# Patient Record
Sex: FEMALE | Race: WHITE | NOT HISPANIC OR LATINO | Employment: OTHER | URBAN - METROPOLITAN AREA
[De-identification: names, ages, dates, MRNs, and addresses within clinical notes are randomized per-mention and may not be internally consistent; named-entity substitution may affect disease eponyms.]

---

## 2017-03-23 RX ORDER — ATENOLOL 25 MG/1
TABLET ORAL
Qty: 90 TABLET | Refills: 0 | Status: SHIPPED | OUTPATIENT
Start: 2017-03-23 | End: 2017-06-05 | Stop reason: SDUPTHER

## 2017-03-23 RX ORDER — ATENOLOL 50 MG/1
TABLET ORAL
Qty: 90 TABLET | Refills: 0 | Status: SHIPPED | OUTPATIENT
Start: 2017-03-23 | End: 2017-06-05 | Stop reason: SDUPTHER

## 2017-03-23 RX ORDER — ZOLPIDEM TARTRATE 5 MG/1
TABLET ORAL
Qty: 15 TABLET | Refills: 0 | Status: SHIPPED | OUTPATIENT
Start: 2017-03-23 | End: 2017-06-05 | Stop reason: SDUPTHER

## 2017-04-13 ENCOUNTER — TELEPHONE (OUTPATIENT)
Dept: INTERNAL MEDICINE | Facility: CLINIC | Age: 82
End: 2017-04-13

## 2017-04-13 NOTE — TELEPHONE ENCOUNTER
----- Message from Mayela Gordon sent at 4/13/2017  3:27 PM CDT -----  Contact: Patient  Type: Sooner appointment than  is able to schedule    When is the first available appointment?  7/10/17    What is the nature of the appointment? Follow up     What appointment type:EP     Comments: The patient will be gone July, August, part of September, and October. Please call her at [phone].    Thanks!

## 2017-06-05 ENCOUNTER — OFFICE VISIT (OUTPATIENT)
Dept: INTERNAL MEDICINE | Facility: CLINIC | Age: 82
End: 2017-06-05
Payer: MEDICARE

## 2017-06-05 ENCOUNTER — HOSPITAL ENCOUNTER (OUTPATIENT)
Dept: RADIOLOGY | Facility: HOSPITAL | Age: 82
Discharge: HOME | End: 2017-06-05
Attending: INTERNAL MEDICINE
Payer: MEDICARE

## 2017-06-05 VITALS
HEART RATE: 56 BPM | SYSTOLIC BLOOD PRESSURE: 145 MMHG | HEIGHT: 66 IN | BODY MASS INDEX: 31.22 KG/M2 | WEIGHT: 194.25 LBS | DIASTOLIC BLOOD PRESSURE: 67 MMHG

## 2017-06-05 DIAGNOSIS — I10 ESSENTIAL HYPERTENSION: Primary | ICD-10-CM

## 2017-06-05 DIAGNOSIS — R52 PAIN: ICD-10-CM

## 2017-06-05 DIAGNOSIS — E78.5 HYPERLIPIDEMIA, UNSPECIFIED HYPERLIPIDEMIA TYPE: ICD-10-CM

## 2017-06-05 DIAGNOSIS — L98.9 FOOT LESION: ICD-10-CM

## 2017-06-05 DIAGNOSIS — E03.4 HYPOTHYROIDISM DUE TO ACQUIRED ATROPHY OF THYROID: ICD-10-CM

## 2017-06-05 PROCEDURE — 99999 PR PBB SHADOW E&M-EST. PATIENT-LVL V: CPT | Mod: PBBFAC,,, | Performed by: INTERNAL MEDICINE

## 2017-06-05 PROCEDURE — 73521 X-RAY EXAM HIPS BI 2 VIEWS: CPT | Mod: 26,,, | Performed by: RADIOLOGY

## 2017-06-05 PROCEDURE — 1159F MED LIST DOCD IN RCRD: CPT | Mod: S$GLB,,, | Performed by: INTERNAL MEDICINE

## 2017-06-05 PROCEDURE — 73030 X-RAY EXAM OF SHOULDER: CPT | Mod: TC,50

## 2017-06-05 PROCEDURE — 1157F ADVNC CARE PLAN IN RCRD: CPT | Mod: S$GLB,,, | Performed by: INTERNAL MEDICINE

## 2017-06-05 PROCEDURE — 73521 X-RAY EXAM HIPS BI 2 VIEWS: CPT | Mod: TC

## 2017-06-05 PROCEDURE — 73030 X-RAY EXAM OF SHOULDER: CPT | Mod: 26,50,, | Performed by: RADIOLOGY

## 2017-06-05 PROCEDURE — 99499 UNLISTED E&M SERVICE: CPT | Mod: S$GLB,,, | Performed by: INTERNAL MEDICINE

## 2017-06-05 PROCEDURE — 99214 OFFICE O/P EST MOD 30 MIN: CPT | Mod: S$GLB,,, | Performed by: INTERNAL MEDICINE

## 2017-06-05 PROCEDURE — 1125F AMNT PAIN NOTED PAIN PRSNT: CPT | Mod: S$GLB,,, | Performed by: INTERNAL MEDICINE

## 2017-06-05 RX ORDER — PRAVASTATIN SODIUM 40 MG/1
40 TABLET ORAL NIGHTLY
Qty: 90 TABLET | Refills: 3 | Status: SHIPPED | OUTPATIENT
Start: 2017-06-05 | End: 2017-06-05 | Stop reason: SDUPTHER

## 2017-06-05 RX ORDER — LOSARTAN POTASSIUM 100 MG/1
100 TABLET ORAL DAILY
Qty: 90 TABLET | Refills: 0 | Status: SHIPPED | OUTPATIENT
Start: 2017-06-05 | End: 2017-09-18 | Stop reason: SDUPTHER

## 2017-06-05 RX ORDER — LEVOTHYROXINE SODIUM 100 UG/1
100 TABLET ORAL DAILY
Qty: 90 TABLET | Refills: 3 | Status: SHIPPED | OUTPATIENT
Start: 2017-06-05 | End: 2017-06-05 | Stop reason: SDUPTHER

## 2017-06-05 RX ORDER — LEVOTHYROXINE SODIUM 100 UG/1
100 TABLET ORAL DAILY
Qty: 90 TABLET | Refills: 3 | Status: SHIPPED | OUTPATIENT
Start: 2017-06-05 | End: 2017-09-18 | Stop reason: SDUPTHER

## 2017-06-05 RX ORDER — ATENOLOL 25 MG/1
25 TABLET ORAL EVERY MORNING
Qty: 90 TABLET | Refills: 0 | Status: SHIPPED | OUTPATIENT
Start: 2017-06-05 | End: 2017-06-05 | Stop reason: SDUPTHER

## 2017-06-05 RX ORDER — LOSARTAN POTASSIUM 100 MG/1
100 TABLET ORAL DAILY
Qty: 90 TABLET | Refills: 0 | Status: SHIPPED | OUTPATIENT
Start: 2017-06-05 | End: 2017-06-05 | Stop reason: SDUPTHER

## 2017-06-05 RX ORDER — INDAPAMIDE 2.5 MG/1
2.5 TABLET ORAL DAILY
Qty: 30 TABLET | Refills: 11 | Status: SHIPPED | OUTPATIENT
Start: 2017-06-05 | End: 2017-06-09 | Stop reason: SDUPTHER

## 2017-06-05 RX ORDER — ATENOLOL 50 MG/1
50 TABLET ORAL NIGHTLY
Qty: 90 TABLET | Refills: 0 | Status: SHIPPED | OUTPATIENT
Start: 2017-06-05 | End: 2017-06-05 | Stop reason: SDUPTHER

## 2017-06-05 RX ORDER — PRAVASTATIN SODIUM 40 MG/1
40 TABLET ORAL NIGHTLY
Qty: 90 TABLET | Refills: 3 | Status: SHIPPED | OUTPATIENT
Start: 2017-06-05 | End: 2017-09-18 | Stop reason: SDUPTHER

## 2017-06-05 RX ORDER — INDAPAMIDE 2.5 MG/1
2.5 TABLET ORAL DAILY
Qty: 30 TABLET | Refills: 11 | Status: SHIPPED | OUTPATIENT
Start: 2017-06-05 | End: 2017-06-05 | Stop reason: SDUPTHER

## 2017-06-05 RX ORDER — MELATONIN 10 MG
1 CAPSULE ORAL NIGHTLY
COMMUNITY
End: 2018-09-11

## 2017-06-05 RX ORDER — ATENOLOL 50 MG/1
50 TABLET ORAL NIGHTLY
Qty: 90 TABLET | Refills: 0 | Status: SHIPPED | OUTPATIENT
Start: 2017-06-05 | End: 2017-09-11

## 2017-06-05 RX ORDER — ATENOLOL 25 MG/1
25 TABLET ORAL EVERY MORNING
Qty: 90 TABLET | Refills: 0 | Status: SHIPPED | OUTPATIENT
Start: 2017-06-05 | End: 2017-09-18 | Stop reason: SDUPTHER

## 2017-06-05 NOTE — PROGRESS NOTES
Subjective:       Patient ID: Tiffany Masterson is a 81 y.o. female.    Chief Complaint: Follow-up    HPI the patient is a 81-year-old female comes in for general checkup.  She has several concerns at this time.  She is noticing a lesion in between her fourth and fifth toes on the left foot.  She is put a small pad there and suspects it might be a corn.  The patient reports problems with constipation she has been using Dulcolax periodically about every 2-3 days.  She is not noticing any blood in the stool.  The patient reports pain in her left hip which is status post a total hip replacement.  She also notices pain in the right hip.  She has had no trauma or falls.  The patient also expresses pain in both shoulders.  Review of Systems   Constitutional: Negative for fatigue, fever and unexpected weight change.   HENT: Negative for congestion, hearing loss and sore throat.    Eyes: Negative for visual disturbance.   Respiratory: Negative for cough, shortness of breath and wheezing.    Cardiovascular: Negative for chest pain and palpitations.   Gastrointestinal: Negative for abdominal distention, abdominal pain, blood in stool, diarrhea, nausea and vomiting.   Genitourinary: Negative for difficulty urinating, dysuria, frequency and hematuria.   Musculoskeletal: Positive for arthralgias. Negative for joint swelling.   Skin: Negative for color change and rash.   Neurological: Negative for dizziness, syncope, weakness and headaches.   Hematological: Negative for adenopathy.   Psychiatric/Behavioral: Negative for confusion, decreased concentration and suicidal ideas.       Objective:      Physical Exam   Constitutional: She is oriented to person, place, and time. She appears well-developed and well-nourished.   HENT:   Head: Normocephalic.   Mouth/Throat: No oropharyngeal exudate.   Eyes: Conjunctivae and EOM are normal. Pupils are equal, round, and reactive to light. Right eye exhibits no discharge. Left eye exhibits no  discharge. No scleral icterus.   Neck: No JVD present. No tracheal deviation present. No thyromegaly present.   Cardiovascular: Normal rate, regular rhythm and normal heart sounds.  Exam reveals no gallop and no friction rub.    No murmur heard.  Pulmonary/Chest: Effort normal and breath sounds normal. No respiratory distress. She has no wheezes. She has no rales. She exhibits no tenderness.   Abdominal: Soft. Bowel sounds are normal. She exhibits no distension and no mass. There is no tenderness. There is no rebound and no guarding.   Musculoskeletal: Normal range of motion. She exhibits no edema or tenderness.   Crepitance noticed in the shoulders bilaterally.   Lymphadenopathy:     She has no cervical adenopathy.   Neurological: She is alert and oriented to person, place, and time. She displays normal reflexes. No cranial nerve deficit. She exhibits normal muscle tone. Coordination normal.   Skin: Skin is warm and dry. No rash noted. No erythema. No pallor.   A small verrucous-like lesion is present between the left fourth and fifth toe   Psychiatric: She has a normal mood and affect. Her behavior is normal. Judgment and thought content normal.       Assessment:       1. Essential hypertension    2. Hyperlipidemia, unspecified hyperlipidemia type    3. Hypothyroidism due to acquired atrophy of thyroid    4. Pain    5. Foot lesion        Plan:       1.  Refill medications  2.  Intensify antihypertensive therapy by adding indapamide 2.5 mg by mouth daily  3.  Bilateral shoulder x-rays  4.  Bilateral hip x-rays  5.  Refer to podiatry  6.  CBC, CMP  7.  Return to clinic in 2 months for follow-up of blood pressure

## 2017-06-06 ENCOUNTER — PATIENT MESSAGE (OUTPATIENT)
Dept: INTERNAL MEDICINE | Facility: CLINIC | Age: 82
End: 2017-06-06

## 2017-06-06 RX ORDER — ZOLPIDEM TARTRATE 5 MG/1
TABLET ORAL
Qty: 15 TABLET | Refills: 0 | Status: SHIPPED | OUTPATIENT
Start: 2017-06-06 | End: 2017-09-07 | Stop reason: SDUPTHER

## 2017-06-10 RX ORDER — INDAPAMIDE 2.5 MG/1
2.5 TABLET ORAL DAILY
Qty: 90 TABLET | Refills: 0 | Status: SHIPPED | OUTPATIENT
Start: 2017-06-10 | End: 2017-09-18 | Stop reason: SDUPTHER

## 2017-06-13 ENCOUNTER — OFFICE VISIT (OUTPATIENT)
Dept: PODIATRY | Facility: CLINIC | Age: 82
End: 2017-06-13
Payer: MEDICARE

## 2017-06-13 VITALS
SYSTOLIC BLOOD PRESSURE: 145 MMHG | DIASTOLIC BLOOD PRESSURE: 67 MMHG | HEART RATE: 60 BPM | WEIGHT: 194 LBS | BODY MASS INDEX: 31.18 KG/M2 | HEIGHT: 66 IN

## 2017-06-13 DIAGNOSIS — M79.675 PAIN OF TOE OF LEFT FOOT: ICD-10-CM

## 2017-06-13 DIAGNOSIS — L84 CORNS AND CALLUS: Primary | ICD-10-CM

## 2017-06-13 PROCEDURE — 99999 PR PBB SHADOW E&M-EST. PATIENT-LVL III: CPT | Mod: PBBFAC,,, | Performed by: PODIATRIST

## 2017-06-13 PROCEDURE — 1157F ADVNC CARE PLAN IN RCRD: CPT | Mod: S$GLB,,, | Performed by: PODIATRIST

## 2017-06-13 PROCEDURE — 1159F MED LIST DOCD IN RCRD: CPT | Mod: S$GLB,,, | Performed by: PODIATRIST

## 2017-06-13 PROCEDURE — 1126F AMNT PAIN NOTED NONE PRSNT: CPT | Mod: S$GLB,,, | Performed by: PODIATRIST

## 2017-06-13 PROCEDURE — 99214 OFFICE O/P EST MOD 30 MIN: CPT | Mod: S$GLB,,, | Performed by: PODIATRIST

## 2017-06-13 NOTE — LETTER
June 13, 2017      Lauro Vera Jr., MD  [address]  [facility] [address]           [place] [facility]  [address] [place] [address]  Phone: [phone]          Patient: Tiffany Masterson   MR Number: [id number]   YOB: 1935   Date of Visit: 6/13/2017       Dear Dr. Lauro Vera Jr.:    Thank you for referring Tiffany Masterson to me for evaluation. Attached you will find relevant portions of my assessment and plan of care.    If you have questions, please do not hesitate to call me. I look forward to following Tiffany Masterson along with you.    Sincerely,    Francine Sunshine DPM    Enclosure  CC:  No Recipients    If you would like to receive this communication electronically, please contact [email or web address] or [phone] to request more information on [facility] access.    For providers and/or their staff who would like to refer a patient to [facility], please contact us through our one-stop-shop provider referral line, [facility], at [phone].    If you feel you have received this communication in error or would no longer like to receive these types of communications, please e-mail [email or web address]

## 2017-06-13 NOTE — PROGRESS NOTES
CC:    Callus/corn      HPI:   Tiffany Masterson is a 81 y.o. female who presents to clinic with complaints of a painful corn on the left foot.   Patient reports pain has been present for a few months.   Treatment has included putting a cotton ball in between the toes.  Denies trauma to the feet.    She gets a pedicure about every month.       PMH:  Patient Active Problem List   Diagnosis    Hyperlipidemia    S/P colonoscopic polypectomy    TIA (transient ischemic attack)    Hypothyroidism    Essential hypertension    Vitamin D deficiency disease    CKD (chronic kidney disease) stage 3, GFR 30-59 ml/min    Atherosclerosis of right carotid artery    Osteopenia         Current Outpatient Prescriptions on File Prior to Visit   Medication Sig Dispense Refill    ascorbic acid (VITAMIN C) 250 MG tablet Take 250 mg by mouth once daily.      aspirin (ECOTRIN) 81 MG EC tablet Take 81 mg by mouth once daily.      atenolol (TENORMIN) 25 MG tablet Take 1 tablet (25 mg total) by mouth every morning. 90 tablet 0    atenolol (TENORMIN) 50 MG tablet Take 1 tablet (50 mg total) by mouth every evening. 90 tablet 0    CALCIUM CARBONATE/VITAMIN D3 (CALCIUM 600 + D,3, ORAL) Take 1 tablet by mouth once daily.       DEXTRAN 70/HYPROMELLOSE (ARTIFICIAL TEARS, PF, OPHT) Apply to eye.      DOCUSATE SODIUM (DULCOLAX STOOL SOFTENER, DSS, ORAL) Take by mouth.      erythromycin (ROMYCIN) ophthalmic ointment every evening.      indapamide (LOZOL) 2.5 MG Tab Take 1 tablet (2.5 mg total) by mouth once daily. 90 tablet 0    levothyroxine (SYNTHROID) 100 MCG tablet Take 1 tablet (100 mcg total) by mouth once daily. 90 tablet 3    losartan (COZAAR) 100 MG tablet Take 1 tablet (100 mg total) by mouth once daily. 90 tablet 0    melatonin 5 mg Tab Take 1 tablet by mouth every evening.      MULTIVITAMIN W-MINERALS/LUTEIN (CENTRUM SILVER ORAL) Take 1 tablet by mouth once daily.      pravastatin (PRAVACHOL) 40 MG tablet Take 1 tablet  "(40 mg total) by mouth nightly. 90 tablet 3    zolpidem (AMBIEN) 5 MG Tab TAKE ONE TABLET BY MOUTH AT BEDTIME AS NEEDED 15 tablet 0    estradiol (ESTRACE) 0.01 % (0.1 mg/gram) vaginal cream Place 1 g vaginally once daily. 42.5 g 1    [DISCONTINUED] ciclopirox (PENLAC) 8 % Soln nightly as needed.        No current facility-administered medications on file prior to visit.          ALLG:  Review of patient's allergies indicates:   Allergen Reactions    Levaquin [levofloxacin]      Joint pain    Hydrochlorothiazide Other (See Comments)     Pain in joints and depression per pt               ROS:  General ROS: negative for - chills, fatigue or fever  Cardiovascular ROS: no chest pain or dyspnea on exertion  Musculoskeletal ROS: negative for - joint pain or joint stiffness   Skin: Negative for rash, negative for nail or hair changes. Positive for  Corn/callus on the foot.       EXAM:  Vitals:    06/13/17 1401   BP: (!) 145/67   Pulse: 60   Weight: 88 kg (194 lb)   Height: 5' 6" (1.676 m)        General: alert and orient and in no apparent distress.      Bilateral foot:  Vasc: Palpable pedal pulses, feet appropriately warm to touch. Capillary refill time within normal limits.   Neuro: Epicritic sensation intact.  No focal deficits. No Tinels.   MSK:   normal toes without deformities and foot and ankle exam otherwise normal  Derm:    Corn on the medial left 5th toe and lateral left 4th toe.  No underlying wounds.    No other open lesions, macerations, or rashes noted.           ASSESSMENT / PLAN:    I counseled the patient on her conditions, their implications and medical management.        Corns and callus    Pain of toe of left foot     I filed the corns as a courtesy.  Moisturize the area daily.   Consider a toe sleeve to separate the toes.  A toe sleeve was provided for the patient to try.     Return if symptoms worsen or fail to improve, for Procedure Brief.        "

## 2017-09-08 ENCOUNTER — TELEPHONE (OUTPATIENT)
Dept: OBSTETRICS AND GYNECOLOGY | Facility: CLINIC | Age: 82
End: 2017-09-08

## 2017-09-08 RX ORDER — ATENOLOL 25 MG/1
TABLET ORAL
Qty: 90 TABLET | Refills: 0 | Status: SHIPPED | OUTPATIENT
Start: 2017-09-08 | End: 2017-09-18 | Stop reason: SDUPTHER

## 2017-09-08 RX ORDER — ATENOLOL 50 MG/1
TABLET ORAL
Qty: 90 TABLET | Refills: 0 | Status: SHIPPED | OUTPATIENT
Start: 2017-09-08 | End: 2017-09-11

## 2017-09-08 RX ORDER — LOSARTAN POTASSIUM 100 MG/1
TABLET ORAL
Qty: 90 TABLET | Refills: 0 | Status: SHIPPED | OUTPATIENT
Start: 2017-09-08 | End: 2017-09-18

## 2017-09-08 RX ORDER — ZOLPIDEM TARTRATE 5 MG/1
TABLET ORAL
Qty: 15 TABLET | Refills: 0 | Status: SHIPPED | OUTPATIENT
Start: 2017-09-08 | End: 2017-12-09 | Stop reason: SDUPTHER

## 2017-09-08 NOTE — TELEPHONE ENCOUNTER
----- Message from Carlin Olvera MA sent at 9/8/2017  4:33 PM CDT -----  Contact: Self  Pt called to schedule wwe visit.  Pt was informed that he is retiring pt was also informed that her last visit with him was in 2014 so she would be a np and he is not taking any np's.  Pt is requesting for Dr Zambrano to be informed that she wants to see him before he leaves because she knows him personally.  The pt can be reached at  [phone].  Thanks [place]

## 2017-09-11 RX ORDER — ATENOLOL 50 MG/1
TABLET ORAL
Qty: 90 TABLET | Refills: 0 | Status: SHIPPED | OUTPATIENT
Start: 2017-09-11 | End: 2017-09-18 | Stop reason: SDUPTHER

## 2017-09-12 ENCOUNTER — OFFICE VISIT (OUTPATIENT)
Dept: OBSTETRICS AND GYNECOLOGY | Facility: CLINIC | Age: 82
End: 2017-09-12
Attending: OBSTETRICS & GYNECOLOGY
Payer: MEDICARE

## 2017-09-12 VITALS
SYSTOLIC BLOOD PRESSURE: 130 MMHG | WEIGHT: 192.44 LBS | BODY MASS INDEX: 30.93 KG/M2 | DIASTOLIC BLOOD PRESSURE: 70 MMHG | HEIGHT: 66 IN

## 2017-09-12 DIAGNOSIS — Z12.31 ENCOUNTER FOR SCREENING MAMMOGRAM FOR MALIGNANT NEOPLASM OF BREAST: ICD-10-CM

## 2017-09-12 DIAGNOSIS — Z01.419 WELL WOMAN EXAM WITH ROUTINE GYNECOLOGICAL EXAM: ICD-10-CM

## 2017-09-12 DIAGNOSIS — N95.1 VAGINAL DRYNESS, MENOPAUSAL: ICD-10-CM

## 2017-09-12 DIAGNOSIS — Z92.89 HISTORY OF SCREENING MAMMOGRAPHY: ICD-10-CM

## 2017-09-12 DIAGNOSIS — N76.1 CHRONIC VAGINITIS: Primary | ICD-10-CM

## 2017-09-12 PROCEDURE — G0101 CA SCREEN;PELVIC/BREAST EXAM: HCPCS | Mod: S$GLB,,, | Performed by: OBSTETRICS & GYNECOLOGY

## 2017-09-12 PROCEDURE — 99999 PR PBB SHADOW E&M-EST. PATIENT-LVL III: CPT | Mod: PBBFAC,,, | Performed by: OBSTETRICS & GYNECOLOGY

## 2017-09-12 RX ORDER — ESTRADIOL 0.1 MG/G
1 CREAM VAGINAL DAILY
Qty: 42.5 G | Refills: 1 | Status: SHIPPED | OUTPATIENT
Start: 2017-09-12 | End: 2018-06-14 | Stop reason: SDUPTHER

## 2017-09-12 RX ORDER — NYSTATIN 100000 U/G
CREAM TOPICAL 2 TIMES DAILY
Qty: 30 G | Refills: 4 | Status: SHIPPED | OUTPATIENT
Start: 2017-09-12 | End: 2018-09-11

## 2017-09-12 NOTE — PROGRESS NOTES
SUBJECTIVE:   81 y.o. female   for annual routine Pap and checkup. No LMP recorded. Patient is postmenopausal..      Past Medical History:   Diagnosis Date    Arthritis     Family history of malignant neoplasm of gastrointestinal tract mother    History of colonoscopy     unremarkable  by Dr. Javier.  Barium enema revealed diverticular disease.    Hyperlipidemia     Hypertension     Hypothyroidism     Personal history of colonic polyps     TIA (transient ischemic attack)     with a normal angiogram in the past    Unspecified essential hypertension 2015     Past Surgical History:   Procedure Laterality Date    CATARACT EXTRACTION      right/ Dr. Lexis Nicolas     COLONOSCOPY       polyps    COLONOSCOPY N/A 2016    Procedure: COLONOSCOPY;  Surgeon: Bebeto Gonzalez MD;  Location: [facility] ([address] [place] [address]);  Service: Endoscopy;  Laterality: N/A;    COLONOSCOPY W/ POLYPECTOMY  2013    polyp of colon    EYE SURGERY  11-10-14    right retina/[facility]. Dalton [facility])    JOINT REPLACEMENT  3/23/2011    left total hip replacement    TONSILLECTOMY      pt was 11 years old     Social History     Social History    Marital status:      Spouse name: N/A    Number of children: N/A    Years of education: N/A     Occupational History    Not on file.     Social History Main Topics    Smoking status: Never Smoker    Smokeless tobacco: Never Used      Comment: The patient is not getting any regular exercise at this time.  She does enjoy traveling to [place].    Alcohol use 1.2 oz/week     2 Shots of liquor per week      Comment: daily    Drug use: No    Sexual activity: Yes     Other Topics Concern    Not on file     Social History Narrative    No narrative on file     Family History   Problem Relation Age of Onset    Cancer Mother 75     colon    Cancer Father      lung    Diabetes Other     Cancer Maternal Aunt 80     colon cancer     OB History    Para Term   AB Living   3 3 3         SAB TAB Ectopic Multiple Live Births                  # Outcome Date GA Lbr Ed/2nd Weight Sex Delivery Anes PTL Lv   3 Term      Vag-Spont      2 Term      Vag-Spont      1 Term      Vag-Spont             Current Outpatient Prescriptions   Medication Sig Dispense Refill    ascorbic acid (VITAMIN C) 250 MG tablet Take 250 mg by mouth once daily.      aspirin (ECOTRIN) 81 MG EC tablet Take 81 mg by mouth once daily.      atenolol (TENORMIN) 25 MG tablet Take 1 tablet (25 mg total) by mouth every morning. 90 tablet 0    atenolol (TENORMIN) 25 MG tablet TAKE ONE TABLET BY MOUTH IN THE MORNING 90 tablet 0    atenolol (TENORMIN) 50 MG tablet TAKE ONE TABLET BY MOUTH IN THE EVENING 90 tablet 0    CALCIUM CARBONATE/VITAMIN D3 (CALCIUM 600 + D,3, ORAL) Take 1 tablet by mouth once daily.       DEXTRAN 70/HYPROMELLOSE (ARTIFICIAL TEARS, PF, OPHT) Apply to eye.      DOCUSATE SODIUM (DULCOLAX STOOL SOFTENER, DSS, ORAL) Take by mouth.      erythromycin (ROMYCIN) ophthalmic ointment every evening.      indapamide (LOZOL) 2.5 MG Tab Take 1 tablet (2.5 mg total) by mouth once daily. 90 tablet 0    levothyroxine (SYNTHROID) 100 MCG tablet Take 1 tablet (100 mcg total) by mouth once daily. 90 tablet 3    losartan (COZAAR) 100 MG tablet Take 1 tablet (100 mg total) by mouth once daily. 90 tablet 0    losartan (COZAAR) 100 MG tablet TAKE ONE TABLET BY MOUTH ONCE DAILY 90 tablet 0    melatonin 5 mg Tab Take 1 tablet by mouth every evening.      MULTIVITAMIN W-MINERALS/LUTEIN (CENTRUM SILVER ORAL) Take 1 tablet by mouth once daily.      pravastatin (PRAVACHOL) 40 MG tablet Take 1 tablet (40 mg total) by mouth nightly. 90 tablet 3    zolpidem (AMBIEN) 5 MG Tab TAKE ONE TABLET BY MOUTH ONCE DAILY AT BEDTIME AS NEEDED 15 tablet 0    estradiol (ESTRACE) 0.01 % (0.1 mg/gram) vaginal cream Place 1 g vaginally once daily. 42.5 g 1     No current facility-administered medications for this  "visit.      Allergies: Levaquin [levofloxacin] and Hydrochlorothiazide     CHIEF COMPLAINT: She has no unusual complaints.   Annual visit Yes      ROS:  Constitutional: no weight loss, weight gain, fever, fatigue  Eyes:  No vision changes, glasses/contacts  ENT/Mouth: No ulcers, sinus problems, ears ringing, headache  Cardiovascular: No inability to lie flat, chest pain, exercise intolerance, swelling, heart palpitations  Respiratory: No wheezing, coughing blood, shortness of breath, or cough  Gastrointestinal: No diarrhea, bloody stool, nausea/vomiting, constipation, gas, hemorrhoids  Genitourinary: No blood in urine, painful urination, urgency of urination, frequency of urination, incomplete emptying, incontinence, abnormal bleeding, painful periods, heavy periods, vaginal discharge, vaginal odor, painful intercourse, sexual problems, bleeding after intercourse.  Musculoskeletal: No muscle weakness  Skin/Breast: No painful breasts, nipple discharge, masses, rash, ulcers  Neurological: No passing out, seizures, numbness, headache  Endocrine: No diabetes, hypothyroid, hyperthyroid, hot flashes, hair loss, abnormal hair growth, ance  Psychiatric: No depression, crying  Hematologic: No bruises, bleeding, swollen lymph nodes, anemia.      OBJECTIVE:   The patient appears well, alert, oriented x 3, in no distress.  /70   Ht 5' 6" (1.676 m)   Wt 87.3 kg (192 lb 7.4 oz)   BMI 31.06 kg/m²   NECK: no thyromegaly, trachea midline  SKIN: no acne, striae, hirsutism  BREAST EXAM: breasts appear normal, no suspicious masses, no skin or nipple changes or axillary nodes  ABDOMEN: no hernias, masses, or hepatosplenomegaly  GENITALIA: normal external genitalia, no erythema, no discharge  URETHRA: normal urethra, normal urethral meatus  VAGINA: Normal  CERVIX: no lesions or cervical motion tenderness  UTERUS: normal size, contour, position, consistency, mobility, non-tender  ADNEXA: no mass, fullness, " tenderness      ASSESSMENT:   Well woman  Vaginal dryness    PLAN:   mammogram  return annually or prn  estrace cream

## 2017-09-15 ENCOUNTER — HOSPITAL ENCOUNTER (OUTPATIENT)
Dept: RADIOLOGY | Facility: HOSPITAL | Age: 82
Discharge: HOME | End: 2017-09-15
Attending: OBSTETRICS & GYNECOLOGY
Payer: MEDICARE

## 2017-09-15 VITALS — BODY MASS INDEX: 30.86 KG/M2 | HEIGHT: 66 IN | WEIGHT: 192 LBS

## 2017-09-15 DIAGNOSIS — Z92.89 HISTORY OF SCREENING MAMMOGRAPHY: ICD-10-CM

## 2017-09-15 DIAGNOSIS — Z12.31 ENCOUNTER FOR SCREENING MAMMOGRAM FOR MALIGNANT NEOPLASM OF BREAST: ICD-10-CM

## 2017-09-15 PROCEDURE — 77067 SCR MAMMO BI INCL CAD: CPT | Mod: 26,,, | Performed by: RADIOLOGY

## 2017-09-15 PROCEDURE — 77067 SCR MAMMO BI INCL CAD: CPT | Mod: TC

## 2017-09-15 PROCEDURE — 77063 BREAST TOMOSYNTHESIS BI: CPT | Mod: 26,,, | Performed by: RADIOLOGY

## 2017-09-18 ENCOUNTER — OFFICE VISIT (OUTPATIENT)
Dept: INTERNAL MEDICINE | Facility: CLINIC | Age: 82
End: 2017-09-18
Payer: MEDICARE

## 2017-09-18 VITALS
SYSTOLIC BLOOD PRESSURE: 118 MMHG | HEIGHT: 66 IN | DIASTOLIC BLOOD PRESSURE: 62 MMHG | OXYGEN SATURATION: 97 % | HEART RATE: 61 BPM | WEIGHT: 188.25 LBS | BODY MASS INDEX: 30.25 KG/M2

## 2017-09-18 DIAGNOSIS — I10 ESSENTIAL HYPERTENSION: Primary | ICD-10-CM

## 2017-09-18 DIAGNOSIS — E78.5 HYPERLIPIDEMIA, UNSPECIFIED HYPERLIPIDEMIA TYPE: ICD-10-CM

## 2017-09-18 PROCEDURE — 3078F DIAST BP <80 MM HG: CPT | Mod: S$GLB,,, | Performed by: INTERNAL MEDICINE

## 2017-09-18 PROCEDURE — 1157F ADVNC CARE PLAN IN RCRD: CPT | Mod: S$GLB,,, | Performed by: INTERNAL MEDICINE

## 2017-09-18 PROCEDURE — 1159F MED LIST DOCD IN RCRD: CPT | Mod: S$GLB,,, | Performed by: INTERNAL MEDICINE

## 2017-09-18 PROCEDURE — 99999 PR PBB SHADOW E&M-EST. PATIENT-LVL III: CPT | Mod: PBBFAC,,, | Performed by: INTERNAL MEDICINE

## 2017-09-18 PROCEDURE — 3008F BODY MASS INDEX DOCD: CPT | Mod: S$GLB,,, | Performed by: INTERNAL MEDICINE

## 2017-09-18 PROCEDURE — 3074F SYST BP LT 130 MM HG: CPT | Mod: S$GLB,,, | Performed by: INTERNAL MEDICINE

## 2017-09-18 PROCEDURE — 99499 UNLISTED E&M SERVICE: CPT | Mod: S$GLB,,, | Performed by: INTERNAL MEDICINE

## 2017-09-18 PROCEDURE — 99213 OFFICE O/P EST LOW 20 MIN: CPT | Mod: S$GLB,,, | Performed by: INTERNAL MEDICINE

## 2017-09-18 RX ORDER — LEVOTHYROXINE SODIUM 100 UG/1
100 TABLET ORAL DAILY
Qty: 90 TABLET | Refills: 3 | Status: SHIPPED | OUTPATIENT
Start: 2017-09-18 | End: 2017-09-18 | Stop reason: SDUPTHER

## 2017-09-18 RX ORDER — ATENOLOL 25 MG/1
25 TABLET ORAL EVERY MORNING
Qty: 90 TABLET | Refills: 3 | Status: SHIPPED | OUTPATIENT
Start: 2017-09-18 | End: 2017-09-18

## 2017-09-18 RX ORDER — LOSARTAN POTASSIUM 100 MG/1
100 TABLET ORAL DAILY
Qty: 90 TABLET | Refills: 3 | Status: SHIPPED | OUTPATIENT
Start: 2017-09-18 | End: 2017-09-18 | Stop reason: SDUPTHER

## 2017-09-18 RX ORDER — PRAVASTATIN SODIUM 40 MG/1
40 TABLET ORAL NIGHTLY
Qty: 90 TABLET | Refills: 3 | Status: SHIPPED | OUTPATIENT
Start: 2017-09-18 | End: 2018-12-04 | Stop reason: SDUPTHER

## 2017-09-18 RX ORDER — INDAPAMIDE 2.5 MG/1
2.5 TABLET ORAL DAILY
Qty: 90 TABLET | Refills: 3 | Status: SHIPPED | OUTPATIENT
Start: 2017-09-18 | End: 2018-12-04 | Stop reason: SDUPTHER

## 2017-09-18 RX ORDER — ATENOLOL 50 MG/1
50 TABLET ORAL NIGHTLY
Qty: 90 TABLET | Refills: 3 | Status: SHIPPED | OUTPATIENT
Start: 2017-09-18 | End: 2017-09-18 | Stop reason: SDUPTHER

## 2017-09-18 RX ORDER — ATENOLOL 50 MG/1
50 TABLET ORAL NIGHTLY
Qty: 90 TABLET | Refills: 3 | Status: SHIPPED | OUTPATIENT
Start: 2017-09-18 | End: 2018-03-06

## 2017-09-18 RX ORDER — PRAVASTATIN SODIUM 40 MG/1
40 TABLET ORAL NIGHTLY
Qty: 90 TABLET | Refills: 3 | Status: SHIPPED | OUTPATIENT
Start: 2017-09-18 | End: 2017-09-18 | Stop reason: SDUPTHER

## 2017-09-18 RX ORDER — LOSARTAN POTASSIUM 100 MG/1
100 TABLET ORAL DAILY
Qty: 90 TABLET | Refills: 3 | Status: SHIPPED | OUTPATIENT
Start: 2017-09-18 | End: 2018-12-05 | Stop reason: SDUPTHER

## 2017-09-18 RX ORDER — ATENOLOL 25 MG/1
25 TABLET ORAL EVERY MORNING
Qty: 90 TABLET | Refills: 0 | Status: SHIPPED | OUTPATIENT
Start: 2017-09-18 | End: 2018-03-06

## 2017-09-18 RX ORDER — LEVOTHYROXINE SODIUM 100 UG/1
100 TABLET ORAL DAILY
Qty: 90 TABLET | Refills: 3 | Status: SHIPPED | OUTPATIENT
Start: 2017-09-18 | End: 2018-12-06 | Stop reason: SDUPTHER

## 2017-09-18 RX ORDER — INDAPAMIDE 2.5 MG/1
2.5 TABLET ORAL DAILY
Qty: 90 TABLET | Refills: 3 | Status: SHIPPED | OUTPATIENT
Start: 2017-09-18 | End: 2017-09-18 | Stop reason: SDUPTHER

## 2017-09-19 NOTE — PROGRESS NOTES
Subjective:       Patient ID: Tiffany Masterson is a 81 y.o. female.    Chief Complaint: Follow-up    HPI the patient is a 81-year-old female who comes in for follow-up of her hypertension.  She is tolerating her new medication well.  Blood pressures at home have been 133/67 maximum.  She is not noticing any side effects on the medication  The patient does report some difficulty with occasional back pain.  She is also noticing occasional episodes where her vision gets transiently blurry.  We discussed following up with her ophthalmologist.  She will be seeing Dr. Nicolas for further evaluation of this visual disturbance.  Review of Systems   Constitutional: Negative.  Negative for activity change, appetite change and fatigue.   Eyes: Positive for visual disturbance.   Respiratory: Negative for cough, chest tightness and shortness of breath.    Cardiovascular: Negative for chest pain and palpitations.   Musculoskeletal: Positive for back pain.       Objective:      Physical Exam   Constitutional: She appears well-developed and well-nourished. No distress.   Cardiovascular: Normal rate, regular rhythm and normal heart sounds.  Exam reveals no gallop and no friction rub.    No murmur heard.  Pulmonary/Chest: Effort normal and breath sounds normal. No respiratory distress. She has no wheezes. She has no rales.   Musculoskeletal:   No vertebral body tenderness.  Negative straight leg raising bilaterally.  Reflexes in lower extremity bilaterally symmetric and 1+.   Skin: She is not diaphoretic.       Assessment:       1. Essential hypertension    2. Hyperlipidemia, unspecified hyperlipidemia type      3.  Low back pain, was likely related to osteoarthritic changes.  Plan:       1.  Back exercises  2.  The patient will follow up with her ophthalmologist regarding her occasional episodes of blurriness of vision  3.  Refill medications  4.  Return to clinic in 6 months

## 2017-12-11 RX ORDER — ZOLPIDEM TARTRATE 5 MG/1
TABLET ORAL
Qty: 15 TABLET | Refills: 0 | Status: SHIPPED | OUTPATIENT
Start: 2017-12-11 | End: 2018-03-05 | Stop reason: SDUPTHER

## 2018-01-23 ENCOUNTER — PES CALL (OUTPATIENT)
Dept: ADMINISTRATIVE | Facility: CLINIC | Age: 83
End: 2018-01-23

## 2018-03-01 ENCOUNTER — TELEPHONE (OUTPATIENT)
Dept: INTERNAL MEDICINE | Facility: CLINIC | Age: 83
End: 2018-03-01

## 2018-03-01 NOTE — TELEPHONE ENCOUNTER
----- Message from Connor Paz sent at 2/28/2018  3:45 PM CST -----  Contact: self [phone]  Patient requesting a call from the office to discuss getting an appointment with MD Before he retired . Please call and advise, Thanks

## 2018-03-06 RX ORDER — ZOLPIDEM TARTRATE 5 MG/1
TABLET ORAL
Qty: 15 TABLET | Refills: 0 | Status: SHIPPED | OUTPATIENT
Start: 2018-03-06 | End: 2018-06-21 | Stop reason: SDUPTHER

## 2018-03-06 RX ORDER — ATENOLOL 50 MG/1
TABLET ORAL
Qty: 90 TABLET | Refills: 0 | Status: SHIPPED | OUTPATIENT
Start: 2018-03-06 | End: 2018-06-14 | Stop reason: SDUPTHER

## 2018-03-09 RX ORDER — ATENOLOL 25 MG/1
TABLET ORAL
Qty: 90 TABLET | Refills: 0 | Status: SHIPPED | OUTPATIENT
Start: 2018-03-09 | End: 2018-06-14 | Stop reason: SDUPTHER

## 2018-03-22 ENCOUNTER — PES CALL (OUTPATIENT)
Dept: ADMINISTRATIVE | Facility: CLINIC | Age: 83
End: 2018-03-22

## 2018-04-10 ENCOUNTER — OFFICE VISIT (OUTPATIENT)
Dept: INTERNAL MEDICINE | Facility: CLINIC | Age: 83
End: 2018-04-10
Payer: MEDICARE

## 2018-04-10 VITALS
DIASTOLIC BLOOD PRESSURE: 52 MMHG | SYSTOLIC BLOOD PRESSURE: 116 MMHG | OXYGEN SATURATION: 97 % | WEIGHT: 190.06 LBS | BODY MASS INDEX: 30.67 KG/M2 | HEART RATE: 73 BPM

## 2018-04-10 DIAGNOSIS — Z00.00 ROUTINE CHECK-UP: ICD-10-CM

## 2018-04-10 DIAGNOSIS — E03.4 HYPOTHYROIDISM DUE TO ACQUIRED ATROPHY OF THYROID: Primary | Chronic | ICD-10-CM

## 2018-04-10 DIAGNOSIS — I10 ESSENTIAL HYPERTENSION: ICD-10-CM

## 2018-04-10 DIAGNOSIS — E78.5 HYPERLIPIDEMIA, UNSPECIFIED HYPERLIPIDEMIA TYPE: ICD-10-CM

## 2018-04-10 LAB
BACTERIA #/AREA URNS AUTO: ABNORMAL /HPF
BILIRUB UR QL STRIP: NEGATIVE
CLARITY UR REFRACT.AUTO: ABNORMAL
COLOR UR AUTO: YELLOW
GLUCOSE UR QL STRIP: NEGATIVE
HGB UR QL STRIP: NEGATIVE
HYALINE CASTS UR QL AUTO: 50 /LPF
KETONES UR QL STRIP: NEGATIVE
LEUKOCYTE ESTERASE UR QL STRIP: ABNORMAL
MICROSCOPIC COMMENT: ABNORMAL
NITRITE UR QL STRIP: NEGATIVE
PH UR STRIP: 5 [PH] (ref 5–8)
PROT UR QL STRIP: NEGATIVE
SP GR UR STRIP: 1.01 (ref 1–1.03)
URN SPEC COLLECT METH UR: ABNORMAL
UROBILINOGEN UR STRIP-ACNC: NEGATIVE EU/DL
WBC #/AREA URNS AUTO: 96 /HPF (ref 0–5)

## 2018-04-10 PROCEDURE — 99499 UNLISTED E&M SERVICE: CPT | Mod: S$PBB,,, | Performed by: INTERNAL MEDICINE

## 2018-04-10 PROCEDURE — 81001 URINALYSIS AUTO W/SCOPE: CPT

## 2018-04-10 PROCEDURE — 99214 OFFICE O/P EST MOD 30 MIN: CPT | Mod: S$GLB,,, | Performed by: INTERNAL MEDICINE

## 2018-04-10 PROCEDURE — 3078F DIAST BP <80 MM HG: CPT | Mod: CPTII,S$GLB,, | Performed by: INTERNAL MEDICINE

## 2018-04-10 PROCEDURE — 3074F SYST BP LT 130 MM HG: CPT | Mod: CPTII,S$GLB,, | Performed by: INTERNAL MEDICINE

## 2018-04-10 PROCEDURE — 99999 PR PBB SHADOW E&M-EST. PATIENT-LVL V: CPT | Mod: PBBFAC,,, | Performed by: INTERNAL MEDICINE

## 2018-04-10 RX ORDER — TRIAMCINOLONE ACETONIDE 1 MG/G
CREAM TOPICAL 2 TIMES DAILY PRN
COMMUNITY
End: 2018-08-30

## 2018-04-11 ENCOUNTER — PATIENT MESSAGE (OUTPATIENT)
Dept: INTERNAL MEDICINE | Facility: CLINIC | Age: 83
End: 2018-04-11

## 2018-04-11 ENCOUNTER — LAB VISIT (OUTPATIENT)
Dept: LAB | Facility: HOSPITAL | Age: 83
End: 2018-04-11
Attending: INTERNAL MEDICINE
Payer: MEDICARE

## 2018-04-11 DIAGNOSIS — E78.5 HYPERLIPIDEMIA, UNSPECIFIED HYPERLIPIDEMIA TYPE: ICD-10-CM

## 2018-04-11 DIAGNOSIS — I10 ESSENTIAL HYPERTENSION: ICD-10-CM

## 2018-04-11 DIAGNOSIS — E03.4 HYPOTHYROIDISM DUE TO ACQUIRED ATROPHY OF THYROID: Chronic | ICD-10-CM

## 2018-04-11 LAB
ALBUMIN SERPL BCP-MCNC: 3.8 G/DL
ALP SERPL-CCNC: 76 U/L
ALT SERPL W/O P-5'-P-CCNC: 27 U/L
ANION GAP SERPL CALC-SCNC: 8 MMOL/L
AST SERPL-CCNC: 22 U/L
BASOPHILS # BLD AUTO: 0.05 K/UL
BASOPHILS NFR BLD: 0.6 %
BILIRUB SERPL-MCNC: 0.6 MG/DL
BUN SERPL-MCNC: 15 MG/DL
CALCIUM SERPL-MCNC: 9 MG/DL
CHLORIDE SERPL-SCNC: 101 MMOL/L
CHOLEST SERPL-MCNC: 163 MG/DL
CHOLEST/HDLC SERPL: 4 {RATIO}
CO2 SERPL-SCNC: 26 MMOL/L
CREAT SERPL-MCNC: 1.2 MG/DL
DIFFERENTIAL METHOD: ABNORMAL
EOSINOPHIL # BLD AUTO: 0.5 K/UL
EOSINOPHIL NFR BLD: 6.5 %
ERYTHROCYTE [DISTWIDTH] IN BLOOD BY AUTOMATED COUNT: 12.6 %
EST. GFR  (AFRICAN AMERICAN): 49 ML/MIN/1.73 M^2
EST. GFR  (NON AFRICAN AMERICAN): 43 ML/MIN/1.73 M^2
GLUCOSE SERPL-MCNC: 103 MG/DL
HCT VFR BLD AUTO: 35.5 %
HDLC SERPL-MCNC: 41 MG/DL
HDLC SERPL: 25.2 %
HGB BLD-MCNC: 11.6 G/DL
LDLC SERPL CALC-MCNC: 88.6 MG/DL
LYMPHOCYTES # BLD AUTO: 1.9 K/UL
LYMPHOCYTES NFR BLD: 22.6 %
MCH RBC QN AUTO: 29.4 PG
MCHC RBC AUTO-ENTMCNC: 32.7 G/DL
MCV RBC AUTO: 90 FL
MONOCYTES # BLD AUTO: 1 K/UL
MONOCYTES NFR BLD: 11.6 %
NEUTROPHILS # BLD AUTO: 4.8 K/UL
NEUTROPHILS NFR BLD: 58.3 %
NONHDLC SERPL-MCNC: 122 MG/DL
PLATELET # BLD AUTO: 261 K/UL
PMV BLD AUTO: 10.5 FL
POTASSIUM SERPL-SCNC: 4.4 MMOL/L
PROT SERPL-MCNC: 7.1 G/DL
RBC # BLD AUTO: 3.94 M/UL
SODIUM SERPL-SCNC: 135 MMOL/L
TRIGL SERPL-MCNC: 167 MG/DL
TSH SERPL DL<=0.005 MIU/L-ACNC: 1.83 UIU/ML
WBC # BLD AUTO: 8.26 K/UL

## 2018-04-11 PROCEDURE — 80053 COMPREHEN METABOLIC PANEL: CPT

## 2018-04-11 PROCEDURE — 36415 COLL VENOUS BLD VENIPUNCTURE: CPT

## 2018-04-11 PROCEDURE — 85025 COMPLETE CBC W/AUTO DIFF WBC: CPT

## 2018-04-11 PROCEDURE — 80061 LIPID PANEL: CPT

## 2018-04-11 PROCEDURE — 84443 ASSAY THYROID STIM HORMONE: CPT

## 2018-04-11 RX ORDER — SULFAMETHOXAZOLE AND TRIMETHOPRIM 800; 160 MG/1; MG/1
1 TABLET ORAL 2 TIMES DAILY
Qty: 6 TABLET | Refills: 0 | Status: SHIPPED | OUTPATIENT
Start: 2018-04-11 | End: 2018-06-14

## 2018-04-11 NOTE — PROGRESS NOTES
Subjective:       Patient ID: Tiffany Masterson is a 82 y.o. female.    Chief Complaint: Cough and Follow-up    HPI the patient is an 82-year-old female comes in for general checkup.  She has generally been feeling well.  She has noticed a very slight cough which she attributes to recent upper strength infection.  It is not associated with any shortness of breath or wheezing.  The patient has noticed a mild pelvic discomfort which has been poorly characterized.  She does wish to undergo a GYN evaluation and be referred to gynecology.  She is asked to see Dr. Karissa Vanessa.  Review of Systems   Constitutional: Negative for fatigue, fever and unexpected weight change.   HENT: Negative for congestion, hearing loss and sore throat.    Eyes: Negative for visual disturbance.   Respiratory: Positive for cough. Negative for shortness of breath and wheezing.    Cardiovascular: Negative for chest pain and palpitations.   Gastrointestinal: Negative for abdominal distention, abdominal pain, blood in stool, diarrhea, nausea and vomiting.   Genitourinary: Negative for difficulty urinating, dysuria, frequency and hematuria.   Musculoskeletal: Negative for arthralgias and joint swelling.   Skin: Negative for color change and rash.   Neurological: Negative for dizziness, syncope, weakness and headaches.   Hematological: Negative for adenopathy.   Psychiatric/Behavioral: Negative for confusion, decreased concentration and suicidal ideas.       Objective:      Physical Exam   Constitutional: She is oriented to person, place, and time. She appears well-developed and well-nourished.   HENT:   Head: Normocephalic.   Mouth/Throat: No oropharyngeal exudate.   Eyes: Conjunctivae and EOM are normal. Pupils are equal, round, and reactive to light. Right eye exhibits no discharge. Left eye exhibits no discharge. No scleral icterus.   Neck: No JVD present. No tracheal deviation present. No thyromegaly present.   Cardiovascular: Normal rate,  regular rhythm and normal heart sounds.  Exam reveals no gallop and no friction rub.    No murmur heard.  Pulmonary/Chest: Effort normal and breath sounds normal. No respiratory distress. She has no wheezes. She has no rales. She exhibits no tenderness.   Abdominal: Soft. Bowel sounds are normal. She exhibits no distension and no mass. There is no tenderness. There is no rebound and no guarding.   Musculoskeletal: Normal range of motion. She exhibits no edema or tenderness.   Lymphadenopathy:     She has no cervical adenopathy.   Neurological: She is alert and oriented to person, place, and time. She displays normal reflexes. No cranial nerve deficit. She exhibits normal muscle tone. Coordination normal.   Skin: Skin is warm and dry. No rash noted. No erythema. No pallor.   Psychiatric: She has a normal mood and affect. Her behavior is normal. Judgment and thought content normal.       Assessment:       1. Hypothyroidism due to acquired atrophy of thyroid    2. Hyperlipidemia, unspecified hyperlipidemia type    3. Essential hypertension    4. Routine check-up        Plan:       1.  Fasting lipids, TSH, urinalysis, CMP, CBC  2.  Refer to GYN  3.  Return to clinic in 1 year

## 2018-04-30 ENCOUNTER — TELEPHONE (OUTPATIENT)
Dept: OBSTETRICS AND GYNECOLOGY | Facility: CLINIC | Age: 83
End: 2018-04-30

## 2018-04-30 NOTE — TELEPHONE ENCOUNTER
Returning patients call. Informed patient that, Dr Vanessa assistant Tiffany will contact her on Wednesday to schedule appointment. Patient also stated that she has spoken to Dr Vanessa's Wife in reference to appointment.           ----- Message from Karissa Bello sent at 4/28/2018  9:42 AM CDT -----  Contact: pt [phone]  Pt called, she would like a call back from staff to schedule an apt as a new pt for a well woman exam.    Pt can be reached at [phone]    Thanks  christi

## 2018-05-01 ENCOUNTER — PATIENT MESSAGE (OUTPATIENT)
Dept: ADMINISTRATIVE | Facility: OTHER | Age: 83
End: 2018-05-01

## 2018-05-17 ENCOUNTER — TELEPHONE (OUTPATIENT)
Dept: OBSTETRICS AND GYNECOLOGY | Facility: CLINIC | Age: 83
End: 2018-05-17

## 2018-05-17 NOTE — TELEPHONE ENCOUNTER
----- Message from Carlin Olvera MA sent at 5/17/2018 11:03 AM CDT -----  Contact: Self  I called pt to r/s her appt for 6/7.  I offered to schedule her with [facility] pt states that she is wanting to know if dr Vanessa can see her at another time in June because she will be out of town in July and August.  The pt can be reached at [phone].  Thanks [place]

## 2018-06-14 ENCOUNTER — OFFICE VISIT (OUTPATIENT)
Dept: OBSTETRICS AND GYNECOLOGY | Facility: CLINIC | Age: 83
End: 2018-06-14
Payer: MEDICARE

## 2018-06-14 VITALS
DIASTOLIC BLOOD PRESSURE: 76 MMHG | WEIGHT: 188.5 LBS | BODY MASS INDEX: 30.29 KG/M2 | SYSTOLIC BLOOD PRESSURE: 134 MMHG | HEIGHT: 66 IN

## 2018-06-14 DIAGNOSIS — I10 HYPERTENSION, UNSPECIFIED TYPE: ICD-10-CM

## 2018-06-14 DIAGNOSIS — R10.2 PELVIC PAIN IN FEMALE: ICD-10-CM

## 2018-06-14 DIAGNOSIS — Z01.419 ENCOUNTER FOR GYNECOLOGICAL EXAMINATION WITHOUT ABNORMAL FINDING: Primary | ICD-10-CM

## 2018-06-14 DIAGNOSIS — N95.1 VAGINAL DRYNESS, MENOPAUSAL: ICD-10-CM

## 2018-06-14 DIAGNOSIS — R39.9 UTI SYMPTOMS: ICD-10-CM

## 2018-06-14 DIAGNOSIS — Z12.31 VISIT FOR SCREENING MAMMOGRAM: ICD-10-CM

## 2018-06-14 PROCEDURE — 81001 URINALYSIS AUTO W/SCOPE: CPT

## 2018-06-14 PROCEDURE — 99499 UNLISTED E&M SERVICE: CPT | Mod: S$PBB,,, | Performed by: OBSTETRICS & GYNECOLOGY

## 2018-06-14 PROCEDURE — 87086 URINE CULTURE/COLONY COUNT: CPT

## 2018-06-14 PROCEDURE — G0101 CA SCREEN;PELVIC/BREAST EXAM: HCPCS | Mod: S$GLB,,, | Performed by: OBSTETRICS & GYNECOLOGY

## 2018-06-14 PROCEDURE — 99999 PR PBB SHADOW E&M-EST. PATIENT-LVL III: CPT | Mod: PBBFAC,,, | Performed by: OBSTETRICS & GYNECOLOGY

## 2018-06-14 RX ORDER — ATENOLOL 50 MG/1
50 TABLET ORAL NIGHTLY
Qty: 90 TABLET | Refills: 0 | Status: SHIPPED | OUTPATIENT
Start: 2018-06-14 | End: 2018-12-06 | Stop reason: SDUPTHER

## 2018-06-14 RX ORDER — ESTRADIOL 0.1 MG/G
1 CREAM VAGINAL DAILY
Qty: 42.5 G | Refills: 1 | Status: SHIPPED | OUTPATIENT
Start: 2018-06-14 | End: 2018-09-11

## 2018-06-14 RX ORDER — ATENOLOL 25 MG/1
25 TABLET ORAL EVERY MORNING
Qty: 90 TABLET | Refills: 0 | Status: SHIPPED | OUTPATIENT
Start: 2018-06-14 | End: 2018-09-19 | Stop reason: SDUPTHER

## 2018-06-14 NOTE — PROGRESS NOTES
[facility] Woman/Medicare breast and pelvic exam    Tiffany Masterson is a 82 y.o. year old  female who presents for routine GYN exam.  She is postmenopausal.  Patient reports UTI symptoms (recent) with no fever or blood in urine.  Patient also reports two month history of pelvic pain.      Past Medical History:   Diagnosis Date    Arthritis     Family history of malignant neoplasm of gastrointestinal tract mother    History of colonoscopy     unremarkable  by Dr. Javier.  Barium enema revealed diverticular disease.    Hyperlipidemia     Hypertension     Hypothyroidism     Migraine, ophthalmoplegic     Personal history of colonic polyps     TIA (transient ischemic attack)     with a normal angiogram in the past, Ocular migraines reported by patient, not TIA     Unspecified essential hypertension 2015       Past Surgical History:   Procedure Laterality Date    CATARACT EXTRACTION      right/ Dr. Lexis Nicolas     COLONOSCOPY       polyps    COLONOSCOPY N/A 2016    Procedure: COLONOSCOPY;  Surgeon: Bebeto Gonzalez MD;  Location: [facility] ([address] [place] [address]);  Service: Endoscopy;  Laterality: N/A;    COLONOSCOPY W/ POLYPECTOMY  2013    polyp of colon    EYE SURGERY  11-10-14    right retina/Dr. Dalton Sierra ([facility])    JOINT REPLACEMENT  3/23/2011    left total hip replacement    TONSILLECTOMY      pt was 11 years old       OB History      Para Term  AB Living    3 3 3          SAB TAB Ectopic Multiple Live Births                         ROS:  GENERAL: Feeling well overall.   SKIN: Denies rash or lesions.   HEAD: Denies head injury or headache.   NODES: Denies enlarged lymph nodes.   CHEST: Denies chest pain or shortness of breath.   CARDIOVASCULAR: Denies palpitations or left sided chest pain.   ABDOMEN: No abdominal pain, nausea, vomiting or rectal bleeding.   URINARY: No dysuria, hematuria, or burning on urination.  REPRODUCTIVE: See HPI.   BREASTS: Denies pain,  lumps, or nipple discharge.   HEMATOLOGIC: No easy bruisability or excessive bleeding.   MUSCULOSKELETAL: Denies joint pain or swelling.   NEUROLOGIC: Denies syncope or weakness.   PSYCHIATRIC: Denies depression.    PE:   APPEARANCE: Well nourished, well developed, in no acute distress.  NECK: Neck symmetric without masses or thyromegaly.  BREASTS: Symmetrical, no skin changes or visible lesions. No palpable masses, nipple discharge or adenopathy bilaterally.  ABDOMEN: Soft. No tenderness or masses. No hernias. No CVA tenderness.  VULVA: No lesions. Normal female genitalia.  URETHRAL MEATUS: Normal size and location, no lesions, no prolapse.  URETHRA: No masses, tenderness, prolapse or scarring.  VAGINA: Atrophic.  No lesions, no discharge, no significant cystocele or rectocele.  CERVIX: No lesions and discharge. PAP done.  UTERUS: Normal size, regular shape, mobile, non-tender, bladder base nontender.  ADNEXA: No masses, tenderness or CDS nodularity.  ANUS PERINEUM: Normal.    Diagnosis:  1. Encounter for gynecological examination without abnormal finding    2. UTI symptoms    3. Vaginal dryness, menopausal    4. Visit for screening mammogram    5. Hypertension, unspecified type    6. Pelvic pain in female      PLAN:    Orders Placed This Encounter    Urine culture    Mammo Digital Screening Bilat with Tomosynthesis CAD    US Pelvis Comp with Transvag NON-OB (xpd    Urinalysis    estradiol (ESTRACE) 0.01 % (0.1 mg/gram) vaginal cream    atenolol (TENORMIN) 50 MG tablet    atenolol (TENORMIN) 25 MG tablet     Patient was counseled today on postmenopausal issues.    Orders as above    Pelvic sonogram for pelvic pain since April 2018.    Estrace cream recommendation reviewed.  Patient is not on systemic HRT.    Follow-up in 1 year.    Elliot Vanessa IV, MD  Answers for HPI/ROS submitted by the patient on 6/13/2018   Gynecologic exam  genital itching: No  genital lesions: No  genital odor: No  genital rash:  No  missed menses: No  pelvic pain: Yes  vaginal bleeding: No  vaginal discharge: No  Chronicity: chronic  Onset: more than 1 month ago  Frequency: intermittently  Progression since onset: waxing and waning  Pain severity: mild  Affected side: both  Pregnant now?: No  abdominal pain: Yes  anorexia: No  back pain: No  chills: No  constipation: Yes  diarrhea: No  discolored urine: No  dysuria: No  fever: No  flank pain: No  frequency: Yes  headaches: No  hematuria: No  painful intercourse: Yes  rash: No  vomiting: No  Vaginal bleeding: no bleeding  Passing clots?: No  Passing tissue?: No  Aggravated by: intercourse  treatments tried: antibiotics  Improvement on treatment: significant  Sexual activity: sexually active  Partner with STD symptoms: no  Birth control: nothing  Menstrual history: postmenopausal  STD: No  abdominal surgery: No   section: No  Ectopic pregnancy: No  Endometriosis: No  herpes simplex: No  gynecological surgery: No  menorrhagia: No  metrorrhagia: No  miscarriage: No  ovarian cysts: No  perineal abscess: No  PID: No  terminated pregnancy: No  vaginosis: No

## 2018-06-15 LAB
BACTERIA #/AREA URNS AUTO: ABNORMAL /HPF
BILIRUB UR QL STRIP: NEGATIVE
CLARITY UR REFRACT.AUTO: CLEAR
COLOR UR AUTO: YELLOW
GLUCOSE UR QL STRIP: NEGATIVE
HGB UR QL STRIP: NEGATIVE
KETONES UR QL STRIP: NEGATIVE
LEUKOCYTE ESTERASE UR QL STRIP: ABNORMAL
MICROSCOPIC COMMENT: ABNORMAL
NITRITE UR QL STRIP: NEGATIVE
PH UR STRIP: 6 [PH] (ref 5–8)
PROT UR QL STRIP: NEGATIVE
RBC #/AREA URNS AUTO: 0 /HPF (ref 0–4)
SP GR UR STRIP: 1.01 (ref 1–1.03)
URN SPEC COLLECT METH UR: ABNORMAL
UROBILINOGEN UR STRIP-ACNC: NEGATIVE EU/DL
WBC #/AREA URNS AUTO: 30 /HPF (ref 0–5)

## 2018-06-17 LAB
BACTERIA UR CULT: NORMAL
BACTERIA UR CULT: NORMAL

## 2018-06-22 ENCOUNTER — HOSPITAL ENCOUNTER (OUTPATIENT)
Dept: RADIOLOGY | Facility: HOSPITAL | Age: 83
Discharge: HOME | End: 2018-06-22
Attending: OBSTETRICS & GYNECOLOGY
Payer: MEDICARE

## 2018-06-22 ENCOUNTER — TELEPHONE (OUTPATIENT)
Dept: OBSTETRICS AND GYNECOLOGY | Facility: CLINIC | Age: 83
End: 2018-06-22

## 2018-06-22 DIAGNOSIS — R10.2 PELVIC PAIN IN FEMALE: ICD-10-CM

## 2018-06-22 PROCEDURE — 76830 TRANSVAGINAL US NON-OB: CPT | Mod: TC

## 2018-06-22 PROCEDURE — 76830 TRANSVAGINAL US NON-OB: CPT | Mod: 26,,, | Performed by: RADIOLOGY

## 2018-06-22 PROCEDURE — 76856 US EXAM PELVIC COMPLETE: CPT | Mod: 26,,, | Performed by: RADIOLOGY

## 2018-06-22 RX ORDER — ZOLPIDEM TARTRATE 5 MG/1
5 TABLET ORAL NIGHTLY PRN
Qty: 30 TABLET | Refills: 0 | Status: SHIPPED | OUTPATIENT
Start: 2018-06-22 | End: 2018-09-11

## 2018-06-22 NOTE — TELEPHONE ENCOUNTER
----- Message from Malaika Gutiérrez sent at 6/22/2018 11:01 AM CDT -----  Contact: self  Pt needing a call back, she have questions and can be reached at [phone].

## 2018-06-22 NOTE — TELEPHONE ENCOUNTER
----- Message from Malaika Gutiérrez sent at 6/22/2018  3:39 PM CDT -----  Contact: self  Pt needing a call back this afternoon, she needs to speak to a Dr this time, she can be reached at [phone].

## 2018-06-22 NOTE — TELEPHONE ENCOUNTER
pt called states she will be leave town for 3 months but would like to get the ultrasound scheduled before she leaves. Scheduled appointment today.

## 2018-06-22 NOTE — TELEPHONE ENCOUNTER
Advised pt spoke with Dr Corral and she recommends pt having this evaluated before going out of town for 3 months. Pt verbalized understanding

## 2018-06-22 NOTE — TELEPHONE ENCOUNTER
Pt want to talk to Dr Vanessa about her ultrasound results. Advised pt Dr Vanessa is out of town. Pt states leaving Sunday for three months and would like to know if she needs to cancel her reservations. Pt states the radiologist told her to get cleared by Dr Vanessa before leaving. Advised pt will see if one of Dr Vanessa partners can read it and give a recommendation.

## 2018-06-25 ENCOUNTER — PATIENT MESSAGE (OUTPATIENT)
Dept: OBSTETRICS AND GYNECOLOGY | Facility: CLINIC | Age: 83
End: 2018-06-25

## 2018-06-26 ENCOUNTER — TELEPHONE (OUTPATIENT)
Dept: OBSTETRICS AND GYNECOLOGY | Facility: CLINIC | Age: 83
End: 2018-06-26

## 2018-06-26 DIAGNOSIS — R93.89 THICKENED ENDOMETRIUM: Primary | ICD-10-CM

## 2018-06-27 ENCOUNTER — OFFICE VISIT (OUTPATIENT)
Dept: OBSTETRICS AND GYNECOLOGY | Facility: CLINIC | Age: 83
End: 2018-06-27
Payer: MEDICARE

## 2018-06-27 VITALS
SYSTOLIC BLOOD PRESSURE: 132 MMHG | WEIGHT: 189.38 LBS | DIASTOLIC BLOOD PRESSURE: 50 MMHG | BODY MASS INDEX: 30.44 KG/M2 | HEIGHT: 66 IN

## 2018-06-27 DIAGNOSIS — R10.2 PELVIC PAIN IN FEMALE: ICD-10-CM

## 2018-06-27 DIAGNOSIS — N94.89 ENDOMETRIAL MASS: Primary | ICD-10-CM

## 2018-06-27 PROCEDURE — 3075F SYST BP GE 130 - 139MM HG: CPT | Mod: CPTII,S$GLB,, | Performed by: OBSTETRICS & GYNECOLOGY

## 2018-06-27 PROCEDURE — 99999 PR PBB SHADOW E&M-EST. PATIENT-LVL III: CPT | Mod: PBBFAC,,, | Performed by: OBSTETRICS & GYNECOLOGY

## 2018-06-27 PROCEDURE — 99213 OFFICE O/P EST LOW 20 MIN: CPT | Mod: S$GLB,,, | Performed by: OBSTETRICS & GYNECOLOGY

## 2018-06-27 PROCEDURE — 3078F DIAST BP <80 MM HG: CPT | Mod: CPTII,S$GLB,, | Performed by: OBSTETRICS & GYNECOLOGY

## 2018-06-29 ENCOUNTER — HOSPITAL ENCOUNTER (OUTPATIENT)
Dept: PREADMISSION TESTING | Facility: OTHER | Age: 83
Discharge: HOME | End: 2018-06-29
Attending: OBSTETRICS & GYNECOLOGY
Payer: MEDICARE

## 2018-06-29 ENCOUNTER — ANESTHESIA EVENT (OUTPATIENT)
Dept: SURGERY | Facility: OTHER | Age: 83
End: 2018-06-29
Payer: MEDICARE

## 2018-06-29 VITALS
HEIGHT: 66 IN | OXYGEN SATURATION: 98 % | DIASTOLIC BLOOD PRESSURE: 60 MMHG | SYSTOLIC BLOOD PRESSURE: 150 MMHG | WEIGHT: 185 LBS | RESPIRATION RATE: 16 BRPM | BODY MASS INDEX: 29.73 KG/M2 | HEART RATE: 77 BPM | TEMPERATURE: 99 F

## 2018-06-29 DIAGNOSIS — Z01.818 PREOP TESTING: ICD-10-CM

## 2018-06-29 PROBLEM — N94.89 ENDOMETRIAL MASS: Status: ACTIVE | Noted: 2018-06-29

## 2018-06-29 PROCEDURE — 93005 ELECTROCARDIOGRAM TRACING: CPT

## 2018-06-29 RX ORDER — FAMOTIDINE 20 MG/1
20 TABLET, FILM COATED ORAL
Status: CANCELLED | OUTPATIENT
Start: 2018-06-29 | End: 2018-06-29

## 2018-06-29 RX ORDER — SODIUM CHLORIDE, SODIUM LACTATE, POTASSIUM CHLORIDE, CALCIUM CHLORIDE 600; 310; 30; 20 MG/100ML; MG/100ML; MG/100ML; MG/100ML
INJECTION, SOLUTION INTRAVENOUS CONTINUOUS
Status: CANCELLED | OUTPATIENT
Start: 2018-06-29

## 2018-06-29 RX ORDER — ACETAMINOPHEN 10 MG/ML
1000 INJECTION, SOLUTION INTRAVENOUS
Status: CANCELLED | OUTPATIENT
Start: 2018-07-02 | End: 2018-07-02

## 2018-06-29 NOTE — PROGRESS NOTES
CC:  Endometrial mass    HPI : Tiffany Masterson is a 82 y.o. female  for evaluation and discussion of treatment options for a thickened endometrium/endometrial mass seen on pelvic sonogram.  Patient denies persistent discomfort and denies any vaginal bleeding.  No other Gyn complaints reported.    Chart reviewed    Sonogram report/images reviewed    Past Medical History:   Diagnosis Date    Arthritis     Family history of malignant neoplasm of gastrointestinal tract mother    History of colonoscopy     unremarkable  by Dr. Javier.  Barium enema revealed diverticular disease.    Hyperlipidemia     Hypertension     Hypothyroidism     Migraine, ophthalmoplegic     Personal history of colonic polyps     TIA (transient ischemic attack)     with a normal angiogram in the past, Ocular migraines reported by patient, not TIA     Unspecified essential hypertension 2015     Past Surgical History:   Procedure Laterality Date    CATARACT EXTRACTION      right/ Dr. Lexis Nicolas     COLONOSCOPY       polyps    COLONOSCOPY N/A 2016    Procedure: COLONOSCOPY;  Surgeon: Bebeto Gonzalez MD;  Location: [address]);  Service: Endoscopy;  Laterality: N/A;    COLONOSCOPY W/ POLYPECTOMY  2013    polyp of colon    EYE SURGERY  11-10-14    right retina/Dr. Dalton Sierra ([facility])    JOINT REPLACEMENT  3/23/2011    left total hip replacement    TONSILLECTOMY      pt was 11 years old     Family History   Problem Relation Age of Onset    Cancer Mother 75        colon    Colon cancer Mother     Cancer Father         lung    Diabetes Other     Cancer Maternal Aunt 80        colon cancer    Diabetes Paternal Aunt     Breast cancer Neg Hx     Ovarian cancer Neg Hx      Social History   Substance Use Topics    Smoking status: Never Smoker    Smokeless tobacco: Never Used      Comment: The patient is not getting any regular exercise at this time.  She does enjoy traveling to  "[place].    Alcohol use 1.2 oz/week     2 Shots of liquor per week      Comment: daily     OB History    Para Term  AB Living   3 3 3         SAB TAB Ectopic Multiple Live Births                  # Outcome Date GA Lbr Ed/2nd Weight Sex Delivery Anes PTL Lv   3 Term      Vag-Spont      2 Term      Vag-Spont      1 Term      Vag-Spont             BP (!) 132/50 (BP Location: Right arm)   Ht 5' 6" (1.676 m)   Wt 85.9 kg (189 lb 6 oz)   BMI 30.57 kg/m²     ROS:  GENERAL: Feeling well overall.   SKIN: Denies rash or lesions.   HEAD: Denies head injury or headache.   NODES: Denies enlarged lymph nodes.   CHEST: Denies chest pain or shortness of breath.   CARDIOVASCULAR: Denies palpitations or left sided chest pain.   ABDOMEN: No abdominal pain, constipation, diarrhea, nausea, vomiting or rectal bleeding.   URINARY: No frequency, dysuria, hematuria, or burning on urination.  REPRODUCTIVE: See HPI.   BREASTS: Denies pain, lumps, or nipple discharge.   HEMATOLOGIC: No easy bruisability.  MUSCULOSKELETAL: Denies joint pain or swelling.   NEUROLOGIC: Denies syncope or weakness.   PSYCHIATRIC: Denies depression, anxiety or mood swings.      PHYSICAL EXAM:  APPEARANCE: Well nourished, well developed, in no acute distress.  AFFECT: WNL, alert and oriented x 3  SKIN: No acne or hirsutism  NECK: Neck symmetric without masses or thyromegaly  NODES: No inguinal, cervical, axillary, or femoral lymph node enlargement  CHEST: Good respiratory effect  ABDOMEN: Soft.  No tenderness or masses.  No hepatosplenomegaly.  No hernias.  PELVIC: Normal external genitalia without lesions.  Normal hair distribution.  Adequate perineal body, normal urethral meatus.  Vagina atrophic without lesions or discharge.  Cervix pink, without lesions, discharge or tenderness.  No significant cystocele or rectocele.  Bimanual exam shows uterus to be normal size, regular, mobile and nontender.  Adnexa without masses or tenderness.  "   EXTREMITIES: No edema.    ASSESSMENT & PLAN:  1. Endometrial mass    2. Pelvic pain in female    I have discussed the risks, benefits, indications, and alternatives of the procedure in detail.  The patient verbalizes her understanding.  All questions answered.  Consents signed.  The patient agrees to proceed to surgery scheduling.    Plan:  Hysteroscopic resection of endometrial mass    Elliot Vanessa IV, MD

## 2018-06-29 NOTE — DISCHARGE INSTRUCTIONS
PRE-ADMIT TESTING -  [phone]    [address]  [place] [facility]          Your surgery has been scheduled at [facility]. We are pleased to have the opportunity to serve you. For Further Information please call [phone].    On the day of surgery please report to the Information Desk on the 1st floor.    · CONTACT YOUR PHYSICIAN'S OFFICE THE DAY PRIOR TO YOUR SURGERY TO OBTAIN YOUR ARRIVAL TIME.     · The evening before surgery do not eat anything after 9 p.m. ( this includes hard candy, chewing gum and mints).  You may only have GATORADE, POWERADE AND WATER  from 9 p.m. until you leave your home.   DO NOT DRINK ANY LIQUIDS ON THE WAY TO THE HOSPITAL.      SPECIAL MEDICATION INSTRUCTIONS: TAKE medications checked off by the Anesthesiologist on your Medication List.    Angiogram Patients: Take medications as instructed by your physician, including aspirin.     Surgery Patients:    If you take ASPIRIN - Your PHYSICIAN/SURGEON will need to inform you IF/OR when you need to stop taking aspirin prior to your surgery.     Do Not take any medications containing IBUPROFEN.  Do Not Wear any make-up or dark nail polish   (especially eye make-up) to surgery. If you come to surgery with makeup on you will be required to remove the makeup or nail polish.    Do not shave your surgical area at least 5 days prior to your surgery. The surgical prep will be performed at the hospital according to Infection Control regulations.    Leave all valuables at home.   Do Not wear any jewelry or watches, including any metal in body piercings.  Contact Lens must be removed before surgery. Either do not wear the contact lens or bring a case and solution for storage.  Please bring a container for eyeglasses or dentures as required.  Bring any paperwork your physician has provided, such as consent forms,  history and physicals, doctor's orders, etc.   Bring comfortable clothes that are loose fitting to wear upon  discharge. Take into consideration the type of surgery being performed.  Maintain your diet as advised per your physician the day prior to surgery.      Adequate rest the night before surgery is advised.   Park in the Parking lot behind the hospital or in the [facility] across the street from the parking lot. Parking is complimentary.  If you will be discharged the same day as your procedure, please arrange for a responsible adult to drive you home or to accompany you if traveling by taxi.   YOU WILL NOT BE PERMITTED TO DRIVE OR TO LEAVE THE HOSPITAL ALONE AFTER SURGERY.   It is strongly recommended that you arrange for someone to remain with you for the first 24 hrs following your surgery.       Thank you for your cooperation.  The Staff of [facility].        Bathing Instructions                                                                 Please shower the evening before and morning of your procedure with    ANTIBACTERIAL SOAP. ( DIAL, etc )  Concentrate on the surgical area   for at least 3 minutes and rinse completely. Dry off as usual.   Do not use any deodorant, powder, body lotions, perfume, after shave or    cologne.

## 2018-06-29 NOTE — ANESTHESIA PREPROCEDURE EVALUATION
06/29/2018  Tiffany Masterson is a 82 y.o., female.    Anesthesia Evaluation    I have reviewed the Patient Summary Reports.    I have reviewed the Nursing Notes.   I have reviewed the Medications.     Review of Systems  Anesthesia Hx:  No problems with previous Anesthesia  Denies Family Hx of Anesthesia complications.   Denies Personal Hx of Anesthesia complications.   Social:  Non-Smoker    Hematology/Oncology:  Hematology Normal   Oncology Normal     EENT/Dental:   chronic allergic rhinitis   Cardiovascular:   Hypertension    Pulmonary:  Pulmonary Normal    Renal/:  Renal/ Normal     Hepatic/GI:  Hepatic/GI Normal    Neurological:   TIA, Headaches Has Ocular migraines that have been confused with TIAs   Endocrine:   Hypothyroidism        Physical Exam  General:  Well nourished    Airway/Jaw/Neck:  Airway Findings: Mouth Opening: Normal Tongue: Normal  General Airway Assessment: Adult  Mallampati: II  TM Distance: Normal, at least 6 cm         Dental:  Dental Findings: In tact             Anesthesia Plan  Type of Anesthesia, risks & benefits discussed:  Anesthesia Type:  general  Patient's Preference:   Intra-op Monitoring Plan: standard ASA monitors  Intra-op Monitoring Plan Comments:   Post Op Pain Control Plan: per primary service following discharge from PACU  Post Op Pain Control Plan Comments:   Induction:   IV  Beta Blocker:         Informed Consent: Patient understands risks and agrees with Anesthesia plan.  Questions answered. Anesthesia consent signed with patient.  ASA Score: 2     Day of Surgery Review of History & Physical:    H&P update referred to the surgeon.         Ready For Surgery From Anesthesia Perspective.

## 2018-07-02 ENCOUNTER — PATIENT MESSAGE (OUTPATIENT)
Dept: SURGERY | Facility: OTHER | Age: 83
End: 2018-07-02

## 2018-07-02 ENCOUNTER — ANESTHESIA (OUTPATIENT)
Dept: SURGERY | Facility: OTHER | Age: 83
End: 2018-07-02
Payer: MEDICARE

## 2018-07-02 ENCOUNTER — HOSPITAL ENCOUNTER (OUTPATIENT)
Facility: OTHER | Age: 83
Discharge: HOME | End: 2018-07-02
Attending: OBSTETRICS & GYNECOLOGY | Admitting: OBSTETRICS & GYNECOLOGY
Payer: MEDICARE

## 2018-07-02 VITALS
TEMPERATURE: 98 F | BODY MASS INDEX: 29.73 KG/M2 | SYSTOLIC BLOOD PRESSURE: 138 MMHG | HEIGHT: 66 IN | HEART RATE: 57 BPM | WEIGHT: 185 LBS | OXYGEN SATURATION: 99 % | DIASTOLIC BLOOD PRESSURE: 64 MMHG | RESPIRATION RATE: 16 BRPM

## 2018-07-02 DIAGNOSIS — Z98.890 STATUS POST HYSTEROSCOPIC POLYPECTOMY: Primary | ICD-10-CM

## 2018-07-02 DIAGNOSIS — N94.89 ENDOMETRIAL MASS: ICD-10-CM

## 2018-07-02 DIAGNOSIS — R10.2 PELVIC PAIN IN FEMALE: ICD-10-CM

## 2018-07-02 LAB — POCT GLUCOSE: 95 MG/DL (ref 70–110)

## 2018-07-02 PROCEDURE — 58558 HYSTEROSCOPY BIOPSY: CPT | Mod: ,,, | Performed by: OBSTETRICS & GYNECOLOGY

## 2018-07-02 PROCEDURE — 71000016 HC POSTOP RECOV ADDL HR: Performed by: OBSTETRICS & GYNECOLOGY

## 2018-07-02 PROCEDURE — 27201423 OPTIME MED/SURG SUP & DEVICES STERILE SUPPLY: Performed by: OBSTETRICS & GYNECOLOGY

## 2018-07-02 PROCEDURE — 25000003 PHARM REV CODE 250: Performed by: ANESTHESIOLOGY

## 2018-07-02 PROCEDURE — C1782 MORCELLATOR: HCPCS | Performed by: OBSTETRICS & GYNECOLOGY

## 2018-07-02 PROCEDURE — 71000015 HC POSTOP RECOV 1ST HR: Performed by: OBSTETRICS & GYNECOLOGY

## 2018-07-02 PROCEDURE — 63600175 PHARM REV CODE 636 W HCPCS: Performed by: NURSE ANESTHETIST, CERTIFIED REGISTERED

## 2018-07-02 PROCEDURE — 36000706: Performed by: OBSTETRICS & GYNECOLOGY

## 2018-07-02 PROCEDURE — 71000033 HC RECOVERY, INTIAL HOUR: Performed by: OBSTETRICS & GYNECOLOGY

## 2018-07-02 PROCEDURE — 37000008 HC ANESTHESIA 1ST 15 MINUTES: Performed by: OBSTETRICS & GYNECOLOGY

## 2018-07-02 PROCEDURE — 25000003 PHARM REV CODE 250: Performed by: NURSE ANESTHETIST, CERTIFIED REGISTERED

## 2018-07-02 PROCEDURE — 88305 TISSUE EXAM BY PATHOLOGIST: CPT | Mod: 26,,, | Performed by: PATHOLOGY

## 2018-07-02 PROCEDURE — 63600175 PHARM REV CODE 636 W HCPCS: Performed by: OBSTETRICS & GYNECOLOGY

## 2018-07-02 PROCEDURE — 37000009 HC ANESTHESIA EA ADD 15 MINS: Performed by: OBSTETRICS & GYNECOLOGY

## 2018-07-02 PROCEDURE — 88305 TISSUE EXAM BY PATHOLOGIST: CPT | Performed by: PATHOLOGY

## 2018-07-02 PROCEDURE — 36000707: Performed by: OBSTETRICS & GYNECOLOGY

## 2018-07-02 RX ORDER — LIDOCAINE HCL/PF 100 MG/5ML
SYRINGE (ML) INTRAVENOUS
Status: DISCONTINUED | OUTPATIENT
Start: 2018-07-02 | End: 2018-07-02

## 2018-07-02 RX ORDER — IBUPROFEN 600 MG/1
600 TABLET ORAL EVERY 6 HOURS PRN
Status: DISCONTINUED | OUTPATIENT
Start: 2018-07-02 | End: 2018-07-02 | Stop reason: HOSPADM

## 2018-07-02 RX ORDER — HYDROCODONE BITARTRATE AND ACETAMINOPHEN 5; 325 MG/1; MG/1
1 TABLET ORAL EVERY 4 HOURS PRN
Qty: 5 TABLET | Refills: 0 | Status: SHIPPED | OUTPATIENT
Start: 2018-07-02 | End: 2018-07-02

## 2018-07-02 RX ORDER — ONDANSETRON 8 MG/1
8 TABLET, ORALLY DISINTEGRATING ORAL EVERY 8 HOURS PRN
Status: DISCONTINUED | OUTPATIENT
Start: 2018-07-02 | End: 2018-07-02 | Stop reason: HOSPADM

## 2018-07-02 RX ORDER — HYDROCODONE BITARTRATE AND ACETAMINOPHEN 5; 325 MG/1; MG/1
1 TABLET ORAL EVERY 4 HOURS PRN
Qty: 5 TABLET | Refills: 0 | Status: SHIPPED | OUTPATIENT
Start: 2018-07-02 | End: 2018-08-30

## 2018-07-02 RX ORDER — DEXAMETHASONE SODIUM PHOSPHATE 4 MG/ML
INJECTION, SOLUTION INTRA-ARTICULAR; INTRALESIONAL; INTRAMUSCULAR; INTRAVENOUS; SOFT TISSUE
Status: DISCONTINUED | OUTPATIENT
Start: 2018-07-02 | End: 2018-07-02

## 2018-07-02 RX ORDER — SODIUM CHLORIDE 0.9 % (FLUSH) 0.9 %
3 SYRINGE (ML) INJECTION
Status: DISCONTINUED | OUTPATIENT
Start: 2018-07-02 | End: 2018-07-02 | Stop reason: HOSPADM

## 2018-07-02 RX ORDER — OXYCODONE HYDROCHLORIDE 5 MG/1
5 TABLET ORAL
Status: DISCONTINUED | OUTPATIENT
Start: 2018-07-02 | End: 2018-07-02 | Stop reason: HOSPADM

## 2018-07-02 RX ORDER — FENTANYL CITRATE 50 UG/ML
25 INJECTION, SOLUTION INTRAMUSCULAR; INTRAVENOUS EVERY 5 MIN PRN
Status: DISCONTINUED | OUTPATIENT
Start: 2018-07-02 | End: 2018-07-02 | Stop reason: HOSPADM

## 2018-07-02 RX ORDER — ONDANSETRON 2 MG/ML
4 INJECTION INTRAMUSCULAR; INTRAVENOUS DAILY PRN
Status: DISCONTINUED | OUTPATIENT
Start: 2018-07-02 | End: 2018-07-02 | Stop reason: HOSPADM

## 2018-07-02 RX ORDER — ACETAMINOPHEN 10 MG/ML
1000 INJECTION, SOLUTION INTRAVENOUS
Status: COMPLETED | OUTPATIENT
Start: 2018-07-02 | End: 2018-07-02

## 2018-07-02 RX ORDER — EPHEDRINE SULFATE 50 MG/ML
INJECTION, SOLUTION INTRAVENOUS
Status: DISCONTINUED | OUTPATIENT
Start: 2018-07-02 | End: 2018-07-02

## 2018-07-02 RX ORDER — IBUPROFEN 600 MG/1
600 TABLET ORAL EVERY 6 HOURS PRN
Qty: 30 TABLET | Refills: 0 | Status: SHIPPED | OUTPATIENT
Start: 2018-07-02 | End: 2018-09-11

## 2018-07-02 RX ORDER — ONDANSETRON HYDROCHLORIDE 2 MG/ML
INJECTION, SOLUTION INTRAMUSCULAR; INTRAVENOUS
Status: DISCONTINUED | OUTPATIENT
Start: 2018-07-02 | End: 2018-07-02

## 2018-07-02 RX ORDER — DIPHENHYDRAMINE HYDROCHLORIDE 50 MG/ML
25 INJECTION INTRAMUSCULAR; INTRAVENOUS EVERY 4 HOURS PRN
Status: DISCONTINUED | OUTPATIENT
Start: 2018-07-02 | End: 2018-07-02 | Stop reason: HOSPADM

## 2018-07-02 RX ORDER — PROPOFOL 10 MG/ML
VIAL (ML) INTRAVENOUS
Status: DISCONTINUED | OUTPATIENT
Start: 2018-07-02 | End: 2018-07-02

## 2018-07-02 RX ORDER — FENTANYL CITRATE 50 UG/ML
INJECTION, SOLUTION INTRAMUSCULAR; INTRAVENOUS
Status: DISCONTINUED | OUTPATIENT
Start: 2018-07-02 | End: 2018-07-02

## 2018-07-02 RX ORDER — GLYCOPYRROLATE 0.2 MG/ML
INJECTION INTRAMUSCULAR; INTRAVENOUS
Status: DISCONTINUED | OUTPATIENT
Start: 2018-07-02 | End: 2018-07-02

## 2018-07-02 RX ORDER — MEPERIDINE HYDROCHLORIDE 50 MG/ML
12.5 INJECTION INTRAMUSCULAR; INTRAVENOUS; SUBCUTANEOUS ONCE AS NEEDED
Status: DISCONTINUED | OUTPATIENT
Start: 2018-07-02 | End: 2018-07-02 | Stop reason: HOSPADM

## 2018-07-02 RX ORDER — SODIUM CHLORIDE, SODIUM LACTATE, POTASSIUM CHLORIDE, CALCIUM CHLORIDE 600; 310; 30; 20 MG/100ML; MG/100ML; MG/100ML; MG/100ML
INJECTION, SOLUTION INTRAVENOUS CONTINUOUS
Status: DISCONTINUED | OUTPATIENT
Start: 2018-07-02 | End: 2018-07-02 | Stop reason: HOSPADM

## 2018-07-02 RX ORDER — HYDROMORPHONE HYDROCHLORIDE 2 MG/ML
1 INJECTION, SOLUTION INTRAMUSCULAR; INTRAVENOUS; SUBCUTANEOUS EVERY 4 HOURS PRN
Status: DISCONTINUED | OUTPATIENT
Start: 2018-07-02 | End: 2018-07-02 | Stop reason: HOSPADM

## 2018-07-02 RX ORDER — HYDROMORPHONE HYDROCHLORIDE 2 MG/ML
0.4 INJECTION, SOLUTION INTRAMUSCULAR; INTRAVENOUS; SUBCUTANEOUS EVERY 5 MIN PRN
Status: DISCONTINUED | OUTPATIENT
Start: 2018-07-02 | End: 2018-07-02 | Stop reason: HOSPADM

## 2018-07-02 RX ORDER — HYDROCODONE BITARTRATE AND ACETAMINOPHEN 5; 325 MG/1; MG/1
1 TABLET ORAL EVERY 4 HOURS PRN
Status: DISCONTINUED | OUTPATIENT
Start: 2018-07-02 | End: 2018-07-02 | Stop reason: HOSPADM

## 2018-07-02 RX ORDER — DIPHENHYDRAMINE HCL 25 MG
25 CAPSULE ORAL EVERY 4 HOURS PRN
Status: DISCONTINUED | OUTPATIENT
Start: 2018-07-02 | End: 2018-07-02 | Stop reason: HOSPADM

## 2018-07-02 RX ORDER — FAMOTIDINE 20 MG/1
20 TABLET, FILM COATED ORAL
Status: COMPLETED | OUTPATIENT
Start: 2018-07-02 | End: 2018-07-02

## 2018-07-02 RX ORDER — IBUPROFEN 600 MG/1
600 TABLET ORAL EVERY 6 HOURS PRN
Qty: 30 TABLET | Refills: 0 | Status: SHIPPED | OUTPATIENT
Start: 2018-07-02 | End: 2018-07-02

## 2018-07-02 RX ORDER — HYDROCODONE BITARTRATE AND ACETAMINOPHEN 10; 325 MG/1; MG/1
1 TABLET ORAL EVERY 4 HOURS PRN
Status: DISCONTINUED | OUTPATIENT
Start: 2018-07-02 | End: 2018-07-02 | Stop reason: HOSPADM

## 2018-07-02 RX ADMIN — PROPOFOL 120 MG: 10 INJECTION, EMULSION INTRAVENOUS at 12:07

## 2018-07-02 RX ADMIN — CARBOXYMETHYLCELLULOSE SODIUM 2 DROP: 2.5 SOLUTION/ DROPS OPHTHALMIC at 12:07

## 2018-07-02 RX ADMIN — LIDOCAINE HYDROCHLORIDE 100 MG: 20 INJECTION, SOLUTION INTRAVENOUS at 12:07

## 2018-07-02 RX ADMIN — EPHEDRINE SULFATE 10 MG: 50 INJECTION INTRAMUSCULAR; INTRAVENOUS; SUBCUTANEOUS at 01:07

## 2018-07-02 RX ADMIN — FAMOTIDINE 20 MG: 20 TABLET ORAL at 10:07

## 2018-07-02 RX ADMIN — GLYCOPYRROLATE 0.2 MG: 0.2 INJECTION, SOLUTION INTRAMUSCULAR; INTRAVENOUS at 12:07

## 2018-07-02 RX ADMIN — PROPOFOL 30 MG: 10 INJECTION, EMULSION INTRAVENOUS at 12:07

## 2018-07-02 RX ADMIN — DEXAMETHASONE SODIUM PHOSPHATE 8 MG: 4 INJECTION, SOLUTION INTRAMUSCULAR; INTRAVENOUS at 12:07

## 2018-07-02 RX ADMIN — SODIUM CHLORIDE, SODIUM LACTATE, POTASSIUM CHLORIDE, AND CALCIUM CHLORIDE: 600; 310; 30; 20 INJECTION, SOLUTION INTRAVENOUS at 12:07

## 2018-07-02 RX ADMIN — PROPOFOL 50 MG: 10 INJECTION, EMULSION INTRAVENOUS at 01:07

## 2018-07-02 RX ADMIN — ACETAMINOPHEN 1000 MG: 10 INJECTION, SOLUTION INTRAVENOUS at 12:07

## 2018-07-02 RX ADMIN — ONDANSETRON 4 MG: 2 INJECTION, SOLUTION INTRAMUSCULAR; INTRAVENOUS at 12:07

## 2018-07-02 RX ADMIN — FENTANYL CITRATE 100 MCG: 50 INJECTION, SOLUTION INTRAMUSCULAR; INTRAVENOUS at 12:07

## 2018-07-02 NOTE — PLAN OF CARE
Problem: Pain, Acute (Adult)  Goal: Acceptable Pain Control/Comfort Level  Patient will demonstrate the desired outcomes by discharge/transition of care.  Outcome: Outcome(s) achieved Date Met: 07/02/18  Tiffany Masterson has met all discharge criteria from Phase II. Vital Signs are stable, ambulating  without difficulty.Pain is now under control and tolerable for the pt. Pain score 0 at this time.  Discharge instructions given, patient verbalized understanding. Discharged from facility via wheelchair in stable condition.

## 2018-07-02 NOTE — DISCHARGE SUMMARY
[facility]  Brief Operative Note     SUMMARY     Surgery Date: 7/2/2018     Surgeon(s) and Role:     * Elliot Vanessa IV, MD - Primary    Assisting Surgeon: Linda Thomas MD- Resident    Pre-op Diagnosis:  Thickened endometrium [R93.8]    Post-op Diagnosis:  Post-Op Diagnosis Codes:     * Thickened endometrium [R93.8]    Procedure(s) (LRB):  POLYPECTOMY, UTERUS, HYSTEROSCOPIC (N/A)    Anesthesia: General    Description of the findings of the procedure:   1. Atrophic genitalia  2. Cervix atrophic, dilated in accommodate 5 mm hysteroscope  3. Uterus sounded to 6 cm  4. Hysteroscopic findings notable for polyp on left uterine side wall, removed with truclear incisor device  4. Gentle curettage performed  5. Total volume used 810 cc, deficit 300 cc  6. Specimens sent to pathology for evaluation       Estimated Blood Loss: 15 cc         Specimens:   Specimen (12h ago through future)    Start     Ordered    07/02/18 1313  Specimen to Pathology - Surgery  Once     Comments:  1. Endometrial scrappings2. Endometrial polyp      07/02/18 1324          Discharge Note    SUMMARY     Admit Date: 7/2/2018    Discharge Date and Time:  07/02/2018 1:41 PM    Hospital Course (synopsis of major diagnoses, care, treatment, and services provided during the course of the hospital stay):   Patient presented for scheduled procedure. Patient was passed back to OR for hysteroscope D&C. Please see OP note for further details. Tolerated procedure well and patient was taken to recovery in a stable condition. Prior to discharge patient was able to void, ambulate, tolerate PO and pain was well controlled with PO meds. Patient was given routine post-op instructions for which patient voiced understanding. Patient was subsequently discharged home.       Final Diagnosis: Post-Op Diagnosis Codes:     * Thickened endometrium [R93.8]    Disposition: Home or Self Care    Follow Up/Patient Instructions:     Medications:  Reconciled  Home Medications:      Medication List      START taking these medications    HYDROcodone-acetaminophen 5-325 mg per tablet  Commonly known as:  NORCO  Take 1 tablet by mouth every 4 (four) hours as needed for Pain.     ibuprofen 600 MG tablet  Commonly known as:  ADVIL,MOTRIN  Take 1 tablet (600 mg total) by mouth every 6 (six) hours as needed for Pain.        CONTINUE taking these medications    ARTIFICIAL TEARS (PF) OPHT  Apply to eye.     aspirin 81 MG EC tablet  Commonly known as:  ECOTRIN  Take 81 mg by mouth once daily.     * atenolol 50 MG tablet  Commonly known as:  TENORMIN  Take 1 tablet (50 mg total) by mouth nightly.     * atenolol 25 MG tablet  Commonly known as:  TENORMIN  Take 1 tablet (25 mg total) by mouth every morning.     CALCIUM 600 + D(3) ORAL  Take 1 tablet by mouth once daily.     CENTRUM SILVER ORAL  Take 1 tablet by mouth once daily.     DULCOLAX STOOL SOFTENER (DSS) ORAL  Take by mouth.     estradiol 0.01 % (0.1 mg/gram) vaginal cream  Commonly known as:  ESTRACE  Place 1 g vaginally once daily.     indapamide 2.5 MG Tab  Commonly known as:  LOZOL  Take 1 tablet (2.5 mg total) by mouth once daily.     levothyroxine 100 MCG tablet  Commonly known as:  SYNTHROID  Take 1 tablet (100 mcg total) by mouth once daily.     losartan 100 MG tablet  Commonly known as:  COZAAR  Take 1 tablet (100 mg total) by mouth once daily.     melatonin 10 mg Cap  Take 1 tablet by mouth every evening.     nystatin cream  Commonly known as:  MYCOSTATIN  Apply topically 2 (two) times daily.     pravastatin 40 MG tablet  Commonly known as:  PRAVACHOL  Take 1 tablet (40 mg total) by mouth nightly.     triamcinolone acetonide 0.1% 0.1 % cream  Commonly known as:  KENALOG  Apply topically 2 (two) times daily as needed.     VITAMIN C 250 MG tablet  Generic drug:  ascorbic acid (vitamin C)  Take 250 mg by mouth once daily.     zolpidem 5 MG Tab  Commonly known as:  AMBIEN  Take 1 tablet (5 mg total) by mouth nightly as  needed.        * This list has 2 medication(s) that are the same as other medications prescribed for you. Read the directions carefully, and ask your doctor or other care provider to review them with you.                Discharge Procedure Orders  Diet general     Other restrictions (specify):   Order Comments: Pelvic rest for 2 weeks (no tampons, no douching, no intercourse, etc.).     Call MD for:  extreme fatigue     Call MD for:  persistent dizziness or light-headedness     Call MD for:  hives     Call MD for:  redness, tenderness, or signs of infection (pain, swelling, redness, odor or green/yellow discharge around incision site)     Call MD for:  difficulty breathing, headache or visual disturbances     Call MD for:  severe uncontrolled pain     Call MD for:  persistent nausea and vomiting     Call MD for:  temperature >100.4     No dressing needed     Activity as tolerated       Follow-up Information     Schedule an appointment as soon as possible for a visit with Elliot Vanessa Iv, MD.    Specialties:  Obstetrics, Obstetrics and Gynecology  Why:  Post Operative Appointment  Contact information:  [address] [id number] [phone]

## 2018-07-02 NOTE — TRANSFER OF CARE
"Anesthesia Transfer of Care Note    Patient: Tiffany Masterson    Procedure(s) Performed: Procedure(s) (LRB):  POLYPECTOMY, UTERUS, HYSTEROSCOPIC (N/A)    Patient location: PACU    Anesthesia Type: general    Transport from OR: Transported from OR on 2-3 L/min O2 by NC with adequate spontaneous ventilation    Post pain: adequate analgesia    Post assessment: no apparent anesthetic complications    Post vital signs: stable    Level of consciousness: awake and alert    Nausea/Vomiting: no nausea/vomiting    Complications: none    Transfer of care protocol was followed      Last vitals:   Visit Vitals  /65 (BP Location: Right arm, Patient Position: Lying)   Pulse (!) 51   Temp 36.8 °C (98.2 °F) (Oral)   Resp 16   Ht 5' 6" (1.676 m)   Wt 83.9 kg (185 lb)   SpO2 98%   Breastfeeding? No   BMI 29.86 kg/m²     "

## 2018-07-02 NOTE — DISCHARGE INSTRUCTIONS
Pelvic rest for 2 weeks (no tampons, no douching, no intercourse, etc.).          Hysteroscopy  Hysteroscopy is a procedure that is done to see inside your uterus. It can help find the cause of problems in the uterus. This helps your health care provider decide on the best treatment. In some cases, it can be used to perform treatment. Hysteroscopy may be done in your health care provider's office or in the hospital.    What are the risks and complications of hysteroscopy?  Problems with the procedure are rare. But all procedures have risks. Risks of hysteroscopy include:  · Infection  · Bleeding  · Tearing of the uterine wall  · Damage to internal organs  · Scarring of the uterus  · Fluid overload  · Problems with anesthesia (the medication that prevents pain during the procedure)    What happens during a hysteroscopy?  · Youll lie on an exam table with your feet in stirrups.  · You may be given general anesthesia or medicines to help you relax or sleep. In some cases, an IV line will be put into a vein in your arm or hand. This line is then used to give fluids and medicines.  · A tool called a speculum is inserted into the vagina to hold it open. A tool called a dilator may be used to widen the cervix.  · Numbing medicine may be applied to the cervix.  · The hysteroscope (a long, thin lighted tube) is inserted through the vagina and into the uterus. It is used to see inside the uterus. Images of the uterus are viewed on a monitor.  · A gas or fluid may be injected into the uterus to expand it.  · Other tools may be put through the hysteroscope. These are used to take tissue samples, remove growths, or place implants for the purpose of sterilization.    What happens after hysteroscopy?  · You may have cramps and bleeding for 24 hours after the procedure. This is normal. Use pads instead of tampons.  · Do not douche or use tampons until your health care provider says its OK.  · Do not use any vaginal medicines  until you are told its OK.  · Ask your health care provider when its OK to have sex again.    When should I call my health care provider?  Call your health care provider if you have:  · Heavy bleeding (more than 1 pad an hour for 2 or more hours)  · A fever over 100.4°F (38.0°C)  · Increasing abdominal pain or tenderness  · Foul-smelling discharge     Follow-up care  Schedule a follow-up visit with your health care provider. Based on the results of your test, you may need more treatment. Be sure to follow instructions and keep your appointments.        Discharge Instructions: After Your Surgery  Youve just had surgery. During surgery, you were given medicine called anesthesia to keep you relaxed and free of pain. After surgery, you may have some pain or nausea. This is common. Here are some tips for feeling better and getting well after surgery.     Stay on schedule with your medicine.     Going home  Your healthcare provider will show you how to take care of yourself when you go home. He or she will also answer your questions. Have an adult family member or friend drive you home. For the first 24 hours after your surgery:    · Do not drive or use heavy equipment.  · Do not make important decisions or sign legal papers.  · Do not drink alcohol.  · Have someone stay with you, if needed. He or she can watch for problems and help keep you safe.    Be sure to go to all follow-up visits with your healthcare provider. And rest after your surgery for as long as your healthcare provider tells you to.    Coping with pain  If you have pain after surgery, pain medicine will help you feel better. Take it as told, before pain becomes severe. Also, ask your healthcare provider or pharmacist about other ways to control pain. This might be with heat, ice, or relaxation. And follow any other instructions your surgeon or nurse gives you.    Tips for taking pain medicine  To get the best relief possible, remember these  points:    · Pain medicines can upset your stomach. Taking them with a little food may help.  · Most pain relievers taken by mouth need at least 20 to 30 minutes to start to work.  · Taking medicine on a schedule can help you remember to take it. Try to time your medicine so that you can take it before starting an activity. This might be before you get dressed, go for a walk, or sit down for dinner.  · Constipation is a common side effect of pain medicines. Call your healthcare provider before taking any medicines such as laxatives or stool softeners to help ease constipation. Also ask if you should skip any foods. Drinking lots of fluids and eating foods such as fruits and vegetables that are high in fiber can also help. Remember, do not take laxatives unless your surgeon has prescribed them.  · Drinking alcohol and taking pain medicine can cause dizziness and slow your breathing. It can even be deadly. Do not drink alcohol while taking pain medicine.  · Pain medicine can make you react more slowly to things. Do not drive or run machinery while taking pain medicine.    Your healthcare provider may tell you to take acetaminophen to help ease your pain. Ask him or her how much you are supposed to take each day. Acetaminophen or other pain relievers may interact with your prescription medicines or other over-the-counter (OTC) medicines. Some prescription medicines have acetaminophen and other ingredients. Using both prescription and OTC acetaminophen for pain can cause you to overdose. Read the labels on your OTC medicines with care. This will help you to clearly know the list of ingredients, how much to take, and any warnings. It may also help you not take too much acetaminophen. If you have questions or do not understand the information, ask your pharmacist or healthcare provider to explain it to you before you take the OTC medicine.    Managing nausea  Some people have an upset stomach after surgery. This is often  because of anesthesia, pain, or pain medicine, or the stress of surgery. These tips will help you handle nausea and eat healthy foods as you get better. If you were on a special food plan before surgery, ask your healthcare provider if you should follow it while you get better. These tips may help:    · Do not push yourself to eat. Your body will tell you when to eat and how much.  · Start off with clear liquids and soup. They are easier to digest.  · Next try semi-solid foods, such as mashed potatoes, applesauce, and gelatin, as you feel ready.  · Slowly move to solid foods. Dont eat fatty, rich, or spicy foods at first.  · Do not force yourself to have 3 large meals a day. Instead eat smaller amounts more often.  · Take pain medicines with a small amount of solid food, such as crackers or toast, to avoid nausea.     Call your surgeon if  · You still have pain an hour after taking medicine. The medicine may not be strong enough.  · You feel too sleepy, dizzy, or groggy. The medicine may be too strong.  · You have side effects like nausea, vomiting, or skin changes, such as rash, itching, or hives.       If you have obstructive sleep apnea  You were given anesthesia medicine during surgery to keep you comfortable and free of pain. After surgery, you may have more apnea spells because of this medicine and other medicines you were given. The spells may last longer than usual.   At home:    · Keep using the continuous positive airway pressure (CPAP) device when you sleep. Unless your healthcare provider tells you not to, use it when you sleep, day or night. CPAP is a common device used to treat obstructive sleep apnea.  · Talk with your provider before taking any pain medicine, muscle relaxants, or sedatives. Your provider will tell you about the possible dangers of taking these medicines.    © 2448-3327 [employer]. [address] [place] [address], [place] [address]. All rights reserved. This information is not  intended as a substitute for professional medical care. Always follow your healthcare professional's instructions.    PLEASE FOLLOW ANY OTHER INSTRUCTIONS PROVIDED TO YOU BY DR. CHARLES!

## 2018-07-02 NOTE — INTERVAL H&P NOTE
The patient has been examined and the H&P has been reviewed:    I concur with the findings and no changes have occurred since H&P was written.        Active Hospital Problems    Diagnosis  POA    Endometrial mass [N94.89]  Yes      Resolved Hospital Problems    Diagnosis Date Resolved POA   No resolved problems to display.     Tanesha Cox MD  OBGYN - PGY 3

## 2018-07-02 NOTE — ANESTHESIA POSTPROCEDURE EVALUATION
"Anesthesia Post Evaluation    Patient: Tiffany Masterson    Procedure(s) Performed: Procedure(s) (LRB):  POLYPECTOMY, UTERUS, HYSTEROSCOPIC (N/A)    Final Anesthesia Type: general  Patient location during evaluation: PACU  Patient participation: Yes- Able to Participate  Level of consciousness: awake and alert  Post-procedure vital signs: reviewed and stable  Pain management: adequate  Airway patency: patent  PONV status at discharge: No PONV  Anesthetic complications: no      Cardiovascular status: blood pressure returned to baseline  Respiratory status: unassisted and spontaneous ventilation  Hydration status: euvolemic  Follow-up not needed.        Visit Vitals  BP (!) 140/65 (BP Location: Right arm, Patient Position: Lying)   Pulse 76   Temp 36.8 °C (98.3 °F) (Oral)   Resp 16   Ht 5' 6" (1.676 m)   Wt 83.9 kg (185 lb)   SpO2 96%   Breastfeeding? No   BMI 29.86 kg/m²       Pain/Apple Score: Presence of Pain: denstephon (7/2/2018 10:20 AM)      "

## 2018-07-02 NOTE — OP NOTE
OPERATIVE REPORT    PREOPERATIVE DIAGNOSIS  1. Endometrial Mass    POSTOPERATIVE DIAGNOSIS  1. Endometrial Polyp    PROCEDURE:  1. Hysteroscopy  2. Dilation and curettage     SURGEON: Karissa Vanessa IV, MD    ASSISTANT: Linda Thomas MD (RES)    ANESTHESIA: General    COMPLICATIONS: None    EBL: 15 cc    IV FLUIDS: 600  cc    URINE OUTPUT: 50  Cc    Hysteroscopy fluid deficit: 300cc    FINDINGS:   1. Atrophic genitalia  2. Cervix atrophic, dilated in accommodate 5 mm hysteroscope  3. Uterus sounded to 6 cm  4. Hysteroscopic findings notable for polyp on left uterine side wall, removed with truclear incisor device  4. Gentle curettage performed  5. Total volume used 810 cc, deficit 300 cc  6. Specimens sent to pathology for evaluation        PROCEDURE: The patient was taken to the operating room where general anesthesia was administered and found to be adequate.  She was prepped and draped in the dorsal lithotomy position.  A weighted sterile speculum was placed in the posterior aspect of vagina, and a right angle retractor was placed in the anterior aspect of the vagina.  The anterior lip of the cervix was grasped with a single tooth tenaculum.  The uterus sounded to approximately 6 cm.  The cervix was gently and gradually dilated in order to accommodate a 5mm hysteroscope.  The hysteroscope was then advanced through the cervical os and the uterus was distended with normal saline.  The endometrium was inspected and found to have a polyp on the left uterine side wall.  The tubal ostia were identified without difficulty, and appeared normal.  The truclear incisor was then used to remove the entire endometrial polyp without difficulty.  The hysteroscope was withdrawn without difficulty.     The uterus was curetted in a clockwise fashion until a gritty feeling was noted in all aspects of the uterus. The endometrial scrapings were sent to pathology.  The endometrial cavity was re inspected with the hysteroscope after  curettage and appeared atrophic and otherwise normal.    The tenaculum was removed and hemostasis was noted at the puncture sites in the cervix.     Sponge, lap, needle counts were correct x 2. The patient was taken to the recovery room in stable condition.      Linda Thomas MD  Ob/Gyn PGY-3

## 2018-07-02 NOTE — OR NURSING
Tiffany Masterson has met all discharge criteria from Phase II. Vital Signs are stable, ambulating  without difficulty. Discharge instructions given, patient verbalized understanding. Discharged from facility via wheelchair in stable condition.

## 2018-07-03 ENCOUNTER — TELEPHONE (OUTPATIENT)
Dept: OBSTETRICS AND GYNECOLOGY | Facility: CLINIC | Age: 83
End: 2018-07-03

## 2018-07-03 NOTE — TELEPHONE ENCOUNTER
Per Dr. Vanessa, instructed patient to avoid having lifting or strenuous activity for one week. Also during her travel to stop 2-3 hours to walk around, informed increased risk for DVT after pelvic surgery. Patient's  and  verbalized understanding

## 2018-07-03 NOTE — TELEPHONE ENCOUNTER
----- Message from Dari Santos sent at 7/3/2018 12:24 PM CDT -----  Contact: pt    Name of Who is Calling: KARIN RICHARDS [[id number]]      What is the request in detail: Patient states she's returning a call. Please contact to further discuss and advise       Can the clinic reply by [facility]: no      [facility] [phone]    ROSA

## 2018-07-11 ENCOUNTER — PATIENT MESSAGE (OUTPATIENT)
Dept: OBSTETRICS AND GYNECOLOGY | Facility: CLINIC | Age: 83
End: 2018-07-11

## 2018-07-12 ENCOUNTER — TELEPHONE (OUTPATIENT)
Dept: OBSTETRICS AND GYNECOLOGY | Facility: CLINIC | Age: 83
End: 2018-07-12

## 2018-07-12 NOTE — TELEPHONE ENCOUNTER
Patient reports some spotting after activity, informed can occur until lining becomes normal. Patient having some leg discomfort when walking, thinks from position she was in during surgery. Patient instructed to report to ED, for leg swelling, or if one leg larger than other. Patient verbalized understanding.

## 2018-07-12 NOTE — TELEPHONE ENCOUNTER
----- Message from Elliot Vanessa IV, MD sent at 7/10/2018  1:12 PM CDT -----  Benign hysteroscopy/D&C specimens.  Contact patient

## 2018-08-24 ENCOUNTER — PES CALL (OUTPATIENT)
Dept: ADMINISTRATIVE | Facility: CLINIC | Age: 83
End: 2018-08-24

## 2018-08-30 ENCOUNTER — OFFICE VISIT (OUTPATIENT)
Dept: INTERNAL MEDICINE | Facility: CLINIC | Age: 83
End: 2018-08-30
Payer: MEDICARE

## 2018-08-30 VITALS
BODY MASS INDEX: 28.93 KG/M2 | HEIGHT: 66 IN | SYSTOLIC BLOOD PRESSURE: 140 MMHG | WEIGHT: 180 LBS | HEART RATE: 62 BPM | DIASTOLIC BLOOD PRESSURE: 56 MMHG

## 2018-08-30 DIAGNOSIS — E78.5 HYPERLIPIDEMIA, UNSPECIFIED HYPERLIPIDEMIA TYPE: ICD-10-CM

## 2018-08-30 DIAGNOSIS — K59.00 CONSTIPATION, UNSPECIFIED CONSTIPATION TYPE: ICD-10-CM

## 2018-08-30 DIAGNOSIS — N39.0 RECURRENT UTI: ICD-10-CM

## 2018-08-30 DIAGNOSIS — G43.109 OCULAR MIGRAINE: ICD-10-CM

## 2018-08-30 DIAGNOSIS — Z12.11 COLON CANCER SCREENING: ICD-10-CM

## 2018-08-30 DIAGNOSIS — E03.4 HYPOTHYROIDISM DUE TO ACQUIRED ATROPHY OF THYROID: Primary | Chronic | ICD-10-CM

## 2018-08-30 LAB
BILIRUB UR QL STRIP: NEGATIVE
CLARITY UR REFRACT.AUTO: CLEAR
COLOR UR AUTO: YELLOW
GLUCOSE UR QL STRIP: NEGATIVE
HGB UR QL STRIP: NEGATIVE
KETONES UR QL STRIP: NEGATIVE
LEUKOCYTE ESTERASE UR QL STRIP: ABNORMAL
MICROSCOPIC COMMENT: ABNORMAL
NITRITE UR QL STRIP: NEGATIVE
PH UR STRIP: 6 [PH] (ref 5–8)
PROT UR QL STRIP: NEGATIVE
RBC #/AREA URNS AUTO: 0 /HPF (ref 0–4)
SP GR UR STRIP: 1.01 (ref 1–1.03)
SQUAMOUS #/AREA URNS AUTO: 1 /HPF
URN SPEC COLLECT METH UR: ABNORMAL
UROBILINOGEN UR STRIP-ACNC: NEGATIVE EU/DL
WBC #/AREA URNS AUTO: 7 /HPF (ref 0–5)

## 2018-08-30 PROCEDURE — 81001 URINALYSIS AUTO W/SCOPE: CPT

## 2018-08-30 PROCEDURE — 87086 URINE CULTURE/COLONY COUNT: CPT

## 2018-08-30 PROCEDURE — 99215 OFFICE O/P EST HI 40 MIN: CPT | Mod: S$GLB,,, | Performed by: INTERNAL MEDICINE

## 2018-08-30 PROCEDURE — 3077F SYST BP >= 140 MM HG: CPT | Mod: CPTII,S$GLB,, | Performed by: INTERNAL MEDICINE

## 2018-08-30 PROCEDURE — 99999 PR PBB SHADOW E&M-EST. PATIENT-LVL IV: CPT | Mod: PBBFAC,,, | Performed by: INTERNAL MEDICINE

## 2018-08-30 PROCEDURE — 3078F DIAST BP <80 MM HG: CPT | Mod: CPTII,S$GLB,, | Performed by: INTERNAL MEDICINE

## 2018-08-30 RX ORDER — NAPROXEN SODIUM 220 MG/1
TABLET, FILM COATED ORAL
COMMUNITY
End: 2018-08-30 | Stop reason: SDUPTHER

## 2018-08-30 RX ORDER — ACETAMINOPHEN 500 MG
1 TABLET ORAL DAILY
COMMUNITY

## 2018-08-30 RX ORDER — NITROFURANTOIN 25; 75 MG/1; MG/1
CAPSULE ORAL
Refills: 0 | COMMUNITY
Start: 2018-08-22 | End: 2018-08-30

## 2018-08-30 RX ORDER — CEPHALEXIN 500 MG/1
CAPSULE ORAL
Refills: 0 | COMMUNITY
Start: 2018-08-06 | End: 2018-08-30

## 2018-08-30 NOTE — PATIENT INSTRUCTIONS
Please call the Endoscopy team at [phone] to schedule your colonoscopy.    Once the urine results return, go to the local pharmacy to get TDaP (tetanus and pertussis booster).

## 2018-08-30 NOTE — PROGRESS NOTES
Subjective:       Patient ID: Tiffany Masterson is a 82 y.o. female.    Chief Complaint: Establish Care    HPI    Former pt of Dr Vera. Seen in last year by Gynecology.     Surgery this summer then back in hospital. Now has been treated for UTIs, given fluids and antibiotics in hospital.    In hospital in Aug in [facility] from Sat to Mon for UTI problems.     Notices frequent swelling in the right ankle after walking a long distance.  She has broken bones in the feet and ankle in the past, also with ankle sprains.  Has longstanding irregular sleep patterns for which she will often use Ambien.  Diagnosed with possible TIA in the past causing blurry vision, but now believed secondary to ocular    Review of Systems   Constitutional: Negative for activity change and unexpected weight change.   HENT: Negative for hearing loss, rhinorrhea and trouble swallowing.    Eyes: Positive for visual disturbance. Negative for discharge.   Respiratory: Negative for chest tightness and wheezing.    Cardiovascular: Negative for chest pain and palpitations.   Gastrointestinal: Positive for constipation. Negative for blood in stool, diarrhea and vomiting.   Endocrine: Negative for polydipsia and polyuria.   Genitourinary: Negative for difficulty urinating, dysuria, hematuria and menstrual problem.   Musculoskeletal: Negative for arthralgias, joint swelling and neck pain.   Neurological: Negative for weakness and headaches.   Psychiatric/Behavioral: Negative for confusion and dysphoric mood.       Objective:      Physical Exam   Constitutional: She is oriented to person, place, and time. No distress.   HENT:   Head: Atraumatic.   Right Ear: Tympanic membrane normal. No tenderness.   Left Ear: Tympanic membrane normal. No tenderness.   Mouth/Throat: Oropharynx is clear and moist. No oropharyngeal exudate.   Eyes: Pupils are equal, round, and reactive to light. Right eye exhibits no discharge. Left eye exhibits no discharge.  "  Neck: Normal range of motion. No thyromegaly present.   Cardiovascular: Normal rate, regular rhythm, normal heart sounds and intact distal pulses.   Pulmonary/Chest: Effort normal and breath sounds normal. No stridor. She has no wheezes. She has no rales.   Abdominal: Soft. She exhibits no distension and no mass. There is no tenderness. There is no guarding.   Musculoskeletal: She exhibits no edema (in bilateral ankles) or tenderness (in bilateral calf muscles).   Lymphadenopathy:     She has no cervical adenopathy.   Neurological: She is alert and oriented to person, place, and time.   Skin: Skin is warm and dry. No rash noted.   Psychiatric: She has a normal mood and affect. Her behavior is normal.   Nursing note and vitals reviewed.      Vitals:    08/30/18 1127   BP: (!) 140/56   BP Location: Right arm   Patient Position: Sitting   BP Method: Large (Manual)   Pulse: 62   Weight: 81.6 kg (180 lb)   Height: 5' 6" (1.676 m)     Body mass index is 29.05 kg/m².    RESULTS: Reviewed labs from last 6 months    Assessment:       1. Hypothyroidism due to acquired atrophy of thyroid    2. Hyperlipidemia, unspecified hyperlipidemia type    3. Recurrent UTI    4. Constipation, unspecified constipation type    5. Colon cancer screening    6. Ocular migraine        Plan:   Tiffany was seen today for establish care.    Diagnoses and all orders for this visit:    Hypothyroidism due to acquired atrophy of thyroid:  Prior diagnosis, well controlled on current management. No changes at this time, will continue to monitor.   -     TSH; Future    Hyperlipidemia, unspecified hyperlipidemia type:  Prior diagnosis, well controlled on current management. No changes at this time, will continue to monitor.   -     Comprehensive metabolic panel; Future  -     Lipid panel; Future    Recurrent UTI:  Prior dx, recently completed 2nd course of antibiotics, recheck urine today. follow up with Gynecology.  -     Urinalysis  -     Urine " culture    Constipation, unspecified constipation type:  Prior diagnosis, sufficiently controlled with Dulcolax PRN. Encourage increased fiber intake and fluid. No changes at this time, will continue to monitor.     Colon cancer screening  -     Case request GI: COLONOSCOPY    Ocular migraine:  Prior dx, stable, seen by outside Ophthalmology.    Follow-up in about 9 months (around 5/30/2019) for EPP annual exam, fasting labs 1 week prior.  Matthew Lewis MD  Internal Medicine    Portions of this note were completed using medical dictation software. Please excuse typographical or syntax errors that were missed on review.

## 2018-08-31 DIAGNOSIS — N18.9 CHRONIC KIDNEY DISEASE, UNSPECIFIED CKD STAGE: ICD-10-CM

## 2018-09-01 LAB — BACTERIA UR CULT: NORMAL

## 2018-09-03 ENCOUNTER — PATIENT MESSAGE (OUTPATIENT)
Dept: OBSTETRICS AND GYNECOLOGY | Facility: CLINIC | Age: 83
End: 2018-09-03

## 2018-09-04 ENCOUNTER — TELEPHONE (OUTPATIENT)
Dept: OBSTETRICS AND GYNECOLOGY | Facility: CLINIC | Age: 83
End: 2018-09-04

## 2018-09-04 ENCOUNTER — TELEPHONE (OUTPATIENT)
Dept: UROGYNECOLOGY | Facility: CLINIC | Age: 83
End: 2018-09-04

## 2018-09-04 ENCOUNTER — LAB VISIT (OUTPATIENT)
Dept: LAB | Facility: HOSPITAL | Age: 83
End: 2018-09-04
Attending: OBSTETRICS & GYNECOLOGY
Payer: MEDICARE

## 2018-09-04 DIAGNOSIS — R30.9 PAINFUL URINATION: Primary | ICD-10-CM

## 2018-09-04 DIAGNOSIS — R30.9 PAINFUL URINATION: ICD-10-CM

## 2018-09-04 LAB
BILIRUB UR QL STRIP: NEGATIVE
CLARITY UR REFRACT.AUTO: CLEAR
COLOR UR AUTO: YELLOW
GLUCOSE UR QL STRIP: NEGATIVE
HGB UR QL STRIP: NEGATIVE
HYALINE CASTS UR QL AUTO: 12 /LPF
KETONES UR QL STRIP: NEGATIVE
LEUKOCYTE ESTERASE UR QL STRIP: ABNORMAL
MICROSCOPIC COMMENT: ABNORMAL
NITRITE UR QL STRIP: NEGATIVE
PH UR STRIP: 5 [PH] (ref 5–8)
PROT UR QL STRIP: NEGATIVE
RBC #/AREA URNS AUTO: 0 /HPF (ref 0–4)
SP GR UR STRIP: 1.01 (ref 1–1.03)
SQUAMOUS #/AREA URNS AUTO: 0 /HPF
URN SPEC COLLECT METH UR: ABNORMAL
UROBILINOGEN UR STRIP-ACNC: NEGATIVE EU/DL
WBC #/AREA URNS AUTO: 5 /HPF (ref 0–5)

## 2018-09-04 PROCEDURE — 87086 URINE CULTURE/COLONY COUNT: CPT

## 2018-09-04 PROCEDURE — 81001 URINALYSIS AUTO W/SCOPE: CPT

## 2018-09-04 NOTE — TELEPHONE ENCOUNTER
Spoke to pt, consult appointment scheduled 9/13 at the [facility] location, pt aware and verbalizes understanding

## 2018-09-04 NOTE — TELEPHONE ENCOUNTER
Patient was seen by PCP 8/30, informed urine was negative but + WBC. Patient having back pain and urinary discomforts. Patient will leave clean catch urine today at lab, per Dr. Vanessa. Patient will be scheduled with Dr. Tadeo or Dr. Mallory

## 2018-09-04 NOTE — TELEPHONE ENCOUNTER
Left voice message for pt to give the office a call back at [phone]. Called to schedule a Consult appointment with Dr. Mallory.

## 2018-09-04 NOTE — TELEPHONE ENCOUNTER
----- Message from Wilma Wood sent at 9/4/2018 11:46 AM CDT -----  Pt is needing to schedule a new pt consult appt. She is being referred by Dr.Morris ALMENDAREZ for chronic UTIs with negative signs of infections. The pt can be reached at [phone].

## 2018-09-04 NOTE — TELEPHONE ENCOUNTER
----- Message from Malaika Gutiérrez sent at 9/4/2018  3:09 PM CDT -----  Contact: self  Pt returning a missed call, she can be reached at [phone].

## 2018-09-04 NOTE — TELEPHONE ENCOUNTER
----- Message from Wilma Wood sent at 9/4/2018 10:33 AM CDT -----  Contact: self   Pt is calling to schedule an appt for September if possible. The pt stated she started  experiencing some pain across her back. The pt can be reached at [phone].

## 2018-09-06 LAB — BACTERIA UR CULT: NORMAL

## 2018-09-11 ENCOUNTER — OFFICE VISIT (OUTPATIENT)
Dept: OBSTETRICS AND GYNECOLOGY | Facility: CLINIC | Age: 83
End: 2018-09-11
Payer: MEDICARE

## 2018-09-11 VITALS
DIASTOLIC BLOOD PRESSURE: 64 MMHG | BODY MASS INDEX: 29.62 KG/M2 | WEIGHT: 184.31 LBS | HEIGHT: 66 IN | SYSTOLIC BLOOD PRESSURE: 130 MMHG

## 2018-09-11 DIAGNOSIS — N39.0 RECURRENT UTI: Primary | ICD-10-CM

## 2018-09-11 DIAGNOSIS — N95.2 VAGINAL ATROPHY: ICD-10-CM

## 2018-09-11 DIAGNOSIS — Z98.890 STATUS POST HYSTEROSCOPIC POLYPECTOMY: ICD-10-CM

## 2018-09-11 PROCEDURE — 99213 OFFICE O/P EST LOW 20 MIN: CPT | Mod: S$PBB,,, | Performed by: OBSTETRICS & GYNECOLOGY

## 2018-09-11 PROCEDURE — 3078F DIAST BP <80 MM HG: CPT | Mod: CPTII,,, | Performed by: OBSTETRICS & GYNECOLOGY

## 2018-09-11 PROCEDURE — 99999 PR PBB SHADOW E&M-EST. PATIENT-LVL III: CPT | Mod: PBBFAC,,, | Performed by: OBSTETRICS & GYNECOLOGY

## 2018-09-11 PROCEDURE — 3075F SYST BP GE 130 - 139MM HG: CPT | Mod: CPTII,,, | Performed by: OBSTETRICS & GYNECOLOGY

## 2018-09-11 PROCEDURE — 99213 OFFICE O/P EST LOW 20 MIN: CPT | Mod: PBBFAC | Performed by: OBSTETRICS & GYNECOLOGY

## 2018-09-11 PROCEDURE — 1101F PT FALLS ASSESS-DOCD LE1/YR: CPT | Mod: CPTII,,, | Performed by: OBSTETRICS & GYNECOLOGY

## 2018-09-12 NOTE — PROGRESS NOTES
CC:   Recurrent UTI    HPI:  Tiffany Masterson is a 82 y.o. female patient  who presents today after multiple episodes of UTI.  Patient had a hysteroscopy in 2018.  Patient was in [place] and experienced a severe UTI requiring IV antibiotics and a trip to the ER.  Patient has since had back pain and sequelae consistent with recurrent UTI.  Patient is asymptomatic today.    Chart reviewed.    Recent Urine culture is negative    No LMP recorded. Patient is postmenopausal.    Past Medical History:   Diagnosis Date    Arthritis     Family history of malignant neoplasm of gastrointestinal tract mother    History of colonoscopy     unremarkable  by Dr. Javier.  Barium enema revealed diverticular disease.    Hyperlipidemia     Hypertension     Hypothyroidism     Migraine, ophthalmoplegic     Personal history of colonic polyps     TIA (transient ischemic attack)     with a normal angiogram in the past, Ocular migraines reported by patient, not TIA     Unspecified essential hypertension 2015       Past Surgical History:   Procedure Laterality Date    CATARACT EXTRACTION      right/ Dr. Lexis Nicolas     COLONOSCOPY       polyps    COLONOSCOPY N/A 2016    Procedure: COLONOSCOPY;  Surgeon: Bebeto Gonzalez MD;  Location: [facility] (4TH FLR);  Service: Endoscopy;  Laterality: N/A;    COLONOSCOPY N/A 2016    Performed by Bebeto Gonzalez MD at [facility] (4TH FLR)    COLONOSCOPY N/A 2014    Performed by Raciel Arce MD at [facility] (4TH FLR)    COLONOSCOPY N/A 2013    Performed by King Owens MD at [facility] (4TH FLR)    COLONOSCOPY N/A 4/15/2013    Performed by Raciel Arce MD at [facility] (4TH FLR)    COLONOSCOPY W/ POLYPECTOMY  2013    polyp of colon    DISSECTION, LESION, LOWER GI TRACT, SUBMUCOSAL, ENDOSCOPIC N/A 2013    Performed by King Owens MD at [facility] (2ND FLR)    EYE SURGERY  11-10-14    right  retina/Dr. Dalton Sierra ([facility])    HYSTEROSCOPIC POLYPECTOMY OF UTERUS N/A 7/2/2018    Procedure: POLYPECTOMY, UTERUS, HYSTEROSCOPIC;  Surgeon: Elliot Vanessa IV, MD;  Location: [facility];  Service: OB/GYN;  Laterality: N/A;    JOINT REPLACEMENT  3/23/2011    left total hip replacement    POLYPECTOMY, UTERUS, HYSTEROSCOPIC N/A 7/2/2018    [facility]    TONSILLECTOMY      pt was 11 years old         ROS:  GENERAL: Feeling well overall.   SKIN: Denies rash or lesions.   HEAD: Denies head injury or headache.   NODES: Denies enlarged lymph nodes.   CHEST: Denies chest pain or shortness of breath.   CARDIOVASCULAR: Denies palpitations or left sided chest pain.   ABDOMEN: No abdominal pain, nausea, vomiting or rectal bleeding.   URINARY: No dysuria, hematuria, or burning on urination.  REPRODUCTIVE: See HPI.   BREASTS: Denies pain, lumps, or nipple discharge.   HEMATOLOGIC: No easy bruisability or excessive bleeding.   MUSCULOSKELETAL: Denies joint pain or swelling.   NEUROLOGIC: Denies syncope or weakness.   PSYCHIATRIC: Denies depression.    PE:   APPEARANCE: Well nourished, well developed, in no acute distress.  ABDOMEN: Soft. No tenderness or masses. No hernias. No CVA tenderness.  VULVA: No lesions. Normal female genitalia.  URETHRAL MEATUS: Normal size and location, no lesions, no prolapse.  URETHRA: No masses, tenderness, prolapse or scarring.  VAGINA: Atrophic, no discharge.  Mild cystocele and rectocele.  Bladder non-tender to palpation.  CERVIX: No lesions and discharge.   UTERUS: Normal size, regular shape, mobile, non-tender, bladder base nontender.  ADNEXA: No masses, tenderness or CDS nodularity.  ANUS PERINEUM: Normal.    Diagnosis:  1. Recurrent UTI    2. Vaginal atrophy    3. Status post hysteroscopic polypectomy          PLAN:  Patient was counseled today on UTI prevention and vaginal atrophy.  Possible treatment options reviewed    Patient instructed on  emptying her bladder pre and post sexual relations.    Keep Urology follow-up appointment    Follow-up:  KAILA Vanessa IV, MD

## 2018-09-13 ENCOUNTER — INITIAL CONSULT (OUTPATIENT)
Dept: UROGYNECOLOGY | Facility: CLINIC | Age: 83
End: 2018-09-13
Payer: MEDICARE

## 2018-09-13 VITALS
SYSTOLIC BLOOD PRESSURE: 138 MMHG | BODY MASS INDEX: 29.72 KG/M2 | HEIGHT: 66 IN | WEIGHT: 184.94 LBS | DIASTOLIC BLOOD PRESSURE: 62 MMHG

## 2018-09-13 DIAGNOSIS — N81.4 UTEROVAGINAL PROLAPSE: ICD-10-CM

## 2018-09-13 DIAGNOSIS — R30.0 DYSURIA: ICD-10-CM

## 2018-09-13 DIAGNOSIS — N81.6 RECTOCELE, FEMALE: ICD-10-CM

## 2018-09-13 DIAGNOSIS — M89.9 DISORDER OF BONE AND CARTILAGE: ICD-10-CM

## 2018-09-13 DIAGNOSIS — M79.89 SWELLING OF RIGHT LOWER EXTREMITY: ICD-10-CM

## 2018-09-13 DIAGNOSIS — N39.46 MIXED STRESS AND URGE URINARY INCONTINENCE: ICD-10-CM

## 2018-09-13 DIAGNOSIS — K59.09 CHRONIC CONSTIPATION: ICD-10-CM

## 2018-09-13 DIAGNOSIS — N81.11 CYSTOCELE, MIDLINE: ICD-10-CM

## 2018-09-13 DIAGNOSIS — M94.9 DISORDER OF BONE AND CARTILAGE: ICD-10-CM

## 2018-09-13 DIAGNOSIS — N39.0 FREQUENT UTI: ICD-10-CM

## 2018-09-13 DIAGNOSIS — N95.2 VAGINAL ATROPHY: Primary | ICD-10-CM

## 2018-09-13 DIAGNOSIS — M54.50 ACUTE LOW BACK PAIN WITHOUT SCIATICA, UNSPECIFIED BACK PAIN LATERALITY: ICD-10-CM

## 2018-09-13 PROCEDURE — 3078F DIAST BP <80 MM HG: CPT | Mod: CPTII,,, | Performed by: OBSTETRICS & GYNECOLOGY

## 2018-09-13 PROCEDURE — 51701 INSERT BLADDER CATHETER: CPT | Mod: PBBFAC | Performed by: OBSTETRICS & GYNECOLOGY

## 2018-09-13 PROCEDURE — 51701 INSERT BLADDER CATHETER: CPT | Mod: S$PBB,,, | Performed by: OBSTETRICS & GYNECOLOGY

## 2018-09-13 PROCEDURE — 99205 OFFICE O/P NEW HI 60 MIN: CPT | Mod: S$PBB,25,, | Performed by: OBSTETRICS & GYNECOLOGY

## 2018-09-13 PROCEDURE — 1101F PT FALLS ASSESS-DOCD LE1/YR: CPT | Mod: CPTII,,, | Performed by: OBSTETRICS & GYNECOLOGY

## 2018-09-13 PROCEDURE — 99999 PR PBB SHADOW E&M-EST. PATIENT-LVL III: CPT | Mod: PBBFAC,,, | Performed by: OBSTETRICS & GYNECOLOGY

## 2018-09-13 PROCEDURE — 3075F SYST BP GE 130 - 139MM HG: CPT | Mod: CPTII,,, | Performed by: OBSTETRICS & GYNECOLOGY

## 2018-09-13 PROCEDURE — 99213 OFFICE O/P EST LOW 20 MIN: CPT | Mod: PBBFAC,25 | Performed by: OBSTETRICS & GYNECOLOGY

## 2018-09-13 NOTE — LETTER
September 30, 2018      Elliot Vanessa III, MD  [address] [facility] [address]  [facility] [address]           [facility]  [address] [address]  Phone: [phone]          Patient: Tiffany Masterson   MR Number: [id number]   YOB: 1935   Date of Visit: 9/13/2018       Dear Dr. Elliot Vanessa III:    Thank you for referring Tiffany Masterson to me for evaluation. Attached you will find relevant portions of my assessment and plan of care.    If you have questions, please do not hesitate to call me. I look forward to following Tiffany Masterson along with you.    Sincerely,    Thomas Mallory MD    Enclosure  CC:  No Recipients    If you would like to receive this communication electronically, please contact [email or web address] or [phone] to request more information on [employer] Link access.    For providers and/or their staff who would like to refer a patient to [facility], please contact us through our one-stop-shop provider referral line, [facility], at [phone].    If you feel you have received this communication in error or would no longer like to receive these types of communications, please e-mail [email or web address]

## 2018-09-13 NOTE — PROGRESS NOTES
[facility] [address] [place] [address]    Tiffany Masterson  [id number]  1935  September 30, 2018    Consulting Physician: Elliot Vanessa III, *   GYN: MD Ot[facility]  Primary M.D.: Matthew Lewis MD    Chief Complaint   Patient presents with    Consult      Recurrent UTI's       HPI:       1) frequent UTIs:  --4 UTIs since Apr 2018.  Was hospitalized.    --had some major back pain when getting out of chair; ankle started swelling; worse when moving  --no dysuria  --no UI  --some U/F; Freq Q2h; nocturia: 2x/PM  --complete emptying NEG    2)  POP:  Present--she doesn't notice. Was told this by GYN.  Has had some mild discomfort in lower abdomen. Saw Ot[facility] ALMENDAREZ in June 2018.  Pelvic US thickened EMB.  D&C, H/S benign.   (--) vaginal bleeding. (--) vaginal discharge. (+) sexually active.  (+) dyspareunia.  (+)  Vaginal dryness.  (--) vaginal estrogen use. Has Rx for vaginal estrogen--didn't start using due to CA fear.      3)  BM:  (+) constipation/straining. Takes ducolax, prune juice PRN.   (--) chronic diarrhea. (--) hematochezia.  (--) fecal incontinence.  (--) fecal smearing/urgency.  (--) complete evacuation.  Does not splint.     Past Medical History  Past Medical History:   Diagnosis Date    Arthritis     Family history of malignant neoplasm of gastrointestinal tract mother    History of colonoscopy     unremarkable 2007 by Dr. Javier.  Barium enema revealed diverticular disease.    Hyperlipidemia     Hypertension     Hypothyroidism     Migraine, ophthalmoplegic     Personal history of colonic polyps     TIA (transient ischemic attack)     with a normal angiogram in the past, Ocular migraines reported by patient, not TIA     Unspecified essential hypertension 5/8/2015   R>L ankle swelling, chronic.   Atherosclerosis of R carotid (12/3/16)  Not TIA; really told it was ocular migraine    Past Surgical History  Past Surgical History:   Procedure Laterality Date     CATARACT EXTRACTION      right/ Dr. Lexis Nicolas     COLONOSCOPY      2014 polyps    COLONOSCOPY N/A 2016    Procedure: COLONOSCOPY;  Surgeon: Bebeto Gonzalez MD;  Location: [facility] (4TH FLR);  Service: Endoscopy;  Laterality: N/A;    COLONOSCOPY N/A 2016    Performed by Bebeto Gonzalez MD at [facility] ENDO (4TH FLR)    COLONOSCOPY N/A 2014    Performed by Raciel Arce MD at [facility] ENDO (4TH FLR)    COLONOSCOPY N/A 2013    Performed by King Owens MD at [facility] ENDO (4TH FLR)    COLONOSCOPY N/A 4/15/2013    Performed by Raciel Arce MD at [facility] (4TH FLR)    COLONOSCOPY W/ POLYPECTOMY  2013    polyp of colon    DISSECTION, LESION, LOWER GI TRACT, SUBMUCOSAL, ENDOSCOPIC N/A 2013    Performed by King Owens MD at [facility] (2ND FLR)    EYE SURGERY  11-10-14    right retina/Dr. Dalton Sierra ([facility])    HYSTEROSCOPIC POLYPECTOMY OF UTERUS N/A 2018    Procedure: POLYPECTOMY, UTERUS, HYSTEROSCOPIC;  Surgeon: Elliot Vanessa IV, MD;  Location: [facility];  Service: OB/GYN;  Laterality: N/A;    JOINT REPLACEMENT  3/23/2011    left total hip replacement    POLYPECTOMY, UTERUS, HYSTEROSCOPIC N/A 2018    Performed by Elliot Vanessa IV, MD at [facility] OR    TONSILLECTOMY      pt was 11 years old      Hysterectomy: No    Past Ob History     x 3.  C/s x 0.    Largest infant weight: 9#  unknown FAVD. Yes episiotomy.      Gynecologic History  LMP: No LMP recorded. Patient is postmenopausal.  Age of menarche: 17 yo  Age of menopause: 51 yo  Menstrual history: h/o normal  Pap test: , normal.  History of abnormal paps: No.  History of STIs:  No  Mammogram: Date of last: .  Result: Normal  Colonoscopy: Date of last: .  Result:  polyps.  Repeat due:  Scheduled for 2018.    DEXA:  Date of last: .    Osteopenia of the femoral neck. FRAX calculation indicates high fracture risk and does support treatment for  osteoporosis.  RECOMMENDATIONS:  1) Adequate calcium and Vitamin D therapy  2) Appropriate exercise  3) Consider medical therapy including bisphosphonates, raloxifene, calcitonin, denosumab, teriparatide.  4) Consider repeat BMD in 2 years.    Family History  Family History   Problem Relation Age of Onset    Cancer Mother 75        colon    Colon cancer Mother     Cancer Father         lung    Diabetes Other     Cancer Maternal Aunt 80        colon cancer    Diabetes Paternal Aunt     Colon cancer Paternal Aunt     Breast cancer Neg Hx     Ovarian cancer Neg Hx       Colon CA: Yes - mother; PA  Breast CA: No  GYN CA: No   CA: No    Social History  Social History     Tobacco Use   Smoking Status Never Smoker   Smokeless Tobacco Never Used   Tobacco Comment    The patient is not getting any regular exercise at this time.  She does enjoy traveling to [place].     Social History     Substance and Sexual Activity   Alcohol Use Yes    Alcohol/week: 1.2 oz    Types: 2 Shots of liquor per week    Comment: daily   .    Social History     Substance and Sexual Activity   Drug Use No     The patient is .  Resides in [place] [address].  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      Allergies  Review of patient's allergies indicates:   Allergen Reactions    Levaquin [levofloxacin]      Joint pain    Hydrochlorothiazide Other (See Comments)     Pain in joints and depression per pt       Medications  Current Outpatient Medications on File Prior to Visit   Medication Sig Dispense Refill    ascorbic acid (VITAMIN C) 250 MG tablet Take 250 mg by mouth once daily.      aspirin (ECOTRIN) 81 MG EC tablet Take 81 mg by mouth once daily.      atenolol (TENORMIN) 50 MG tablet Take 1 tablet (50 mg total) by mouth nightly. 90 tablet 0    CALCIUM CARBONATE/VITAMIN D3 (CALCIUM 600 + D,3, ORAL) Take 1 tablet by mouth once daily.       cholecalciferol, vitamin D3, (VITAMIN D3) 2,000 unit  "Cap       DEXTRAN 70/HYPROMELLOSE (ARTIFICIAL TEARS, PF, OPHT) Apply to eye.      indapamide (LOZOL) 2.5 MG Tab Take 1 tablet (2.5 mg total) by mouth once daily. 90 tablet 3    levothyroxine (SYNTHROID) 100 MCG tablet Take 1 tablet (100 mcg total) by mouth once daily. 90 tablet 3    losartan (COZAAR) 100 MG tablet Take 1 tablet (100 mg total) by mouth once daily. 90 tablet 3    MULTIVITAMIN W-MINERALS/LUTEIN (CENTRUM SILVER ORAL) Take 1 tablet by mouth once daily.      pravastatin (PRAVACHOL) 40 MG tablet Take 1 tablet (40 mg total) by mouth nightly. 90 tablet 3    [DISCONTINUED] ciclopirox (PENLAC) 8 % Soln nightly as needed.        No current facility-administered medications on file prior to visit.        Review of Systems A 14 point ROS was reviewed with pertinent positives as noted above in the history of present illness.      Constitutional: negative  Eyes: negative  Endocrine: negative  Gastrointestinal: negative  Cardiovascular: negative  Respiratory: negative  Allergic/Immunologic: negative  Integumentary: negative  Psychiatric: negative  Musculoskeletal: negative   Ear/Nose/Throat: negative  Neurologic: negative  Genitourinary: SEE HPI  Hematologic/Lymphatic: negative   Breast: negative    Urogynecologic Exam  /62 (BP Location: Right arm, Patient Position: Sitting, BP Method: Medium (Manual))   Ht 5' 6" (1.676 m)   Wt 83.9 kg (184 lb 15.5 oz)   BMI 29.85 kg/m²     GENERAL APPEARANCE:  The patient is well-developed, well-nourished.   Neck:  Supple with no thyromegaly, no carotid bruits.  Heart:  Regular rate and rhythm, no murmurs, rubs or gallops.  Lungs:  Clear.  No CVA tenderness.  Abdomen:  Soft, nontender, nondistended, no hepatosplenomegaly.      PELVIC:    External genitalia:  Normal Bartholins, Skenes and labia bilaterally.    Urethra:  No caruncle, diverticulum or masses.  (+) hypermobility.    Vagina:  Atrophy (+) , no bladder masses or tender, no discharge.    Cervix:  normal " appearance  Uterus: normal size, contour, position, consistency, mobility, non-tender  Adnexa: Not palpable.    POP-Q:  Aa 0; Ba 0; C -3; Ap -1; Bp -1; D -6.  Genital hiatus 2, perineal body 2 total vaginal length 10.    NEUROLOGIC:  Cranial nerves 2 through 12 intact.  Strength 5/5.  DTRs 2+ lower extremities.  S2 through 4 normal.  Sacral reflexes intact.    EXT: MARINO, 2+ pulses bilaterally, no C/C; 1+ NP edema at RLE, NT, no cords/erythema/signs of VTE    COUGH STRESS TEST:  negative  KEGEL: 1 /5      PVR: 10 mL    Impression    1. Vaginal atrophy    2. Disorder of bone and cartilage    3. Dysuria    4. Frequent UTI    5. Mixed stress and urge urinary incontinence    6. Uterovaginal prolapse    7. Cystocele, midline    8. Rectocele, female    9. Chronic constipation    10. Acute low back pain without sciatica, unspecified back pain laterality    11. Swelling of right lower extremity        Initial Plan  The patient was counseled regarding these issues. The patient was given a summary sheet containing each of these issues with possible options for evaluation and management. When appropriate, we also reviewed computer-generated diagrams specific to their diagnoses..  All questions were addressed to the patient's satisfaction.     1. Concern for recurrent UTI  --urine culture today  --control bowel movements  --premarin cream can help with vaginal health and help prevent uti  --if you feel like you have a UTI in the future, please call us.  We will place an order for a urine culture-- you can go to the nearest [facility] lab to bring a sample.  We will then call in something to help with discomfort vs. Antibiotics.  --last 2 cultures were negative  -- if you do continue to have UTI's will consider imaging / cystoscopy    2. Midline cystocele stage 2, rectocele, stage 2, uterovaginal prolapse stage 1  --currently asymptomatic  --will continue to monitor  --normal post void residual today    3. Vaginal atrophy (dryness):   Use  gram of estrogen cream in vagina nightly x 2 weeks, then twice a week thereafter. Place a small amount around the inner labia as well.    4. Constipation:  Controlling constipation may help bladder urgency/leakage and fiber may better control cholesterol and blood glucose.  Start daily fiber.  Take 1 tsp of fiber powder (psyllium or other sugar-free powder).  Mix in 8 oz of water.  Take x 3-5 days.  Then, increase fiber by 1 tsp every 3-5 days until stool is easy to pass.  Stop and continue at that dose.   Do not exceed 6 tsps/day.  May also use over the counter stool softener 1-2 x/day.  AVOID laxatives.  --If you would rather pills follow the following information, but make sure you are drinking enough water.  --Take 1 fiber pill/day x 3 days.  Then 2 pills/day x 3 days.  Then 3 pills/day x 3 days...increasing in this fashion to max 6 pills a day.  STOP when you find dose that makes stool easy to pass (this may be 1 pill a day or may be 4 pills/day).  Continue at this dose FOREVER.  Additionally, take 1-2 stool softener pills (EX: colace) OTC daily.  AVOID laxatives.   --start magnesium oxide 400 mg daily  --take stool softener 1-2 times daily  --adequate hydration is important    5. Rare urinary incontinence   --Empty bladder every 3 hours.  Empty well: wait a minute, lean forward on toilet.    --Avoid dietary irritants (see sheet).  Keep diary x 3-5 days to determine your irritants.  --KEGELS: do 10 in AM and 10 in PM, holding each x 10 seconds.  When you feel urge to go, STOP, KEGEL, and when urge has passed, then go to bathroom.  Consider PT in future.    --URGE: consider anticholinergic. Takes 2-4 weeks to see if will have effect.  For dry mouth: get sour, sugar free lozenge or gum.    --STRESS:  Pessary vs. Sling if symptoms worsen    6. Back pain  --not present today  --continue to monitor  --if it is worse with movement, it is most likely musculoskeletal  --notify us if it resumes  --work on  strengthening your core    7. R leg swelling  -- will send a message to your primary care physician  --wear light compression stockings  --elevate your legs above your heart level  --no current sings of VTE on exam    8.well woman  --dexa ordered  --call [phone] to schedule    9. RTC 3 months    Approximately 60 min were spent in consult, 90 % in discussion.      Thank you for requesting consultation of your patient.  I look forward to participating in their care.    Thomas Mallory  Female Pelvic Medicine and Reconstructive Surgery  [facility] [place], [place]

## 2018-09-13 NOTE — PATIENT INSTRUCTIONS
1. Concern for recurrent UTI  --urine culture today  --control bowel movements  --premarin cream can help with vaginal health and help prevent uti  --if you feel like you have a UTI in the future, please call us.  We will place an order for a urine culture-- you can go to the nearest [facility] lab to bring a sample.  We will then call in something to help with discomfort vs. Antibiotics.  --last 2 cultures were negative  -- if you do continue to have UTI's will consider imaging / cystoscopy    2. Midline cystocele stage 2, rectocele, stage 2, uterovaginal prolapse stage 1  --currently asymptomatic  --will continue to monitor  --normal post void residual today    3. Vaginal atrophy (dryness):  Use  gram of estrogen cream in vagina nightly x 2 weeks, then twice a week thereafter. Place a small amount around the inner labia as well.    4. Constipation:  Controlling constipation may help bladder urgency/leakage and fiber may better control cholesterol and blood glucose.  Start daily fiber.  Take 1 tsp of fiber powder (psyllium or other sugar-free powder).  Mix in 8 oz of water.  Take x 3-5 days.  Then, increase fiber by 1 tsp every 3-5 days until stool is easy to pass.  Stop and continue at that dose.   Do not exceed 6 tsps/day.  May also use over the counter stool softener 1-2 x/day.  AVOID laxatives.  --If you would rather pills follow the following information, but make sure you are drinking enough water.  --Take 1 fiber pill/day x 3 days.  Then 2 pills/day x 3 days.  Then 3 pills/day x 3 days...increasing in this fashion to max 6 pills a day.  STOP when you find dose that makes stool easy to pass (this may be 1 pill a day or may be 4 pills/day).  Continue at this dose FOREVER.  Additionally, take 1-2 stool softener pills (EX: colace) OTC daily.  AVOID laxatives.   --start magnesium oxide 400 mg daily  --take stool softener 1-2 times daily  --adequate hydration is important    5. Rare urinary incontinence   --Empty  bladder every 3 hours.  Empty well: wait a minute, lean forward on toilet.    --Avoid dietary irritants (see sheet).  Keep diary x 3-5 days to determine your irritants.  --KEGELS: do 10 in AM and 10 in PM, holding each x 10 seconds.  When you feel urge to go, STOP, KEGEL, and when urge has passed, then go to bathroom.  Consider PT in future.    --URGE: consider anticholinergic. Takes 2-4 weeks to see if will have effect.  For dry mouth: get sour, sugar free lozenge or gum.    --STRESS:  Pessary vs. Sling if symptoms worsen    6. Back pain  --not present today  --continue to monitor  --if it is worse with movement, it is most likely musculoskeletal  --notify us if it resumes  --work on strengthening your core    7. R leg swelling  -- will send a message to your primary care physician  --wear light compression stockings  --elevate your legs above your heart level    8.well woman  --dexa ordered  --call [phone] to schedule    9. RTC 3 months        Bladder Irritants  Certain foods and drinks have been associated with worsening symptoms of urinary frequency, urgency, urge incontinence, or  bladder pain. If you suffer from any of these conditions, you may wish to try eliminating one or more of these foods from your  diet and see if your symptoms improve. If bladder symptoms are related to dietary factors, strict adherence to a diet that  eliminates the food should bring marked relief in 10 days. Once you are feeling better, you can begin to add foods back into  your diet, one at a time. If symptoms return, you will be able to identify the irritant. As you add foods back to your diet it is  very important that you drink significant amounts of water.  Low-acid fruit substitutions include apricots, papaya, pears and watermelon. Coffee drinkers can drink Kava or other lowacid  instant drinks. Tea drinkers can substitute non-citrus herbal and sun brewed teas. Calcium carbonate co-buffered with  calcium ascorbate can be  substituted for Vitamin C. Prelief is a dietary supplement that works as an acid blocker for the  bladder.  Where to get more information:   Overcoming Bladder Disorders by Peace Choi and Keanu Siddiqui, 1990   You Dont Have to Live with Cystitis! By Elza Lind, 1988  List of Common Bladder Irritants*  Alcoholic beverages  Apples and apple juice  Cantaloupe  Carbonated beverages  Chili and spicy foods  Chocolate  Citrus fruit  Coffee (including decaffeinated)  Cranberries and cranberry juice  Grapes  Guava  Milk Products: milk, cheese, cottage cheese, yogurt, ice cream  Peaches  Pineapple  Plums  Strawberries  Sugar especially artificial sweeteners, saccharin, aspartame, corn sweeteners, honey, fructose, sucrose, lactose  Tea  Tomatoes and tomato juice  Vitamin B complex  Vinegar  *Most people are not sensitive to ALL of these products; your goal is to find the foods that make YOUR  symptoms worse

## 2018-09-14 ENCOUNTER — LAB VISIT (OUTPATIENT)
Dept: LAB | Facility: HOSPITAL | Age: 83
End: 2018-09-14
Attending: NURSE PRACTITIONER
Payer: MEDICARE

## 2018-09-14 DIAGNOSIS — R30.0 DYSURIA: ICD-10-CM

## 2018-09-14 LAB
BILIRUB UR QL STRIP: NEGATIVE
CLARITY UR REFRACT.AUTO: CLEAR
COLOR UR AUTO: YELLOW
GLUCOSE UR QL STRIP: NEGATIVE
HGB UR QL STRIP: NEGATIVE
KETONES UR QL STRIP: NEGATIVE
LEUKOCYTE ESTERASE UR QL STRIP: NEGATIVE
MICROSCOPIC COMMENT: NORMAL
NITRITE UR QL STRIP: NEGATIVE
PH UR STRIP: 5 [PH] (ref 5–8)
PROT UR QL STRIP: NEGATIVE
RBC #/AREA URNS AUTO: 1 /HPF (ref 0–4)
SP GR UR STRIP: 1.01 (ref 1–1.03)
SQUAMOUS #/AREA URNS AUTO: 0 /HPF
URN SPEC COLLECT METH UR: NORMAL
UROBILINOGEN UR STRIP-ACNC: NEGATIVE EU/DL
WBC #/AREA URNS AUTO: 2 /HPF (ref 0–5)

## 2018-09-14 PROCEDURE — 81001 URINALYSIS AUTO W/SCOPE: CPT

## 2018-09-14 PROCEDURE — 87086 URINE CULTURE/COLONY COUNT: CPT

## 2018-09-16 LAB — BACTERIA UR CULT: NO GROWTH

## 2018-09-17 DIAGNOSIS — I10 HYPERTENSION, UNSPECIFIED TYPE: ICD-10-CM

## 2018-09-17 RX ORDER — ATENOLOL 25 MG/1
TABLET ORAL
Qty: 90 TABLET | Refills: 0 | OUTPATIENT
Start: 2018-09-17

## 2018-09-17 NOTE — TELEPHONE ENCOUNTER
I received a refill request for zolpidem (Ambien).  It says in the system that this medication was discontinued last week by another provider.  This may have been an error.  Please call the patient to see if she is still using this medication, and if there has been any side effects or problems.

## 2018-09-18 ENCOUNTER — TELEPHONE (OUTPATIENT)
Dept: UROGYNECOLOGY | Facility: CLINIC | Age: 83
End: 2018-09-18

## 2018-09-18 RX ORDER — ZOLPIDEM TARTRATE 5 MG/1
TABLET ORAL
Qty: 90 TABLET | Refills: 0 | Status: SHIPPED | OUTPATIENT
Start: 2018-09-18 | End: 2018-12-03 | Stop reason: SDUPTHER

## 2018-09-18 NOTE — TELEPHONE ENCOUNTER
----- Message from Katelyn Adair NP sent at 9/18/2018  4:10 PM CDT -----  Please let patient know urine culture was normal.  BLAKE Lopes-BC

## 2018-09-18 NOTE — TELEPHONE ENCOUNTER
Called pt no answer, Left voice message for pt to give the office a call back at [phone] regarding test results.

## 2018-09-19 DIAGNOSIS — I10 HYPERTENSION, UNSPECIFIED TYPE: ICD-10-CM

## 2018-09-19 RX ORDER — ATENOLOL 25 MG/1
25 TABLET ORAL EVERY MORNING
Qty: 90 TABLET | Refills: 3 | Status: SHIPPED | OUTPATIENT
Start: 2018-09-19 | End: 2018-12-06 | Stop reason: SDUPTHER

## 2018-09-21 ENCOUNTER — TELEPHONE (OUTPATIENT)
Dept: UROGYNECOLOGY | Facility: CLINIC | Age: 83
End: 2018-09-21

## 2018-09-24 ENCOUNTER — HOSPITAL ENCOUNTER (OUTPATIENT)
Dept: RADIOLOGY | Facility: HOSPITAL | Age: 83
Discharge: HOME | End: 2018-09-24
Attending: OBSTETRICS & GYNECOLOGY
Payer: MEDICARE

## 2018-09-24 DIAGNOSIS — Z12.31 VISIT FOR SCREENING MAMMOGRAM: ICD-10-CM

## 2018-09-24 PROCEDURE — 77067 SCR MAMMO BI INCL CAD: CPT | Mod: 26,,, | Performed by: RADIOLOGY

## 2018-09-24 PROCEDURE — 77063 BREAST TOMOSYNTHESIS BI: CPT | Mod: TC

## 2018-09-24 PROCEDURE — 77063 BREAST TOMOSYNTHESIS BI: CPT | Mod: 26,,, | Performed by: RADIOLOGY

## 2018-09-25 ENCOUNTER — HOSPITAL ENCOUNTER (OUTPATIENT)
Dept: RADIOLOGY | Facility: CLINIC | Age: 83
Discharge: HOME | End: 2018-09-25
Attending: NURSE PRACTITIONER
Payer: MEDICARE

## 2018-09-25 DIAGNOSIS — M89.9 DISORDER OF BONE AND CARTILAGE: ICD-10-CM

## 2018-09-25 DIAGNOSIS — M94.9 DISORDER OF BONE AND CARTILAGE: ICD-10-CM

## 2018-09-25 PROCEDURE — 77080 DXA BONE DENSITY AXIAL: CPT | Mod: TC

## 2018-09-25 PROCEDURE — 77080 DXA BONE DENSITY AXIAL: CPT | Mod: 26,,, | Performed by: INTERNAL MEDICINE

## 2018-09-30 PROBLEM — N39.46 MIXED STRESS AND URGE URINARY INCONTINENCE: Status: ACTIVE | Noted: 2018-09-30

## 2018-09-30 PROBLEM — M54.50 ACUTE LOW BACK PAIN WITHOUT SCIATICA: Status: ACTIVE | Noted: 2018-09-30

## 2018-09-30 PROBLEM — N81.11 CYSTOCELE, MIDLINE: Status: ACTIVE | Noted: 2018-09-30

## 2018-09-30 PROBLEM — N39.0 FREQUENT UTI: Status: ACTIVE | Noted: 2018-09-30

## 2018-09-30 PROBLEM — M79.89 SWELLING OF RIGHT LOWER EXTREMITY: Status: ACTIVE | Noted: 2018-09-30

## 2018-09-30 PROBLEM — K59.09 CHRONIC CONSTIPATION: Status: ACTIVE | Noted: 2018-09-30

## 2018-09-30 PROBLEM — N81.4 UTEROVAGINAL PROLAPSE: Status: ACTIVE | Noted: 2018-09-30

## 2018-09-30 PROBLEM — N81.6 RECTOCELE, FEMALE: Status: ACTIVE | Noted: 2018-09-30

## 2018-10-01 ENCOUNTER — TELEPHONE (OUTPATIENT)
Dept: INTERNAL MEDICINE | Facility: CLINIC | Age: 83
End: 2018-10-01

## 2018-10-01 NOTE — TELEPHONE ENCOUNTER
Patient reports to the urogynecologist that she was having some lower extremity swelling.  Please call to get some more details regarding how long this has been going on and when she is noticing it.  With that information I can determine if she needs to come in or if we need to check any blood work for the problem.    Portions of this note were completed using medical dictation software. Please excuse typographical or syntax errors that were missed on review.

## 2018-10-02 NOTE — TELEPHONE ENCOUNTER
Pt states that the swelling problem in her ankles only has been going on x 5 years. The swelling occurs mostly on her rt ankle and slight swelling on the left. She is able to wear shoes without discomfort of swelling of the feet. She denies shortness of breath on exertion or injury. She is able to be active. Swelling also occurs when sitting.  After Saturday, she will be going out of town for 2 weeks.

## 2018-10-03 ENCOUNTER — TELEPHONE (OUTPATIENT)
Dept: UROGYNECOLOGY | Facility: CLINIC | Age: 83
End: 2018-10-03

## 2018-10-03 DIAGNOSIS — M85.89 OSTEOPENIA OF MULTIPLE SITES: Primary | ICD-10-CM

## 2018-10-04 NOTE — TELEPHONE ENCOUNTER
----- Message from Matthew Lewis MD sent at 10/3/2018  5:23 PM CDT -----  Hello! In this case I think she should see Endocrinology; with very elderly patients I prefer having Endocrinology navigate potential drug interactions and side effects. Plus I never know how to get things set up with the infusion center when needed! Thanks Thomas; hope you're having a great week. --Matthew    ----- Message -----  From: Thomas Mallory MD  Sent: 9/30/2018  11:43 AM  To: Matthew Lewis MD, Joshua Castro Staff    Hi again! Patient dexa shows osteopenia with high frax. Got it to make sure up to date water previous osteopenia. Doesnt look like shes seeing endocrinology. Do you want me to get her into see them, or would you be comfortable discussing treatment options with her? Happy to help get her plugged in either way. Thanks for ur help with this :)  Thomas Mallory

## 2018-10-04 NOTE — TELEPHONE ENCOUNTER
Patient has osteopenia (weak bones but not osteoporosis) on her bone test (DEXA).  Her calculated FRAX was elevated.  Needs to see endocrine.  I spoke with PCP, and he agrees.  All of this was explained to patient.   -0------------------------    Please call patient and help her make appt with endocrine for osteopenia with high risk of fracture.  I explained situation to her, and she will be expecting a call.  She leaves on Sat 10/6 to go out of town x 1 month.  So, if we could call her before that, it would be great.  Thanks!

## 2018-10-09 ENCOUNTER — PES CALL (OUTPATIENT)
Dept: ADMINISTRATIVE | Facility: CLINIC | Age: 83
End: 2018-10-09

## 2018-10-10 NOTE — TELEPHONE ENCOUNTER
Appointment with Endocrine scheduled Wednesday 11/14 at 9:30a. Appointment slipped will be mailed to pt's home address as she is out-of-town for a month.

## 2018-11-13 NOTE — PROGRESS NOTES
Tiffany Masterson is a 83 y.o. female referred by Dr. Thomas Mallory for evaluation of osteopenia with high FRAX.     Believes she was told she had osteoporosis many years ago but unsure timing  Briefly treated with what sounds like bisphosphonates but did not take long    Fractures:    Pelvis many years ago  Foot on treadmill 40 + years ago  Arm after fall in 2003; fell in [place] in [place]    DXA:  9/2018:Lumbar Spine: Lumbar bone mineral density L1-L4 is 0.932g/cm2, which is a T-score of -1.0. The Z-score is 1.7.  Total Hip: Total hip bone mineral density is 0.703g/cm2.  The T-score is -2.0, and the Z-score is 0.2.  Femoral neck: Bone mineral density is 0.624g/cm2 and the T-score is -2.0 and the Z-score is 0.3 g/cm2.  There is a 36 % risk of a major osteoporotic fracture and a 21 % risk of hip fracture in the next 10 years (FRAX).    Treatment (pharmacotherapy) history:  Treated with monthly bisphosphonate stopped due to joint pain    Dental visits: yes; needs crown replaced    Diet:   Calcium intake: some cheese and ice cream    Supplements:   Calcium: 600 mg daily  Vitamin D: 2000 units + D3 in supplement  MVI: Centrum silver    Exercise:  Busy life but no regular exercise      Menopause: 50's  HRT history: no    Glucocorticoid History: denies  Personal history of kidney stones: denies  Family history of kidney stones: denies  Family history of bone disease or fracture: not known  Lost about 1/2 inch height    Hypothyroidism  Seen by Dr. Ibarra 40+ years ago and told she had hyperthyroidism with subsequent development of hypothyroidism  Been on LT4 for many years  Taking LT4 100 mcg daily, stable dose    +cold intolerance  Denies fatigue, weight change  Dry, splitting nails    Review of Systems:  Constitutional: no f/c, abnormal weight changes, fatigue  Eyes: no blurry vision, loss of vision  ENT: no hearing loss, rhinorrhea, sore throat  Thyroid: +cold intolerance  Pulm: no cough, SOB, wheezing  CV: no  CP, palpitations, +LE edema R>L  GI: no abd pain, n/v, diarrhea, constipation  : frequent UTI  MSK: R shoulder pain, myalgias; leaning forward more  Neuro: no headaches, paresthesias, dizziness  Skin: no rashes  Heme: no easy bruising or bleeding    Review of patient's allergies indicates:   Allergen Reactions    Levaquin [levofloxacin]      Joint pain    Hydrochlorothiazide Other (See Comments)     Pain in joints and depression per pt         Current Outpatient Medications:     ascorbic acid (VITAMIN C) 250 MG tablet, Take 250 mg by mouth once daily., Disp: , Rfl:     aspirin (ECOTRIN) 81 MG EC tablet, Take 81 mg by mouth once daily., Disp: , Rfl:     atenolol (TENORMIN) 25 MG tablet, Take 1 tablet (25 mg total) by mouth every morning., Disp: 90 tablet, Rfl: 3    atenolol (TENORMIN) 50 MG tablet, Take 1 tablet (50 mg total) by mouth nightly., Disp: 90 tablet, Rfl: 0    CALCIUM CARBONATE/VITAMIN D3 (CALCIUM 600 + D,3, ORAL), Take 1 tablet by mouth once daily. , Disp: , Rfl:     cholecalciferol, vitamin D3, (VITAMIN D3) 2,000 unit Cap, , Disp: , Rfl:     DEXTRAN 70/HYPROMELLOSE (ARTIFICIAL TEARS, PF, OPHT), Apply to eye., Disp: , Rfl:     losartan (COZAAR) 100 MG tablet, Take 1 tablet (100 mg total) by mouth once daily., Disp: 90 tablet, Rfl: 3    MULTIVITAMIN W-MINERALS/LUTEIN (CENTRUM SILVER ORAL), Take 1 tablet by mouth once daily., Disp: , Rfl:     pravastatin (PRAVACHOL) 40 MG tablet, Take 1 tablet (40 mg total) by mouth nightly., Disp: 90 tablet, Rfl: 3    zolpidem (AMBIEN) 5 MG Tab, TAKE 1 TABLET BY MOUTH AT BEDTIME AS NEEDED, Disp: 90 tablet, Rfl: 0    conjugated estrogens (PREMARIN) vaginal cream, Place 0.5 g vaginally twice a week., Disp: 1 applicator, Rfl: 0    indapamide (LOZOL) 2.5 MG Tab, Take 1 tablet (2.5 mg total) by mouth once daily., Disp: 90 tablet, Rfl: 3    levothyroxine (SYNTHROID) 100 MCG tablet, Take 1 tablet (100 mcg total) by mouth once daily., Disp: 90 tablet, Rfl:  "3    Physical Exam:  /73 (BP Location: Right arm, Patient Position: Sitting, BP Method: Medium (Manual))   Pulse 69   Ht 5' 6" (1.676 m)   Wt 82.8 kg (182 lb 8.7 oz)   BMI 29.46 kg/m²   Body mass index is 29.46 kg/m².  General Appearance:  Normal appearing, pleasant, NAD  Skin:  no rashes or lesions  HEENT:   EOMI, MMM, sclera anicteric  Neck:  No thyromegaly  Chest and Lungs:  CTAB, no wheezes/rales/rhonchi  Cardiovascular:  RRR, nml S1, S2  Extremities:  No LE edema  Neurologic:  A&O x3  Psychiatric:  normal mood and affect    Component      Latest Ref Rng & Units 4/11/2018 9/14/2016   Sodium      136 - 145 mmol/L 135 (L)    Potassium      3.5 - 5.1 mmol/L 4.4    Chloride      95 - 110 mmol/L 101    CO2      23 - 29 mmol/L 26    Glucose      70 - 110 mg/dL 103    BUN, Bld      8 - 23 mg/dL 15    Creatinine      0.5 - 1.4 mg/dL 1.2    Calcium      8.7 - 10.5 mg/dL 9.0    Total Protein      6.0 - 8.4 g/dL 7.1    Albumin      3.5 - 5.2 g/dL 3.8    Total Bilirubin      0.1 - 1.0 mg/dL 0.6    Alkaline Phosphatase      55 - 135 U/L 76    AST      10 - 40 U/L 22    ALT      10 - 44 U/L 27    Anion Gap      8 - 16 mmol/L 8    eGFR if African American      >60 mL/min/1.73 m:2 49 (A)    eGFR if non African American      >60 mL/min/1.73 m:2 43 (A)    Vit D, 25-Hydroxy      30 - 96 ng/mL  37     Component      Latest Ref Rng & Units 4/11/2018   TSH      0.400 - 4.000 uIU/mL 1.826       Impression/Plan:  1. Osteopenia, unspecified location    2. Vitamin D deficiency disease    3. Hypothyroidism due to acquired atrophy of thyroid    4. CKD (chronic kidney disease) stage 3, GFR 30-59 ml/min        Osteoporosis/Osteopenia  Risk factors include age, postmenopausal  Based on high FRAX would benefit from treatment. Recommend Prolia given renal function and previous intolerance of bisphosphonates. She is hesitant but will consider while we perform secondary work-up as below    Side effects of Prolia reviewed, including risk " of hypocalcemia, eczema/skin complications. Also reviewed association with ONJ and rare atypical femur fractures.  Advise to see the dentist regularly, notify dentist of using this medication and avoid non-emergent dental implants and extractions.  Also advise to contact us if develops new, persistent thigh or groin pain.    Check:  - renal chem panel, PTH, Vit D 25 OH, magnesium  - 24 hour urine Ca and Cr  - SPEP    Recommend:  Treatment: prolia  Calcium:  recommended 1200 mg daily divided between diet and supplements. Written instructions provided to patient  Vitamin D: check level and titrate pending results  DXA: 1 year after starting therapy  Exercise: encouraged weight bearing exercise  Fall precautions: reviewed  Dental health: regular visits    Hypothyroidism  Check TSH given possible symptoms  Continue LT4 100 mcg daily    RTC 1 year    Maia Yang MD

## 2018-11-14 ENCOUNTER — OFFICE VISIT (OUTPATIENT)
Dept: ENDOCRINOLOGY | Facility: CLINIC | Age: 83
End: 2018-11-14
Payer: MEDICARE

## 2018-11-14 VITALS
DIASTOLIC BLOOD PRESSURE: 73 MMHG | BODY MASS INDEX: 29.34 KG/M2 | HEIGHT: 66 IN | HEART RATE: 69 BPM | SYSTOLIC BLOOD PRESSURE: 123 MMHG | WEIGHT: 182.56 LBS

## 2018-11-14 DIAGNOSIS — M85.80 OSTEOPENIA, UNSPECIFIED LOCATION: Primary | ICD-10-CM

## 2018-11-14 DIAGNOSIS — N18.30 CKD (CHRONIC KIDNEY DISEASE) STAGE 3, GFR 30-59 ML/MIN: ICD-10-CM

## 2018-11-14 DIAGNOSIS — E55.9 VITAMIN D DEFICIENCY DISEASE: ICD-10-CM

## 2018-11-14 DIAGNOSIS — E03.4 HYPOTHYROIDISM DUE TO ACQUIRED ATROPHY OF THYROID: Chronic | ICD-10-CM

## 2018-11-14 PROCEDURE — 99999 PR PBB SHADOW E&M-EST. PATIENT-LVL III: CPT | Mod: PBBFAC,HCNC,, | Performed by: INTERNAL MEDICINE

## 2018-11-14 PROCEDURE — 3074F SYST BP LT 130 MM HG: CPT | Mod: CPTII,HCNC,S$GLB, | Performed by: INTERNAL MEDICINE

## 2018-11-14 PROCEDURE — 99204 OFFICE O/P NEW MOD 45 MIN: CPT | Mod: HCNC,S$GLB,, | Performed by: INTERNAL MEDICINE

## 2018-11-14 PROCEDURE — 99499 UNLISTED E&M SERVICE: CPT | Mod: S$GLB,,, | Performed by: INTERNAL MEDICINE

## 2018-11-14 PROCEDURE — 1101F PT FALLS ASSESS-DOCD LE1/YR: CPT | Mod: CPTII,HCNC,S$GLB, | Performed by: INTERNAL MEDICINE

## 2018-11-14 PROCEDURE — 3078F DIAST BP <80 MM HG: CPT | Mod: CPTII,HCNC,S$GLB, | Performed by: INTERNAL MEDICINE

## 2018-11-14 NOTE — LETTER
November 14, 2018      Thomas Mallory MD  [address]  [facility] [address]           [facility] - Endocrinology  [address]  [facility] [address]  Phone: [phone]          Patient: Tiffany Masterson   MR Number: [id number]   YOB: 1935   Date of Visit: 11/14/2018       Dear Dr. Thomas Mallory:    Thank you for referring Tiffany Masterson to me for evaluation. Attached you will find relevant portions of my assessment and plan of care.    If you have questions, please do not hesitate to call me. I look forward to following Tiffany Masterson along with you.    Sincerely,    Maia Yang MD    Enclosure  CC:  No Recipients    If you would like to receive this communication electronically, please contact [email or web address] or [phone] to request more information on [employer] Link access.    For providers and/or their staff who would like to refer a patient to [facility], please contact us through our one-stop-shop provider referral line, [facility], at [phone].    If you feel you have received this communication in error or would no longer like to receive these types of communications, please e-mail [email or web address]

## 2018-11-14 NOTE — PATIENT INSTRUCTIONS
Osteoporosis medications  These are the medications we discussed for osteoporosis treatment:    - Prolia (denosumab):          - slows down bone loss           - it is a subcutaneous injection (under the skin) every 6 months, given in the office      For more information on medications: https://www.nof.org/patients/treatment/medicationadherence/    HOW TO COLLECT AN ACCURATE   24 HOUR URINE SPECIMEN     I want you to collect EVERY drop of your urine during a 24 hour period. I do not care what the volume of the urine is as long as it represents every drop that you pass during that 24 hour period. If you need to have a bowel movement, you may separate the urine but I want every drop.     Begin the collection on a morning which is convenient for you. At that time, pass your urine, flush it down the toilet and note the exact time. Now you have an empty bladder and empty bottle. That starts the collection. Collect every drop all during the day and night until exactly the same time the next morning, when you should pass your urine and add it to the bottle.     Bring the closed container back to the same laboratory that issued the empty container.

## 2018-12-03 RX ORDER — PRAVASTATIN SODIUM 40 MG/1
40 TABLET ORAL NIGHTLY
Qty: 90 TABLET | Refills: 3 | Status: CANCELLED | OUTPATIENT
Start: 2018-12-03

## 2018-12-03 RX ORDER — ZOLPIDEM TARTRATE 5 MG/1
TABLET ORAL
Qty: 90 TABLET | Refills: 0 | Status: SHIPPED | OUTPATIENT
Start: 2018-12-03 | End: 2018-12-06 | Stop reason: SDUPTHER

## 2018-12-05 RX ORDER — LOSARTAN POTASSIUM 100 MG/1
100 TABLET ORAL DAILY
Qty: 90 TABLET | Refills: 3 | Status: SHIPPED | OUTPATIENT
Start: 2018-12-05 | End: 2018-12-06 | Stop reason: SDUPTHER

## 2018-12-05 RX ORDER — PRAVASTATIN SODIUM 40 MG/1
40 TABLET ORAL NIGHTLY
Qty: 90 TABLET | Refills: 3 | Status: SHIPPED | OUTPATIENT
Start: 2018-12-05 | End: 2018-12-06 | Stop reason: SDUPTHER

## 2018-12-05 RX ORDER — INDAPAMIDE 2.5 MG/1
2.5 TABLET ORAL DAILY
Qty: 90 TABLET | Refills: 3 | Status: SHIPPED | OUTPATIENT
Start: 2018-12-05 | End: 2018-12-06 | Stop reason: SDUPTHER

## 2018-12-06 DIAGNOSIS — I10 HYPERTENSION, UNSPECIFIED TYPE: ICD-10-CM

## 2018-12-06 RX ORDER — LOSARTAN POTASSIUM 100 MG/1
100 TABLET ORAL DAILY
Qty: 90 TABLET | Refills: 3 | Status: SHIPPED | OUTPATIENT
Start: 2018-12-06 | End: 2019-12-02 | Stop reason: SDUPTHER

## 2018-12-06 RX ORDER — ATENOLOL 50 MG/1
50 TABLET ORAL NIGHTLY
Qty: 90 TABLET | Refills: 3 | Status: SHIPPED | OUTPATIENT
Start: 2018-12-06 | End: 2019-12-02 | Stop reason: SDUPTHER

## 2018-12-06 RX ORDER — INDAPAMIDE 2.5 MG/1
2.5 TABLET ORAL DAILY
Qty: 90 TABLET | Refills: 3 | Status: SHIPPED | OUTPATIENT
Start: 2018-12-06 | End: 2019-12-09 | Stop reason: SDUPTHER

## 2018-12-06 RX ORDER — PRAVASTATIN SODIUM 40 MG/1
40 TABLET ORAL NIGHTLY
Qty: 90 TABLET | Refills: 3 | Status: SHIPPED | OUTPATIENT
Start: 2018-12-06 | End: 2019-12-09 | Stop reason: SDUPTHER

## 2018-12-06 RX ORDER — ATENOLOL 25 MG/1
25 TABLET ORAL EVERY MORNING
Qty: 90 TABLET | Refills: 3 | Status: SHIPPED | OUTPATIENT
Start: 2018-12-06 | End: 2019-12-12 | Stop reason: SDUPTHER

## 2018-12-06 RX ORDER — LEVOTHYROXINE SODIUM 100 UG/1
100 TABLET ORAL DAILY
Qty: 90 TABLET | Refills: 3 | Status: SHIPPED | OUTPATIENT
Start: 2018-12-06 | End: 2019-12-02 | Stop reason: SDUPTHER

## 2018-12-06 RX ORDER — ZOLPIDEM TARTRATE 5 MG/1
5 TABLET ORAL NIGHTLY PRN
Qty: 90 TABLET | Refills: 0 | Status: SHIPPED | OUTPATIENT
Start: 2018-12-06 | End: 2019-06-18 | Stop reason: SDUPTHER

## 2018-12-06 NOTE — TELEPHONE ENCOUNTER
Message sent to the practice because pt asked to speak to the MA about papers that were dropped off.

## 2018-12-06 NOTE — TELEPHONE ENCOUNTER
----- Message from Lillian Kearney sent at 12/6/2018 10:40 AM CST -----  Contact: self/[phone]      ----- Message -----  From: Symone Ring  Sent: 12/6/2018  10:15 AM  To: Joshua Castro Staff    .1 Patient would like to get medical advice.  Symptoms (please be specific): cold   How long has patient had these symptoms: Sunday   Pharmacy name and phone#:[id number] [place] [facility] [address] [phone] (Phone) [phone] (Fax)  Any drug allergies:  Comments:Patient called in regards needing to talk with Dr Lewis medical assistant about paper work that it was dropped off yesterday.  Patient would like to get medical advice.

## 2018-12-06 NOTE — TELEPHONE ENCOUNTER
Dr Lewis pt is requesting a 90 day supply on all of these  I am not sire if some of them took or not   Can you please make sure.      Thanks

## 2018-12-19 ENCOUNTER — TELEPHONE (OUTPATIENT)
Dept: INTERNAL MEDICINE | Facility: CLINIC | Age: 83
End: 2018-12-19

## 2018-12-19 DIAGNOSIS — R05.9 COUGH: Primary | ICD-10-CM

## 2018-12-19 RX ORDER — BENZONATATE 200 MG/1
200 CAPSULE ORAL 3 TIMES DAILY PRN
Qty: 30 CAPSULE | Refills: 1 | Status: SHIPPED | OUTPATIENT
Start: 2018-12-19 | End: 2018-12-26

## 2018-12-19 NOTE — TELEPHONE ENCOUNTER
----- Message from Shelia Giron sent at 12/19/2018  1:57 PM CST -----  Contact: pt [phone]       ----- Message -----  From: Neha Velázquez  Sent: 12/19/2018   1:55 PM  To: Joshua Castro Staff    Pt would like to know what she can take over the counter for a dry cough, please advise.

## 2018-12-19 NOTE — TELEPHONE ENCOUNTER
I will send in a prescription for Tessalon Perles to the pharmacy.  She should use this 3 times daily to see if this improves her cough symptoms.  If she has having any sinus drainage, this also may be contributing to cough symptoms.  If drainage is present she should also take over-the-counter Claritin, Zyrtec, or Allegra.

## 2018-12-19 NOTE — TELEPHONE ENCOUNTER
Pt. states that she has been coughing since 12/2/2018. Cough is now occasionally and non-productive. She denies sob, fever, ear pain or sore throat. She does not cough while laying down. She has only tried Delsym cough syrup OTC because of Htnn but this has not helped. Any recommendations?

## 2019-04-13 ENCOUNTER — TELEPHONE (OUTPATIENT)
Dept: ENDOSCOPY | Facility: HOSPITAL | Age: 84
End: 2019-04-13

## 2019-06-17 RX ORDER — ZOLPIDEM TARTRATE 5 MG/1
TABLET ORAL
Qty: 90 TABLET | Refills: 0 | OUTPATIENT
Start: 2019-06-17

## 2019-06-18 RX ORDER — ZOLPIDEM TARTRATE 5 MG/1
5 TABLET ORAL NIGHTLY PRN
Qty: 90 TABLET | Refills: 0 | Status: SHIPPED | OUTPATIENT
Start: 2019-06-18 | End: 2020-03-23

## 2019-06-18 NOTE — TELEPHONE ENCOUNTER
Automated refill was sent from the pharmacy.  Please ask the patient if she indeed needs a new supply or if she is still good with the zolpidem (Ambien) that she has.  If he does need a new script, please route to me as drug refill request.

## 2019-06-20 ENCOUNTER — TELEPHONE (OUTPATIENT)
Dept: INTERNAL MEDICINE | Facility: CLINIC | Age: 84
End: 2019-06-20

## 2019-07-16 ENCOUNTER — PES CALL (OUTPATIENT)
Dept: ADMINISTRATIVE | Facility: CLINIC | Age: 84
End: 2019-07-16

## 2019-10-18 ENCOUNTER — PES CALL (OUTPATIENT)
Dept: ADMINISTRATIVE | Facility: CLINIC | Age: 84
End: 2019-10-18

## 2019-10-25 DIAGNOSIS — N18.9 CHRONIC KIDNEY DISEASE, UNSPECIFIED CKD STAGE: ICD-10-CM

## 2019-10-25 DIAGNOSIS — Z12.39 BREAST CANCER SCREENING: ICD-10-CM

## 2019-11-13 ENCOUNTER — HOSPITAL ENCOUNTER (OUTPATIENT)
Dept: RADIOLOGY | Facility: HOSPITAL | Age: 84
Discharge: HOME | End: 2019-11-13
Attending: INTERNAL MEDICINE
Payer: MEDICARE

## 2019-11-13 VITALS — BODY MASS INDEX: 29.34 KG/M2 | WEIGHT: 182.56 LBS | HEIGHT: 66 IN

## 2019-11-13 DIAGNOSIS — Z12.31 BREAST CANCER SCREENING BY MAMMOGRAM: ICD-10-CM

## 2019-11-13 DIAGNOSIS — Z12.39 BREAST CANCER SCREENING: ICD-10-CM

## 2019-11-13 PROCEDURE — 77063 BREAST TOMOSYNTHESIS BI: CPT | Mod: 26,HCNC,, | Performed by: RADIOLOGY

## 2019-11-13 PROCEDURE — 77067 SCR MAMMO BI INCL CAD: CPT | Mod: 26,HCNC,, | Performed by: RADIOLOGY

## 2019-11-13 PROCEDURE — 77063 MAMMO DIGITAL SCREENING BILAT WITH TOMOSYNTHESIS_CAD: ICD-10-PCS | Mod: 26,HCNC,, | Performed by: RADIOLOGY

## 2019-11-13 PROCEDURE — 77067 SCR MAMMO BI INCL CAD: CPT | Mod: TC,HCNC

## 2019-11-13 PROCEDURE — 77067 MAMMO DIGITAL SCREENING BILAT WITH TOMOSYNTHESIS_CAD: ICD-10-PCS | Mod: 26,HCNC,, | Performed by: RADIOLOGY

## 2019-12-02 DIAGNOSIS — I10 HYPERTENSION, UNSPECIFIED TYPE: ICD-10-CM

## 2019-12-05 RX ORDER — LEVOTHYROXINE SODIUM 100 UG/1
TABLET ORAL
Qty: 90 TABLET | Refills: 0 | Status: SHIPPED | OUTPATIENT
Start: 2019-12-05 | End: 2020-03-23

## 2019-12-05 RX ORDER — ATENOLOL 50 MG/1
TABLET ORAL
Qty: 90 TABLET | Refills: 0 | Status: SHIPPED | OUTPATIENT
Start: 2019-12-05 | End: 2020-05-28

## 2019-12-05 RX ORDER — LOSARTAN POTASSIUM 100 MG/1
TABLET ORAL
Qty: 90 TABLET | Refills: 0 | Status: SHIPPED | OUTPATIENT
Start: 2019-12-05 | End: 2020-03-23

## 2019-12-05 NOTE — PROGRESS NOTES
Refill Authorization Note     is requesting a refill authorization.    Brief assessment and rationale for refill: APPROVE: needs labs     Medication-related problems identified: Requires labs    Medication Therapy Plan: NTBS(LIPID/CMP/TSH)                              Comments:   Requested Prescriptions   Pending Prescriptions Disp Refills    atenolol (TENORMIN) 50 MG tablet [Pharmacy Med Name: ATENOLOL 50MG       TAB] 90 tablet 0     Sig: TAKE 1 TABLET BY MOUTH AT BEDTIME       Cardiovascular:  Beta Blockers Failed - 12/2/2019  2:42 PM        Failed - Last BP in normal range within 360 days     BP Readings from Last 3 Encounters:   11/14/18 123/73   09/13/18 138/62   09/11/18 130/64              Failed - Last Heart Rate in normal range within 360 days     Pulse Readings from Last 3 Encounters:   11/14/18 69   08/30/18 62   07/02/18 57             Passed - Patient is at least 18 years old        Passed - Office visit in past 12 months or future 90 days     Recent Outpatient Visits            1 year ago Osteopenia, unspecified location    [facility] - Endocrinology [address] [place] Maia Yang MD    1 year ago Recurrent UTI    [facility] [id number] [place] [address] Elliot Vanessa IV, MD    1 year ago Hypothyroidism due to acquired atrophy of thyroid    [facility] - Internal Medicine Matthew Lewis MD    1 year ago Endometrial mass    [facility] [id number] [place] [address] Elliot Vanessa IV, MD    1 year ago Encounter for gynecological examination without abnormal finding    [facility] [id number] [place] [address] Elliot Vanessa IV, MD          Future Appointments              In 1 month Matthew Lewis MD [facility] - Internal Medicine, [facility]               levothyroxine (SYNTHROID) 100 MCG tablet [Pharmacy Med Name: LEVOTHYROXIN 100MCG TAB] 90 tablet 0     Sig: TAKE 1 TABLET BY MOUTH ONCE DAILY       Endocrinology:  Hypothyroid Agents Failed - 12/2/2019  2:42 PM        Failed - If TSH was abnormal but FT4 was  WNL, okay to refill. FT4 is not required if TSH is WNL.        Failed - TSH in normal range and within 360 days     TSH   Date Value Ref Range Status   04/11/2018 1.826 0.400 - 4.000 uIU/mL Final   12/02/2016 1.331 0.400 - 4.000 uIU/mL Final   09/14/2016 4.153 (H) 0.400 - 4.000 uIU/mL Final              Failed - T4 free in normal range and within 360 days     Free T4   Date Value Ref Range Status   12/02/2016 1.05 0.71 - 1.51 ng/dL Final   09/14/2016 0.99 0.71 - 1.51 ng/dL Final   04/15/2010 1.05 0.71 - 1.51 ng/dl Final              Passed - Patient is at least 18 years old        Passed - Office visit in past 12 months or future 90 days     Recent Outpatient Visits            1 year ago Osteopenia, unspecified location    [facility] - Endocrinology 6[address] [place] Maia Yang MD    1 year ago Recurrent UTI    [facility] [id number] [facility] [address] Elliot Vanessa IV, MD    1 year ago Hypothyroidism due to acquired atrophy of thyroid    [facility] - Internal Medicine Matthew Lewis MD    1 year ago Endometrial mass    [facility] [id number] [facility] [address] Elliot Vanessa IV, MD    1 year ago Encounter for gynecological examination without abnormal finding    [facility] [id number] [facility] [address] Elliot Vanessa IV, MD          Future Appointments              In 1 month Matthew Lewis MD [facility] - Internal Medicine, [facility]               losartan (COZAAR) 100 MG tablet [Pharmacy Med Name: LOSARTAN 100MG   TAB] 90 tablet 0     Sig: TAKE 1 TABLET BY MOUTH ONCE DAILY       Cardiovascular:  Angiotensin Receptor Blockers Failed - 12/2/2019  2:42 PM        Failed - Last BP in normal range within 360 days     BP Readings from Last 3 Encounters:   11/14/18 123/73   09/13/18 138/62   09/11/18 130/64              Failed - Cr is 1.4 or below and within 360 days     Creatinine   Date Value Ref Range Status   04/11/2018 1.2 0.5 - 1.4 mg/dL Final   06/05/2017 1.2 0.5 - 1.4 mg/dL Final   09/14/2016 1.3 0.5 - 1.4 mg/dL Final               Failed - K in normal range and within 360 days     Potassium   Date Value Ref Range Status   04/11/2018 4.4 3.5 - 5.1 mmol/L Final   06/05/2017 4.9 3.5 - 5.1 mmol/L Final   09/14/2016 4.6 3.5 - 5.1 mmol/L Final              Failed - eGFR within 360 days     eGFR if non    Date Value Ref Range Status   04/11/2018 43 (A) >60 mL/min/1.73 m^2 Final     Comment:     Calculation used to obtain the estimated glomerular filtration  rate (eGFR) is the CKD-EPI equation.      06/05/2017 43 (A) >60 mL/min/1.73 m^2 Final     Comment:     Calculation used to obtain the estimated glomerular filtration  rate (eGFR) is the CKD-EPI equation. Since race is unknown   in our information system, the eGFR values for   -American and Non--American patients are given   for each creatinine result.     09/14/2016 39.4 (A) >60 mL/min/1.73 m^2 Final     Comment:     Calculation used to obtain the estimated glomerular filtration  rate (eGFR) is the CKD-EPI equation. Since race is unknown   in our information system, the eGFR values for   -American and Non--American patients are given   for each creatinine result.                Passed - Patient is at least 18 years old        Passed - Office visit in past 12 months or future 90 days     Recent Outpatient Visits            1 year ago Osteopenia, unspecified location    [facility] - Endocrinology [address] [place] Maia Yang MD    1 year ago Recurrent UTI    [facility] [id number] [place] [address] Elliot Vanessa IV, MD    1 year ago Hypothyroidism due to acquired atrophy of thyroid    [facility] - Internal Medicine Matthew Lewis MD    1 year ago Endometrial mass    [facility] [id number] [place] [address] Elliot Vanessa IV, MD    1 year ago Encounter for gynecological examination without abnormal finding    [facility] [id number] [place] [address] Elliot Vanessa IV, MD          Future Appointments              In 1 month Matthew Lewis MD [facility] - Internal Medicine,  [place] CO[place]

## 2019-12-05 NOTE — TELEPHONE ENCOUNTER
Please schedule patient for labs (TSH/LIPIDS/CMP).         Please also check with your provider if any further labs need to be added and scheduled together.        Thank you

## 2019-12-09 DIAGNOSIS — I10 HYPERTENSION, UNSPECIFIED TYPE: ICD-10-CM

## 2019-12-10 RX ORDER — ATENOLOL 25 MG/1
TABLET ORAL
Qty: 90 TABLET | Refills: 0 | OUTPATIENT
Start: 2019-12-10

## 2019-12-10 RX ORDER — INDAPAMIDE 2.5 MG/1
TABLET ORAL
Qty: 90 TABLET | Refills: 0 | Status: SHIPPED | OUTPATIENT
Start: 2019-12-10 | End: 2020-03-23

## 2019-12-10 RX ORDER — PRAVASTATIN SODIUM 40 MG/1
TABLET ORAL
Qty: 90 TABLET | Refills: 0 | Status: SHIPPED | OUTPATIENT
Start: 2019-12-10 | End: 2020-03-23

## 2019-12-10 NOTE — PROGRESS NOTES
Refill Authorization Note     is requesting a refill authorization.    Brief assessment and rationale for refill: QUICK DC: last commented as no longer taking // APPROVE: needs labs          Medication Therapy Plan: PCP ordered atenolol 50 mg and patient has been filling atenolol 50 mg; QUICK DC                              Comments:     Requested Prescriptions   Signed Prescriptions Disp Refills    pravastatin (PRAVACHOL) 40 MG tablet 90 tablet 0     Sig: TAKE 1 TABLET BY MOUTH AT BEDTIME       Cardiovascular:  Antilipid - Statins Failed - 12/10/2019  3:05 PM        Failed - Lipid Panel completed in last 360 days     Lab Results   Component Value Date    CHOL 163 04/11/2018    HDL 41 04/11/2018    LDLCALC 88.6 04/11/2018    TRIG 167 (H) 04/11/2018             Failed - ALT is 94 or below and within 360 days     ALT   Date Value Ref Range Status   04/11/2018 27 10 - 44 U/L Final   06/05/2017 29 10 - 44 U/L Final   09/14/2016 29 10 - 44 U/L Final              Failed - AST is 54 or below and within 360 days     AST   Date Value Ref Range Status   04/11/2018 22 10 - 40 U/L Final   06/05/2017 27 10 - 40 U/L Final   09/14/2016 26 10 - 40 U/L Final              Passed - Patient is at least 18 years old        Passed - Office visit in past 12 months or future 90 days     Recent Outpatient Visits            1 year ago Osteopenia, unspecified location    [facility] - Endocrinology 6th [place] Maia Yang MD    1 year ago Recurrent UTI    [facility] [id number] [place] [address] Elliot Vanessa IV, MD    1 year ago Hypothyroidism due to acquired atrophy of thyroid    [facility] - Internal Medicine Matthew Lewis MD    1 year ago Endometrial mass    [facility] [id number] [place] [address] Elliot Vanessa IV, MD    1 year ago Encounter for gynecological examination without abnormal finding    [facility] [id number] [place] [address] Elliot Vanessa IV, MD          Future Appointments              In 1 month Matthew Lewis MD [address] - Internal Medicine, [facility]               indapamide (LOZOL) 2.5 MG Tab 90 tablet 0     Sig: TAKE 1 TABLET BY MOUTH ONCE DAILY       Cardiovascular: Diuretics - Thiazide Failed - 12/10/2019  3:05 PM        Failed - Last BP in normal range within 360 days     BP Readings from Last 3 Encounters:   11/14/18 123/73   09/13/18 138/62   09/11/18 130/64              Failed - Ca in normal range and within 360 days     Calcium   Date Value Ref Range Status   04/11/2018 9.0 8.7 - 10.5 mg/dL Final   06/05/2017 9.6 8.7 - 10.5 mg/dL Final   09/14/2016 9.5 8.7 - 10.5 mg/dL Final              Failed - Cr is 1.4 or below and within 180 days     Creatinine   Date Value Ref Range Status   04/11/2018 1.2 0.5 - 1.4 mg/dL Final   06/05/2017 1.2 0.5 - 1.4 mg/dL Final   09/14/2016 1.3 0.5 - 1.4 mg/dL Final              Failed - K in normal range and within 180 days     Potassium   Date Value Ref Range Status   04/11/2018 4.4 3.5 - 5.1 mmol/L Final   06/05/2017 4.9 3.5 - 5.1 mmol/L Final   09/14/2016 4.6 3.5 - 5.1 mmol/L Final              Failed - Na in normal range and within 180 days     Sodium   Date Value Ref Range Status   04/11/2018 135 (L) 136 - 145 mmol/L Final   06/05/2017 139 136 - 145 mmol/L Final   09/14/2016 140 136 - 145 mmol/L Final              Failed - eGFR within 180 days     eGFR if non    Date Value Ref Range Status   04/11/2018 43 (A) >60 mL/min/1.73 m^2 Final     Comment:     Calculation used to obtain the estimated glomerular filtration  rate (eGFR) is the CKD-EPI equation.      06/05/2017 43 (A) >60 mL/min/1.73 m^2 Final     Comment:     Calculation used to obtain the estimated glomerular filtration  rate (eGFR) is the CKD-EPI equation. Since race is unknown   in our information system, the eGFR values for   -American and Non--American patients are given   for each creatinine result.     09/14/2016 39.4 (A) >60 mL/min/1.73 m^2 Final     Comment:     Calculation used to obtain  the estimated glomerular filtration  rate (eGFR) is the CKD-EPI equation. Since race is unknown   in our information system, the eGFR values for   -American and Non--American patients are given   for each creatinine result.                Passed - Patient is at least 18 years old        Passed - Office visit in past 12 months or future 90 days     Recent Outpatient Visits            1 year ago Osteopenia, unspecified location    [facility] - Endocrinology 6th [place] Maia Yang MD    1 year ago Recurrent UTI    [facility] [id number] [place] [address] Elliot Vanessa IV, MD    1 year ago Hypothyroidism due to acquired atrophy of thyroid    [facility] - Internal Medicine Matthew Lewis MD    1 year ago Endometrial mass    [facility] [id number] [place] [address] Elliot Vanessa IV, MD    1 year ago Encounter for gynecological examination without abnormal finding    [facility] [id number] [place] [address] Elliot Vanessa IV, MD          Future Appointments              In 1 month Matthew Lewis MD [facility] Internal Medicine, [facility]              Refused Prescriptions Disp Refills    atenolol (TENORMIN) 25 MG tablet [Pharmacy Med Name: ATENOLOL 25MG       TAB] 90 tablet 0     Sig: TAKE 1 TABLET BY MOUTH ONCE DAILY IN THE MORNING       Cardiovascular:  Beta Blockers Failed - 12/10/2019  3:05 PM        Failed - Last BP in normal range within 360 days     BP Readings from Last 3 Encounters:   11/14/18 123/73   09/13/18 138/62   09/11/18 130/64              Failed - Last Heart Rate in normal range within 360 days     Pulse Readings from Last 3 Encounters:   11/14/18 69   08/30/18 62   07/02/18 57             Passed - Patient is at least 18 years old        Passed - Office visit in past 12 months or future 90 days     Recent Outpatient Visits            1 year ago Osteopenia, unspecified location    [facility] Endocrinology 6[address] [place] Maia Yang MD    1 year ago Recurrent UTI    [facility] [id number] [place] [address]  Elliot Vanessa IV, MD    1 year ago Hypothyroidism due to acquired atrophy of thyroid    [facility] - Internal Medicine Matthew Lewis MD    1 year ago Endometrial mass    [facility] [id number] [facility] [address] Elliot Vanessa IV, MD    1 year ago Encounter for gynecological examination without abnormal finding    [facility] [id number] [facility] [address] Elliot Vanessa IV, MD          Future Appointments              In 1 month Matthew Lewis MD [facility] - Internal Medicine, [facility]

## 2019-12-10 NOTE — PROGRESS NOTES
Refill Authorization Note     is requesting a refill authorization.    Brief assessment and rationale for refill: Quick DC: duplicate request   Name and strength of medication: indapamide (LOZOL) 2.5 MG Tab // pravastatin (PRAVACHOL) 40 MG tablet       Medication Therapy Plan: will reroute previous encounter high priority and quick dc this duplicate encounter     Medication reconciliation completed: No                         Comments:

## 2019-12-10 NOTE — TELEPHONE ENCOUNTER
Medication Refill     Laura Liriano routed conversation to Centralized Refill Staff Pool 1 hour ago (12:54 PM)

## 2019-12-12 DIAGNOSIS — I10 HYPERTENSION, UNSPECIFIED TYPE: ICD-10-CM

## 2019-12-12 RX ORDER — PRAVASTATIN SODIUM 40 MG/1
40 TABLET ORAL NIGHTLY
Qty: 90 TABLET | Refills: 3 | OUTPATIENT
Start: 2019-12-12

## 2019-12-12 RX ORDER — INDAPAMIDE 2.5 MG/1
2.5 TABLET ORAL DAILY
Qty: 90 TABLET | Refills: 3 | OUTPATIENT
Start: 2019-12-12 | End: 2020-12-11

## 2019-12-12 NOTE — TELEPHONE ENCOUNTER
----- Message from Azeb Bryson sent at 12/12/2019  5:11 PM CST -----  Pt is requesting a refill's on her atenolol (TENORMIN) 25 MG tablet.  Please call and advise             Thank you

## 2019-12-13 RX ORDER — ATENOLOL 25 MG/1
TABLET ORAL
Qty: 90 TABLET | Refills: 0 | Status: SHIPPED | OUTPATIENT
Start: 2019-12-13 | End: 2020-03-24

## 2019-12-20 ENCOUNTER — PATIENT OUTREACH (OUTPATIENT)
Dept: ADMINISTRATIVE | Facility: HOSPITAL | Age: 84
End: 2019-12-20

## 2019-12-20 DIAGNOSIS — E78.5 HYPERLIPIDEMIA, UNSPECIFIED HYPERLIPIDEMIA TYPE: Primary | ICD-10-CM

## 2019-12-26 ENCOUNTER — PATIENT OUTREACH (OUTPATIENT)
Dept: ADMINISTRATIVE | Facility: HOSPITAL | Age: 84
End: 2019-12-26

## 2020-01-09 ENCOUNTER — OFFICE VISIT (OUTPATIENT)
Dept: INTERNAL MEDICINE | Facility: CLINIC | Age: 85
End: 2020-01-09
Attending: INTERNAL MEDICINE
Payer: MEDICARE

## 2020-01-09 ENCOUNTER — IMMUNIZATION (OUTPATIENT)
Dept: PHARMACY | Facility: CLINIC | Age: 85
End: 2020-01-09
Payer: MEDICARE

## 2020-01-09 VITALS
HEIGHT: 66 IN | OXYGEN SATURATION: 97 % | DIASTOLIC BLOOD PRESSURE: 64 MMHG | HEART RATE: 69 BPM | SYSTOLIC BLOOD PRESSURE: 132 MMHG | BODY MASS INDEX: 29.46 KG/M2

## 2020-01-09 DIAGNOSIS — M79.89 SWELLING OF RIGHT LOWER EXTREMITY: ICD-10-CM

## 2020-01-09 DIAGNOSIS — I10 ESSENTIAL HYPERTENSION: ICD-10-CM

## 2020-01-09 DIAGNOSIS — B35.1 ONYCHOMYCOSIS: ICD-10-CM

## 2020-01-09 DIAGNOSIS — Z12.11 COLON CANCER SCREENING: ICD-10-CM

## 2020-01-09 DIAGNOSIS — N18.30 CKD (CHRONIC KIDNEY DISEASE) STAGE 3, GFR 30-59 ML/MIN: ICD-10-CM

## 2020-01-09 DIAGNOSIS — E03.4 HYPOTHYROIDISM DUE TO ACQUIRED ATROPHY OF THYROID: Chronic | ICD-10-CM

## 2020-01-09 DIAGNOSIS — Z00.00 ANNUAL PHYSICAL EXAM: Primary | ICD-10-CM

## 2020-01-09 DIAGNOSIS — G47.00 INSOMNIA, UNSPECIFIED TYPE: ICD-10-CM

## 2020-01-09 DIAGNOSIS — E78.5 HYPERLIPIDEMIA, UNSPECIFIED HYPERLIPIDEMIA TYPE: ICD-10-CM

## 2020-01-09 PROCEDURE — 99499 RISK ADDL DX/OHS AUDIT: ICD-10-PCS | Mod: HCNC,S$GLB,, | Performed by: INTERNAL MEDICINE

## 2020-01-09 PROCEDURE — 3075F PR MOST RECENT SYSTOLIC BLOOD PRESS GE 130-139MM HG: ICD-10-PCS | Mod: HCNC,CPTII,S$GLB, | Performed by: INTERNAL MEDICINE

## 2020-01-09 PROCEDURE — 99999 PR PBB SHADOW E&M-EST. PATIENT-LVL III: CPT | Mod: PBBFAC,HCNC,, | Performed by: INTERNAL MEDICINE

## 2020-01-09 PROCEDURE — 3075F SYST BP GE 130 - 139MM HG: CPT | Mod: HCNC,CPTII,S$GLB, | Performed by: INTERNAL MEDICINE

## 2020-01-09 PROCEDURE — 99397 PR PREVENTIVE VISIT,EST,65 & OVER: ICD-10-PCS | Mod: HCNC,S$GLB,, | Performed by: INTERNAL MEDICINE

## 2020-01-09 PROCEDURE — 99499 UNLISTED E&M SERVICE: CPT | Mod: HCNC,S$GLB,, | Performed by: INTERNAL MEDICINE

## 2020-01-09 PROCEDURE — 99999 PR PBB SHADOW E&M-EST. PATIENT-LVL III: ICD-10-PCS | Mod: PBBFAC,HCNC,, | Performed by: INTERNAL MEDICINE

## 2020-01-09 PROCEDURE — 3078F DIAST BP <80 MM HG: CPT | Mod: HCNC,CPTII,S$GLB, | Performed by: INTERNAL MEDICINE

## 2020-01-09 PROCEDURE — 99397 PER PM REEVAL EST PAT 65+ YR: CPT | Mod: HCNC,S$GLB,, | Performed by: INTERNAL MEDICINE

## 2020-01-09 PROCEDURE — 3078F PR MOST RECENT DIASTOLIC BLOOD PRESSURE < 80 MM HG: ICD-10-PCS | Mod: HCNC,CPTII,S$GLB, | Performed by: INTERNAL MEDICINE

## 2020-01-09 RX ORDER — CICLOPIROX 80 MG/ML
SOLUTION TOPICAL NIGHTLY
Qty: 6.6 ML | Refills: 1 | Status: SHIPPED | OUTPATIENT
Start: 2020-01-09 | End: 2020-08-01

## 2020-01-09 NOTE — PATIENT INSTRUCTIONS
The Endoscopy team should be contacting you to schedule your appointment. You can also call them directly at [phone] to schedule your colonoscopy.

## 2020-01-09 NOTE — PROGRESS NOTES
Subjective:       Patient ID: Tiffany Masterson is a 84 y.o. female.    Chief Complaint: Annual Exam    HPI    Last visit with me over 1 yr ago. R ankle swells periodically, hasn't been doing as much recently. Mostly at night and then better in morning.     Has long history of onychomycosis of toenails. Now on fingernails. Not bothersome. Doesn't want oral meds due to risks.     Continues to have insomnia, generally well controlled with Ambien. No side effects or problems on the medication. Pt admits to poor sleep hygiene.     Reviewed PMH, PSH, SH, FH, allergies, and medications.     Review of Systems   All other systems reviewed and are negative.      Objective:      Physical Exam   Constitutional: She is oriented to person, place, and time. No distress.   HENT:   Head: Atraumatic.   Right Ear: Tympanic membrane normal. No tenderness.   Left Ear: Tympanic membrane normal. No tenderness.   Mouth/Throat: Oropharynx is clear and moist. No oropharyngeal exudate.   Eyes: Pupils are equal, round, and reactive to light. Right eye exhibits no discharge. Left eye exhibits no discharge.   Neck: Normal range of motion. No thyromegaly present.   Cardiovascular: Normal rate, regular rhythm and normal heart sounds.   Pulmonary/Chest: Effort normal and breath sounds normal. No stridor. She has no wheezes. She has no rales.   Abdominal: Soft. There is no tenderness. There is no guarding.   Musculoskeletal: She exhibits no edema or tenderness.        Right foot: There is deformity (bony nodule on dorsal aspect). There is no tenderness and no swelling.   Lymphadenopathy:     She has no cervical adenopathy.   Neurological: She is alert and oriented to person, place, and time.   Skin: Skin is warm and dry. No rash noted.   Mild onychomycosis of fingernails    Psychiatric: She has a normal mood and affect. Her behavior is normal.   Nursing note and vitals reviewed.      Vitals:    01/09/20 1350   BP: 132/64   BP Location: Right arm  "  Patient Position: Sitting   BP Method: Large (Manual)   Pulse: 69   SpO2: 97%   Height: 5' 6" (1.676 m)     Body mass index is 29.46 kg/m².    RESULTS: Reviewed labs from last 12 months    Assessment:       1. Annual physical exam    2. Swelling of right lower extremity    3. Onychomycosis    4. Hypothyroidism due to acquired atrophy of thyroid    5. Hyperlipidemia, unspecified hyperlipidemia type    6. CKD (chronic kidney disease) stage 3, GFR 30-59 ml/min    7. Essential hypertension    8. Colon cancer screening    9. Insomnia, unspecified type        Plan:   Tiffany was seen today for annual exam.    Diagnoses and all orders for this visit:    Annual physical exam:  Age-appropriate health screening reviewed, indicated tests ordered. 1st dose Shingrix today.    Swelling of right lower extremity:  New problem, comes and goes, likely chronic venous insufficiency exacerbated by prior injury. No changes needed at this time. Please notify the office if the symptoms persist or worsen.     Onychomycosis:  Prior dx, fingers and toes, pt reluctant to do oral meds, try topical lotion.  -     ciclopirox (PENLAC) 8 % Soln; Apply topically nightly.    Hypothyroidism due to acquired atrophy of thyroid:  Prior diagnosis, stable, well controlled on current management. No changes at this time, will continue to monitor.   -     TSH; Future    Hyperlipidemia, unspecified hyperlipidemia type:  Prior diagnosis, stable, well controlled on current management. No changes at this time, will continue to monitor.   -     Lipid panel; Future    CKD (chronic kidney disease) stage 3, GFR 30-59 ml/min:  Prior diagnosis, stable, well controlled on current management. No changes at this time, will continue to monitor.     Essential hypertension:  Prior diagnosis, stable, well controlled on current management. No changes at this time, will continue to monitor.   -     Comprehensive metabolic panel; Future  -     CBC Without Differential; " Future    Colon cancer screening  -     Case request GI: COLONOSCOPY    Insomnia, unspecified type:  Prior diagnosis, stable, sufficient controlled on current management. No changes at this time, will continue to monitor.     Follow up in about 1 year (around 1/9/2021) for EPP annual exam, fasting labs 1 week prior.  Matthew Lewis MD  Internal Medicine    Portions of this note were completed using medical dictation software. Please excuse typographical or syntax errors that were missed on review.

## 2020-01-14 DIAGNOSIS — Z12.11 SPECIAL SCREENING FOR MALIGNANT NEOPLASMS, COLON: Primary | ICD-10-CM

## 2020-01-14 RX ORDER — POLYETHYLENE GLYCOL 3350, SODIUM SULFATE ANHYDROUS, SODIUM BICARBONATE, SODIUM CHLORIDE, POTASSIUM CHLORIDE 236; 22.74; 6.74; 5.86; 2.97 G/4L; G/4L; G/4L; G/4L; G/4L
4 POWDER, FOR SOLUTION ORAL ONCE
Qty: 4000 ML | Refills: 0 | Status: SHIPPED | OUTPATIENT
Start: 2020-01-14 | End: 2020-01-14

## 2020-01-23 ENCOUNTER — PATIENT MESSAGE (OUTPATIENT)
Dept: PHARMACY | Facility: CLINIC | Age: 85
End: 2020-01-23

## 2020-03-09 ENCOUNTER — ANESTHESIA (OUTPATIENT)
Dept: ENDOSCOPY | Facility: HOSPITAL | Age: 85
End: 2020-03-09
Payer: MEDICARE

## 2020-03-09 ENCOUNTER — ANESTHESIA EVENT (OUTPATIENT)
Dept: ENDOSCOPY | Facility: HOSPITAL | Age: 85
End: 2020-03-09
Payer: MEDICARE

## 2020-03-09 ENCOUNTER — HOSPITAL ENCOUNTER (OUTPATIENT)
Facility: HOSPITAL | Age: 85
Discharge: HOME | End: 2020-03-09
Attending: INTERNAL MEDICINE | Admitting: INTERNAL MEDICINE
Payer: MEDICARE

## 2020-03-09 VITALS
SYSTOLIC BLOOD PRESSURE: 139 MMHG | TEMPERATURE: 98 F | OXYGEN SATURATION: 99 % | DIASTOLIC BLOOD PRESSURE: 65 MMHG | HEART RATE: 63 BPM | RESPIRATION RATE: 16 BRPM

## 2020-03-09 DIAGNOSIS — D12.6 COLON ADENOMAS: ICD-10-CM

## 2020-03-09 PROCEDURE — 63600175 PHARM REV CODE 636 W HCPCS: Mod: HCNC | Performed by: NURSE ANESTHETIST, CERTIFIED REGISTERED

## 2020-03-09 PROCEDURE — 63600175 PHARM REV CODE 636 W HCPCS: Mod: HCNC | Performed by: INTERNAL MEDICINE

## 2020-03-09 PROCEDURE — E9220 PRA ENDO ANESTHESIA: ICD-10-PCS | Mod: HCNC,PT,, | Performed by: NURSE ANESTHETIST, CERTIFIED REGISTERED

## 2020-03-09 PROCEDURE — 37000008 HC ANESTHESIA 1ST 15 MINUTES: Mod: HCNC | Performed by: INTERNAL MEDICINE

## 2020-03-09 PROCEDURE — 88305 TISSUE EXAM BY PATHOLOGIST: ICD-10-PCS | Mod: 26,HCNC,, | Performed by: PATHOLOGY

## 2020-03-09 PROCEDURE — 45385 COLONOSCOPY W/LESION REMOVAL: CPT | Mod: HCNC | Performed by: INTERNAL MEDICINE

## 2020-03-09 PROCEDURE — 45385 COLONOSCOPY W/LESION REMOVAL: CPT | Mod: PT,HCNC,, | Performed by: INTERNAL MEDICINE

## 2020-03-09 PROCEDURE — 88305 TISSUE EXAM BY PATHOLOGIST: CPT | Mod: 26,HCNC,, | Performed by: PATHOLOGY

## 2020-03-09 PROCEDURE — 27201089 HC SNARE, DISP (ANY): Mod: HCNC | Performed by: INTERNAL MEDICINE

## 2020-03-09 PROCEDURE — 45385 PR COLONOSCOPY,REMV LESN,SNARE: ICD-10-PCS | Mod: PT,HCNC,, | Performed by: INTERNAL MEDICINE

## 2020-03-09 PROCEDURE — E9220 PRA ENDO ANESTHESIA: HCPCS | Mod: HCNC,PT,, | Performed by: NURSE ANESTHETIST, CERTIFIED REGISTERED

## 2020-03-09 PROCEDURE — 37000009 HC ANESTHESIA EA ADD 15 MINS: Mod: HCNC | Performed by: INTERNAL MEDICINE

## 2020-03-09 PROCEDURE — 88305 TISSUE EXAM BY PATHOLOGIST: CPT | Mod: 59,HCNC | Performed by: PATHOLOGY

## 2020-03-09 RX ORDER — SODIUM CHLORIDE 9 MG/ML
INJECTION, SOLUTION INTRAVENOUS CONTINUOUS
Status: DISCONTINUED | OUTPATIENT
Start: 2020-03-09 | End: 2020-03-09 | Stop reason: HOSPADM

## 2020-03-09 RX ORDER — SODIUM CHLORIDE 0.9 % (FLUSH) 0.9 %
10 SYRINGE (ML) INJECTION
Status: DISCONTINUED | OUTPATIENT
Start: 2020-03-09 | End: 2020-03-09 | Stop reason: HOSPADM

## 2020-03-09 RX ORDER — PROPOFOL 10 MG/ML
VIAL (ML) INTRAVENOUS
Status: DISCONTINUED | OUTPATIENT
Start: 2020-03-09 | End: 2020-03-09

## 2020-03-09 RX ORDER — LIDOCAINE HCL/PF 100 MG/5ML
SYRINGE (ML) INTRAVENOUS
Status: DISCONTINUED | OUTPATIENT
Start: 2020-03-09 | End: 2020-03-09

## 2020-03-09 RX ADMIN — PROPOFOL 50 MG: 10 INJECTION, EMULSION INTRAVENOUS at 08:03

## 2020-03-09 RX ADMIN — PROPOFOL 100 MG: 10 INJECTION, EMULSION INTRAVENOUS at 08:03

## 2020-03-09 RX ADMIN — Medication 100 MG: at 08:03

## 2020-03-09 RX ADMIN — SODIUM CHLORIDE: 0.9 INJECTION, SOLUTION INTRAVENOUS at 08:03

## 2020-03-09 NOTE — ANESTHESIA POSTPROCEDURE EVALUATION
Anesthesia Post Evaluation    Patient: Tiffany Masterson    Procedure(s) Performed: Procedure(s) (LRB):  COLONOSCOPY (N/A)    Final Anesthesia Type: general    Patient location during evaluation: GI PACU  Patient participation: Yes- Able to Participate  Level of consciousness: awake and alert  Post-procedure vital signs: reviewed and stable  Pain management: adequate  Airway patency: patent    PONV status at discharge: No PONV  Anesthetic complications: no      Cardiovascular status: hemodynamically stable  Respiratory status: unassisted and spontaneous ventilation  Hydration status: euvolemic  Follow-up not needed.          Vitals Value Taken Time   /65 3/9/2020  9:30 AM   Temp 36.6 °C (97.9 °F) 3/9/2020  9:00 AM   Pulse 63 3/9/2020  9:30 AM   Resp 16 3/9/2020  9:30 AM   SpO2 99 % 3/9/2020  9:30 AM         Event Time     Out of Recovery 09:40:31          Pain/Apple Score: Apple Score: 10 (3/9/2020  9:30 AM)

## 2020-03-09 NOTE — [employer] PATIENT INSTRUCTIONS
Discharge Summary/Instructions after an Endoscopic Procedure  Patient Name: Tiffany Acevedo  Patient MRN: [id number]  Patient YOB: 1935  Monday, March 09, 2020  Bebeto Gonzalez MD  RESTRICTIONS:  During your procedure today, you received medications for sedation.  These   medications may affect your judgment, balance and coordination.  Therefore,   for 24 hours, you have the following restrictions:   - DO NOT drive a car, operate machinery, make legal/financial decisions,   sign important papers or drink alcohol.    ACTIVITY:  Today: no heavy lifting, straining or running due to procedural   sedation/anesthesia.  The following day: return to full activity including work.  DIET:  Eat and drink normally unless instructed otherwise.     TREATMENT FOR COMMON SIDE EFFECTS:  - Mild abdominal pain, nausea, belching, bloating or excessive gas:  rest,   eat lightly and use a heating pad.  - Sore Throat: treat with throat lozenges and/or gargle with warm salt   water.  - Because air was used during the procedure, expelling large amounts of air   from your rectum or belching is normal.  - If a bowel prep was taken, you may not have a bowel movement for 1-3 days.    This is normal.  SYMPTOMS TO WATCH FOR AND REPORT TO YOUR PHYSICIAN:  1. Abdominal pain or bloating, other than gas cramps.  2. Chest pain.  3. Back pain.  4. Signs of infection such as: chills or fever occurring within 24 hours   after the procedure.  5. Rectal bleeding, which would show as bright red, maroon, or black stools.   (A tablespoon of blood from the rectum is not serious, especially if   hemorrhoids are present.)  6. Vomiting.  7. Weakness or dizziness.  GO DIRECTLY TO THE NEAREST EMERGENCY ROOM IF YOU HAVE ANY OF THE FOLLOWING:      Difficulty breathing              Chills and/or fever over 101 F   Persistent vomiting and/or vomiting blood   Severe abdominal pain   Severe chest pain   Black, tarry stools   Bleeding- more than one  tablespoon   Any other symptom or condition that you feel may need urgent attention  Your doctor recommends these additional instructions:  If any biopsies were taken, your doctors clinic will contact you in 1 to 2   weeks with any results.  - Discharge patient to home.   - Await pathology results.   - Telephone endoscopist for pathology results in 2 weeks.   - Repeat colonoscopy in 3 years for surveillance of multiple polyps.   - The findings and recommendations were discussed with the patient.   - For the next 2 weeks only - Consider avoiding all non-steroidal   anti-inflammatory drugs (ibuprofen, naproxen, etc.), unless needed for   cardiovascular protection.  O.K. to stay on your aspirin for cardiovascular   protection.  For questions, problems or results please call your physician - Bebeto Gonzalez MD at Work:  [phone].  [facility], EMERGENCY ROOM PHONE NUMBER: [phone]  IF A COMPLICATION OR EMERGENCY SITUATION ARISES AND YOU ARE UNABLE TO REACH   YOUR PHYSICIAN - GO DIRECTLY TO THE EMERGENCY ROOM.  Bebeto Gonzalez MD  3/9/2020 8:53:23 AM  This report has been verified and signed electronically.  [employer]

## 2020-03-09 NOTE — PLAN OF CARE
poc initiated, verbalized understanding  Stated yesterday she had a episode of a TIA vs. An occular migraine. Stated she is seeing a doctor, they are trying to figure out whether or not it's TIA's or migraines. Dr. Muniz with Anesthesia notified. He spoke with pt. We will proceed.

## 2020-03-09 NOTE — PLAN OF CARE
Plan of care discussed with patient. All questions and concerns addressed and answered. Free of pain or distress. PIV removed without complications. VS stable. Procedure report and discharge instructions gone over and patient aware of restrictions and when to resume medications. Patient in agreement.    1078 Dr.. Gonzalez at bedside

## 2020-03-09 NOTE — DISCHARGE INSTRUCTIONS
Colonoscopy     A camera attached to a flexible tube with a viewing lens is used to take video pictures.     Colonoscopy is a test to view the inside of your lower digestive tract (colon and rectum). Sometimes it can show the last part of the small intestine (ileum). During the test, small pieces of tissue may be removed for testing. This is called a biopsy. Small growths, such as polyps, may also be removed.   Why is colonoscopy done?  The test is done to help look for colon cancer. And it can help find the source of abdominal pain, bleeding, and changes in bowel habits. It may be needed once a year, depending on factors such as your:  · Age  · Health history  · Family health history  · Symptoms  · Results from any prior colonoscopy  Risks and possible complications  These include:  · Bleeding               · A puncture or tear in the colon   · Risks of anesthesia  · A cancer lesion not being seen  Getting ready   To prepare for the test:  · Talk with your healthcare provider about the risks of the test (see below). Also ask your healthcare provider about alternatives to the test.  · Tell your healthcare provider about any medicines you take. Also tell him or her about any health conditions you may have.  · Make sure your rectum and colon are empty for the test. Follow the diet and bowel prep instructions exactly. If you dont, the test may need to be rescheduled.  · Plan for a friend or family member to drive you home after the test.     Colonoscopy provides an inside view of the entire colon.     You may discuss the results with your doctor right away or at a future visit.  During the test   The test is usually done in the hospital on an outpatient basis. This means you go home the same day. The procedure takes about 30 minutes. During that time:  · You are given relaxing (sedating) medicine through an IV line. You may be drowsy, or fully asleep.  · The healthcare provider will first give you a physical exam to  check for anal and rectal problems.  · Then the anus is lubricated and the scope inserted.  · If you are awake, you may have a feeling similar to needing to have a bowel movement. You may also feel pressure as air is pumped into the colon. Its OK to pass gas during the procedure.  · Biopsy, polyp removal, or other treatments may be done during the test.  After the test   You may have gas right after the test. It can help to try to pass it to help prevent later bloating. Your healthcare provider may discuss the results with you right away. Or you may need to schedule a follow-up visit to talk about the results. After the test, you can go back to your normal eating and other activities. You may be tired from the sedation and need to rest for a few hours.  Date Last Reviewed: 11/1/2016 © 2000-2017 [employer] [address] [place] [address], [place] [address]. All rights reserved. This information is not intended as a substitute for professional medical care. Always follow your healthcare professional's instructions.

## 2020-03-09 NOTE — ANESTHESIA PREPROCEDURE EVALUATION
03/09/2020  Tiffany Masterson is a 84 y.o., female presenting for colonoscopy.  Hx of ocular migraines, last yesterday, full resolution of symptoms spontaneously.  No issues with previous anesthetics.    Past Medical History:   Diagnosis Date    Arthritis     Family history of malignant neoplasm of gastrointestinal tract mother    History of colonoscopy     unremarkable 2007 by Dr. Javier.  Barium enema revealed diverticular disease.    Hyperlipidemia     Hypertension     Hypothyroidism     Migraine, ophthalmoplegic     Personal history of colonic polyps     TIA (transient ischemic attack)     with a normal angiogram in the past, Ocular migraines reported by patient, not TIA     Unspecified essential hypertension 5/8/2015     Past Surgical History:   Procedure Laterality Date    CATARACT EXTRACTION  2012    right/ Dr. Lexis Nicolas     COLONOSCOPY      2014 polyps    COLONOSCOPY N/A 11/7/2016    Procedure: COLONOSCOPY;  Surgeon: Bebeto Gonzalez MD;  Location: [address]);  Service: Endoscopy;  Laterality: N/A;    COLONOSCOPY W/ POLYPECTOMY  6/2013    polyp of colon    EYE SURGERY  11-10-14    right retina/Dr. Dalton Sierra ([facility])    HYSTEROSCOPIC POLYPECTOMY OF UTERUS N/A 7/2/2018    Procedure: POLYPECTOMY, UTERUS, HYSTEROSCOPIC;  Surgeon: Elliot Vanessa IV, MD;  Location: [facility];  Service: OB/GYN;  Laterality: N/A;    JOINT REPLACEMENT  3/23/2011    left total hip replacement    TONSILLECTOMY      pt was 11 years old     No current facility-administered medications on file prior to encounter.      Current Outpatient Medications on File Prior to Encounter   Medication Sig Dispense Refill    ascorbic acid (VITAMIN C) 250 MG tablet Take 250 mg by mouth once daily.      aspirin (ECOTRIN) 81 MG EC tablet Take 81 mg by mouth once daily.      atenolol (TENORMIN) 25 MG tablet  TAKE 1 TABLET BY MOUTH ONCE DAILY IN THE MORNING 90 tablet 0    atenolol (TENORMIN) 50 MG tablet TAKE 1 TABLET BY MOUTH AT BEDTIME 90 tablet 0    CALCIUM CARBONATE/VITAMIN D3 (CALCIUM 600 + D,3, ORAL) Take 1 tablet by mouth once daily.       cholecalciferol, vitamin D3, (VITAMIN D3) 2,000 unit Cap       indapamide (LOZOL) 2.5 MG Tab TAKE 1 TABLET BY MOUTH ONCE DAILY 90 tablet 0    levothyroxine (SYNTHROID) 100 MCG tablet TAKE 1 TABLET BY MOUTH ONCE DAILY 90 tablet 0    losartan (COZAAR) 100 MG tablet TAKE 1 TABLET BY MOUTH ONCE DAILY 90 tablet 0    MULTIVITAMIN W-MINERALS/LUTEIN (CENTRUM SILVER ORAL) Take 1 tablet by mouth once daily.      pravastatin (PRAVACHOL) 40 MG tablet TAKE 1 TABLET BY MOUTH AT BEDTIME 90 tablet 0    zolpidem (AMBIEN) 5 MG Tab Take 1 tablet (5 mg total) by mouth nightly as needed. 90 tablet 0    ciclopirox (PENLAC) 8 % Soln Apply topically nightly. 6.6 mL 1    conjugated estrogens (PREMARIN) vaginal cream Place 0.5 g vaginally twice a week. 1 applicator 0    DEXTRAN 70/HYPROMELLOSE (ARTIFICIAL TEARS, PF, OPHT) Apply to eye.           Anesthesia Evaluation    I have reviewed the Patient Summary Reports.     I have reviewed the Medications.     Review of Systems  Anesthesia Hx:  No problems with previous Anesthesia   Denies Personal Hx of Anesthesia complications.   Cardiovascular:   Exercise tolerance: good Hypertension    Renal/:   Chronic Renal Disease, CRI    Hepatic/GI:   Bowel Prep.    Neurological:   Denies CVA. Headaches        Physical Exam  General:  Well nourished    Airway/Jaw/Neck:  Airway Findings: Mouth Opening: Normal Tongue: Large  General Airway Assessment: Adult  Mallampati: III  TM Distance: Normal, at least 6 cm  Jaw/Neck Findings:  Neck ROM: Extension Decreased, Mild      Dental:  Dental Findings: In tact        Mental Status:  Mental Status Findings:  Cooperative, Alert and Oriented         Anesthesia Plan  Type of Anesthesia, risks & benefits  discussed:  Anesthesia Type:  general  Patient's Preference: natural airway  Intra-op Monitoring Plan: standard ASA monitors  Intra-op Monitoring Plan Comments:   Post Op Pain Control Plan: per primary service following discharge from PACU  Post Op Pain Control Plan Comments:   Induction:   IV  Beta Blocker:  Patient is on a Beta-Blocker and has received one dose within the past 24 hours (No further documentation required).       Informed Consent: Patient understands risks and agrees with Anesthesia plan.  Questions answered. Anesthesia consent signed with patient.  ASA Score: 3     Day of Surgery Review of History & Physical:    H&P update referred to the surgeon.         Ready For Surgery From Anesthesia Perspective.

## 2020-03-09 NOTE — H&P
[facility]  History & Physical    Subjective:      Chief Complaint/Reason for Admission:    Colonoscopy    Tiffany Masterson is a 84 y.o. female.    Past Medical History:   Diagnosis Date    Arthritis     Family history of malignant neoplasm of gastrointestinal tract mother    History of colonoscopy     unremarkable 2007 by Dr. Javier.  Barium enema revealed diverticular disease.    Hyperlipidemia     Hypertension     Hypothyroidism     Migraine, ophthalmoplegic     Personal history of colonic polyps     TIA (transient ischemic attack)     with a normal angiogram in the past, Ocular migraines reported by patient, not TIA     Unspecified essential hypertension 5/8/2015     Past Surgical History:   Procedure Laterality Date    CATARACT EXTRACTION  2012    right/ Dr. Lexis Nicolas     COLONOSCOPY      2014 polyps    COLONOSCOPY N/A 11/7/2016    Procedure: COLONOSCOPY;  Surgeon: Bebeto Gonzalez MD;  Location: [address];  Service: Endoscopy;  Laterality: N/A;    COLONOSCOPY W/ POLYPECTOMY  6/2013    polyp of colon    EYE SURGERY  11-10-14    right retina/Dr. Dalton Sierra ([facility])    HYSTEROSCOPIC POLYPECTOMY OF UTERUS N/A 7/2/2018    Procedure: POLYPECTOMY, UTERUS, HYSTEROSCOPIC;  Surgeon: Elliot Vanessa IV, MD;  Location: [facility];  Service: OB/GYN;  Laterality: N/A;    JOINT REPLACEMENT  3/23/2011    left total hip replacement    TONSILLECTOMY      pt was 11 years old     Family History   Problem Relation Age of Onset    Cancer Mother 75        colon    Colon cancer Mother     Cancer Father         lung    Diabetes Other     Cancer Maternal Aunt 80        colon cancer    Diabetes Paternal Aunt     Colon cancer Paternal Aunt     Breast cancer Neg Hx     Ovarian cancer Neg Hx      Social History     Tobacco Use    Smoking status: Never Smoker    Smokeless tobacco: Never Used    Tobacco comment: The patient is not getting any regular exercise at this time.   She does enjoy traveling to [place].   Substance Use Topics    Alcohol use: Yes     Alcohol/week: 2.0 standard drinks     Types: 2 Shots of liquor per week     Comment: daily    Drug use: No       PTA Medications   Medication Sig    ascorbic acid (VITAMIN C) 250 MG tablet Take 250 mg by mouth once daily.    aspirin (ECOTRIN) 81 MG EC tablet Take 81 mg by mouth once daily.    atenolol (TENORMIN) 25 MG tablet TAKE 1 TABLET BY MOUTH ONCE DAILY IN THE MORNING    atenolol (TENORMIN) 50 MG tablet TAKE 1 TABLET BY MOUTH AT BEDTIME    CALCIUM CARBONATE/VITAMIN D3 (CALCIUM 600 + D,3, ORAL) Take 1 tablet by mouth once daily.     cholecalciferol, vitamin D3, (VITAMIN D3) 2,000 unit Cap     indapamide (LOZOL) 2.5 MG Tab TAKE 1 TABLET BY MOUTH ONCE DAILY    levothyroxine (SYNTHROID) 100 MCG tablet TAKE 1 TABLET BY MOUTH ONCE DAILY    losartan (COZAAR) 100 MG tablet TAKE 1 TABLET BY MOUTH ONCE DAILY    MULTIVITAMIN W-MINERALS/LUTEIN (CENTRUM SILVER ORAL) Take 1 tablet by mouth once daily.    pravastatin (PRAVACHOL) 40 MG tablet TAKE 1 TABLET BY MOUTH AT BEDTIME    zolpidem (AMBIEN) 5 MG Tab Take 1 tablet (5 mg total) by mouth nightly as needed.    ciclopirox (PENLAC) 8 % Soln Apply topically nightly.    conjugated estrogens (PREMARIN) vaginal cream Place 0.5 g vaginally twice a week.    DEXTRAN 70/HYPROMELLOSE (ARTIFICIAL TEARS, PF, OPHT) Apply to eye.     Review of patient's allergies indicates:   Allergen Reactions    Levaquin [levofloxacin]      Joint pain    Hydrochlorothiazide Other (See Comments)     Pain in joints and depression per pt        Review of Systems   Constitutional: Negative for chills, fever and weight loss.   Respiratory: Negative for shortness of breath and wheezing.    Cardiovascular: Negative for chest pain.   Gastrointestinal: Negative for abdominal pain, blood in stool, constipation and diarrhea.       Objective:      Vital Signs (Most Recent)       Vital Signs Range (Last 24H):        Physical Exam   Constitutional: She is oriented to person, place, and time. She appears well-developed and well-nourished.   Cardiovascular: Normal rate.   Pulmonary/Chest: Effort normal and breath sounds normal.   Abdominal: Soft.   Neurological: She is alert and oriented to person, place, and time.   Skin: Skin is warm and dry.   Psychiatric: She has a normal mood and affect. Her behavior is normal. Judgment and thought content normal.         Assessment:      Active Hospital Problems    Diagnosis  POA    Colon adenomas [D12.6]  Yes      Resolved Hospital Problems   No resolved problems to display.       Plan:    Colonoscopy for history of colonoscopy for history of colon polyp.

## 2020-03-09 NOTE — TRANSFER OF CARE
Anesthesia Transfer of Care Note    Patient: Tiffany Masterson    Procedure(s) Performed: Procedure(s) (LRB):  COLONOSCOPY (N/A)    Patient location: GI    Anesthesia Type: general    Transport from OR: Transported from OR on room air with adequate spontaneous ventilation    Post pain: adequate analgesia    Post assessment: no apparent anesthetic complications and tolerated procedure well    Post vital signs: stable    Level of consciousness: awake, alert and oriented    Nausea/Vomiting: no nausea/vomiting    Complications: none    Transfer of care protocol was followed      Last vitals:   Visit Vitals  Breastfeeding? No

## 2020-03-16 ENCOUNTER — TELEPHONE (OUTPATIENT)
Dept: ENDOSCOPY | Facility: HOSPITAL | Age: 85
End: 2020-03-16

## 2020-03-17 ENCOUNTER — TELEPHONE (OUTPATIENT)
Dept: INTERNAL MEDICINE | Facility: CLINIC | Age: 85
End: 2020-03-17

## 2020-03-17 NOTE — TELEPHONE ENCOUNTER
----- Message from Liz Workman sent at 3/17/2020  9:46 AM CDT -----  Contact: Patient [phone]  Patient had dinner with someone at [facility] who tested positive for COVID-19.  Patient is Asymptomatic.  Given hotline numbers. Request call back.

## 2020-03-19 LAB
FINAL PATHOLOGIC DIAGNOSIS: NORMAL
GROSS: NORMAL

## 2020-03-23 RX ORDER — LOSARTAN POTASSIUM 100 MG/1
TABLET ORAL
Qty: 90 TABLET | Refills: 3 | Status: SHIPPED | OUTPATIENT
Start: 2020-03-23 | End: 2021-04-12

## 2020-03-23 RX ORDER — LEVOTHYROXINE SODIUM 100 UG/1
TABLET ORAL
Qty: 90 TABLET | Refills: 3 | Status: SHIPPED | OUTPATIENT
Start: 2020-03-23 | End: 2021-04-12

## 2020-03-23 RX ORDER — ZOLPIDEM TARTRATE 5 MG/1
TABLET ORAL
Qty: 90 TABLET | Refills: 1 | Status: SHIPPED | OUTPATIENT
Start: 2020-03-23 | End: 2020-12-30

## 2020-03-23 RX ORDER — INDAPAMIDE 2.5 MG/1
TABLET ORAL
Qty: 90 TABLET | Refills: 3 | Status: SHIPPED | OUTPATIENT
Start: 2020-03-23 | End: 2021-04-12

## 2020-03-23 RX ORDER — PRAVASTATIN SODIUM 40 MG/1
TABLET ORAL
Qty: 90 TABLET | Refills: 3 | Status: SHIPPED | OUTPATIENT
Start: 2020-03-23 | End: 2020-06-29 | Stop reason: SDUPTHER

## 2020-03-24 DIAGNOSIS — I10 HYPERTENSION, UNSPECIFIED TYPE: ICD-10-CM

## 2020-03-24 RX ORDER — ATENOLOL 25 MG/1
TABLET ORAL
Qty: 90 TABLET | Refills: 3 | Status: SHIPPED | OUTPATIENT
Start: 2020-03-24 | End: 2021-04-12

## 2020-05-27 DIAGNOSIS — I10 HYPERTENSION, UNSPECIFIED TYPE: ICD-10-CM

## 2020-05-28 RX ORDER — ATENOLOL 50 MG/1
TABLET ORAL
Qty: 90 TABLET | Refills: 2 | Status: SHIPPED | OUTPATIENT
Start: 2020-05-28 | End: 2020-07-02 | Stop reason: SDUPTHER

## 2020-05-28 NOTE — PROGRESS NOTES
Refill Routing Note    Medication(s) are not appropriate for processing by [facility]:       Drug-Drug Interaction (Atenolol 25 mg and 50mg)     Medication-related problems identified: Drug-drug interaction  Medication Therapy Plan: Pt has atenolol 25 mg and 50 mg active on med list; unclear which one pt should be on; defer to you  Medication reconciliation completed: No      Automatic Epic Protocol Generated Data:    Requested Prescriptions   Pending Prescriptions Disp Refills    atenoloL (TENORMIN) 50 MG tablet [Pharmacy Med Name: Atenolol 50 MG Oral Tablet] 90 tablet 2     Sig: TAKE 1 TABLET BY MOUTH AT BEDTIME       Cardiovascular:  Beta Blockers Passed - 5/27/2020  3:49 PM        Passed - Patient is at least 18 years old        Passed - Last BP in normal range within 360 days.     BP Readings from Last 3 Encounters:   03/09/20 139/65   01/09/20 132/64   11/14/18 123/73              Passed - Last Heart Rate in normal range within 360 days.     Pulse Readings from Last 3 Encounters:   03/09/20 63   01/09/20 69   11/14/18 69             Passed - Office visit in past 12 months or future 90 days.     Recent Outpatient Visits            4 months ago Annual physical exam    [facility] - Internal Medicine Matthew Lewis MD    1 year ago Osteopenia, unspecified location    [facility] - Endocrinology [address]th [place] Maia Yang MD    1 year ago Recurrent UTI    [facility] [address] Elliot Vanessa IV, MD    1 year ago Hypothyroidism due to acquired atrophy of thyroid    [facility] - Internal Medicine Matthew Lewis MD    1 year ago Endometrial mass    [facility] [address] Elliot Vanessa IV, MD                       Appointments  past 12m or future 3m with PCP    Date Provider   Last Visit   1/9/2020 Matthew Lewis MD   Next Visit   Visit date not found Matthew Lewis MD   ED visits in past 90 days: 0     Note composed:5:01 PM 05/28/2020

## 2020-06-29 RX ORDER — PRAVASTATIN SODIUM 40 MG/1
40 TABLET ORAL NIGHTLY
Qty: 90 TABLET | Refills: 0 | Status: SHIPPED | OUTPATIENT
Start: 2020-06-29 | End: 2021-01-06 | Stop reason: SDUPTHER

## 2020-06-29 NOTE — TELEPHONE ENCOUNTER
----- Message from Shama Gutiérrez sent at 6/29/2020  4:03 PM CDT -----  Contact: self [phone]   Pt states that pharmacy is waiting on dr to call to fill pravastatin (PRAVACHOL) 40 MG tablet. [facility]  [address] [place] [address] [place] [address] Phone .   Please advise

## 2020-07-02 ENCOUNTER — PATIENT MESSAGE (OUTPATIENT)
Dept: INTERNAL MEDICINE | Facility: CLINIC | Age: 85
End: 2020-07-02

## 2020-07-02 DIAGNOSIS — I10 HYPERTENSION, UNSPECIFIED TYPE: ICD-10-CM

## 2020-07-02 RX ORDER — ATENOLOL 50 MG/1
50 TABLET ORAL NIGHTLY
Qty: 90 TABLET | Refills: 2 | Status: SHIPPED | OUTPATIENT
Start: 2020-07-02 | End: 2021-04-12

## 2020-07-16 ENCOUNTER — OFFICE VISIT (OUTPATIENT)
Dept: OBSTETRICS AND GYNECOLOGY | Facility: CLINIC | Age: 85
End: 2020-07-16
Payer: MEDICARE

## 2020-07-16 VITALS
WEIGHT: 186.94 LBS | DIASTOLIC BLOOD PRESSURE: 60 MMHG | BODY MASS INDEX: 30.04 KG/M2 | SYSTOLIC BLOOD PRESSURE: 120 MMHG | HEIGHT: 66 IN

## 2020-07-16 DIAGNOSIS — N95.2 VAGINAL ATROPHY: ICD-10-CM

## 2020-07-16 DIAGNOSIS — N95.0 POSTMENOPAUSAL BLEEDING: Primary | ICD-10-CM

## 2020-07-16 PROCEDURE — 99213 PR OFFICE/OUTPT VISIT, EST, LEVL III, 20-29 MIN: ICD-10-PCS | Mod: HCNC,S$GLB,, | Performed by: OBSTETRICS & GYNECOLOGY

## 2020-07-16 PROCEDURE — 1126F PR PAIN SEVERITY QUANTIFIED, NO PAIN PRESENT: ICD-10-PCS | Mod: HCNC,S$GLB,, | Performed by: OBSTETRICS & GYNECOLOGY

## 2020-07-16 PROCEDURE — 1159F MED LIST DOCD IN RCRD: CPT | Mod: HCNC,S$GLB,, | Performed by: OBSTETRICS & GYNECOLOGY

## 2020-07-16 PROCEDURE — 3078F DIAST BP <80 MM HG: CPT | Mod: HCNC,CPTII,S$GLB, | Performed by: OBSTETRICS & GYNECOLOGY

## 2020-07-16 PROCEDURE — 99999 PR PBB SHADOW E&M-EST. PATIENT-LVL III: ICD-10-PCS | Mod: PBBFAC,HCNC,, | Performed by: OBSTETRICS & GYNECOLOGY

## 2020-07-16 PROCEDURE — 1126F AMNT PAIN NOTED NONE PRSNT: CPT | Mod: HCNC,S$GLB,, | Performed by: OBSTETRICS & GYNECOLOGY

## 2020-07-16 PROCEDURE — 1101F PR PT FALLS ASSESS DOC 0-1 FALLS W/OUT INJ PAST YR: ICD-10-PCS | Mod: HCNC,CPTII,S$GLB, | Performed by: OBSTETRICS & GYNECOLOGY

## 2020-07-16 PROCEDURE — 1159F PR MEDICATION LIST DOCUMENTED IN MEDICAL RECORD: ICD-10-PCS | Mod: HCNC,S$GLB,, | Performed by: OBSTETRICS & GYNECOLOGY

## 2020-07-16 PROCEDURE — 99213 OFFICE O/P EST LOW 20 MIN: CPT | Mod: HCNC,S$GLB,, | Performed by: OBSTETRICS & GYNECOLOGY

## 2020-07-16 PROCEDURE — 3074F SYST BP LT 130 MM HG: CPT | Mod: HCNC,CPTII,S$GLB, | Performed by: OBSTETRICS & GYNECOLOGY

## 2020-07-16 PROCEDURE — 99999 PR PBB SHADOW E&M-EST. PATIENT-LVL III: CPT | Mod: PBBFAC,HCNC,, | Performed by: OBSTETRICS & GYNECOLOGY

## 2020-07-16 PROCEDURE — 3074F PR MOST RECENT SYSTOLIC BLOOD PRESSURE < 130 MM HG: ICD-10-PCS | Mod: HCNC,CPTII,S$GLB, | Performed by: OBSTETRICS & GYNECOLOGY

## 2020-07-16 PROCEDURE — 1101F PT FALLS ASSESS-DOCD LE1/YR: CPT | Mod: HCNC,CPTII,S$GLB, | Performed by: OBSTETRICS & GYNECOLOGY

## 2020-07-16 PROCEDURE — 3078F PR MOST RECENT DIASTOLIC BLOOD PRESSURE < 80 MM HG: ICD-10-PCS | Mod: HCNC,CPTII,S$GLB, | Performed by: OBSTETRICS & GYNECOLOGY

## 2020-07-16 NOTE — PROGRESS NOTES
CC:  Vaginal discharge/possible vaginal bleeding    HPI:  Tiffany Masterson is a 84 y.o. female patient  who presents today with complaint of brownish vaginal discharge and 1 episode of vaginal bleeding/spotting that has been somewhat persistent since May 2020.  Patient denies pelvic pain or pelvic cramping.  Patient reports no recurrence active bleeding.  Patient denies malodorous vaginal discharge.  Patient remains sexually active.    Patient brought a pair of her panty/undergarments with discharge on undergarment.  The discharge visualized is consistent with possible old blood.    No LMP recorded. Patient is postmenopausal.    Past Medical History:   Diagnosis Date    Arthritis     Family history of malignant neoplasm of gastrointestinal tract mother    History of colonoscopy     unremarkable  by Dr. Javier.  Barium enema revealed diverticular disease.    Hyperlipidemia     Hypertension     Hypothyroidism     Migraine, ophthalmoplegic     Personal history of colonic polyps     TIA (transient ischemic attack)     with a normal angiogram in the past, Ocular migraines reported by patient, not TIA     Unspecified essential hypertension 2015       Past Surgical History:   Procedure Laterality Date    CATARACT EXTRACTION      right/ Dr. Lexis Nicolas     COLONOSCOPY      2014 polyps    COLONOSCOPY N/A 2016    Procedure: COLONOSCOPY;  Surgeon: Bebeto Gonzalez MD;  Location: [address]);  Service: Endoscopy;  Laterality: N/A;    COLONOSCOPY N/A 3/9/2020    Procedure: COLONOSCOPY;  Surgeon: Bebeto Gonzalez MD;  Location: [address]);  Service: Endoscopy;  Laterality: N/A;    COLONOSCOPY W/ POLYPECTOMY  2013    polyp of colon    EYE SURGERY  11-10-14    right retina/Dr. Dalton Sierra ([facility])    HYSTEROSCOPIC POLYPECTOMY OF UTERUS N/A 2018    Procedure: POLYPECTOMY, UTERUS, HYSTEROSCOPIC;  Surgeon: Elliot Vanessa IV, MD;  Location: [facility];  Service:  OB/GYN;  Laterality: N/A;    JOINT REPLACEMENT  3/23/2011    left total hip replacement    TONSILLECTOMY      pt was 11 years old         ROS:  GENERAL: Feeling well overall.   SKIN: Denies rash or lesions.   HEAD: Denies head injury or headache.   NODES: Denies enlarged lymph nodes.   CHEST: Denies chest pain or shortness of breath.   CARDIOVASCULAR: Denies palpitations or left sided chest pain.   ABDOMEN: No abdominal pain, nausea, vomiting or rectal bleeding.   URINARY: No dysuria, hematuria, or burning on urination.  REPRODUCTIVE: See HPI.   BREASTS: Denies pain, lumps, or nipple discharge.   HEMATOLOGIC: No easy bruisability or excessive bleeding.   MUSCULOSKELETAL: Denies joint pain or swelling.   NEUROLOGIC: Denies syncope or weakness.   PSYCHIATRIC: Denies depression.    PE:   APPEARANCE: Well nourished, well developed, in no acute distress.  ABDOMEN: Soft. No tenderness or masses. No hernias. No CVA tenderness.  No peritoneal signs.  VULVA: No lesions. Normal female genitalia.  URETHRAL MEATUS: Normal size and location, no lesions, no prolapse.  URETHRA: No masses, tenderness, prolapse or scarring.  VAGINA:  Atrophic with no significant discharge today.  Mild cystocele or rectocele.  No blood noted in vaginal vault today.  CERVIX: No lesions and discharge.  No supracervical bleeding noted today.  UTERUS:  6-8 weeks size, regular shape, mobile, non-tender, bladder base nontender.  ADNEXA: No masses, tenderness or CDS nodularity.  ANUS PERINEUM: Normal.    Diagnosis:  1. Postmenopausal bleeding    2. Vaginal atrophy      PLAN:    Orders Placed This Encounter    US Pelvis Comp with Transvag NON-OB (xpd       Patient was counseled today on need for pelvic ultrasound and possible need for endometrial biopsy.  Will await of pelvic sonogram findings.    Patient instructed to monitor for any vaginal bleeding.    Follow-up:  After pelvic sonogram report received.    Elliot Vanessa IV, MD

## 2020-07-23 ENCOUNTER — HOSPITAL ENCOUNTER (OUTPATIENT)
Dept: RADIOLOGY | Facility: HOSPITAL | Age: 85
Discharge: HOME | End: 2020-07-23
Attending: OBSTETRICS & GYNECOLOGY
Payer: MEDICARE

## 2020-07-23 DIAGNOSIS — N95.0 POSTMENOPAUSAL BLEEDING: ICD-10-CM

## 2020-07-23 PROCEDURE — 76830 US PELVIS COMP WITH TRANSVAG NON-OB (XPD): ICD-10-PCS | Mod: 26,HCNC,, | Performed by: RADIOLOGY

## 2020-07-23 PROCEDURE — 76856 US EXAM PELVIC COMPLETE: CPT | Mod: 26,HCNC,, | Performed by: RADIOLOGY

## 2020-07-23 PROCEDURE — 76830 TRANSVAGINAL US NON-OB: CPT | Mod: TC,HCNC

## 2020-07-23 PROCEDURE — 76830 TRANSVAGINAL US NON-OB: CPT | Mod: 26,HCNC,, | Performed by: RADIOLOGY

## 2020-07-23 PROCEDURE — 76856 US PELVIS COMP WITH TRANSVAG NON-OB (XPD): ICD-10-PCS | Mod: 26,HCNC,, | Performed by: RADIOLOGY

## 2020-07-30 ENCOUNTER — TELEPHONE (OUTPATIENT)
Dept: OBSTETRICS AND GYNECOLOGY | Facility: CLINIC | Age: 85
End: 2020-07-30

## 2020-07-30 NOTE — TELEPHONE ENCOUNTER
----- Message from Elliot Vanessa IV, MD sent at 7/23/2020  2:30 PM CDT -----  Benign pelvic ultrasound with 2 mm endometrial stripe.  Please set patient monitor for any spotting or bleeding.  I have no other follow-up recommendation after reviewing this sonogram report.    Elliot Vanessa IV, MD

## 2020-07-31 ENCOUNTER — TELEPHONE (OUTPATIENT)
Dept: OBSTETRICS AND GYNECOLOGY | Facility: CLINIC | Age: 85
End: 2020-07-31

## 2020-08-01 ENCOUNTER — OFFICE VISIT (OUTPATIENT)
Dept: INTERNAL MEDICINE | Facility: CLINIC | Age: 85
End: 2020-08-01
Payer: MEDICARE

## 2020-08-01 ENCOUNTER — LAB VISIT (OUTPATIENT)
Dept: LAB | Facility: HOSPITAL | Age: 85
End: 2020-08-01
Attending: INTERNAL MEDICINE
Payer: MEDICARE

## 2020-08-01 VITALS
BODY MASS INDEX: 30.16 KG/M2 | HEART RATE: 69 BPM | WEIGHT: 187.63 LBS | DIASTOLIC BLOOD PRESSURE: 60 MMHG | HEIGHT: 66 IN | SYSTOLIC BLOOD PRESSURE: 138 MMHG | OXYGEN SATURATION: 98 %

## 2020-08-01 DIAGNOSIS — E78.5 HYPERLIPIDEMIA, UNSPECIFIED HYPERLIPIDEMIA TYPE: ICD-10-CM

## 2020-08-01 DIAGNOSIS — R42 DIZZINESS AND GIDDINESS: ICD-10-CM

## 2020-08-01 DIAGNOSIS — D64.9 NORMOCYTIC ANEMIA: ICD-10-CM

## 2020-08-01 DIAGNOSIS — N18.30 CKD (CHRONIC KIDNEY DISEASE) STAGE 3, GFR 30-59 ML/MIN: ICD-10-CM

## 2020-08-01 DIAGNOSIS — E03.4 HYPOTHYROIDISM DUE TO ACQUIRED ATROPHY OF THYROID: Chronic | ICD-10-CM

## 2020-08-01 DIAGNOSIS — G47.30 OBSERVED SLEEP APNEA: ICD-10-CM

## 2020-08-01 DIAGNOSIS — G47.00 INSOMNIA, UNSPECIFIED TYPE: ICD-10-CM

## 2020-08-01 DIAGNOSIS — Z00.00 ANNUAL PHYSICAL EXAM: Primary | ICD-10-CM

## 2020-08-01 DIAGNOSIS — R20.2 PARESTHESIA OF SKIN: ICD-10-CM

## 2020-08-01 DIAGNOSIS — R35.1 NOCTURIA: ICD-10-CM

## 2020-08-01 DIAGNOSIS — I10 ESSENTIAL HYPERTENSION: ICD-10-CM

## 2020-08-01 DIAGNOSIS — Z20.822 EXPOSURE TO COVID-19 VIRUS: ICD-10-CM

## 2020-08-01 DIAGNOSIS — Z00.00 ANNUAL PHYSICAL EXAM: ICD-10-CM

## 2020-08-01 LAB
25(OH)D3+25(OH)D2 SERPL-MCNC: 43 NG/ML (ref 30–96)
ALBUMIN SERPL BCP-MCNC: 4 G/DL (ref 3.5–5.2)
ALP SERPL-CCNC: 115 U/L (ref 55–135)
ALT SERPL W/O P-5'-P-CCNC: 21 U/L (ref 10–44)
ANION GAP SERPL CALC-SCNC: 11 MMOL/L (ref 8–16)
AST SERPL-CCNC: 23 U/L (ref 10–40)
BACTERIA #/AREA URNS AUTO: ABNORMAL /HPF
BILIRUB SERPL-MCNC: 0.4 MG/DL (ref 0.1–1)
BILIRUB UR QL STRIP: NEGATIVE
BUN SERPL-MCNC: 19 MG/DL (ref 8–23)
CALCIUM SERPL-MCNC: 9.4 MG/DL (ref 8.7–10.5)
CHLORIDE SERPL-SCNC: 100 MMOL/L (ref 95–110)
CHOLEST SERPL-MCNC: 156 MG/DL (ref 120–199)
CHOLEST/HDLC SERPL: 3.8 {RATIO} (ref 2–5)
CLARITY UR REFRACT.AUTO: ABNORMAL
CO2 SERPL-SCNC: 25 MMOL/L (ref 23–29)
COLOR UR AUTO: ABNORMAL
CREAT SERPL-MCNC: 1.5 MG/DL (ref 0.5–1.4)
ERYTHROCYTE [DISTWIDTH] IN BLOOD BY AUTOMATED COUNT: 12.3 % (ref 11.5–14.5)
EST. GFR  (AFRICAN AMERICAN): 37.1 ML/MIN/1.73 M^2
EST. GFR  (NON AFRICAN AMERICAN): 32.2 ML/MIN/1.73 M^2
FERRITIN SERPL-MCNC: 201 NG/ML (ref 20–300)
GLUCOSE SERPL-MCNC: 145 MG/DL (ref 70–110)
GLUCOSE UR QL STRIP: NEGATIVE
HCT VFR BLD AUTO: 35.9 % (ref 37–48.5)
HDLC SERPL-MCNC: 41 MG/DL (ref 40–75)
HDLC SERPL: 26.3 % (ref 20–50)
HGB BLD-MCNC: 11.2 G/DL (ref 12–16)
HGB UR QL STRIP: NEGATIVE
IRON SERPL-MCNC: 65 UG/DL (ref 30–160)
KETONES UR QL STRIP: NEGATIVE
LDLC SERPL CALC-MCNC: 58.2 MG/DL (ref 63–159)
LEUKOCYTE ESTERASE UR QL STRIP: ABNORMAL
MAGNESIUM SERPL-MCNC: 2 MG/DL (ref 1.6–2.6)
MCH RBC QN AUTO: 29.8 PG (ref 27–31)
MCHC RBC AUTO-ENTMCNC: 31.2 G/DL (ref 32–36)
MCV RBC AUTO: 96 FL (ref 82–98)
MICROSCOPIC COMMENT: ABNORMAL
NITRITE UR QL STRIP: NEGATIVE
NONHDLC SERPL-MCNC: 115 MG/DL
PH UR STRIP: 5 [PH] (ref 5–8)
PLATELET # BLD AUTO: 208 K/UL (ref 150–350)
PMV BLD AUTO: 10.9 FL (ref 9.2–12.9)
POTASSIUM SERPL-SCNC: 4 MMOL/L (ref 3.5–5.1)
PROT SERPL-MCNC: 7.4 G/DL (ref 6–8.4)
PROT UR QL STRIP: NEGATIVE
RBC # BLD AUTO: 3.76 M/UL (ref 4–5.4)
RBC #/AREA URNS AUTO: 1 /HPF (ref 0–4)
SARS-COV-2 IGG SERPLBLD QL IA.RAPID: NEGATIVE
SATURATED IRON: 18 % (ref 20–50)
SODIUM SERPL-SCNC: 136 MMOL/L (ref 136–145)
SP GR UR STRIP: 1.01 (ref 1–1.03)
SQUAMOUS #/AREA URNS AUTO: 0 /HPF
TOTAL IRON BINDING CAPACITY: 364 UG/DL (ref 250–450)
TRANSFERRIN SERPL-MCNC: 246 MG/DL (ref 200–375)
TRIGL SERPL-MCNC: 284 MG/DL (ref 30–150)
TSH SERPL DL<=0.005 MIU/L-ACNC: 1.05 UIU/ML (ref 0.4–4)
URN SPEC COLLECT METH UR: ABNORMAL
VIT B12 SERPL-MCNC: 631 PG/ML (ref 210–950)
WBC # BLD AUTO: 8.08 K/UL (ref 3.9–12.7)
WBC #/AREA URNS AUTO: 56 /HPF (ref 0–5)

## 2020-08-01 PROCEDURE — 99999 PR PBB SHADOW E&M-EST. PATIENT-LVL V: ICD-10-PCS | Mod: PBBFAC,HCNC,, | Performed by: INTERNAL MEDICINE

## 2020-08-01 PROCEDURE — 87086 URINE CULTURE/COLONY COUNT: CPT | Mod: HCNC

## 2020-08-01 PROCEDURE — 85027 COMPLETE CBC AUTOMATED: CPT | Mod: HCNC

## 2020-08-01 PROCEDURE — 99999 PR PBB SHADOW E&M-EST. PATIENT-LVL V: CPT | Mod: PBBFAC,HCNC,, | Performed by: INTERNAL MEDICINE

## 2020-08-01 PROCEDURE — 84443 ASSAY THYROID STIM HORMONE: CPT | Mod: HCNC

## 2020-08-01 PROCEDURE — 3078F PR MOST RECENT DIASTOLIC BLOOD PRESSURE < 80 MM HG: ICD-10-PCS | Mod: HCNC,CPTII,S$GLB, | Performed by: INTERNAL MEDICINE

## 2020-08-01 PROCEDURE — 99397 PR PREVENTIVE VISIT,EST,65 & OVER: ICD-10-PCS | Mod: HCNC,S$GLB,, | Performed by: INTERNAL MEDICINE

## 2020-08-01 PROCEDURE — 82607 VITAMIN B-12: CPT | Mod: HCNC

## 2020-08-01 PROCEDURE — 82728 ASSAY OF FERRITIN: CPT | Mod: HCNC

## 2020-08-01 PROCEDURE — 3078F DIAST BP <80 MM HG: CPT | Mod: HCNC,CPTII,S$GLB, | Performed by: INTERNAL MEDICINE

## 2020-08-01 PROCEDURE — 80053 COMPREHEN METABOLIC PANEL: CPT | Mod: HCNC

## 2020-08-01 PROCEDURE — 86769 SARS-COV-2 COVID-19 ANTIBODY: CPT | Mod: HCNC

## 2020-08-01 PROCEDURE — 81001 URINALYSIS AUTO W/SCOPE: CPT | Mod: HCNC

## 2020-08-01 PROCEDURE — 83735 ASSAY OF MAGNESIUM: CPT | Mod: HCNC

## 2020-08-01 PROCEDURE — 80061 LIPID PANEL: CPT | Mod: HCNC

## 2020-08-01 PROCEDURE — 36415 COLL VENOUS BLD VENIPUNCTURE: CPT | Mod: HCNC

## 2020-08-01 PROCEDURE — 99397 PER PM REEVAL EST PAT 65+ YR: CPT | Mod: HCNC,S$GLB,, | Performed by: INTERNAL MEDICINE

## 2020-08-01 PROCEDURE — 83540 ASSAY OF IRON: CPT | Mod: HCNC

## 2020-08-01 PROCEDURE — 3075F SYST BP GE 130 - 139MM HG: CPT | Mod: HCNC,CPTII,S$GLB, | Performed by: INTERNAL MEDICINE

## 2020-08-01 PROCEDURE — 82306 VITAMIN D 25 HYDROXY: CPT | Mod: HCNC

## 2020-08-01 PROCEDURE — 3075F PR MOST RECENT SYSTOLIC BLOOD PRESS GE 130-139MM HG: ICD-10-PCS | Mod: HCNC,CPTII,S$GLB, | Performed by: INTERNAL MEDICINE

## 2020-08-01 NOTE — PATIENT INSTRUCTIONS
Please go to an [facility] pharmacy or your local pharmacy for the 2nd and final dose of the Shingrix vaccine.

## 2020-08-01 NOTE — Clinical Note
Met with patient for visit last week. She reports she is interested in assessed for ovarian cancer because she had vaginal discharge.  She notes that you had done a scan back in July.  I do not know if any additional evaluation is needed for possible ovarian cancer, but I told her I would pass this along to you.  Thanks! --Matthew

## 2020-08-01 NOTE — PROGRESS NOTES
Subjective:       Patient ID: Tiffany Masterson is a 84 y.o. female.    Chief Complaint: Annual Exam      Last visit with me 1/9/2020.     HPI    1 year ago started noting eating less. Appetite is good. No fever or chills. Notes some dizziness, when moving around symptoms will start. Doesn't happen when lying down. Ophthalmology recommends MRI.     bilateral shoulder pain going on at night when lying on arm. Going on for a long time, months to years.    patient notes having some vaginal discharge which she has discussed with her gynecologist.  She has noted increased anxiety because the pandemic along with other so side all problems, also her  had a minor stroke in July.    Patient continues to have problems with insomnia.  She has woken up by pain in her shoulders.  She also has urinary frequency that happens at night but not during the day.  She has been using Ambien and melatonin without benefit.      Review of Systems   Constitutional: Positive for activity change. Negative for unexpected weight change.   HENT: Positive for hearing loss. Negative for rhinorrhea and trouble swallowing.    Eyes: Positive for visual disturbance. Negative for discharge.   Respiratory: Negative for chest tightness and wheezing.    Cardiovascular: Negative for chest pain and palpitations.   Gastrointestinal: Positive for constipation. Negative for blood in stool, diarrhea and vomiting.   Endocrine: Negative for polydipsia and polyuria.   Genitourinary: Negative for difficulty urinating, dysuria, hematuria and menstrual problem.   Musculoskeletal: Positive for arthralgias and joint swelling. Negative for neck pain.   Neurological: Positive for weakness. Negative for headaches.   Psychiatric/Behavioral: Negative for confusion and dysphoric mood.       Objective:      Physical Exam  Vitals signs and nursing note reviewed.   Constitutional:       General: She is not in acute distress.     Appearance: She is not diaphoretic.   HENT:  "     Head: Atraumatic.      Right Ear: Tympanic membrane and external ear normal.      Left Ear: Tympanic membrane and external ear normal.   Eyes:      General:         Right eye: No discharge.         Left eye: No discharge.      Extraocular Movements: Extraocular movements intact.      Conjunctiva/sclera: Conjunctivae normal.      Pupils: Pupils are equal, round, and reactive to light.   Neck:      Musculoskeletal: Normal range of motion and neck supple.      Thyroid: No thyromegaly.      Vascular: No carotid bruit.   Cardiovascular:      Rate and Rhythm: Normal rate and regular rhythm.      Heart sounds: Normal heart sounds.   Pulmonary:      Effort: Pulmonary effort is normal.      Breath sounds: Normal breath sounds. No stridor. No wheezing or rales.   Abdominal:      General: Bowel sounds are normal. There is no distension.      Palpations: Abdomen is soft. There is no mass.      Tenderness: There is no abdominal tenderness. There is no guarding.   Musculoskeletal:         General: No tenderness.      Right lower leg: No edema.      Left lower leg: No edema.   Lymphadenopathy:      Cervical: No cervical adenopathy.   Skin:     General: Skin is warm and dry.      Findings: No rash.   Neurological:      General: No focal deficit present.      Mental Status: She is alert and oriented to person, place, and time.   Psychiatric:         Mood and Affect: Mood normal.         Behavior: Behavior normal.         Vitals:    08/01/20 1356   BP: 138/60   BP Location: Right arm   Patient Position: Sitting   BP Method: Large (Manual)   Pulse: 69   SpO2: 98%   Weight: 85.1 kg (187 lb 9.8 oz)   Height: 5' 6" (1.676 m)     Body mass index is 30.28 kg/m².    RESULTS: Reviewed labs from last 12 months    Assessment:       1. Annual physical exam    2. Dizziness and giddiness    3. Insomnia, unspecified type    4. Observed sleep apnea    5. Hyperlipidemia, unspecified hyperlipidemia type    6. Hypothyroidism due to acquired " atrophy of thyroid    7. Nocturia    8. Exposure to Covid-19 Virus    9. Essential hypertension    10. CKD (chronic kidney disease) stage 3, GFR 30-59 ml/min    11. Normocytic anemia    12. Paresthesia of skin        Plan:   Tiffany was seen today for annual exam.    Diagnoses and all orders for this visit:    Annual physical exam:  Age-appropriate health screening reviewed, indicated tests ordered.   -     Ferritin; Future  -     CBC Without Differential; Future  -     Iron and TIBC; Future  -     Vitamin B12; Future  -     Magnesium; Future  -     TSH; Future  -     Comprehensive metabolic panel; Future  -     Lipid Panel; Future  -     COVID-19 (SARS CoV-2) IgG Antibody; Future  -     Urinalysis, Reflex to Urine Culture Urine, Clean Catch  -     Vitamin D; Future    Dizziness and giddiness:  Prior diagnosis, has been seen by ophthalmology.  MRI recommended.  I will place the order.  -     MRI Brain Without Contrast; Future  -     Comprehensive metabolic panel; Future    Insomnia, unspecified type:  Prior diagnosis, longstanding.  Referring to Sleep Medicine for additional evaluation including possible sleep study.  -     Ambulatory referral/consult to Sleep Disorders; Future    Observed sleep apnea  -     Ambulatory referral/consult to Sleep Disorders; Future    Hyperlipidemia, unspecified hyperlipidemia type:  Prior diagnosis, stable, well controlled on current management. No changes at this time, will continue to monitor.   -     Lipid Panel; Future    Hypothyroidism due to acquired atrophy of thyroid:  Prior diagnosis, stable, well controlled on current management. No changes at this time, will continue to monitor.   -     TSH; Future    Nocturia  -     Urinalysis, Reflex to Urine Culture Urine, Clean Catch    Exposure to Covid-19 Virus  -     COVID-19 (SARS CoV-2) IgG Antibody; Future    Essential hypertension  -     Magnesium; Future    CKD (chronic kidney disease) stage 3, GFR 30-59 ml/min:  Prior diagnosis,  stable, continue to monitor periodically, avoid heavy NSAID use and dehydration.  -     Vitamin D; Future    Normocytic anemia  -     Ferritin; Future  -     CBC Without Differential; Future  -     Iron and TIBC; Future    Paresthesia of skin  -     Vitamin B12; Future    Other orders  -     Urinalysis Microscopic  -     Urine culture      Follow up in about 1 year (around 8/1/2021) for EPP annual exam, fasting labs 1 week prior.  Matthew Lewis MD  Internal Medicine    Portions of this note were completed using medical dictation software. Please excuse typographical or syntax errors that were missed on review.

## 2020-08-02 LAB
BACTERIA UR CULT: NORMAL
BACTERIA UR CULT: NORMAL

## 2020-08-03 ENCOUNTER — IMMUNIZATION (OUTPATIENT)
Dept: PHARMACY | Facility: CLINIC | Age: 85
End: 2020-08-03
Payer: MEDICARE

## 2020-08-04 ENCOUNTER — TELEPHONE (OUTPATIENT)
Dept: INTERNAL MEDICINE | Facility: CLINIC | Age: 85
End: 2020-08-04

## 2020-08-04 DIAGNOSIS — R79.89 ELEVATED SERUM CREATININE: ICD-10-CM

## 2020-08-04 DIAGNOSIS — R73.09 ELEVATED RANDOM BLOOD GLUCOSE LEVEL: Primary | ICD-10-CM

## 2020-08-06 ENCOUNTER — HOSPITAL ENCOUNTER (OUTPATIENT)
Dept: RADIOLOGY | Facility: HOSPITAL | Age: 85
Discharge: HOME | End: 2020-08-06
Attending: INTERNAL MEDICINE
Payer: MEDICARE

## 2020-08-06 ENCOUNTER — TELEPHONE (OUTPATIENT)
Dept: NEUROLOGY | Facility: CLINIC | Age: 85
End: 2020-08-06

## 2020-08-06 DIAGNOSIS — R42 DIZZINESS AND GIDDINESS: ICD-10-CM

## 2020-08-06 PROCEDURE — 70551 MRI BRAIN STEM W/O DYE: CPT | Mod: 26,HCNC,, | Performed by: RADIOLOGY

## 2020-08-06 PROCEDURE — 70551 MRI BRAIN WITHOUT CONTRAST: ICD-10-PCS | Mod: 26,HCNC,, | Performed by: RADIOLOGY

## 2020-08-06 PROCEDURE — 70551 MRI BRAIN STEM W/O DYE: CPT | Mod: TC,HCNC

## 2020-08-31 ENCOUNTER — LAB VISIT (OUTPATIENT)
Dept: LAB | Facility: HOSPITAL | Age: 85
End: 2020-08-31
Attending: INTERNAL MEDICINE
Payer: MEDICARE

## 2020-08-31 ENCOUNTER — OFFICE VISIT (OUTPATIENT)
Dept: INTERNAL MEDICINE | Facility: CLINIC | Age: 85
End: 2020-08-31
Payer: MEDICARE

## 2020-08-31 VITALS
HEART RATE: 79 BPM | HEIGHT: 66 IN | OXYGEN SATURATION: 98 % | WEIGHT: 185.44 LBS | SYSTOLIC BLOOD PRESSURE: 142 MMHG | DIASTOLIC BLOOD PRESSURE: 68 MMHG | BODY MASS INDEX: 29.8 KG/M2

## 2020-08-31 DIAGNOSIS — R10.84 GENERALIZED ABDOMINAL PAIN: ICD-10-CM

## 2020-08-31 DIAGNOSIS — R73.09 ELEVATED RANDOM BLOOD GLUCOSE LEVEL: ICD-10-CM

## 2020-08-31 DIAGNOSIS — E66.3 OVERWEIGHT (BMI 25.0-29.9): ICD-10-CM

## 2020-08-31 DIAGNOSIS — R79.89 ELEVATED SERUM CREATININE: ICD-10-CM

## 2020-08-31 DIAGNOSIS — R82.998 DARK URINE: Primary | ICD-10-CM

## 2020-08-31 DIAGNOSIS — N39.0 ACUTE UTI (URINARY TRACT INFECTION): ICD-10-CM

## 2020-08-31 DIAGNOSIS — R21 RASH IN ADULT: ICD-10-CM

## 2020-08-31 LAB
ANION GAP SERPL CALC-SCNC: 10 MMOL/L (ref 8–16)
BILIRUB SERPL-MCNC: ABNORMAL MG/DL
BLOOD URINE, POC: ABNORMAL
BUN SERPL-MCNC: 17 MG/DL (ref 8–23)
CALCIUM SERPL-MCNC: 9.3 MG/DL (ref 8.7–10.5)
CHLORIDE SERPL-SCNC: 97 MMOL/L (ref 95–110)
CO2 SERPL-SCNC: 25 MMOL/L (ref 23–29)
COLOR, POC UA: YELLOW
CREAT SERPL-MCNC: 1.4 MG/DL (ref 0.5–1.4)
EST. GFR  (AFRICAN AMERICAN): 40.4 ML/MIN/1.73 M^2
EST. GFR  (NON AFRICAN AMERICAN): 35 ML/MIN/1.73 M^2
ESTIMATED AVG GLUCOSE: 120 MG/DL (ref 68–131)
GLUCOSE SERPL-MCNC: 107 MG/DL (ref 70–110)
GLUCOSE UR QL STRIP: ABNORMAL
HBA1C MFR BLD HPLC: 5.8 % (ref 4–5.6)
KETONES UR QL STRIP: ABNORMAL
LEUKOCYTE ESTERASE URINE, POC: ABNORMAL
NITRITE, POC UA: ABNORMAL
PH, POC UA: 6
POTASSIUM SERPL-SCNC: 3.9 MMOL/L (ref 3.5–5.1)
PROTEIN, POC: ABNORMAL
SODIUM SERPL-SCNC: 132 MMOL/L (ref 136–145)
SPECIFIC GRAVITY, POC UA: 1.01
UROBILINOGEN, POC UA: 1

## 2020-08-31 PROCEDURE — 1126F PR PAIN SEVERITY QUANTIFIED, NO PAIN PRESENT: ICD-10-PCS | Mod: HCNC,S$GLB,, | Performed by: NURSE PRACTITIONER

## 2020-08-31 PROCEDURE — 1159F PR MEDICATION LIST DOCUMENTED IN MEDICAL RECORD: ICD-10-PCS | Mod: HCNC,S$GLB,, | Performed by: NURSE PRACTITIONER

## 2020-08-31 PROCEDURE — 1101F PR PT FALLS ASSESS DOC 0-1 FALLS W/OUT INJ PAST YR: ICD-10-PCS | Mod: HCNC,CPTII,S$GLB, | Performed by: NURSE PRACTITIONER

## 2020-08-31 PROCEDURE — 81001 URINALYSIS AUTO W/SCOPE: CPT | Mod: HCNC,S$GLB,, | Performed by: NURSE PRACTITIONER

## 2020-08-31 PROCEDURE — 3078F DIAST BP <80 MM HG: CPT | Mod: HCNC,CPTII,S$GLB, | Performed by: NURSE PRACTITIONER

## 2020-08-31 PROCEDURE — 1126F AMNT PAIN NOTED NONE PRSNT: CPT | Mod: HCNC,S$GLB,, | Performed by: NURSE PRACTITIONER

## 2020-08-31 PROCEDURE — 99999 PR PBB SHADOW E&M-EST. PATIENT-LVL IV: CPT | Mod: PBBFAC,HCNC,, | Performed by: NURSE PRACTITIONER

## 2020-08-31 PROCEDURE — 1159F MED LIST DOCD IN RCRD: CPT | Mod: HCNC,S$GLB,, | Performed by: NURSE PRACTITIONER

## 2020-08-31 PROCEDURE — 3077F PR MOST RECENT SYSTOLIC BLOOD PRESSURE >= 140 MM HG: ICD-10-PCS | Mod: HCNC,CPTII,S$GLB, | Performed by: NURSE PRACTITIONER

## 2020-08-31 PROCEDURE — 99214 OFFICE O/P EST MOD 30 MIN: CPT | Mod: HCNC,25,S$GLB, | Performed by: NURSE PRACTITIONER

## 2020-08-31 PROCEDURE — 81001 POCT URINALYSIS, DIPSTICK OR TABLET REAGENT, AUTOMATED, WITH MICROSCOP: ICD-10-PCS | Mod: HCNC,S$GLB,, | Performed by: NURSE PRACTITIONER

## 2020-08-31 PROCEDURE — 36415 COLL VENOUS BLD VENIPUNCTURE: CPT | Mod: HCNC

## 2020-08-31 PROCEDURE — 3078F PR MOST RECENT DIASTOLIC BLOOD PRESSURE < 80 MM HG: ICD-10-PCS | Mod: HCNC,CPTII,S$GLB, | Performed by: NURSE PRACTITIONER

## 2020-08-31 PROCEDURE — 3077F SYST BP >= 140 MM HG: CPT | Mod: HCNC,CPTII,S$GLB, | Performed by: NURSE PRACTITIONER

## 2020-08-31 PROCEDURE — 80048 BASIC METABOLIC PNL TOTAL CA: CPT | Mod: HCNC

## 2020-08-31 PROCEDURE — 99999 PR PBB SHADOW E&M-EST. PATIENT-LVL IV: ICD-10-PCS | Mod: PBBFAC,HCNC,, | Performed by: NURSE PRACTITIONER

## 2020-08-31 PROCEDURE — 99214 PR OFFICE/OUTPT VISIT, EST, LEVL IV, 30-39 MIN: ICD-10-PCS | Mod: HCNC,25,S$GLB, | Performed by: NURSE PRACTITIONER

## 2020-08-31 PROCEDURE — 1101F PT FALLS ASSESS-DOCD LE1/YR: CPT | Mod: HCNC,CPTII,S$GLB, | Performed by: NURSE PRACTITIONER

## 2020-08-31 PROCEDURE — 83036 HEMOGLOBIN GLYCOSYLATED A1C: CPT | Mod: HCNC

## 2020-08-31 RX ORDER — TRIAMCINOLONE ACETONIDE 5 MG/G
CREAM TOPICAL 2 TIMES DAILY
Qty: 15 G | Refills: 0 | Status: SHIPPED | OUTPATIENT
Start: 2020-08-31 | End: 2020-10-29

## 2020-08-31 RX ORDER — NITROFURANTOIN (MACROCRYSTALS) 100 MG/1
100 CAPSULE ORAL EVERY 12 HOURS
Qty: 14 CAPSULE | Refills: 0 | Status: SHIPPED | OUTPATIENT
Start: 2020-08-31 | End: 2020-09-07

## 2020-08-31 NOTE — PROGRESS NOTES
Subjective:       Patient ID: Tiffany Masterson is a 84 y.o. female.    Chief Complaint: Abdominal Pain, Urinary Tract Infection, and Rash    Pt of Dr. Lewis, here for complaint of stomach pain/dark urine/rash. Pt brought a recent urine specimen in for us to check it.    States she went out Saturday with friends and had a 5 course meal. She was the only one in the group who had an appetizer with crabmeat and shrimp. No known seafood allergy. Since that meal she developed extreme abdominal pain for a couple of hours that night with 2 bouts of vomiting. This went away but then she noticed a rash on her chest area and itching to her hands. Also noticed dark color urine, no odor. Reports she drinks 2 cokes a day. Also does not limit fat in diet. Concerned about her gall bladder and UTI.    Review of Systems   Constitutional: Negative for activity change, appetite change, chills, diaphoresis, fatigue, fever and unexpected weight change.   Respiratory: Negative for chest tightness and shortness of breath.    Cardiovascular: Negative for chest pain.   Gastrointestinal: Positive for abdominal pain and constipation. Negative for abdominal distention, blood in stool, change in bowel habit, diarrhea, nausea, vomiting and change in bowel habit.        Resolved   Genitourinary: Positive for dysuria, vaginal discharge and vaginal pain. Negative for bladder incontinence, decreased urine volume, difficulty urinating, enuresis, flank pain, frequency, hematuria and urgency.        Vaginal issues have been and are being addressed by her GYN Dr. Vanessa per pt   Musculoskeletal: Negative for arthralgias, back pain and myalgias.   Integumentary:  Positive for rash.   Allergic/Immunologic: Negative for environmental allergies, food allergies and immunocompromised state.   Neurological: Negative for dizziness, vertigo, weakness, light-headedness, numbness and headaches.   Hematological: Negative for adenopathy. Does not bruise/bleed  easily.   Psychiatric/Behavioral: Negative for suicidal ideas.     Review of patient's allergies indicates:   Allergen Reactions    Levaquin [levofloxacin]      Joint pain    Hydrochlorothiazide Other (See Comments)     Pain in joints and depression per pt     Current Outpatient Medications:     ascorbic acid (VITAMIN C) 250 MG tablet, Take 250 mg by mouth once daily., Disp: , Rfl:     aspirin (ECOTRIN) 81 MG EC tablet, Take 81 mg by mouth once daily., Disp: , Rfl:     atenoloL (TENORMIN) 25 MG tablet, TAKE 1 TABLET BY MOUTH ONCE DAILY IN THE MORNING, Disp: 90 tablet, Rfl: 3    atenoloL (TENORMIN) 50 MG tablet, Take 1 tablet (50 mg total) by mouth every evening., Disp: 90 tablet, Rfl: 2    CALCIUM CARBONATE/VITAMIN D3 (CALCIUM 600 + D,3, ORAL), Take 1 tablet by mouth once daily. , Disp: , Rfl:     cholecalciferol, vitamin D3, (VITAMIN D3) 2,000 unit Cap, , Disp: , Rfl:     DEXTRAN 70/HYPROMELLOSE (ARTIFICIAL TEARS, PF, OPHT), Apply to eye., Disp: , Rfl:     indapamide (LOZOL) 2.5 MG Tab, Take 1 tablet by mouth once daily, Disp: 90 tablet, Rfl: 3    levothyroxine (SYNTHROID) 100 MCG tablet, Take 1 tablet by mouth once daily, Disp: 90 tablet, Rfl: 3    losartan (COZAAR) 100 MG tablet, Take 1 tablet by mouth once daily, Disp: 90 tablet, Rfl: 3    MULTIVITAMIN W-MINERALS/LUTEIN (CENTRUM SILVER ORAL), Take 1 tablet by mouth once daily., Disp: , Rfl:     pravastatin (PRAVACHOL) 40 MG tablet, Take 1 tablet (40 mg total) by mouth every evening., Disp: 90 tablet, Rfl: 0    zolpidem (AMBIEN) 5 MG Tab, TAKE ONE TABLET BY MOUTH NIGHTLY AS NEEDED, Disp: 90 tablet, Rfl: 1    Patient Active Problem List   Diagnosis    Hyperlipidemia    S/P colonoscopic polypectomy    TIA (transient ischemic attack)    Hypothyroidism    Essential hypertension    Vitamin D deficiency disease    CKD (chronic kidney disease) stage 3, GFR 30-59 ml/min    Osteopenia    Endometrial mass    Status post hysteroscopic polypectomy     Ocular migraine    Vaginal atrophy    Frequent UTI    Mixed stress and urge urinary incontinence    Uterovaginal prolapse    Cystocele, midline    Rectocele, female    Chronic constipation    Acute low back pain without sciatica    Swelling of right lower extremity    Colon adenomas     Past Medical History:   Diagnosis Date    Arthritis     Family history of malignant neoplasm of gastrointestinal tract mother    History of colonoscopy     unremarkable 2007 by Dr. Javier.  Barium enema revealed diverticular disease.    Hyperlipidemia     Hypertension     Hypothyroidism     Migraine, ophthalmoplegic     Personal history of colonic polyps     TIA (transient ischemic attack)     with a normal angiogram in the past, Ocular migraines reported by patient, not TIA     Unspecified essential hypertension 5/8/2015     Past Surgical History:   Procedure Laterality Date    CATARACT EXTRACTION  2012    right/ Dr. Lexis Nicolas     COLONOSCOPY      2014 polyps    COLONOSCOPY N/A 11/7/2016    Procedure: COLONOSCOPY;  Surgeon: Bebeto Gonzalez MD;  Location: [facility] ([address] [place] [address]);  Service: Endoscopy;  Laterality: N/A;    COLONOSCOPY N/A 3/9/2020    Procedure: COLONOSCOPY;  Surgeon: Bebeto Gonzalez MD;  Location: [address]);  Service: Endoscopy;  Laterality: N/A;    COLONOSCOPY W/ POLYPECTOMY  6/2013    polyp of colon    EYE SURGERY  11-10-14    right retina/Dr. Dalton Sierra ([facility])    HYSTEROSCOPIC POLYPECTOMY OF UTERUS N/A 7/2/2018    Procedure: POLYPECTOMY, UTERUS, HYSTEROSCOPIC;  Surgeon: Elliot Vanessa IV, MD;  [facility];  Service: OB/GYN;  Laterality: N/A;    JOINT REPLACEMENT  3/23/2011    left total hip replacement    TONSILLECTOMY      pt was 11 years old     Social History     Socioeconomic History    Marital status:      Spouse name: Not on file    Number of children: Not on file    Years of education: Not on file    Highest education level: Not on file  "  Occupational History    Not on file   Social Needs    Financial resource strain: Not hard at all    Food insecurity     Worry: Never true     Inability: Never true    Transportation needs     Medical: No     Non-medical: No   Tobacco Use    Smoking status: Never Smoker    Smokeless tobacco: Never Used    Tobacco comment: The patient is not getting any regular exercise at this time.  She does enjoy traveling to [place].   Substance and Sexual Activity    Alcohol use: Yes     Alcohol/week: 1.0 standard drinks     Types: 1 Shots of liquor per week     Frequency: 2-3 times a week     Drinks per session: 1 or 2     Binge frequency: Never     Comment: daily    Drug use: No    Sexual activity: Yes     Partners: Male     Birth control/protection: Post-menopausal     Comment:  64 years    Lifestyle    Physical activity     Days per week: 0 days     Minutes per session: Not on file    Stress: To some extent   Relationships    Social connections     Talks on phone: More than three times a week     Gets together: More than three times a week     Attends [employer] service: Not on file     Active member of club or organization: Not on file     Attends meetings of clubs or organizations: More than 4 times per year     Relationship status:    Other Topics Concern    Not on file   Social History Narrative    Not on file     Family History   Problem Relation Age of Onset    Cancer Mother 75        Colon cancer    Colon cancer Mother     Cancer Father         Lung cancer    Diabetes Other     Cancer Maternal Aunt 80        colon cancer    Diabetes Paternal Aunt     Colon cancer Paternal Aunt     Breast cancer Neg Hx     Ovarian cancer Neg Hx       Objective:       Vitals:    08/31/20 1741   BP: (!) 142/68   Pulse: 79   Temp: (P) 99 °F (37.2 °C)   SpO2: 98%   Weight: 84.1 kg (185 lb 6.5 oz)   Height: 5' 6" (1.676 m)   PainSc: 0-No pain     Body mass index is 29.93 kg/m².    Physical " Exam  Vitals signs and nursing note reviewed.   Constitutional:       Appearance: Normal appearance.      Comments: overweight   HENT:      Head: Normocephalic.      Mouth/Throat:      Mouth: Mucous membranes are moist.      Pharynx: Oropharynx is clear.   Eyes:      Extraocular Movements: Extraocular movements intact.      Conjunctiva/sclera: Conjunctivae normal.      Pupils: Pupils are equal, round, and reactive to light.   Cardiovascular:      Rate and Rhythm: Normal rate and regular rhythm.      Pulses: Normal pulses.      Heart sounds: Normal heart sounds.   Pulmonary:      Effort: Pulmonary effort is normal.      Breath sounds: Normal breath sounds.   Abdominal:      General: Abdomen is flat. Bowel sounds are normal. There is no distension.      Palpations: Abdomen is soft. There is no mass.      Tenderness: There is no abdominal tenderness. There is no right CVA tenderness, left CVA tenderness, guarding or rebound.      Hernia: No hernia is present.   Musculoskeletal: Normal range of motion.   Skin:     General: Skin is warm.      Capillary Refill: Capillary refill takes less than 2 seconds.      Findings: Rash present.      Comments: Hive like rash to breast area noted   Neurological:      General: No focal deficit present.      Mental Status: She is alert and oriented to person, place, and time.   Psychiatric:         Mood and Affect: Mood normal.         Behavior: Behavior normal.         Thought Content: Thought content normal.         Judgment: Judgment normal.         Assessment:       1. Dark urine    2. Generalized abdominal pain    3. Rash in adult    4. BMI 29.0-29.9,adult    5. Overweight (BMI 25.0-29.9)    6. Acute UTI (urinary tract infection)        Plan:       Tiffany was seen today for abdominal pain, urinary tract infection and rash.    Diagnoses and all orders for this visit:    Dark urine  -     POCT urinalysis, dipstick or tablet reag  -     nitrofurantoin (MACRODANTIN) 100 MG capsule; Take 1  capsule (100 mg total) by mouth every 12 (twelve) hours. for 7 days    Generalized abdominal pain  -     US Abdomen Complete; Future    Rash in adult  -     triamcinolone acetonide 0.5% (KENALOG) 0.5 % Crea; Apply topically 2 (two) times daily. for 7 days    BMI 29.0-29.9,adult  BMI reviewed    Overweight (BMI 25.0-29.9)  BMI reviewed.    Diet and exercise to lose weight.    Acute UTI (urinary tract infection)  -     nitrofurantoin (MACRODANTIN) 100 MG capsule; Take 1 capsule (100 mg total) by mouth every 12 (twelve) hours. for 7 days    Meds as prescribed.    Drink plenty of water to flush bladder.    Avoid caffeine, sugary drinks, and ETOH.    Wipe from front to back.    Do not hold bladder when needing to void for long periods.    Follow up with Dr. Vanessa, GYN regarding vaginal issues    Triamcinolone cream twice a day for 7 days to rash    Check Abdominal Ultrasound tomorrow, fast 8 hrs prior to test    Self care instructions provided in AVS    Follow up if symptoms worsen or fail to improve.

## 2020-08-31 NOTE — PATIENT INSTRUCTIONS
"Meds as prescribed.    Drink plenty of water to flush bladder.    Avoid caffeine, sugary drinks, and ETOH.    Wipe from front to back.    Do not hold bladder when needing to void for long periods.    Follow up with Dr. Vanessa, GYN regarding vaginal issues    Triamcinolone cream twice a day for 7 days to rash    Check Abdominal Ultrasound tomorrow, fast 8 hrs prior to test      Bladder Infection, Female (Adult)    Urine is normally doesn't have any bacteria in it. But bacteria can get into the urinary tract from the skin around the rectum. Or they can travel in the blood from elsewhere in the body. Once they are in your urinary tract, they can cause infection in the urethra (urethritis), the bladder (cystitis), or the kidneys (pyelonephritis).  The most common place for an infection is in the bladder. This is called a bladder infection. This is one of the most common infections in women. Most bladder infections are easily treated. They are not serious unless the infection spreads to the kidney.  The phrases "bladder infection," "UTI," and "cystitis" are often used to describe the same thing. But they are not always the same. Cystitis is an inflammation of the bladder. The most common cause of cystitis is an infection.  Symptoms  The infection causes inflammation in the urethra and bladder. This causes many of the symptoms. The most common symptoms of a bladder infection are:  · Pain or burning when urinating  · Having to urinate more often than usual  · Urgent need to urinate  · Only a small amount of urine comes out  · Blood in urine  · Abdominal discomfort. This is usually in the lower abdomen above the pubic bone.  · Cloudy urine  · Strong- or bad-smelling urine  · Unable to urinate (urinary retention)  · Unable to hold urine in (urinary incontinence)  · Fever  · Loss of appetite  · Confusion (in older adults)  Causes  Bladder infections are not contagious. You can't get one from someone else, from a toilet seat, " or from sharing a bath.  The most common cause of bladder infections is bacteria from the bowels. The bacteria get onto the skin around the opening of the urethra. From there, they can get into the urine and travel up to the bladder, causing inflammation and infection. This usually happens because of:  · Wiping improperly after urinating. Always wipe from front to back.  · Bowel incontinence  · Pregnancy  · Procedures such as having a catheter inserted  · Older age  · Not emptying your bladder. This can allow bacteria a chance to grow in your urine.  · Dehydration  · Constipation  · Sex  · Use of a diaphragm for birth control   Treatment  Bladder infections are diagnosed by a urine test. They are treated with antibiotics and usually clear up quickly without complications. Treatment helps prevent a more serious kidney infection.  Medicines  Medicines can help in the treatment of a bladder infection:  · Take antibiotics until they are used up, even if you feel better. It is important to finish them to make sure the infection has cleared.  · You can use acetaminophen or ibuprofen for pain, fever, or discomfort, unless another medicine was prescribed. If you have chronic liver or kidney disease, talk with your healthcare provider before using these medicines. Also talk with your provider if you've ever had a stomach ulcer or gastrointestinal bleeding, or are taking blood-thinner medicines.  · If you are given phenazopydridine to reduce burning with urination, it will cause your urine to become a bright orange color. This can stain clothing.  Care and prevention  These self-care steps can help prevent future infections:  · Drink plenty of fluids to prevent dehydration and flush out your bladder. Do this unless you must restrict fluids for other health reasons, or your doctor told you not to.  · Proper cleaning after going to the bathroom is important. Wipe from front to back after using the toilet to prevent the spread  of bacteria.  · Urinate more often. Don't try to hold urine in for a long time.  · Wear loose-fitting clothes and cotton underwear. Avoid tight-fitting pants.  · Improve your diet and prevent constipation. Eat more fresh fruit and vegetables, and fiber, and less junk and fatty foods.  · Avoid sex until your symptoms are gone.  · Avoid caffeine, alcohol, and spicy foods. These can irritate your bladder.  · Urinate right after intercourse to flush out your bladder.  · If you use birth control pills and have frequent bladder infections, discuss it with your doctor.  Follow-up care  Call your healthcare provider if all symptoms are not gone after 3 days of treatment. This is especially important if you have repeat infections.  If a culture was done, you will be told if your treatment needs to be changed. If directed, you can call to find out the results.  If X-rays were done, you will be told if the results will affect your treatment.  Call 911  Call 911 if any of the following occur:  · Trouble breathing  · Hard to wake up or confusion  · Fainting or loss of consciousness  · Rapid heart rate  When to seek medical advice  Call your healthcare provider right away if any of these occur:  · Fever of 100.4ºF (38.0ºC) or higher, or as directed by your healthcare provider  · Symptoms are not better by the third day of treatment  · Back or belly (abdominal) pain that gets worse  · Repeated vomiting, or unable to keep medicine down  · Weakness or dizziness  · Vaginal discharge  · Pain, redness, or swelling in the outer vaginal area (labia)  Date Last Reviewed: 10/1/2016  © 0441-1651 [employer]. [address] [place] [address], [place] [address]. All rights reserved. This information is not intended as a substitute for professional medical care. Always follow your healthcare professional's instructions.

## 2020-09-01 ENCOUNTER — TELEPHONE (OUTPATIENT)
Dept: OBSTETRICS AND GYNECOLOGY | Facility: CLINIC | Age: 85
End: 2020-09-01

## 2020-09-01 ENCOUNTER — HOSPITAL ENCOUNTER (OUTPATIENT)
Dept: RADIOLOGY | Facility: HOSPITAL | Age: 85
Discharge: HOME | End: 2020-09-01
Attending: NURSE PRACTITIONER
Payer: MEDICARE

## 2020-09-01 ENCOUNTER — TELEPHONE (OUTPATIENT)
Dept: INTERNAL MEDICINE | Facility: CLINIC | Age: 85
End: 2020-09-01

## 2020-09-01 DIAGNOSIS — R10.84 GENERALIZED ABDOMINAL PAIN: ICD-10-CM

## 2020-09-01 PROCEDURE — 76700 US ABDOMEN COMPLETE: ICD-10-PCS | Mod: 26,HCNC,, | Performed by: RADIOLOGY

## 2020-09-01 PROCEDURE — 76700 US EXAM ABDOM COMPLETE: CPT | Mod: TC,HCNC

## 2020-09-01 PROCEDURE — 76700 US EXAM ABDOM COMPLETE: CPT | Mod: 26,HCNC,, | Performed by: RADIOLOGY

## 2020-09-01 NOTE — TELEPHONE ENCOUNTER
----- Message from Tiffany Chan MA sent at 9/1/2020  9:34 AM CDT -----  Good Morning,     I spoke with Lakesha Acevedo this morning. She wanted me to send a message to you guys. Her b/p was elevated this morning.       Tiffany

## 2020-09-01 NOTE — TELEPHONE ENCOUNTER
Patient still has vaginal discharge. Would like evaluation and reports abdominal discomfort. Scan scheduled today. Patient denies odor or irration associated with vaginal discharge. Patient has concerns regarding elevated b/p this morning. Informed will send message to pcp.

## 2020-09-10 ENCOUNTER — PATIENT OUTREACH (OUTPATIENT)
Dept: ADMINISTRATIVE | Facility: OTHER | Age: 85
End: 2020-09-10

## 2020-09-15 ENCOUNTER — OFFICE VISIT (OUTPATIENT)
Dept: OBSTETRICS AND GYNECOLOGY | Facility: CLINIC | Age: 85
End: 2020-09-15
Payer: MEDICARE

## 2020-09-15 VITALS
DIASTOLIC BLOOD PRESSURE: 62 MMHG | BODY MASS INDEX: 30.44 KG/M2 | SYSTOLIC BLOOD PRESSURE: 136 MMHG | WEIGHT: 189.38 LBS | HEIGHT: 66 IN

## 2020-09-15 DIAGNOSIS — N36.2 URETHRAL CARUNCLE: ICD-10-CM

## 2020-09-15 DIAGNOSIS — N95.2 VAGINAL ATROPHY: Primary | ICD-10-CM

## 2020-09-15 PROCEDURE — 3075F SYST BP GE 130 - 139MM HG: CPT | Mod: HCNC,CPTII,S$GLB, | Performed by: OBSTETRICS & GYNECOLOGY

## 2020-09-15 PROCEDURE — 1101F PR PT FALLS ASSESS DOC 0-1 FALLS W/OUT INJ PAST YR: ICD-10-PCS | Mod: HCNC,CPTII,S$GLB, | Performed by: OBSTETRICS & GYNECOLOGY

## 2020-09-15 PROCEDURE — 99213 PR OFFICE/OUTPT VISIT, EST, LEVL III, 20-29 MIN: ICD-10-PCS | Mod: HCNC,S$GLB,, | Performed by: OBSTETRICS & GYNECOLOGY

## 2020-09-15 PROCEDURE — 99999 PR PBB SHADOW E&M-EST. PATIENT-LVL IV: ICD-10-PCS | Mod: PBBFAC,HCNC,, | Performed by: OBSTETRICS & GYNECOLOGY

## 2020-09-15 PROCEDURE — 99213 OFFICE O/P EST LOW 20 MIN: CPT | Mod: HCNC,S$GLB,, | Performed by: OBSTETRICS & GYNECOLOGY

## 2020-09-15 PROCEDURE — 1101F PT FALLS ASSESS-DOCD LE1/YR: CPT | Mod: HCNC,CPTII,S$GLB, | Performed by: OBSTETRICS & GYNECOLOGY

## 2020-09-15 PROCEDURE — 1125F PR PAIN SEVERITY QUANTIFIED, PAIN PRESENT: ICD-10-PCS | Mod: HCNC,S$GLB,, | Performed by: OBSTETRICS & GYNECOLOGY

## 2020-09-15 PROCEDURE — 1159F MED LIST DOCD IN RCRD: CPT | Mod: HCNC,S$GLB,, | Performed by: OBSTETRICS & GYNECOLOGY

## 2020-09-15 PROCEDURE — 99999 PR PBB SHADOW E&M-EST. PATIENT-LVL IV: CPT | Mod: PBBFAC,HCNC,, | Performed by: OBSTETRICS & GYNECOLOGY

## 2020-09-15 PROCEDURE — 1125F AMNT PAIN NOTED PAIN PRSNT: CPT | Mod: HCNC,S$GLB,, | Performed by: OBSTETRICS & GYNECOLOGY

## 2020-09-15 PROCEDURE — 1159F PR MEDICATION LIST DOCUMENTED IN MEDICAL RECORD: ICD-10-PCS | Mod: HCNC,S$GLB,, | Performed by: OBSTETRICS & GYNECOLOGY

## 2020-09-15 PROCEDURE — 3078F PR MOST RECENT DIASTOLIC BLOOD PRESSURE < 80 MM HG: ICD-10-PCS | Mod: HCNC,CPTII,S$GLB, | Performed by: OBSTETRICS & GYNECOLOGY

## 2020-09-15 PROCEDURE — 3075F PR MOST RECENT SYSTOLIC BLOOD PRESS GE 130-139MM HG: ICD-10-PCS | Mod: HCNC,CPTII,S$GLB, | Performed by: OBSTETRICS & GYNECOLOGY

## 2020-09-15 PROCEDURE — 3078F DIAST BP <80 MM HG: CPT | Mod: HCNC,CPTII,S$GLB, | Performed by: OBSTETRICS & GYNECOLOGY

## 2020-09-15 NOTE — PROGRESS NOTES
CC:  Yellow vaginal discharge    HPI:  Tiffany Masterson is a 84 y.o. female patient  who presents today complaint of persistent yellow vaginal discharge.  Patient denies odor or pain.  Patient reports no active or recent bleeding.  No other gynecologic complaints today.    No LMP recorded. Patient is postmenopausal.    Past Medical History:   Diagnosis Date    Arthritis     Family history of malignant neoplasm of gastrointestinal tract mother    History of colonoscopy     unremarkable  by Dr. Javier.  Barium enema revealed diverticular disease.    Hyperlipidemia     Hypertension     Hypothyroidism     Migraine, ophthalmoplegic     Personal history of colonic polyps     TIA (transient ischemic attack)     with a normal angiogram in the past, Ocular migraines reported by patient, not TIA     Unspecified essential hypertension 2015       Past Surgical History:   Procedure Laterality Date    CATARACT EXTRACTION      right/ Dr. Lexis Nicolas     COLONOSCOPY       polyps    COLONOSCOPY N/A 2016    Procedure: COLONOSCOPY;  Surgeon: Bebeto Gonzalez MD;  Location: [facility] ([address] [place] [address]);  Service: Endoscopy;  Laterality: N/A;    COLONOSCOPY N/A 3/9/2020    Procedure: COLONOSCOPY;  Surgeon: Bebeto Gonzalez MD;  Location: [facility] ([address] [place] [address]);  Service: Endoscopy;  Laterality: N/A;    COLONOSCOPY W/ POLYPECTOMY  2013    polyp of colon    EYE SURGERY  11-10-14    right retina/Dr. Dalton Sierra ([facility])    HYSTEROSCOPIC POLYPECTOMY OF UTERUS N/A 2018    Procedure: POLYPECTOMY, UTERUS, HYSTEROSCOPIC;  Surgeon: Elliot Vanessa IV, MD;  Location: [facility];  Service: OB/GYN;  Laterality: N/A;    JOINT REPLACEMENT  3/23/2011    left total hip replacement    TONSILLECTOMY      pt was 11 years old         ROS:  GENERAL: Feeling well overall.   SKIN: Denies rash or lesions.   HEAD: Denies head injury or headache.   NODES: Denies enlarged lymph nodes.   CHEST: Denies chest pain or  shortness of breath.   CARDIOVASCULAR: Denies palpitations or left sided chest pain.   ABDOMEN: No abdominal pain, nausea, vomiting or rectal bleeding.   URINARY: No dysuria, hematuria, or burning on urination.  REPRODUCTIVE: See HPI.   BREASTS: Denies pain, lumps, or nipple discharge.   HEMATOLOGIC: No easy bruisability or excessive bleeding.   MUSCULOSKELETAL: Denies joint pain or swelling.   NEUROLOGIC: Denies syncope or weakness.   PSYCHIATRIC: Denies depression.    PE:   APPEARANCE: Well nourished, well developed, in no acute distress.  ABDOMEN: Soft. No tenderness or masses. No hernias. No CVA tenderness.  VULVA: No lesions. Normal female genitalia.  URETHRAL MEATUS: Normal size and location.  Urethral mucosa noted on external urethra (caruncle)  URETHRA: No masses, tenderness, prolapse or scarring.  VAGINA:  Atrophic.  Scant yellow discharge, no significant cystocele or rectocele.  CERVIX: No lesions and discharge.   UTERUS: Normal size, regular shape, mobile, non-tender, bladder base nontender.  ADNEXA: No masses, tenderness or CDS nodularity.  ANUS PERINEUM: Normal.    Diagnosis:  1. Vaginal atrophy    2. Urethral caruncle      PLAN:    Orders Placed This Encounter    conjugated estrogens (PREMARIN) vaginal cream     Patient was counseled today on vaginal atrophy and the existence of a urethral caruncle.  Patient verbalized understanding of discussion.  No vaginal bleeding noted on exam today.    Follow-up:  P.r.n.    Patient instructed to monitor for any bleeding    Patient counseled on use of Premarin cream/recommendation for vaginal atrophy treatment.    Elliot Vanessa IV, MD

## 2020-09-18 ENCOUNTER — TELEPHONE (OUTPATIENT)
Dept: INTERNAL MEDICINE | Facility: CLINIC | Age: 85
End: 2020-09-18

## 2020-09-18 ENCOUNTER — PATIENT MESSAGE (OUTPATIENT)
Dept: OBSTETRICS AND GYNECOLOGY | Facility: CLINIC | Age: 85
End: 2020-09-18

## 2020-09-19 ENCOUNTER — PATIENT MESSAGE (OUTPATIENT)
Dept: INTERNAL MEDICINE | Facility: CLINIC | Age: 85
End: 2020-09-19

## 2020-09-29 ENCOUNTER — PATIENT MESSAGE (OUTPATIENT)
Dept: OTHER | Facility: OTHER | Age: 85
End: 2020-09-29

## 2020-10-15 ENCOUNTER — PATIENT OUTREACH (OUTPATIENT)
Dept: ADMINISTRATIVE | Facility: OTHER | Age: 85
End: 2020-10-15

## 2020-10-16 ENCOUNTER — OFFICE VISIT (OUTPATIENT)
Dept: NEUROLOGY | Facility: CLINIC | Age: 85
End: 2020-10-16
Payer: MEDICARE

## 2020-10-16 VITALS
WEIGHT: 182.75 LBS | HEART RATE: 66 BPM | BODY MASS INDEX: 29.37 KG/M2 | DIASTOLIC BLOOD PRESSURE: 53 MMHG | SYSTOLIC BLOOD PRESSURE: 133 MMHG | HEIGHT: 66 IN

## 2020-10-16 DIAGNOSIS — E03.4 HYPOTHYROIDISM DUE TO ACQUIRED ATROPHY OF THYROID: ICD-10-CM

## 2020-10-16 DIAGNOSIS — R93.89 ABNORMAL MRI: ICD-10-CM

## 2020-10-16 DIAGNOSIS — G31.9 VENTRICULAR ENLARGEMENT DUE TO BRAIN ATROPHY: Primary | ICD-10-CM

## 2020-10-16 DIAGNOSIS — I10 ESSENTIAL HYPERTENSION: ICD-10-CM

## 2020-10-16 DIAGNOSIS — E78.1 HYPERTRIGLYCERIDEMIA: ICD-10-CM

## 2020-10-16 DIAGNOSIS — E55.9 VITAMIN D DEFICIENCY DISEASE: ICD-10-CM

## 2020-10-16 DIAGNOSIS — N18.32 STAGE 3B CHRONIC KIDNEY DISEASE: ICD-10-CM

## 2020-10-16 PROCEDURE — 99204 OFFICE O/P NEW MOD 45 MIN: CPT | Mod: HCNC,GC,S$GLB, | Performed by: STUDENT IN AN ORGANIZED HEALTH CARE EDUCATION/TRAINING PROGRAM

## 2020-10-16 PROCEDURE — 99999 PR PBB SHADOW E&M-EST. PATIENT-LVL V: ICD-10-PCS | Mod: PBBFAC,HCNC,GC, | Performed by: STUDENT IN AN ORGANIZED HEALTH CARE EDUCATION/TRAINING PROGRAM

## 2020-10-16 PROCEDURE — 3078F PR MOST RECENT DIASTOLIC BLOOD PRESSURE < 80 MM HG: ICD-10-PCS | Mod: HCNC,CPTII,GC,S$GLB | Performed by: STUDENT IN AN ORGANIZED HEALTH CARE EDUCATION/TRAINING PROGRAM

## 2020-10-16 PROCEDURE — 1101F PR PT FALLS ASSESS DOC 0-1 FALLS W/OUT INJ PAST YR: ICD-10-PCS | Mod: HCNC,CPTII,GC,S$GLB | Performed by: STUDENT IN AN ORGANIZED HEALTH CARE EDUCATION/TRAINING PROGRAM

## 2020-10-16 PROCEDURE — 1159F PR MEDICATION LIST DOCUMENTED IN MEDICAL RECORD: ICD-10-PCS | Mod: HCNC,GC,S$GLB, | Performed by: STUDENT IN AN ORGANIZED HEALTH CARE EDUCATION/TRAINING PROGRAM

## 2020-10-16 PROCEDURE — 3078F DIAST BP <80 MM HG: CPT | Mod: HCNC,CPTII,GC,S$GLB | Performed by: STUDENT IN AN ORGANIZED HEALTH CARE EDUCATION/TRAINING PROGRAM

## 2020-10-16 PROCEDURE — 99204 PR OFFICE/OUTPT VISIT, NEW, LEVL IV, 45-59 MIN: ICD-10-PCS | Mod: HCNC,GC,S$GLB, | Performed by: STUDENT IN AN ORGANIZED HEALTH CARE EDUCATION/TRAINING PROGRAM

## 2020-10-16 PROCEDURE — 99999 PR PBB SHADOW E&M-EST. PATIENT-LVL V: CPT | Mod: PBBFAC,HCNC,GC, | Performed by: STUDENT IN AN ORGANIZED HEALTH CARE EDUCATION/TRAINING PROGRAM

## 2020-10-16 PROCEDURE — 3075F PR MOST RECENT SYSTOLIC BLOOD PRESS GE 130-139MM HG: ICD-10-PCS | Mod: HCNC,CPTII,GC,S$GLB | Performed by: STUDENT IN AN ORGANIZED HEALTH CARE EDUCATION/TRAINING PROGRAM

## 2020-10-16 PROCEDURE — 3075F SYST BP GE 130 - 139MM HG: CPT | Mod: HCNC,CPTII,GC,S$GLB | Performed by: STUDENT IN AN ORGANIZED HEALTH CARE EDUCATION/TRAINING PROGRAM

## 2020-10-16 PROCEDURE — 1126F PR PAIN SEVERITY QUANTIFIED, NO PAIN PRESENT: ICD-10-PCS | Mod: HCNC,GC,S$GLB, | Performed by: STUDENT IN AN ORGANIZED HEALTH CARE EDUCATION/TRAINING PROGRAM

## 2020-10-16 PROCEDURE — 1101F PT FALLS ASSESS-DOCD LE1/YR: CPT | Mod: HCNC,CPTII,GC,S$GLB | Performed by: STUDENT IN AN ORGANIZED HEALTH CARE EDUCATION/TRAINING PROGRAM

## 2020-10-16 PROCEDURE — 1126F AMNT PAIN NOTED NONE PRSNT: CPT | Mod: HCNC,GC,S$GLB, | Performed by: STUDENT IN AN ORGANIZED HEALTH CARE EDUCATION/TRAINING PROGRAM

## 2020-10-16 PROCEDURE — 1159F MED LIST DOCD IN RCRD: CPT | Mod: HCNC,GC,S$GLB, | Performed by: STUDENT IN AN ORGANIZED HEALTH CARE EDUCATION/TRAINING PROGRAM

## 2020-10-16 NOTE — ASSESSMENT & PLAN NOTE
Ventriculomegaly noted on MRI ordered by PCP, concerned for NPH  Denies any typical NPH symptoms  MOCA 25/30  Likely incidental finding, offered referral to neurosurgery NPH clinic, patient will consider  No further neurologic intervention at this time  Continue to monitor clinically  RTC if development of any neurologic signs including incontinence, confusion or gait disturbance

## 2020-10-16 NOTE — PROGRESS NOTES
Neurology Clinic  Initial Consult    Patient Name: Tiffany Masterson  MRN: [id number]    CC: MRI abnormality     HPI: Tiffany Masterson is an 85 year old female with a medical history significant for HTN, HLD, Hypothyroidism and ocular migraines (well controlled) who presents to clinic as a referral from PCP for evaluation of ventricular enlargement noted on MRI Brain. Patient initially presented to her PCP with complaints of intermittent dizziness that she felt was related to sleep deprivation. MRI brain was ordered which showed mild generalized cerebral volume loss with enlargement of the ventricular system and sulci concerning for NPH. Patient states that dizziness has resolved and has no complaints at this time. Patient denies any difficulty walking, confusion/memory loss or urinary incontinence. Patient denies nay cognitive complaints other then delayed response time. Patient does report occasional blurred vision with prolonged tv watching.     Physical exam including gait in clinic is unremarkable. MOCA 25/30.    Review of Systems   Constitutional: Negative for chills, malaise/fatigue and weight loss.   HENT: Negative for hearing loss and tinnitus.    Eyes: Positive for blurred vision and double vision. Negative for photophobia, pain, discharge and redness.   Respiratory: Negative for cough and shortness of breath.    Cardiovascular: Negative for chest pain.   Gastrointestinal: Positive for nausea.   Musculoskeletal: Negative for back pain, falls, joint pain and neck pain.   Neurological: Negative for dizziness, tingling, tremors, sensory change, speech change, focal weakness, seizures, loss of consciousness, weakness and headaches.   Psychiatric/Behavioral: Negative for depression, hallucinations and substance abuse. The patient has insomnia. The patient is not nervous/anxious.        Past Medical History  Past Medical History:   Diagnosis Date    Arthritis     Family history of malignant neoplasm of  gastrointestinal tract mother    History of colonoscopy     unremarkable 2007 by Dr. Javier.  Barium enema revealed diverticular disease.    Hyperlipidemia     Hypertension     Hypothyroidism     Migraine, ophthalmoplegic     Personal history of colonic polyps     TIA (transient ischemic attack)     with a normal angiogram in the past, Ocular migraines reported by patient, not TIA     Unspecified essential hypertension 5/8/2015       Medications    Current Outpatient Medications:     ascorbic acid (VITAMIN C) 250 MG tablet, Take 250 mg by mouth once daily., Disp: , Rfl:     aspirin (ECOTRIN) 81 MG EC tablet, Take 81 mg by mouth once daily., Disp: , Rfl:     atenoloL (TENORMIN) 25 MG tablet, TAKE 1 TABLET BY MOUTH ONCE DAILY IN THE MORNING, Disp: 90 tablet, Rfl: 3    atenoloL (TENORMIN) 50 MG tablet, Take 1 tablet (50 mg total) by mouth every evening., Disp: 90 tablet, Rfl: 2    CALCIUM CARBONATE/VITAMIN D3 (CALCIUM 600 + D,3, ORAL), Take 1 tablet by mouth once daily. , Disp: , Rfl:     cholecalciferol, vitamin D3, (VITAMIN D3) 2,000 unit Cap, , Disp: , Rfl:     conjugated estrogens (PREMARIN) vaginal cream, Place 0.5 g intravaginal daily x2 weeks, then placed 0.5 g intravaginally twice weekly, Disp: 30 g, Rfl: 1    DEXTRAN 70/HYPROMELLOSE (ARTIFICIAL TEARS, PF, OPHT), Apply to eye., Disp: , Rfl:     indapamide (LOZOL) 2.5 MG Tab, Take 1 tablet by mouth once daily, Disp: 90 tablet, Rfl: 3    levothyroxine (SYNTHROID) 100 MCG tablet, Take 1 tablet by mouth once daily, Disp: 90 tablet, Rfl: 3    losartan (COZAAR) 100 MG tablet, Take 1 tablet by mouth once daily, Disp: 90 tablet, Rfl: 3    MULTIVITAMIN W-MINERALS/LUTEIN (CENTRUM SILVER ORAL), Take 1 tablet by mouth once daily., Disp: , Rfl:     pravastatin (PRAVACHOL) 40 MG tablet, Take 1 tablet (40 mg total) by mouth every evening., Disp: 90 tablet, Rfl: 0    zolpidem (AMBIEN) 5 MG Tab, TAKE ONE TABLET BY MOUTH NIGHTLY AS NEEDED, Disp: 90 tablet,  Rfl: 1    triamcinolone acetonide 0.5% (KENALOG) 0.5 % Crea, Apply topically 2 (two) times daily. for 7 days, Disp: 15 g, Rfl: 0  Any other notable medications as documented in HPI    Allergies  Review of patient's allergies indicates:   Allergen Reactions    Levaquin [levofloxacin]      Joint pain    Hydrochlorothiazide Other (See Comments)     Pain in joints and depression per pt       Social History  Social History     Socioeconomic History    Marital status:      Spouse name: Not on file    Number of children: Not on file    Years of education: Not on file    Highest education level: Not on file   Occupational History    Not on file   Social Needs    Financial resource strain: Not hard at all    Food insecurity     Worry: Never true     Inability: Never true    Transportation needs     Medical: No     Non-medical: No   Tobacco Use    Smoking status: Never Smoker    Smokeless tobacco: Never Used    Tobacco comment: The patient is not getting any regular exercise at this time.  She does enjoy traveling to [place].   Substance and Sexual Activity    Alcohol use: Yes     Alcohol/week: 1.0 standard drinks     Types: 1 Shots of liquor per week     Frequency: 2-3 times a week     Drinks per session: 1 or 2     Binge frequency: Never     Comment: daily    Drug use: No    Sexual activity: Yes     Partners: Male     Birth control/protection: Post-menopausal     Comment:  64 years    Lifestyle    Physical activity     Days per week: 0 days     Minutes per session: Not on file    Stress: To some extent   Relationships    Social connections     Talks on phone: More than three times a week     Gets together: More than three times a week     Attends Muslim service: Not on file     Active member of club or organization: Not on file     Attends meetings of clubs or organizations: More than 4 times per year     Relationship status:    Other Topics Concern    Not on file   Social  "History Narrative    Not on file     Any other notable Social History as documented in HPI.    Family History  Family History   Problem Relation Age of Onset    Cancer Mother 75        Colon cancer    Colon cancer Mother     Cancer Father         Lung cancer    Diabetes Other     Cancer Maternal Aunt 80        colon cancer    Diabetes Paternal Aunt     Colon cancer Paternal Aunt     Breast cancer Neg Hx     Ovarian cancer Neg Hx      Any other notable FMH as documented in HPI.    Physical Exam  BP (!) 133/53   Pulse 66   Ht 5' 6" (1.676 m)   Wt 82.9 kg (182 lb 12.2 oz)   BMI 29.50 kg/m²     Physical Exam  Vitals signs reviewed.   Constitutional:       General: She is not in acute distress.     Appearance: Normal appearance. She is not ill-appearing.   HENT:      Head: Normocephalic and atraumatic.   Eyes:      Extraocular Movements: Extraocular movements intact.      Pupils: Pupils are equal, round, and reactive to light.   Neck:      Musculoskeletal: Normal range of motion. No neck rigidity.   Pulmonary:      Effort: Pulmonary effort is normal. No respiratory distress.   Musculoskeletal: Normal range of motion.         General: No swelling or tenderness.   Skin:     General: Skin is warm and dry.      Coloration: Skin is not jaundiced.   Neurological:      General: No focal deficit present.      Mental Status: She is alert.      Cranial Nerves: No cranial nerve deficit.      Sensory: No sensory deficit.      Motor: No weakness.      Coordination: Coordination normal.      Gait: Gait normal.      Deep Tendon Reflexes: Reflexes normal.         Neurologic Exam: The patient is awake, alert and oriented. Language is fluent.  Fund of knowledge is appropriate. Some delayed response, states "ill tell you in 6 minutes" before answering several questions.     Cranial nerves:   Pupils are round and reactive to light and accommodation.   Visual fields are full to confrontation.    Ocular motility is full in all " cardinal positions of gaze.   Facial sensation is normal to light touch.   Facial activation is symmetric.   Hearing is symmetric bilaterally.   Palate elevates symmetrically.   Shoulder elevation is symmetric and full strength bilaterally.   Tongue is midline and neck rotation strength is normal bilaterally.     Motor examination of all extremities demonstrates normal bulk and tone in all four limbs. There are no atrophy or fasciculations. Strength is 5/5 in the upper and lower extremities bilaterally.    Sensory examination   Sensation to light touch: intact in BUE and BLE  Sensation to temperature: intact in BUE and BLE    Deep tendon reflexes are 2+ and symmetric in the upper and lower extremities bilaterally.  No clonus, downgoing toes b/l.    Gait: Normal tandem, and casual gait. No wide based gait disturbance. No instability.     Coordination: Finger to nose and heel to shin testing is normal in both upper and lower extremities.    Miscellaneous: MOCA 25/30      Lab and Test Results  All prior available lab work has been personally reviewed and interpreted by myself.  Relevant studies as listed below:    8/2020  A1C 5.8  Vitamin D 43  Cholesterol 156  Triglycerides 284  LDL 58.2  TSH 1.048  Fe 65    Images:  All prior available relevant imaging has been personally reviewed and interpreted by myself.  Relevant imaging and radiology reports are summarized as below.    MRI Brain WO 8/6/2020   Mild generalized cerebral volume loss with enlargement of the ventricular system and sulci.  Of note, there is disproportionate enlargement of the ventricles suggesting the possibility of normal pressure hydrocephalus in the correct clinical context.     No acute hemorrhage or infarct.          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    Current Assessment & Plan     Ventriculomegaly noted on MRI ordered by PCP, concerned for  NPH  Denies any typical NPH symptoms  MOCA 25/30  Likely incidental finding, offered referral to neurosurgery NPH clinic, patient will consider  No further neurologic intervention at this time  Continue to monitor clinically  RTC if development of any neurologic signs including incontinence, confusion or gait disturbance            Cardiac/Vascular    Hypertriglyceridemia    Overview     Continue statin for secondary stroke prevention         Essential hypertension    Current Assessment & Plan     Noted            Renal/    CKD (chronic kidney disease) stage 3, GFR 30-59 ml/min    Current Assessment & Plan     Noted            Endocrine    Hypothyroidism (Chronic)    Current Assessment & Plan     Noted in PMH  TSH in August wnl         Vitamin D deficiency disease    Current Assessment & Plan     Vitamin D replacement per PCP            Other    Abnormal MRI    Current Assessment & Plan     See 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

## 2020-10-29 ENCOUNTER — TELEPHONE (OUTPATIENT)
Dept: OBSTETRICS AND GYNECOLOGY | Facility: CLINIC | Age: 85
End: 2020-10-29

## 2020-10-29 ENCOUNTER — OFFICE VISIT (OUTPATIENT)
Dept: OBSTETRICS AND GYNECOLOGY | Facility: CLINIC | Age: 85
End: 2020-10-29
Attending: OBSTETRICS & GYNECOLOGY
Payer: MEDICARE

## 2020-10-29 VITALS
DIASTOLIC BLOOD PRESSURE: 58 MMHG | HEIGHT: 66 IN | WEIGHT: 183.19 LBS | BODY MASS INDEX: 29.44 KG/M2 | SYSTOLIC BLOOD PRESSURE: 130 MMHG

## 2020-10-29 DIAGNOSIS — N95.2 VAGINAL ATROPHY: Primary | ICD-10-CM

## 2020-10-29 DIAGNOSIS — R39.9 UTI SYMPTOMS: ICD-10-CM

## 2020-10-29 DIAGNOSIS — R10.2 PELVIC PAIN IN FEMALE: ICD-10-CM

## 2020-10-29 LAB
BILIRUB SERPL-MCNC: NORMAL MG/DL
BLOOD URINE, POC: NORMAL
CLARITY, POC UA: NORMAL
COLOR, POC UA: NORMAL
GLUCOSE UR QL STRIP: NORMAL
KETONES UR QL STRIP: NORMAL
LEUKOCYTE ESTERASE URINE, POC: NORMAL
NITRITE, POC UA: NORMAL
PH, POC UA: NORMAL
PROTEIN, POC: NORMAL
SPECIFIC GRAVITY, POC UA: NORMAL
UROBILINOGEN, POC UA: NORMAL

## 2020-10-29 PROCEDURE — 3075F SYST BP GE 130 - 139MM HG: CPT | Mod: HCNC,CPTII,S$GLB, | Performed by: OBSTETRICS & GYNECOLOGY

## 2020-10-29 PROCEDURE — 1159F MED LIST DOCD IN RCRD: CPT | Mod: HCNC,S$GLB,, | Performed by: OBSTETRICS & GYNECOLOGY

## 2020-10-29 PROCEDURE — 99213 OFFICE O/P EST LOW 20 MIN: CPT | Mod: 25,HCNC,S$GLB, | Performed by: OBSTETRICS & GYNECOLOGY

## 2020-10-29 PROCEDURE — 81002 URINALYSIS NONAUTO W/O SCOPE: CPT | Mod: HCNC,S$GLB,, | Performed by: OBSTETRICS & GYNECOLOGY

## 2020-10-29 PROCEDURE — 1101F PR PT FALLS ASSESS DOC 0-1 FALLS W/OUT INJ PAST YR: ICD-10-PCS | Mod: HCNC,CPTII,S$GLB, | Performed by: OBSTETRICS & GYNECOLOGY

## 2020-10-29 PROCEDURE — 87088 URINE BACTERIA CULTURE: CPT | Mod: HCNC

## 2020-10-29 PROCEDURE — 1125F AMNT PAIN NOTED PAIN PRSNT: CPT | Mod: HCNC,S$GLB,, | Performed by: OBSTETRICS & GYNECOLOGY

## 2020-10-29 PROCEDURE — 1101F PT FALLS ASSESS-DOCD LE1/YR: CPT | Mod: HCNC,CPTII,S$GLB, | Performed by: OBSTETRICS & GYNECOLOGY

## 2020-10-29 PROCEDURE — 3078F DIAST BP <80 MM HG: CPT | Mod: HCNC,CPTII,S$GLB, | Performed by: OBSTETRICS & GYNECOLOGY

## 2020-10-29 PROCEDURE — 87186 SC STD MICRODIL/AGAR DIL: CPT | Mod: HCNC

## 2020-10-29 PROCEDURE — 87077 CULTURE AEROBIC IDENTIFY: CPT | Mod: HCNC

## 2020-10-29 PROCEDURE — 99213 PR OFFICE/OUTPT VISIT, EST, LEVL III, 20-29 MIN: ICD-10-PCS | Mod: 25,HCNC,S$GLB, | Performed by: OBSTETRICS & GYNECOLOGY

## 2020-10-29 PROCEDURE — 99999 PR PBB SHADOW E&M-EST. PATIENT-LVL III: ICD-10-PCS | Mod: PBBFAC,HCNC,, | Performed by: OBSTETRICS & GYNECOLOGY

## 2020-10-29 PROCEDURE — 1159F PR MEDICATION LIST DOCUMENTED IN MEDICAL RECORD: ICD-10-PCS | Mod: HCNC,S$GLB,, | Performed by: OBSTETRICS & GYNECOLOGY

## 2020-10-29 PROCEDURE — 1125F PR PAIN SEVERITY QUANTIFIED, PAIN PRESENT: ICD-10-PCS | Mod: HCNC,S$GLB,, | Performed by: OBSTETRICS & GYNECOLOGY

## 2020-10-29 PROCEDURE — 3075F PR MOST RECENT SYSTOLIC BLOOD PRESS GE 130-139MM HG: ICD-10-PCS | Mod: HCNC,CPTII,S$GLB, | Performed by: OBSTETRICS & GYNECOLOGY

## 2020-10-29 PROCEDURE — 87086 URINE CULTURE/COLONY COUNT: CPT | Mod: HCNC

## 2020-10-29 PROCEDURE — 3078F PR MOST RECENT DIASTOLIC BLOOD PRESSURE < 80 MM HG: ICD-10-PCS | Mod: HCNC,CPTII,S$GLB, | Performed by: OBSTETRICS & GYNECOLOGY

## 2020-10-29 PROCEDURE — 99999 PR PBB SHADOW E&M-EST. PATIENT-LVL III: CPT | Mod: PBBFAC,HCNC,, | Performed by: OBSTETRICS & GYNECOLOGY

## 2020-10-29 PROCEDURE — 81002 POCT URINE DIPSTICK WITHOUT MICROSCOPE: ICD-10-PCS | Mod: HCNC,S$GLB,, | Performed by: OBSTETRICS & GYNECOLOGY

## 2020-10-29 RX ORDER — INFLUENZA A VIRUS A/MICHIGAN/45/2015 X-275 (H1N1) ANTIGEN (FORMALDEHYDE INACTIVATED), INFLUENZA A VIRUS A/SINGAPORE/INFIMH-16-0019/2016 IVR-186 (H3N2) ANTIGEN (FORMALDEHYDE INACTIVATED), INFLUENZA B VIRUS B/PHUKET/3073/2013 ANTIGEN (FORMALDEHYDE INACTIVATED), AND INFLUENZA B VIRUS B/MARYLAND/15/2016 BX-69A ANTIGEN (FORMALDEHYDE INACTIVATED) 60; 60; 60; 60 UG/.7ML; UG/.7ML; UG/.7ML; UG/.7ML
INJECTION, SUSPENSION INTRAMUSCULAR
COMMUNITY
Start: 2020-09-21 | End: 2021-04-30

## 2020-10-29 NOTE — PROGRESS NOTES
CC:  Patient with continued low pelvic pain/possible UTI.    Tiffany Masterson is a 85 y.o. female patient  who presents today persistent intermittent pelvic pain across mid abdomen.  Patient denies fever chills or dysuria today.  Patient with pelvic exam in 2020 and pelvic sonogram in 2020 with no significant findings noted.  Patient reports continued symptoms and desires discussion on possible causes for this persistent issue.  Patient reports compliance with twice weekly vaginal estrogen use.    No LMP recorded. Patient is postmenopausal.    Past Medical History:   Diagnosis Date    Arthritis     Family history of malignant neoplasm of gastrointestinal tract mother    History of colonoscopy     unremarkable  by Dr. Javier.  Barium enema revealed diverticular disease.    Hyperlipidemia     Hypertension     Hypothyroidism     Migraine, ophthalmoplegic     Personal history of colonic polyps     TIA (transient ischemic attack)     with a normal angiogram in the past, Ocular migraines reported by patient, not TIA     Unspecified essential hypertension 2015       Past Surgical History:   Procedure Laterality Date    CATARACT EXTRACTION      right/ Dr. Lexis Nicolas     COLONOSCOPY      2014 polyps    COLONOSCOPY N/A 2016    Procedure: COLONOSCOPY;  Surgeon: Bebeto Gonzalez MD;  Location: [address]);  Service: Endoscopy;  Laterality: N/A;    COLONOSCOPY N/A 3/9/2020    Procedure: COLONOSCOPY;  Surgeon: Bebeto Gonzalez MD;  Location: [address]);  Service: Endoscopy;  Laterality: N/A;    COLONOSCOPY W/ POLYPECTOMY  2013    polyp of colon    EYE SURGERY  11-10-14    right retina/[facility] Dalton [facility]    HYSTEROSCOPIC POLYPECTOMY OF UTERUS N/A 2018    Procedure: POLYPECTOMY, UTERUS, HYSTEROSCOPIC;  Surgeon: Elliot Vanessa IV, MD;  Location: [facility];  Service: OB/GYN;  Laterality: N/A;    JOINT REPLACEMENT  3/23/2011    left total  hip replacement    TONSILLECTOMY      pt was 11 years old         ROS:  GENERAL: Feeling well overall.   SKIN: Denies rash or lesions.   HEAD: Denies head injury or headache.   NODES: Denies enlarged lymph nodes.   CHEST: Denies chest pain or shortness of breath.   CARDIOVASCULAR: Denies palpitations or left sided chest pain.   ABDOMEN: No abdominal pain, nausea, vomiting or rectal bleeding.   URINARY: No dysuria, hematuria, or burning on urination.  REPRODUCTIVE: See HPI.   BREASTS: Denies pain, lumps, or nipple discharge.   HEMATOLOGIC: No easy bruisability or excessive bleeding.   MUSCULOSKELETAL: Denies joint pain or swelling.   NEUROLOGIC: Denies syncope or weakness.   PSYCHIATRIC: Denies depression.    PE:   APPEARANCE: Well nourished, well developed, in no acute distress.  ABDOMEN: Soft. No tenderness or masses. No hernias. No CVA tenderness.  VULVA: No lesions. Normal female genitalia.  URETHRAL MEATUS: Normal size and location, no lesions, no prolapse.  URETHRA:  Atrophy with conical noted.  No mass or tenderness noted to bimanual exam.  VAGINA:  Atrophic (slightly improved from a September 2020).  No discharge, no significant cystocele or rectocele.  CERVIX: No lesions and discharge.   UTERUS: Normal size, regular shape, mobile, non-tender, bladder base nontender.  ADNEXA: No masses, tenderness or CDS nodularity.  ANUS PERINEUM: Normal.    Diagnosis:  1. Vaginal atrophy    2. Pelvic pain in female    3. UTI symptoms        PLAN:    Orders Placed This Encounter    Urine culture    POCT URINE DIPSTICK WITHOUT MICROSCOPE       Patient counseled on benign bimanual exam findings.  No evidence of pelvic mass noted today.  Urine dip was negative today.  Will send urine culture due to persistence of discomfort.    Patient counseled to continue current vaginal estrogen therapy.    Patient counseled to follow up with GI as to possible etiology of persistent pelvic discomfort.    Patient also considering urology  follow-up with Dr. Gilles Vanessa IV, MD

## 2020-11-02 ENCOUNTER — TELEPHONE (OUTPATIENT)
Dept: OBSTETRICS AND GYNECOLOGY | Facility: CLINIC | Age: 85
End: 2020-11-02

## 2020-11-02 LAB — BACTERIA UR CULT: ABNORMAL

## 2020-11-02 NOTE — TELEPHONE ENCOUNTER
----- Message from Elliot Vanessa IV, MD sent at 11/2/2020  4:00 PM CST -----  Recommend Bactrim DS BID or Macrobid BID x 5 Days.  Contact patient with result.  Elliot Vanessa IV, MD

## 2020-11-02 NOTE — TELEPHONE ENCOUNTER
Pt notified +UTI, currently traveling to [facility], will call back tomorrow with pharmacy information

## 2020-11-03 DIAGNOSIS — N39.0 URINARY TRACT INFECTION WITHOUT HEMATURIA, SITE UNSPECIFIED: Primary | ICD-10-CM

## 2020-11-03 RX ORDER — NITROFURANTOIN 25; 75 MG/1; MG/1
100 CAPSULE ORAL 2 TIMES DAILY
Qty: 10 CAPSULE | Refills: 0 | Status: SHIPPED | OUTPATIENT
Start: 2020-11-03 | End: 2020-11-08

## 2020-11-03 NOTE — TELEPHONE ENCOUNTER
----- Message from Carlene Jones sent at 11/3/2020  9:50 AM CST -----  Name of Who is Calling: KARIN RICHARDS  What is the request in detail: The patient was told to call back and give the pharmacy information for where she would like the doctor to send her medication for an bladder infection. Please advise    [facility]  Number:[phone]   [address] [facility] [address]  Fax # [phone]   Can the clinic reply by [facility]: No What Number to Call Back if not in [facility]: [phone]

## 2020-11-04 ENCOUNTER — TELEPHONE (OUTPATIENT)
Dept: OBSTETRICS AND GYNECOLOGY | Facility: CLINIC | Age: 85
End: 2020-11-04

## 2020-11-04 ENCOUNTER — TELEPHONE (OUTPATIENT)
Dept: INTERNAL MEDICINE | Facility: CLINIC | Age: 85
End: 2020-11-04

## 2020-11-04 DIAGNOSIS — N30.00 ACUTE CYSTITIS WITHOUT HEMATURIA: Primary | ICD-10-CM

## 2020-11-04 RX ORDER — CEPHALEXIN 500 MG/1
500 CAPSULE ORAL EVERY 12 HOURS
Qty: 10 CAPSULE | Refills: 0 | Status: SHIPPED | OUTPATIENT
Start: 2020-11-04 | End: 2020-11-09

## 2020-11-04 NOTE — TELEPHONE ENCOUNTER
Please advise:  Patient was prescribed macrobid. Stated that she has CKD stage 3 and would like to verify whether the macrobid is safe for her to take. Patients EFR is 35.

## 2020-11-04 NOTE — TELEPHONE ENCOUNTER
Given kidney function I recommend changing instead to Keflex. Please notify pharmacy and tell the patient. I have sent this to the pharmacy.

## 2020-11-04 NOTE — TELEPHONE ENCOUNTER
----- Message from Carlene Jones sent at 11/4/2020  9:24 AM CST -----  Name of Who is Calling: Tiff ([facility]) What is the request in detail: Tiff would like to speak to the nurse in regards to finding out if Macrobid medication is okay for the patient to take with her Kidney function. Please advise Can the clinic reply by [facility]: No What Number to Call Back if not in [facility]: [phone]

## 2020-11-04 NOTE — TELEPHONE ENCOUNTER
----- Message from Kriss Zazueta sent at 11/4/2020  9:54 AM CST -----   Name of Who is Calling:     What is the request in detail:  patient request call back in reference to medication   nitrofurantoin, macrocrystal-monohydrate, (MACROBID) 100 MG capsule/ with her known medical condition questions  Please contact to further discuss and advise      Can the clinic reply by [facility]: no     What Number to Call Back if not in [facility]: [phone]

## 2020-11-30 ENCOUNTER — TELEPHONE (OUTPATIENT)
Dept: ORTHOPEDICS | Facility: CLINIC | Age: 85
End: 2020-11-30

## 2020-11-30 DIAGNOSIS — M51.36 DDD (DEGENERATIVE DISC DISEASE), LUMBAR: Primary | ICD-10-CM

## 2020-12-01 ENCOUNTER — OFFICE VISIT (OUTPATIENT)
Dept: ORTHOPEDICS | Facility: CLINIC | Age: 85
End: 2020-12-01
Payer: MEDICARE

## 2020-12-01 ENCOUNTER — HOSPITAL ENCOUNTER (OUTPATIENT)
Dept: RADIOLOGY | Facility: HOSPITAL | Age: 85
Discharge: HOME | End: 2020-12-01
Attending: PHYSICIAN ASSISTANT
Payer: MEDICARE

## 2020-12-01 ENCOUNTER — TELEPHONE (OUTPATIENT)
Dept: ORTHOPEDICS | Facility: CLINIC | Age: 85
End: 2020-12-01

## 2020-12-01 VITALS — BODY MASS INDEX: 29.41 KG/M2 | HEIGHT: 66 IN | WEIGHT: 183 LBS

## 2020-12-01 DIAGNOSIS — M51.36 DDD (DEGENERATIVE DISC DISEASE), LUMBAR: ICD-10-CM

## 2020-12-01 DIAGNOSIS — M54.50 ACUTE BILATERAL LOW BACK PAIN WITHOUT SCIATICA: Primary | ICD-10-CM

## 2020-12-01 DIAGNOSIS — M50.30 DDD (DEGENERATIVE DISC DISEASE), CERVICAL: ICD-10-CM

## 2020-12-01 DIAGNOSIS — M50.30 DDD (DEGENERATIVE DISC DISEASE), CERVICAL: Primary | ICD-10-CM

## 2020-12-01 PROCEDURE — 3288F PR FALLS RISK ASSESSMENT DOCUMENTED: ICD-10-PCS | Mod: HCNC,CPTII,S$GLB, | Performed by: PHYSICIAN ASSISTANT

## 2020-12-01 PROCEDURE — 3288F FALL RISK ASSESSMENT DOCD: CPT | Mod: HCNC,CPTII,S$GLB, | Performed by: PHYSICIAN ASSISTANT

## 2020-12-01 PROCEDURE — 99204 PR OFFICE/OUTPT VISIT, NEW, LEVL IV, 45-59 MIN: ICD-10-PCS | Mod: HCNC,S$GLB,, | Performed by: PHYSICIAN ASSISTANT

## 2020-12-01 PROCEDURE — 72120 XR LUMBAR SPINE AP AND LAT WITH FLEX/EXT: ICD-10-PCS | Mod: 26,HCNC,, | Performed by: RADIOLOGY

## 2020-12-01 PROCEDURE — 1159F PR MEDICATION LIST DOCUMENTED IN MEDICAL RECORD: ICD-10-PCS | Mod: HCNC,S$GLB,, | Performed by: PHYSICIAN ASSISTANT

## 2020-12-01 PROCEDURE — 99999 PR PBB SHADOW E&M-EST. PATIENT-LVL III: CPT | Mod: PBBFAC,HCNC,, | Performed by: PHYSICIAN ASSISTANT

## 2020-12-01 PROCEDURE — 72050 X-RAY EXAM NECK SPINE 4/5VWS: CPT | Mod: 26,HCNC,, | Performed by: RADIOLOGY

## 2020-12-01 PROCEDURE — 1157F ADVNC CARE PLAN IN RCRD: CPT | Mod: HCNC,S$GLB,, | Performed by: PHYSICIAN ASSISTANT

## 2020-12-01 PROCEDURE — 72100 X-RAY EXAM L-S SPINE 2/3 VWS: CPT | Mod: 26,HCNC,, | Performed by: RADIOLOGY

## 2020-12-01 PROCEDURE — 72050 X-RAY EXAM NECK SPINE 4/5VWS: CPT | Mod: TC,HCNC

## 2020-12-01 PROCEDURE — 72120 X-RAY BEND ONLY L-S SPINE: CPT | Mod: 26,HCNC,, | Performed by: RADIOLOGY

## 2020-12-01 PROCEDURE — 72050 XR CERVICAL SPINE AP LAT WITH FLEX EXTEN: ICD-10-PCS | Mod: 26,HCNC,, | Performed by: RADIOLOGY

## 2020-12-01 PROCEDURE — 1101F PR PT FALLS ASSESS DOC 0-1 FALLS W/OUT INJ PAST YR: ICD-10-PCS | Mod: HCNC,CPTII,S$GLB, | Performed by: PHYSICIAN ASSISTANT

## 2020-12-01 PROCEDURE — 1157F PR ADVANCE CARE PLAN OR EQUIV PRESENT IN MEDICAL RECORD: ICD-10-PCS | Mod: HCNC,S$GLB,, | Performed by: PHYSICIAN ASSISTANT

## 2020-12-01 PROCEDURE — 72100 XR LUMBAR SPINE AP AND LAT WITH FLEX/EXT: ICD-10-PCS | Mod: 26,HCNC,, | Performed by: RADIOLOGY

## 2020-12-01 PROCEDURE — 1101F PT FALLS ASSESS-DOCD LE1/YR: CPT | Mod: HCNC,CPTII,S$GLB, | Performed by: PHYSICIAN ASSISTANT

## 2020-12-01 PROCEDURE — 99999 PR PBB SHADOW E&M-EST. PATIENT-LVL III: ICD-10-PCS | Mod: PBBFAC,HCNC,, | Performed by: PHYSICIAN ASSISTANT

## 2020-12-01 PROCEDURE — 1125F AMNT PAIN NOTED PAIN PRSNT: CPT | Mod: HCNC,S$GLB,, | Performed by: PHYSICIAN ASSISTANT

## 2020-12-01 PROCEDURE — 1159F MED LIST DOCD IN RCRD: CPT | Mod: HCNC,S$GLB,, | Performed by: PHYSICIAN ASSISTANT

## 2020-12-01 PROCEDURE — 72120 X-RAY BEND ONLY L-S SPINE: CPT | Mod: TC,HCNC

## 2020-12-01 PROCEDURE — 99204 OFFICE O/P NEW MOD 45 MIN: CPT | Mod: HCNC,S$GLB,, | Performed by: PHYSICIAN ASSISTANT

## 2020-12-01 PROCEDURE — 1125F PR PAIN SEVERITY QUANTIFIED, PAIN PRESENT: ICD-10-PCS | Mod: HCNC,S$GLB,, | Performed by: PHYSICIAN ASSISTANT

## 2020-12-01 NOTE — PROGRESS NOTES
DATE: 12/2/2020  PATIENT: Tiffany Masterson    Supervising Physician: Michael De Dios M.D.    CHIEF COMPLAINT: back pain    HISTORY:  Tiffany Masterson is a 85 y.o. female who is well known to me (her  is also my patient) here for initial evaluation of low back pain (Back - 8, Leg - 0). The pain has been present since Sunday.  She was stepping backwards off a stepstool and did not realize how high it was and hit the ground harder than expected.  She did not fall.  The patient describes the pain as aching.  The pain is worse with twisting and in the morning and improved by rest.  The pain has progressively improved since Sunday.  There is no associated numbness and tingling. There is no subjective weakness. Prior treatments have included aspirin, but no PT, ESIs or surgery.  She also reports some neck pain immediately after but she does not report any neck pain today.    The patient denies myelopathic symptoms such as handwriting changes or difficulty with buttons/coins/keys. Denies perineal paresthesias, bowel/bladder dysfunction.    PAST MEDICAL/SURGICAL HISTORY:  Past Medical History:   Diagnosis Date    Arthritis     Family history of malignant neoplasm of gastrointestinal tract mother    History of colonoscopy     unremarkable 2007 by Dr. Javier.  Barium enema revealed diverticular disease.    Hyperlipidemia     Hypertension     Hypothyroidism     Migraine, ophthalmoplegic     Personal history of colonic polyps     TIA (transient ischemic attack)     with a normal angiogram in the past, Ocular migraines reported by patient, not TIA     Unspecified essential hypertension 5/8/2015     Past Surgical History:   Procedure Laterality Date    CATARACT EXTRACTION  2012    right/ Dr. Lexis Nicolas     COLONOSCOPY      2014 polyps    COLONOSCOPY N/A 11/7/2016    Procedure: COLONOSCOPY;  Surgeon: Bebeto Gonzalez MD;  Location: [address]);  Service: Endoscopy;  Laterality: N/A;    COLONOSCOPY  N/A 3/9/2020    Procedure: COLONOSCOPY;  Surgeon: Bebeto Gonzalez MD;  Location: [facility] ([address] [place] [address]);  Service: Endoscopy;  Laterality: N/A;    COLONOSCOPY W/ POLYPECTOMY  6/2013    polyp of colon    EYE SURGERY  11-10-14    right retina/[facility]. Dalton [facility] ([facility])    HYSTEROSCOPIC POLYPECTOMY OF UTERUS N/A 7/2/2018    Procedure: POLYPECTOMY, UTERUS, HYSTEROSCOPIC;  Surgeon: Elliot Vanessa IV, MD;  Location: [facility] OR;  Service: OB/GYN;  Laterality: N/A;    JOINT REPLACEMENT  3/23/2011    left total hip replacement    TONSILLECTOMY      pt was 11 years old       Medications:   Current Outpatient Medications on File Prior to Visit   Medication Sig Dispense Refill    ascorbic acid (VITAMIN C) 250 MG tablet Take 250 mg by mouth once daily.      aspirin (ECOTRIN) 81 MG EC tablet Take 81 mg by mouth once daily.      atenoloL (TENORMIN) 25 MG tablet TAKE 1 TABLET BY MOUTH ONCE DAILY IN THE MORNING 90 tablet 3    atenoloL (TENORMIN) 50 MG tablet Take 1 tablet (50 mg total) by mouth every evening. 90 tablet 2    CALCIUM CARBONATE/VITAMIN D3 (CALCIUM 600 + D,3, ORAL) Take 1 tablet by mouth once daily.       cholecalciferol, vitamin D3, (VITAMIN D3) 2,000 unit Cap       conjugated estrogens (PREMARIN) vaginal cream Place 0.5 g intravaginal daily x2 weeks, then placed 0.5 g intravaginally twice weekly 30 g 1    DEXTRAN 70/HYPROMELLOSE (ARTIFICIAL TEARS, PF, OPHT) Apply to eye.      FLUZONE HIGHDOSE QUAD 20-21  mcg/0.7 mL Syrg       indapamide (LOZOL) 2.5 MG Tab Take 1 tablet by mouth once daily 90 tablet 3    levothyroxine (SYNTHROID) 100 MCG tablet Take 1 tablet by mouth once daily 90 tablet 3    losartan (COZAAR) 100 MG tablet Take 1 tablet by mouth once daily 90 tablet 3    MULTIVITAMIN W-MINERALS/LUTEIN (CENTRUM SILVER ORAL) Take 1 tablet by mouth once daily.      pravastatin (PRAVACHOL) 40 MG tablet Take 1 tablet (40 mg total) by mouth every evening. 90 tablet 0    zolpidem (AMBIEN) 5 MG Tab  TAKE ONE TABLET BY MOUTH NIGHTLY AS NEEDED 90 tablet 1     No current facility-administered medications on file prior to visit.        Social History:   Social History     Socioeconomic History    Marital status:      Spouse name: Not on file    Number of children: Not on file    Years of education: Not on file    Highest education level: Not on file   Occupational History    Not on file   Social Needs    Financial resource strain: Not hard at all    Food insecurity     Worry: Never true     Inability: Never true    Transportation needs     Medical: No     Non-medical: No   Tobacco Use    Smoking status: Never Smoker    Smokeless tobacco: Never Used    Tobacco comment: The patient is not getting any regular exercise at this time.  She does enjoy traveling to [place].   Substance and Sexual Activity    Alcohol use: Yes     Alcohol/week: 1.0 standard drinks     Types: 1 Shots of liquor per week     Frequency: 2-3 times a week     Drinks per session: 1 or 2     Binge frequency: Never     Comment: daily    Drug use: No    Sexual activity: Yes     Partners: Male     Birth control/protection: Post-menopausal     Comment:  64 years    Lifestyle    Physical activity     Days per week: 0 days     Minutes per session: Not on file    Stress: To some extent   Relationships    Social connections     Talks on phone: More than three times a week     Gets together: More than three times a week     Attends Denominational service: Not on file     Active member of club or organization: Not on file     Attends meetings of clubs or organizations: More than 4 times per year     Relationship status:    Other Topics Concern    Not on file   Social History Narrative    Not on file       REVIEW OF SYSTEMS:  Constitution: Negative. Negative for chills, fever and night sweats.   Cardiovascular: Negative for chest pain and syncope.   Respiratory: Negative for cough and shortness of breath.  "  Gastrointestinal: See HPI. Negative for nausea/vomiting. Negative for abdominal pain.  Genitourinary: See HPI. Negative for discoloration or dysuria.  Skin: Negative for dry skin, itching and rash.   Hematologic/Lymphatic: Negative for bleeding problem. Does not bruise/bleed easily.   Musculoskeletal: Negative for falls and muscle weakness.   Neurological: See HPI. No seizures.   Endocrine: Negative for polydipsia, polyphagia and polyuria.   Allergic/Immunologic: Negative for hives and persistent infections.     EXAM:  Ht 5' 6" (1.676 m)   Wt 83 kg (183 lb)   BMI 29.54 kg/m²     PHYSICAL EXAMINATION:    General: The patient is a very pleasant 85 y.o. female in no apparent distress, the patient is oriented to person, place and time.  Psych: Normal mood and affect  HEENT: Vision grossly intact, hearing intact to the spoken word.  Lungs: Respirations unlabored.  Gait: Normal station and gait, no difficulty with toe or heel walk.   Skin: Cervical skin and dorsal lumbar skin negative for rashes, lesions, hairy patches and surgical scars.    Range of motion: Cervical and lumbar range of motion is acceptable. There is no tenderness to palpation of the paracervical muscles.  There is mild lumbar tenderness to palpation.  Spinal Balance: Global saggital and coronal spinal balance acceptable, no significant for scoliosis and kyphosis.  Musculoskeletal: No pain with the range of motion of the bilateral shoulders and elbows. Normal bulk and contour of the bilateral hands.  No pain with the range of motion of the bilateral hips. Mild bilateral trochanteric tenderness to palpation.  Vascular: Bilateral upper and lower extremities warm and well perfused, radial pulses 2+ bilaterally, dorsalis pedis pulses 2+ bilaterally.  Neurological: Normal strength and tone in all major motor groups in the bilateral upper and lower extremities. Normal sensation to light touch in the C5-T1 and L2-S1 dermatomes bilaterally.  Deep tendon " reflexes symmetric 2+ in the bilateral upper and lower extremities.  Negative Inverted Radial Reflex and Herbert's bilaterally. Negative Babinski bilaterally. Negative straight leg raise bilaterally.     IMAGING:   Today I personally reviewed AP, Lat and Flex/Ex  upright C-spine films that demonstrate grade I anterolisthesis at C3/4. Mild to moderate degenerative changes.    AP, Lat and Flex/Ex upright lumbar spine films demonstrate grade I anterolisthesis at L4/5 and L5/S1.     Body mass index is 29.54 kg/m².    Hemoglobin A1C   Date Value Ref Range Status   08/31/2020 5.8 (H) 4.0 - 5.6 % Final     Comment:     ADA Screening Guidelines:  5.7-6.4%  Consistent with prediabetes  >or=6.5%  Consistent with diabetes  High levels of fetal hemoglobin interfere with the HbA1C  assay. Heterozygous hemoglobin variants (HbS, HgC, etc)do  not significantly interfere with this assay.   However, presence of multiple variants may affect accuracy.           ASSESSMENT/PLAN:    Tiffany was seen today for low-back pain.    Diagnoses and all orders for this visit:    Acute bilateral low back pain without sciatica        Today we discussed at length all of the different treatment options including anti-inflammatories, acetaminophen, rest, ice, heat, physical therapy including strengthening and stretching exercises, home exercises, ROM, aerobic conditioning, aqua therapy, other modalities including ultrasound, massage, and dry needling, epidural steroid injections and finally surgical intervention.      The patient's pain is improving.  There is no evidence of acute comrpession fractures or other abnormalities.  She will continue aspirin as needed.  I also recommended tylenol as needed.  She will follow up as needed.  We may consider PT if her symptoms persist.

## 2020-12-02 ENCOUNTER — TELEPHONE (OUTPATIENT)
Dept: INTERNAL MEDICINE | Facility: CLINIC | Age: 85
End: 2020-12-02

## 2020-12-02 DIAGNOSIS — Z12.31 SCREENING MAMMOGRAM FOR HIGH-RISK PATIENT: Primary | ICD-10-CM

## 2020-12-03 ENCOUNTER — HOSPITAL ENCOUNTER (OUTPATIENT)
Dept: RADIOLOGY | Facility: HOSPITAL | Age: 85
Discharge: HOME | End: 2020-12-03
Attending: INTERNAL MEDICINE
Payer: MEDICARE

## 2020-12-03 DIAGNOSIS — Z12.31 SCREENING MAMMOGRAM FOR HIGH-RISK PATIENT: ICD-10-CM

## 2020-12-03 PROCEDURE — 77063 MAMMO DIGITAL SCREENING BILAT WITH TOMO: ICD-10-PCS | Mod: 26,HCNC,, | Performed by: RADIOLOGY

## 2020-12-03 PROCEDURE — 77067 MAMMO DIGITAL SCREENING BILAT WITH TOMO: ICD-10-PCS | Mod: 26,HCNC,, | Performed by: RADIOLOGY

## 2020-12-03 PROCEDURE — 77067 SCR MAMMO BI INCL CAD: CPT | Mod: TC,HCNC

## 2020-12-03 PROCEDURE — 77067 SCR MAMMO BI INCL CAD: CPT | Mod: 26,HCNC,, | Performed by: RADIOLOGY

## 2020-12-03 PROCEDURE — 77063 BREAST TOMOSYNTHESIS BI: CPT | Mod: 26,HCNC,, | Performed by: RADIOLOGY

## 2020-12-11 ENCOUNTER — PATIENT MESSAGE (OUTPATIENT)
Dept: OTHER | Facility: OTHER | Age: 85
End: 2020-12-11

## 2020-12-30 RX ORDER — ZOLPIDEM TARTRATE 5 MG/1
TABLET ORAL
Qty: 90 TABLET | Refills: 0 | Status: SHIPPED | OUTPATIENT
Start: 2020-12-30 | End: 2021-04-12

## 2021-01-04 ENCOUNTER — PATIENT MESSAGE (OUTPATIENT)
Dept: INTERNAL MEDICINE | Facility: CLINIC | Age: 86
End: 2021-01-04

## 2021-01-04 ENCOUNTER — IMMUNIZATION (OUTPATIENT)
Dept: OBSTETRICS AND GYNECOLOGY | Facility: CLINIC | Age: 86
End: 2021-01-04
Payer: MEDICARE

## 2021-01-04 DIAGNOSIS — Z23 NEED FOR VACCINATION: ICD-10-CM

## 2021-01-04 DIAGNOSIS — N95.2 VAGINAL ATROPHY: ICD-10-CM

## 2021-01-04 PROCEDURE — 91300 COVID-19, MRNA, LNP-S, PF, 30 MCG/0.3 ML DOSE VACCINE: CPT | Mod: PBBFAC | Performed by: FAMILY MEDICINE

## 2021-01-07 RX ORDER — PRAVASTATIN SODIUM 40 MG/1
40 TABLET ORAL NIGHTLY
Qty: 90 TABLET | Refills: 1 | Status: SHIPPED | OUTPATIENT
Start: 2021-01-07 | End: 2021-06-09

## 2021-01-26 ENCOUNTER — IMMUNIZATION (OUTPATIENT)
Dept: OBSTETRICS AND GYNECOLOGY | Facility: CLINIC | Age: 86
End: 2021-01-26
Payer: MEDICARE

## 2021-01-26 DIAGNOSIS — Z23 NEED FOR VACCINATION: Primary | ICD-10-CM

## 2021-01-26 PROCEDURE — 91300 COVID-19, MRNA, LNP-S, PF, 30 MCG/0.3 ML DOSE VACCINE: CPT | Mod: PBBFAC | Performed by: FAMILY MEDICINE

## 2021-01-26 PROCEDURE — 0002A COVID-19, MRNA, LNP-S, PF, 30 MCG/0.3 ML DOSE VACCINE: CPT | Mod: PBBFAC | Performed by: FAMILY MEDICINE

## 2021-02-09 ENCOUNTER — PATIENT MESSAGE (OUTPATIENT)
Dept: INTERNAL MEDICINE | Facility: CLINIC | Age: 86
End: 2021-02-09

## 2021-02-09 DIAGNOSIS — R39.89 ABNORMAL URINE COLOR: ICD-10-CM

## 2021-02-09 DIAGNOSIS — R11.2 NAUSEA AND VOMITING, INTRACTABILITY OF VOMITING NOT SPECIFIED, UNSPECIFIED VOMITING TYPE: Primary | ICD-10-CM

## 2021-02-17 ENCOUNTER — LAB VISIT (OUTPATIENT)
Dept: LAB | Facility: HOSPITAL | Age: 86
End: 2021-02-17
Attending: INTERNAL MEDICINE
Payer: MEDICARE

## 2021-02-17 DIAGNOSIS — R11.2 NAUSEA AND VOMITING, INTRACTABILITY OF VOMITING NOT SPECIFIED, UNSPECIFIED VOMITING TYPE: ICD-10-CM

## 2021-02-17 LAB
BACTERIA #/AREA URNS AUTO: NORMAL /HPF
BILIRUB UR QL STRIP: NEGATIVE
CAOX CRY UR QL COMP ASSIST: NORMAL
CLARITY UR REFRACT.AUTO: CLEAR
COLOR UR AUTO: ABNORMAL
GLUCOSE UR QL STRIP: NEGATIVE
HGB UR QL STRIP: NEGATIVE
KETONES UR QL STRIP: NEGATIVE
LEUKOCYTE ESTERASE UR QL STRIP: ABNORMAL
MICROSCOPIC COMMENT: NORMAL
NITRITE UR QL STRIP: NEGATIVE
PH UR STRIP: 6 [PH] (ref 5–8)
PROT UR QL STRIP: NEGATIVE
SP GR UR STRIP: 1 (ref 1–1.03)
SQUAMOUS #/AREA URNS AUTO: 8 /HPF
URN SPEC COLLECT METH UR: ABNORMAL
WBC #/AREA URNS AUTO: 2 /HPF (ref 0–5)

## 2021-02-17 PROCEDURE — 81001 URINALYSIS AUTO W/SCOPE: CPT

## 2021-02-18 ENCOUNTER — PATIENT MESSAGE (OUTPATIENT)
Dept: INTERNAL MEDICINE | Facility: CLINIC | Age: 86
End: 2021-02-18

## 2021-02-18 DIAGNOSIS — R74.01 ELEVATED TRANSAMINASE LEVEL: Primary | ICD-10-CM

## 2021-02-18 DIAGNOSIS — K85.90 ACUTE PANCREATITIS, UNSPECIFIED COMPLICATION STATUS, UNSPECIFIED PANCREATITIS TYPE: ICD-10-CM

## 2021-03-16 ENCOUNTER — LAB VISIT (OUTPATIENT)
Dept: LAB | Facility: HOSPITAL | Age: 86
End: 2021-03-16
Attending: INTERNAL MEDICINE
Payer: MEDICARE

## 2021-03-16 ENCOUNTER — PATIENT MESSAGE (OUTPATIENT)
Dept: INTERNAL MEDICINE | Facility: CLINIC | Age: 86
End: 2021-03-16

## 2021-03-16 DIAGNOSIS — R74.01 ELEVATED TRANSAMINASE LEVEL: ICD-10-CM

## 2021-03-16 DIAGNOSIS — K85.90 ACUTE PANCREATITIS, UNSPECIFIED COMPLICATION STATUS, UNSPECIFIED PANCREATITIS TYPE: ICD-10-CM

## 2021-03-16 DIAGNOSIS — R74.01 ELEVATED TRANSAMINASE LEVEL: Primary | ICD-10-CM

## 2021-03-16 LAB
ALBUMIN SERPL BCP-MCNC: 3.7 G/DL (ref 3.5–5.2)
ALP SERPL-CCNC: 252 U/L (ref 55–135)
ALT SERPL W/O P-5'-P-CCNC: 488 U/L (ref 10–44)
AST SERPL-CCNC: 292 U/L (ref 10–40)
BILIRUB DIRECT SERPL-MCNC: 0.5 MG/DL (ref 0.1–0.3)
BILIRUB SERPL-MCNC: 0.9 MG/DL (ref 0.1–1)
HAV IGM SERPL QL IA: NEGATIVE
HBV CORE IGM SERPL QL IA: NEGATIVE
HBV SURFACE AG SERPL QL IA: NEGATIVE
HCV AB SERPL QL IA: NEGATIVE
LIPASE SERPL-CCNC: 49 U/L (ref 4–60)
PROT SERPL-MCNC: 7 G/DL (ref 6–8.4)

## 2021-03-16 PROCEDURE — 83690 ASSAY OF LIPASE: CPT | Performed by: INTERNAL MEDICINE

## 2021-03-16 PROCEDURE — 36415 COLL VENOUS BLD VENIPUNCTURE: CPT | Performed by: INTERNAL MEDICINE

## 2021-03-16 PROCEDURE — 80076 HEPATIC FUNCTION PANEL: CPT | Performed by: INTERNAL MEDICINE

## 2021-03-16 PROCEDURE — 80074 ACUTE HEPATITIS PANEL: CPT | Performed by: INTERNAL MEDICINE

## 2021-03-17 ENCOUNTER — TELEPHONE (OUTPATIENT)
Dept: TRANSPLANT | Facility: CLINIC | Age: 86
End: 2021-03-17

## 2021-03-19 ENCOUNTER — LAB VISIT (OUTPATIENT)
Dept: LAB | Facility: HOSPITAL | Age: 86
End: 2021-03-19
Attending: NURSE PRACTITIONER
Payer: MEDICARE

## 2021-03-19 ENCOUNTER — OFFICE VISIT (OUTPATIENT)
Dept: HEPATOLOGY | Facility: CLINIC | Age: 86
End: 2021-03-19
Payer: MEDICARE

## 2021-03-19 VITALS
DIASTOLIC BLOOD PRESSURE: 70 MMHG | HEIGHT: 66 IN | HEART RATE: 61 BPM | SYSTOLIC BLOOD PRESSURE: 174 MMHG | RESPIRATION RATE: 16 BRPM | OXYGEN SATURATION: 98 % | BODY MASS INDEX: 27.88 KG/M2 | WEIGHT: 173.5 LBS

## 2021-03-19 DIAGNOSIS — R74.01 TRANSAMINITIS: Primary | ICD-10-CM

## 2021-03-19 DIAGNOSIS — R74.01 TRANSAMINITIS: ICD-10-CM

## 2021-03-19 DIAGNOSIS — R74.01 ELEVATED TRANSAMINASE LEVEL: ICD-10-CM

## 2021-03-19 PROBLEM — M79.89 SWELLING OF RIGHT LOWER EXTREMITY: Status: RESOLVED | Noted: 2018-09-30 | Resolved: 2021-03-19

## 2021-03-19 PROBLEM — M54.50 ACUTE LOW BACK PAIN WITHOUT SCIATICA: Status: RESOLVED | Noted: 2018-09-30 | Resolved: 2021-03-19

## 2021-03-19 LAB
A1AT SERPL-MCNC: 230 MG/DL (ref 100–190)
ALBUMIN SERPL BCP-MCNC: 3.8 G/DL (ref 3.5–5.2)
ALP SERPL-CCNC: 291 U/L (ref 55–135)
ALT SERPL W/O P-5'-P-CCNC: 247 U/L (ref 10–44)
ANION GAP SERPL CALC-SCNC: 11 MMOL/L (ref 8–16)
AST SERPL-CCNC: 73 U/L (ref 10–40)
BASOPHILS # BLD AUTO: 0.04 K/UL (ref 0–0.2)
BASOPHILS NFR BLD: 0.5 % (ref 0–1.9)
BILIRUB DIRECT SERPL-MCNC: 0.7 MG/DL (ref 0.1–0.3)
BILIRUB SERPL-MCNC: 1.1 MG/DL (ref 0.1–1)
BUN SERPL-MCNC: 20 MG/DL (ref 8–23)
CALCIUM SERPL-MCNC: 9.3 MG/DL (ref 8.7–10.5)
CERULOPLASMIN SERPL-MCNC: 36 MG/DL (ref 15–45)
CHLORIDE SERPL-SCNC: 97 MMOL/L (ref 95–110)
CK SERPL-CCNC: 165 U/L (ref 20–180)
CO2 SERPL-SCNC: 23 MMOL/L (ref 23–29)
CREAT SERPL-MCNC: 1.3 MG/DL (ref 0.5–1.4)
DIFFERENTIAL METHOD: ABNORMAL
EOSINOPHIL # BLD AUTO: 0.1 K/UL (ref 0–0.5)
EOSINOPHIL NFR BLD: 1.6 % (ref 0–8)
ERYTHROCYTE [DISTWIDTH] IN BLOOD BY AUTOMATED COUNT: 12.9 % (ref 11.5–14.5)
EST. GFR  (AFRICAN AMERICAN): 43.8 ML/MIN/1.73 M^2
EST. GFR  (NON AFRICAN AMERICAN): 38 ML/MIN/1.73 M^2
FERRITIN SERPL-MCNC: 377 NG/ML (ref 20–300)
GLUCOSE SERPL-MCNC: 108 MG/DL (ref 70–110)
HCT VFR BLD AUTO: 32.9 % (ref 37–48.5)
HGB BLD-MCNC: 10.8 G/DL (ref 12–16)
IGG SERPL-MCNC: 1356 MG/DL (ref 650–1600)
IMM GRANULOCYTES # BLD AUTO: 0.04 K/UL (ref 0–0.04)
IMM GRANULOCYTES NFR BLD AUTO: 0.5 % (ref 0–0.5)
IRON SERPL-MCNC: 54 UG/DL (ref 30–160)
LYMPHOCYTES # BLD AUTO: 1.7 K/UL (ref 1–4.8)
LYMPHOCYTES NFR BLD: 20.8 % (ref 18–48)
MCH RBC QN AUTO: 30 PG (ref 27–31)
MCHC RBC AUTO-ENTMCNC: 32.8 G/DL (ref 32–36)
MCV RBC AUTO: 91 FL (ref 82–98)
MONOCYTES # BLD AUTO: 0.9 K/UL (ref 0.3–1)
MONOCYTES NFR BLD: 10.6 % (ref 4–15)
NEUTROPHILS # BLD AUTO: 5.3 K/UL (ref 1.8–7.7)
NEUTROPHILS NFR BLD: 66 % (ref 38–73)
NRBC BLD-RTO: 0 /100 WBC
PLATELET # BLD AUTO: 257 K/UL (ref 150–350)
PMV BLD AUTO: 10.3 FL (ref 9.2–12.9)
POTASSIUM SERPL-SCNC: 4 MMOL/L (ref 3.5–5.1)
PROT SERPL-MCNC: 7.7 G/DL (ref 6–8.4)
RBC # BLD AUTO: 3.6 M/UL (ref 4–5.4)
SATURATED IRON: 16 % (ref 20–50)
SODIUM SERPL-SCNC: 131 MMOL/L (ref 136–145)
TOTAL IRON BINDING CAPACITY: 330 UG/DL (ref 250–450)
TRANSFERRIN SERPL-MCNC: 223 MG/DL (ref 200–375)
WBC # BLD AUTO: 8.09 K/UL (ref 3.9–12.7)

## 2021-03-19 PROCEDURE — 86039 ANTINUCLEAR ANTIBODIES (ANA): CPT | Performed by: NURSE PRACTITIONER

## 2021-03-19 PROCEDURE — 83540 ASSAY OF IRON: CPT | Performed by: NURSE PRACTITIONER

## 2021-03-19 PROCEDURE — 82784 ASSAY IGA/IGD/IGG/IGM EACH: CPT | Performed by: NURSE PRACTITIONER

## 2021-03-19 PROCEDURE — 1126F PR PAIN SEVERITY QUANTIFIED, NO PAIN PRESENT: ICD-10-PCS | Mod: S$GLB,,, | Performed by: NURSE PRACTITIONER

## 2021-03-19 PROCEDURE — 82103 ALPHA-1-ANTITRYPSIN TOTAL: CPT | Performed by: NURSE PRACTITIONER

## 2021-03-19 PROCEDURE — 1157F PR ADVANCE CARE PLAN OR EQUIV PRESENT IN MEDICAL RECORD: ICD-10-PCS | Mod: S$GLB,,, | Performed by: NURSE PRACTITIONER

## 2021-03-19 PROCEDURE — 99204 OFFICE O/P NEW MOD 45 MIN: CPT | Mod: S$GLB,,, | Performed by: NURSE PRACTITIONER

## 2021-03-19 PROCEDURE — 86706 HEP B SURFACE ANTIBODY: CPT | Performed by: NURSE PRACTITIONER

## 2021-03-19 PROCEDURE — 1159F MED LIST DOCD IN RCRD: CPT | Mod: S$GLB,,, | Performed by: NURSE PRACTITIONER

## 2021-03-19 PROCEDURE — 86790 VIRUS ANTIBODY NOS: CPT | Performed by: NURSE PRACTITIONER

## 2021-03-19 PROCEDURE — 86645 CMV ANTIBODY IGM: CPT | Performed by: NURSE PRACTITIONER

## 2021-03-19 PROCEDURE — 86256 FLUORESCENT ANTIBODY TITER: CPT | Performed by: NURSE PRACTITIONER

## 2021-03-19 PROCEDURE — 3078F PR MOST RECENT DIASTOLIC BLOOD PRESSURE < 80 MM HG: ICD-10-PCS | Mod: CPTII,S$GLB,, | Performed by: NURSE PRACTITIONER

## 2021-03-19 PROCEDURE — 1126F AMNT PAIN NOTED NONE PRSNT: CPT | Mod: S$GLB,,, | Performed by: NURSE PRACTITIONER

## 2021-03-19 PROCEDURE — 86663 EPSTEIN-BARR ANTIBODY: CPT | Performed by: NURSE PRACTITIONER

## 2021-03-19 PROCEDURE — 1157F ADVNC CARE PLAN IN RCRD: CPT | Mod: S$GLB,,, | Performed by: NURSE PRACTITIONER

## 2021-03-19 PROCEDURE — 3288F PR FALLS RISK ASSESSMENT DOCUMENTED: ICD-10-PCS | Mod: CPTII,S$GLB,, | Performed by: NURSE PRACTITIONER

## 2021-03-19 PROCEDURE — 80076 HEPATIC FUNCTION PANEL: CPT | Performed by: NURSE PRACTITIONER

## 2021-03-19 PROCEDURE — 99999 PR PBB SHADOW E&M-EST. PATIENT-LVL V: ICD-10-PCS | Mod: PBBFAC,,, | Performed by: NURSE PRACTITIONER

## 2021-03-19 PROCEDURE — 80048 BASIC METABOLIC PNL TOTAL CA: CPT | Performed by: NURSE PRACTITIONER

## 2021-03-19 PROCEDURE — 99999 PR PBB SHADOW E&M-EST. PATIENT-LVL V: CPT | Mod: PBBFAC,,, | Performed by: NURSE PRACTITIONER

## 2021-03-19 PROCEDURE — 86704 HEP B CORE ANTIBODY TOTAL: CPT | Performed by: NURSE PRACTITIONER

## 2021-03-19 PROCEDURE — 3288F FALL RISK ASSESSMENT DOCD: CPT | Mod: CPTII,S$GLB,, | Performed by: NURSE PRACTITIONER

## 2021-03-19 PROCEDURE — 3077F PR MOST RECENT SYSTOLIC BLOOD PRESSURE >= 140 MM HG: ICD-10-PCS | Mod: CPTII,S$GLB,, | Performed by: NURSE PRACTITIONER

## 2021-03-19 PROCEDURE — 99204 PR OFFICE/OUTPT VISIT, NEW, LEVL IV, 45-59 MIN: ICD-10-PCS | Mod: S$GLB,,, | Performed by: NURSE PRACTITIONER

## 2021-03-19 PROCEDURE — 1101F PR PT FALLS ASSESS DOC 0-1 FALLS W/OUT INJ PAST YR: ICD-10-PCS | Mod: CPTII,S$GLB,, | Performed by: NURSE PRACTITIONER

## 2021-03-19 PROCEDURE — 3077F SYST BP >= 140 MM HG: CPT | Mod: CPTII,S$GLB,, | Performed by: NURSE PRACTITIONER

## 2021-03-19 PROCEDURE — 82728 ASSAY OF FERRITIN: CPT | Performed by: NURSE PRACTITIONER

## 2021-03-19 PROCEDURE — 85025 COMPLETE CBC W/AUTO DIFF WBC: CPT | Performed by: NURSE PRACTITIONER

## 2021-03-19 PROCEDURE — 1159F PR MEDICATION LIST DOCUMENTED IN MEDICAL RECORD: ICD-10-PCS | Mod: S$GLB,,, | Performed by: NURSE PRACTITIONER

## 2021-03-19 PROCEDURE — 82390 ASSAY OF CERULOPLASMIN: CPT | Performed by: NURSE PRACTITIONER

## 2021-03-19 PROCEDURE — 86038 ANTINUCLEAR ANTIBODIES: CPT | Performed by: NURSE PRACTITIONER

## 2021-03-19 PROCEDURE — 3078F DIAST BP <80 MM HG: CPT | Mod: CPTII,S$GLB,, | Performed by: NURSE PRACTITIONER

## 2021-03-19 PROCEDURE — 86235 NUCLEAR ANTIGEN ANTIBODY: CPT | Mod: 59 | Performed by: NURSE PRACTITIONER

## 2021-03-19 PROCEDURE — 1101F PT FALLS ASSESS-DOCD LE1/YR: CPT | Mod: CPTII,S$GLB,, | Performed by: NURSE PRACTITIONER

## 2021-03-19 PROCEDURE — 86235 NUCLEAR ANTIGEN ANTIBODY: CPT | Mod: 91 | Performed by: NURSE PRACTITIONER

## 2021-03-19 PROCEDURE — 80321 ALCOHOLS BIOMARKERS 1OR 2: CPT | Performed by: NURSE PRACTITIONER

## 2021-03-19 PROCEDURE — 86694 HERPES SIMPLEX NES ANTBDY: CPT | Performed by: NURSE PRACTITIONER

## 2021-03-19 PROCEDURE — 82550 ASSAY OF CK (CPK): CPT | Performed by: NURSE PRACTITIONER

## 2021-03-19 RX ORDER — BISACODYL 5 MG
5 TABLET, DELAYED RELEASE (ENTERIC COATED) ORAL DAILY
COMMUNITY

## 2021-03-22 ENCOUNTER — HOSPITAL ENCOUNTER (OUTPATIENT)
Dept: RADIOLOGY | Facility: HOSPITAL | Age: 86
Discharge: HOME | End: 2021-03-22
Attending: INTERNAL MEDICINE
Payer: MEDICARE

## 2021-03-22 DIAGNOSIS — R74.01 TRANSAMINITIS: ICD-10-CM

## 2021-03-22 LAB
ANA PATTERN 1: NORMAL
ANA SER QL IF: POSITIVE
ANA TITR SER IF: NORMAL {TITER}
CMV IGM SERPL IA-ACNC: <8 AU/ML
EBV EA IGG SER-ACNC: 31.2 U/ML
EBV NA IGG SER-ACNC: >600 U/ML
EBV VCA IGG SER-ACNC: >750 U/ML
EBV VCA IGM SER-ACNC: 22.6 U/ML
HBV CORE AB SERPL QL IA: NEGATIVE
HBV SURFACE AB SER QL IA: NEGATIVE
HBV SURFACE AB SERPL IA-ACNC: <3 MIU/ML
HSV AB, IGM BY EIA: 0.22 INDEX
MITOCHONDRIA AB TITR SER IF: NORMAL {TITER}
SMOOTH MUSCLE AB TITR SER IF: NORMAL {TITER}

## 2021-03-22 PROCEDURE — 93975 VASCULAR STUDY: CPT | Mod: TC

## 2021-03-22 PROCEDURE — 93975 US ABDOMEN COMP WITH DOPPLER (XPD): ICD-10-PCS | Mod: 26,,, | Performed by: RADIOLOGY

## 2021-03-22 PROCEDURE — 93975 VASCULAR STUDY: CPT | Mod: 26,,, | Performed by: RADIOLOGY

## 2021-03-23 ENCOUNTER — TELEPHONE (OUTPATIENT)
Dept: HEPATOLOGY | Facility: CLINIC | Age: 86
End: 2021-03-23

## 2021-03-23 DIAGNOSIS — R79.89 ELEVATED LFTS: Primary | ICD-10-CM

## 2021-03-23 DIAGNOSIS — R74.01 TRANSAMINITIS: ICD-10-CM

## 2021-03-23 DIAGNOSIS — K80.20 CALCULUS OF GALLBLADDER WITHOUT CHOLECYSTITIS WITHOUT OBSTRUCTION: ICD-10-CM

## 2021-03-23 LAB — HEPATITIS A ANTIBODY, IGG: NEGATIVE

## 2021-03-23 RX ORDER — URSODIOL 300 MG/1
300 CAPSULE ORAL 2 TIMES DAILY
Qty: 60 CAPSULE | Refills: 11 | Status: SHIPPED | OUTPATIENT
Start: 2021-03-23 | End: 2021-06-02

## 2021-03-24 ENCOUNTER — TELEPHONE (OUTPATIENT)
Dept: HEPATOLOGY | Facility: CLINIC | Age: 86
End: 2021-03-24

## 2021-03-24 ENCOUNTER — TELEPHONE (OUTPATIENT)
Dept: PODIATRY | Facility: CLINIC | Age: 86
End: 2021-03-24

## 2021-03-24 ENCOUNTER — OFFICE VISIT (OUTPATIENT)
Dept: DERMATOLOGY | Facility: CLINIC | Age: 86
End: 2021-03-24
Payer: MEDICARE

## 2021-03-24 VITALS — BODY MASS INDEX: 27.92 KG/M2 | WEIGHT: 173 LBS

## 2021-03-24 DIAGNOSIS — L84 CLAVUS: ICD-10-CM

## 2021-03-24 DIAGNOSIS — L60.1 ONYCHOLYSIS: Primary | ICD-10-CM

## 2021-03-24 DIAGNOSIS — R20.2 NOTALGIA PARESTHETICA: ICD-10-CM

## 2021-03-24 DIAGNOSIS — L81.4 LENTIGINES: ICD-10-CM

## 2021-03-24 DIAGNOSIS — L82.1 SEBORRHEIC KERATOSES: ICD-10-CM

## 2021-03-24 LAB
ANTI SM ANTIBODY: 0.1 RATIO (ref 0–0.99)
ANTI SM/RNP ANTIBODY: 0.07 RATIO (ref 0–0.99)
ANTI-SM INTERPRETATION: NEGATIVE
ANTI-SM/RNP INTERPRETATION: NEGATIVE
ANTI-SSA ANTIBODY: 0.08 RATIO (ref 0–0.99)
ANTI-SSA INTERPRETATION: NEGATIVE
ANTI-SSB ANTIBODY: 0.08 RATIO (ref 0–0.99)
ANTI-SSB INTERPRETATION: NEGATIVE
DSDNA AB SER-ACNC: NORMAL [IU]/ML

## 2021-03-24 PROCEDURE — 1159F PR MEDICATION LIST DOCUMENTED IN MEDICAL RECORD: ICD-10-PCS | Mod: S$GLB,,, | Performed by: DERMATOLOGY

## 2021-03-24 PROCEDURE — 1101F PT FALLS ASSESS-DOCD LE1/YR: CPT | Mod: CPTII,S$GLB,, | Performed by: DERMATOLOGY

## 2021-03-24 PROCEDURE — 1101F PR PT FALLS ASSESS DOC 0-1 FALLS W/OUT INJ PAST YR: ICD-10-PCS | Mod: CPTII,S$GLB,, | Performed by: DERMATOLOGY

## 2021-03-24 PROCEDURE — 1126F PR PAIN SEVERITY QUANTIFIED, NO PAIN PRESENT: ICD-10-PCS | Mod: S$GLB,,, | Performed by: DERMATOLOGY

## 2021-03-24 PROCEDURE — 1157F PR ADVANCE CARE PLAN OR EQUIV PRESENT IN MEDICAL RECORD: ICD-10-PCS | Mod: S$GLB,,, | Performed by: DERMATOLOGY

## 2021-03-24 PROCEDURE — 3288F PR FALLS RISK ASSESSMENT DOCUMENTED: ICD-10-PCS | Mod: CPTII,S$GLB,, | Performed by: DERMATOLOGY

## 2021-03-24 PROCEDURE — 3288F FALL RISK ASSESSMENT DOCD: CPT | Mod: CPTII,S$GLB,, | Performed by: DERMATOLOGY

## 2021-03-24 PROCEDURE — 1126F AMNT PAIN NOTED NONE PRSNT: CPT | Mod: S$GLB,,, | Performed by: DERMATOLOGY

## 2021-03-24 PROCEDURE — 99202 OFFICE O/P NEW SF 15 MIN: CPT | Mod: S$GLB,,, | Performed by: DERMATOLOGY

## 2021-03-24 PROCEDURE — 99202 PR OFFICE/OUTPT VISIT, NEW, LEVL II, 15-29 MIN: ICD-10-PCS | Mod: S$GLB,,, | Performed by: DERMATOLOGY

## 2021-03-24 PROCEDURE — 1159F MED LIST DOCD IN RCRD: CPT | Mod: S$GLB,,, | Performed by: DERMATOLOGY

## 2021-03-24 PROCEDURE — 1157F ADVNC CARE PLAN IN RCRD: CPT | Mod: S$GLB,,, | Performed by: DERMATOLOGY

## 2021-03-24 PROCEDURE — 99999 PR PBB SHADOW E&M-EST. PATIENT-LVL IV: CPT | Mod: PBBFAC,,, | Performed by: DERMATOLOGY

## 2021-03-24 PROCEDURE — 99999 PR PBB SHADOW E&M-EST. PATIENT-LVL IV: ICD-10-PCS | Mod: PBBFAC,,, | Performed by: DERMATOLOGY

## 2021-03-24 RX ORDER — CICLOPIROX 80 MG/ML
SOLUTION TOPICAL DAILY
Qty: 6.6 ML | Refills: 3 | Status: SHIPPED | OUTPATIENT
Start: 2021-03-24 | End: 2021-09-21

## 2021-03-24 RX ORDER — HYDROQUINONE 40 MG/G
CREAM TOPICAL
Qty: 28.35 G | Refills: 3 | Status: SHIPPED | OUTPATIENT
Start: 2021-03-24 | End: 2021-09-21

## 2021-03-29 LAB — PHOSPHATIDYLETHANOL (PETH): NEGATIVE NG/ML

## 2021-04-01 ENCOUNTER — TELEPHONE (OUTPATIENT)
Dept: HEPATOLOGY | Facility: CLINIC | Age: 86
End: 2021-04-01

## 2021-04-01 ENCOUNTER — PATIENT MESSAGE (OUTPATIENT)
Dept: HEPATOLOGY | Facility: CLINIC | Age: 86
End: 2021-04-01

## 2021-04-01 DIAGNOSIS — R79.89 ELEVATED LFTS: ICD-10-CM

## 2021-04-01 DIAGNOSIS — K80.20 CALCULUS OF GALLBLADDER WITHOUT CHOLECYSTITIS WITHOUT OBSTRUCTION: Primary | ICD-10-CM

## 2021-04-01 DIAGNOSIS — K59.09 CHRONIC CONSTIPATION: ICD-10-CM

## 2021-04-05 ENCOUNTER — TELEPHONE (OUTPATIENT)
Dept: HEPATOLOGY | Facility: CLINIC | Age: 86
End: 2021-04-05

## 2021-04-05 ENCOUNTER — TELEPHONE (OUTPATIENT)
Dept: GASTROENTEROLOGY | Facility: CLINIC | Age: 86
End: 2021-04-05

## 2021-04-07 ENCOUNTER — PATIENT MESSAGE (OUTPATIENT)
Dept: HEPATOLOGY | Facility: CLINIC | Age: 86
End: 2021-04-07

## 2021-04-07 ENCOUNTER — TELEPHONE (OUTPATIENT)
Dept: HEPATOLOGY | Facility: CLINIC | Age: 86
End: 2021-04-07

## 2021-04-09 ENCOUNTER — PATIENT MESSAGE (OUTPATIENT)
Dept: HEPATOLOGY | Facility: CLINIC | Age: 86
End: 2021-04-09

## 2021-04-09 ENCOUNTER — TELEPHONE (OUTPATIENT)
Dept: GASTROENTEROLOGY | Facility: CLINIC | Age: 86
End: 2021-04-09

## 2021-04-09 ENCOUNTER — OFFICE VISIT (OUTPATIENT)
Dept: PODIATRY | Facility: CLINIC | Age: 86
End: 2021-04-09
Payer: MEDICARE

## 2021-04-09 VITALS — HEIGHT: 66 IN | BODY MASS INDEX: 27.8 KG/M2 | WEIGHT: 173 LBS

## 2021-04-09 DIAGNOSIS — R10.13 POSTPRANDIAL EPIGASTRIC PAIN: ICD-10-CM

## 2021-04-09 DIAGNOSIS — R10.13 EPIGASTRIC ABDOMINAL PAIN: ICD-10-CM

## 2021-04-09 DIAGNOSIS — G47.00 INSOMNIA, UNSPECIFIED TYPE: ICD-10-CM

## 2021-04-09 DIAGNOSIS — R11.2 NAUSEA AND VOMITING, INTRACTABILITY OF VOMITING NOT SPECIFIED, UNSPECIFIED VOMITING TYPE: Primary | ICD-10-CM

## 2021-04-09 DIAGNOSIS — M20.5X2 ADDUCTOVARUS ROTATION OF TOE, ACQUIRED, LEFT: ICD-10-CM

## 2021-04-09 DIAGNOSIS — R79.89 ELEVATED LFTS: Primary | ICD-10-CM

## 2021-04-09 DIAGNOSIS — L84 CORN OF TOE: ICD-10-CM

## 2021-04-09 DIAGNOSIS — D50.9 IRON DEFICIENCY ANEMIA, UNSPECIFIED IRON DEFICIENCY ANEMIA TYPE: ICD-10-CM

## 2021-04-09 DIAGNOSIS — K80.20 GALLSTONES: ICD-10-CM

## 2021-04-09 DIAGNOSIS — R79.89 ELEVATED LFTS: ICD-10-CM

## 2021-04-09 DIAGNOSIS — B35.1 DERMATOPHYTOSIS OF NAIL: Primary | ICD-10-CM

## 2021-04-09 DIAGNOSIS — E03.4 HYPOTHYROIDISM DUE TO ACQUIRED ATROPHY OF THYROID: Primary | ICD-10-CM

## 2021-04-09 DIAGNOSIS — K80.20 CALCULUS OF GALLBLADDER WITHOUT CHOLECYSTITIS WITHOUT OBSTRUCTION: ICD-10-CM

## 2021-04-09 DIAGNOSIS — I10 HYPERTENSION, UNSPECIFIED TYPE: ICD-10-CM

## 2021-04-09 PROCEDURE — 1101F PR PT FALLS ASSESS DOC 0-1 FALLS W/OUT INJ PAST YR: ICD-10-PCS | Mod: CPTII,,, | Performed by: PODIATRIST

## 2021-04-09 PROCEDURE — 99203 OFFICE O/P NEW LOW 30 MIN: CPT | Mod: ,,, | Performed by: PODIATRIST

## 2021-04-09 PROCEDURE — 1126F PR PAIN SEVERITY QUANTIFIED, NO PAIN PRESENT: ICD-10-PCS | Mod: ,,, | Performed by: PODIATRIST

## 2021-04-09 PROCEDURE — 1159F MED LIST DOCD IN RCRD: CPT | Mod: ,,, | Performed by: PODIATRIST

## 2021-04-09 PROCEDURE — 3288F FALL RISK ASSESSMENT DOCD: CPT | Mod: CPTII,,, | Performed by: PODIATRIST

## 2021-04-09 PROCEDURE — 17999 PR NON-COVERED FOOT CARE: ICD-10-PCS | Mod: CSM,S$GLB,, | Performed by: PODIATRIST

## 2021-04-09 PROCEDURE — 99203 PR OFFICE/OUTPT VISIT, NEW, LEVL III, 30-44 MIN: ICD-10-PCS | Mod: ,,, | Performed by: PODIATRIST

## 2021-04-09 PROCEDURE — 1157F PR ADVANCE CARE PLAN OR EQUIV PRESENT IN MEDICAL RECORD: ICD-10-PCS | Mod: ,,, | Performed by: PODIATRIST

## 2021-04-09 PROCEDURE — 1159F PR MEDICATION LIST DOCUMENTED IN MEDICAL RECORD: ICD-10-PCS | Mod: ,,, | Performed by: PODIATRIST

## 2021-04-09 PROCEDURE — 1157F ADVNC CARE PLAN IN RCRD: CPT | Mod: ,,, | Performed by: PODIATRIST

## 2021-04-09 PROCEDURE — 17999 UNLISTD PX SKN MUC MEMB SUBQ: CPT | Mod: CSM,S$GLB,, | Performed by: PODIATRIST

## 2021-04-09 PROCEDURE — 3288F PR FALLS RISK ASSESSMENT DOCUMENTED: ICD-10-PCS | Mod: CPTII,,, | Performed by: PODIATRIST

## 2021-04-09 PROCEDURE — 99999 PR PBB SHADOW E&M-EST. PATIENT-LVL III: ICD-10-PCS | Mod: PBBFAC,,, | Performed by: PODIATRIST

## 2021-04-09 PROCEDURE — 1101F PT FALLS ASSESS-DOCD LE1/YR: CPT | Mod: CPTII,,, | Performed by: PODIATRIST

## 2021-04-09 PROCEDURE — 99999 PR PBB SHADOW E&M-EST. PATIENT-LVL III: CPT | Mod: PBBFAC,,, | Performed by: PODIATRIST

## 2021-04-09 PROCEDURE — 1126F AMNT PAIN NOTED NONE PRSNT: CPT | Mod: ,,, | Performed by: PODIATRIST

## 2021-04-10 ENCOUNTER — LAB VISIT (OUTPATIENT)
Dept: LAB | Facility: HOSPITAL | Age: 86
End: 2021-04-10
Attending: INTERNAL MEDICINE
Payer: MEDICARE

## 2021-04-10 DIAGNOSIS — D50.9 IRON DEFICIENCY ANEMIA, UNSPECIFIED IRON DEFICIENCY ANEMIA TYPE: ICD-10-CM

## 2021-04-10 DIAGNOSIS — K80.20 CALCULUS OF GALLBLADDER WITHOUT CHOLECYSTITIS WITHOUT OBSTRUCTION: ICD-10-CM

## 2021-04-10 DIAGNOSIS — R79.89 ELEVATED LFTS: ICD-10-CM

## 2021-04-10 LAB
ALBUMIN SERPL BCP-MCNC: 3.3 G/DL (ref 3.5–5.2)
ALP SERPL-CCNC: 179 U/L (ref 55–135)
ALT SERPL W/O P-5'-P-CCNC: 74 U/L (ref 10–44)
ANION GAP SERPL CALC-SCNC: 11 MMOL/L (ref 8–16)
AST SERPL-CCNC: 35 U/L (ref 10–40)
BASOPHILS # BLD AUTO: 0.05 K/UL (ref 0–0.2)
BASOPHILS NFR BLD: 0.5 % (ref 0–1.9)
BILIRUB DIRECT SERPL-MCNC: 0.6 MG/DL (ref 0.1–0.3)
BILIRUB SERPL-MCNC: 1 MG/DL (ref 0.1–1)
BUN SERPL-MCNC: 20 MG/DL (ref 8–23)
CALCIUM SERPL-MCNC: 9.6 MG/DL (ref 8.7–10.5)
CERULOPLASMIN SERPL-MCNC: 38 MG/DL (ref 15–45)
CHLORIDE SERPL-SCNC: 97 MMOL/L (ref 95–110)
CO2 SERPL-SCNC: 23 MMOL/L (ref 23–29)
CREAT SERPL-MCNC: 1.3 MG/DL (ref 0.5–1.4)
DIFFERENTIAL METHOD: ABNORMAL
EOSINOPHIL # BLD AUTO: 0.1 K/UL (ref 0–0.5)
EOSINOPHIL NFR BLD: 1.2 % (ref 0–8)
ERYTHROCYTE [DISTWIDTH] IN BLOOD BY AUTOMATED COUNT: 13.2 % (ref 11.5–14.5)
EST. GFR  (AFRICAN AMERICAN): 43.8 ML/MIN/1.73 M^2
EST. GFR  (NON AFRICAN AMERICAN): 38 ML/MIN/1.73 M^2
FERRITIN SERPL-MCNC: 338 NG/ML (ref 20–300)
GLUCOSE SERPL-MCNC: 100 MG/DL (ref 70–110)
HCT VFR BLD AUTO: 32.4 % (ref 37–48.5)
HGB BLD-MCNC: 10.6 G/DL (ref 12–16)
IGG SERPL-MCNC: 1225 MG/DL (ref 650–1600)
IMM GRANULOCYTES # BLD AUTO: 0.09 K/UL (ref 0–0.04)
IMM GRANULOCYTES NFR BLD AUTO: 0.9 % (ref 0–0.5)
IRON SERPL-MCNC: 64 UG/DL (ref 30–160)
LYMPHOCYTES # BLD AUTO: 1.6 K/UL (ref 1–4.8)
LYMPHOCYTES NFR BLD: 16.1 % (ref 18–48)
MCH RBC QN AUTO: 29.7 PG (ref 27–31)
MCHC RBC AUTO-ENTMCNC: 32.7 G/DL (ref 32–36)
MCV RBC AUTO: 91 FL (ref 82–98)
MONOCYTES # BLD AUTO: 0.9 K/UL (ref 0.3–1)
MONOCYTES NFR BLD: 8.6 % (ref 4–15)
NEUTROPHILS # BLD AUTO: 7.1 K/UL (ref 1.8–7.7)
NEUTROPHILS NFR BLD: 72.7 % (ref 38–73)
NRBC BLD-RTO: 0 /100 WBC
PLATELET # BLD AUTO: 296 K/UL (ref 150–450)
PMV BLD AUTO: 10.7 FL (ref 9.2–12.9)
POTASSIUM SERPL-SCNC: 4.2 MMOL/L (ref 3.5–5.1)
PROT SERPL-MCNC: 7.2 G/DL (ref 6–8.4)
RBC # BLD AUTO: 3.57 M/UL (ref 4–5.4)
SATURATED IRON: 18 % (ref 20–50)
SODIUM SERPL-SCNC: 131 MMOL/L (ref 136–145)
TOTAL IRON BINDING CAPACITY: 355 UG/DL (ref 250–450)
TRANSFERRIN SERPL-MCNC: 240 MG/DL (ref 200–375)
WBC # BLD AUTO: 9.83 K/UL (ref 3.9–12.7)

## 2021-04-10 PROCEDURE — 85025 COMPLETE CBC W/AUTO DIFF WBC: CPT | Performed by: INTERNAL MEDICINE

## 2021-04-10 PROCEDURE — 86256 FLUORESCENT ANTIBODY TITER: CPT | Mod: 91 | Performed by: INTERNAL MEDICINE

## 2021-04-10 PROCEDURE — 82390 ASSAY OF CERULOPLASMIN: CPT | Performed by: INTERNAL MEDICINE

## 2021-04-10 PROCEDURE — 82103 ALPHA-1-ANTITRYPSIN TOTAL: CPT | Performed by: INTERNAL MEDICINE

## 2021-04-10 PROCEDURE — 86235 NUCLEAR ANTIGEN ANTIBODY: CPT | Performed by: INTERNAL MEDICINE

## 2021-04-10 PROCEDURE — 87340 HEPATITIS B SURFACE AG IA: CPT | Performed by: INTERNAL MEDICINE

## 2021-04-10 PROCEDURE — 80048 BASIC METABOLIC PNL TOTAL CA: CPT | Performed by: INTERNAL MEDICINE

## 2021-04-10 PROCEDURE — 83540 ASSAY OF IRON: CPT | Performed by: INTERNAL MEDICINE

## 2021-04-10 PROCEDURE — 86790 VIRUS ANTIBODY NOS: CPT | Performed by: INTERNAL MEDICINE

## 2021-04-10 PROCEDURE — 80076 HEPATIC FUNCTION PANEL: CPT | Performed by: INTERNAL MEDICINE

## 2021-04-10 PROCEDURE — 36415 COLL VENOUS BLD VENIPUNCTURE: CPT | Performed by: INTERNAL MEDICINE

## 2021-04-10 PROCEDURE — 82784 ASSAY IGA/IGD/IGG/IGM EACH: CPT | Performed by: INTERNAL MEDICINE

## 2021-04-10 PROCEDURE — 86038 ANTINUCLEAR ANTIBODIES: CPT | Performed by: INTERNAL MEDICINE

## 2021-04-10 PROCEDURE — 86803 HEPATITIS C AB TEST: CPT | Performed by: INTERNAL MEDICINE

## 2021-04-10 PROCEDURE — 82728 ASSAY OF FERRITIN: CPT | Performed by: INTERNAL MEDICINE

## 2021-04-10 PROCEDURE — 86706 HEP B SURFACE ANTIBODY: CPT | Performed by: INTERNAL MEDICINE

## 2021-04-10 PROCEDURE — 86709 HEPATITIS A IGM ANTIBODY: CPT | Performed by: INTERNAL MEDICINE

## 2021-04-12 LAB — ANA SER QL IF: NORMAL

## 2021-04-12 RX ORDER — LOSARTAN POTASSIUM 100 MG/1
TABLET ORAL
Qty: 90 TABLET | Refills: 1 | Status: SHIPPED | OUTPATIENT
Start: 2021-04-12 | End: 2021-09-21

## 2021-04-12 RX ORDER — ZOLPIDEM TARTRATE 5 MG/1
TABLET ORAL
Qty: 90 TABLET | Refills: 0 | Status: SHIPPED | OUTPATIENT
Start: 2021-04-12 | End: 2021-06-09

## 2021-04-12 RX ORDER — ATENOLOL 50 MG/1
TABLET ORAL
Qty: 90 TABLET | Refills: 1 | Status: SHIPPED | OUTPATIENT
Start: 2021-04-12 | End: 2021-09-21

## 2021-04-12 RX ORDER — INDAPAMIDE 2.5 MG/1
TABLET ORAL
Qty: 90 TABLET | Refills: 1 | Status: SHIPPED | OUTPATIENT
Start: 2021-04-12 | End: 2021-06-02

## 2021-04-12 RX ORDER — ATENOLOL 25 MG/1
TABLET ORAL
Qty: 90 TABLET | Refills: 1 | Status: SHIPPED | OUTPATIENT
Start: 2021-04-12 | End: 2021-09-21

## 2021-04-12 RX ORDER — LEVOTHYROXINE SODIUM 100 UG/1
TABLET ORAL
Qty: 90 TABLET | Refills: 1 | Status: SHIPPED | OUTPATIENT
Start: 2021-04-12 | End: 2021-09-21

## 2021-04-13 ENCOUNTER — PES CALL (OUTPATIENT)
Dept: ADMINISTRATIVE | Facility: CLINIC | Age: 86
End: 2021-04-13

## 2021-04-13 LAB
HAV IGM SERPL QL IA: NEGATIVE
HBV SURFACE AG SERPL QL IA: NEGATIVE
HCV AB SERPL QL IA: NEGATIVE
HEPATITIS A ANTIBODY, IGG: NEGATIVE
MITOCHONDRIA AB TITR SER IF: NORMAL {TITER}
SMOOTH MUSCLE AB TITR SER IF: NORMAL {TITER}

## 2021-04-14 ENCOUNTER — TELEPHONE (OUTPATIENT)
Dept: INTERNAL MEDICINE | Facility: CLINIC | Age: 86
End: 2021-04-14

## 2021-04-14 LAB
HBV SURFACE AB SER QL IA: NEGATIVE
HBV SURFACE AB SERPL IA-ACNC: 3 MIU/ML

## 2021-04-15 LAB
A1AT PHENOTYP SERPL-IMP: ABNORMAL BANDS
A1AT SERPL NEPH-MCNC: 222 MG/DL (ref 100–190)

## 2021-04-16 ENCOUNTER — PATIENT MESSAGE (OUTPATIENT)
Dept: GASTROENTEROLOGY | Facility: CLINIC | Age: 86
End: 2021-04-16

## 2021-04-19 ENCOUNTER — HOSPITAL ENCOUNTER (OUTPATIENT)
Dept: RADIOLOGY | Facility: HOSPITAL | Age: 86
Discharge: HOME | End: 2021-04-19
Attending: INTERNAL MEDICINE
Payer: MEDICARE

## 2021-04-19 ENCOUNTER — TELEPHONE (OUTPATIENT)
Dept: GASTROENTEROLOGY | Facility: CLINIC | Age: 86
End: 2021-04-19

## 2021-04-19 ENCOUNTER — TELEPHONE (OUTPATIENT)
Dept: HEPATOLOGY | Facility: CLINIC | Age: 86
End: 2021-04-19

## 2021-04-19 ENCOUNTER — PATIENT MESSAGE (OUTPATIENT)
Dept: GASTROENTEROLOGY | Facility: CLINIC | Age: 86
End: 2021-04-19

## 2021-04-19 DIAGNOSIS — K80.20 GALLSTONES: ICD-10-CM

## 2021-04-19 DIAGNOSIS — R10.13 POSTPRANDIAL EPIGASTRIC PAIN: ICD-10-CM

## 2021-04-19 DIAGNOSIS — R11.2 NAUSEA AND VOMITING, INTRACTABILITY OF VOMITING NOT SPECIFIED, UNSPECIFIED VOMITING TYPE: ICD-10-CM

## 2021-04-19 DIAGNOSIS — R79.89 ELEVATED LFTS: ICD-10-CM

## 2021-04-19 LAB
CREAT SERPL-MCNC: 1.3 MG/DL (ref 0.5–1.4)
SAMPLE: NORMAL

## 2021-04-19 PROCEDURE — A9585 GADOBUTROL INJECTION: HCPCS | Performed by: INTERNAL MEDICINE

## 2021-04-19 PROCEDURE — 74183 MRI ABD W/O CNTR FLWD CNTR: CPT | Mod: 26,,, | Performed by: RADIOLOGY

## 2021-04-19 PROCEDURE — 25500020 PHARM REV CODE 255: Performed by: INTERNAL MEDICINE

## 2021-04-19 PROCEDURE — 74183 MRI ABDOMEN W WO CONTRAST: ICD-10-PCS | Mod: 26,,, | Performed by: RADIOLOGY

## 2021-04-19 PROCEDURE — 74183 MRI ABD W/O CNTR FLWD CNTR: CPT | Mod: TC

## 2021-04-19 RX ORDER — GADOBUTROL 604.72 MG/ML
10 INJECTION INTRAVENOUS
Status: COMPLETED | OUTPATIENT
Start: 2021-04-19 | End: 2021-04-19

## 2021-04-19 RX ADMIN — GADOBUTROL 10 ML: 604.72 INJECTION INTRAVENOUS at 08:04

## 2021-04-20 ENCOUNTER — PATIENT MESSAGE (OUTPATIENT)
Dept: GASTROENTEROLOGY | Facility: CLINIC | Age: 86
End: 2021-04-20

## 2021-04-20 ENCOUNTER — TELEPHONE (OUTPATIENT)
Dept: ENDOSCOPY | Facility: HOSPITAL | Age: 86
End: 2021-04-20

## 2021-04-20 ENCOUNTER — OFFICE VISIT (OUTPATIENT)
Dept: GASTROENTEROLOGY | Facility: CLINIC | Age: 86
End: 2021-04-20
Payer: MEDICARE

## 2021-04-20 VITALS — WEIGHT: 173 LBS | HEIGHT: 66 IN | BODY MASS INDEX: 27.8 KG/M2

## 2021-04-20 DIAGNOSIS — D12.6 COLON ADENOMA: ICD-10-CM

## 2021-04-20 DIAGNOSIS — Z80.0 FAMILY HISTORY OF COLON CANCER: ICD-10-CM

## 2021-04-20 DIAGNOSIS — R11.2 NON-INTRACTABLE VOMITING WITH NAUSEA, UNSPECIFIED VOMITING TYPE: ICD-10-CM

## 2021-04-20 DIAGNOSIS — K80.20 CALCULUS OF GALLBLADDER WITHOUT CHOLECYSTITIS WITHOUT OBSTRUCTION: ICD-10-CM

## 2021-04-20 DIAGNOSIS — K80.50 CHOLEDOCHOLITHIASIS: ICD-10-CM

## 2021-04-20 DIAGNOSIS — D50.9 IRON DEFICIENCY ANEMIA, UNSPECIFIED IRON DEFICIENCY ANEMIA TYPE: ICD-10-CM

## 2021-04-20 DIAGNOSIS — R79.89 ELEVATED LFTS: Primary | ICD-10-CM

## 2021-04-20 DIAGNOSIS — K83.8 DILATED BILE DUCT: ICD-10-CM

## 2021-04-20 PROCEDURE — 1157F PR ADVANCE CARE PLAN OR EQUIV PRESENT IN MEDICAL RECORD: ICD-10-PCS | Mod: 95,,, | Performed by: INTERNAL MEDICINE

## 2021-04-20 PROCEDURE — 99215 PR OFFICE/OUTPT VISIT, EST, LEVL V, 40-54 MIN: ICD-10-PCS | Mod: 95,,, | Performed by: INTERNAL MEDICINE

## 2021-04-20 PROCEDURE — 1157F ADVNC CARE PLAN IN RCRD: CPT | Mod: 95,,, | Performed by: INTERNAL MEDICINE

## 2021-04-20 PROCEDURE — 1159F PR MEDICATION LIST DOCUMENTED IN MEDICAL RECORD: ICD-10-PCS | Mod: 95,,, | Performed by: INTERNAL MEDICINE

## 2021-04-20 PROCEDURE — 99215 OFFICE O/P EST HI 40 MIN: CPT | Mod: 95,,, | Performed by: INTERNAL MEDICINE

## 2021-04-20 PROCEDURE — 1159F MED LIST DOCD IN RCRD: CPT | Mod: 95,,, | Performed by: INTERNAL MEDICINE

## 2021-04-20 RX ORDER — FERROUS SULFATE 325(65) MG
325 TABLET ORAL EVERY 12 HOURS
Qty: 60 TABLET | Refills: 4 | Status: SHIPPED | OUTPATIENT
Start: 2021-04-20 | End: 2021-09-21

## 2021-04-21 ENCOUNTER — TELEPHONE (OUTPATIENT)
Dept: ENDOSCOPY | Facility: HOSPITAL | Age: 86
End: 2021-04-21

## 2021-04-21 ENCOUNTER — TELEPHONE (OUTPATIENT)
Dept: GASTROENTEROLOGY | Facility: CLINIC | Age: 86
End: 2021-04-21

## 2021-04-22 ENCOUNTER — CLINICAL SUPPORT (OUTPATIENT)
Dept: INFECTIOUS DISEASES | Facility: CLINIC | Age: 86
End: 2021-04-22
Payer: MEDICARE

## 2021-04-22 DIAGNOSIS — D50.9 IRON DEFICIENCY ANEMIA, UNSPECIFIED IRON DEFICIENCY ANEMIA TYPE: ICD-10-CM

## 2021-04-22 DIAGNOSIS — K80.50 CHOLEDOCHOLITHIASIS: ICD-10-CM

## 2021-04-22 DIAGNOSIS — Z80.0 FAMILY HISTORY OF COLON CANCER: ICD-10-CM

## 2021-04-22 DIAGNOSIS — R79.89 ELEVATED LFTS: ICD-10-CM

## 2021-04-22 DIAGNOSIS — D12.6 COLON ADENOMA: ICD-10-CM

## 2021-04-22 DIAGNOSIS — R11.2 NON-INTRACTABLE VOMITING WITH NAUSEA, UNSPECIFIED VOMITING TYPE: ICD-10-CM

## 2021-04-22 DIAGNOSIS — K80.20 CALCULUS OF GALLBLADDER WITHOUT CHOLECYSTITIS WITHOUT OBSTRUCTION: ICD-10-CM

## 2021-04-22 PROCEDURE — 90632 HEPATITIS A VACCINE ADULT IM: ICD-10-PCS | Mod: S$GLB,,, | Performed by: INTERNAL MEDICINE

## 2021-04-22 PROCEDURE — G0010 ADMIN HEPATITIS B VACCINE: HCPCS | Mod: 59,S$GLB,, | Performed by: INTERNAL MEDICINE

## 2021-04-22 PROCEDURE — 90471 IMMUNIZATION ADMIN: CPT | Mod: S$GLB,,, | Performed by: INTERNAL MEDICINE

## 2021-04-22 PROCEDURE — 90632 HEPA VACCINE ADULT IM: CPT | Mod: S$GLB,,, | Performed by: INTERNAL MEDICINE

## 2021-04-22 PROCEDURE — 90471 HEPATITIS A VACCINE ADULT IM: ICD-10-PCS | Mod: S$GLB,,, | Performed by: INTERNAL MEDICINE

## 2021-04-22 PROCEDURE — G0010 HEPATITIS B (RECOMBINANT) ADJUVANTED, 2 DOSE: ICD-10-PCS | Mod: 59,S$GLB,, | Performed by: INTERNAL MEDICINE

## 2021-04-22 PROCEDURE — 90739 HEPATITIS B (RECOMBINANT) ADJUVANTED, 2 DOSE: ICD-10-PCS | Mod: S$GLB,,, | Performed by: INTERNAL MEDICINE

## 2021-04-22 PROCEDURE — 90739 HEPB VACC 2/4 DOSE ADULT IM: CPT | Mod: S$GLB,,, | Performed by: INTERNAL MEDICINE

## 2021-04-26 ENCOUNTER — OFFICE VISIT (OUTPATIENT)
Dept: SURGERY | Facility: CLINIC | Age: 86
End: 2021-04-26
Payer: MEDICARE

## 2021-04-26 ENCOUNTER — TELEPHONE (OUTPATIENT)
Dept: ENDOSCOPY | Facility: HOSPITAL | Age: 86
End: 2021-04-26

## 2021-04-26 ENCOUNTER — HOSPITAL ENCOUNTER (OUTPATIENT)
Dept: RADIOLOGY | Facility: HOSPITAL | Age: 86
Discharge: HOME | End: 2021-04-26
Attending: SURGERY
Payer: MEDICARE

## 2021-04-26 ENCOUNTER — LAB VISIT (OUTPATIENT)
Dept: INTERNAL MEDICINE | Facility: CLINIC | Age: 86
End: 2021-04-26
Payer: MEDICARE

## 2021-04-26 ENCOUNTER — HOSPITAL ENCOUNTER (OUTPATIENT)
Dept: CARDIOLOGY | Facility: CLINIC | Age: 86
Discharge: HOME | End: 2021-04-26
Payer: MEDICARE

## 2021-04-26 VITALS
BODY MASS INDEX: 26.68 KG/M2 | HEIGHT: 66 IN | WEIGHT: 166 LBS | DIASTOLIC BLOOD PRESSURE: 65 MMHG | SYSTOLIC BLOOD PRESSURE: 160 MMHG | HEART RATE: 56 BPM

## 2021-04-26 DIAGNOSIS — Z01.818 PREOP TESTING: ICD-10-CM

## 2021-04-26 DIAGNOSIS — K80.20 CALCULUS OF GALLBLADDER WITHOUT CHOLECYSTITIS WITHOUT OBSTRUCTION: Primary | ICD-10-CM

## 2021-04-26 DIAGNOSIS — K80.20 CALCULUS OF GALLBLADDER WITHOUT CHOLECYSTITIS WITHOUT OBSTRUCTION: ICD-10-CM

## 2021-04-26 DIAGNOSIS — Z01.818 PRE-OP TESTING: ICD-10-CM

## 2021-04-26 DIAGNOSIS — R10.13 EPIGASTRIC ABDOMINAL PAIN: ICD-10-CM

## 2021-04-26 PROCEDURE — 1159F MED LIST DOCD IN RCRD: CPT | Mod: S$GLB,,, | Performed by: SURGERY

## 2021-04-26 PROCEDURE — 1157F ADVNC CARE PLAN IN RCRD: CPT | Mod: S$GLB,,, | Performed by: SURGERY

## 2021-04-26 PROCEDURE — 99999 PR PBB SHADOW E&M-EST. PATIENT-LVL V: CPT | Mod: PBBFAC,,, | Performed by: SURGERY

## 2021-04-26 PROCEDURE — 93010 ELECTROCARDIOGRAM REPORT: CPT | Mod: S$GLB,,, | Performed by: INTERNAL MEDICINE

## 2021-04-26 PROCEDURE — 1126F AMNT PAIN NOTED NONE PRSNT: CPT | Mod: S$GLB,,, | Performed by: SURGERY

## 2021-04-26 PROCEDURE — 99999 PR PBB SHADOW E&M-EST. PATIENT-LVL V: ICD-10-PCS | Mod: PBBFAC,,, | Performed by: SURGERY

## 2021-04-26 PROCEDURE — 1126F PR PAIN SEVERITY QUANTIFIED, NO PAIN PRESENT: ICD-10-PCS | Mod: S$GLB,,, | Performed by: SURGERY

## 2021-04-26 PROCEDURE — 1159F PR MEDICATION LIST DOCUMENTED IN MEDICAL RECORD: ICD-10-PCS | Mod: S$GLB,,, | Performed by: SURGERY

## 2021-04-26 PROCEDURE — 71046 XR CHEST PA AND LATERAL: ICD-10-PCS | Mod: 26,,, | Performed by: RADIOLOGY

## 2021-04-26 PROCEDURE — 93005 ELECTROCARDIOGRAM TRACING: CPT | Mod: S$GLB,,, | Performed by: SURGERY

## 2021-04-26 PROCEDURE — 93010 EKG 12-LEAD: ICD-10-PCS | Mod: S$GLB,,, | Performed by: INTERNAL MEDICINE

## 2021-04-26 PROCEDURE — 1157F PR ADVANCE CARE PLAN OR EQUIV PRESENT IN MEDICAL RECORD: ICD-10-PCS | Mod: S$GLB,,, | Performed by: SURGERY

## 2021-04-26 PROCEDURE — U0005 INFEC AGEN DETEC AMPLI PROBE: HCPCS | Performed by: FAMILY MEDICINE

## 2021-04-26 PROCEDURE — 99204 PR OFFICE/OUTPT VISIT, NEW, LEVL IV, 45-59 MIN: ICD-10-PCS | Mod: S$GLB,CS,, | Performed by: SURGERY

## 2021-04-26 PROCEDURE — 3288F FALL RISK ASSESSMENT DOCD: CPT | Mod: CPTII,S$GLB,, | Performed by: SURGERY

## 2021-04-26 PROCEDURE — 3288F PR FALLS RISK ASSESSMENT DOCUMENTED: ICD-10-PCS | Mod: CPTII,S$GLB,, | Performed by: SURGERY

## 2021-04-26 PROCEDURE — U0003 INFECTIOUS AGENT DETECTION BY NUCLEIC ACID (DNA OR RNA); SEVERE ACUTE RESPIRATORY SYNDROME CORONAVIRUS 2 (SARS-COV-2) (CORONAVIRUS DISEASE [COVID-19]), AMPLIFIED PROBE TECHNIQUE, MAKING USE OF HIGH THROUGHPUT TECHNOLOGIES AS DESCRIBED BY CMS-2020-01-R: HCPCS | Performed by: FAMILY MEDICINE

## 2021-04-26 PROCEDURE — 71046 X-RAY EXAM CHEST 2 VIEWS: CPT | Mod: 26,,, | Performed by: RADIOLOGY

## 2021-04-26 PROCEDURE — 1101F PR PT FALLS ASSESS DOC 0-1 FALLS W/OUT INJ PAST YR: ICD-10-PCS | Mod: CPTII,S$GLB,, | Performed by: SURGERY

## 2021-04-26 PROCEDURE — 93005 EKG 12-LEAD: ICD-10-PCS | Mod: S$GLB,,, | Performed by: SURGERY

## 2021-04-26 PROCEDURE — 71046 X-RAY EXAM CHEST 2 VIEWS: CPT | Mod: TC,FY

## 2021-04-26 PROCEDURE — 99204 OFFICE O/P NEW MOD 45 MIN: CPT | Mod: S$GLB,CS,, | Performed by: SURGERY

## 2021-04-26 PROCEDURE — 1101F PT FALLS ASSESS-DOCD LE1/YR: CPT | Mod: CPTII,S$GLB,, | Performed by: SURGERY

## 2021-04-26 RX ORDER — CEFAZOLIN SODIUM 2 G/50ML
2 SOLUTION INTRAVENOUS
Status: CANCELLED | OUTPATIENT
Start: 2021-04-26

## 2021-04-27 ENCOUNTER — PATIENT MESSAGE (OUTPATIENT)
Dept: ENDOSCOPY | Facility: HOSPITAL | Age: 86
End: 2021-04-27

## 2021-04-27 ENCOUNTER — TELEPHONE (OUTPATIENT)
Dept: ENDOSCOPY | Facility: HOSPITAL | Age: 86
End: 2021-04-27

## 2021-04-27 LAB — SARS-COV-2 RNA RESP QL NAA+PROBE: NOT DETECTED

## 2021-04-29 ENCOUNTER — ANESTHESIA EVENT (OUTPATIENT)
Dept: ENDOSCOPY | Facility: HOSPITAL | Age: 86
End: 2021-04-29
Payer: MEDICARE

## 2021-04-29 ENCOUNTER — ANESTHESIA (OUTPATIENT)
Dept: ENDOSCOPY | Facility: HOSPITAL | Age: 86
End: 2021-04-29
Payer: MEDICARE

## 2021-04-29 ENCOUNTER — HOSPITAL ENCOUNTER (OUTPATIENT)
Facility: HOSPITAL | Age: 86
Discharge: HOME | End: 2021-04-29
Attending: INTERNAL MEDICINE | Admitting: INTERNAL MEDICINE
Payer: MEDICARE

## 2021-04-29 VITALS
WEIGHT: 150 LBS | DIASTOLIC BLOOD PRESSURE: 75 MMHG | BODY MASS INDEX: 24.11 KG/M2 | HEART RATE: 46 BPM | TEMPERATURE: 97 F | OXYGEN SATURATION: 100 % | HEIGHT: 66 IN | RESPIRATION RATE: 16 BRPM | SYSTOLIC BLOOD PRESSURE: 160 MMHG

## 2021-04-29 DIAGNOSIS — K80.50 CHOLEDOCHOLITHIASIS: ICD-10-CM

## 2021-04-29 PROCEDURE — 43259 EGD US EXAM DUODENUM/JEJUNUM: CPT | Performed by: INTERNAL MEDICINE

## 2021-04-29 PROCEDURE — D9220A PRA ANESTHESIA: Mod: ANES,,, | Performed by: ANESTHESIOLOGY

## 2021-04-29 PROCEDURE — 43264 PR ERCP,W/REMOVAL STONE,BIL/PANCR DUCTS: ICD-10-PCS | Mod: 51,,, | Performed by: INTERNAL MEDICINE

## 2021-04-29 PROCEDURE — 43262 ENDO CHOLANGIOPANCREATOGRAPH: CPT | Performed by: INTERNAL MEDICINE

## 2021-04-29 PROCEDURE — 25000003 PHARM REV CODE 250: Performed by: NURSE ANESTHETIST, CERTIFIED REGISTERED

## 2021-04-29 PROCEDURE — 37000009 HC ANESTHESIA EA ADD 15 MINS: Performed by: INTERNAL MEDICINE

## 2021-04-29 PROCEDURE — D9220A PRA ANESTHESIA: Mod: CRNA,,, | Performed by: NURSE ANESTHETIST, CERTIFIED REGISTERED

## 2021-04-29 PROCEDURE — 43259 PR ENDOSCOPIC ULTRASOUND EXAM: ICD-10-PCS | Mod: 51,,, | Performed by: INTERNAL MEDICINE

## 2021-04-29 PROCEDURE — 43264 ERCP REMOVE DUCT CALCULI: CPT | Performed by: INTERNAL MEDICINE

## 2021-04-29 PROCEDURE — D9220A PRA ANESTHESIA: ICD-10-PCS | Mod: CRNA,,, | Performed by: NURSE ANESTHETIST, CERTIFIED REGISTERED

## 2021-04-29 PROCEDURE — 25500020 PHARM REV CODE 255: Performed by: INTERNAL MEDICINE

## 2021-04-29 PROCEDURE — 43262 ENDO CHOLANGIOPANCREATOGRAPH: CPT | Mod: 59,,, | Performed by: INTERNAL MEDICINE

## 2021-04-29 PROCEDURE — 27202125 HC BALLOON, EXTRACTION (ANY): Performed by: INTERNAL MEDICINE

## 2021-04-29 PROCEDURE — 43264 ERCP REMOVE DUCT CALCULI: CPT | Mod: 51,,, | Performed by: INTERNAL MEDICINE

## 2021-04-29 PROCEDURE — 63600175 PHARM REV CODE 636 W HCPCS: Performed by: NURSE ANESTHETIST, CERTIFIED REGISTERED

## 2021-04-29 PROCEDURE — 37000008 HC ANESTHESIA 1ST 15 MINUTES: Performed by: INTERNAL MEDICINE

## 2021-04-29 PROCEDURE — 43274 PR ERCP W/STENT PLCMNT BILIARY/PANCREATIC DUCT: ICD-10-PCS | Mod: ,,, | Performed by: INTERNAL MEDICINE

## 2021-04-29 PROCEDURE — 74328 X-RAY BILE DUCT ENDOSCOPY: CPT | Performed by: INTERNAL MEDICINE

## 2021-04-29 PROCEDURE — 27201674 HC SPHINCTERTOME: Performed by: INTERNAL MEDICINE

## 2021-04-29 PROCEDURE — 25000003 PHARM REV CODE 250: Performed by: INTERNAL MEDICINE

## 2021-04-29 PROCEDURE — 43274 ERCP DUCT STENT PLACEMENT: CPT | Performed by: INTERNAL MEDICINE

## 2021-04-29 PROCEDURE — 74328 PR  X-RAY FOR BILE DUCT ENDOSCOPY: ICD-10-PCS | Mod: 26,,, | Performed by: INTERNAL MEDICINE

## 2021-04-29 PROCEDURE — C2617 STENT, NON-COR, TEM W/O DEL: HCPCS | Performed by: INTERNAL MEDICINE

## 2021-04-29 PROCEDURE — D9220A PRA ANESTHESIA: ICD-10-PCS | Mod: ANES,,, | Performed by: ANESTHESIOLOGY

## 2021-04-29 PROCEDURE — 43259 EGD US EXAM DUODENUM/JEJUNUM: CPT | Mod: 51,,, | Performed by: INTERNAL MEDICINE

## 2021-04-29 PROCEDURE — 43262 PR ERCP,SPHINCTEROTOMY: ICD-10-PCS | Mod: 59,,, | Performed by: INTERNAL MEDICINE

## 2021-04-29 PROCEDURE — 43274 ERCP DUCT STENT PLACEMENT: CPT | Mod: ,,, | Performed by: INTERNAL MEDICINE

## 2021-04-29 PROCEDURE — C1769 GUIDE WIRE: HCPCS | Performed by: INTERNAL MEDICINE

## 2021-04-29 PROCEDURE — 74328 X-RAY BILE DUCT ENDOSCOPY: CPT | Mod: 26,,, | Performed by: INTERNAL MEDICINE

## 2021-04-29 RX ORDER — INDOMETHACIN 50 MG/1
SUPPOSITORY RECTAL
Status: COMPLETED | OUTPATIENT
Start: 2021-04-29 | End: 2021-04-29

## 2021-04-29 RX ORDER — SODIUM CHLORIDE 0.9 % (FLUSH) 0.9 %
10 SYRINGE (ML) INJECTION
Status: DISCONTINUED | OUTPATIENT
Start: 2021-04-29 | End: 2021-04-29 | Stop reason: HOSPADM

## 2021-04-29 RX ORDER — SODIUM CHLORIDE 9 MG/ML
INJECTION, SOLUTION INTRAVENOUS CONTINUOUS
Status: DISCONTINUED | OUTPATIENT
Start: 2021-04-29 | End: 2021-04-29 | Stop reason: HOSPADM

## 2021-04-29 RX ORDER — PROPOFOL 10 MG/ML
VIAL (ML) INTRAVENOUS CONTINUOUS PRN
Status: DISCONTINUED | OUTPATIENT
Start: 2021-04-29 | End: 2021-04-29

## 2021-04-29 RX ORDER — LIDOCAINE HYDROCHLORIDE 20 MG/ML
INJECTION, SOLUTION EPIDURAL; INFILTRATION; INTRACAUDAL; PERINEURAL
Status: DISCONTINUED | OUTPATIENT
Start: 2021-04-29 | End: 2021-04-29

## 2021-04-29 RX ORDER — PROCHLORPERAZINE EDISYLATE 5 MG/ML
5 INJECTION INTRAMUSCULAR; INTRAVENOUS EVERY 30 MIN PRN
Status: DISCONTINUED | OUTPATIENT
Start: 2021-04-29 | End: 2021-04-29 | Stop reason: HOSPADM

## 2021-04-29 RX ORDER — SODIUM CHLORIDE 0.9 % (FLUSH) 0.9 %
3 SYRINGE (ML) INJECTION
Status: CANCELLED | OUTPATIENT
Start: 2021-04-29

## 2021-04-29 RX ORDER — PHENYLEPHRINE HCL IN 0.9% NACL 1 MG/10 ML
SYRINGE (ML) INTRAVENOUS
Status: DISCONTINUED | OUTPATIENT
Start: 2021-04-29 | End: 2021-04-29

## 2021-04-29 RX ORDER — PROPOFOL 10 MG/ML
VIAL (ML) INTRAVENOUS
Status: DISCONTINUED | OUTPATIENT
Start: 2021-04-29 | End: 2021-04-29

## 2021-04-29 RX ORDER — FENTANYL CITRATE 50 UG/ML
INJECTION, SOLUTION INTRAMUSCULAR; INTRAVENOUS
Status: DISCONTINUED | OUTPATIENT
Start: 2021-04-29 | End: 2021-04-29

## 2021-04-29 RX ADMIN — Medication 100 MCG: at 09:04

## 2021-04-29 RX ADMIN — LIDOCAINE HYDROCHLORIDE 50 MG: 20 INJECTION, SOLUTION EPIDURAL; INFILTRATION; INTRACAUDAL at 09:04

## 2021-04-29 RX ADMIN — GLYCOPYRROLATE 0.2 MG: 0.2 INJECTION, SOLUTION INTRAMUSCULAR; INTRAVITREAL at 09:04

## 2021-04-29 RX ADMIN — FENTANYL CITRATE 25 MCG: 50 INJECTION INTRAMUSCULAR; INTRAVENOUS at 09:04

## 2021-04-29 RX ADMIN — SODIUM CHLORIDE: 0.9 INJECTION, SOLUTION INTRAVENOUS at 08:04

## 2021-04-29 RX ADMIN — PROPOFOL 50 MG: 10 INJECTION, EMULSION INTRAVENOUS at 09:04

## 2021-04-29 RX ADMIN — PROPOFOL 200 MCG/KG/MIN: 10 INJECTION, EMULSION INTRAVENOUS at 09:04

## 2021-04-29 RX ADMIN — IOHEXOL 10 ML: 300 INJECTION, SOLUTION INTRAVENOUS at 09:04

## 2021-04-29 RX ADMIN — INDOMETHACIN 100 MG: 50 SUPPOSITORY RECTAL at 09:04

## 2021-04-30 ENCOUNTER — OFFICE VISIT (OUTPATIENT)
Dept: INTERNAL MEDICINE | Facility: CLINIC | Age: 86
End: 2021-04-30
Payer: MEDICARE

## 2021-04-30 VITALS
HEIGHT: 67 IN | SYSTOLIC BLOOD PRESSURE: 102 MMHG | OXYGEN SATURATION: 98 % | WEIGHT: 164.88 LBS | DIASTOLIC BLOOD PRESSURE: 50 MMHG | BODY MASS INDEX: 25.88 KG/M2 | HEART RATE: 50 BPM

## 2021-04-30 DIAGNOSIS — G47.30 SLEEP-DISORDERED BREATHING: ICD-10-CM

## 2021-04-30 DIAGNOSIS — Z96.89 PRESENCE OF PANCREATIC DUCT STENT: Primary | ICD-10-CM

## 2021-04-30 DIAGNOSIS — Z00.00 ROUTINE GENERAL MEDICAL EXAMINATION AT A HEALTH CARE FACILITY: Primary | ICD-10-CM

## 2021-04-30 PROCEDURE — 1126F PR PAIN SEVERITY QUANTIFIED, NO PAIN PRESENT: ICD-10-PCS | Mod: S$GLB,,, | Performed by: INTERNAL MEDICINE

## 2021-04-30 PROCEDURE — 1126F AMNT PAIN NOTED NONE PRSNT: CPT | Mod: S$GLB,,, | Performed by: INTERNAL MEDICINE

## 2021-04-30 PROCEDURE — 99999 PR PBB SHADOW E&M-EST. PATIENT-LVL V: CPT | Mod: PBBFAC,,, | Performed by: INTERNAL MEDICINE

## 2021-04-30 PROCEDURE — 3288F PR FALLS RISK ASSESSMENT DOCUMENTED: ICD-10-PCS | Mod: CPTII,S$GLB,, | Performed by: INTERNAL MEDICINE

## 2021-04-30 PROCEDURE — 99999 PR PBB SHADOW E&M-EST. PATIENT-LVL V: ICD-10-PCS | Mod: PBBFAC,,, | Performed by: INTERNAL MEDICINE

## 2021-04-30 PROCEDURE — 99214 OFFICE O/P EST MOD 30 MIN: CPT | Mod: S$GLB,,, | Performed by: INTERNAL MEDICINE

## 2021-04-30 PROCEDURE — 1157F ADVNC CARE PLAN IN RCRD: CPT | Mod: S$GLB,,, | Performed by: INTERNAL MEDICINE

## 2021-04-30 PROCEDURE — 1101F PR PT FALLS ASSESS DOC 0-1 FALLS W/OUT INJ PAST YR: ICD-10-PCS | Mod: CPTII,S$GLB,, | Performed by: INTERNAL MEDICINE

## 2021-04-30 PROCEDURE — 1157F PR ADVANCE CARE PLAN OR EQUIV PRESENT IN MEDICAL RECORD: ICD-10-PCS | Mod: S$GLB,,, | Performed by: INTERNAL MEDICINE

## 2021-04-30 PROCEDURE — 1101F PT FALLS ASSESS-DOCD LE1/YR: CPT | Mod: CPTII,S$GLB,, | Performed by: INTERNAL MEDICINE

## 2021-04-30 PROCEDURE — 99214 PR OFFICE/OUTPT VISIT, EST, LEVL IV, 30-39 MIN: ICD-10-PCS | Mod: S$GLB,,, | Performed by: INTERNAL MEDICINE

## 2021-04-30 PROCEDURE — 3288F FALL RISK ASSESSMENT DOCD: CPT | Mod: CPTII,S$GLB,, | Performed by: INTERNAL MEDICINE

## 2021-05-04 ENCOUNTER — TELEPHONE (OUTPATIENT)
Dept: ENDOSCOPY | Facility: HOSPITAL | Age: 86
End: 2021-05-04

## 2021-05-09 DIAGNOSIS — K80.20 CALCULUS OF GALLBLADDER WITHOUT CHOLECYSTITIS WITHOUT OBSTRUCTION: Primary | ICD-10-CM

## 2021-05-09 DIAGNOSIS — Z01.818 PREOP TESTING: ICD-10-CM

## 2021-05-10 ENCOUNTER — TELEPHONE (OUTPATIENT)
Dept: SURGERY | Facility: CLINIC | Age: 86
End: 2021-05-10

## 2021-05-10 ENCOUNTER — LAB VISIT (OUTPATIENT)
Dept: LAB | Facility: HOSPITAL | Age: 86
End: 2021-05-10
Attending: SURGERY
Payer: MEDICARE

## 2021-05-10 DIAGNOSIS — K80.20 CALCULUS OF GALLBLADDER WITHOUT CHOLECYSTITIS WITHOUT OBSTRUCTION: ICD-10-CM

## 2021-05-10 DIAGNOSIS — Z01.818 PREOP TESTING: ICD-10-CM

## 2021-05-10 LAB
ALBUMIN SERPL BCP-MCNC: 3.6 G/DL (ref 3.5–5.2)
ALP SERPL-CCNC: 112 U/L (ref 55–135)
ALT SERPL W/O P-5'-P-CCNC: 20 U/L (ref 10–44)
ANION GAP SERPL CALC-SCNC: 7 MMOL/L (ref 8–16)
AST SERPL-CCNC: 25 U/L (ref 10–40)
BASOPHILS # BLD AUTO: 0.04 K/UL (ref 0–0.2)
BASOPHILS NFR BLD: 0.8 % (ref 0–1.9)
BILIRUB SERPL-MCNC: 0.5 MG/DL (ref 0.1–1)
BUN SERPL-MCNC: 12 MG/DL (ref 8–23)
CALCIUM SERPL-MCNC: 9.5 MG/DL (ref 8.7–10.5)
CHLORIDE SERPL-SCNC: 101 MMOL/L (ref 95–110)
CO2 SERPL-SCNC: 25 MMOL/L (ref 23–29)
CREAT SERPL-MCNC: 1.2 MG/DL (ref 0.5–1.4)
DIFFERENTIAL METHOD: ABNORMAL
EOSINOPHIL # BLD AUTO: 0.2 K/UL (ref 0–0.5)
EOSINOPHIL NFR BLD: 4.3 % (ref 0–8)
ERYTHROCYTE [DISTWIDTH] IN BLOOD BY AUTOMATED COUNT: 12.5 % (ref 11.5–14.5)
EST. GFR  (AFRICAN AMERICAN): 48.3 ML/MIN/1.73 M^2
EST. GFR  (NON AFRICAN AMERICAN): 41.9 ML/MIN/1.73 M^2
GLUCOSE SERPL-MCNC: 94 MG/DL (ref 70–110)
HCT VFR BLD AUTO: 29.8 % (ref 37–48.5)
HGB BLD-MCNC: 10.2 G/DL (ref 12–16)
IMM GRANULOCYTES # BLD AUTO: 0.02 K/UL (ref 0–0.04)
IMM GRANULOCYTES NFR BLD AUTO: 0.4 % (ref 0–0.5)
LYMPHOCYTES # BLD AUTO: 1.6 K/UL (ref 1–4.8)
LYMPHOCYTES NFR BLD: 30.8 % (ref 18–48)
MCH RBC QN AUTO: 30.8 PG (ref 27–31)
MCHC RBC AUTO-ENTMCNC: 34.2 G/DL (ref 32–36)
MCV RBC AUTO: 90 FL (ref 82–98)
MONOCYTES # BLD AUTO: 0.6 K/UL (ref 0.3–1)
MONOCYTES NFR BLD: 11.6 % (ref 4–15)
NEUTROPHILS # BLD AUTO: 2.6 K/UL (ref 1.8–7.7)
NEUTROPHILS NFR BLD: 52.1 % (ref 38–73)
NRBC BLD-RTO: 0 /100 WBC
PLATELET # BLD AUTO: 220 K/UL (ref 150–450)
PMV BLD AUTO: 10.2 FL (ref 9.2–12.9)
POTASSIUM SERPL-SCNC: 4.8 MMOL/L (ref 3.5–5.1)
PROT SERPL-MCNC: 6.6 G/DL (ref 6–8.4)
RBC # BLD AUTO: 3.31 M/UL (ref 4–5.4)
SODIUM SERPL-SCNC: 133 MMOL/L (ref 136–145)
WBC # BLD AUTO: 5.07 K/UL (ref 3.9–12.7)

## 2021-05-10 PROCEDURE — 85025 COMPLETE CBC W/AUTO DIFF WBC: CPT | Performed by: SURGERY

## 2021-05-10 PROCEDURE — 36415 COLL VENOUS BLD VENIPUNCTURE: CPT | Performed by: SURGERY

## 2021-05-10 PROCEDURE — 80053 COMPREHEN METABOLIC PANEL: CPT | Performed by: SURGERY

## 2021-05-11 ENCOUNTER — OFFICE VISIT (OUTPATIENT)
Dept: PODIATRY | Facility: CLINIC | Age: 86
End: 2021-05-11
Payer: MEDICARE

## 2021-05-11 DIAGNOSIS — L84 CORN OF TOE: ICD-10-CM

## 2021-05-11 DIAGNOSIS — B35.1 DERMATOPHYTOSIS OF NAIL: Primary | ICD-10-CM

## 2021-05-11 PROCEDURE — 99499 UNLISTED E&M SERVICE: CPT | Mod: ,,, | Performed by: PODIATRIST

## 2021-05-11 PROCEDURE — 99499 NO LOS: ICD-10-PCS | Mod: ,,, | Performed by: PODIATRIST

## 2021-05-11 PROCEDURE — 1157F ADVNC CARE PLAN IN RCRD: CPT | Mod: ,,, | Performed by: PODIATRIST

## 2021-05-11 PROCEDURE — 99999 PR PBB SHADOW E&M-EST. PATIENT-LVL II: ICD-10-PCS | Mod: PBBFAC,,, | Performed by: PODIATRIST

## 2021-05-11 PROCEDURE — 1157F PR ADVANCE CARE PLAN OR EQUIV PRESENT IN MEDICAL RECORD: ICD-10-PCS | Mod: ,,, | Performed by: PODIATRIST

## 2021-05-11 PROCEDURE — 99999 PR PBB SHADOW E&M-EST. PATIENT-LVL II: CPT | Mod: PBBFAC,,, | Performed by: PODIATRIST

## 2021-05-11 PROCEDURE — 17999 PR NON-COVERED FOOT CARE: ICD-10-PCS | Mod: CSM,S$GLB,, | Performed by: PODIATRIST

## 2021-05-11 PROCEDURE — 17999 UNLISTD PX SKN MUC MEMB SUBQ: CPT | Mod: CSM,S$GLB,, | Performed by: PODIATRIST

## 2021-05-14 ENCOUNTER — PATIENT MESSAGE (OUTPATIENT)
Dept: GASTROENTEROLOGY | Facility: HOSPITAL | Age: 86
End: 2021-05-14

## 2021-05-14 ENCOUNTER — TELEPHONE (OUTPATIENT)
Dept: SURGERY | Facility: CLINIC | Age: 86
End: 2021-05-14

## 2021-05-14 ENCOUNTER — LAB VISIT (OUTPATIENT)
Dept: INTERNAL MEDICINE | Facility: CLINIC | Age: 86
End: 2021-05-14
Payer: MEDICARE

## 2021-05-14 ENCOUNTER — HOSPITAL ENCOUNTER (OUTPATIENT)
Dept: RADIOLOGY | Facility: HOSPITAL | Age: 86
Discharge: HOME | End: 2021-05-14
Attending: INTERNAL MEDICINE
Payer: MEDICARE

## 2021-05-14 ENCOUNTER — PATIENT MESSAGE (OUTPATIENT)
Dept: SURGERY | Facility: HOSPITAL | Age: 86
End: 2021-05-14

## 2021-05-14 ENCOUNTER — ANESTHESIA EVENT (OUTPATIENT)
Dept: SURGERY | Facility: HOSPITAL | Age: 86
End: 2021-05-14
Payer: MEDICARE

## 2021-05-14 DIAGNOSIS — Z96.89 PRESENCE OF PANCREATIC DUCT STENT: ICD-10-CM

## 2021-05-14 DIAGNOSIS — Z01.818 PRE-OP TESTING: ICD-10-CM

## 2021-05-14 LAB — SARS-COV-2 RNA RESP QL NAA+PROBE: NOT DETECTED

## 2021-05-14 PROCEDURE — 74019 RADEX ABDOMEN 2 VIEWS: CPT | Mod: 26,,, | Performed by: RADIOLOGY

## 2021-05-14 PROCEDURE — U0005 INFEC AGEN DETEC AMPLI PROBE: HCPCS | Performed by: SURGERY

## 2021-05-14 PROCEDURE — 74019 RADEX ABDOMEN 2 VIEWS: CPT | Mod: TC

## 2021-05-14 PROCEDURE — 74019 XR ABDOMEN FLAT AND ERECT: ICD-10-PCS | Mod: 26,,, | Performed by: RADIOLOGY

## 2021-05-14 PROCEDURE — U0003 INFECTIOUS AGENT DETECTION BY NUCLEIC ACID (DNA OR RNA); SEVERE ACUTE RESPIRATORY SYNDROME CORONAVIRUS 2 (SARS-COV-2) (CORONAVIRUS DISEASE [COVID-19]), AMPLIFIED PROBE TECHNIQUE, MAKING USE OF HIGH THROUGHPUT TECHNOLOGIES AS DESCRIBED BY CMS-2020-01-R: HCPCS | Performed by: SURGERY

## 2021-05-15 RX ORDER — OXYCODONE HYDROCHLORIDE 5 MG/1
5 TABLET ORAL EVERY 6 HOURS PRN
Qty: 20 TABLET | Refills: 0 | Status: SHIPPED | OUTPATIENT
Start: 2021-05-15 | End: 2021-06-02

## 2021-05-17 ENCOUNTER — ANESTHESIA (OUTPATIENT)
Dept: SURGERY | Facility: HOSPITAL | Age: 86
End: 2021-05-17
Payer: MEDICARE

## 2021-05-17 ENCOUNTER — HOSPITAL ENCOUNTER (OUTPATIENT)
Facility: HOSPITAL | Age: 86
Discharge: HOME | End: 2021-05-17
Attending: SURGERY | Admitting: SURGERY
Payer: MEDICARE

## 2021-05-17 VITALS
TEMPERATURE: 98 F | BODY MASS INDEX: 24.8 KG/M2 | RESPIRATION RATE: 16 BRPM | WEIGHT: 158 LBS | DIASTOLIC BLOOD PRESSURE: 72 MMHG | HEIGHT: 67 IN | OXYGEN SATURATION: 96 % | SYSTOLIC BLOOD PRESSURE: 180 MMHG | HEART RATE: 46 BPM

## 2021-05-17 DIAGNOSIS — K80.20 CALCULUS OF GALLBLADDER WITHOUT CHOLECYSTITIS WITHOUT OBSTRUCTION: Primary | ICD-10-CM

## 2021-05-17 DIAGNOSIS — Z01.818 PREOP TESTING: ICD-10-CM

## 2021-05-17 PROCEDURE — 71000044 HC DOSC ROUTINE RECOVERY FIRST HOUR: Performed by: SURGERY

## 2021-05-17 PROCEDURE — D9220A PRA ANESTHESIA: Mod: CRNA,,, | Performed by: NURSE ANESTHETIST, CERTIFIED REGISTERED

## 2021-05-17 PROCEDURE — D9220A PRA ANESTHESIA: ICD-10-PCS | Mod: CRNA,,, | Performed by: NURSE ANESTHETIST, CERTIFIED REGISTERED

## 2021-05-17 PROCEDURE — 63600175 PHARM REV CODE 636 W HCPCS: Performed by: SURGERY

## 2021-05-17 PROCEDURE — 88304 TISSUE EXAM BY PATHOLOGIST: CPT | Mod: 26,,, | Performed by: PATHOLOGY

## 2021-05-17 PROCEDURE — 25000003 PHARM REV CODE 250: Performed by: SURGERY

## 2021-05-17 PROCEDURE — 36000708 HC OR TIME LEV III 1ST 15 MIN: Performed by: SURGERY

## 2021-05-17 PROCEDURE — 27201423 OPTIME MED/SURG SUP & DEVICES STERILE SUPPLY: Performed by: SURGERY

## 2021-05-17 PROCEDURE — 36000709 HC OR TIME LEV III EA ADD 15 MIN: Performed by: SURGERY

## 2021-05-17 PROCEDURE — D9220A PRA ANESTHESIA: Mod: ANES,,, | Performed by: STUDENT IN AN ORGANIZED HEALTH CARE EDUCATION/TRAINING PROGRAM

## 2021-05-17 PROCEDURE — 88304 PR  SURG PATH,LEVEL III: ICD-10-PCS | Mod: 26,,, | Performed by: PATHOLOGY

## 2021-05-17 PROCEDURE — 37000009 HC ANESTHESIA EA ADD 15 MINS: Performed by: SURGERY

## 2021-05-17 PROCEDURE — D9220A PRA ANESTHESIA: ICD-10-PCS | Mod: ANES,,, | Performed by: STUDENT IN AN ORGANIZED HEALTH CARE EDUCATION/TRAINING PROGRAM

## 2021-05-17 PROCEDURE — 25000003 PHARM REV CODE 250: Performed by: NURSE ANESTHETIST, CERTIFIED REGISTERED

## 2021-05-17 PROCEDURE — 47562 PR LAP,CHOLECYSTECTOMY: ICD-10-PCS | Mod: ,,, | Performed by: SURGERY

## 2021-05-17 PROCEDURE — 37000008 HC ANESTHESIA 1ST 15 MINUTES: Performed by: SURGERY

## 2021-05-17 PROCEDURE — 88304 TISSUE EXAM BY PATHOLOGIST: CPT | Performed by: PATHOLOGY

## 2021-05-17 PROCEDURE — 63600175 PHARM REV CODE 636 W HCPCS: Performed by: NURSE ANESTHETIST, CERTIFIED REGISTERED

## 2021-05-17 PROCEDURE — 71000015 HC POSTOP RECOV 1ST HR: Performed by: SURGERY

## 2021-05-17 PROCEDURE — 47562 LAPAROSCOPIC CHOLECYSTECTOMY: CPT | Mod: ,,, | Performed by: SURGERY

## 2021-05-17 RX ORDER — LIDOCAINE HYDROCHLORIDE AND EPINEPHRINE 10; 10 MG/ML; UG/ML
INJECTION, SOLUTION INFILTRATION; PERINEURAL
Status: DISCONTINUED | OUTPATIENT
Start: 2021-05-17 | End: 2021-05-17 | Stop reason: HOSPADM

## 2021-05-17 RX ORDER — FENTANYL CITRATE 50 UG/ML
INJECTION, SOLUTION INTRAMUSCULAR; INTRAVENOUS
Status: DISCONTINUED | OUTPATIENT
Start: 2021-05-17 | End: 2021-05-17

## 2021-05-17 RX ORDER — LIDOCAINE HYDROCHLORIDE 20 MG/ML
INJECTION, SOLUTION EPIDURAL; INFILTRATION; INTRACAUDAL; PERINEURAL
Status: DISCONTINUED | OUTPATIENT
Start: 2021-05-17 | End: 2021-05-17

## 2021-05-17 RX ORDER — ONDANSETRON 2 MG/ML
4 INJECTION INTRAMUSCULAR; INTRAVENOUS ONCE AS NEEDED
Status: DISCONTINUED | OUTPATIENT
Start: 2021-05-17 | End: 2021-05-17 | Stop reason: HOSPADM

## 2021-05-17 RX ORDER — PROPOFOL 10 MG/ML
VIAL (ML) INTRAVENOUS
Status: DISCONTINUED | OUTPATIENT
Start: 2021-05-17 | End: 2021-05-17

## 2021-05-17 RX ORDER — SODIUM CHLORIDE 0.9 % (FLUSH) 0.9 %
10 SYRINGE (ML) INJECTION
Status: DISCONTINUED | OUTPATIENT
Start: 2021-05-17 | End: 2021-05-17 | Stop reason: HOSPADM

## 2021-05-17 RX ORDER — CEFAZOLIN SODIUM 1 G/3ML
2 INJECTION, POWDER, FOR SOLUTION INTRAMUSCULAR; INTRAVENOUS
Status: COMPLETED | OUTPATIENT
Start: 2021-05-17 | End: 2021-05-17

## 2021-05-17 RX ORDER — BUPIVACAINE HYDROCHLORIDE 2.5 MG/ML
INJECTION, SOLUTION EPIDURAL; INFILTRATION; INTRACAUDAL
Status: DISCONTINUED | OUTPATIENT
Start: 2021-05-17 | End: 2021-05-17 | Stop reason: HOSPADM

## 2021-05-17 RX ORDER — FENTANYL CITRATE 50 UG/ML
25 INJECTION, SOLUTION INTRAMUSCULAR; INTRAVENOUS EVERY 5 MIN PRN
Status: DISCONTINUED | OUTPATIENT
Start: 2021-05-17 | End: 2021-05-17 | Stop reason: HOSPADM

## 2021-05-17 RX ORDER — ACETAMINOPHEN 10 MG/ML
INJECTION, SOLUTION INTRAVENOUS
Status: DISCONTINUED | OUTPATIENT
Start: 2021-05-17 | End: 2021-05-17

## 2021-05-17 RX ORDER — SODIUM CHLORIDE 0.9 % (FLUSH) 0.9 %
3 SYRINGE (ML) INJECTION
Status: DISCONTINUED | OUTPATIENT
Start: 2021-05-17 | End: 2021-05-17 | Stop reason: HOSPADM

## 2021-05-17 RX ORDER — ONDANSETRON 2 MG/ML
INJECTION INTRAMUSCULAR; INTRAVENOUS
Status: DISCONTINUED | OUTPATIENT
Start: 2021-05-17 | End: 2021-05-17

## 2021-05-17 RX ORDER — HYDROMORPHONE HYDROCHLORIDE 2 MG/ML
INJECTION, SOLUTION INTRAMUSCULAR; INTRAVENOUS; SUBCUTANEOUS
Status: DISCONTINUED | OUTPATIENT
Start: 2021-05-17 | End: 2021-05-17

## 2021-05-17 RX ORDER — DEXAMETHASONE SODIUM PHOSPHATE 4 MG/ML
INJECTION, SOLUTION INTRA-ARTICULAR; INTRALESIONAL; INTRAMUSCULAR; INTRAVENOUS; SOFT TISSUE
Status: DISCONTINUED | OUTPATIENT
Start: 2021-05-17 | End: 2021-05-17

## 2021-05-17 RX ORDER — ROCURONIUM BROMIDE 10 MG/ML
INJECTION, SOLUTION INTRAVENOUS
Status: DISCONTINUED | OUTPATIENT
Start: 2021-05-17 | End: 2021-05-17

## 2021-05-17 RX ORDER — NEOSTIGMINE METHYLSULFATE 0.5 MG/ML
INJECTION, SOLUTION INTRAVENOUS
Status: DISCONTINUED | OUTPATIENT
Start: 2021-05-17 | End: 2021-05-17

## 2021-05-17 RX ADMIN — SODIUM CHLORIDE: 0.9 INJECTION, SOLUTION INTRAVENOUS at 08:05

## 2021-05-17 RX ADMIN — DEXAMETHASONE SODIUM PHOSPHATE 4 MG: 4 INJECTION INTRA-ARTICULAR; INTRALESIONAL; INTRAMUSCULAR; INTRAVENOUS; SOFT TISSUE at 09:05

## 2021-05-17 RX ADMIN — PROPOFOL 70 MG: 10 INJECTION, EMULSION INTRAVENOUS at 09:05

## 2021-05-17 RX ADMIN — HYDROMORPHONE HYDROCHLORIDE 1 MG: 2 INJECTION INTRAMUSCULAR; INTRAVENOUS; SUBCUTANEOUS at 10:05

## 2021-05-17 RX ADMIN — ACETAMINOPHEN 1000 MG: 10 INJECTION INTRAVENOUS at 09:05

## 2021-05-17 RX ADMIN — NEOSTIGMINE METHYLSULFATE 5 MG: 0.5 INJECTION INTRAVENOUS at 10:05

## 2021-05-17 RX ADMIN — ONDANSETRON 4 MG: 2 INJECTION INTRAMUSCULAR; INTRAVENOUS at 09:05

## 2021-05-17 RX ADMIN — ROCURONIUM BROMIDE 10 MG: 10 INJECTION, SOLUTION INTRAVENOUS at 09:05

## 2021-05-17 RX ADMIN — GLYCOPYRROLATE 0.2 MG: 0.2 INJECTION, SOLUTION INTRAMUSCULAR; INTRAVITREAL at 08:05

## 2021-05-17 RX ADMIN — GLYCOPYRROLATE 0.4 MG: 0.2 INJECTION, SOLUTION INTRAMUSCULAR; INTRAVITREAL at 10:05

## 2021-05-17 RX ADMIN — LIDOCAINE HYDROCHLORIDE 60 MG: 20 INJECTION, SOLUTION EPIDURAL; INFILTRATION; INTRACAUDAL at 08:05

## 2021-05-17 RX ADMIN — FENTANYL CITRATE 50 MCG: 50 INJECTION INTRAMUSCULAR; INTRAVENOUS at 09:05

## 2021-05-17 RX ADMIN — ROCURONIUM BROMIDE 30 MG: 10 INJECTION, SOLUTION INTRAVENOUS at 09:05

## 2021-05-17 RX ADMIN — CEFAZOLIN 2 G: 330 INJECTION, POWDER, FOR SOLUTION INTRAMUSCULAR; INTRAVENOUS at 09:05

## 2021-05-20 LAB
FINAL PATHOLOGIC DIAGNOSIS: NORMAL
GROSS: NORMAL
Lab: NORMAL

## 2021-05-21 ENCOUNTER — PATIENT MESSAGE (OUTPATIENT)
Dept: HEPATOLOGY | Facility: CLINIC | Age: 86
End: 2021-05-21

## 2021-05-24 ENCOUNTER — CLINICAL SUPPORT (OUTPATIENT)
Dept: INFECTIOUS DISEASES | Facility: CLINIC | Age: 86
End: 2021-05-24
Payer: MEDICARE

## 2021-05-24 ENCOUNTER — TELEPHONE (OUTPATIENT)
Dept: GASTROENTEROLOGY | Facility: CLINIC | Age: 86
End: 2021-05-24

## 2021-05-24 DIAGNOSIS — R79.89 ELEVATED LFTS: ICD-10-CM

## 2021-05-24 DIAGNOSIS — K80.20 CALCULUS OF GALLBLADDER WITHOUT CHOLECYSTITIS WITHOUT OBSTRUCTION: ICD-10-CM

## 2021-05-24 DIAGNOSIS — R11.2 NON-INTRACTABLE VOMITING WITH NAUSEA, UNSPECIFIED VOMITING TYPE: ICD-10-CM

## 2021-05-24 DIAGNOSIS — K80.50 CHOLEDOCHOLITHIASIS: ICD-10-CM

## 2021-05-24 DIAGNOSIS — D50.9 IRON DEFICIENCY ANEMIA, UNSPECIFIED IRON DEFICIENCY ANEMIA TYPE: ICD-10-CM

## 2021-05-24 DIAGNOSIS — Z80.0 FAMILY HISTORY OF COLON CANCER: ICD-10-CM

## 2021-05-24 DIAGNOSIS — D12.6 COLON ADENOMA: ICD-10-CM

## 2021-05-24 PROCEDURE — G0010 HEPATITIS B (RECOMBINANT) ADJUVANTED, 2 DOSE: ICD-10-PCS | Mod: S$GLB,,, | Performed by: INTERNAL MEDICINE

## 2021-05-24 PROCEDURE — 99999 PR PBB SHADOW E&M-EST. PATIENT-LVL I: CPT | Mod: PBBFAC,,,

## 2021-05-24 PROCEDURE — 90739 HEPB VACC 2/4 DOSE ADULT IM: CPT | Mod: S$GLB,,, | Performed by: INTERNAL MEDICINE

## 2021-05-24 PROCEDURE — 99999 PR PBB SHADOW E&M-EST. PATIENT-LVL I: ICD-10-PCS | Mod: PBBFAC,,,

## 2021-05-24 PROCEDURE — 90739 HEPATITIS B (RECOMBINANT) ADJUVANTED, 2 DOSE: ICD-10-PCS | Mod: S$GLB,,, | Performed by: INTERNAL MEDICINE

## 2021-05-24 PROCEDURE — G0010 ADMIN HEPATITIS B VACCINE: HCPCS | Mod: S$GLB,,, | Performed by: INTERNAL MEDICINE

## 2021-05-24 RX ORDER — ERYTHROMYCIN 5 MG/G
OINTMENT OPHTHALMIC
COMMUNITY
Start: 2021-01-25 | End: 2021-09-21

## 2021-05-28 ENCOUNTER — TELEPHONE (OUTPATIENT)
Dept: ENDOSCOPY | Facility: HOSPITAL | Age: 86
End: 2021-05-28

## 2021-06-01 ENCOUNTER — LAB VISIT (OUTPATIENT)
Dept: LAB | Facility: HOSPITAL | Age: 86
End: 2021-06-01
Attending: INTERNAL MEDICINE
Payer: MEDICARE

## 2021-06-01 ENCOUNTER — TELEPHONE (OUTPATIENT)
Dept: GASTROENTEROLOGY | Facility: CLINIC | Age: 86
End: 2021-06-01

## 2021-06-01 ENCOUNTER — OFFICE VISIT (OUTPATIENT)
Dept: GASTROENTEROLOGY | Facility: CLINIC | Age: 86
End: 2021-06-01
Payer: MEDICARE

## 2021-06-01 VITALS
BODY MASS INDEX: 26.14 KG/M2 | DIASTOLIC BLOOD PRESSURE: 60 MMHG | HEIGHT: 66 IN | WEIGHT: 162.69 LBS | SYSTOLIC BLOOD PRESSURE: 134 MMHG

## 2021-06-01 DIAGNOSIS — R68.89 COLD INTOLERANCE: Primary | ICD-10-CM

## 2021-06-01 DIAGNOSIS — E61.1 IRON DEFICIENCY: ICD-10-CM

## 2021-06-01 DIAGNOSIS — D12.6 COLON ADENOMAS: ICD-10-CM

## 2021-06-01 DIAGNOSIS — R79.89 ELEVATED LFTS: ICD-10-CM

## 2021-06-01 DIAGNOSIS — K59.09 CONSTIPATION, CHRONIC: ICD-10-CM

## 2021-06-01 DIAGNOSIS — R79.89 LOW SERUM SODIUM: ICD-10-CM

## 2021-06-01 DIAGNOSIS — R68.89 COLD INTOLERANCE: ICD-10-CM

## 2021-06-01 DIAGNOSIS — R79.89 LOW SERUM SODIUM: Primary | ICD-10-CM

## 2021-06-01 DIAGNOSIS — Z80.0 FAMILY HISTORY OF COLON CANCER IN MOTHER: ICD-10-CM

## 2021-06-01 DIAGNOSIS — K80.20 CALCULUS OF GALLBLADDER WITHOUT CHOLECYSTITIS WITHOUT OBSTRUCTION: ICD-10-CM

## 2021-06-01 DIAGNOSIS — Z13.0 SCREENING FOR IRON DEFICIENCY ANEMIA: Primary | ICD-10-CM

## 2021-06-01 DIAGNOSIS — K59.09 CHRONIC CONSTIPATION: ICD-10-CM

## 2021-06-01 LAB
HGB BLD-MCNC: 9.8 G/DL (ref 12–16)
IGA SERPL-MCNC: 103 MG/DL (ref 40–350)
TSH SERPL DL<=0.005 MIU/L-ACNC: 1.92 UIU/ML (ref 0.4–4)

## 2021-06-01 PROCEDURE — 1159F PR MEDICATION LIST DOCUMENTED IN MEDICAL RECORD: ICD-10-PCS | Mod: S$GLB,,, | Performed by: INTERNAL MEDICINE

## 2021-06-01 PROCEDURE — 1157F PR ADVANCE CARE PLAN OR EQUIV PRESENT IN MEDICAL RECORD: ICD-10-PCS | Mod: S$GLB,,, | Performed by: INTERNAL MEDICINE

## 2021-06-01 PROCEDURE — 99214 PR OFFICE/OUTPT VISIT, EST, LEVL IV, 30-39 MIN: ICD-10-PCS | Mod: S$GLB,,, | Performed by: INTERNAL MEDICINE

## 2021-06-01 PROCEDURE — 82784 ASSAY IGA/IGD/IGG/IGM EACH: CPT | Performed by: INTERNAL MEDICINE

## 2021-06-01 PROCEDURE — 1159F MED LIST DOCD IN RCRD: CPT | Mod: S$GLB,,, | Performed by: INTERNAL MEDICINE

## 2021-06-01 PROCEDURE — 99999 PR PBB SHADOW E&M-EST. PATIENT-LVL IV: ICD-10-PCS | Mod: PBBFAC,,, | Performed by: INTERNAL MEDICINE

## 2021-06-01 PROCEDURE — 82728 ASSAY OF FERRITIN: CPT | Performed by: INTERNAL MEDICINE

## 2021-06-01 PROCEDURE — 99214 OFFICE O/P EST MOD 30 MIN: CPT | Mod: S$GLB,,, | Performed by: INTERNAL MEDICINE

## 2021-06-01 PROCEDURE — 83516 IMMUNOASSAY NONANTIBODY: CPT | Performed by: INTERNAL MEDICINE

## 2021-06-01 PROCEDURE — 1157F ADVNC CARE PLAN IN RCRD: CPT | Mod: S$GLB,,, | Performed by: INTERNAL MEDICINE

## 2021-06-01 PROCEDURE — 3288F PR FALLS RISK ASSESSMENT DOCUMENTED: ICD-10-PCS | Mod: CPTII,S$GLB,, | Performed by: INTERNAL MEDICINE

## 2021-06-01 PROCEDURE — 1101F PR PT FALLS ASSESS DOC 0-1 FALLS W/OUT INJ PAST YR: ICD-10-PCS | Mod: CPTII,S$GLB,, | Performed by: INTERNAL MEDICINE

## 2021-06-01 PROCEDURE — 36415 COLL VENOUS BLD VENIPUNCTURE: CPT | Mod: PO | Performed by: INTERNAL MEDICINE

## 2021-06-01 PROCEDURE — 85018 HEMOGLOBIN: CPT | Performed by: INTERNAL MEDICINE

## 2021-06-01 PROCEDURE — 1101F PT FALLS ASSESS-DOCD LE1/YR: CPT | Mod: CPTII,S$GLB,, | Performed by: INTERNAL MEDICINE

## 2021-06-01 PROCEDURE — 3288F FALL RISK ASSESSMENT DOCD: CPT | Mod: CPTII,S$GLB,, | Performed by: INTERNAL MEDICINE

## 2021-06-01 PROCEDURE — 1125F PR PAIN SEVERITY QUANTIFIED, PAIN PRESENT: ICD-10-PCS | Mod: S$GLB,,, | Performed by: INTERNAL MEDICINE

## 2021-06-01 PROCEDURE — 99999 PR PBB SHADOW E&M-EST. PATIENT-LVL IV: CPT | Mod: PBBFAC,,, | Performed by: INTERNAL MEDICINE

## 2021-06-01 PROCEDURE — 80048 BASIC METABOLIC PNL TOTAL CA: CPT | Performed by: INTERNAL MEDICINE

## 2021-06-01 PROCEDURE — 84443 ASSAY THYROID STIM HORMONE: CPT | Performed by: INTERNAL MEDICINE

## 2021-06-01 PROCEDURE — 83540 ASSAY OF IRON: CPT | Performed by: INTERNAL MEDICINE

## 2021-06-01 PROCEDURE — 1125F AMNT PAIN NOTED PAIN PRSNT: CPT | Mod: S$GLB,,, | Performed by: INTERNAL MEDICINE

## 2021-06-02 ENCOUNTER — OFFICE VISIT (OUTPATIENT)
Dept: INTERNAL MEDICINE | Facility: CLINIC | Age: 86
End: 2021-06-02
Payer: MEDICARE

## 2021-06-02 ENCOUNTER — OFFICE VISIT (OUTPATIENT)
Dept: SURGERY | Facility: CLINIC | Age: 86
End: 2021-06-02
Payer: MEDICARE

## 2021-06-02 VITALS
SYSTOLIC BLOOD PRESSURE: 141 MMHG | HEIGHT: 66 IN | HEART RATE: 55 BPM | WEIGHT: 159.38 LBS | BODY MASS INDEX: 25.61 KG/M2 | DIASTOLIC BLOOD PRESSURE: 66 MMHG

## 2021-06-02 VITALS
WEIGHT: 159.63 LBS | BODY MASS INDEX: 25.66 KG/M2 | SYSTOLIC BLOOD PRESSURE: 120 MMHG | OXYGEN SATURATION: 99 % | HEIGHT: 66 IN | HEART RATE: 60 BPM | DIASTOLIC BLOOD PRESSURE: 80 MMHG

## 2021-06-02 DIAGNOSIS — I10 ESSENTIAL HYPERTENSION: ICD-10-CM

## 2021-06-02 DIAGNOSIS — K80.21 CALCULUS OF GALLBLADDER WITH BILIARY OBSTRUCTION BUT WITHOUT CHOLECYSTITIS: ICD-10-CM

## 2021-06-02 DIAGNOSIS — R26.9 GAIT ABNORMALITY: Primary | ICD-10-CM

## 2021-06-02 DIAGNOSIS — E03.4 HYPOTHYROIDISM DUE TO ACQUIRED ATROPHY OF THYROID: Chronic | ICD-10-CM

## 2021-06-02 DIAGNOSIS — D64.9 LOW HEMOGLOBIN: Primary | ICD-10-CM

## 2021-06-02 DIAGNOSIS — Z09 POSTOP CHECK: Primary | ICD-10-CM

## 2021-06-02 LAB
ANION GAP SERPL CALC-SCNC: 14 MMOL/L (ref 8–16)
BUN SERPL-MCNC: 12 MG/DL (ref 8–23)
CALCIUM SERPL-MCNC: 9.2 MG/DL (ref 8.7–10.5)
CHLORIDE SERPL-SCNC: 104 MMOL/L (ref 95–110)
CO2 SERPL-SCNC: 18 MMOL/L (ref 23–29)
CREAT SERPL-MCNC: 1.1 MG/DL (ref 0.5–1.4)
EST. GFR  (AFRICAN AMERICAN): 53.7 ML/MIN/1.73 M^2
EST. GFR  (NON AFRICAN AMERICAN): 46.5 ML/MIN/1.73 M^2
FERRITIN SERPL-MCNC: 372 NG/ML (ref 20–300)
GLUCOSE SERPL-MCNC: 89 MG/DL (ref 70–110)
IRON SERPL-MCNC: 82 UG/DL (ref 30–160)
POTASSIUM SERPL-SCNC: 4.2 MMOL/L (ref 3.5–5.1)
SATURATED IRON: 27 % (ref 20–50)
SODIUM SERPL-SCNC: 136 MMOL/L (ref 136–145)
TOTAL IRON BINDING CAPACITY: 305 UG/DL (ref 250–450)
TRANSFERRIN SERPL-MCNC: 206 MG/DL (ref 200–375)

## 2021-06-02 PROCEDURE — 3288F FALL RISK ASSESSMENT DOCD: CPT | Mod: CPTII,S$GLB,, | Performed by: SURGERY

## 2021-06-02 PROCEDURE — 99999 PR PBB SHADOW E&M-EST. PATIENT-LVL IV: CPT | Mod: PBBFAC,,, | Performed by: SURGERY

## 2021-06-02 PROCEDURE — 1157F ADVNC CARE PLAN IN RCRD: CPT | Mod: S$GLB,,, | Performed by: SURGERY

## 2021-06-02 PROCEDURE — 99024 PR POST-OP FOLLOW-UP VISIT: ICD-10-PCS | Mod: S$GLB,,, | Performed by: SURGERY

## 2021-06-02 PROCEDURE — 1159F MED LIST DOCD IN RCRD: CPT | Mod: S$GLB,,, | Performed by: INTERNAL MEDICINE

## 2021-06-02 PROCEDURE — 1157F ADVNC CARE PLAN IN RCRD: CPT | Mod: S$GLB,,, | Performed by: INTERNAL MEDICINE

## 2021-06-02 PROCEDURE — 3288F FALL RISK ASSESSMENT DOCD: CPT | Mod: CPTII,S$GLB,, | Performed by: INTERNAL MEDICINE

## 2021-06-02 PROCEDURE — 1101F PR PT FALLS ASSESS DOC 0-1 FALLS W/OUT INJ PAST YR: ICD-10-PCS | Mod: CPTII,S$GLB,, | Performed by: SURGERY

## 2021-06-02 PROCEDURE — 99214 OFFICE O/P EST MOD 30 MIN: CPT | Mod: S$GLB,,, | Performed by: INTERNAL MEDICINE

## 2021-06-02 PROCEDURE — 3288F PR FALLS RISK ASSESSMENT DOCUMENTED: ICD-10-PCS | Mod: CPTII,S$GLB,, | Performed by: INTERNAL MEDICINE

## 2021-06-02 PROCEDURE — 1157F PR ADVANCE CARE PLAN OR EQUIV PRESENT IN MEDICAL RECORD: ICD-10-PCS | Mod: S$GLB,,, | Performed by: SURGERY

## 2021-06-02 PROCEDURE — 99999 PR PBB SHADOW E&M-EST. PATIENT-LVL IV: ICD-10-PCS | Mod: PBBFAC,,, | Performed by: INTERNAL MEDICINE

## 2021-06-02 PROCEDURE — 3288F PR FALLS RISK ASSESSMENT DOCUMENTED: ICD-10-PCS | Mod: CPTII,S$GLB,, | Performed by: SURGERY

## 2021-06-02 PROCEDURE — 99214 PR OFFICE/OUTPT VISIT, EST, LEVL IV, 30-39 MIN: ICD-10-PCS | Mod: S$GLB,,, | Performed by: INTERNAL MEDICINE

## 2021-06-02 PROCEDURE — 1126F PR PAIN SEVERITY QUANTIFIED, NO PAIN PRESENT: ICD-10-PCS | Mod: S$GLB,,, | Performed by: INTERNAL MEDICINE

## 2021-06-02 PROCEDURE — 1101F PR PT FALLS ASSESS DOC 0-1 FALLS W/OUT INJ PAST YR: ICD-10-PCS | Mod: CPTII,S$GLB,, | Performed by: INTERNAL MEDICINE

## 2021-06-02 PROCEDURE — 1125F AMNT PAIN NOTED PAIN PRSNT: CPT | Mod: S$GLB,,, | Performed by: SURGERY

## 2021-06-02 PROCEDURE — 99999 PR PBB SHADOW E&M-EST. PATIENT-LVL IV: CPT | Mod: PBBFAC,,, | Performed by: INTERNAL MEDICINE

## 2021-06-02 PROCEDURE — 1126F AMNT PAIN NOTED NONE PRSNT: CPT | Mod: S$GLB,,, | Performed by: INTERNAL MEDICINE

## 2021-06-02 PROCEDURE — 1125F PR PAIN SEVERITY QUANTIFIED, PAIN PRESENT: ICD-10-PCS | Mod: S$GLB,,, | Performed by: SURGERY

## 2021-06-02 PROCEDURE — 1101F PT FALLS ASSESS-DOCD LE1/YR: CPT | Mod: CPTII,S$GLB,, | Performed by: SURGERY

## 2021-06-02 PROCEDURE — 1157F PR ADVANCE CARE PLAN OR EQUIV PRESENT IN MEDICAL RECORD: ICD-10-PCS | Mod: S$GLB,,, | Performed by: INTERNAL MEDICINE

## 2021-06-02 PROCEDURE — 1101F PT FALLS ASSESS-DOCD LE1/YR: CPT | Mod: CPTII,S$GLB,, | Performed by: INTERNAL MEDICINE

## 2021-06-02 PROCEDURE — 99024 POSTOP FOLLOW-UP VISIT: CPT | Mod: S$GLB,,, | Performed by: SURGERY

## 2021-06-02 PROCEDURE — 99999 PR PBB SHADOW E&M-EST. PATIENT-LVL IV: ICD-10-PCS | Mod: PBBFAC,,, | Performed by: SURGERY

## 2021-06-02 PROCEDURE — 1159F PR MEDICATION LIST DOCUMENTED IN MEDICAL RECORD: ICD-10-PCS | Mod: S$GLB,,, | Performed by: INTERNAL MEDICINE

## 2021-06-03 ENCOUNTER — TELEPHONE (OUTPATIENT)
Dept: ENDOSCOPY | Facility: HOSPITAL | Age: 86
End: 2021-06-03

## 2021-06-04 ENCOUNTER — CLINICAL SUPPORT (OUTPATIENT)
Dept: REHABILITATION | Facility: HOSPITAL | Age: 86
End: 2021-06-04
Attending: INTERNAL MEDICINE
Payer: MEDICARE

## 2021-06-04 DIAGNOSIS — Z74.09 IMPAIRED FUNCTIONAL MOBILITY, BALANCE, GAIT, AND ENDURANCE: ICD-10-CM

## 2021-06-04 DIAGNOSIS — R26.9 GAIT ABNORMALITY: ICD-10-CM

## 2021-06-04 PROCEDURE — 97161 PT EVAL LOW COMPLEX 20 MIN: CPT | Mod: PO

## 2021-06-07 ENCOUNTER — HOSPITAL ENCOUNTER (OUTPATIENT)
Dept: RADIOLOGY | Facility: HOSPITAL | Age: 86
Discharge: HOME | End: 2021-06-07
Attending: NURSE PRACTITIONER
Payer: MEDICARE

## 2021-06-07 DIAGNOSIS — K80.20 CALCULUS OF GALLBLADDER WITHOUT CHOLECYSTITIS WITHOUT OBSTRUCTION: ICD-10-CM

## 2021-06-07 DIAGNOSIS — R79.89 ELEVATED LFTS: ICD-10-CM

## 2021-06-07 LAB — TTG IGA SER-ACNC: 3 UNITS

## 2021-06-07 PROCEDURE — 76705 ECHO EXAM OF ABDOMEN: CPT | Mod: TC

## 2021-06-07 PROCEDURE — 76705 ECHO EXAM OF ABDOMEN: CPT | Mod: 26,,, | Performed by: INTERNAL MEDICINE

## 2021-06-07 PROCEDURE — 76705 US ABDOMEN LIMITED: ICD-10-PCS | Mod: 26,,, | Performed by: INTERNAL MEDICINE

## 2021-06-08 DIAGNOSIS — G47.00 INSOMNIA, UNSPECIFIED TYPE: ICD-10-CM

## 2021-06-09 RX ORDER — ZOLPIDEM TARTRATE 5 MG/1
TABLET ORAL
Qty: 90 TABLET | Refills: 0 | Status: SHIPPED | OUTPATIENT
Start: 2021-06-09 | End: 2021-09-19

## 2021-06-09 RX ORDER — PRAVASTATIN SODIUM 40 MG/1
TABLET ORAL
Qty: 90 TABLET | Refills: 0 | Status: SHIPPED | OUTPATIENT
Start: 2021-06-09 | End: 2021-09-21

## 2021-06-10 ENCOUNTER — OFFICE VISIT (OUTPATIENT)
Dept: HEPATOLOGY | Facility: CLINIC | Age: 86
End: 2021-06-10
Payer: MEDICARE

## 2021-06-10 VITALS
SYSTOLIC BLOOD PRESSURE: 155 MMHG | HEIGHT: 66 IN | HEART RATE: 63 BPM | BODY MASS INDEX: 25.61 KG/M2 | TEMPERATURE: 97 F | OXYGEN SATURATION: 97 % | WEIGHT: 159.38 LBS | DIASTOLIC BLOOD PRESSURE: 70 MMHG | RESPIRATION RATE: 18 BRPM

## 2021-06-10 DIAGNOSIS — R74.01 TRANSAMINITIS: Primary | ICD-10-CM

## 2021-06-10 DIAGNOSIS — K80.50 CHOLEDOCHOLITHIASIS: ICD-10-CM

## 2021-06-10 DIAGNOSIS — Z90.49 HISTORY OF CHOLECYSTECTOMY: ICD-10-CM

## 2021-06-10 PROCEDURE — 99999 PR PBB SHADOW E&M-EST. PATIENT-LVL V: CPT | Mod: PBBFAC,,, | Performed by: NURSE PRACTITIONER

## 2021-06-10 PROCEDURE — 1157F ADVNC CARE PLAN IN RCRD: CPT | Mod: S$GLB,,, | Performed by: NURSE PRACTITIONER

## 2021-06-10 PROCEDURE — 1101F PT FALLS ASSESS-DOCD LE1/YR: CPT | Mod: CPTII,S$GLB,, | Performed by: NURSE PRACTITIONER

## 2021-06-10 PROCEDURE — 3288F FALL RISK ASSESSMENT DOCD: CPT | Mod: CPTII,S$GLB,, | Performed by: NURSE PRACTITIONER

## 2021-06-10 PROCEDURE — 99999 PR PBB SHADOW E&M-EST. PATIENT-LVL V: ICD-10-PCS | Mod: PBBFAC,,, | Performed by: NURSE PRACTITIONER

## 2021-06-10 PROCEDURE — 99213 OFFICE O/P EST LOW 20 MIN: CPT | Mod: S$GLB,,, | Performed by: NURSE PRACTITIONER

## 2021-06-10 PROCEDURE — 1159F MED LIST DOCD IN RCRD: CPT | Mod: S$GLB,,, | Performed by: NURSE PRACTITIONER

## 2021-06-10 PROCEDURE — 1101F PR PT FALLS ASSESS DOC 0-1 FALLS W/OUT INJ PAST YR: ICD-10-PCS | Mod: CPTII,S$GLB,, | Performed by: NURSE PRACTITIONER

## 2021-06-10 PROCEDURE — 3288F PR FALLS RISK ASSESSMENT DOCUMENTED: ICD-10-PCS | Mod: CPTII,S$GLB,, | Performed by: NURSE PRACTITIONER

## 2021-06-10 PROCEDURE — 1157F PR ADVANCE CARE PLAN OR EQUIV PRESENT IN MEDICAL RECORD: ICD-10-PCS | Mod: S$GLB,,, | Performed by: NURSE PRACTITIONER

## 2021-06-10 PROCEDURE — 1126F AMNT PAIN NOTED NONE PRSNT: CPT | Mod: S$GLB,,, | Performed by: NURSE PRACTITIONER

## 2021-06-10 PROCEDURE — 1126F PR PAIN SEVERITY QUANTIFIED, NO PAIN PRESENT: ICD-10-PCS | Mod: S$GLB,,, | Performed by: NURSE PRACTITIONER

## 2021-06-10 PROCEDURE — 99213 PR OFFICE/OUTPT VISIT, EST, LEVL III, 20-29 MIN: ICD-10-PCS | Mod: S$GLB,,, | Performed by: NURSE PRACTITIONER

## 2021-06-10 PROCEDURE — 1159F PR MEDICATION LIST DOCUMENTED IN MEDICAL RECORD: ICD-10-PCS | Mod: S$GLB,,, | Performed by: NURSE PRACTITIONER

## 2021-06-14 ENCOUNTER — OFFICE VISIT (OUTPATIENT)
Dept: PODIATRY | Facility: CLINIC | Age: 86
End: 2021-06-14
Payer: MEDICARE

## 2021-06-14 VITALS
SYSTOLIC BLOOD PRESSURE: 139 MMHG | DIASTOLIC BLOOD PRESSURE: 59 MMHG | BODY MASS INDEX: 25.61 KG/M2 | HEIGHT: 66 IN | WEIGHT: 159.38 LBS | HEART RATE: 58 BPM

## 2021-06-14 DIAGNOSIS — B35.1 DERMATOPHYTOSIS OF NAIL: Primary | ICD-10-CM

## 2021-06-14 PROCEDURE — 1157F PR ADVANCE CARE PLAN OR EQUIV PRESENT IN MEDICAL RECORD: ICD-10-PCS | Mod: ,,, | Performed by: PODIATRIST

## 2021-06-14 PROCEDURE — 3288F FALL RISK ASSESSMENT DOCD: CPT | Mod: CPTII,,, | Performed by: PODIATRIST

## 2021-06-14 PROCEDURE — 99499 UNLISTED E&M SERVICE: CPT | Mod: ,,, | Performed by: PODIATRIST

## 2021-06-14 PROCEDURE — 1101F PT FALLS ASSESS-DOCD LE1/YR: CPT | Mod: CPTII,,, | Performed by: PODIATRIST

## 2021-06-14 PROCEDURE — 1157F ADVNC CARE PLAN IN RCRD: CPT | Mod: ,,, | Performed by: PODIATRIST

## 2021-06-14 PROCEDURE — 99999 PR PBB SHADOW E&M-EST. PATIENT-LVL III: CPT | Mod: PBBFAC,,, | Performed by: PODIATRIST

## 2021-06-14 PROCEDURE — 17999 PR NON-COVERED FOOT CARE: ICD-10-PCS | Mod: CSM,S$GLB,, | Performed by: PODIATRIST

## 2021-06-14 PROCEDURE — 1126F AMNT PAIN NOTED NONE PRSNT: CPT | Mod: ,,, | Performed by: PODIATRIST

## 2021-06-14 PROCEDURE — 3288F PR FALLS RISK ASSESSMENT DOCUMENTED: ICD-10-PCS | Mod: CPTII,,, | Performed by: PODIATRIST

## 2021-06-14 PROCEDURE — 17999 UNLISTD PX SKN MUC MEMB SUBQ: CPT | Mod: CSM,S$GLB,, | Performed by: PODIATRIST

## 2021-06-14 PROCEDURE — 99999 PR PBB SHADOW E&M-EST. PATIENT-LVL III: ICD-10-PCS | Mod: PBBFAC,,, | Performed by: PODIATRIST

## 2021-06-14 PROCEDURE — 99499 NO LOS: ICD-10-PCS | Mod: ,,, | Performed by: PODIATRIST

## 2021-06-14 PROCEDURE — 1126F PR PAIN SEVERITY QUANTIFIED, NO PAIN PRESENT: ICD-10-PCS | Mod: ,,, | Performed by: PODIATRIST

## 2021-06-14 PROCEDURE — 1101F PR PT FALLS ASSESS DOC 0-1 FALLS W/OUT INJ PAST YR: ICD-10-PCS | Mod: CPTII,,, | Performed by: PODIATRIST

## 2021-06-16 ENCOUNTER — CLINICAL SUPPORT (OUTPATIENT)
Dept: REHABILITATION | Facility: HOSPITAL | Age: 86
End: 2021-06-16
Attending: INTERNAL MEDICINE
Payer: MEDICARE

## 2021-06-16 ENCOUNTER — LAB VISIT (OUTPATIENT)
Dept: LAB | Facility: HOSPITAL | Age: 86
End: 2021-06-16
Attending: INTERNAL MEDICINE
Payer: MEDICARE

## 2021-06-16 DIAGNOSIS — R79.89 ELEVATED LFTS: ICD-10-CM

## 2021-06-16 DIAGNOSIS — K80.50 CHOLEDOCHOLITHIASIS: ICD-10-CM

## 2021-06-16 DIAGNOSIS — R11.2 NON-INTRACTABLE VOMITING WITH NAUSEA, UNSPECIFIED VOMITING TYPE: ICD-10-CM

## 2021-06-16 DIAGNOSIS — K80.20 CALCULUS OF GALLBLADDER WITHOUT CHOLECYSTITIS WITHOUT OBSTRUCTION: ICD-10-CM

## 2021-06-16 DIAGNOSIS — Z74.09 IMPAIRED FUNCTIONAL MOBILITY, BALANCE, GAIT, AND ENDURANCE: ICD-10-CM

## 2021-06-16 DIAGNOSIS — D50.9 IRON DEFICIENCY ANEMIA, UNSPECIFIED IRON DEFICIENCY ANEMIA TYPE: ICD-10-CM

## 2021-06-16 DIAGNOSIS — Z80.0 FAMILY HISTORY OF COLON CANCER: ICD-10-CM

## 2021-06-16 DIAGNOSIS — D12.6 COLON ADENOMA: ICD-10-CM

## 2021-06-16 LAB
ALBUMIN SERPL BCP-MCNC: 3.7 G/DL (ref 3.5–5.2)
ALP SERPL-CCNC: 105 U/L (ref 55–135)
ALT SERPL W/O P-5'-P-CCNC: 19 U/L (ref 10–44)
ANION GAP SERPL CALC-SCNC: 8 MMOL/L (ref 8–16)
AST SERPL-CCNC: 24 U/L (ref 10–40)
BASOPHILS # BLD AUTO: 0.05 K/UL (ref 0–0.2)
BASOPHILS NFR BLD: 0.8 % (ref 0–1.9)
BILIRUB SERPL-MCNC: 0.5 MG/DL (ref 0.1–1)
BUN SERPL-MCNC: 11 MG/DL (ref 8–23)
CALCIUM SERPL-MCNC: 9.6 MG/DL (ref 8.7–10.5)
CHLORIDE SERPL-SCNC: 104 MMOL/L (ref 95–110)
CO2 SERPL-SCNC: 25 MMOL/L (ref 23–29)
CREAT SERPL-MCNC: 1.1 MG/DL (ref 0.5–1.4)
DIFFERENTIAL METHOD: ABNORMAL
EOSINOPHIL # BLD AUTO: 0.3 K/UL (ref 0–0.5)
EOSINOPHIL NFR BLD: 4.2 % (ref 0–8)
ERYTHROCYTE [DISTWIDTH] IN BLOOD BY AUTOMATED COUNT: 13.4 % (ref 11.5–14.5)
EST. GFR  (AFRICAN AMERICAN): 53.7 ML/MIN/1.73 M^2
EST. GFR  (NON AFRICAN AMERICAN): 46.5 ML/MIN/1.73 M^2
FERRITIN SERPL-MCNC: 311 NG/ML (ref 20–300)
GLUCOSE SERPL-MCNC: 98 MG/DL (ref 70–110)
HCT VFR BLD AUTO: 34 % (ref 37–48.5)
HGB BLD-MCNC: 10.9 G/DL (ref 12–16)
IMM GRANULOCYTES # BLD AUTO: 0.01 K/UL (ref 0–0.04)
IMM GRANULOCYTES NFR BLD AUTO: 0.2 % (ref 0–0.5)
IRON SERPL-MCNC: 81 UG/DL (ref 30–160)
LIPASE SERPL-CCNC: 289 U/L (ref 4–60)
LYMPHOCYTES # BLD AUTO: 1.7 K/UL (ref 1–4.8)
LYMPHOCYTES NFR BLD: 25.1 % (ref 18–48)
MCH RBC QN AUTO: 30.4 PG (ref 27–31)
MCHC RBC AUTO-ENTMCNC: 32.1 G/DL (ref 32–36)
MCV RBC AUTO: 95 FL (ref 82–98)
MONOCYTES # BLD AUTO: 0.6 K/UL (ref 0.3–1)
MONOCYTES NFR BLD: 8.3 % (ref 4–15)
NEUTROPHILS # BLD AUTO: 4.1 K/UL (ref 1.8–7.7)
NEUTROPHILS NFR BLD: 61.4 % (ref 38–73)
NRBC BLD-RTO: 0 /100 WBC
PLATELET # BLD AUTO: 189 K/UL (ref 150–450)
PMV BLD AUTO: 11 FL (ref 9.2–12.9)
POTASSIUM SERPL-SCNC: 4.8 MMOL/L (ref 3.5–5.1)
PROT SERPL-MCNC: 6.8 G/DL (ref 6–8.4)
RBC # BLD AUTO: 3.58 M/UL (ref 4–5.4)
SATURATED IRON: 26 % (ref 20–50)
SODIUM SERPL-SCNC: 137 MMOL/L (ref 136–145)
TOTAL IRON BINDING CAPACITY: 315 UG/DL (ref 250–450)
TRANSFERRIN SERPL-MCNC: 213 MG/DL (ref 200–375)
WBC # BLD AUTO: 6.65 K/UL (ref 3.9–12.7)

## 2021-06-16 PROCEDURE — 80053 COMPREHEN METABOLIC PANEL: CPT | Performed by: INTERNAL MEDICINE

## 2021-06-16 PROCEDURE — 85025 COMPLETE CBC W/AUTO DIFF WBC: CPT | Performed by: INTERNAL MEDICINE

## 2021-06-16 PROCEDURE — 83690 ASSAY OF LIPASE: CPT | Performed by: INTERNAL MEDICINE

## 2021-06-16 PROCEDURE — 36415 COLL VENOUS BLD VENIPUNCTURE: CPT | Mod: PO | Performed by: INTERNAL MEDICINE

## 2021-06-16 PROCEDURE — 82728 ASSAY OF FERRITIN: CPT | Performed by: INTERNAL MEDICINE

## 2021-06-16 PROCEDURE — 97110 THERAPEUTIC EXERCISES: CPT | Mod: PO

## 2021-06-16 PROCEDURE — 83540 ASSAY OF IRON: CPT | Performed by: INTERNAL MEDICINE

## 2021-06-18 DIAGNOSIS — Z87.19 HISTORY OF GALLSTONES: Primary | ICD-10-CM

## 2021-06-18 DIAGNOSIS — R74.8 ELEVATED LIPASE: ICD-10-CM

## 2021-06-18 DIAGNOSIS — Z90.49 STATUS POST CHOLECYSTECTOMY: ICD-10-CM

## 2021-06-21 ENCOUNTER — PATIENT MESSAGE (OUTPATIENT)
Dept: GASTROENTEROLOGY | Facility: CLINIC | Age: 86
End: 2021-06-21

## 2021-06-22 ENCOUNTER — TELEPHONE (OUTPATIENT)
Dept: GASTROENTEROLOGY | Facility: CLINIC | Age: 86
End: 2021-06-22

## 2021-06-23 ENCOUNTER — CLINICAL SUPPORT (OUTPATIENT)
Dept: REHABILITATION | Facility: HOSPITAL | Age: 86
End: 2021-06-23
Attending: INTERNAL MEDICINE
Payer: MEDICARE

## 2021-06-23 ENCOUNTER — HOSPITAL ENCOUNTER (OUTPATIENT)
Dept: RADIOLOGY | Facility: HOSPITAL | Age: 86
Discharge: HOME | End: 2021-06-23
Attending: INTERNAL MEDICINE
Payer: MEDICARE

## 2021-06-23 DIAGNOSIS — Z74.09 IMPAIRED FUNCTIONAL MOBILITY, BALANCE, GAIT, AND ENDURANCE: Primary | ICD-10-CM

## 2021-06-23 DIAGNOSIS — Z87.19 HISTORY OF GALLSTONES: ICD-10-CM

## 2021-06-23 DIAGNOSIS — Z90.49 STATUS POST CHOLECYSTECTOMY: ICD-10-CM

## 2021-06-23 DIAGNOSIS — R74.8 ELEVATED LIPASE: ICD-10-CM

## 2021-06-23 PROCEDURE — A9585 GADOBUTROL INJECTION: HCPCS | Performed by: INTERNAL MEDICINE

## 2021-06-23 PROCEDURE — 74183 MRI ABDOMEN W WO CONTRAST: ICD-10-PCS | Mod: 26,,, | Performed by: RADIOLOGY

## 2021-06-23 PROCEDURE — 97110 THERAPEUTIC EXERCISES: CPT | Mod: PO

## 2021-06-23 PROCEDURE — 74183 MRI ABD W/O CNTR FLWD CNTR: CPT | Mod: 26,,, | Performed by: RADIOLOGY

## 2021-06-23 PROCEDURE — 25500020 PHARM REV CODE 255: Performed by: INTERNAL MEDICINE

## 2021-06-23 PROCEDURE — 74183 MRI ABD W/O CNTR FLWD CNTR: CPT | Mod: TC

## 2021-06-23 RX ORDER — GADOBUTROL 604.72 MG/ML
10 INJECTION INTRAVENOUS
Status: COMPLETED | OUTPATIENT
Start: 2021-06-23 | End: 2021-06-23

## 2021-06-23 RX ADMIN — GADOBUTROL 10 ML: 604.72 INJECTION INTRAVENOUS at 05:06

## 2021-08-19 ENCOUNTER — PES CALL (OUTPATIENT)
Dept: ADMINISTRATIVE | Facility: CLINIC | Age: 86
End: 2021-08-19

## 2021-09-15 ENCOUNTER — PATIENT MESSAGE (OUTPATIENT)
Dept: GASTROENTEROLOGY | Facility: CLINIC | Age: 86
End: 2021-09-15

## 2021-09-17 ENCOUNTER — TELEPHONE (OUTPATIENT)
Dept: INTERNAL MEDICINE | Facility: CLINIC | Age: 86
End: 2021-09-17

## 2021-09-17 ENCOUNTER — PATIENT MESSAGE (OUTPATIENT)
Dept: INTERNAL MEDICINE | Facility: CLINIC | Age: 86
End: 2021-09-17

## 2021-09-17 DIAGNOSIS — E53.8 VITAMIN B12 DEFICIENCY: ICD-10-CM

## 2021-09-17 DIAGNOSIS — E55.9 VITAMIN D DEFICIENCY DISEASE: ICD-10-CM

## 2021-09-17 DIAGNOSIS — D64.9 ANEMIA, UNSPECIFIED TYPE: ICD-10-CM

## 2021-09-17 DIAGNOSIS — M62.838 MUSCLE SPASM: Primary | ICD-10-CM

## 2021-09-17 DIAGNOSIS — M25.50 ARTHRALGIA, UNSPECIFIED JOINT: ICD-10-CM

## 2021-09-17 DIAGNOSIS — Z01.818 PRE-OP TESTING: ICD-10-CM

## 2021-09-17 DIAGNOSIS — Z12.11 SPECIAL SCREENING FOR MALIGNANT NEOPLASMS, COLON: Primary | ICD-10-CM

## 2021-09-17 DIAGNOSIS — I10 ESSENTIAL HYPERTENSION: ICD-10-CM

## 2021-09-17 RX ORDER — POLYETHYLENE GLYCOL 3350, SODIUM SULFATE ANHYDROUS, SODIUM BICARBONATE, SODIUM CHLORIDE, POTASSIUM CHLORIDE 236; 22.74; 6.74; 5.86; 2.97 G/4L; G/4L; G/4L; G/4L; G/4L
4 POWDER, FOR SOLUTION ORAL ONCE
Qty: 4000 ML | Refills: 0 | Status: SHIPPED | OUTPATIENT
Start: 2021-09-17 | End: 2021-09-17

## 2021-09-18 ENCOUNTER — LAB VISIT (OUTPATIENT)
Dept: LAB | Facility: HOSPITAL | Age: 86
End: 2021-09-18
Attending: INTERNAL MEDICINE
Payer: MEDICARE

## 2021-09-18 DIAGNOSIS — E53.8 VITAMIN B12 DEFICIENCY: ICD-10-CM

## 2021-09-18 DIAGNOSIS — G47.00 INSOMNIA, UNSPECIFIED TYPE: ICD-10-CM

## 2021-09-18 DIAGNOSIS — M25.50 ARTHRALGIA, UNSPECIFIED JOINT: ICD-10-CM

## 2021-09-18 DIAGNOSIS — D64.9 ANEMIA, UNSPECIFIED TYPE: ICD-10-CM

## 2021-09-18 DIAGNOSIS — I10 ESSENTIAL HYPERTENSION: ICD-10-CM

## 2021-09-18 DIAGNOSIS — E55.9 VITAMIN D DEFICIENCY DISEASE: ICD-10-CM

## 2021-09-18 DIAGNOSIS — M62.838 MUSCLE SPASM: ICD-10-CM

## 2021-09-18 LAB
25(OH)D3+25(OH)D2 SERPL-MCNC: 43 NG/ML (ref 30–96)
ALBUMIN SERPL BCP-MCNC: 3.7 G/DL (ref 3.5–5.2)
ALP SERPL-CCNC: 99 U/L (ref 55–135)
ALT SERPL W/O P-5'-P-CCNC: 21 U/L (ref 10–44)
ANION GAP SERPL CALC-SCNC: 11 MMOL/L (ref 8–16)
AST SERPL-CCNC: 22 U/L (ref 10–40)
BASOPHILS # BLD AUTO: 0.05 K/UL (ref 0–0.2)
BASOPHILS NFR BLD: 0.7 % (ref 0–1.9)
BILIRUB SERPL-MCNC: 0.5 MG/DL (ref 0.1–1)
BUN SERPL-MCNC: 21 MG/DL (ref 8–23)
CALCIUM SERPL-MCNC: 9.4 MG/DL (ref 8.7–10.5)
CHLORIDE SERPL-SCNC: 103 MMOL/L (ref 95–110)
CK SERPL-CCNC: 95 U/L (ref 20–180)
CO2 SERPL-SCNC: 21 MMOL/L (ref 23–29)
CREAT SERPL-MCNC: 1.1 MG/DL (ref 0.5–1.4)
CRP SERPL-MCNC: 0.7 MG/L (ref 0–8.2)
DIFFERENTIAL METHOD: ABNORMAL
EOSINOPHIL # BLD AUTO: 0.3 K/UL (ref 0–0.5)
EOSINOPHIL NFR BLD: 4.6 % (ref 0–8)
ERYTHROCYTE [DISTWIDTH] IN BLOOD BY AUTOMATED COUNT: 12.9 % (ref 11.5–14.5)
ERYTHROCYTE [SEDIMENTATION RATE] IN BLOOD BY WESTERGREN METHOD: 10 MM/HR (ref 0–36)
EST. GFR  (AFRICAN AMERICAN): 53.7 ML/MIN/1.73 M^2
EST. GFR  (NON AFRICAN AMERICAN): 46.5 ML/MIN/1.73 M^2
FERRITIN SERPL-MCNC: 251 NG/ML (ref 20–300)
GLUCOSE SERPL-MCNC: 105 MG/DL (ref 70–110)
HCT VFR BLD AUTO: 35.1 % (ref 37–48.5)
HGB BLD-MCNC: 11.3 G/DL (ref 12–16)
IMM GRANULOCYTES # BLD AUTO: 0.01 K/UL (ref 0–0.04)
IMM GRANULOCYTES NFR BLD AUTO: 0.1 % (ref 0–0.5)
IRON SERPL-MCNC: 47 UG/DL (ref 30–160)
LYMPHOCYTES # BLD AUTO: 1.8 K/UL (ref 1–4.8)
LYMPHOCYTES NFR BLD: 27.1 % (ref 18–48)
MCH RBC QN AUTO: 30.3 PG (ref 27–31)
MCHC RBC AUTO-ENTMCNC: 32.2 G/DL (ref 32–36)
MCV RBC AUTO: 94 FL (ref 82–98)
MONOCYTES # BLD AUTO: 0.7 K/UL (ref 0.3–1)
MONOCYTES NFR BLD: 10.1 % (ref 4–15)
NEUTROPHILS # BLD AUTO: 3.8 K/UL (ref 1.8–7.7)
NEUTROPHILS NFR BLD: 57.4 % (ref 38–73)
NRBC BLD-RTO: 0 /100 WBC
PLATELET # BLD AUTO: 203 K/UL (ref 150–450)
PMV BLD AUTO: 11.3 FL (ref 9.2–12.9)
POTASSIUM SERPL-SCNC: 4.5 MMOL/L (ref 3.5–5.1)
PROT SERPL-MCNC: 6.8 G/DL (ref 6–8.4)
RBC # BLD AUTO: 3.73 M/UL (ref 4–5.4)
RHEUMATOID FACT SERPL-ACNC: <13 IU/ML (ref 0–15)
SATURATED IRON: 14 % (ref 20–50)
SODIUM SERPL-SCNC: 135 MMOL/L (ref 136–145)
TOTAL IRON BINDING CAPACITY: 330 UG/DL (ref 250–450)
TRANSFERRIN SERPL-MCNC: 223 MG/DL (ref 200–375)
TSH SERPL DL<=0.005 MIU/L-ACNC: 1.39 UIU/ML (ref 0.4–4)
VIT B12 SERPL-MCNC: 391 PG/ML (ref 210–950)
WBC # BLD AUTO: 6.71 K/UL (ref 3.9–12.7)

## 2021-09-18 PROCEDURE — 82085 ASSAY OF ALDOLASE: CPT | Mod: HCNC | Performed by: INTERNAL MEDICINE

## 2021-09-18 PROCEDURE — 86140 C-REACTIVE PROTEIN: CPT | Mod: HCNC | Performed by: INTERNAL MEDICINE

## 2021-09-18 PROCEDURE — 82306 VITAMIN D 25 HYDROXY: CPT | Mod: HCNC | Performed by: INTERNAL MEDICINE

## 2021-09-18 PROCEDURE — 85652 RBC SED RATE AUTOMATED: CPT | Mod: HCNC | Performed by: INTERNAL MEDICINE

## 2021-09-18 PROCEDURE — 84443 ASSAY THYROID STIM HORMONE: CPT | Mod: HCNC | Performed by: INTERNAL MEDICINE

## 2021-09-18 PROCEDURE — 36415 COLL VENOUS BLD VENIPUNCTURE: CPT | Mod: HCNC | Performed by: INTERNAL MEDICINE

## 2021-09-18 PROCEDURE — 80053 COMPREHEN METABOLIC PANEL: CPT | Mod: HCNC | Performed by: INTERNAL MEDICINE

## 2021-09-18 PROCEDURE — 82607 VITAMIN B-12: CPT | Mod: HCNC | Performed by: INTERNAL MEDICINE

## 2021-09-18 PROCEDURE — 82728 ASSAY OF FERRITIN: CPT | Mod: HCNC | Performed by: INTERNAL MEDICINE

## 2021-09-18 PROCEDURE — 84466 ASSAY OF TRANSFERRIN: CPT | Mod: HCNC | Performed by: INTERNAL MEDICINE

## 2021-09-18 PROCEDURE — 86038 ANTINUCLEAR ANTIBODIES: CPT | Mod: HCNC | Performed by: INTERNAL MEDICINE

## 2021-09-18 PROCEDURE — 82550 ASSAY OF CK (CPK): CPT | Mod: HCNC | Performed by: INTERNAL MEDICINE

## 2021-09-18 PROCEDURE — 85025 COMPLETE CBC W/AUTO DIFF WBC: CPT | Mod: HCNC | Performed by: INTERNAL MEDICINE

## 2021-09-18 PROCEDURE — 86431 RHEUMATOID FACTOR QUANT: CPT | Mod: HCNC | Performed by: INTERNAL MEDICINE

## 2021-09-19 ENCOUNTER — PATIENT MESSAGE (OUTPATIENT)
Dept: ENDOSCOPY | Facility: HOSPITAL | Age: 86
End: 2021-09-19

## 2021-09-19 ENCOUNTER — LAB VISIT (OUTPATIENT)
Dept: SURGERY | Facility: CLINIC | Age: 86
End: 2021-09-19
Payer: MEDICARE

## 2021-09-19 DIAGNOSIS — Z01.818 PRE-OP TESTING: ICD-10-CM

## 2021-09-19 LAB
SARS-COV-2 RNA RESP QL NAA+PROBE: NOT DETECTED
SARS-COV-2- CYCLE NUMBER: NORMAL

## 2021-09-19 PROCEDURE — U0005 INFEC AGEN DETEC AMPLI PROBE: HCPCS | Performed by: FAMILY MEDICINE

## 2021-09-19 PROCEDURE — U0003 INFECTIOUS AGENT DETECTION BY NUCLEIC ACID (DNA OR RNA); SEVERE ACUTE RESPIRATORY SYNDROME CORONAVIRUS 2 (SARS-COV-2) (CORONAVIRUS DISEASE [COVID-19]), AMPLIFIED PROBE TECHNIQUE, MAKING USE OF HIGH THROUGHPUT TECHNOLOGIES AS DESCRIBED BY CMS-2020-01-R: HCPCS | Mod: HCNC | Performed by: FAMILY MEDICINE

## 2021-09-19 RX ORDER — ZOLPIDEM TARTRATE 5 MG/1
TABLET ORAL
Qty: 90 TABLET | Refills: 0 | Status: SHIPPED | OUTPATIENT
Start: 2021-09-19 | End: 2022-01-03

## 2021-09-20 LAB
ALDOLASE SERPL-CCNC: 3.3 U/L (ref 1.2–7.6)
ANA SER QL IF: NORMAL

## 2021-09-21 ENCOUNTER — PATIENT MESSAGE (OUTPATIENT)
Dept: ENDOSCOPY | Facility: HOSPITAL | Age: 86
End: 2021-09-21

## 2021-09-21 ENCOUNTER — OFFICE VISIT (OUTPATIENT)
Dept: GASTROENTEROLOGY | Facility: CLINIC | Age: 86
End: 2021-09-21
Payer: MEDICARE

## 2021-09-21 ENCOUNTER — TELEPHONE (OUTPATIENT)
Dept: GASTROENTEROLOGY | Facility: CLINIC | Age: 86
End: 2021-09-21

## 2021-09-21 DIAGNOSIS — D50.9 IRON DEFICIENCY ANEMIA, UNSPECIFIED IRON DEFICIENCY ANEMIA TYPE: Primary | ICD-10-CM

## 2021-09-21 DIAGNOSIS — Z80.0 FAMILY HISTORY OF COLON CANCER: ICD-10-CM

## 2021-09-21 PROCEDURE — 1160F PR REVIEW ALL MEDS BY PRESCRIBER/CLIN PHARMACIST DOCUMENTED: ICD-10-PCS | Mod: HCNC,CPTII,95, | Performed by: INTERNAL MEDICINE

## 2021-09-21 PROCEDURE — 99443 PR PHYSICIAN TELEPHONE EVALUATION 21-30 MIN: CPT | Mod: HCNC,95,, | Performed by: INTERNAL MEDICINE

## 2021-09-21 PROCEDURE — 1157F ADVNC CARE PLAN IN RCRD: CPT | Mod: HCNC,CPTII,95, | Performed by: INTERNAL MEDICINE

## 2021-09-21 PROCEDURE — 1159F MED LIST DOCD IN RCRD: CPT | Mod: HCNC,CPTII,95, | Performed by: INTERNAL MEDICINE

## 2021-09-21 PROCEDURE — 99443 PR PHYSICIAN TELEPHONE EVALUATION 21-30 MIN: ICD-10-PCS | Mod: HCNC,95,, | Performed by: INTERNAL MEDICINE

## 2021-09-21 PROCEDURE — 1159F PR MEDICATION LIST DOCUMENTED IN MEDICAL RECORD: ICD-10-PCS | Mod: HCNC,CPTII,95, | Performed by: INTERNAL MEDICINE

## 2021-09-21 PROCEDURE — 1160F RVW MEDS BY RX/DR IN RCRD: CPT | Mod: HCNC,CPTII,95, | Performed by: INTERNAL MEDICINE

## 2021-09-21 PROCEDURE — 1157F PR ADVANCE CARE PLAN OR EQUIV PRESENT IN MEDICAL RECORD: ICD-10-PCS | Mod: HCNC,CPTII,95, | Performed by: INTERNAL MEDICINE

## 2021-09-22 DIAGNOSIS — Z13.0 SCREENING FOR IRON DEFICIENCY ANEMIA: Primary | ICD-10-CM

## 2021-09-22 DIAGNOSIS — Z80.0 FAMILY HISTORY OF COLON CANCER IN MOTHER: ICD-10-CM

## 2021-09-22 DIAGNOSIS — Z12.11 COLON CANCER SCREENING: Primary | ICD-10-CM

## 2021-09-22 DIAGNOSIS — D12.6 COLON ADENOMAS: ICD-10-CM

## 2021-09-22 DIAGNOSIS — K59.00 CONSTIPATION, UNSPECIFIED CONSTIPATION TYPE: ICD-10-CM

## 2021-09-22 RX ORDER — POLYETHYLENE GLYCOL 3350, SODIUM SULFATE ANHYDROUS, SODIUM BICARBONATE, SODIUM CHLORIDE, POTASSIUM CHLORIDE 236; 22.74; 6.74; 5.86; 2.97 G/4L; G/4L; G/4L; G/4L; G/4L
4 POWDER, FOR SOLUTION ORAL ONCE
Qty: 4000 ML | Refills: 0 | Status: SHIPPED | OUTPATIENT
Start: 2021-09-22 | End: 2021-09-22

## 2021-09-24 ENCOUNTER — PATIENT MESSAGE (OUTPATIENT)
Dept: ENDOSCOPY | Facility: HOSPITAL | Age: 86
End: 2021-09-24

## 2021-09-24 ENCOUNTER — HOSPITAL ENCOUNTER (OUTPATIENT)
Dept: RADIOLOGY | Facility: HOSPITAL | Age: 86
Discharge: HOME | End: 2021-09-24
Attending: INTERNAL MEDICINE
Payer: MEDICARE

## 2021-09-24 ENCOUNTER — OFFICE VISIT (OUTPATIENT)
Dept: INTERNAL MEDICINE | Facility: CLINIC | Age: 86
End: 2021-09-24
Payer: MEDICARE

## 2021-09-24 VITALS
HEIGHT: 66 IN | HEART RATE: 55 BPM | DIASTOLIC BLOOD PRESSURE: 52 MMHG | SYSTOLIC BLOOD PRESSURE: 134 MMHG | OXYGEN SATURATION: 96 % | WEIGHT: 163.81 LBS | BODY MASS INDEX: 26.33 KG/M2

## 2021-09-24 DIAGNOSIS — G47.9 SLEEP DISORDER: Primary | ICD-10-CM

## 2021-09-24 DIAGNOSIS — M54.9 BACK PAIN, UNSPECIFIED BACK LOCATION, UNSPECIFIED BACK PAIN LATERALITY, UNSPECIFIED CHRONICITY: ICD-10-CM

## 2021-09-24 DIAGNOSIS — M25.559 HIP PAIN: ICD-10-CM

## 2021-09-24 DIAGNOSIS — E78.5 HYPERLIPIDEMIA, UNSPECIFIED HYPERLIPIDEMIA TYPE: ICD-10-CM

## 2021-09-24 DIAGNOSIS — I10 ESSENTIAL HYPERTENSION: ICD-10-CM

## 2021-09-24 DIAGNOSIS — M54.9 DORSALGIA, UNSPECIFIED: ICD-10-CM

## 2021-09-24 DIAGNOSIS — R52 TOTAL BODY PAIN: ICD-10-CM

## 2021-09-24 PROCEDURE — 99999 PR PBB SHADOW E&M-EST. PATIENT-LVL V: ICD-10-PCS | Mod: PBBFAC,HCNC,, | Performed by: INTERNAL MEDICINE

## 2021-09-24 PROCEDURE — 72100 X-RAY EXAM L-S SPINE 2/3 VWS: CPT | Mod: 26,HCNC,, | Performed by: RADIOLOGY

## 2021-09-24 PROCEDURE — 73521 X-RAY EXAM HIPS BI 2 VIEWS: CPT | Mod: 26,HCNC,, | Performed by: RADIOLOGY

## 2021-09-24 PROCEDURE — 3078F DIAST BP <80 MM HG: CPT | Mod: HCNC,CPTII,S$GLB, | Performed by: INTERNAL MEDICINE

## 2021-09-24 PROCEDURE — 3078F PR MOST RECENT DIASTOLIC BLOOD PRESSURE < 80 MM HG: ICD-10-PCS | Mod: HCNC,CPTII,S$GLB, | Performed by: INTERNAL MEDICINE

## 2021-09-24 PROCEDURE — 99999 PR PBB SHADOW E&M-EST. PATIENT-LVL V: CPT | Mod: PBBFAC,HCNC,, | Performed by: INTERNAL MEDICINE

## 2021-09-24 PROCEDURE — 73521 XR HIPS BILATERAL 2 VIEW INCL AP PELVIS: ICD-10-PCS | Mod: 26,HCNC,, | Performed by: RADIOLOGY

## 2021-09-24 PROCEDURE — 1159F MED LIST DOCD IN RCRD: CPT | Mod: HCNC,CPTII,S$GLB, | Performed by: INTERNAL MEDICINE

## 2021-09-24 PROCEDURE — 1101F PR PT FALLS ASSESS DOC 0-1 FALLS W/OUT INJ PAST YR: ICD-10-PCS | Mod: HCNC,CPTII,S$GLB, | Performed by: INTERNAL MEDICINE

## 2021-09-24 PROCEDURE — 1126F AMNT PAIN NOTED NONE PRSNT: CPT | Mod: HCNC,CPTII,S$GLB, | Performed by: INTERNAL MEDICINE

## 2021-09-24 PROCEDURE — 99215 OFFICE O/P EST HI 40 MIN: CPT | Mod: HCNC,S$GLB,, | Performed by: INTERNAL MEDICINE

## 2021-09-24 PROCEDURE — 73521 X-RAY EXAM HIPS BI 2 VIEWS: CPT | Mod: TC,HCNC

## 2021-09-24 PROCEDURE — 72100 X-RAY EXAM L-S SPINE 2/3 VWS: CPT | Mod: TC,HCNC

## 2021-09-24 PROCEDURE — 3075F PR MOST RECENT SYSTOLIC BLOOD PRESS GE 130-139MM HG: ICD-10-PCS | Mod: HCNC,CPTII,S$GLB, | Performed by: INTERNAL MEDICINE

## 2021-09-24 PROCEDURE — 99215 PR OFFICE/OUTPT VISIT, EST, LEVL V, 40-54 MIN: ICD-10-PCS | Mod: HCNC,S$GLB,, | Performed by: INTERNAL MEDICINE

## 2021-09-24 PROCEDURE — 1157F PR ADVANCE CARE PLAN OR EQUIV PRESENT IN MEDICAL RECORD: ICD-10-PCS | Mod: HCNC,CPTII,S$GLB, | Performed by: INTERNAL MEDICINE

## 2021-09-24 PROCEDURE — 3288F FALL RISK ASSESSMENT DOCD: CPT | Mod: HCNC,CPTII,S$GLB, | Performed by: INTERNAL MEDICINE

## 2021-09-24 PROCEDURE — 3288F PR FALLS RISK ASSESSMENT DOCUMENTED: ICD-10-PCS | Mod: HCNC,CPTII,S$GLB, | Performed by: INTERNAL MEDICINE

## 2021-09-24 PROCEDURE — 1157F ADVNC CARE PLAN IN RCRD: CPT | Mod: HCNC,CPTII,S$GLB, | Performed by: INTERNAL MEDICINE

## 2021-09-24 PROCEDURE — 3075F SYST BP GE 130 - 139MM HG: CPT | Mod: HCNC,CPTII,S$GLB, | Performed by: INTERNAL MEDICINE

## 2021-09-24 PROCEDURE — 1101F PT FALLS ASSESS-DOCD LE1/YR: CPT | Mod: HCNC,CPTII,S$GLB, | Performed by: INTERNAL MEDICINE

## 2021-09-24 PROCEDURE — 1159F PR MEDICATION LIST DOCUMENTED IN MEDICAL RECORD: ICD-10-PCS | Mod: HCNC,CPTII,S$GLB, | Performed by: INTERNAL MEDICINE

## 2021-09-24 PROCEDURE — 1126F PR PAIN SEVERITY QUANTIFIED, NO PAIN PRESENT: ICD-10-PCS | Mod: HCNC,CPTII,S$GLB, | Performed by: INTERNAL MEDICINE

## 2021-09-24 PROCEDURE — 72100 XR LUMBAR SPINE AP AND LATERAL: ICD-10-PCS | Mod: 26,HCNC,, | Performed by: RADIOLOGY

## 2021-09-24 RX ORDER — AMLODIPINE BESYLATE 2.5 MG/1
2.5 TABLET ORAL NIGHTLY
Qty: 30 TABLET | Refills: 3 | Status: SHIPPED | OUTPATIENT
Start: 2021-09-24 | End: 2021-11-08 | Stop reason: SDUPTHER

## 2021-09-29 ENCOUNTER — HOSPITAL ENCOUNTER (OUTPATIENT)
Facility: HOSPITAL | Age: 86
Discharge: HOME | End: 2021-09-29
Attending: INTERNAL MEDICINE | Admitting: INTERNAL MEDICINE
Payer: MEDICARE

## 2021-09-29 ENCOUNTER — ANESTHESIA EVENT (OUTPATIENT)
Dept: ENDOSCOPY | Facility: HOSPITAL | Age: 86
End: 2021-09-29
Payer: MEDICARE

## 2021-09-29 ENCOUNTER — ANESTHESIA (OUTPATIENT)
Dept: ENDOSCOPY | Facility: HOSPITAL | Age: 86
End: 2021-09-29
Payer: MEDICARE

## 2021-09-29 VITALS
BODY MASS INDEX: 25.55 KG/M2 | TEMPERATURE: 98 F | HEART RATE: 72 BPM | HEIGHT: 66 IN | SYSTOLIC BLOOD PRESSURE: 137 MMHG | DIASTOLIC BLOOD PRESSURE: 77 MMHG | WEIGHT: 159 LBS | OXYGEN SATURATION: 99 % | RESPIRATION RATE: 17 BRPM

## 2021-09-29 DIAGNOSIS — D50.9 IRON DEFICIENCY ANEMIA: ICD-10-CM

## 2021-09-29 LAB — SARS-COV-2 RDRP RESP QL NAA+PROBE: NEGATIVE

## 2021-09-29 PROCEDURE — 37000009 HC ANESTHESIA EA ADD 15 MINS: Mod: HCNC | Performed by: INTERNAL MEDICINE

## 2021-09-29 PROCEDURE — E9220 PRA ENDO ANESTHESIA: HCPCS | Mod: HCNC,,, | Performed by: NURSE ANESTHETIST, CERTIFIED REGISTERED

## 2021-09-29 PROCEDURE — 25000003 PHARM REV CODE 250: Mod: HCNC | Performed by: NURSE ANESTHETIST, CERTIFIED REGISTERED

## 2021-09-29 PROCEDURE — 88341 IMHCHEM/IMCYTCHM EA ADD ANTB: CPT | Mod: 26,HCNC,, | Performed by: PATHOLOGY

## 2021-09-29 PROCEDURE — 45385 COLONOSCOPY W/LESION REMOVAL: CPT | Mod: HCNC | Performed by: INTERNAL MEDICINE

## 2021-09-29 PROCEDURE — 88342 CHG IMMUNOCYTOCHEMISTRY: ICD-10-PCS | Mod: 26,HCNC,, | Performed by: PATHOLOGY

## 2021-09-29 PROCEDURE — 88341 IMHCHEM/IMCYTCHM EA ADD ANTB: CPT | Mod: HCNC | Performed by: PATHOLOGY

## 2021-09-29 PROCEDURE — 25000003 PHARM REV CODE 250: Mod: HCNC | Performed by: INTERNAL MEDICINE

## 2021-09-29 PROCEDURE — 88342 IMHCHEM/IMCYTCHM 1ST ANTB: CPT | Mod: 26,HCNC,, | Performed by: PATHOLOGY

## 2021-09-29 PROCEDURE — 88341 PR IHC OR ICC EACH ADD'L SINGLE ANTIBODY  STAINPR: ICD-10-PCS | Mod: 26,HCNC,, | Performed by: PATHOLOGY

## 2021-09-29 PROCEDURE — 88305 TISSUE EXAM BY PATHOLOGIST: CPT | Mod: 59,HCNC | Performed by: PATHOLOGY

## 2021-09-29 PROCEDURE — 45385 PR COLONOSCOPY,REMV LESN,SNARE: ICD-10-PCS | Mod: PT,HCNC,, | Performed by: INTERNAL MEDICINE

## 2021-09-29 PROCEDURE — 37000008 HC ANESTHESIA 1ST 15 MINUTES: Mod: HCNC | Performed by: INTERNAL MEDICINE

## 2021-09-29 PROCEDURE — 43239 EGD BIOPSY SINGLE/MULTIPLE: CPT | Mod: HCNC | Performed by: INTERNAL MEDICINE

## 2021-09-29 PROCEDURE — E9220 PRA ENDO ANESTHESIA: ICD-10-PCS | Mod: HCNC,,, | Performed by: NURSE ANESTHETIST, CERTIFIED REGISTERED

## 2021-09-29 PROCEDURE — 88305 TISSUE EXAM BY PATHOLOGIST: ICD-10-PCS | Mod: 26,HCNC,, | Performed by: PATHOLOGY

## 2021-09-29 PROCEDURE — 27201012 HC FORCEPS, HOT/COLD, DISP: Mod: HCNC | Performed by: INTERNAL MEDICINE

## 2021-09-29 PROCEDURE — 88305 TISSUE EXAM BY PATHOLOGIST: CPT | Mod: 26,HCNC,, | Performed by: PATHOLOGY

## 2021-09-29 PROCEDURE — 43239 EGD BIOPSY SINGLE/MULTIPLE: CPT | Mod: 51,HCNC,, | Performed by: INTERNAL MEDICINE

## 2021-09-29 PROCEDURE — 43239 PR EGD, FLEX, W/BIOPSY, SGL/MULTI: ICD-10-PCS | Mod: 51,HCNC,, | Performed by: INTERNAL MEDICINE

## 2021-09-29 PROCEDURE — 88342 IMHCHEM/IMCYTCHM 1ST ANTB: CPT | Mod: 59,HCNC | Performed by: PATHOLOGY

## 2021-09-29 PROCEDURE — 45385 COLONOSCOPY W/LESION REMOVAL: CPT | Mod: PT,HCNC,, | Performed by: INTERNAL MEDICINE

## 2021-09-29 PROCEDURE — 63600175 PHARM REV CODE 636 W HCPCS: Mod: HCNC | Performed by: NURSE ANESTHETIST, CERTIFIED REGISTERED

## 2021-09-29 PROCEDURE — U0002 COVID-19 LAB TEST NON-CDC: HCPCS | Mod: HCNC | Performed by: INTERNAL MEDICINE

## 2021-09-29 PROCEDURE — 27201089 HC SNARE, DISP (ANY): Mod: HCNC | Performed by: INTERNAL MEDICINE

## 2021-09-29 RX ORDER — PHENYLEPHRINE HYDROCHLORIDE 10 MG/ML
INJECTION INTRAVENOUS
Status: DISCONTINUED | OUTPATIENT
Start: 2021-09-29 | End: 2021-09-29

## 2021-09-29 RX ORDER — PROPOFOL 10 MG/ML
VIAL (ML) INTRAVENOUS CONTINUOUS PRN
Status: DISCONTINUED | OUTPATIENT
Start: 2021-09-29 | End: 2021-09-29

## 2021-09-29 RX ORDER — SODIUM CHLORIDE 9 MG/ML
INJECTION, SOLUTION INTRAVENOUS CONTINUOUS
Status: DISCONTINUED | OUTPATIENT
Start: 2021-09-29 | End: 2021-09-29 | Stop reason: HOSPADM

## 2021-09-29 RX ORDER — PROPOFOL 10 MG/ML
VIAL (ML) INTRAVENOUS
Status: DISCONTINUED | OUTPATIENT
Start: 2021-09-29 | End: 2021-09-29

## 2021-09-29 RX ORDER — LIDOCAINE HYDROCHLORIDE 20 MG/ML
INJECTION INTRAVENOUS
Status: DISCONTINUED | OUTPATIENT
Start: 2021-09-29 | End: 2021-09-29

## 2021-09-29 RX ADMIN — LIDOCAINE HYDROCHLORIDE 100 MG: 20 INJECTION, SOLUTION INTRAVENOUS at 02:09

## 2021-09-29 RX ADMIN — PHENYLEPHRINE HYDROCHLORIDE 100 MCG: 10 INJECTION INTRAVENOUS at 03:09

## 2021-09-29 RX ADMIN — PROPOFOL 20 MG: 10 INJECTION, EMULSION INTRAVENOUS at 02:09

## 2021-09-29 RX ADMIN — PROPOFOL 50 MG: 10 INJECTION, EMULSION INTRAVENOUS at 02:09

## 2021-09-29 RX ADMIN — PROPOFOL 10 MG: 10 INJECTION, EMULSION INTRAVENOUS at 02:09

## 2021-09-29 RX ADMIN — GLYCOPYRROLATE 0.1 MG: 0.2 INJECTION, SOLUTION INTRAMUSCULAR; INTRAVITREAL at 02:09

## 2021-09-29 RX ADMIN — Medication 150 MCG/KG/MIN: at 02:09

## 2021-09-29 RX ADMIN — SODIUM CHLORIDE: 0.9 INJECTION, SOLUTION INTRAVENOUS at 02:09

## 2021-10-06 LAB
FINAL PATHOLOGIC DIAGNOSIS: NORMAL
GROSS: NORMAL
Lab: NORMAL

## 2021-11-04 ENCOUNTER — OFFICE VISIT (OUTPATIENT)
Dept: PODIATRY | Facility: CLINIC | Age: 86
End: 2021-11-04
Payer: MEDICARE

## 2021-11-04 VITALS
HEART RATE: 58 BPM | SYSTOLIC BLOOD PRESSURE: 165 MMHG | BODY MASS INDEX: 25.55 KG/M2 | WEIGHT: 159 LBS | HEIGHT: 66 IN | DIASTOLIC BLOOD PRESSURE: 68 MMHG

## 2021-11-04 DIAGNOSIS — L84 CORN OF TOE: Primary | ICD-10-CM

## 2021-11-04 DIAGNOSIS — M20.5X2 ADDUCTOVARUS ROTATION OF TOE, ACQUIRED, LEFT: ICD-10-CM

## 2021-11-04 DIAGNOSIS — M79.675 PAIN OF TOE OF LEFT FOOT: ICD-10-CM

## 2021-11-04 PROCEDURE — 3077F PR MOST RECENT SYSTOLIC BLOOD PRESSURE >= 140 MM HG: ICD-10-PCS | Mod: HCNC,CPTII,S$GLB, | Performed by: PODIATRIST

## 2021-11-04 PROCEDURE — 1159F PR MEDICATION LIST DOCUMENTED IN MEDICAL RECORD: ICD-10-PCS | Mod: HCNC,CPTII,S$GLB, | Performed by: PODIATRIST

## 2021-11-04 PROCEDURE — 3078F DIAST BP <80 MM HG: CPT | Mod: HCNC,CPTII,S$GLB, | Performed by: PODIATRIST

## 2021-11-04 PROCEDURE — 1157F ADVNC CARE PLAN IN RCRD: CPT | Mod: HCNC,CPTII,S$GLB, | Performed by: PODIATRIST

## 2021-11-04 PROCEDURE — 1160F PR REVIEW ALL MEDS BY PRESCRIBER/CLIN PHARMACIST DOCUMENTED: ICD-10-PCS | Mod: HCNC,CPTII,S$GLB, | Performed by: PODIATRIST

## 2021-11-04 PROCEDURE — 1160F RVW MEDS BY RX/DR IN RCRD: CPT | Mod: HCNC,CPTII,S$GLB, | Performed by: PODIATRIST

## 2021-11-04 PROCEDURE — 99213 OFFICE O/P EST LOW 20 MIN: CPT | Mod: HCNC,S$GLB,, | Performed by: PODIATRIST

## 2021-11-04 PROCEDURE — 99999 PR PBB SHADOW E&M-EST. PATIENT-LVL IV: CPT | Mod: PBBFAC,HCNC,, | Performed by: PODIATRIST

## 2021-11-04 PROCEDURE — 99213 PR OFFICE/OUTPT VISIT, EST, LEVL III, 20-29 MIN: ICD-10-PCS | Mod: HCNC,S$GLB,, | Performed by: PODIATRIST

## 2021-11-04 PROCEDURE — 99999 PR PBB SHADOW E&M-EST. PATIENT-LVL IV: ICD-10-PCS | Mod: PBBFAC,HCNC,, | Performed by: PODIATRIST

## 2021-11-04 PROCEDURE — 1157F PR ADVANCE CARE PLAN OR EQUIV PRESENT IN MEDICAL RECORD: ICD-10-PCS | Mod: HCNC,CPTII,S$GLB, | Performed by: PODIATRIST

## 2021-11-04 PROCEDURE — 1101F PR PT FALLS ASSESS DOC 0-1 FALLS W/OUT INJ PAST YR: ICD-10-PCS | Mod: HCNC,CPTII,S$GLB, | Performed by: PODIATRIST

## 2021-11-04 PROCEDURE — 3077F SYST BP >= 140 MM HG: CPT | Mod: HCNC,CPTII,S$GLB, | Performed by: PODIATRIST

## 2021-11-04 PROCEDURE — 3288F PR FALLS RISK ASSESSMENT DOCUMENTED: ICD-10-PCS | Mod: HCNC,CPTII,S$GLB, | Performed by: PODIATRIST

## 2021-11-04 PROCEDURE — 3288F FALL RISK ASSESSMENT DOCD: CPT | Mod: HCNC,CPTII,S$GLB, | Performed by: PODIATRIST

## 2021-11-04 PROCEDURE — 1159F MED LIST DOCD IN RCRD: CPT | Mod: HCNC,CPTII,S$GLB, | Performed by: PODIATRIST

## 2021-11-04 PROCEDURE — 1126F AMNT PAIN NOTED NONE PRSNT: CPT | Mod: HCNC,CPTII,S$GLB, | Performed by: PODIATRIST

## 2021-11-04 PROCEDURE — 3078F PR MOST RECENT DIASTOLIC BLOOD PRESSURE < 80 MM HG: ICD-10-PCS | Mod: HCNC,CPTII,S$GLB, | Performed by: PODIATRIST

## 2021-11-04 PROCEDURE — 1101F PT FALLS ASSESS-DOCD LE1/YR: CPT | Mod: HCNC,CPTII,S$GLB, | Performed by: PODIATRIST

## 2021-11-04 PROCEDURE — 1126F PR PAIN SEVERITY QUANTIFIED, NO PAIN PRESENT: ICD-10-PCS | Mod: HCNC,CPTII,S$GLB, | Performed by: PODIATRIST

## 2021-11-08 ENCOUNTER — OFFICE VISIT (OUTPATIENT)
Dept: INTERNAL MEDICINE | Facility: CLINIC | Age: 86
End: 2021-11-08
Payer: MEDICARE

## 2021-11-08 VITALS
WEIGHT: 166.44 LBS | DIASTOLIC BLOOD PRESSURE: 50 MMHG | BODY MASS INDEX: 26.75 KG/M2 | SYSTOLIC BLOOD PRESSURE: 130 MMHG | HEART RATE: 70 BPM | HEIGHT: 66 IN | OXYGEN SATURATION: 100 %

## 2021-11-08 DIAGNOSIS — K59.09 CHRONIC CONSTIPATION: ICD-10-CM

## 2021-11-08 DIAGNOSIS — G47.9 SLEEP DISORDER: ICD-10-CM

## 2021-11-08 DIAGNOSIS — E78.1 HYPERTRIGLYCERIDEMIA: ICD-10-CM

## 2021-11-08 DIAGNOSIS — I10 HYPERTENSION, UNSPECIFIED TYPE: ICD-10-CM

## 2021-11-08 DIAGNOSIS — H57.9 EYE EXAM ABNORMAL: Primary | ICD-10-CM

## 2021-11-08 DIAGNOSIS — M16.10 ARTHRITIS, HIP: ICD-10-CM

## 2021-11-08 DIAGNOSIS — I10 ESSENTIAL HYPERTENSION: ICD-10-CM

## 2021-11-08 PROCEDURE — 1157F PR ADVANCE CARE PLAN OR EQUIV PRESENT IN MEDICAL RECORD: ICD-10-PCS | Mod: HCNC,CPTII,S$GLB, | Performed by: INTERNAL MEDICINE

## 2021-11-08 PROCEDURE — 3075F SYST BP GE 130 - 139MM HG: CPT | Mod: HCNC,CPTII,S$GLB, | Performed by: INTERNAL MEDICINE

## 2021-11-08 PROCEDURE — 99215 OFFICE O/P EST HI 40 MIN: CPT | Mod: HCNC,S$GLB,, | Performed by: INTERNAL MEDICINE

## 2021-11-08 PROCEDURE — 3288F FALL RISK ASSESSMENT DOCD: CPT | Mod: HCNC,CPTII,S$GLB, | Performed by: INTERNAL MEDICINE

## 2021-11-08 PROCEDURE — 3078F PR MOST RECENT DIASTOLIC BLOOD PRESSURE < 80 MM HG: ICD-10-PCS | Mod: HCNC,CPTII,S$GLB, | Performed by: INTERNAL MEDICINE

## 2021-11-08 PROCEDURE — 1126F PR PAIN SEVERITY QUANTIFIED, NO PAIN PRESENT: ICD-10-PCS | Mod: HCNC,CPTII,S$GLB, | Performed by: INTERNAL MEDICINE

## 2021-11-08 PROCEDURE — 99215 PR OFFICE/OUTPT VISIT, EST, LEVL V, 40-54 MIN: ICD-10-PCS | Mod: HCNC,S$GLB,, | Performed by: INTERNAL MEDICINE

## 2021-11-08 PROCEDURE — 1101F PT FALLS ASSESS-DOCD LE1/YR: CPT | Mod: HCNC,CPTII,S$GLB, | Performed by: INTERNAL MEDICINE

## 2021-11-08 PROCEDURE — 1126F AMNT PAIN NOTED NONE PRSNT: CPT | Mod: HCNC,CPTII,S$GLB, | Performed by: INTERNAL MEDICINE

## 2021-11-08 PROCEDURE — 3075F PR MOST RECENT SYSTOLIC BLOOD PRESS GE 130-139MM HG: ICD-10-PCS | Mod: HCNC,CPTII,S$GLB, | Performed by: INTERNAL MEDICINE

## 2021-11-08 PROCEDURE — 3078F DIAST BP <80 MM HG: CPT | Mod: HCNC,CPTII,S$GLB, | Performed by: INTERNAL MEDICINE

## 2021-11-08 PROCEDURE — 3288F PR FALLS RISK ASSESSMENT DOCUMENTED: ICD-10-PCS | Mod: HCNC,CPTII,S$GLB, | Performed by: INTERNAL MEDICINE

## 2021-11-08 PROCEDURE — 99999 PR PBB SHADOW E&M-EST. PATIENT-LVL IV: CPT | Mod: PBBFAC,HCNC,, | Performed by: INTERNAL MEDICINE

## 2021-11-08 PROCEDURE — 1159F MED LIST DOCD IN RCRD: CPT | Mod: HCNC,CPTII,S$GLB, | Performed by: INTERNAL MEDICINE

## 2021-11-08 PROCEDURE — 99999 PR PBB SHADOW E&M-EST. PATIENT-LVL IV: ICD-10-PCS | Mod: PBBFAC,HCNC,, | Performed by: INTERNAL MEDICINE

## 2021-11-08 PROCEDURE — 1159F PR MEDICATION LIST DOCUMENTED IN MEDICAL RECORD: ICD-10-PCS | Mod: HCNC,CPTII,S$GLB, | Performed by: INTERNAL MEDICINE

## 2021-11-08 PROCEDURE — 1101F PR PT FALLS ASSESS DOC 0-1 FALLS W/OUT INJ PAST YR: ICD-10-PCS | Mod: HCNC,CPTII,S$GLB, | Performed by: INTERNAL MEDICINE

## 2021-11-08 PROCEDURE — 1157F ADVNC CARE PLAN IN RCRD: CPT | Mod: HCNC,CPTII,S$GLB, | Performed by: INTERNAL MEDICINE

## 2021-11-08 RX ORDER — ATENOLOL 25 MG/1
50 TABLET ORAL NIGHTLY
Qty: 30 TABLET | Refills: 3 | Status: ON HOLD | OUTPATIENT
Start: 2021-11-08 | End: 2021-12-23 | Stop reason: HOSPADM

## 2021-11-08 RX ORDER — AMLODIPINE BESYLATE 5 MG/1
5 TABLET ORAL NIGHTLY
Qty: 30 TABLET | Refills: 3 | Status: SHIPPED | OUTPATIENT
Start: 2021-11-08 | End: 2021-12-29

## 2021-11-08 RX ORDER — VITAMIN B COMPLEX
1 CAPSULE ORAL DAILY
COMMUNITY

## 2021-11-08 RX ORDER — CALCIUM CARBONATE 300MG(750)
1 TABLET,CHEWABLE ORAL DAILY
COMMUNITY

## 2021-11-08 RX ORDER — MELATONIN 5 MG
1 CAPSULE ORAL NIGHTLY
COMMUNITY
End: 2021-12-29 | Stop reason: DRUGHIGH

## 2021-11-10 ENCOUNTER — PATIENT OUTREACH (OUTPATIENT)
Dept: ADMINISTRATIVE | Facility: OTHER | Age: 86
End: 2021-11-10
Payer: MEDICARE

## 2021-11-15 ENCOUNTER — PATIENT MESSAGE (OUTPATIENT)
Dept: SLEEP MEDICINE | Facility: CLINIC | Age: 86
End: 2021-11-15
Payer: MEDICARE

## 2021-11-16 ENCOUNTER — TELEPHONE (OUTPATIENT)
Dept: SLEEP MEDICINE | Facility: CLINIC | Age: 86
End: 2021-11-16
Payer: MEDICARE

## 2021-11-17 ENCOUNTER — TELEPHONE (OUTPATIENT)
Dept: SLEEP MEDICINE | Facility: CLINIC | Age: 86
End: 2021-11-17
Payer: MEDICARE

## 2021-11-22 ENCOUNTER — CLINICAL SUPPORT (OUTPATIENT)
Dept: INFECTIOUS DISEASES | Facility: CLINIC | Age: 86
End: 2021-11-22
Payer: MEDICARE

## 2021-11-22 DIAGNOSIS — K80.50 CHOLEDOCHOLITHIASIS: ICD-10-CM

## 2021-11-22 DIAGNOSIS — Z80.0 FAMILY HISTORY OF COLON CANCER: ICD-10-CM

## 2021-11-22 DIAGNOSIS — D12.6 COLON ADENOMA: ICD-10-CM

## 2021-11-22 DIAGNOSIS — K80.20 CALCULUS OF GALLBLADDER WITHOUT CHOLECYSTITIS WITHOUT OBSTRUCTION: ICD-10-CM

## 2021-11-22 DIAGNOSIS — R79.89 ELEVATED LFTS: ICD-10-CM

## 2021-11-22 DIAGNOSIS — D50.9 IRON DEFICIENCY ANEMIA, UNSPECIFIED IRON DEFICIENCY ANEMIA TYPE: ICD-10-CM

## 2021-11-22 DIAGNOSIS — R11.2 NON-INTRACTABLE VOMITING WITH NAUSEA, UNSPECIFIED VOMITING TYPE: ICD-10-CM

## 2021-11-22 PROCEDURE — 99999 PR PBB SHADOW E&M-EST. PATIENT-LVL III: ICD-10-PCS | Mod: PBBFAC,HCNC,,

## 2021-11-22 PROCEDURE — 99999 PR PBB SHADOW E&M-EST. PATIENT-LVL III: CPT | Mod: PBBFAC,HCNC,,

## 2021-11-22 PROCEDURE — 90632 HEPA VACCINE ADULT IM: CPT | Mod: HCNC,S$GLB,, | Performed by: INTERNAL MEDICINE

## 2021-11-22 PROCEDURE — 90632 HEPATITIS A VACCINE ADULT IM: ICD-10-PCS | Mod: HCNC,S$GLB,, | Performed by: INTERNAL MEDICINE

## 2021-11-22 PROCEDURE — 90471 HEPATITIS A VACCINE ADULT IM: ICD-10-PCS | Mod: HCNC,S$GLB,, | Performed by: INTERNAL MEDICINE

## 2021-11-22 PROCEDURE — 90471 IMMUNIZATION ADMIN: CPT | Mod: HCNC,S$GLB,, | Performed by: INTERNAL MEDICINE

## 2021-11-23 ENCOUNTER — TELEPHONE (OUTPATIENT)
Dept: SLEEP MEDICINE | Facility: CLINIC | Age: 86
End: 2021-11-23
Payer: MEDICARE

## 2021-11-23 ENCOUNTER — TELEPHONE (OUTPATIENT)
Dept: INTERNAL MEDICINE | Facility: CLINIC | Age: 86
End: 2021-11-23
Payer: MEDICARE

## 2021-11-23 ENCOUNTER — PATIENT MESSAGE (OUTPATIENT)
Dept: INTERNAL MEDICINE | Facility: CLINIC | Age: 86
End: 2021-11-23
Payer: MEDICARE

## 2021-11-23 DIAGNOSIS — Z12.31 SCREENING MAMMOGRAM FOR HIGH-RISK PATIENT: Primary | ICD-10-CM

## 2021-11-29 ENCOUNTER — TELEPHONE (OUTPATIENT)
Dept: SLEEP MEDICINE | Facility: CLINIC | Age: 86
End: 2021-11-29
Payer: MEDICARE

## 2021-12-03 ENCOUNTER — OFFICE VISIT (OUTPATIENT)
Dept: SLEEP MEDICINE | Facility: CLINIC | Age: 86
End: 2021-12-03
Payer: MEDICARE

## 2021-12-03 VITALS
BODY MASS INDEX: 27.07 KG/M2 | HEIGHT: 66 IN | DIASTOLIC BLOOD PRESSURE: 84 MMHG | WEIGHT: 168.44 LBS | SYSTOLIC BLOOD PRESSURE: 140 MMHG | HEART RATE: 80 BPM

## 2021-12-03 DIAGNOSIS — R06.83 SNORING: ICD-10-CM

## 2021-12-03 DIAGNOSIS — G47.01 INSOMNIA DUE TO MEDICAL CONDITION: Primary | ICD-10-CM

## 2021-12-03 DIAGNOSIS — G47.30 SLEEP APNEA IN ADULT: ICD-10-CM

## 2021-12-03 DIAGNOSIS — N18.32 STAGE 3B CHRONIC KIDNEY DISEASE: ICD-10-CM

## 2021-12-03 DIAGNOSIS — E03.4 HYPOTHYROIDISM DUE TO ACQUIRED ATROPHY OF THYROID: ICD-10-CM

## 2021-12-03 DIAGNOSIS — I10 ESSENTIAL HYPERTENSION: ICD-10-CM

## 2021-12-03 DIAGNOSIS — E78.1 HYPERTRIGLYCERIDEMIA: ICD-10-CM

## 2021-12-03 DIAGNOSIS — G43.109 OCULAR MIGRAINE: ICD-10-CM

## 2021-12-03 PROCEDURE — 99499 UNLISTED E&M SERVICE: CPT | Mod: S$GLB,,, | Performed by: INTERNAL MEDICINE

## 2021-12-03 PROCEDURE — 99499 RISK ADDL DX/OHS AUDIT: ICD-10-PCS | Mod: S$GLB,,, | Performed by: INTERNAL MEDICINE

## 2021-12-03 PROCEDURE — 1157F ADVNC CARE PLAN IN RCRD: CPT | Mod: HCNC,CPTII,S$GLB, | Performed by: INTERNAL MEDICINE

## 2021-12-03 PROCEDURE — 1157F PR ADVANCE CARE PLAN OR EQUIV PRESENT IN MEDICAL RECORD: ICD-10-PCS | Mod: HCNC,CPTII,S$GLB, | Performed by: INTERNAL MEDICINE

## 2021-12-03 PROCEDURE — 99203 OFFICE O/P NEW LOW 30 MIN: CPT | Mod: HCNC,S$GLB,, | Performed by: INTERNAL MEDICINE

## 2021-12-03 PROCEDURE — 99999 PR PBB SHADOW E&M-EST. PATIENT-LVL IV: CPT | Mod: PBBFAC,HCNC,, | Performed by: INTERNAL MEDICINE

## 2021-12-03 PROCEDURE — 99999 PR PBB SHADOW E&M-EST. PATIENT-LVL IV: ICD-10-PCS | Mod: PBBFAC,HCNC,, | Performed by: INTERNAL MEDICINE

## 2021-12-03 PROCEDURE — 99203 PR OFFICE/OUTPT VISIT, NEW, LEVL III, 30-44 MIN: ICD-10-PCS | Mod: HCNC,S$GLB,, | Performed by: INTERNAL MEDICINE

## 2021-12-08 ENCOUNTER — TELEPHONE (OUTPATIENT)
Dept: SLEEP MEDICINE | Facility: OTHER | Age: 86
End: 2021-12-08
Payer: MEDICARE

## 2021-12-13 ENCOUNTER — TELEPHONE (OUTPATIENT)
Dept: SLEEP MEDICINE | Facility: OTHER | Age: 86
End: 2021-12-13
Payer: MEDICARE

## 2021-12-15 ENCOUNTER — TELEPHONE (OUTPATIENT)
Dept: SLEEP MEDICINE | Facility: OTHER | Age: 86
End: 2021-12-15
Payer: MEDICARE

## 2021-12-16 ENCOUNTER — PATIENT MESSAGE (OUTPATIENT)
Dept: SLEEP MEDICINE | Facility: OTHER | Age: 86
End: 2021-12-16

## 2021-12-16 ENCOUNTER — HOSPITAL ENCOUNTER (OUTPATIENT)
Dept: SLEEP MEDICINE | Facility: OTHER | Age: 86
Discharge: HOME | End: 2021-12-16
Attending: INTERNAL MEDICINE
Payer: MEDICARE

## 2021-12-16 ENCOUNTER — TELEPHONE (OUTPATIENT)
Dept: SLEEP MEDICINE | Facility: OTHER | Age: 86
End: 2021-12-16
Payer: MEDICARE

## 2021-12-16 DIAGNOSIS — I10 ESSENTIAL HYPERTENSION: ICD-10-CM

## 2021-12-16 DIAGNOSIS — E78.1 HYPERTRIGLYCERIDEMIA: ICD-10-CM

## 2021-12-16 DIAGNOSIS — G47.30 SLEEP APNEA IN ADULT: ICD-10-CM

## 2021-12-16 DIAGNOSIS — N18.32 STAGE 3B CHRONIC KIDNEY DISEASE: ICD-10-CM

## 2021-12-16 DIAGNOSIS — G43.109 OCULAR MIGRAINE: ICD-10-CM

## 2021-12-16 DIAGNOSIS — E03.4 HYPOTHYROIDISM DUE TO ACQUIRED ATROPHY OF THYROID: ICD-10-CM

## 2021-12-16 DIAGNOSIS — G47.01 INSOMNIA DUE TO MEDICAL CONDITION: ICD-10-CM

## 2021-12-16 DIAGNOSIS — R06.83 SNORING: ICD-10-CM

## 2021-12-16 PROCEDURE — 95810 POLYSOM 6/> YRS 4/> PARAM: CPT | Mod: 26,HCNC,, | Performed by: INTERNAL MEDICINE

## 2021-12-16 PROCEDURE — 95810 PR POLYSOMNOGRAPHY, 4 OR MORE: ICD-10-PCS | Mod: 26,HCNC,, | Performed by: INTERNAL MEDICINE

## 2021-12-16 PROCEDURE — 95810 POLYSOM 6/> YRS 4/> PARAM: CPT | Mod: HCNC

## 2021-12-17 NOTE — PROGRESS NOTES
Education was done via explanation of sleep study process and procedure. All questions were answered.    Pt. did not meet criteria for CPAP. Respiratory events were observed mainly during supine sleep. REM sleep was oibtained in side position.    Low sat of 86% was observed in study. EKG revealed rare PVC, rare PAC and an acceleration. Soft to moderate snoring was heard. Thank  you letter was given in a.m.

## 2021-12-21 ENCOUNTER — HOSPITAL ENCOUNTER (INPATIENT)
Facility: HOSPITAL | Age: 86
LOS: 2 days | Discharge: HOME | DRG: 309 | End: 2021-12-23
Attending: EMERGENCY MEDICINE | Admitting: STUDENT IN AN ORGANIZED HEALTH CARE EDUCATION/TRAINING PROGRAM
Payer: MEDICARE

## 2021-12-21 DIAGNOSIS — I48.0 PAROXYSMAL ATRIAL FIBRILLATION: ICD-10-CM

## 2021-12-21 DIAGNOSIS — I48.91 ATRIAL FIBRILLATION, UNSPECIFIED TYPE: Primary | ICD-10-CM

## 2021-12-21 DIAGNOSIS — I48.91 ATRIAL FIBRILLATION WITH RVR: ICD-10-CM

## 2021-12-21 DIAGNOSIS — Z90.49 HISTORY OF CHOLECYSTECTOMY: ICD-10-CM

## 2021-12-21 DIAGNOSIS — R00.2 PALPITATIONS: ICD-10-CM

## 2021-12-21 DIAGNOSIS — K43.9 VENTRAL HERNIA WITHOUT OBSTRUCTION OR GANGRENE: ICD-10-CM

## 2021-12-21 DIAGNOSIS — N30.00 ACUTE CYSTITIS WITHOUT HEMATURIA: ICD-10-CM

## 2021-12-21 DIAGNOSIS — R07.9 CHEST PAIN: ICD-10-CM

## 2021-12-21 DIAGNOSIS — I10 ESSENTIAL (PRIMARY) HYPERTENSION: ICD-10-CM

## 2021-12-21 LAB
ALBUMIN SERPL BCP-MCNC: 3.9 G/DL (ref 3.5–5.2)
ALP SERPL-CCNC: 107 U/L (ref 55–135)
ALT SERPL W/O P-5'-P-CCNC: 21 U/L (ref 10–44)
ANION GAP SERPL CALC-SCNC: 6 MMOL/L (ref 8–16)
AST SERPL-CCNC: 30 U/L (ref 10–40)
BASOPHILS # BLD AUTO: 0.06 K/UL (ref 0–0.2)
BASOPHILS NFR BLD: 0.6 % (ref 0–1.9)
BILIRUB SERPL-MCNC: 0.7 MG/DL (ref 0.1–1)
BNP SERPL-MCNC: 184 PG/ML (ref 0–99)
BUN SERPL-MCNC: 19 MG/DL (ref 8–23)
CALCIUM SERPL-MCNC: 9.3 MG/DL (ref 8.7–10.5)
CHLORIDE SERPL-SCNC: 105 MMOL/L (ref 95–110)
CO2 SERPL-SCNC: 26 MMOL/L (ref 23–29)
CREAT SERPL-MCNC: 1.2 MG/DL (ref 0.5–1.4)
CTP QC/QA: YES
DIFFERENTIAL METHOD: ABNORMAL
EOSINOPHIL # BLD AUTO: 0.3 K/UL (ref 0–0.5)
EOSINOPHIL NFR BLD: 2.7 % (ref 0–8)
ERYTHROCYTE [DISTWIDTH] IN BLOOD BY AUTOMATED COUNT: 13 % (ref 11.5–14.5)
EST. GFR  (AFRICAN AMERICAN): 48 ML/MIN/1.73 M^2
EST. GFR  (NON AFRICAN AMERICAN): 41.6 ML/MIN/1.73 M^2
GLUCOSE SERPL-MCNC: 103 MG/DL (ref 70–110)
HCT VFR BLD AUTO: 37.1 % (ref 37–48.5)
HGB BLD-MCNC: 11.9 G/DL (ref 12–16)
IMM GRANULOCYTES # BLD AUTO: 0.04 K/UL (ref 0–0.04)
IMM GRANULOCYTES NFR BLD AUTO: 0.4 % (ref 0–0.5)
LYMPHOCYTES # BLD AUTO: 2.8 K/UL (ref 1–4.8)
LYMPHOCYTES NFR BLD: 27.1 % (ref 18–48)
MAGNESIUM SERPL-MCNC: 2.1 MG/DL (ref 1.6–2.6)
MCH RBC QN AUTO: 29.8 PG (ref 27–31)
MCHC RBC AUTO-ENTMCNC: 32.1 G/DL (ref 32–36)
MCV RBC AUTO: 93 FL (ref 82–98)
MONOCYTES # BLD AUTO: 1 K/UL (ref 0.3–1)
MONOCYTES NFR BLD: 9.7 % (ref 4–15)
NEUTROPHILS # BLD AUTO: 6.1 K/UL (ref 1.8–7.7)
NEUTROPHILS NFR BLD: 59.5 % (ref 38–73)
NRBC BLD-RTO: 0 /100 WBC
PLATELET # BLD AUTO: 243 K/UL (ref 150–450)
PMV BLD AUTO: 10.3 FL (ref 9.2–12.9)
POCT GLUCOSE: 114 MG/DL (ref 70–110)
POTASSIUM SERPL-SCNC: 4.3 MMOL/L (ref 3.5–5.1)
PROT SERPL-MCNC: 7.4 G/DL (ref 6–8.4)
RBC # BLD AUTO: 4 M/UL (ref 4–5.4)
SARS-COV-2 RDRP RESP QL NAA+PROBE: NEGATIVE
SODIUM SERPL-SCNC: 137 MMOL/L (ref 136–145)
TROPONIN I SERPL DL<=0.01 NG/ML-MCNC: 0.01 NG/ML (ref 0–0.03)
TSH SERPL DL<=0.005 MIU/L-ACNC: 2.39 UIU/ML (ref 0.4–4)
WBC # BLD AUTO: 10.21 K/UL (ref 3.9–12.7)

## 2021-12-21 PROCEDURE — 99291 CRITICAL CARE FIRST HOUR: CPT | Mod: GC,CS,, | Performed by: EMERGENCY MEDICINE

## 2021-12-21 PROCEDURE — 80053 COMPREHEN METABOLIC PANEL: CPT | Mod: HCNC | Performed by: STUDENT IN AN ORGANIZED HEALTH CARE EDUCATION/TRAINING PROGRAM

## 2021-12-21 PROCEDURE — 96374 THER/PROPH/DIAG INJ IV PUSH: CPT | Mod: HCNC

## 2021-12-21 PROCEDURE — 25000003 PHARM REV CODE 250: Mod: HCNC | Performed by: STUDENT IN AN ORGANIZED HEALTH CARE EDUCATION/TRAINING PROGRAM

## 2021-12-21 PROCEDURE — 84484 ASSAY OF TROPONIN QUANT: CPT | Mod: HCNC | Performed by: STUDENT IN AN ORGANIZED HEALTH CARE EDUCATION/TRAINING PROGRAM

## 2021-12-21 PROCEDURE — 99223 1ST HOSP IP/OBS HIGH 75: CPT | Mod: HCNC,,, | Performed by: INTERNAL MEDICINE

## 2021-12-21 PROCEDURE — 83880 ASSAY OF NATRIURETIC PEPTIDE: CPT | Mod: HCNC | Performed by: STUDENT IN AN ORGANIZED HEALTH CARE EDUCATION/TRAINING PROGRAM

## 2021-12-21 PROCEDURE — 85025 COMPLETE CBC W/AUTO DIFF WBC: CPT | Mod: HCNC | Performed by: STUDENT IN AN ORGANIZED HEALTH CARE EDUCATION/TRAINING PROGRAM

## 2021-12-21 PROCEDURE — 94761 N-INVAS EAR/PLS OXIMETRY MLT: CPT | Mod: HCNC

## 2021-12-21 PROCEDURE — 93005 ELECTROCARDIOGRAM TRACING: CPT | Mod: HCNC

## 2021-12-21 PROCEDURE — 93010 EKG 12-LEAD: ICD-10-PCS | Mod: HCNC,,, | Performed by: INTERNAL MEDICINE

## 2021-12-21 PROCEDURE — 99223 PR INITIAL HOSPITAL CARE,LEVL III: ICD-10-PCS | Mod: AI,HCNC,, | Performed by: STUDENT IN AN ORGANIZED HEALTH CARE EDUCATION/TRAINING PROGRAM

## 2021-12-21 PROCEDURE — 96376 TX/PRO/DX INJ SAME DRUG ADON: CPT | Mod: HCNC

## 2021-12-21 PROCEDURE — 20600001 HC STEP DOWN PRIVATE ROOM: Mod: HCNC

## 2021-12-21 PROCEDURE — 93010 ELECTROCARDIOGRAM REPORT: CPT | Mod: HCNC,,, | Performed by: INTERNAL MEDICINE

## 2021-12-21 PROCEDURE — 63600175 PHARM REV CODE 636 W HCPCS: Mod: HCNC | Performed by: STUDENT IN AN ORGANIZED HEALTH CARE EDUCATION/TRAINING PROGRAM

## 2021-12-21 PROCEDURE — 84443 ASSAY THYROID STIM HORMONE: CPT | Mod: HCNC | Performed by: STUDENT IN AN ORGANIZED HEALTH CARE EDUCATION/TRAINING PROGRAM

## 2021-12-21 PROCEDURE — 83735 ASSAY OF MAGNESIUM: CPT | Mod: HCNC | Performed by: STUDENT IN AN ORGANIZED HEALTH CARE EDUCATION/TRAINING PROGRAM

## 2021-12-21 PROCEDURE — 99223 1ST HOSP IP/OBS HIGH 75: CPT | Mod: AI,HCNC,, | Performed by: STUDENT IN AN ORGANIZED HEALTH CARE EDUCATION/TRAINING PROGRAM

## 2021-12-21 PROCEDURE — 96361 HYDRATE IV INFUSION ADD-ON: CPT | Mod: HCNC

## 2021-12-21 PROCEDURE — 99285 EMERGENCY DEPT VISIT HI MDM: CPT | Mod: 25,HCNC

## 2021-12-21 PROCEDURE — 99291 PR CRITICAL CARE, E/M 30-74 MINUTES: ICD-10-PCS | Mod: GC,CS,, | Performed by: EMERGENCY MEDICINE

## 2021-12-21 PROCEDURE — U0002 COVID-19 LAB TEST NON-CDC: HCPCS | Mod: HCNC | Performed by: STUDENT IN AN ORGANIZED HEALTH CARE EDUCATION/TRAINING PROGRAM

## 2021-12-21 PROCEDURE — 99223 PR INITIAL HOSPITAL CARE,LEVL III: ICD-10-PCS | Mod: HCNC,,, | Performed by: INTERNAL MEDICINE

## 2021-12-21 RX ORDER — METOPROLOL TARTRATE 1 MG/ML
5 INJECTION, SOLUTION INTRAVENOUS
Status: DISCONTINUED | OUTPATIENT
Start: 2021-12-21 | End: 2021-12-21

## 2021-12-21 RX ORDER — ENOXAPARIN SODIUM 100 MG/ML
40 INJECTION SUBCUTANEOUS EVERY 24 HOURS
Status: DISCONTINUED | OUTPATIENT
Start: 2021-12-21 | End: 2021-12-21

## 2021-12-21 RX ORDER — ACETAMINOPHEN 325 MG/1
650 TABLET ORAL EVERY 8 HOURS PRN
Status: DISCONTINUED | OUTPATIENT
Start: 2021-12-21 | End: 2021-12-23 | Stop reason: HOSPADM

## 2021-12-21 RX ORDER — DILTIAZEM HYDROCHLORIDE 120 MG/1
120 CAPSULE, COATED, EXTENDED RELEASE ORAL
Status: COMPLETED | OUTPATIENT
Start: 2021-12-21 | End: 2021-12-21

## 2021-12-21 RX ORDER — IBUPROFEN 200 MG
24 TABLET ORAL
Status: DISCONTINUED | OUTPATIENT
Start: 2021-12-21 | End: 2021-12-23 | Stop reason: HOSPADM

## 2021-12-21 RX ORDER — ZOLPIDEM TARTRATE 5 MG/1
5 TABLET ORAL NIGHTLY
Status: DISCONTINUED | OUTPATIENT
Start: 2021-12-21 | End: 2021-12-21

## 2021-12-21 RX ORDER — ZOLPIDEM TARTRATE 5 MG/1
5 TABLET ORAL NIGHTLY PRN
Status: DISCONTINUED | OUTPATIENT
Start: 2021-12-21 | End: 2021-12-23 | Stop reason: HOSPADM

## 2021-12-21 RX ORDER — ONDANSETRON 8 MG/1
8 TABLET, ORALLY DISINTEGRATING ORAL EVERY 8 HOURS PRN
Status: DISCONTINUED | OUTPATIENT
Start: 2021-12-21 | End: 2021-12-23 | Stop reason: HOSPADM

## 2021-12-21 RX ORDER — POLYETHYLENE GLYCOL 3350 17 G/17G
17 POWDER, FOR SOLUTION ORAL 2 TIMES DAILY PRN
Status: DISCONTINUED | OUTPATIENT
Start: 2021-12-21 | End: 2021-12-23 | Stop reason: HOSPADM

## 2021-12-21 RX ORDER — PROCHLORPERAZINE EDISYLATE 5 MG/ML
5 INJECTION INTRAMUSCULAR; INTRAVENOUS EVERY 6 HOURS PRN
Status: DISCONTINUED | OUTPATIENT
Start: 2021-12-21 | End: 2021-12-23 | Stop reason: HOSPADM

## 2021-12-21 RX ORDER — AMLODIPINE BESYLATE 5 MG/1
5 TABLET ORAL NIGHTLY
Status: DISCONTINUED | OUTPATIENT
Start: 2021-12-21 | End: 2021-12-23 | Stop reason: HOSPADM

## 2021-12-21 RX ORDER — IBUPROFEN 200 MG
16 TABLET ORAL
Status: DISCONTINUED | OUTPATIENT
Start: 2021-12-21 | End: 2021-12-23 | Stop reason: HOSPADM

## 2021-12-21 RX ORDER — LEVOTHYROXINE SODIUM 100 UG/1
100 TABLET ORAL
Status: DISCONTINUED | OUTPATIENT
Start: 2021-12-22 | End: 2021-12-23 | Stop reason: HOSPADM

## 2021-12-21 RX ORDER — DILTIAZEM HCL 1 MG/ML
5 INJECTION, SOLUTION INTRAVENOUS
Status: DISCONTINUED | OUTPATIENT
Start: 2021-12-21 | End: 2021-12-21

## 2021-12-21 RX ORDER — FERROUS SULFATE, DRIED 160(50) MG
1 TABLET, EXTENDED RELEASE ORAL DAILY
Status: DISCONTINUED | OUTPATIENT
Start: 2021-12-22 | End: 2021-12-23 | Stop reason: HOSPADM

## 2021-12-21 RX ORDER — GLUCAGON 1 MG
1 KIT INJECTION
Status: DISCONTINUED | OUTPATIENT
Start: 2021-12-21 | End: 2021-12-23 | Stop reason: HOSPADM

## 2021-12-21 RX ORDER — ACETAMINOPHEN 325 MG/1
650 TABLET ORAL EVERY 4 HOURS PRN
Status: DISCONTINUED | OUTPATIENT
Start: 2021-12-21 | End: 2021-12-23 | Stop reason: HOSPADM

## 2021-12-21 RX ORDER — NALOXONE HCL 0.4 MG/ML
0.02 VIAL (ML) INJECTION
Status: DISCONTINUED | OUTPATIENT
Start: 2021-12-21 | End: 2021-12-23 | Stop reason: HOSPADM

## 2021-12-21 RX ORDER — ASPIRIN 81 MG/1
81 TABLET ORAL NIGHTLY
Status: DISCONTINUED | OUTPATIENT
Start: 2021-12-21 | End: 2021-12-23 | Stop reason: HOSPADM

## 2021-12-21 RX ORDER — LOSARTAN POTASSIUM 50 MG/1
100 TABLET ORAL NIGHTLY
Status: DISCONTINUED | OUTPATIENT
Start: 2021-12-21 | End: 2021-12-23 | Stop reason: HOSPADM

## 2021-12-21 RX ORDER — LANOLIN ALCOHOL/MO/W.PET/CERES
400 CREAM (GRAM) TOPICAL DAILY
Status: DISCONTINUED | OUTPATIENT
Start: 2021-12-22 | End: 2021-12-23 | Stop reason: HOSPADM

## 2021-12-21 RX ORDER — CHOLECALCIFEROL (VITAMIN D3) 25 MCG
2000 TABLET ORAL DAILY
Status: DISCONTINUED | OUTPATIENT
Start: 2021-12-22 | End: 2021-12-23 | Stop reason: HOSPADM

## 2021-12-21 RX ORDER — SODIUM CHLORIDE 0.9 % (FLUSH) 0.9 %
10 SYRINGE (ML) INJECTION EVERY 8 HOURS PRN
Status: DISCONTINUED | OUTPATIENT
Start: 2021-12-21 | End: 2021-12-23 | Stop reason: HOSPADM

## 2021-12-21 RX ORDER — LOSARTAN POTASSIUM 50 MG/1
100 TABLET ORAL DAILY
Status: DISCONTINUED | OUTPATIENT
Start: 2021-12-22 | End: 2021-12-21

## 2021-12-21 RX ORDER — MAG HYDROX/ALUMINUM HYD/SIMETH 200-200-20
30 SUSPENSION, ORAL (FINAL DOSE FORM) ORAL 4 TIMES DAILY PRN
Status: DISCONTINUED | OUTPATIENT
Start: 2021-12-21 | End: 2021-12-23 | Stop reason: HOSPADM

## 2021-12-21 RX ORDER — BISACODYL 5 MG
5 TABLET, DELAYED RELEASE (ENTERIC COATED) ORAL DAILY PRN
Status: DISCONTINUED | OUTPATIENT
Start: 2021-12-21 | End: 2021-12-23 | Stop reason: HOSPADM

## 2021-12-21 RX ORDER — DILTIAZEM HYDROCHLORIDE 5 MG/ML
0.25 INJECTION INTRAVENOUS ONCE
Status: COMPLETED | OUTPATIENT
Start: 2021-12-21 | End: 2021-12-21

## 2021-12-21 RX ORDER — TALC
6 POWDER (GRAM) TOPICAL NIGHTLY PRN
Status: DISCONTINUED | OUTPATIENT
Start: 2021-12-21 | End: 2021-12-23 | Stop reason: HOSPADM

## 2021-12-21 RX ORDER — DILTIAZEM HYDROCHLORIDE 5 MG/ML
0.35 INJECTION INTRAVENOUS ONCE
Status: COMPLETED | OUTPATIENT
Start: 2021-12-21 | End: 2021-12-21

## 2021-12-21 RX ORDER — METOPROLOL SUCCINATE 50 MG/1
50 TABLET, EXTENDED RELEASE ORAL DAILY
Status: DISCONTINUED | OUTPATIENT
Start: 2021-12-22 | End: 2021-12-23 | Stop reason: HOSPADM

## 2021-12-21 RX ORDER — INSULIN ASPART 100 [IU]/ML
0-5 INJECTION, SOLUTION INTRAVENOUS; SUBCUTANEOUS
Status: DISCONTINUED | OUTPATIENT
Start: 2021-12-21 | End: 2021-12-23 | Stop reason: HOSPADM

## 2021-12-21 RX ADMIN — DILTIAZEM HYDROCHLORIDE 18.5 MG: 5 INJECTION INTRAVENOUS at 11:12

## 2021-12-21 RX ADMIN — BISACODYL 5 MG: 5 TABLET, COATED ORAL at 08:12

## 2021-12-21 RX ADMIN — LOSARTAN POTASSIUM 100 MG: 50 TABLET, FILM COATED ORAL at 08:12

## 2021-12-21 RX ADMIN — SODIUM CHLORIDE, SODIUM LACTATE, POTASSIUM CHLORIDE, AND CALCIUM CHLORIDE 1000 ML: .6; .31; .03; .02 INJECTION, SOLUTION INTRAVENOUS at 11:12

## 2021-12-21 RX ADMIN — DILTIAZEM HYDROCHLORIDE 120 MG: 120 CAPSULE, COATED, EXTENDED RELEASE ORAL at 11:12

## 2021-12-21 RX ADMIN — Medication 6 MG: at 08:12

## 2021-12-21 RX ADMIN — AMLODIPINE BESYLATE 5 MG: 5 TABLET ORAL at 08:12

## 2021-12-21 RX ADMIN — ASPIRIN 81 MG: 81 TABLET, COATED ORAL at 08:12

## 2021-12-21 RX ADMIN — APIXABAN 5 MG: 5 TABLET, FILM COATED ORAL at 09:12

## 2021-12-21 RX ADMIN — DILTIAZEM HYDROCHLORIDE 25.5 MG: 5 INJECTION INTRAVENOUS at 12:12

## 2021-12-21 RX ADMIN — ZOLPIDEM TARTRATE 5 MG: 5 TABLET ORAL at 10:12

## 2021-12-22 ENCOUNTER — TELEPHONE (OUTPATIENT)
Dept: INTERNAL MEDICINE | Facility: CLINIC | Age: 86
End: 2021-12-22
Payer: MEDICARE

## 2021-12-22 PROBLEM — N30.00 ACUTE CYSTITIS WITHOUT HEMATURIA: Status: ACTIVE | Noted: 2021-12-22

## 2021-12-22 LAB
ALBUMIN SERPL BCP-MCNC: 3.3 G/DL (ref 3.5–5.2)
ALP SERPL-CCNC: 85 U/L (ref 55–135)
ALT SERPL W/O P-5'-P-CCNC: 18 U/L (ref 10–44)
ANION GAP SERPL CALC-SCNC: 6 MMOL/L (ref 8–16)
ASCENDING AORTA: 3.21 CM
AST SERPL-CCNC: 26 U/L (ref 10–40)
AV INDEX (PROSTH): 0.75
AV MEAN GRADIENT: 9 MMHG
AV PEAK GRADIENT: 15 MMHG
AV VALVE AREA: 3.23 CM2
AV VELOCITY RATIO: 0.71
BACTERIA #/AREA URNS AUTO: ABNORMAL /HPF
BASOPHILS # BLD AUTO: 0.05 K/UL (ref 0–0.2)
BASOPHILS NFR BLD: 0.8 % (ref 0–1.9)
BILIRUB SERPL-MCNC: 0.6 MG/DL (ref 0.1–1)
BILIRUB UR QL STRIP: NEGATIVE
BSA FOR ECHO PROCEDURE: 1.85 M2
BUN SERPL-MCNC: 17 MG/DL (ref 8–23)
CALCIUM SERPL-MCNC: 9.1 MG/DL (ref 8.7–10.5)
CHLORIDE SERPL-SCNC: 106 MMOL/L (ref 95–110)
CLARITY UR REFRACT.AUTO: ABNORMAL
CO2 SERPL-SCNC: 26 MMOL/L (ref 23–29)
COLOR UR AUTO: ABNORMAL
CREAT SERPL-MCNC: 1 MG/DL (ref 0.5–1.4)
CV ECHO LV RWT: 0.26 CM
DIFFERENTIAL METHOD: ABNORMAL
DOP CALC AO PEAK VEL: 1.91 M/S
DOP CALC AO VTI: 42.37 CM
DOP CALC LVOT AREA: 4.3 CM2
DOP CALC LVOT DIAMETER: 2.34 CM
DOP CALC LVOT PEAK VEL: 1.35 M/S
DOP CALC LVOT STROKE VOLUME: 136.95 CM3
DOP CALCLVOT PEAK VEL VTI: 31.86 CM
E WAVE DECELERATION TIME: 217.5 MSEC
E/A RATIO: 0.82
E/E' RATIO: 18.55 M/S
ECHO LV POSTERIOR WALL: 0.64 CM (ref 0.6–1.1)
EJECTION FRACTION: 65 %
EOSINOPHIL # BLD AUTO: 0.2 K/UL (ref 0–0.5)
EOSINOPHIL NFR BLD: 3.7 % (ref 0–8)
ERYTHROCYTE [DISTWIDTH] IN BLOOD BY AUTOMATED COUNT: 12.9 % (ref 11.5–14.5)
EST. GFR  (AFRICAN AMERICAN): 59.8 ML/MIN/1.73 M^2
EST. GFR  (NON AFRICAN AMERICAN): 51.9 ML/MIN/1.73 M^2
FRACTIONAL SHORTENING: 38 % (ref 28–44)
GLUCOSE SERPL-MCNC: 87 MG/DL (ref 70–110)
GLUCOSE UR QL STRIP: NEGATIVE
HCT VFR BLD AUTO: 33.8 % (ref 37–48.5)
HGB BLD-MCNC: 11 G/DL (ref 12–16)
HGB UR QL STRIP: NEGATIVE
IMM GRANULOCYTES # BLD AUTO: 0.02 K/UL (ref 0–0.04)
IMM GRANULOCYTES NFR BLD AUTO: 0.3 % (ref 0–0.5)
INTERVENTRICULAR SEPTUM: 0.94 CM (ref 0.6–1.1)
KETONES UR QL STRIP: NEGATIVE
LA MAJOR: 5.2 CM
LA MINOR: 4.3 CM
LA WIDTH: 3.12 CM
LEFT ATRIUM SIZE: 4.75 CM
LEFT ATRIUM VOLUME INDEX MOD: 25.9 ML/M2
LEFT ATRIUM VOLUME INDEX: 32.1 ML/M2
LEFT ATRIUM VOLUME MOD: 47.89 CM3
LEFT ATRIUM VOLUME: 59.3 CM3
LEFT INTERNAL DIMENSION IN SYSTOLE: 3.04 CM (ref 2.1–4)
LEFT VENTRICLE DIASTOLIC VOLUME INDEX: 60.96 ML/M2
LEFT VENTRICLE DIASTOLIC VOLUME: 112.78 ML
LEFT VENTRICLE MASS INDEX: 70 G/M2
LEFT VENTRICLE SYSTOLIC VOLUME INDEX: 19.5 ML/M2
LEFT VENTRICLE SYSTOLIC VOLUME: 36.01 ML
LEFT VENTRICULAR INTERNAL DIMENSION IN DIASTOLE: 4.9 CM (ref 3.5–6)
LEFT VENTRICULAR MASS: 129.1 G
LEUKOCYTE ESTERASE UR QL STRIP: ABNORMAL
LV LATERAL E/E' RATIO: 17 M/S
LV SEPTAL E/E' RATIO: 20.4 M/S
LYMPHOCYTES # BLD AUTO: 2.2 K/UL (ref 1–4.8)
LYMPHOCYTES NFR BLD: 33.5 % (ref 18–48)
MCH RBC QN AUTO: 30.2 PG (ref 27–31)
MCHC RBC AUTO-ENTMCNC: 32.5 G/DL (ref 32–36)
MCV RBC AUTO: 93 FL (ref 82–98)
MICROSCOPIC COMMENT: ABNORMAL
MONOCYTES # BLD AUTO: 0.6 K/UL (ref 0.3–1)
MONOCYTES NFR BLD: 9.3 % (ref 4–15)
MV A" WAVE DURATION": 13.99 MSEC
MV PEAK A VEL: 1.24 M/S
MV PEAK E VEL: 1.02 M/S
MV STENOSIS PRESSURE HALF TIME: 63.07 MS
MV VALVE AREA P 1/2 METHOD: 3.49 CM2
NEUTROPHILS # BLD AUTO: 3.4 K/UL (ref 1.8–7.7)
NEUTROPHILS NFR BLD: 52.4 % (ref 38–73)
NITRITE UR QL STRIP: NEGATIVE
NRBC BLD-RTO: 0 /100 WBC
PH UR STRIP: 7 [PH] (ref 5–8)
PISA TR MAX VEL: 2.58 M/S
PLATELET # BLD AUTO: 202 K/UL (ref 150–450)
PMV BLD AUTO: 10.6 FL (ref 9.2–12.9)
POCT GLUCOSE: 102 MG/DL (ref 70–110)
POCT GLUCOSE: 123 MG/DL (ref 70–110)
POCT GLUCOSE: 127 MG/DL (ref 70–110)
POCT GLUCOSE: 137 MG/DL (ref 70–110)
POTASSIUM SERPL-SCNC: 4.4 MMOL/L (ref 3.5–5.1)
PROT SERPL-MCNC: 6.2 G/DL (ref 6–8.4)
PROT UR QL STRIP: NEGATIVE
PULM VEIN S/D RATIO: 1.64
PV PEAK D VEL: 0.47 M/S
PV PEAK S VEL: 0.77 M/S
RA MAJOR: 4.8 CM
RA PRESSURE: 3 MMHG
RA WIDTH: 2.92 CM
RBC # BLD AUTO: 3.64 M/UL (ref 4–5.4)
RBC #/AREA URNS AUTO: 7 /HPF (ref 0–4)
RIGHT VENTRICULAR END-DIASTOLIC DIMENSION: 3.06 CM
RV TISSUE DOPPLER FREE WALL SYSTOLIC VELOCITY 1 (APICAL 4 CHAMBER VIEW): 16.62 CM/S
SINUS: 3.1 CM
SODIUM SERPL-SCNC: 138 MMOL/L (ref 136–145)
SP GR UR STRIP: 1.01 (ref 1–1.03)
SQUAMOUS #/AREA URNS AUTO: 1 /HPF
STJ: 3.01 CM
TDI LATERAL: 0.06 M/S
TDI SEPTAL: 0.05 M/S
TDI: 0.06 M/S
TR MAX PG: 27 MMHG
TRICUSPID ANNULAR PLANE SYSTOLIC EXCURSION: 1.95 CM
TV REST PULMONARY ARTERY PRESSURE: 30 MMHG
URN SPEC COLLECT METH UR: ABNORMAL
WBC # BLD AUTO: 6.44 K/UL (ref 3.9–12.7)
WBC #/AREA URNS AUTO: 52 /HPF (ref 0–5)

## 2021-12-22 PROCEDURE — 99233 PR SUBSEQUENT HOSPITAL CARE,LEVL III: ICD-10-PCS | Mod: HCNC,,, | Performed by: STUDENT IN AN ORGANIZED HEALTH CARE EDUCATION/TRAINING PROGRAM

## 2021-12-22 PROCEDURE — 63600175 PHARM REV CODE 636 W HCPCS: Mod: HCNC | Performed by: STUDENT IN AN ORGANIZED HEALTH CARE EDUCATION/TRAINING PROGRAM

## 2021-12-22 PROCEDURE — 87205 SMEAR GRAM STAIN: CPT | Mod: HCNC | Performed by: STUDENT IN AN ORGANIZED HEALTH CARE EDUCATION/TRAINING PROGRAM

## 2021-12-22 PROCEDURE — 87086 URINE CULTURE/COLONY COUNT: CPT | Mod: HCNC | Performed by: STUDENT IN AN ORGANIZED HEALTH CARE EDUCATION/TRAINING PROGRAM

## 2021-12-22 PROCEDURE — 25000003 PHARM REV CODE 250: Mod: HCNC | Performed by: STUDENT IN AN ORGANIZED HEALTH CARE EDUCATION/TRAINING PROGRAM

## 2021-12-22 PROCEDURE — 20600001 HC STEP DOWN PRIVATE ROOM: Mod: HCNC

## 2021-12-22 PROCEDURE — 85025 COMPLETE CBC W/AUTO DIFF WBC: CPT | Mod: HCNC | Performed by: STUDENT IN AN ORGANIZED HEALTH CARE EDUCATION/TRAINING PROGRAM

## 2021-12-22 PROCEDURE — 81001 URINALYSIS AUTO W/SCOPE: CPT | Mod: HCNC | Performed by: STUDENT IN AN ORGANIZED HEALTH CARE EDUCATION/TRAINING PROGRAM

## 2021-12-22 PROCEDURE — 99233 SBSQ HOSP IP/OBS HIGH 50: CPT | Mod: HCNC,,, | Performed by: STUDENT IN AN ORGANIZED HEALTH CARE EDUCATION/TRAINING PROGRAM

## 2021-12-22 PROCEDURE — 36415 COLL VENOUS BLD VENIPUNCTURE: CPT | Mod: HCNC | Performed by: STUDENT IN AN ORGANIZED HEALTH CARE EDUCATION/TRAINING PROGRAM

## 2021-12-22 PROCEDURE — 80053 COMPREHEN METABOLIC PANEL: CPT | Mod: HCNC | Performed by: STUDENT IN AN ORGANIZED HEALTH CARE EDUCATION/TRAINING PROGRAM

## 2021-12-22 RX ADMIN — ASPIRIN 81 MG: 81 TABLET, COATED ORAL at 08:12

## 2021-12-22 RX ADMIN — AMLODIPINE BESYLATE 5 MG: 5 TABLET ORAL at 08:12

## 2021-12-22 RX ADMIN — METOPROLOL SUCCINATE 50 MG: 50 TABLET, EXTENDED RELEASE ORAL at 08:12

## 2021-12-22 RX ADMIN — ZOLPIDEM TARTRATE 5 MG: 5 TABLET ORAL at 08:12

## 2021-12-22 RX ADMIN — LOSARTAN POTASSIUM 100 MG: 50 TABLET, FILM COATED ORAL at 08:12

## 2021-12-22 RX ADMIN — MULTIPLE VITAMINS W/ MINERALS TAB 1 TABLET: TAB at 08:12

## 2021-12-22 RX ADMIN — APIXABAN 5 MG: 5 TABLET, FILM COATED ORAL at 08:12

## 2021-12-22 RX ADMIN — Medication 400 MG: at 08:12

## 2021-12-22 RX ADMIN — Medication 6 MG: at 08:12

## 2021-12-22 RX ADMIN — LEVOTHYROXINE SODIUM 100 MCG: 100 TABLET ORAL at 06:12

## 2021-12-22 RX ADMIN — BISACODYL 5 MG: 5 TABLET, COATED ORAL at 08:12

## 2021-12-22 RX ADMIN — Medication 2000 UNITS: at 08:12

## 2021-12-22 RX ADMIN — CEFTRIAXONE 1 G: 1 INJECTION, SOLUTION INTRAVENOUS at 12:12

## 2021-12-22 RX ADMIN — Medication 1 TABLET: at 08:12

## 2021-12-23 VITALS
WEIGHT: 165.81 LBS | HEIGHT: 66 IN | BODY MASS INDEX: 26.65 KG/M2 | TEMPERATURE: 98 F | HEART RATE: 70 BPM | SYSTOLIC BLOOD PRESSURE: 134 MMHG | DIASTOLIC BLOOD PRESSURE: 63 MMHG | RESPIRATION RATE: 18 BRPM | OXYGEN SATURATION: 95 %

## 2021-12-23 LAB
BACTERIA UR CULT: NORMAL
BACTERIA UR CULT: NORMAL
GRAM STN SPEC: NORMAL

## 2021-12-23 PROCEDURE — 1111F PR DISCHARGE MEDS RECONCILED W/ CURRENT OUTPATIENT MED LIST: ICD-10-PCS | Mod: HCNC,CPTII,, | Performed by: HOSPITALIST

## 2021-12-23 PROCEDURE — 99239 HOSP IP/OBS DSCHRG MGMT >30: CPT | Mod: HCNC,,, | Performed by: HOSPITALIST

## 2021-12-23 PROCEDURE — 1111F DSCHRG MED/CURRENT MED MERGE: CPT | Mod: HCNC,CPTII,, | Performed by: HOSPITALIST

## 2021-12-23 PROCEDURE — 99239 PR HOSPITAL DISCHARGE DAY,>30 MIN: ICD-10-PCS | Mod: HCNC,,, | Performed by: HOSPITALIST

## 2021-12-23 PROCEDURE — 63600175 PHARM REV CODE 636 W HCPCS: Mod: HCNC | Performed by: STUDENT IN AN ORGANIZED HEALTH CARE EDUCATION/TRAINING PROGRAM

## 2021-12-23 PROCEDURE — 25000003 PHARM REV CODE 250: Mod: HCNC | Performed by: HOSPITALIST

## 2021-12-23 PROCEDURE — 25000003 PHARM REV CODE 250: Mod: HCNC | Performed by: STUDENT IN AN ORGANIZED HEALTH CARE EDUCATION/TRAINING PROGRAM

## 2021-12-23 RX ORDER — METOPROLOL SUCCINATE 50 MG/1
50 TABLET, EXTENDED RELEASE ORAL DAILY
Qty: 90 TABLET | Refills: 0 | Status: SHIPPED | OUTPATIENT
Start: 2021-12-24 | End: 2021-12-29 | Stop reason: SDUPTHER

## 2021-12-23 RX ORDER — CEFADROXIL 500 MG/1
500 CAPSULE ORAL EVERY 12 HOURS
Qty: 6 CAPSULE | Refills: 0 | Status: SHIPPED | OUTPATIENT
Start: 2021-12-24 | End: 2021-12-27

## 2021-12-23 RX ADMIN — Medication 400 MG: at 09:12

## 2021-12-23 RX ADMIN — HYPROMELLOSE 2910 2 DROP: 5 SOLUTION OPHTHALMIC at 12:12

## 2021-12-23 RX ADMIN — APIXABAN 5 MG: 5 TABLET, FILM COATED ORAL at 09:12

## 2021-12-23 RX ADMIN — METOPROLOL SUCCINATE 50 MG: 50 TABLET, EXTENDED RELEASE ORAL at 09:12

## 2021-12-23 RX ADMIN — CEFTRIAXONE 1 G: 1 INJECTION, SOLUTION INTRAVENOUS at 01:12

## 2021-12-23 RX ADMIN — HYPROMELLOSE 2910 2 DROP: 5 SOLUTION OPHTHALMIC at 03:12

## 2021-12-23 RX ADMIN — Medication 2000 UNITS: at 09:12

## 2021-12-23 RX ADMIN — Medication 1 TABLET: at 09:12

## 2021-12-23 RX ADMIN — LEVOTHYROXINE SODIUM 100 MCG: 100 TABLET ORAL at 06:12

## 2021-12-23 RX ADMIN — MULTIPLE VITAMINS W/ MINERALS TAB 1 TABLET: TAB at 09:12

## 2021-12-29 ENCOUNTER — OFFICE VISIT (OUTPATIENT)
Dept: CARDIOLOGY | Facility: CLINIC | Age: 86
End: 2021-12-29
Payer: MEDICARE

## 2021-12-29 VITALS
HEART RATE: 72 BPM | BODY MASS INDEX: 26.13 KG/M2 | DIASTOLIC BLOOD PRESSURE: 52 MMHG | HEIGHT: 66 IN | WEIGHT: 162.56 LBS | SYSTOLIC BLOOD PRESSURE: 122 MMHG

## 2021-12-29 DIAGNOSIS — I48.0 PAROXYSMAL ATRIAL FIBRILLATION: Primary | ICD-10-CM

## 2021-12-29 DIAGNOSIS — I10 ESSENTIAL (PRIMARY) HYPERTENSION: ICD-10-CM

## 2021-12-29 LAB — POCT GLUCOSE: 112 MG/DL (ref 70–110)

## 2021-12-29 PROCEDURE — 1157F PR ADVANCE CARE PLAN OR EQUIV PRESENT IN MEDICAL RECORD: ICD-10-PCS | Mod: HCNC,CPTII,S$GLB, | Performed by: INTERNAL MEDICINE

## 2021-12-29 PROCEDURE — 3288F PR FALLS RISK ASSESSMENT DOCUMENTED: ICD-10-PCS | Mod: HCNC,CPTII,S$GLB, | Performed by: INTERNAL MEDICINE

## 2021-12-29 PROCEDURE — 99204 OFFICE O/P NEW MOD 45 MIN: CPT | Mod: HCNC,S$GLB,, | Performed by: INTERNAL MEDICINE

## 2021-12-29 PROCEDURE — 1160F RVW MEDS BY RX/DR IN RCRD: CPT | Mod: HCNC,CPTII,S$GLB, | Performed by: INTERNAL MEDICINE

## 2021-12-29 PROCEDURE — 3074F PR MOST RECENT SYSTOLIC BLOOD PRESSURE < 130 MM HG: ICD-10-PCS | Mod: HCNC,CPTII,S$GLB, | Performed by: INTERNAL MEDICINE

## 2021-12-29 PROCEDURE — 1126F PR PAIN SEVERITY QUANTIFIED, NO PAIN PRESENT: ICD-10-PCS | Mod: HCNC,CPTII,S$GLB, | Performed by: INTERNAL MEDICINE

## 2021-12-29 PROCEDURE — 99499 RISK ADDL DX/OHS AUDIT: ICD-10-PCS | Mod: HCNC,S$GLB,, | Performed by: INTERNAL MEDICINE

## 2021-12-29 PROCEDURE — 1101F PT FALLS ASSESS-DOCD LE1/YR: CPT | Mod: HCNC,CPTII,S$GLB, | Performed by: INTERNAL MEDICINE

## 2021-12-29 PROCEDURE — 99204 PR OFFICE/OUTPT VISIT, NEW, LEVL IV, 45-59 MIN: ICD-10-PCS | Mod: HCNC,S$GLB,, | Performed by: INTERNAL MEDICINE

## 2021-12-29 PROCEDURE — 1159F PR MEDICATION LIST DOCUMENTED IN MEDICAL RECORD: ICD-10-PCS | Mod: HCNC,CPTII,S$GLB, | Performed by: INTERNAL MEDICINE

## 2021-12-29 PROCEDURE — 1160F PR REVIEW ALL MEDS BY PRESCRIBER/CLIN PHARMACIST DOCUMENTED: ICD-10-PCS | Mod: HCNC,CPTII,S$GLB, | Performed by: INTERNAL MEDICINE

## 2021-12-29 PROCEDURE — 3078F PR MOST RECENT DIASTOLIC BLOOD PRESSURE < 80 MM HG: ICD-10-PCS | Mod: HCNC,CPTII,S$GLB, | Performed by: INTERNAL MEDICINE

## 2021-12-29 PROCEDURE — 3074F SYST BP LT 130 MM HG: CPT | Mod: HCNC,CPTII,S$GLB, | Performed by: INTERNAL MEDICINE

## 2021-12-29 PROCEDURE — 3288F FALL RISK ASSESSMENT DOCD: CPT | Mod: HCNC,CPTII,S$GLB, | Performed by: INTERNAL MEDICINE

## 2021-12-29 PROCEDURE — 1111F DSCHRG MED/CURRENT MED MERGE: CPT | Mod: HCNC,CPTII,S$GLB, | Performed by: INTERNAL MEDICINE

## 2021-12-29 PROCEDURE — 99999 PR PBB SHADOW E&M-EST. PATIENT-LVL III: ICD-10-PCS | Mod: PBBFAC,HCNC,, | Performed by: INTERNAL MEDICINE

## 2021-12-29 PROCEDURE — 99499 UNLISTED E&M SERVICE: CPT | Mod: HCNC,S$GLB,, | Performed by: INTERNAL MEDICINE

## 2021-12-29 PROCEDURE — 1126F AMNT PAIN NOTED NONE PRSNT: CPT | Mod: HCNC,CPTII,S$GLB, | Performed by: INTERNAL MEDICINE

## 2021-12-29 PROCEDURE — 1101F PR PT FALLS ASSESS DOC 0-1 FALLS W/OUT INJ PAST YR: ICD-10-PCS | Mod: HCNC,CPTII,S$GLB, | Performed by: INTERNAL MEDICINE

## 2021-12-29 PROCEDURE — 1111F PR DISCHARGE MEDS RECONCILED W/ CURRENT OUTPATIENT MED LIST: ICD-10-PCS | Mod: HCNC,CPTII,S$GLB, | Performed by: INTERNAL MEDICINE

## 2021-12-29 PROCEDURE — 1159F MED LIST DOCD IN RCRD: CPT | Mod: HCNC,CPTII,S$GLB, | Performed by: INTERNAL MEDICINE

## 2021-12-29 PROCEDURE — 99999 PR PBB SHADOW E&M-EST. PATIENT-LVL III: CPT | Mod: PBBFAC,HCNC,, | Performed by: INTERNAL MEDICINE

## 2021-12-29 PROCEDURE — 1157F ADVNC CARE PLAN IN RCRD: CPT | Mod: HCNC,CPTII,S$GLB, | Performed by: INTERNAL MEDICINE

## 2021-12-29 PROCEDURE — 3078F DIAST BP <80 MM HG: CPT | Mod: HCNC,CPTII,S$GLB, | Performed by: INTERNAL MEDICINE

## 2021-12-29 RX ORDER — METOPROLOL SUCCINATE 50 MG/1
50 TABLET, EXTENDED RELEASE ORAL 2 TIMES DAILY
Qty: 180 TABLET | Refills: 3 | Status: SHIPPED | OUTPATIENT
Start: 2021-12-29 | End: 2022-01-10 | Stop reason: SDUPTHER

## 2021-12-29 RX ORDER — ACETAMINOPHEN 160 MG/5ML
200 SUSPENSION, ORAL (FINAL DOSE FORM) ORAL DAILY
COMMUNITY

## 2021-12-29 RX ORDER — ACETAMINOPHEN, DIPHENHYDRAMINE HCL, PHENYLEPHRINE HCL 325; 25; 5 MG/1; MG/1; MG/1
1 TABLET ORAL NIGHTLY
COMMUNITY
End: 2023-05-24

## 2021-12-30 ENCOUNTER — HOSPITAL ENCOUNTER (OUTPATIENT)
Dept: RADIOLOGY | Facility: HOSPITAL | Age: 86
Discharge: HOME | End: 2021-12-30
Attending: FAMILY MEDICINE
Payer: MEDICARE

## 2021-12-30 VITALS — HEIGHT: 60 IN | BODY MASS INDEX: 31.8 KG/M2 | WEIGHT: 162 LBS

## 2021-12-30 DIAGNOSIS — Z12.31 SCREENING MAMMOGRAM FOR HIGH-RISK PATIENT: ICD-10-CM

## 2021-12-30 PROCEDURE — 77063 MAMMO DIGITAL SCREENING BILAT WITH TOMO: ICD-10-PCS | Mod: 26,HCNC,, | Performed by: RADIOLOGY

## 2021-12-30 PROCEDURE — 77067 SCR MAMMO BI INCL CAD: CPT | Mod: TC,HCNC

## 2021-12-30 PROCEDURE — 77067 MAMMO DIGITAL SCREENING BILAT WITH TOMO: ICD-10-PCS | Mod: 26,HCNC,, | Performed by: RADIOLOGY

## 2021-12-30 PROCEDURE — 77063 BREAST TOMOSYNTHESIS BI: CPT | Mod: 26,HCNC,, | Performed by: RADIOLOGY

## 2021-12-30 PROCEDURE — 77067 SCR MAMMO BI INCL CAD: CPT | Mod: 26,HCNC,, | Performed by: RADIOLOGY

## 2021-12-31 ENCOUNTER — PATIENT MESSAGE (OUTPATIENT)
Dept: SLEEP MEDICINE | Facility: CLINIC | Age: 86
End: 2021-12-31
Payer: MEDICARE

## 2021-12-31 DIAGNOSIS — G47.33 OSA (OBSTRUCTIVE SLEEP APNEA): Primary | ICD-10-CM

## 2021-12-31 NOTE — PROCEDURES
[id number]   Sex: Female Study Date: 2021   : 1935 Clinic #: [id number]   Age: 86 Referring Physician: Magda Claros M.D   Height: 66.0 in Referring Physician #    Weight: 168.0 lbs Sleep Specialist:    SHERWINI.: 27.1 Sleep Specialist #    Hypopnea rule: AASM 1B Scoring Tech: Karly,RPSGT   Total AHI: 17.5 Recording Tech: TAD Andrews CRT, RPSGT   Lowest O2 sat: 85.0% Recording Location: [facility]     Sleep architecture: This is a baseline polysomnogram. At lights out, the patient fell asleep in 6.0 minutes and slept for 59.2% of the time. Total sleep time (TST) was 271.0 minutes. 26.4% of TST was in Stage N1 sleep, 21.8% TST in slow wave sleep, and 20.3% TST in REM sleep. The REM latency was 75.5 minutes.     Respiratory: Snoring was present. There was significant NOEL (obstructive sleep apnea) based on AHI (apnea hypopnea index) criteria. The overall AHI was 17.5 with an oxygen rachelle of 85.0%.  The supine AHI was 82.5 and the REM AHI was 17.5. The patient did not qualify for a split night study due to an insufficient number of events in the first half of the study.    Motor movement / Parasomnia:   There were frequent limb movements of sleep noted, occasionally associated with arousals.   The total limb movement index was 35.6 (5.1with arousal).    Cardiac: Cardiac rhythm monitoring revealed a normal sinus rhythm with occasional PACs and PVCs.      IMPRESSION:  1. Moderate NOEL   2. Moderate Periodic Limb Movements during sleep    RECOMMENDATION:  1. CPAP is recommended and ordered.

## 2022-01-03 DIAGNOSIS — G47.00 INSOMNIA, UNSPECIFIED TYPE: ICD-10-CM

## 2022-01-03 RX ORDER — ZOLPIDEM TARTRATE 5 MG/1
TABLET ORAL
Qty: 45 TABLET | Refills: 0 | Status: SHIPPED | OUTPATIENT
Start: 2022-01-03 | End: 2022-03-21

## 2022-01-07 ENCOUNTER — OFFICE VISIT (OUTPATIENT)
Dept: SURGERY | Facility: CLINIC | Age: 87
End: 2022-01-07
Payer: MEDICARE

## 2022-01-07 VITALS
SYSTOLIC BLOOD PRESSURE: 149 MMHG | BODY MASS INDEX: 26.36 KG/M2 | HEART RATE: 67 BPM | WEIGHT: 164 LBS | DIASTOLIC BLOOD PRESSURE: 67 MMHG | HEIGHT: 66 IN

## 2022-01-07 DIAGNOSIS — K43.2 INCISIONAL HERNIA, WITHOUT OBSTRUCTION OR GANGRENE: Primary | ICD-10-CM

## 2022-01-07 DIAGNOSIS — I48.0 PAROXYSMAL ATRIAL FIBRILLATION: ICD-10-CM

## 2022-01-07 PROBLEM — K80.20 CALCULUS OF GALLBLADDER WITHOUT CHOLECYSTITIS WITHOUT OBSTRUCTION: Status: RESOLVED | Noted: 2021-05-17 | Resolved: 2022-01-07

## 2022-01-07 PROBLEM — K80.50 CHOLEDOCHOLITHIASIS: Status: RESOLVED | Noted: 2021-04-29 | Resolved: 2022-01-07

## 2022-01-07 PROCEDURE — 3078F DIAST BP <80 MM HG: CPT | Mod: HCNC,CPTII,S$GLB, | Performed by: SURGERY

## 2022-01-07 PROCEDURE — 99999 PR PBB SHADOW E&M-EST. PATIENT-LVL III: ICD-10-PCS | Mod: PBBFAC,HCNC,, | Performed by: SURGERY

## 2022-01-07 PROCEDURE — 3077F SYST BP >= 140 MM HG: CPT | Mod: HCNC,CPTII,S$GLB, | Performed by: SURGERY

## 2022-01-07 PROCEDURE — 1159F MED LIST DOCD IN RCRD: CPT | Mod: HCNC,CPTII,S$GLB, | Performed by: SURGERY

## 2022-01-07 PROCEDURE — 99213 PR OFFICE/OUTPT VISIT, EST, LEVL III, 20-29 MIN: ICD-10-PCS | Mod: HCNC,S$GLB,, | Performed by: SURGERY

## 2022-01-07 PROCEDURE — 3077F PR MOST RECENT SYSTOLIC BLOOD PRESSURE >= 140 MM HG: ICD-10-PCS | Mod: HCNC,CPTII,S$GLB, | Performed by: SURGERY

## 2022-01-07 PROCEDURE — 1111F PR DISCHARGE MEDS RECONCILED W/ CURRENT OUTPATIENT MED LIST: ICD-10-PCS | Mod: HCNC,CPTII,S$GLB, | Performed by: SURGERY

## 2022-01-07 PROCEDURE — 1126F PR PAIN SEVERITY QUANTIFIED, NO PAIN PRESENT: ICD-10-PCS | Mod: HCNC,CPTII,S$GLB, | Performed by: SURGERY

## 2022-01-07 PROCEDURE — 3078F PR MOST RECENT DIASTOLIC BLOOD PRESSURE < 80 MM HG: ICD-10-PCS | Mod: HCNC,CPTII,S$GLB, | Performed by: SURGERY

## 2022-01-07 PROCEDURE — 1101F PR PT FALLS ASSESS DOC 0-1 FALLS W/OUT INJ PAST YR: ICD-10-PCS | Mod: HCNC,CPTII,S$GLB, | Performed by: SURGERY

## 2022-01-07 PROCEDURE — 1160F PR REVIEW ALL MEDS BY PRESCRIBER/CLIN PHARMACIST DOCUMENTED: ICD-10-PCS | Mod: HCNC,CPTII,S$GLB, | Performed by: SURGERY

## 2022-01-07 PROCEDURE — 1157F PR ADVANCE CARE PLAN OR EQUIV PRESENT IN MEDICAL RECORD: ICD-10-PCS | Mod: HCNC,CPTII,S$GLB, | Performed by: SURGERY

## 2022-01-07 PROCEDURE — 3288F PR FALLS RISK ASSESSMENT DOCUMENTED: ICD-10-PCS | Mod: HCNC,CPTII,S$GLB, | Performed by: SURGERY

## 2022-01-07 PROCEDURE — 1157F ADVNC CARE PLAN IN RCRD: CPT | Mod: HCNC,CPTII,S$GLB, | Performed by: SURGERY

## 2022-01-07 PROCEDURE — 1111F DSCHRG MED/CURRENT MED MERGE: CPT | Mod: HCNC,CPTII,S$GLB, | Performed by: SURGERY

## 2022-01-07 PROCEDURE — 1101F PT FALLS ASSESS-DOCD LE1/YR: CPT | Mod: HCNC,CPTII,S$GLB, | Performed by: SURGERY

## 2022-01-07 PROCEDURE — 3288F FALL RISK ASSESSMENT DOCD: CPT | Mod: HCNC,CPTII,S$GLB, | Performed by: SURGERY

## 2022-01-07 PROCEDURE — 99213 OFFICE O/P EST LOW 20 MIN: CPT | Mod: HCNC,S$GLB,, | Performed by: SURGERY

## 2022-01-07 PROCEDURE — 1160F RVW MEDS BY RX/DR IN RCRD: CPT | Mod: HCNC,CPTII,S$GLB, | Performed by: SURGERY

## 2022-01-07 PROCEDURE — 1159F PR MEDICATION LIST DOCUMENTED IN MEDICAL RECORD: ICD-10-PCS | Mod: HCNC,CPTII,S$GLB, | Performed by: SURGERY

## 2022-01-07 PROCEDURE — 1126F AMNT PAIN NOTED NONE PRSNT: CPT | Mod: HCNC,CPTII,S$GLB, | Performed by: SURGERY

## 2022-01-07 PROCEDURE — 99999 PR PBB SHADOW E&M-EST. PATIENT-LVL III: CPT | Mod: PBBFAC,HCNC,, | Performed by: SURGERY

## 2022-01-07 RX ORDER — CEFAZOLIN SODIUM 2 G/50ML
2 SOLUTION INTRAVENOUS
Status: CANCELLED | OUTPATIENT
Start: 2022-01-07

## 2022-01-07 NOTE — PROGRESS NOTES
History & Physical    SUBJECTIVE:     History of Present Illness:  Patient is a 86 y.o. female presents with a small incisional hernia after a lap marlene.  She first noticed this in the last month.  She states it has increased in size.  She denies sig pain but has discomfort and feels the size is large.  Interested in repair.  She has been diagnosed with Afib since her last surgery and is on Eliquis.  No nausea/vomiting.  No obstructive symptoms.  Constipation now under control.      Review of patient's allergies indicates:   Allergen Reactions    Levaquin [levofloxacin]      Joint pain    Hydrochlorothiazide Other (See Comments)     Pain in joints and depression per pt    Hydroquinone Itching       Current Outpatient Medications   Medication Sig Dispense Refill    apixaban (ELIQUIS) 5 mg Tab Take 1 tablet (5 mg total) by mouth 2 (two) times daily. 180 tablet 3    ascorbic acid, vitamin C, (VITAMIN C) 250 MG tablet Take 250 mg by mouth once daily.      b complex vitamins capsule Take 1 capsule by mouth once daily.      bisacodyL (DULCOLAX) 5 mg EC tablet Take 5 mg by mouth once daily.      CALCIUM CARBONATE/VITAMIN D3 (CALCIUM 600 + D,3, ORAL) Take 1 tablet by mouth once daily.       cholecalciferol, vitamin D3, (VITAMIN D3) 50 mcg (2,000 unit) Cap Take 1 capsule by mouth once daily.      coenzyme Q10 200 mg capsule Take 200 mg by mouth once daily.      DEXTRAN 70/HYPROMELLOSE (ARTIFICIAL TEARS, PF, OPHT) Place 1 drop into both eyes as needed.      levothyroxine (SYNTHROID) 100 MCG tablet Take 1 tablet by mouth once daily 90 tablet 3    losartan (COZAAR) 100 MG tablet Take 1 tablet by mouth once daily 90 tablet 3    magnesium oxide 400 mg magnesium Tab Take 1 tablet by mouth once daily.      melatonin 10 mg Tab Take 1 tablet by mouth every evening.      metoprolol succinate (TOPROL-XL) 50 MG 24 hr tablet Take 1 tablet (50 mg total) by mouth 2 (two) times daily. 180 tablet 3    MULTIVITAMIN  W-MINERALS/LUTEIN (CENTRUM SILVER ORAL) Take 1 tablet by mouth once daily.      vit A/vit C/vit E/zinc/copper (ICAPS AREDS ORAL) Take 1 tablet by mouth 2 (two) times a day.      zolpidem (AMBIEN) 5 MG Tab TAKE 1 TABLET BY MOUTH NIGHTLY AS NEEDED 45 tablet 0    aspirin (ECOTRIN) 81 MG EC tablet Take 81 mg by mouth nightly.        No current facility-administered medications for this visit.       Past Medical History:   Diagnosis Date    Arthritis     Elevated liver enzymes     Family history of malignant neoplasm of gastrointestinal tract mother    Graves disease     now hypothryoid - no surgery or medicine. resolved on its own    History of colonoscopy     unremarkable 2007 by Dr. Javier.  Barium enema revealed diverticular disease.    Hyperlipidemia     Hypertension     Hypothyroidism     Migraine, ophthalmoplegic     Personal history of colonic polyps     TIA (transient ischemic attack)     with a normal angiogram in the past, Ocular migraines reported by patient, not TIA      Past Surgical History:   Procedure Laterality Date    CATARACT EXTRACTION  2012    right/ Dr. Lexis Nicolas     COLONOSCOPY      2014 polyps    COLONOSCOPY N/A 11/7/2016    Procedure: COLONOSCOPY;  Surgeon: Bebeto Gonzalez MD;  Location: [address]);  Service: Endoscopy;  Laterality: N/A;    COLONOSCOPY N/A 3/9/2020    Procedure: COLONOSCOPY;  Surgeon: Bebeto Gonzalez MD;  [facility]  Service: Endoscopy;  Laterality: N/A;    COLONOSCOPY N/A 9/29/2021    Procedure: COLONOSCOPY;  Surgeon: Bebeto Gonzalez MD;  Location: [address]);  Service: Endoscopy;  Laterality: N/A;  please schedule patient as soon as possible with me EGD colonoscopy with constipation bowel prep for iron deficiency anemia family history of colon cancer and personal history of colon polyps  9/17/21 ok for standard split prep per Dr. Gonzalez-ml    COLONOSCOPY N/A 9/29/2021    Procedure: COLONOSCOPY;  Surgeon:  Bebeto Gonzalez MD;  Location: [facility] (4[facility] F[facility]);  Service: Endoscopy;  Laterality: N/A;  Please schedule patient as soon as possible with me EGD colonoscopy with constipation bowel prep for iron deficiency anemia family history of colon cancer and personal history of colon polyps  9/17/21 Ok for standard split prep per Dr. Gonzalez-ml    COLONOSCOPY W/ POLYPECTOMY  6/2013    polyp of colon    ENDOSCOPIC ULTRASOUND OF UPPER GASTROINTESTINAL TRACT N/A 4/29/2021    Procedure: ULTRASOUND, UPPER GI TRACT, ENDOSCOPIC;  Surgeon: Dalton Johnson MD;  Location: [facility] (2ND FLR);  Service: Endoscopy;  Laterality: N/A;  Postprandial nausea vomiting epigastric 0.9 cm stone and sludge seen within the gallbladder lumen.  No wall thickening or pericholecystic fluid.  0.6 cm stone seen within the distal common bile duct at the level of the pancreatic head.  There is dil    ERCP N/A 4/29/2021    Procedure: ERCP (ENDOSCOPIC RETROGRADE CHOLANGIOPANCREATOGRAPHY);  Surgeon: Dalton Johnson MD;  Location: [facility] (2ND FLR);  Service: Endoscopy;  Laterality: N/A;  Postprandial nausea and vomit 0.9 cm stone and sludge seen within the gallbladder lumen.  No wall thickening or pericholecystic fluid.  0.6 cm stone seen within the distal common bile duct at the level of the pancreatic head.  There i    ESOPHAGOGASTRODUODENOSCOPY N/A 9/29/2021    Procedure: EGD (ESOPHAGOGASTRODUODENOSCOPY);  Surgeon: Bebeto Gonzalez MD;  Location: [facility] (4TH FLR);  Service: Endoscopy;  Laterality: N/A;  rapid covid test arrival 12pm - sm  9/27 arrival time for covid test/procedure confirmed with pt-rb    ESOPHAGOGASTRODUODENOSCOPY N/A 9/29/2021    Procedure: EGD (ESOPHAGOGASTRODUODENOSCOPY);  Surgeon: Bebeto Gonzalez MD;  Location: [facility] (4TH FLR);  Service: Endoscopy;  Laterality: N/A;  covid test 9/19/21 [facility], prep instr emailed -ml    EYE SURGERY  11-10-14    right retina/[facility]. Dalton Sierra ([facility])    HYSTEROSCOPIC POLYPECTOMY  OF UTERUS N/A 7/2/2018    Procedure: POLYPECTOMY, UTERUS, HYSTEROSCOPIC;  Surgeon: Elliot Vanessa IV, MD;  Location: [facility];  Service: OB/GYN;  Laterality: N/A;    JOINT REPLACEMENT  3/23/2011    left total hip replacement    LAPAROSCOPIC CHOLECYSTECTOMY N/A 5/17/2021    Procedure: CHOLECYSTECTOMY, LAPAROSCOPIC;  Surgeon: Rayshawn Peralta Jr., MD;  Location: [address];  Service: General;  Laterality: N/A;    TONSILLECTOMY      pt was 11 years old     Family History   Problem Relation Age of Onset    Colon cancer Mother 75    Lung cancer Father 75    Diabetes Other     Diabetes Paternal Aunt     Colon cancer Paternal Aunt 80    Prostate cancer Brother     Breast cancer Neg Hx     Ovarian cancer Neg Hx     Celiac disease Neg Hx      Social History     Tobacco Use    Smoking status: Never Smoker    Smokeless tobacco: Never Used    Tobacco comment: The patient is not getting any regular exercise at this time.  She does enjoy traveling to [place].   Substance Use Topics    Alcohol use: Yes     Comment: occasionally    Drug use: No        Review of Systems:  Review of Systems   Constitutional: Negative for activity change, appetite change, chills, diaphoresis, fatigue and fever.   HENT: Negative for congestion, sinus pressure, sneezing and sore throat.    Eyes: Negative for pain, discharge, redness, itching and visual disturbance.   Respiratory: Negative for apnea, cough, choking, chest tightness and shortness of breath.    Cardiovascular: Negative for chest pain, palpitations and leg swelling.   Gastrointestinal: Negative for abdominal distention, abdominal pain, constipation, diarrhea, nausea and vomiting.   Musculoskeletal: Negative for arthralgias, back pain and gait problem.   Skin: Negative for color change, pallor and rash.   Neurological: Negative for dizziness, facial asymmetry, light-headedness and headaches.   Psychiatric/Behavioral: Negative for agitation, behavioral problems and  "confusion.       OBJECTIVE:     Vital Signs (Most Recent)  Pulse: 67 (01/07/22 0848)  BP: (!) 149/67 (caffien) (01/07/22 0848)  5' 6" (1.676 m)  74.4 kg (164 lb 0.4 oz)     Physical Exam:  Physical Exam  Constitutional:       General: She is not in acute distress.     Appearance: Normal appearance. She is not diaphoretic.   HENT:      Head: Normocephalic and atraumatic.      Right Ear: External ear normal.      Left Ear: External ear normal.   Eyes:      General: No scleral icterus.        Right eye: No discharge.         Left eye: No discharge.   Cardiovascular:      Rate and Rhythm: Normal rate and regular rhythm.   Pulmonary:      Effort: Pulmonary effort is normal. No respiratory distress.   Abdominal:      General: There is no distension.      Palpations: Abdomen is soft. There is no mass.      Tenderness: There is no abdominal tenderness. There is no guarding or rebound.      Hernia: A hernia (Small reducible incisional hernia.  Mild discomfort.) is present.   Musculoskeletal:         General: Normal range of motion.   Skin:     General: Skin is warm and dry.      Coloration: Skin is not pale.      Findings: No erythema or rash.   Neurological:      Mental Status: She is alert and oriented to person, place, and time.   Psychiatric:         Mood and Affect: Mood normal.         Behavior: Behavior normal.         Thought Content: Thought content normal.         Judgment: Judgment normal.         ASSESSMENT/PLAN:     Small supraumbilical incisional henria    PLAN:Plan     To OR for open repiar with mesh  Will obtain consent the AM of surgery  Will get PCP clearance and Eliquis recs.         Rayshawn Peralta MD    "

## 2022-01-10 ENCOUNTER — OFFICE VISIT (OUTPATIENT)
Dept: INTERNAL MEDICINE | Facility: CLINIC | Age: 87
End: 2022-01-10
Payer: MEDICARE

## 2022-01-10 VITALS
WEIGHT: 164.69 LBS | DIASTOLIC BLOOD PRESSURE: 60 MMHG | SYSTOLIC BLOOD PRESSURE: 142 MMHG | HEART RATE: 65 BPM | BODY MASS INDEX: 26.47 KG/M2 | OXYGEN SATURATION: 99 % | HEIGHT: 66 IN

## 2022-01-10 DIAGNOSIS — E03.9 HYPOTHYROIDISM, UNSPECIFIED TYPE: Chronic | ICD-10-CM

## 2022-01-10 DIAGNOSIS — I10 ESSENTIAL (PRIMARY) HYPERTENSION: Primary | ICD-10-CM

## 2022-01-10 DIAGNOSIS — R82.998 DARK URINE: ICD-10-CM

## 2022-01-10 DIAGNOSIS — N18.32 STAGE 3B CHRONIC KIDNEY DISEASE: ICD-10-CM

## 2022-01-10 DIAGNOSIS — I48.0 PAROXYSMAL ATRIAL FIBRILLATION: ICD-10-CM

## 2022-01-10 LAB
BACTERIA #/AREA URNS AUTO: NORMAL /HPF
BILIRUB UR QL STRIP: NEGATIVE
CLARITY UR REFRACT.AUTO: CLEAR
COLOR UR AUTO: YELLOW
GLUCOSE UR QL STRIP: NEGATIVE
HGB UR QL STRIP: NEGATIVE
KETONES UR QL STRIP: NEGATIVE
LEUKOCYTE ESTERASE UR QL STRIP: NEGATIVE
MICROSCOPIC COMMENT: NORMAL
NITRITE UR QL STRIP: NEGATIVE
PH UR STRIP: 5 [PH] (ref 5–8)
PROT UR QL STRIP: NEGATIVE
RBC #/AREA URNS AUTO: 1 /HPF (ref 0–4)
SP GR UR STRIP: 1.02 (ref 1–1.03)
SQUAMOUS #/AREA URNS AUTO: 1 /HPF
URN SPEC COLLECT METH UR: NORMAL
WBC #/AREA URNS AUTO: 1 /HPF (ref 0–5)

## 2022-01-10 PROCEDURE — 99214 OFFICE O/P EST MOD 30 MIN: CPT | Mod: HCNC,S$GLB,, | Performed by: PHYSICIAN ASSISTANT

## 2022-01-10 PROCEDURE — 99214 PR OFFICE/OUTPT VISIT, EST, LEVL IV, 30-39 MIN: ICD-10-PCS | Mod: HCNC,S$GLB,, | Performed by: PHYSICIAN ASSISTANT

## 2022-01-10 PROCEDURE — 1111F DSCHRG MED/CURRENT MED MERGE: CPT | Mod: HCNC,CPTII,S$GLB, | Performed by: PHYSICIAN ASSISTANT

## 2022-01-10 PROCEDURE — 99499 RISK ADDL DX/OHS AUDIT: ICD-10-PCS | Mod: S$GLB,,, | Performed by: PHYSICIAN ASSISTANT

## 2022-01-10 PROCEDURE — 99999 PR PBB SHADOW E&M-EST. PATIENT-LVL V: CPT | Mod: PBBFAC,HCNC,, | Performed by: PHYSICIAN ASSISTANT

## 2022-01-10 PROCEDURE — 1159F PR MEDICATION LIST DOCUMENTED IN MEDICAL RECORD: ICD-10-PCS | Mod: HCNC,CPTII,S$GLB, | Performed by: PHYSICIAN ASSISTANT

## 2022-01-10 PROCEDURE — 1157F ADVNC CARE PLAN IN RCRD: CPT | Mod: HCNC,CPTII,S$GLB, | Performed by: PHYSICIAN ASSISTANT

## 2022-01-10 PROCEDURE — 1160F RVW MEDS BY RX/DR IN RCRD: CPT | Mod: HCNC,CPTII,S$GLB, | Performed by: PHYSICIAN ASSISTANT

## 2022-01-10 PROCEDURE — 3288F PR FALLS RISK ASSESSMENT DOCUMENTED: ICD-10-PCS | Mod: HCNC,CPTII,S$GLB, | Performed by: PHYSICIAN ASSISTANT

## 2022-01-10 PROCEDURE — 1126F PR PAIN SEVERITY QUANTIFIED, NO PAIN PRESENT: ICD-10-PCS | Mod: HCNC,CPTII,S$GLB, | Performed by: PHYSICIAN ASSISTANT

## 2022-01-10 PROCEDURE — 1101F PT FALLS ASSESS-DOCD LE1/YR: CPT | Mod: HCNC,CPTII,S$GLB, | Performed by: PHYSICIAN ASSISTANT

## 2022-01-10 PROCEDURE — 81001 URINALYSIS AUTO W/SCOPE: CPT | Mod: HCNC | Performed by: PHYSICIAN ASSISTANT

## 2022-01-10 PROCEDURE — 3078F PR MOST RECENT DIASTOLIC BLOOD PRESSURE < 80 MM HG: ICD-10-PCS | Mod: HCNC,CPTII,S$GLB, | Performed by: PHYSICIAN ASSISTANT

## 2022-01-10 PROCEDURE — 1111F PR DISCHARGE MEDS RECONCILED W/ CURRENT OUTPATIENT MED LIST: ICD-10-PCS | Mod: HCNC,CPTII,S$GLB, | Performed by: PHYSICIAN ASSISTANT

## 2022-01-10 PROCEDURE — 1160F PR REVIEW ALL MEDS BY PRESCRIBER/CLIN PHARMACIST DOCUMENTED: ICD-10-PCS | Mod: HCNC,CPTII,S$GLB, | Performed by: PHYSICIAN ASSISTANT

## 2022-01-10 PROCEDURE — 99999 PR PBB SHADOW E&M-EST. PATIENT-LVL V: ICD-10-PCS | Mod: PBBFAC,HCNC,, | Performed by: PHYSICIAN ASSISTANT

## 2022-01-10 PROCEDURE — 3077F PR MOST RECENT SYSTOLIC BLOOD PRESSURE >= 140 MM HG: ICD-10-PCS | Mod: HCNC,CPTII,S$GLB, | Performed by: PHYSICIAN ASSISTANT

## 2022-01-10 PROCEDURE — 1101F PR PT FALLS ASSESS DOC 0-1 FALLS W/OUT INJ PAST YR: ICD-10-PCS | Mod: HCNC,CPTII,S$GLB, | Performed by: PHYSICIAN ASSISTANT

## 2022-01-10 PROCEDURE — 99499 UNLISTED E&M SERVICE: CPT | Mod: S$GLB,,, | Performed by: PHYSICIAN ASSISTANT

## 2022-01-10 PROCEDURE — 1126F AMNT PAIN NOTED NONE PRSNT: CPT | Mod: HCNC,CPTII,S$GLB, | Performed by: PHYSICIAN ASSISTANT

## 2022-01-10 PROCEDURE — 1157F PR ADVANCE CARE PLAN OR EQUIV PRESENT IN MEDICAL RECORD: ICD-10-PCS | Mod: HCNC,CPTII,S$GLB, | Performed by: PHYSICIAN ASSISTANT

## 2022-01-10 PROCEDURE — 3077F SYST BP >= 140 MM HG: CPT | Mod: HCNC,CPTII,S$GLB, | Performed by: PHYSICIAN ASSISTANT

## 2022-01-10 PROCEDURE — 3078F DIAST BP <80 MM HG: CPT | Mod: HCNC,CPTII,S$GLB, | Performed by: PHYSICIAN ASSISTANT

## 2022-01-10 PROCEDURE — 1159F MED LIST DOCD IN RCRD: CPT | Mod: HCNC,CPTII,S$GLB, | Performed by: PHYSICIAN ASSISTANT

## 2022-01-10 PROCEDURE — 3288F FALL RISK ASSESSMENT DOCD: CPT | Mod: HCNC,CPTII,S$GLB, | Performed by: PHYSICIAN ASSISTANT

## 2022-01-10 RX ORDER — METOPROLOL SUCCINATE 50 MG/1
50 TABLET, EXTENDED RELEASE ORAL 2 TIMES DAILY
Qty: 180 TABLET | Refills: 3 | Status: SHIPPED | OUTPATIENT
Start: 2022-01-10 | End: 2022-03-29 | Stop reason: SDUPTHER

## 2022-01-10 NOTE — PATIENT INSTRUCTIONS
"Patient Education       Atrial Fibrillation   The Basics   Written by the doctors and editors at [facility]   What is atrial fibrillation? -- Atrial fibrillation is the most common heart rhythm problem (figure 1). The condition puts you at risk of stroke and other problems as well as death. Another term for atrial fibrillation is "A-fib."  In people with A-fib, the electrical signals that control the heartbeat are abnormal. The top 2 chambers (there are 4 chambers) of the heart stop pumping blood as strongly as normal. When this happens, the blood can start to form clots. These clots can travel to the brain through the blood vessels and cause strokes.  In some people, A-fib never goes away. In others, A-fib can come and go, even with treatment. If you had A-fib in the past, but have a normal heart rhythm now, ask your doctor what you can do to keep A-fib from coming back. Some people can reduce their chances of having A-fib again by:  · Controlling their blood pressure  · Avoiding or limiting alcohol  · Cutting down on caffeine  · Getting treatment for an overactive thyroid gland  · Getting regular exercise  · Losing weight (if they are overweight)  · Reducing stress  What are the symptoms of atrial fibrillation? -- Some people with A-fib have no symptoms. When symptoms do occur, they can include:  · Feeling as though your heart is racing, skipping beats, or beating out of sync  · Mild chest "tightness" or pain  · Feeling lightheaded, dizzy, or like you might pass out  · Having trouble breathing, especially with exercise  Is there a test for atrial fibrillation? -- Yes. If your doctor or nurse suspects you have A-fib, he or she will probably do a test called an electrocardiogram. This test, also known as an "ECG," measures the electrical activity in your heart.  How is atrial fibrillation treated? -- In some cases, A-fib goes away on its own, even without treatment. But many people do need treatment.  Treatment can " "include 1 or more of these:  · Medicines to control the speed or rhythm of the heartbeat  · Medicines to keep clots from forming  · A treatment called "cardioversion," which involves applying a mild electrical current to the heart to make the rhythm regular again  · Treatments called "ablation," which use heat ("radiofrequency ablation") or cold ("cryoablation") to destroy the small part of the heart that is sending abnormal electrical signals  · A device called a pacemaker that is implanted in your body and sends electrical signals to the heart to control the heartbeat  What will my life be like? -- Most people with A-fib are able to live normal lives. Still, it is important that you take the medicines your doctor prescribes every day. Taking your medicines as directed can help reduce the chances that your A-fib will cause a stroke. It's also a good idea to learn what the signs and symptoms of a stroke are (figure 2).  All topics are updated as new evidence becomes available and our peer review process is complete.  This topic retrieved from [employer] on: Sep 21, 2021.  Topic 94383 Version 18.0  Release: 29.4.2 - C29.263  © 2021 UpToDate, Inc. and/or its affiliates. All rights reserved.  figure 1: Atrial fibrillation     This drawing shows where the heart is located in the chest. In atrial fibrillation, the electrical signals that control the heartbeat are abnormal. As a result, the top two chambers of the heart (see arrows) stop pumping effectively, and blood that should move out of these chambers gets left behind.  Graphic 90539 Version 3.0    figure 2: Signs of stroke     The letters in the word "FAST" help you remember the signs of stroke. Some experts use "BE-FAST." This adds Balance (trouble standing or walking) and Eyes (problems with vision) to the list above.  If a person shows any of these signs, call for an ambulance right away. In the [place] and [place], dial 9-1-1.    Graphic 89316 Version 5.0    Consumer " Information Use and Disclaimer   This information is not specific medical advice and does not replace information you receive from your health care provider. This is only a brief summary of general information. It does NOT include all information about conditions, illnesses, injuries, tests, procedures, treatments, therapies, discharge instructions or life-style choices that may apply to you. You must talk with your health care provider for complete information about your health and treatment options. This information should not be used to decide whether or not to accept your health care provider's advice, instructions or recommendations. Only your health care provider has the knowledge and training to provide advice that is right for you. The use of this information is governed by the [employer] End User License Agreement, available at https://[email or web address]/en/solutions/[email or web address] [place]/about/piper.The use of [employer] content is governed by the [employer] Terms of Use. ©2021 UpToDate, Inc. All rights reserved.  Copyright   © 2021 UpToDate, Inc. and/or its affiliates. All rights reserved.

## 2022-01-10 NOTE — PROGRESS NOTES
Subjective:       Patient ID: Tiffany Masterson is a 86 y.o. female.        Chief Complaint: Hospital Follow Up    Tiffany Masterson is an established patient of Ruth Blanton MD here today for hospital f/u visit.    Admitted 12/21-12/22 due to afib with RVR, also found to have UTI.  Presented as palpitations and spontaneously converted to sinus rhythm while in ED.  Observed overnight, d/c on eliquis and toprol, also cefadroxil for UTI.    Initial metoprolol rx written for #90 pills although dosed BID, has had a hard time getting full rx although new rx was already sent for 180 pills.      Urine intermittently is dark yellow, but no dysuria/urgency/frequency.  Urine cx grew multiple organisms.      Home BP has been a little higher, 135-145 systolic.    Past medical history HTN, lipids, CKD, frequent UTIs.         Review of Systems   Constitutional: Negative for chills, diaphoresis, fatigue and fever.   HENT: Negative for congestion and sore throat.    Eyes: Negative for visual disturbance.   Respiratory: Negative for cough, chest tightness and shortness of breath.    Cardiovascular: Negative for chest pain, palpitations and leg swelling.   Gastrointestinal: Negative for abdominal pain, blood in stool, constipation, diarrhea, nausea and vomiting.   Genitourinary: Negative for dysuria, frequency, hematuria and urgency.   Musculoskeletal: Negative for arthralgias and back pain.   Skin: Negative for rash.   Neurological: Negative for dizziness, syncope, weakness and headaches.   Psychiatric/Behavioral: Negative for dysphoric mood and sleep disturbance. The patient is not nervous/anxious.        Objective:      Physical Exam  Vitals and nursing note reviewed.   Constitutional:       Appearance: Normal appearance. She is well-developed and well-nourished.   HENT:      Head: Normocephalic.      Right Ear: External ear normal.      Left Ear: External ear normal.      Mouth/Throat:      Mouth: Oropharynx is clear and  moist.   Eyes:      Pupils: Pupils are equal, round, and reactive to light.   Cardiovascular:      Rate and Rhythm: Normal rate and regular rhythm.      Heart sounds: Normal heart sounds. No murmur heard.  No friction rub. No gallop.    Pulmonary:      Effort: Pulmonary effort is normal. No respiratory distress.      Breath sounds: Normal breath sounds.   Abdominal:      Palpations: Abdomen is soft.      Tenderness: There is no abdominal tenderness. There is no CVA tenderness.   Musculoskeletal:         General: No edema.   Skin:     General: Skin is warm and dry.   Neurological:      Mental Status: She is alert.   Psychiatric:         Mood and Affect: Mood and affect normal.         Assessment:       1. Essential (primary) hypertension    2. Paroxysmal atrial fibrillation    3. Dark urine    4. Stage 3b chronic kidney disease    5. Hypothyroidism, unspecified type        Plan:       Tiffany was seen today for hospital follow up.    Diagnoses and all orders for this visit:    Essential (primary) hypertension - mildly elevated today, monitor home BP, has f/u with Dr. Blanton in 3-4 weeks so will recheck at that time  BP Readings from Last 5 Encounters:   01/10/22 (!) 142/60   01/07/22 (!) 149/67   12/29/21 (!) 122/52   12/23/21 134/63   12/03/21 (!) 140/84      Paroxysmal atrial fibrillation  -     REFILL: metoprolol succinate (TOPROL-XL) 50 MG 24 hr tablet; Take 1 tablet (50 mg total) by mouth 2 (two) times daily.    Dark urine  -     Urinalysis, Reflex to Urine Culture Urine, Clean Catch    Stage 3b chronic kidney disease - stable    Hypothyroidism, unspecified type - stable    >30 minutes spent on patient encounter    Pt has been given instructions populated from [employer] database and has verbalized understanding of the after visit summary and information contained wherein.    Follow up with a primary care provider. May go to ER for acute shortness of breath, lightheadedness, fever, or any other emergent complaints  "or changes in condition.    "This note will be shared with the patient"    Future Appointments   Date Time Provider Department Center   2/7/2022  1:30 PM Ruth Blanton MD [facility] IM [place] PC[facility]   3/29/2022  9:30 AM Bao Yap MD [facility] CARDIO [place]   3/31/2022  9:00 AM Zuleika Mckeon, OD [facility] OPTICL [place]                 "

## 2022-02-07 ENCOUNTER — OFFICE VISIT (OUTPATIENT)
Dept: INTERNAL MEDICINE | Facility: CLINIC | Age: 87
End: 2022-02-07
Payer: MEDICARE

## 2022-02-07 VITALS
DIASTOLIC BLOOD PRESSURE: 80 MMHG | BODY MASS INDEX: 26.88 KG/M2 | OXYGEN SATURATION: 98 % | SYSTOLIC BLOOD PRESSURE: 122 MMHG | WEIGHT: 167.25 LBS | HEART RATE: 70 BPM | HEIGHT: 66 IN

## 2022-02-07 DIAGNOSIS — E55.9 VITAMIN D DEFICIENCY DISEASE: ICD-10-CM

## 2022-02-07 DIAGNOSIS — G43.109 OCULAR MIGRAINE: ICD-10-CM

## 2022-02-07 DIAGNOSIS — N18.32 STAGE 3B CHRONIC KIDNEY DISEASE: ICD-10-CM

## 2022-02-07 DIAGNOSIS — E03.9 HYPOTHYROIDISM, UNSPECIFIED TYPE: Chronic | ICD-10-CM

## 2022-02-07 DIAGNOSIS — I10 ESSENTIAL (PRIMARY) HYPERTENSION: ICD-10-CM

## 2022-02-07 DIAGNOSIS — I48.0 PAROXYSMAL ATRIAL FIBRILLATION: Primary | ICD-10-CM

## 2022-02-07 DIAGNOSIS — Z01.00 EYE EXAM, ROUTINE: ICD-10-CM

## 2022-02-07 DIAGNOSIS — K59.09 CHRONIC CONSTIPATION: ICD-10-CM

## 2022-02-07 PROCEDURE — 3288F FALL RISK ASSESSMENT DOCD: CPT | Mod: HCNC,CPTII,S$GLB, | Performed by: INTERNAL MEDICINE

## 2022-02-07 PROCEDURE — 3074F PR MOST RECENT SYSTOLIC BLOOD PRESSURE < 130 MM HG: ICD-10-PCS | Mod: HCNC,CPTII,S$GLB, | Performed by: INTERNAL MEDICINE

## 2022-02-07 PROCEDURE — 99999 PR PBB SHADOW E&M-EST. PATIENT-LVL III: ICD-10-PCS | Mod: PBBFAC,HCNC,, | Performed by: INTERNAL MEDICINE

## 2022-02-07 PROCEDURE — 99999 PR PBB SHADOW E&M-EST. PATIENT-LVL III: CPT | Mod: PBBFAC,HCNC,, | Performed by: INTERNAL MEDICINE

## 2022-02-07 PROCEDURE — 1126F PR PAIN SEVERITY QUANTIFIED, NO PAIN PRESENT: ICD-10-PCS | Mod: HCNC,CPTII,S$GLB, | Performed by: INTERNAL MEDICINE

## 2022-02-07 PROCEDURE — 1101F PR PT FALLS ASSESS DOC 0-1 FALLS W/OUT INJ PAST YR: ICD-10-PCS | Mod: HCNC,CPTII,S$GLB, | Performed by: INTERNAL MEDICINE

## 2022-02-07 PROCEDURE — 1157F ADVNC CARE PLAN IN RCRD: CPT | Mod: HCNC,CPTII,S$GLB, | Performed by: INTERNAL MEDICINE

## 2022-02-07 PROCEDURE — 3079F DIAST BP 80-89 MM HG: CPT | Mod: HCNC,CPTII,S$GLB, | Performed by: INTERNAL MEDICINE

## 2022-02-07 PROCEDURE — 1101F PT FALLS ASSESS-DOCD LE1/YR: CPT | Mod: HCNC,CPTII,S$GLB, | Performed by: INTERNAL MEDICINE

## 2022-02-07 PROCEDURE — 3079F PR MOST RECENT DIASTOLIC BLOOD PRESSURE 80-89 MM HG: ICD-10-PCS | Mod: HCNC,CPTII,S$GLB, | Performed by: INTERNAL MEDICINE

## 2022-02-07 PROCEDURE — 99214 OFFICE O/P EST MOD 30 MIN: CPT | Mod: HCNC,S$GLB,, | Performed by: INTERNAL MEDICINE

## 2022-02-07 PROCEDURE — 1157F PR ADVANCE CARE PLAN OR EQUIV PRESENT IN MEDICAL RECORD: ICD-10-PCS | Mod: HCNC,CPTII,S$GLB, | Performed by: INTERNAL MEDICINE

## 2022-02-07 PROCEDURE — 3288F PR FALLS RISK ASSESSMENT DOCUMENTED: ICD-10-PCS | Mod: HCNC,CPTII,S$GLB, | Performed by: INTERNAL MEDICINE

## 2022-02-07 PROCEDURE — 3074F SYST BP LT 130 MM HG: CPT | Mod: HCNC,CPTII,S$GLB, | Performed by: INTERNAL MEDICINE

## 2022-02-07 PROCEDURE — 1126F AMNT PAIN NOTED NONE PRSNT: CPT | Mod: HCNC,CPTII,S$GLB, | Performed by: INTERNAL MEDICINE

## 2022-02-07 PROCEDURE — 99214 PR OFFICE/OUTPT VISIT, EST, LEVL IV, 30-39 MIN: ICD-10-PCS | Mod: HCNC,S$GLB,, | Performed by: INTERNAL MEDICINE

## 2022-02-07 RX ORDER — HEPATITIS A VACCINE 1440 [IU]/ML
INJECTION, SUSPENSION INTRAMUSCULAR
COMMUNITY
Start: 2021-11-22 | End: 2022-05-18

## 2022-02-07 RX ORDER — INFLUENZA VACCINE, ADJUVANTED 15; 15; 15; 15 UG/.5ML; UG/.5ML; UG/.5ML; UG/.5ML
INJECTION, SUSPENSION INTRAMUSCULAR
COMMUNITY
Start: 2021-12-03 | End: 2022-05-18

## 2022-02-07 NOTE — PROGRESS NOTES
Chief Complaint: Follow up of multiple issues.      HPI: This is a 86 year old woman who presents for [facility] of multiple issues.    Since our last visit, she was hospitalized from 12/21/21 to 12/23/21 due to atrial fibrillation.  She stayed up all night then got ready for a dinner party then had 12 people for dinner and washed dishes until 11 pm.  The next day she had palpitations which brought her to the ED.   She is now taking metoprolol succinate 50 mg twice daily and eliquis 5 mg twice daily.  Energy level is good.   No bleeding.  No chest pain, shortness of breath or palpitations. She continues to take losartan 100 mg daily.     She continues to have hip pain and shoulder pain when in the bed and going to get out of the bed. . She is off pravastatin due to this pain. SHe is taking co enzyme Q 10 200 mg daily. Xray of the hip revealed arthritis of the hips.      She had her gall bladder removed on 5/17/21.  Since the surgery she has not had nausea or vomiting, constipation, diarrhea. She is back eating her normal foods with smaller portions.         She no longer has constipoation.     She has occular migraines in the past. She feels that she has never had a TIA in the past.   It was thought she never had a TIA - it was an occular migraine.  No occular migraines.       She is taking zolpidem 5 mg 1 tablet with melatonin 10 mg at bedtime.  sleep patterns have not changed. Some days are better than others.     She falls asleep around 9:30 to midnight.   At midnight she gets up and goes to watch TV - she falls asleep watching the TV - generally at 3-4 am.      Sleep study 12/17/21 revealed moderate sleep apnea.  She does not desire a cpap machine.     She is very active. She goes up and down her 2 story home. She mows the lawn and swims. No chest pain, shortness of breath, or palpitations.      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 She is now hypothyroid and takes levothyroxine 100 mcg  daily.   .              Past Medical History:   Diagnosis Date    Arthritis      Family history of malignant neoplasm of gastrointestinal tract mother    Graves disease       now hypothryoid - no surgery or medicine. resolved on its own    History of colonoscopy       unremarkable 2007 by Dr. Javier.  Barium enema revealed diverticular disease.    Hyperlipidemia      Hypertension      Hypothyroidism      Migraine, ophthalmoplegic      Personal history of colonic polyps      TIA (transient ischemic attack)       with a normal angiogram in the past, Ocular migraines reported by patient, not TIA                 Past Surgical History:   Procedure Laterality Date    CATARACT EXTRACTION   2012     right/ Dr. Lexis Nicolas     COLONOSCOPY         2014 polyps    COLONOSCOPY N/A 11/7/2016     Procedure: COLONOSCOPY;  Surgeon: Bebeto Gonzalez MD;  Location: [facility] (4TH FLR);  Service: Endoscopy;  Laterality: N/A;    COLONOSCOPY N/A 3/9/2020     Procedure: COLONOSCOPY;  Surgeon: Bebeto Gonzalez MD;  Location: [facility] (4TH FLR);  Service: Endoscopy;  Laterality: N/A;    COLONOSCOPY W/ POLYPECTOMY   6/2013     polyp of colon    ENDOSCOPIC ULTRASOUND OF UPPER GASTROINTESTINAL TRACT N/A 4/29/2021     Procedure: ULTRASOUND, UPPER GI TRACT, ENDOSCOPIC;  Surgeon: Dalton Johnson MD;  Location: [facility] (2ND FLR);  Service: Endoscopy;  Laterality: N/A;  Postprandial nausea vomiting epigastric 0.9 cm stone and sludge seen within the gallbladder lumen.  No wall thickening or pericholecystic fluid.  0.6 cm stone seen within the distal common bile duct at the level of the pancreatic head.  There is dil    ERCP N/A 4/29/2021     Procedure: ERCP (ENDOSCOPIC RETROGRADE CHOLANGIOPANCREATOGRAPHY);  Surgeon: Dalton Johnson MD;  Location: [facility] (2ND FLR);  Service: Endoscopy;  Laterality: N/A;  Postprandial nausea and vomit 0.9 cm stone and sludge seen within the gallbladder lumen.  No wall thickening or  pericholecystic fluid.  0.6 cm stone seen within the distal common bile duct at the level of the pancreatic head.  There i    EYE SURGERY   11-10-14     right retina/Dr. Dalton Sierra ([facility])    HYSTEROSCOPIC POLYPECTOMY OF UTERUS N/A 7/2/2018     Procedure: POLYPECTOMY, UTERUS, HYSTEROSCOPIC;  Surgeon: Elliot Vanessa IV, MD;  Location: [facility];  Service: OB/GYN;  Laterality: N/A;    JOINT REPLACEMENT   3/23/2011     left total hip replacement    TONSILLECTOMY         pt was 11 years old      Social History                   Socioeconomic History    Marital status:        Spouse name: Not on file    Number of children: Not on file    Years of education: Not on file    Highest education level: Not on file   Occupational History    Not on file   Tobacco Use    Smoking status: Never Smoker    Smokeless tobacco: Never Used    Tobacco comment: The patient is not getting any regular exercise at this time.  She does enjoy traveling to [place].   Substance and Sexual Activity    Alcohol use: Yes       Comment: At least 1 cocktail every evening.     Drug use: No    Sexual activity: Yes       Partners: Male       Birth control/protection: Post-menopausal       Comment:  64 years    Other Topics Concern    Not on file   Social History Narrative    Not on file      Social Determinants of Health            Financial Resource Strain: Low Risk     Difficulty of Paying Living Expenses: Not hard at all   Food Insecurity: No Food Insecurity    Worried About Running Out of Food in the Last Year: Never true    Ran Out of Food in the Last Year: Never true   Transportation Needs: No Transportation Needs    Lack of Transportation (Medical): No    Lack of Transportation (Non-Medical): No   Physical Activity: Unknown    Days of Exercise per Week: 0 days    Minutes of Exercise per Session: Not on file   Stress: Stress Concern Present    Feeling of Stress : To some extent   Social Connections:  "Unknown    Frequency of Communication with Friends and Family: More than three times a week    Frequency of Social Gatherings with Friends and Family: More than three times a week    Attends Yazidi Services: Not on file    Active Member of Clubs or Organizations: Not on file    Attends Club or Organization Meetings: More than 4 times per year    Marital Status:                       Family Status   Relation Name Status    Mother Tiffany Sutton     Father ColLakesha Sutton     Other great aunt (Not Specified)    Pat Aunt   (Not Specified)    Brother   Alive    Neg Hx   (Not Specified)                       Past Medical History:   Diagnosis Date    Arthritis      Family history of malignant neoplasm of gastrointestinal tract mother    History of colonoscopy       unremarkable  by Dr. Javier.  Barium enema revealed diverticular disease.    Hyperlipidemia      Hypertension      Hypothyroidism      Migraine, ophthalmoplegic      Personal history of colonic polyps      TIA (transient ischemic attack)       with a normal angiogram in the past, Ocular migraines reported by patient, not TIA     Unspecified essential hypertension 2015               Meds and allergies: updated on [facility]           Physical exam:   /80   Pulse 70   Ht 5' 6" (1.676 m)   Wt 75.8 kg (167 lb 3.5 oz)   SpO2 98%   BMI 26.99 kg/m²        General: alert, oriented x 3, no apparent distress.  Affect normal  HEENT: Conjunctivae: anicteric, PERRL, EOMI, TM clear, Oralpharynx clear  Neck: supple, no thyroid enlargement, no cervical lymphadenopathy  Resp: effort normal, lungs clear bilaterally  CV: Regular rate and rhythm without murmurs, gallops or rubs, no lower extremity edema,   Abdomen: soft, non-distended, non-tender, bowel sounds present, no hepatosplenomegaly.  Umbilical hernia that is easily reducible.   .   Mild effusion in the right " knee     Assessment/Plan:   1. Hip pain -due to arthritis of the right hip - take tylenol as needed. e      2. HTN - stable     3. Constipation - s/p colonoscopy - resolved     4. Sleep disorder -continue zolpidem to 5 mg 1/2 tablet at bedtime and take melatonin 10 mg at bedtime. Sleep study revealed sleep apnea - needs to wean off the zolpidem.    5. Hyperlipidemia  - currently off pravastatin due to body pain .   6. Anemia - stable  7. Varicose vein - pt reassured - wear compression stockings if aches.   8. Low vitamin B12 level - B complex vitamin      9. Umbilical hernia -easily reducible - montior for now. Discussed signs of incarceration.   10. Right knee effusion - small - asper creme as needed     MMG 12/2021  BMD 9/2018 - osteopenia     I will see her back in 4 months

## 2022-03-21 DIAGNOSIS — G47.00 INSOMNIA, UNSPECIFIED TYPE: ICD-10-CM

## 2022-03-21 RX ORDER — ZOLPIDEM TARTRATE 5 MG/1
TABLET ORAL
Qty: 45 TABLET | Refills: 0 | Status: SHIPPED | OUTPATIENT
Start: 2022-03-21 | End: 2022-12-05

## 2022-03-21 NOTE — TELEPHONE ENCOUNTER
No new care gaps identified.  Powered by [employer]. Reference number: [id number].   3/21/2022 10:01:49 AM CDT

## 2022-03-28 ENCOUNTER — TELEPHONE (OUTPATIENT)
Dept: CARDIOLOGY | Facility: CLINIC | Age: 87
End: 2022-03-28
Payer: MEDICARE

## 2022-03-28 ENCOUNTER — PATIENT MESSAGE (OUTPATIENT)
Dept: CARDIOLOGY | Facility: CLINIC | Age: 87
End: 2022-03-28
Payer: MEDICARE

## 2022-03-29 ENCOUNTER — OFFICE VISIT (OUTPATIENT)
Dept: CARDIOLOGY | Facility: CLINIC | Age: 87
End: 2022-03-29
Payer: MEDICARE

## 2022-03-29 VITALS
DIASTOLIC BLOOD PRESSURE: 59 MMHG | HEIGHT: 66 IN | SYSTOLIC BLOOD PRESSURE: 130 MMHG | RESPIRATION RATE: 20 BRPM | HEART RATE: 68 BPM | WEIGHT: 169.44 LBS | BODY MASS INDEX: 27.23 KG/M2

## 2022-03-29 DIAGNOSIS — I48.0 PAROXYSMAL ATRIAL FIBRILLATION: ICD-10-CM

## 2022-03-29 DIAGNOSIS — I10 ESSENTIAL (PRIMARY) HYPERTENSION: Primary | ICD-10-CM

## 2022-03-29 PROCEDURE — 99999 PR PBB SHADOW E&M-EST. PATIENT-LVL IV: ICD-10-PCS | Mod: PBBFAC,,, | Performed by: INTERNAL MEDICINE

## 2022-03-29 PROCEDURE — 1126F AMNT PAIN NOTED NONE PRSNT: CPT | Mod: CPTII,S$GLB,, | Performed by: INTERNAL MEDICINE

## 2022-03-29 PROCEDURE — 1157F PR ADVANCE CARE PLAN OR EQUIV PRESENT IN MEDICAL RECORD: ICD-10-PCS | Mod: CPTII,S$GLB,, | Performed by: INTERNAL MEDICINE

## 2022-03-29 PROCEDURE — 3288F FALL RISK ASSESSMENT DOCD: CPT | Mod: CPTII,S$GLB,, | Performed by: INTERNAL MEDICINE

## 2022-03-29 PROCEDURE — 3075F SYST BP GE 130 - 139MM HG: CPT | Mod: CPTII,S$GLB,, | Performed by: INTERNAL MEDICINE

## 2022-03-29 PROCEDURE — 99214 OFFICE O/P EST MOD 30 MIN: CPT | Mod: S$GLB,,, | Performed by: INTERNAL MEDICINE

## 2022-03-29 PROCEDURE — 99999 PR PBB SHADOW E&M-EST. PATIENT-LVL IV: CPT | Mod: PBBFAC,,, | Performed by: INTERNAL MEDICINE

## 2022-03-29 PROCEDURE — 99499 UNLISTED E&M SERVICE: CPT | Mod: S$GLB,,, | Performed by: INTERNAL MEDICINE

## 2022-03-29 PROCEDURE — 1159F MED LIST DOCD IN RCRD: CPT | Mod: CPTII,S$GLB,, | Performed by: INTERNAL MEDICINE

## 2022-03-29 PROCEDURE — 1101F PT FALLS ASSESS-DOCD LE1/YR: CPT | Mod: CPTII,S$GLB,, | Performed by: INTERNAL MEDICINE

## 2022-03-29 PROCEDURE — 1126F PR PAIN SEVERITY QUANTIFIED, NO PAIN PRESENT: ICD-10-PCS | Mod: CPTII,S$GLB,, | Performed by: INTERNAL MEDICINE

## 2022-03-29 PROCEDURE — 1159F PR MEDICATION LIST DOCUMENTED IN MEDICAL RECORD: ICD-10-PCS | Mod: CPTII,S$GLB,, | Performed by: INTERNAL MEDICINE

## 2022-03-29 PROCEDURE — 1157F ADVNC CARE PLAN IN RCRD: CPT | Mod: CPTII,S$GLB,, | Performed by: INTERNAL MEDICINE

## 2022-03-29 PROCEDURE — 3078F PR MOST RECENT DIASTOLIC BLOOD PRESSURE < 80 MM HG: ICD-10-PCS | Mod: CPTII,S$GLB,, | Performed by: INTERNAL MEDICINE

## 2022-03-29 PROCEDURE — 1101F PR PT FALLS ASSESS DOC 0-1 FALLS W/OUT INJ PAST YR: ICD-10-PCS | Mod: CPTII,S$GLB,, | Performed by: INTERNAL MEDICINE

## 2022-03-29 PROCEDURE — 1160F RVW MEDS BY RX/DR IN RCRD: CPT | Mod: CPTII,S$GLB,, | Performed by: INTERNAL MEDICINE

## 2022-03-29 PROCEDURE — 99499 RISK ADDL DX/OHS AUDIT: ICD-10-PCS | Mod: S$GLB,,, | Performed by: INTERNAL MEDICINE

## 2022-03-29 PROCEDURE — 1160F PR REVIEW ALL MEDS BY PRESCRIBER/CLIN PHARMACIST DOCUMENTED: ICD-10-PCS | Mod: CPTII,S$GLB,, | Performed by: INTERNAL MEDICINE

## 2022-03-29 PROCEDURE — 99214 PR OFFICE/OUTPT VISIT, EST, LEVL IV, 30-39 MIN: ICD-10-PCS | Mod: S$GLB,,, | Performed by: INTERNAL MEDICINE

## 2022-03-29 PROCEDURE — 3288F PR FALLS RISK ASSESSMENT DOCUMENTED: ICD-10-PCS | Mod: CPTII,S$GLB,, | Performed by: INTERNAL MEDICINE

## 2022-03-29 PROCEDURE — 3078F DIAST BP <80 MM HG: CPT | Mod: CPTII,S$GLB,, | Performed by: INTERNAL MEDICINE

## 2022-03-29 PROCEDURE — 3075F PR MOST RECENT SYSTOLIC BLOOD PRESS GE 130-139MM HG: ICD-10-PCS | Mod: CPTII,S$GLB,, | Performed by: INTERNAL MEDICINE

## 2022-03-29 RX ORDER — METOPROLOL SUCCINATE 50 MG/1
50 TABLET, EXTENDED RELEASE ORAL 2 TIMES DAILY
Qty: 60 TABLET | Refills: 0 | Status: SHIPPED | OUTPATIENT
Start: 2022-03-29 | End: 2022-03-29 | Stop reason: SDUPTHER

## 2022-03-29 RX ORDER — METOPROLOL SUCCINATE 50 MG/1
50 TABLET, EXTENDED RELEASE ORAL 2 TIMES DAILY
Qty: 180 TABLET | Refills: 3 | Status: SHIPPED | OUTPATIENT
Start: 2022-03-29 | End: 2022-05-19 | Stop reason: SDUPTHER

## 2022-03-29 NOTE — ASSESSMENT & PLAN NOTE
Patient has a history of hypertension.  Will continue losartan and metoprolol. No need for amlodipine at this time.

## 2022-03-29 NOTE — PROGRESS NOTES
Chief Complaint   Patient presents with    Paroxysmal atrial fibrillation       HPI:  This is an extremely pleasant 86-year-old female with a history of ocular migraines, hypertension, and paroxysmal atrial fibrillation.    The patient had her 1st clinical presentation for atrial fibrillation on December 21, 2021 when she noticed her heart racing with some irregularity.  She sought care in the emergency department and was admitted overnight. She seemed to spontaneously cardiovert after IV Dilt pushes and has seemingly remained in sinus rhythm since.  Inpatient evaluation demonstrated a normal TSH and left ventricular ejection fraction without any structural disease.  She was discharged on Eliquis therapy and metoprolol therapy and has tolerated these medicines without issue.    Prior to her presentation she had no previous diagnosis of cardiovascular disease other than hypertension.  She was previously on losartan and atenolol for a long period of time with the recent addition of amlodipine for better blood pressure control.  She had no history of coronary artery disease, myocardial infarction, congestive heart failure, cardiomyopathy, or stroke.  She had no previous diagnosis of atrial fibrillation or symptoms of palpitations.    At baseline the patient reports excellent activity levels without significant limitation.  She is very social and actively participates in many different activities throughout the day.  Her capacity is much greater than her peers.  The patient denies any symptoms of chest pain, shortness of breath, or dyspnea on exertion.    Her discharge regimen included aspirin 81 x 1, Eliquis 5 x 2, metoprolol succinate 50 x 1, losartan 100 x 1, and amlodipine 5 x 1.     At last visit we increased her metoprolol succinate to 50x2 and held her amlodipine.  Blood pressures are well controlled on this regimen at home. No palpitations. Hrs 50-60s.     PHYSICAL EXAM:  Vitals:    03/29/22 0931   BP: (!)  130/59   Pulse: 68   Resp: 20       Physical Exam  Constitutional:       Appearance: Normal appearance.   Neck:      Vascular: No carotid bruit or JVD.   Cardiovascular:      Rate and Rhythm: Normal rate and regular rhythm.      Pulses: Normal pulses.           Carotid pulses are 2+ on the right side and 2+ on the left side.       Radial pulses are 2+ on the right side and 2+ on the left side.        Dorsalis pedis pulses are 2+ on the right side and 2+ on the left side.        Posterior tibial pulses are 2+ on the right side and 2+ on the left side.      Heart sounds: S1 normal and S2 normal. No murmur heard.    No S3 sounds.   Pulmonary:      Effort: Pulmonary effort is normal.      Breath sounds: Normal breath sounds. No rales.   Feet:      Right foot:      Skin integrity: Skin integrity normal.      Left foot:      Skin integrity: Skin integrity normal.   Skin:     General: Skin is warm and dry.      Findings: No lesion.   Neurological:      Mental Status: She is alert and oriented to person, place, and time.      Motor: Motor function is intact.      Gait: Gait is intact.       LABS/CARDIAC TESTS:  December 2021 CBC demonstrates hemoglobin of 11 with an MCV of 93 and platelets of 202. CMP demonstrates a creatinine 1.0 with a BUN of 17 and an EGFR of 52. Albumin is 3.3.  TSH is normal.  Troponin was negative and BNP was 184. 2020 LDL was 58 and HDL 41.  ECG on December 21, 2021 initially demonstrated atrial fibrillation with RVR.  No Q-waves.  Lateral ST deviations noted.  Follow-up ECG demonstrated normal sinus rhythm with no Q-waves and nonspecific lateral ST abnormalities that were old.  TTE December 2021 demonstrates normal LV size and systolic function with an EF of 65%.  Mild AI noted. Normal CVP.    ASSESSMENT & PLAN:    Atrial fibrillation  The patient has a new diagnosis of paroxysmal atrial fibrillation.  She remains in sinus rhythm at this time on beta-blocker therapy and is tolerating metoprolol  succinate from 50 mg twice a day.  She has an elevated chads Vasc score and is tolerating Eliquis 5 mg b.i.d. (her creatinine is less than 1.5 and her weight is greater than 60 kg).  We had a conversation about the importance of anticoagulation in mitigating her elevated stroke risk and she understands the potential benefits and risks of treatment with Eliquis at this time.    Essential (primary) hypertension  Patient has a history of hypertension.  Will continue losartan and metoprolol. No need for amlodipine at this time.          Essential (primary) hypertension    Paroxysmal atrial fibrillation  -     Discontinue: metoprolol succinate (TOPROL-XL) 50 MG 24 hr tablet; Take 1 tablet (50 mg total) by mouth 2 (two) times daily.  Dispense: 60 tablet; Refill: 0  -     metoprolol succinate (TOPROL-XL) 50 MG 24 hr tablet; Take 1 tablet (50 mg total) by mouth 2 (two) times daily.  Dispense: 180 tablet; Refill: 3        Bao Yap MD

## 2022-03-31 ENCOUNTER — OFFICE VISIT (OUTPATIENT)
Dept: OPTOMETRY | Facility: CLINIC | Age: 87
End: 2022-03-31
Payer: MEDICARE

## 2022-03-31 DIAGNOSIS — H02.403 PTOSIS OF BOTH EYELIDS: ICD-10-CM

## 2022-03-31 DIAGNOSIS — H25.12 NS (NUCLEAR SCLEROSIS), LEFT: ICD-10-CM

## 2022-03-31 DIAGNOSIS — H57.9 EYE EXAM ABNORMAL: ICD-10-CM

## 2022-03-31 DIAGNOSIS — H43.393 VITREOUS SYNERESIS OF BOTH EYES: ICD-10-CM

## 2022-03-31 DIAGNOSIS — H52.4 PRESBYOPIA: ICD-10-CM

## 2022-03-31 DIAGNOSIS — H47.393 OPTIC NERVE CUPPING OF BOTH EYES: Primary | ICD-10-CM

## 2022-03-31 DIAGNOSIS — Z96.1 PSEUDOPHAKIA OF RIGHT EYE: ICD-10-CM

## 2022-03-31 DIAGNOSIS — H04.123 DRY EYE SYNDROME, BILATERAL: ICD-10-CM

## 2022-03-31 PROCEDURE — 1159F PR MEDICATION LIST DOCUMENTED IN MEDICAL RECORD: ICD-10-PCS | Mod: CPTII,S$GLB,, | Performed by: OPTOMETRIST

## 2022-03-31 PROCEDURE — 1160F RVW MEDS BY RX/DR IN RCRD: CPT | Mod: CPTII,S$GLB,, | Performed by: OPTOMETRIST

## 2022-03-31 PROCEDURE — 1160F PR REVIEW ALL MEDS BY PRESCRIBER/CLIN PHARMACIST DOCUMENTED: ICD-10-PCS | Mod: CPTII,S$GLB,, | Performed by: OPTOMETRIST

## 2022-03-31 PROCEDURE — 3288F PR FALLS RISK ASSESSMENT DOCUMENTED: ICD-10-PCS | Mod: CPTII,S$GLB,, | Performed by: OPTOMETRIST

## 2022-03-31 PROCEDURE — 99999 PR PBB SHADOW E&M-EST. PATIENT-LVL III: CPT | Mod: PBBFAC,,, | Performed by: OPTOMETRIST

## 2022-03-31 PROCEDURE — 1159F MED LIST DOCD IN RCRD: CPT | Mod: CPTII,S$GLB,, | Performed by: OPTOMETRIST

## 2022-03-31 PROCEDURE — 92004 PR EYE EXAM, NEW PATIENT,COMPREHESV: ICD-10-PCS | Mod: S$GLB,,, | Performed by: OPTOMETRIST

## 2022-03-31 PROCEDURE — 92250 COLOR FUNDUS PHOTOGRAPHY - OU - BOTH EYES: ICD-10-PCS | Mod: S$GLB,,, | Performed by: OPTOMETRIST

## 2022-03-31 PROCEDURE — 1101F PR PT FALLS ASSESS DOC 0-1 FALLS W/OUT INJ PAST YR: ICD-10-PCS | Mod: CPTII,S$GLB,, | Performed by: OPTOMETRIST

## 2022-03-31 PROCEDURE — 1157F PR ADVANCE CARE PLAN OR EQUIV PRESENT IN MEDICAL RECORD: ICD-10-PCS | Mod: CPTII,S$GLB,, | Performed by: OPTOMETRIST

## 2022-03-31 PROCEDURE — 1157F ADVNC CARE PLAN IN RCRD: CPT | Mod: CPTII,S$GLB,, | Performed by: OPTOMETRIST

## 2022-03-31 PROCEDURE — 1126F PR PAIN SEVERITY QUANTIFIED, NO PAIN PRESENT: ICD-10-PCS | Mod: CPTII,S$GLB,, | Performed by: OPTOMETRIST

## 2022-03-31 PROCEDURE — 99999 PR PBB SHADOW E&M-EST. PATIENT-LVL III: ICD-10-PCS | Mod: PBBFAC,,, | Performed by: OPTOMETRIST

## 2022-03-31 PROCEDURE — 1101F PT FALLS ASSESS-DOCD LE1/YR: CPT | Mod: CPTII,S$GLB,, | Performed by: OPTOMETRIST

## 2022-03-31 PROCEDURE — 92004 COMPRE OPH EXAM NEW PT 1/>: CPT | Mod: S$GLB,,, | Performed by: OPTOMETRIST

## 2022-03-31 PROCEDURE — 92250 FUNDUS PHOTOGRAPHY W/I&R: CPT | Mod: S$GLB,,, | Performed by: OPTOMETRIST

## 2022-03-31 PROCEDURE — 3288F FALL RISK ASSESSMENT DOCD: CPT | Mod: CPTII,S$GLB,, | Performed by: OPTOMETRIST

## 2022-03-31 PROCEDURE — 92015 DETERMINE REFRACTIVE STATE: CPT | Mod: S$GLB,,, | Performed by: OPTOMETRIST

## 2022-03-31 PROCEDURE — 92015 PR REFRACTION: ICD-10-PCS | Mod: S$GLB,,, | Performed by: OPTOMETRIST

## 2022-03-31 PROCEDURE — 1126F AMNT PAIN NOTED NONE PRSNT: CPT | Mod: CPTII,S$GLB,, | Performed by: OPTOMETRIST

## 2022-03-31 RX ORDER — OXYMETAZOLINE HYDROCHLORIDE OPHTHALMIC 1 MG/ML
1 SOLUTION/ DROPS OPHTHALMIC DAILY
Qty: 180 EACH | Refills: 3 | Status: SHIPPED | OUTPATIENT
Start: 2022-03-31 | End: 2022-12-05

## 2022-03-31 NOTE — PROGRESS NOTES
HPI     Last eye exam was approximately 1 years ago.  Patient states decrease in near vision since last exam. Has ocular   migraines-last one was in the Fall. Occasionally sees floaters OU.  Patient denies diplopia, flashes, and pain.      Last edited by Che Azevedo MA on 3/31/2022  9:24 AM. (History)            Assessment /Plan     For exam results, see Encounter Report.      Ptosis of both eyelids  -     oxymetazoline, PF, (UPNEEQ, PF,) 0.1 % Dpet; Apply 1 drop to eye once daily.  Dispense: 180 each; Refill: 3    NS (nuclear sclerosis), left   Borderline VS, monitor    Vitreous syneresis of both eyes  History of ocular migraine   Pseudophakia of right eye- Dr. 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 Sierra    Dry eye syndrome, bilateral   Use systane daily/PRN    Presbyopia   Rx specs, discussed may need separate pair for computer or CE OS      Optic nerve cupping of both eyes  OD>OS  Thin rim IT OD  -today     Color Fundus Photography - OU - Both Eyes    - RTC 1 month for    IOP/ Gonio/Pachy        Santos Visual Field - OU - Extended - Both Eyes; Future  -     Posterior Segment OCT Optic Nerve- Both eyes; Future

## 2022-04-08 NOTE — TELEPHONE ENCOUNTER
----- Message from Elliot Vanessa IV, MD sent at 7/23/2020  2:30 PM CDT -----  Benign pelvic ultrasound with 2 mm endometrial stripe.  Please set patient monitor for any spotting or bleeding.  I have no other follow-up recommendation after reviewing this sonogram report.    Elliot Vanessa IV, MD   Xerosis Normal Treatment: I recommended application of Cetaphil or CeraVe numerous times a day going to bed to all dry areas.

## 2022-04-19 ENCOUNTER — PATIENT MESSAGE (OUTPATIENT)
Dept: OPTOMETRY | Facility: CLINIC | Age: 87
End: 2022-04-19
Payer: MEDICARE

## 2022-05-02 ENCOUNTER — OFFICE VISIT (OUTPATIENT)
Dept: OPTOMETRY | Facility: CLINIC | Age: 87
End: 2022-05-02
Payer: MEDICARE

## 2022-05-02 ENCOUNTER — CLINICAL SUPPORT (OUTPATIENT)
Dept: OPHTHALMOLOGY | Facility: CLINIC | Age: 87
End: 2022-05-02
Payer: MEDICARE

## 2022-05-02 DIAGNOSIS — H47.393 OPTIC NERVE CUPPING OF BOTH EYES: ICD-10-CM

## 2022-05-02 DIAGNOSIS — H47.393 OPTIC NERVE CUPPING OF BOTH EYES: Primary | ICD-10-CM

## 2022-05-02 PROCEDURE — 1101F PT FALLS ASSESS-DOCD LE1/YR: CPT | Mod: CPTII,S$GLB,, | Performed by: OPTOMETRIST

## 2022-05-02 PROCEDURE — 1160F RVW MEDS BY RX/DR IN RCRD: CPT | Mod: CPTII,S$GLB,, | Performed by: OPTOMETRIST

## 2022-05-02 PROCEDURE — 3288F FALL RISK ASSESSMENT DOCD: CPT | Mod: CPTII,S$GLB,, | Performed by: OPTOMETRIST

## 2022-05-02 PROCEDURE — 99999 PR PBB SHADOW E&M-EST. PATIENT-LVL III: ICD-10-PCS | Mod: PBBFAC,,, | Performed by: OPTOMETRIST

## 2022-05-02 PROCEDURE — 1157F ADVNC CARE PLAN IN RCRD: CPT | Mod: CPTII,S$GLB,, | Performed by: OPTOMETRIST

## 2022-05-02 PROCEDURE — 1159F PR MEDICATION LIST DOCUMENTED IN MEDICAL RECORD: ICD-10-PCS | Mod: CPTII,S$GLB,, | Performed by: OPTOMETRIST

## 2022-05-02 PROCEDURE — 1157F PR ADVANCE CARE PLAN OR EQUIV PRESENT IN MEDICAL RECORD: ICD-10-PCS | Mod: CPTII,S$GLB,, | Performed by: OPTOMETRIST

## 2022-05-02 PROCEDURE — 3288F PR FALLS RISK ASSESSMENT DOCUMENTED: ICD-10-PCS | Mod: CPTII,S$GLB,, | Performed by: OPTOMETRIST

## 2022-05-02 PROCEDURE — 92012 INTRM OPH EXAM EST PATIENT: CPT | Mod: S$GLB,,, | Performed by: OPTOMETRIST

## 2022-05-02 PROCEDURE — 1126F PR PAIN SEVERITY QUANTIFIED, NO PAIN PRESENT: ICD-10-PCS | Mod: CPTII,S$GLB,, | Performed by: OPTOMETRIST

## 2022-05-02 PROCEDURE — 76514 PR  US, EYE, FOR CORNEAL THICKNESS: ICD-10-PCS | Mod: S$GLB,,, | Performed by: OPTOMETRIST

## 2022-05-02 PROCEDURE — 1160F PR REVIEW ALL MEDS BY PRESCRIBER/CLIN PHARMACIST DOCUMENTED: ICD-10-PCS | Mod: CPTII,S$GLB,, | Performed by: OPTOMETRIST

## 2022-05-02 PROCEDURE — 1101F PR PT FALLS ASSESS DOC 0-1 FALLS W/OUT INJ PAST YR: ICD-10-PCS | Mod: CPTII,S$GLB,, | Performed by: OPTOMETRIST

## 2022-05-02 PROCEDURE — 92012 PR EYE EXAM, EST PATIENT,INTERMED: ICD-10-PCS | Mod: S$GLB,,, | Performed by: OPTOMETRIST

## 2022-05-02 PROCEDURE — 99999 PR PBB SHADOW E&M-EST. PATIENT-LVL III: CPT | Mod: PBBFAC,,, | Performed by: OPTOMETRIST

## 2022-05-02 PROCEDURE — 76514 ECHO EXAM OF EYE THICKNESS: CPT | Mod: S$GLB,,, | Performed by: OPTOMETRIST

## 2022-05-02 PROCEDURE — 1159F MED LIST DOCD IN RCRD: CPT | Mod: CPTII,S$GLB,, | Performed by: OPTOMETRIST

## 2022-05-02 PROCEDURE — 1126F AMNT PAIN NOTED NONE PRSNT: CPT | Mod: CPTII,S$GLB,, | Performed by: OPTOMETRIST

## 2022-05-02 NOTE — PROGRESS NOTES
Visual field test done.  Patient stated no latex allergies used coverlet          Glasses Prescription     Sphere Cylinder Axis Dist VA Add   Right -0.25 +2.50 179 20/30+2 +2.50   Left -2.50 +2.25 178 20/40+ +2.50   Expiration Date: 3/31/2023   Comments: Anti-reflective

## 2022-05-03 NOTE — PROGRESS NOTES
HPI     85 Y/o female is here for routine eye exam with C/o IOP/PACH check   Could not get an auto refractor on OS   Pt denies pain and discomfort   No f/f    Eye med: no gtt    Last edited by Devin Preciado MA on 5/2/2022  2:42 PM. (History)            Assessment /Plan     For exam results, see Encounter Report.    Optic nerve cupping of both eyes         Ptosis of both eyelids  -     oxymetazoline, PF, (UPNEEQ, PF,) 0.1 % Dpet; Apply 1 drop to eye once daily.  Dispense: 180 each; Refill: 3     NS (nuclear sclerosis), left              Borderline VS, monitor     Vitreous syneresis of both eyes  History of ocular migraine   Pseudophakia of right eye- Dr. 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 Sierra     Dry eye syndrome, bilateral              Use systane daily/PRN     Presbyopia              Rx specs, discussed may need separate pair for computer or CE OS      Optic nerve cupping of both eyes  OD>OS  Thin rim IT OD  Color Fundus Photography - OU - Both Eyes   IOP WNL  Pachy   584/585   Santos Visual Field - OU - scattered defects OU  Posterior Segment OCT Optic Nerve-WNL OU      RTC 1 year annual and OCT optic nerve

## 2022-05-11 DIAGNOSIS — Z78.0 ASYMPTOMATIC MENOPAUSAL STATE: ICD-10-CM

## 2022-05-16 ENCOUNTER — PATIENT MESSAGE (OUTPATIENT)
Dept: ADMINISTRATIVE | Facility: HOSPITAL | Age: 87
End: 2022-05-16
Payer: MEDICARE

## 2022-05-18 ENCOUNTER — OFFICE VISIT (OUTPATIENT)
Dept: INTERNAL MEDICINE | Facility: CLINIC | Age: 87
End: 2022-05-18
Payer: MEDICARE

## 2022-05-18 ENCOUNTER — TELEPHONE (OUTPATIENT)
Dept: DERMATOLOGY | Facility: CLINIC | Age: 87
End: 2022-05-18

## 2022-05-18 VITALS
DIASTOLIC BLOOD PRESSURE: 70 MMHG | BODY MASS INDEX: 27.39 KG/M2 | SYSTOLIC BLOOD PRESSURE: 136 MMHG | OXYGEN SATURATION: 97 % | WEIGHT: 170.44 LBS | HEIGHT: 66 IN | HEART RATE: 86 BPM

## 2022-05-18 DIAGNOSIS — I48.0 PAROXYSMAL ATRIAL FIBRILLATION: Primary | ICD-10-CM

## 2022-05-18 DIAGNOSIS — E53.8 VITAMIN B12 DEFICIENCY: ICD-10-CM

## 2022-05-18 DIAGNOSIS — E03.9 HYPOTHYROIDISM, UNSPECIFIED TYPE: ICD-10-CM

## 2022-05-18 DIAGNOSIS — E55.9 VITAMIN D DEFICIENCY DISEASE: ICD-10-CM

## 2022-05-18 DIAGNOSIS — E03.4 HYPOTHYROIDISM DUE TO ACQUIRED ATROPHY OF THYROID: ICD-10-CM

## 2022-05-18 DIAGNOSIS — I10 ESSENTIAL (PRIMARY) HYPERTENSION: ICD-10-CM

## 2022-05-18 DIAGNOSIS — N18.32 STAGE 3B CHRONIC KIDNEY DISEASE: ICD-10-CM

## 2022-05-18 DIAGNOSIS — K59.09 CHRONIC CONSTIPATION: ICD-10-CM

## 2022-05-18 DIAGNOSIS — G43.109 OCULAR MIGRAINE: ICD-10-CM

## 2022-05-18 PROBLEM — G31.9: Status: RESOLVED | Noted: 2020-10-16 | Resolved: 2022-05-18

## 2022-05-18 PROCEDURE — 3288F PR FALLS RISK ASSESSMENT DOCUMENTED: ICD-10-PCS | Mod: CPTII,S$GLB,, | Performed by: INTERNAL MEDICINE

## 2022-05-18 PROCEDURE — 3078F PR MOST RECENT DIASTOLIC BLOOD PRESSURE < 80 MM HG: ICD-10-PCS | Mod: CPTII,S$GLB,, | Performed by: INTERNAL MEDICINE

## 2022-05-18 PROCEDURE — 1126F PR PAIN SEVERITY QUANTIFIED, NO PAIN PRESENT: ICD-10-PCS | Mod: CPTII,S$GLB,, | Performed by: INTERNAL MEDICINE

## 2022-05-18 PROCEDURE — 3075F PR MOST RECENT SYSTOLIC BLOOD PRESS GE 130-139MM HG: ICD-10-PCS | Mod: CPTII,S$GLB,, | Performed by: INTERNAL MEDICINE

## 2022-05-18 PROCEDURE — 99999 PR PBB SHADOW E&M-EST. PATIENT-LVL IV: ICD-10-PCS | Mod: PBBFAC,,, | Performed by: INTERNAL MEDICINE

## 2022-05-18 PROCEDURE — 99214 OFFICE O/P EST MOD 30 MIN: CPT | Mod: S$GLB,,, | Performed by: INTERNAL MEDICINE

## 2022-05-18 PROCEDURE — 1101F PR PT FALLS ASSESS DOC 0-1 FALLS W/OUT INJ PAST YR: ICD-10-PCS | Mod: CPTII,S$GLB,, | Performed by: INTERNAL MEDICINE

## 2022-05-18 PROCEDURE — 1126F AMNT PAIN NOTED NONE PRSNT: CPT | Mod: CPTII,S$GLB,, | Performed by: INTERNAL MEDICINE

## 2022-05-18 PROCEDURE — 1157F ADVNC CARE PLAN IN RCRD: CPT | Mod: CPTII,S$GLB,, | Performed by: INTERNAL MEDICINE

## 2022-05-18 PROCEDURE — 1159F PR MEDICATION LIST DOCUMENTED IN MEDICAL RECORD: ICD-10-PCS | Mod: CPTII,S$GLB,, | Performed by: INTERNAL MEDICINE

## 2022-05-18 PROCEDURE — 3078F DIAST BP <80 MM HG: CPT | Mod: CPTII,S$GLB,, | Performed by: INTERNAL MEDICINE

## 2022-05-18 PROCEDURE — 3075F SYST BP GE 130 - 139MM HG: CPT | Mod: CPTII,S$GLB,, | Performed by: INTERNAL MEDICINE

## 2022-05-18 PROCEDURE — 99999 PR PBB SHADOW E&M-EST. PATIENT-LVL IV: CPT | Mod: PBBFAC,,, | Performed by: INTERNAL MEDICINE

## 2022-05-18 PROCEDURE — 3288F FALL RISK ASSESSMENT DOCD: CPT | Mod: CPTII,S$GLB,, | Performed by: INTERNAL MEDICINE

## 2022-05-18 PROCEDURE — 1159F MED LIST DOCD IN RCRD: CPT | Mod: CPTII,S$GLB,, | Performed by: INTERNAL MEDICINE

## 2022-05-18 PROCEDURE — 1101F PT FALLS ASSESS-DOCD LE1/YR: CPT | Mod: CPTII,S$GLB,, | Performed by: INTERNAL MEDICINE

## 2022-05-18 PROCEDURE — 1157F PR ADVANCE CARE PLAN OR EQUIV PRESENT IN MEDICAL RECORD: ICD-10-PCS | Mod: CPTII,S$GLB,, | Performed by: INTERNAL MEDICINE

## 2022-05-18 PROCEDURE — 99214 PR OFFICE/OUTPT VISIT, EST, LEVL IV, 30-39 MIN: ICD-10-PCS | Mod: S$GLB,,, | Performed by: INTERNAL MEDICINE

## 2022-05-18 NOTE — TELEPHONE ENCOUNTER
----- Message from Melody Edwards LPN sent at 5/18/2022  4:36 PM CDT -----  Regarding: FW: Appointment  Contact: [phone]    ----- Message -----  From: Marianna Rueda  Sent: 5/18/2022   4:35 PM CDT  To: [facility] CEDE[facility] Staff  Subject: Appointment                                      Calling to schedule an appointment for skin check before the end of June. Please call and schedule.

## 2022-05-18 NOTE — PATIENT INSTRUCTIONS
decrease the zolpidem 5 mg 1/2 tablet at bedtime for 2 weeks then stop.  Try over the counter Unisom.

## 2022-05-18 NOTE — PROGRESS NOTES
Chief Complaint: Follow up of multiple issues.      HPI: This is a 86 year old woman who presents for [facility] of multiple issues.     She has not had any more episodes of atrial fibrillation.   She was hospitalized from 12/21/21 to 12/23/21 due to atrial fibrillation.  She stayed up all night then got ready for a dinner party then had 12 people for dinner and washed dishes until 11 pm.  The next day she had palpitations which brought her to the ED.   She continues to take metoprolol succinate 50 mg twice daily and eliquis 5 mg twice daily.  Energy level is good.   No bleeding.  No chest pain, shortness of breath or palpitations. She continues to take losartan 100 mg daily.     Her hip and shoulder pain are better. She changed her pillows which has helped.   She is off pravastatin due to this pain. SHe is taking co enzyme Q 10 200 mg daily. Rare muscle spasms.       She had her gall bladder removed on 5/17/21.  Since the surgery she has not had nausea or vomiting, constipation, diarrhea. She is back eating her normal foods with smaller portions.   She no longer has constipoation. She is taking a  Magnesium and a dulcolax daily.      She has occular migraines in the past. She has had one occular migrane since our last visit due to being stressed from a project. She stayed up all night doing a project. She feels that she has never had a TIA in the past.   It was thought she never had a TIA - it was an occular migraine.  .       She is taking zolpidem 5 mg 1 tablet with melatonin 10 mg at bedtime.  sleep patterns have not changed. Some days are better than others.     She falls asleep around 9:30 to midnight.   At midnight she gets up and goes to watch TV - she falls asleep watching the TV - generally at 3-4 am.       Sleep study 12/17/21 revealed moderate sleep apnea.  She does not desire a cpap machine.      She is very active. She goes up and down her 2 story home. She mows the lawn and swims. No chest pain,  shortness of breath, or palpitations.      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 She is now hypothyroid and takes levothyroxine 100 mcg daily.   .              Past Medical History:   Diagnosis Date    Arthritis      Family history of malignant neoplasm of gastrointestinal tract mother    Graves disease       now hypothryoid - no surgery or medicine. resolved on its own    History of colonoscopy       unremarkable 2007 by Dr. Javier.  Barium enema revealed diverticular disease.    Hyperlipidemia      Hypertension      Hypothyroidism      Migraine, ophthalmoplegic      Personal history of colonic polyps      TIA (transient ischemic attack)       with a normal angiogram in the past, Ocular migraines reported by patient, not TIA                 Past Surgical History:   Procedure Laterality Date    CATARACT EXTRACTION   2012     right/ Dr. Lexis Nicolas     COLONOSCOPY         2014 polyps    COLONOSCOPY N/A 11/7/2016     Procedure: COLONOSCOPY;  Surgeon: Bebeto Gonzalez MD;  Location: [facility] (4TH FLR);  Service: Endoscopy;  Laterality: N/A;    COLONOSCOPY N/A 3/9/2020     Procedure: COLONOSCOPY;  Surgeon: Bebeto Gonzalze MD;  Location: [facility] (4TH FLR);  Service: Endoscopy;  Laterality: N/A;    COLONOSCOPY W/ POLYPECTOMY   6/2013     polyp of colon    ENDOSCOPIC ULTRASOUND OF UPPER GASTROINTESTINAL TRACT N/A 4/29/2021     Procedure: ULTRASOUND, UPPER GI TRACT, ENDOSCOPIC;  Surgeon: Dalton Johnson MD;  Location: [facility] (2ND FLR);  Service: Endoscopy;  Laterality: N/A;  Postprandial nausea vomiting epigastric 0.9 cm stone and sludge seen within the gallbladder lumen.  No wall thickening or pericholecystic fluid.  0.6 cm stone seen within the distal common bile duct at the level of the pancreatic head.  There is dil    ERCP N/A 4/29/2021     Procedure: ERCP (ENDOSCOPIC RETROGRADE CHOLANGIOPANCREATOGRAPHY);  Surgeon: Dalton Johnson MD;  Location: [facility] (2ND FLR);   Service: Endoscopy;  Laterality: N/A;  Postprandial nausea and vomit 0.9 cm stone and sludge seen within the gallbladder lumen.  No wall thickening or pericholecystic fluid.  0.6 cm stone seen within the distal common bile duct at the level of the pancreatic head.  There i    EYE SURGERY   11-10-14     right retina/Dr. Dalton Sierra ([facility])    HYSTEROSCOPIC POLYPECTOMY OF UTERUS N/A 7/2/2018     Procedure: POLYPECTOMY, UTERUS, HYSTEROSCOPIC;  Surgeon: Elliot Vanessa IV, MD;  Location: [facility];  Service: OB/GYN;  Laterality: N/A;    JOINT REPLACEMENT   3/23/2011     left total hip replacement    TONSILLECTOMY         pt was 11 years old      Social History                   Socioeconomic History    Marital status:        Spouse name: Not on file    Number of children: Not on file    Years of education: Not on file    Highest education level: Not on file   Occupational History    Not on file   Tobacco Use    Smoking status: Never Smoker    Smokeless tobacco: Never Used    Tobacco comment: The patient is not getting any regular exercise at this time.  She does enjoy traveling to [place].   Substance and Sexual Activity    Alcohol use: Yes       Comment: At least 1 cocktail every evening.     Drug use: No    Sexual activity: Yes       Partners: Male       Birth control/protection: Post-menopausal       Comment:  64 years    Other Topics Concern    Not on file   Social History Narrative    Not on file      Social Determinants of Health            Financial Resource Strain: Low Risk     Difficulty of Paying Living Expenses: Not hard at all   Food Insecurity: No Food Insecurity    Worried About Running Out of Food in the Last Year: Never true    Ran Out of Food in the Last Year: Never true   Transportation Needs: No Transportation Needs    Lack of Transportation (Medical): No    Lack of Transportation (Non-Medical): No   Physical Activity: Unknown    Days of Exercise per Week:  "0 days    Minutes of Exercise per Session: Not on file   Stress: Stress Concern Present    Feeling of Stress : To some extent   Social Connections: Unknown    Frequency of Communication with Friends and Family: More than three times a week    Frequency of Social Gatherings with Friends and Family: More than three times a week    Attends Yazidi Services: Not on file    Active Member of Clubs or Organizations: Not on file    Attends Club or Organization Meetings: More than 4 times per year    Marital Status:                       Family Status   Relation Name Status    Mother Tiffany Sutton     Father ColLakesha Sutton     Other great aunt (Not Specified)    Pat Aunt   (Not Specified)    Brother   Alive    Neg Hx   (Not Specified)              Meds and allergies: updated on Epic           Physical exam:    /70 (BP Location: Right arm, Patient Position: Sitting)   Pulse 86   Ht 5' 6" (1.676 m)   Wt 77.3 kg (170 lb 6.7 oz)   SpO2 97%   BMI 27.51 kg/m²        General: alert, oriented x 3, no apparent distress.  Affect normal  HEENT: Conjunctivae: anicteric, PERRL, EOMI, TM clear, Oralpharynx clear  Neck: supple, no thyroid enlargement, no cervical lymphadenopathy  Resp: effort normal, lungs clear bilaterally  CV: Regular rate and rhythm without murmurs, gallops or rubs, no lower extremity edema,   Abdomen: soft, non-distended, non-tender, bowel sounds present, no hepatosplenomegaly.  Umbilical hernia that is easily reducible.      Assessment/Plan:   1. Hip pain -due to arthritis of the right hip - take tylenol as needed. e      2. HTN - stable     3. Constipation - s/p colonoscopy - resolved     4. Sleep disorder -continue zolpidem to 5 mg 1/2 tablet at bedtime and take melatonin 10 mg at bedtime. Discussed sleep hygeine - she does not want to give up her TV at night.  Sleep study revealed sleep apnea - needs to wean off the zolpidem.  She needs " to decrease the zolpidem 5 mg 1/2 tablet at bedtime for 2 weeks then stop.  Try over the counter Unisom.   Could try gabapentin at bedtime instead.     5. Hyperlipidemia  - currently off pravastatin due to body pain .     6. Anemia - stable    7. Varicose vein - pt reassured - wear compression stockings if aches.     8. Low vitamin B12 level - B complex vitamin      9. Umbilical hernia -easily reducible - montior for now. Discussed signs of incarceration.      MMG 12/2021  BMD 9/2018 - osteopenia     I will see her back in 4 months  with labs and BMD

## 2022-05-19 ENCOUNTER — PATIENT MESSAGE (OUTPATIENT)
Dept: INTERNAL MEDICINE | Facility: CLINIC | Age: 87
End: 2022-05-19
Payer: MEDICARE

## 2022-05-19 DIAGNOSIS — I48.0 PAROXYSMAL ATRIAL FIBRILLATION: ICD-10-CM

## 2022-05-19 DIAGNOSIS — E03.4 HYPOTHYROIDISM DUE TO ACQUIRED ATROPHY OF THYROID: ICD-10-CM

## 2022-05-19 RX ORDER — LEVOTHYROXINE SODIUM 100 UG/1
100 TABLET ORAL DAILY
Qty: 180 TABLET | Refills: 3 | Status: SHIPPED | OUTPATIENT
Start: 2022-05-19 | End: 2022-11-09 | Stop reason: SDUPTHER

## 2022-05-19 RX ORDER — METOPROLOL SUCCINATE 50 MG/1
50 TABLET, EXTENDED RELEASE ORAL 2 TIMES DAILY
Qty: 180 TABLET | Refills: 3 | Status: SHIPPED | OUTPATIENT
Start: 2022-05-19 | End: 2023-02-20 | Stop reason: SDUPTHER

## 2022-05-20 ENCOUNTER — PATIENT MESSAGE (OUTPATIENT)
Dept: INTERNAL MEDICINE | Facility: CLINIC | Age: 87
End: 2022-05-20
Payer: MEDICARE

## 2022-05-22 ENCOUNTER — PATIENT MESSAGE (OUTPATIENT)
Dept: INTERNAL MEDICINE | Facility: CLINIC | Age: 87
End: 2022-05-22
Payer: MEDICARE

## 2022-05-23 NOTE — TELEPHONE ENCOUNTER
Justin Lamb, LPN  You 5 hours ago (7:42 AM)     TW    Pt is requesting a 60 day supply of metoprolol and 8 pills on the levothyroxine. Pt would like this done so that she is on the same cycle as her other medication. Pt states that she drives 4 hours to  her medication and would like this done to help with gas.pt would like a better explanation on the Stage 3 kidney disease. Pt states that she was never told that she has hyperthyroid. She is 12 and d deficient and she wants to know why and lastly the hernia issue was missed. Please advise.    Message text

## 2022-05-24 ENCOUNTER — PATIENT MESSAGE (OUTPATIENT)
Dept: INTERNAL MEDICINE | Facility: CLINIC | Age: 87
End: 2022-05-24
Payer: MEDICARE

## 2022-05-24 RX ORDER — LEVOTHYROXINE SODIUM 100 UG/1
100 TABLET ORAL
Qty: 8 TABLET | Refills: 0 | Status: SHIPPED | OUTPATIENT
Start: 2022-05-24 | End: 2022-11-09

## 2022-05-24 RX ORDER — METOPROLOL SUCCINATE 50 MG/1
50 TABLET, EXTENDED RELEASE ORAL DAILY
Qty: 60 TABLET | Refills: 0 | Status: SHIPPED | OUTPATIENT
Start: 2022-05-24 | End: 2022-12-05

## 2022-05-25 ENCOUNTER — PATIENT MESSAGE (OUTPATIENT)
Dept: INTERNAL MEDICINE | Facility: CLINIC | Age: 87
End: 2022-05-25
Payer: MEDICARE

## 2022-06-13 ENCOUNTER — OFFICE VISIT (OUTPATIENT)
Dept: DERMATOLOGY | Facility: CLINIC | Age: 87
End: 2022-06-13
Payer: MEDICARE

## 2022-06-13 VITALS — BODY MASS INDEX: 27.44 KG/M2 | WEIGHT: 170 LBS

## 2022-06-13 DIAGNOSIS — L82.1 SEBORRHEIC KERATOSES: Primary | ICD-10-CM

## 2022-06-13 DIAGNOSIS — L72.0 MILIUM CYST: ICD-10-CM

## 2022-06-13 DIAGNOSIS — L81.4 LENTIGINES: ICD-10-CM

## 2022-06-13 DIAGNOSIS — Z12.83 SKIN EXAM, SCREENING FOR CANCER: ICD-10-CM

## 2022-06-13 PROCEDURE — 1159F MED LIST DOCD IN RCRD: CPT | Mod: CPTII,S$GLB,, | Performed by: DERMATOLOGY

## 2022-06-13 PROCEDURE — 99213 PR OFFICE/OUTPT VISIT, EST, LEVL III, 20-29 MIN: ICD-10-PCS | Mod: S$GLB,,, | Performed by: DERMATOLOGY

## 2022-06-13 PROCEDURE — 99213 OFFICE O/P EST LOW 20 MIN: CPT | Mod: S$GLB,,, | Performed by: DERMATOLOGY

## 2022-06-13 PROCEDURE — 1160F RVW MEDS BY RX/DR IN RCRD: CPT | Mod: CPTII,S$GLB,, | Performed by: DERMATOLOGY

## 2022-06-13 PROCEDURE — 99999 PR PBB SHADOW E&M-EST. PATIENT-LVL III: ICD-10-PCS | Mod: PBBFAC,,, | Performed by: DERMATOLOGY

## 2022-06-13 PROCEDURE — 1126F AMNT PAIN NOTED NONE PRSNT: CPT | Mod: CPTII,S$GLB,, | Performed by: DERMATOLOGY

## 2022-06-13 PROCEDURE — 1126F PR PAIN SEVERITY QUANTIFIED, NO PAIN PRESENT: ICD-10-PCS | Mod: CPTII,S$GLB,, | Performed by: DERMATOLOGY

## 2022-06-13 PROCEDURE — 1101F PR PT FALLS ASSESS DOC 0-1 FALLS W/OUT INJ PAST YR: ICD-10-PCS | Mod: CPTII,S$GLB,, | Performed by: DERMATOLOGY

## 2022-06-13 PROCEDURE — 1157F PR ADVANCE CARE PLAN OR EQUIV PRESENT IN MEDICAL RECORD: ICD-10-PCS | Mod: CPTII,S$GLB,, | Performed by: DERMATOLOGY

## 2022-06-13 PROCEDURE — 1101F PT FALLS ASSESS-DOCD LE1/YR: CPT | Mod: CPTII,S$GLB,, | Performed by: DERMATOLOGY

## 2022-06-13 PROCEDURE — 1159F PR MEDICATION LIST DOCUMENTED IN MEDICAL RECORD: ICD-10-PCS | Mod: CPTII,S$GLB,, | Performed by: DERMATOLOGY

## 2022-06-13 PROCEDURE — 99999 PR PBB SHADOW E&M-EST. PATIENT-LVL III: CPT | Mod: PBBFAC,,, | Performed by: DERMATOLOGY

## 2022-06-13 PROCEDURE — 3288F PR FALLS RISK ASSESSMENT DOCUMENTED: ICD-10-PCS | Mod: CPTII,S$GLB,, | Performed by: DERMATOLOGY

## 2022-06-13 PROCEDURE — 1160F PR REVIEW ALL MEDS BY PRESCRIBER/CLIN PHARMACIST DOCUMENTED: ICD-10-PCS | Mod: CPTII,S$GLB,, | Performed by: DERMATOLOGY

## 2022-06-13 PROCEDURE — 1157F ADVNC CARE PLAN IN RCRD: CPT | Mod: CPTII,S$GLB,, | Performed by: DERMATOLOGY

## 2022-06-13 PROCEDURE — 3288F FALL RISK ASSESSMENT DOCD: CPT | Mod: CPTII,S$GLB,, | Performed by: DERMATOLOGY

## 2022-06-13 NOTE — PROGRESS NOTES
Subjective:       Patient ID:  Tiffany Masterson is a 86 y.o. female who presents for   Chief Complaint   Patient presents with    Skin Check     UBSE     Would like skin check, new spot on right nose.       Review of Systems   Constitutional: Negative for fever, chills, weight loss, weight gain, fatigue, night sweats and malaise.   Skin: Positive for daily sunscreen use and activity-related sunscreen use.   Hematologic/Lymphatic: Does not bruise/bleed easily.        Objective:    Physical Exam   Constitutional: She appears well-developed and well-nourished. No distress.   Neurological: She is alert and oriented to person, place, and time. She is not disoriented.   Psychiatric: She has a normal mood and affect.   Skin:   Areas Examined (abnormalities noted in diagram):   Head / Face Inspection Performed  Neck Inspection Performed  Chest / Axilla Inspection Performed  Back Inspection Performed  RUE Inspected  LUE Inspection Performed  Nails and Digits Inspection Performed                   Diagram Legend     Erythematous scaling macule/papule c/w actinic keratosis       Vascular papule c/w angioma      Pigmented verrucoid papule/plaque c/w seborrheic keratosis      Yellow umbilicated papule c/w sebaceous hyperplasia      Irregularly shaped tan macule c/w lentigo     1-2 mm smooth white papules consistent with Milia      Movable subcutaneous cyst with punctum c/w epidermal inclusion cyst      Subcutaneous movable cyst c/w pilar cyst      Firm pink to brown papule c/w dermatofibroma      Pedunculated fleshy papule(s) c/w skin tag(s)      Evenly pigmented macule c/w junctional nevus     Mildly variegated pigmented, slightly irregular-bordered macule c/w mildly atypical nevus      Flesh colored to evenly pigmented papule c/w intradermal nevus       Pink pearly papule/plaque c/w basal cell carcinoma      Erythematous hyperkeratotic cursted plaque c/w SCC      Surgical scar with no sign of skin cancer recurrence       "Open and closed comedones      Inflammatory papules and pustules      Verrucoid papule consistent consistent with wart     Erythematous eczematous patches and plaques     Dystrophic onycholytic nail with subungual debris c/w onychomycosis     Umbilicated papule    Erythematous-base heme-crusted tan verrucoid plaque consistent with inflamed seborrheic keratosis     Erythematous Silvery Scaling Plaque c/w Psoriasis     See annotation      Assessment / Plan:        Seborrheic keratoses  reassurance      Lentigines  The "ABCD" rules to observe pigmented lesions were reviewed.      Milium cyst  reassurance  I and d    Skin exam, screening for cancer   No lesions suspicious for malignancy noted.    Recommend daily sun protection/avoidance and use of at least SPF 30, broad spectrum sunscreen (OTC drug).                Follow up in about 1 year (around 6/13/2023).  "

## 2022-07-19 ENCOUNTER — PATIENT MESSAGE (OUTPATIENT)
Dept: RESEARCH | Facility: CLINIC | Age: 87
End: 2022-07-19
Payer: MEDICARE

## 2022-09-09 DIAGNOSIS — I10 HYPERTENSION, UNSPECIFIED TYPE: ICD-10-CM

## 2022-09-09 RX ORDER — LOSARTAN POTASSIUM 100 MG/1
TABLET ORAL
Qty: 90 TABLET | Refills: 0 | Status: SHIPPED | OUTPATIENT
Start: 2022-09-09 | End: 2022-11-09

## 2022-09-09 NOTE — TELEPHONE ENCOUNTER
No new care gaps identified.  [facility] Embedded Care Gaps. Reference number: [id number]. 9/09/2022   2:32:12 PM CDT

## 2022-09-09 NOTE — TELEPHONE ENCOUNTER
Refill Decision Note   Tiffany Masterson  is requesting a refill authorization.  Brief Assessment and Rationale for Refill:  Approve     Medication Therapy Plan:       Medication Reconciliation Completed: No   Comments:     No Care Gaps recommended.     Note composed:2:34 PM 09/09/2022

## 2022-10-02 NOTE — PROGRESS NOTES
Hi,    Your ultrasound did show a very small 8 mm gallstone without evidence of acute cholecystitis. It is very small, not big enough for surgical intervention since there is no evidence of infection or inflammation of the gall bladder.    Fatty foods can aggravte gallstones and the gallbladder so try to limit these.    If you have any further abdominal issues we could always send you to a gastro doctor but I do not think this is necessary given your symptoms have been resolving and this is pretty benign.    Silvia Barrera DNP, FNP-C  
-ACP Only-, .(Attending)

## 2022-10-25 ENCOUNTER — PATIENT MESSAGE (OUTPATIENT)
Dept: INTERNAL MEDICINE | Facility: CLINIC | Age: 87
End: 2022-10-25
Payer: MEDICARE

## 2022-10-25 ENCOUNTER — PATIENT MESSAGE (OUTPATIENT)
Dept: OBSTETRICS AND GYNECOLOGY | Facility: CLINIC | Age: 87
End: 2022-10-25
Payer: MEDICARE

## 2022-10-25 DIAGNOSIS — R21 RASH: Primary | ICD-10-CM

## 2022-10-28 ENCOUNTER — TELEPHONE (OUTPATIENT)
Dept: ORTHOPEDICS | Facility: CLINIC | Age: 87
End: 2022-10-28
Payer: MEDICARE

## 2022-10-28 DIAGNOSIS — M25.531 RIGHT WRIST PAIN: Primary | ICD-10-CM

## 2022-10-28 DIAGNOSIS — M51.36 DDD (DEGENERATIVE DISC DISEASE), LUMBAR: Primary | ICD-10-CM

## 2022-10-31 ENCOUNTER — HOSPITAL ENCOUNTER (OUTPATIENT)
Dept: RADIOLOGY | Facility: HOSPITAL | Age: 87
Discharge: HOME | End: 2022-10-31
Attending: PHYSICIAN ASSISTANT
Payer: MEDICARE

## 2022-10-31 DIAGNOSIS — M25.531 RIGHT WRIST PAIN: ICD-10-CM

## 2022-10-31 DIAGNOSIS — M51.36 DDD (DEGENERATIVE DISC DISEASE), LUMBAR: ICD-10-CM

## 2022-10-31 PROCEDURE — 73110 X-RAY EXAM OF WRIST: CPT | Mod: TC,RT

## 2022-10-31 PROCEDURE — 72110 XR LUMBAR SPINE AP AND LAT WITH FLEX/EXT: ICD-10-PCS | Mod: 26,,, | Performed by: RADIOLOGY

## 2022-10-31 PROCEDURE — 73110 XR WRIST COMPLETE 3 VIEWS RIGHT: ICD-10-PCS | Mod: 26,RT,, | Performed by: RADIOLOGY

## 2022-10-31 PROCEDURE — 72110 X-RAY EXAM L-2 SPINE 4/>VWS: CPT | Mod: TC

## 2022-10-31 PROCEDURE — 72110 X-RAY EXAM L-2 SPINE 4/>VWS: CPT | Mod: 26,,, | Performed by: RADIOLOGY

## 2022-10-31 PROCEDURE — 73110 X-RAY EXAM OF WRIST: CPT | Mod: 26,RT,, | Performed by: RADIOLOGY

## 2022-11-01 ENCOUNTER — TELEPHONE (OUTPATIENT)
Dept: ORTHOPEDICS | Facility: CLINIC | Age: 87
End: 2022-11-01
Payer: MEDICARE

## 2022-11-02 ENCOUNTER — OFFICE VISIT (OUTPATIENT)
Dept: ORTHOPEDICS | Facility: CLINIC | Age: 87
End: 2022-11-02
Payer: MEDICARE

## 2022-11-02 VITALS — WEIGHT: 175.81 LBS | BODY MASS INDEX: 28.38 KG/M2

## 2022-11-02 DIAGNOSIS — M16.11 PRIMARY OSTEOARTHRITIS OF RIGHT HIP: Primary | ICD-10-CM

## 2022-11-02 PROCEDURE — 99999 PR PBB SHADOW E&M-EST. PATIENT-LVL III: CPT | Mod: PBBFAC,,, | Performed by: PHYSICIAN ASSISTANT

## 2022-11-02 PROCEDURE — 99213 OFFICE O/P EST LOW 20 MIN: CPT | Mod: S$GLB,,, | Performed by: PHYSICIAN ASSISTANT

## 2022-11-02 PROCEDURE — 1125F PR PAIN SEVERITY QUANTIFIED, PAIN PRESENT: ICD-10-PCS | Mod: CPTII,S$GLB,, | Performed by: PHYSICIAN ASSISTANT

## 2022-11-02 PROCEDURE — 99999 PR PBB SHADOW E&M-EST. PATIENT-LVL III: ICD-10-PCS | Mod: PBBFAC,,, | Performed by: PHYSICIAN ASSISTANT

## 2022-11-02 PROCEDURE — 1157F PR ADVANCE CARE PLAN OR EQUIV PRESENT IN MEDICAL RECORD: ICD-10-PCS | Mod: CPTII,S$GLB,, | Performed by: PHYSICIAN ASSISTANT

## 2022-11-02 PROCEDURE — 99213 PR OFFICE/OUTPT VISIT, EST, LEVL III, 20-29 MIN: ICD-10-PCS | Mod: S$GLB,,, | Performed by: PHYSICIAN ASSISTANT

## 2022-11-02 PROCEDURE — 1157F ADVNC CARE PLAN IN RCRD: CPT | Mod: CPTII,S$GLB,, | Performed by: PHYSICIAN ASSISTANT

## 2022-11-02 PROCEDURE — 1125F AMNT PAIN NOTED PAIN PRSNT: CPT | Mod: CPTII,S$GLB,, | Performed by: PHYSICIAN ASSISTANT

## 2022-11-02 NOTE — PROGRESS NOTES
DATE: 11/7/2022  PATIENT: Tiffany Masterson    Attending Physician: Michael De Dios M.D.    HISTORY:  Tiffany Masterson is a 87 y.o. female who returns to me today for follow up of right lateral hip pain, right wrist and right shoulder pain.  She was last seen by me 12/1/2020.  Today she is doing well but notes she sprained her wrist a few days ago.  She has been wearing a brace and that pain has improved.  She also has some bilateral shoulder pain and right lateral hip pain that is worse with walking, going up steps and laying on her side.        PMH/PSH/FamHx/SocHx:  Unchanged from prior visit      EXAM:  Wt 79.8 kg (175 lb 13.1 oz)   BMI 28.38 kg/m²     Physical exam stable. Neuro exam stable.       IMAGING:    Today I personally re- reviewed AP, Lat and Flex/Ex  upright L-spine that demonstrate grade I anterolisthesis at L4/5 and L5/S1.  There is DJD of the right hip.     Body mass index is 28.38 kg/m².    Hemoglobin A1C   Date Value Ref Range Status   08/31/2020 5.8 (H) 4.0 - 5.6 % Final     Comment:     ADA Screening Guidelines:  5.7-6.4%  Consistent with prediabetes  >or=6.5%  Consistent with diabetes  High levels of fetal hemoglobin interfere with the HbA1C  assay. Heterozygous hemoglobin variants (HbS, HgC, etc)do  not significantly interfere with this assay.   However, presence of multiple variants may affect accuracy.           ASSESSMENT/PLAN:    Diagnoses and all orders for this visit:    Primary osteoarthritis of right hip  -     Procedure Order to Pain Management; Future    Plan for right hip injection. Follow up as needed.

## 2022-11-03 ENCOUNTER — TELEPHONE (OUTPATIENT)
Dept: PAIN MEDICINE | Facility: OTHER | Age: 87
End: 2022-11-03
Payer: MEDICARE

## 2022-11-03 ENCOUNTER — OFFICE VISIT (OUTPATIENT)
Dept: OBSTETRICS AND GYNECOLOGY | Facility: CLINIC | Age: 87
End: 2022-11-03
Payer: MEDICARE

## 2022-11-03 ENCOUNTER — PATIENT MESSAGE (OUTPATIENT)
Dept: PAIN MEDICINE | Facility: OTHER | Age: 87
End: 2022-11-03
Payer: MEDICARE

## 2022-11-03 ENCOUNTER — PATIENT MESSAGE (OUTPATIENT)
Dept: ADMINISTRATIVE | Facility: OTHER | Age: 87
End: 2022-11-03
Payer: MEDICARE

## 2022-11-03 VITALS
BODY MASS INDEX: 28.53 KG/M2 | WEIGHT: 177.5 LBS | SYSTOLIC BLOOD PRESSURE: 142 MMHG | DIASTOLIC BLOOD PRESSURE: 68 MMHG | HEIGHT: 66 IN

## 2022-11-03 DIAGNOSIS — N95.2 ATROPHIC VAGINITIS: Primary | ICD-10-CM

## 2022-11-03 DIAGNOSIS — M16.11 PRIMARY OSTEOARTHRITIS OF RIGHT HIP: Primary | ICD-10-CM

## 2022-11-03 DIAGNOSIS — K42.9 UMBILICAL HERNIA WITHOUT OBSTRUCTION AND WITHOUT GANGRENE: ICD-10-CM

## 2022-11-03 PROCEDURE — 99999 PR PBB SHADOW E&M-EST. PATIENT-LVL III: ICD-10-PCS | Mod: PBBFAC,,, | Performed by: OBSTETRICS & GYNECOLOGY

## 2022-11-03 PROCEDURE — 99213 PR OFFICE/OUTPT VISIT, EST, LEVL III, 20-29 MIN: ICD-10-PCS | Mod: S$GLB,,, | Performed by: OBSTETRICS & GYNECOLOGY

## 2022-11-03 PROCEDURE — 1157F ADVNC CARE PLAN IN RCRD: CPT | Mod: CPTII,S$GLB,, | Performed by: OBSTETRICS & GYNECOLOGY

## 2022-11-03 PROCEDURE — 1159F MED LIST DOCD IN RCRD: CPT | Mod: CPTII,S$GLB,, | Performed by: OBSTETRICS & GYNECOLOGY

## 2022-11-03 PROCEDURE — 3077F SYST BP >= 140 MM HG: CPT | Mod: CPTII,S$GLB,, | Performed by: OBSTETRICS & GYNECOLOGY

## 2022-11-03 PROCEDURE — 1126F PR PAIN SEVERITY QUANTIFIED, NO PAIN PRESENT: ICD-10-PCS | Mod: CPTII,S$GLB,, | Performed by: OBSTETRICS & GYNECOLOGY

## 2022-11-03 PROCEDURE — 1101F PR PT FALLS ASSESS DOC 0-1 FALLS W/OUT INJ PAST YR: ICD-10-PCS | Mod: CPTII,S$GLB,, | Performed by: OBSTETRICS & GYNECOLOGY

## 2022-11-03 PROCEDURE — 3288F FALL RISK ASSESSMENT DOCD: CPT | Mod: CPTII,S$GLB,, | Performed by: OBSTETRICS & GYNECOLOGY

## 2022-11-03 PROCEDURE — 99213 OFFICE O/P EST LOW 20 MIN: CPT | Mod: S$GLB,,, | Performed by: OBSTETRICS & GYNECOLOGY

## 2022-11-03 PROCEDURE — 1126F AMNT PAIN NOTED NONE PRSNT: CPT | Mod: CPTII,S$GLB,, | Performed by: OBSTETRICS & GYNECOLOGY

## 2022-11-03 PROCEDURE — 1157F PR ADVANCE CARE PLAN OR EQUIV PRESENT IN MEDICAL RECORD: ICD-10-PCS | Mod: CPTII,S$GLB,, | Performed by: OBSTETRICS & GYNECOLOGY

## 2022-11-03 PROCEDURE — 1159F PR MEDICATION LIST DOCUMENTED IN MEDICAL RECORD: ICD-10-PCS | Mod: CPTII,S$GLB,, | Performed by: OBSTETRICS & GYNECOLOGY

## 2022-11-03 PROCEDURE — 3078F PR MOST RECENT DIASTOLIC BLOOD PRESSURE < 80 MM HG: ICD-10-PCS | Mod: CPTII,S$GLB,, | Performed by: OBSTETRICS & GYNECOLOGY

## 2022-11-03 PROCEDURE — 3077F PR MOST RECENT SYSTOLIC BLOOD PRESSURE >= 140 MM HG: ICD-10-PCS | Mod: CPTII,S$GLB,, | Performed by: OBSTETRICS & GYNECOLOGY

## 2022-11-03 PROCEDURE — 99999 PR PBB SHADOW E&M-EST. PATIENT-LVL III: CPT | Mod: PBBFAC,,, | Performed by: OBSTETRICS & GYNECOLOGY

## 2022-11-03 PROCEDURE — 3078F DIAST BP <80 MM HG: CPT | Mod: CPTII,S$GLB,, | Performed by: OBSTETRICS & GYNECOLOGY

## 2022-11-03 PROCEDURE — 1101F PT FALLS ASSESS-DOCD LE1/YR: CPT | Mod: CPTII,S$GLB,, | Performed by: OBSTETRICS & GYNECOLOGY

## 2022-11-03 PROCEDURE — 3288F PR FALLS RISK ASSESSMENT DOCUMENTED: ICD-10-PCS | Mod: CPTII,S$GLB,, | Performed by: OBSTETRICS & GYNECOLOGY

## 2022-11-03 NOTE — TELEPHONE ENCOUNTER
Spoke to patient to schedule direct referral procedure. Patient is scheduled on 11/9/22 at 2:00 PM with Dr. Esparza.Patient was offered an opportunity to be scheduled in the [facility] for a consult with the performing provider before scheduling. Patient declined provider consult. Date, time, and instructions for procedure given to patient. Patient verbalized understanding.

## 2022-11-03 NOTE — PROGRESS NOTES
"CC:  Yellow colored vaginal discharge    HPI:  Tiffany Masterson  is a 87 y.o.  patient who presents today for evaluation of a reported vaginal discharge that patient notes in her undergarments.  Patient reports no significant symptoms including no significant odor and no irritation or discomfort.  Patient reports that this is a "yellowish discharge.  Patient reports no postmenopausal bleeding.  Patient denies significant dyspareunia at this time.  Patient also would like her umbilical area looked at, as she has a suspected hernia in laparoscopic scar site post cholecystectomy.    Patient reports that she is not currently using her Premarin vaginal cream (prescribed in ).    Patient questionnaire briefly reviewed and patient denies passing tissue or any postmenopausal bleeding    No LMP recorded. Patient is postmenopausal.    Past Medical History:   Diagnosis Date    Arthritis     Atrial fibrillation     Cataract     Elevated liver enzymes     Epiretinal membrane (ERM) of right eye     Family history of malignant neoplasm of gastrointestinal tract mother    Graves disease     now hypothryoid - no surgery or medicine. resolved on its own    History of colonoscopy     unremarkable  by Dr. Javier.  Barium enema revealed diverticular disease.    Hyperlipidemia     Hypertension     Hypothyroidism     Migraine, ophthalmoplegic     Ocular migraine     Personal history of colonic polyps     TIA (transient ischemic attack)     with a normal angiogram in the past, Ocular migraines reported by patient, not TIA        Past Surgical History:   Procedure Laterality Date    CATARACT EXTRACTION Right     Dr. Lexis Nicolas     COLONOSCOPY      2014 polyps    COLONOSCOPY N/A 2016    Procedure: COLONOSCOPY;  Surgeon: Bebeto Gonzalez MD;  Location: [address]);  Service: Endoscopy;  Laterality: N/A;    COLONOSCOPY N/A 3/9/2020    Procedure: COLONOSCOPY;  Surgeon: Bebeto Gonzalez MD;  Location: [facility] (4TH FLR);  Service: Endoscopy;  Laterality: N/A;    COLONOSCOPY N/A 9/29/2021    Procedure: COLONOSCOPY;  Surgeon: Bebeto Gonzalez MD;  Location: [facility] (4TH FLR);  Service: Endoscopy;  Laterality: N/A;  please schedule patient as soon as possible with me EGD colonoscopy with constipation bowel prep for iron deficiency anemia family history of colon cancer and personal history of colon polyps  9/17/21 ok for standard split prep per Dr. Gonzalez-imani    COLONOSCOPY N/A 9/29/2021    Procedure: COLONOSCOPY;  Surgeon: Bebeto Gonzalez MD;  Location: [facility] (4TH FLR);  Service: Endoscopy;  Laterality: N/A;  Please schedule patient as soon as possible with me EGD colonoscopy with constipation bowel prep for iron deficiency anemia family history of colon cancer and personal history of colon polyps  9/17/21 Ok for standard split prep per Dr. Collins    COLONOSCOPY W/ POLYPECTOMY  6/2013    polyp of colon    ENDOSCOPIC ULTRASOUND OF UPPER GASTROINTESTINAL TRACT N/A 4/29/2021    Procedure: ULTRASOUND, UPPER GI TRACT, ENDOSCOPIC;  Surgeon: Dalton Johnson MD;  Location: [facility] (2ND FLR);  Service: Endoscopy;  Laterality: N/A;  Postprandial nausea vomiting epigastric 0.9 cm stone and sludge seen within the gallbladder lumen.  No wall thickening or pericholecystic fluid.  0.6 cm stone seen within the distal common bile duct at the level of the pancreatic head.  There is dil    ERCP N/A 4/29/2021    Procedure: ERCP (ENDOSCOPIC RETROGRADE CHOLANGIOPANCREATOGRAPHY);  Surgeon: Dalton Johnson MD;  Location: [facility] (2ND FLR);  Service: Endoscopy;  Laterality: N/A;  Postprandial nausea and vomit 0.9 cm stone and sludge seen within the gallbladder lumen.  No wall thickening or pericholecystic fluid.  0.6 cm stone seen within the distal common bile duct at the level of the pancreatic head.  There i    ESOPHAGOGASTRODUODENOSCOPY N/A 9/29/2021    Procedure: EGD (ESOPHAGOGASTRODUODENOSCOPY);  Surgeon: Bebeto Gonzalez  MD;  Location: [facility] (4TH FLR);  Service: Endoscopy;  Laterality: N/A;  rapid covid test arrival 12pm - sm  9/27 arrival time for covid test/procedure confirmed with pt-rb    ESOPHAGOGASTRODUODENOSCOPY N/A 9/29/2021    Procedure: EGD (ESOPHAGOGASTRODUODENOSCOPY);  Surgeon: Bebeto Gonzalez MD;  Location: [facility] (4TH FLR);  Service: Endoscopy;  Laterality: N/A;  covid test 9/19/21 [facility], prep instr emailed -    EYE SURGERY  11-10-14    right retina/Dr. Dalton Sierra ([facility])    HYSTEROSCOPIC POLYPECTOMY OF UTERUS N/A 7/2/2018    Procedure: POLYPECTOMY, UTERUS, HYSTEROSCOPIC;  Surgeon: Elliot Vanessa IV, MD;  Location: [facility];  Service: OB/GYN;  Laterality: N/A;    JOINT REPLACEMENT  3/23/2011    left total hip replacement    LAPAROSCOPIC CHOLECYSTECTOMY N/A 5/17/2021    Procedure: CHOLECYSTECTOMY, LAPAROSCOPIC;  Surgeon: Rayshawn Peralta Jr., MD;  Location: [facility] 2ND FLR;  Service: General;  Laterality: N/A;    REMOVAL OF EPIRETINAL MEMBRANE Right     Dr. Padron    TONSILLECTOMY      pt was 11 years old         ROS:  GENERAL: Feeling well overall.   SKIN: Denies rash or lesions.   HEAD: Denies head injury or headache.   NODES: Denies enlarged lymph nodes.   CHEST: Denies chest pain or shortness of breath.   CARDIOVASCULAR: Denies palpitations or left sided chest pain.   ABDOMEN: No abdominal pain, nausea, vomiting or rectal bleeding.   URINARY: No dysuria, hematuria, or burning on urination.  REPRODUCTIVE: See HPI.   BREASTS: Denies pain, lumps, or nipple discharge.   HEMATOLOGIC: No easy bruisability or excessive bleeding.   MUSCULOSKELETAL: Denies joint pain or swelling.   NEUROLOGIC: Denies syncope or weakness.   PSYCHIATRIC: Denies depression.    PE:   APPEARANCE: Well nourished, well developed, in no acute distress.  ABDOMEN: Soft. No tenderness or masses. No CVA tenderness.  Umbilical hernia noted (no evidence of small-bowel incarceration)  VULVA: No lesions. Normal female genitalia.  URETHRAL  MEATUS: Normal size and location, no lesions, no prolapse.  URETHRA: No masses, tenderness, prolapse or scarring.  VAGINA:  Severely atrophic with thin yellow discharge consistent with atrophic vaginitis.  No significant uterine prolapse.  Mild vaginal wall prolapse.  CERVIX: No lesions and discharge.  No supracervical bleeding.  UTERUS:  8 weeks size, regular shape, mobile, non-tender, bladder base nontender.  ADNEXA: No masses, tenderness or CDS nodularity.  ANUS PERINEUM: Normal.    Diagnosis:  1. Atrophic vaginitis    2. Umbilical hernia without obstruction and without gangrene          PLAN:    Patient was counseled today on exam findings.  Patient counseled on Premarin vaginal cream treatment for atrophic vaginitis.  Patient instructed to contact office for any postmenopausal bleeding.  Patient verbalized understanding of this discussion and recommendations.    Asymptomatic umbilical hernia noted.  No discomfort noted on exam in this hernia is reducible on exam.  Patient counseled on bowel incarceration/worsening pain discomfort.  If patient experiences these symptoms, patient is instructed to report to the emergency room/contact our general surgery department.  Patient verbalized understanding of these discussions.    Patient instructed to keep primary care follow-up.    Follow-up:  KAILA Vanessa IV, MD     Answers submitted by the patient for this visit:  Vaginal Discharge Questionnaire  (Submitted on 10/31/2022)  Chief Complaint: Vaginal discharge  Chronicity: recurrent  Onset: more than 1 month ago  Frequency: intermittently  Progression since onset: waxing and waning  Pain severity: no pain  Affected side: both  Pregnant now?: No  abdominal pain: No  anorexia: No  back pain: No  chills: No  constipation: Yes  diarrhea: Yes  discolored urine: No  dysuria: No  fever: No  flank pain: No  frequency: No  headaches: No  hematuria: No  nausea: No  painful intercourse: No  rash: No  urgency:  Yes  vomiting: No  Please select the characteristics of your discharge: : yellow  Vaginal bleeding: no bleeding  Passing clots?: No  Passing tissue?: Yes  Aggravated by: heavy lifting  treatments tried: nothing  Improvement on treatment: no relief  Sexual activity: sexually active  Partner with STD symptoms: no  Birth control: nothing  Menstrual history: postmenopausal  STD: No  abdominal surgery: Yes   section: No  Ectopic pregnancy: No  Endometriosis: No  herpes simplex: No  gynecological surgery: Yes  menorrhagia: No  metrorrhagia: No  miscarriage: No  ovarian cysts: No  perineal abscess: No  PID: No  terminated pregnancy: No  vaginosis: No

## 2022-11-04 ENCOUNTER — TELEPHONE (OUTPATIENT)
Dept: PAIN MEDICINE | Facility: OTHER | Age: 87
End: 2022-11-04
Payer: MEDICARE

## 2022-11-11 ENCOUNTER — HOSPITAL ENCOUNTER (OUTPATIENT)
Facility: OTHER | Age: 87
Discharge: HOME | End: 2022-11-11
Attending: ANESTHESIOLOGY | Admitting: ANESTHESIOLOGY
Payer: MEDICARE

## 2022-11-11 VITALS
BODY MASS INDEX: 27.8 KG/M2 | HEART RATE: 74 BPM | SYSTOLIC BLOOD PRESSURE: 184 MMHG | OXYGEN SATURATION: 99 % | DIASTOLIC BLOOD PRESSURE: 80 MMHG | TEMPERATURE: 98 F | HEIGHT: 66 IN | RESPIRATION RATE: 16 BRPM | WEIGHT: 173 LBS

## 2022-11-11 DIAGNOSIS — M16.11 OSTEOARTHRITIS OF RIGHT HIP, UNSPECIFIED OSTEOARTHRITIS TYPE: Primary | ICD-10-CM

## 2022-11-11 DIAGNOSIS — G89.29 CHRONIC PAIN: ICD-10-CM

## 2022-11-11 PROCEDURE — 25500020 PHARM REV CODE 255: Performed by: ANESTHESIOLOGY

## 2022-11-11 PROCEDURE — 77002 NEEDLE LOCALIZATION BY XRAY: CPT | Performed by: ANESTHESIOLOGY

## 2022-11-11 PROCEDURE — 77002 NEEDLE LOCALIZATION BY XRAY: CPT | Mod: 26,,, | Performed by: ANESTHESIOLOGY

## 2022-11-11 PROCEDURE — 20610 PR DRAIN/INJECT LARGE JOINT/BURSA: ICD-10-PCS | Mod: RT,,, | Performed by: ANESTHESIOLOGY

## 2022-11-11 PROCEDURE — 77002 PR FLUOROSCOPIC GUIDANCE NEEDLE PLACEMENT: ICD-10-PCS | Mod: 26,,, | Performed by: ANESTHESIOLOGY

## 2022-11-11 PROCEDURE — 20610 DRAIN/INJ JOINT/BURSA W/O US: CPT | Mod: RT | Performed by: ANESTHESIOLOGY

## 2022-11-11 PROCEDURE — 25000003 PHARM REV CODE 250: Performed by: ANESTHESIOLOGY

## 2022-11-11 PROCEDURE — 63600175 PHARM REV CODE 636 W HCPCS: Performed by: ANESTHESIOLOGY

## 2022-11-11 PROCEDURE — 20610 DRAIN/INJ JOINT/BURSA W/O US: CPT | Mod: RT,,, | Performed by: ANESTHESIOLOGY

## 2022-11-11 RX ORDER — BUPIVACAINE HYDROCHLORIDE 2.5 MG/ML
INJECTION, SOLUTION EPIDURAL; INFILTRATION; INTRACAUDAL
Status: DISCONTINUED | OUTPATIENT
Start: 2022-11-11 | End: 2022-11-11 | Stop reason: HOSPADM

## 2022-11-11 RX ORDER — TRIAMCINOLONE ACETONIDE 40 MG/ML
INJECTION, SUSPENSION INTRA-ARTICULAR; INTRAMUSCULAR
Status: DISCONTINUED | OUTPATIENT
Start: 2022-11-11 | End: 2022-11-11 | Stop reason: HOSPADM

## 2022-11-11 RX ORDER — SODIUM CHLORIDE 9 MG/ML
500 INJECTION, SOLUTION INTRAVENOUS CONTINUOUS
Status: DISCONTINUED | OUTPATIENT
Start: 2022-11-11 | End: 2022-11-11 | Stop reason: HOSPADM

## 2022-11-11 RX ORDER — LIDOCAINE HYDROCHLORIDE 20 MG/ML
INJECTION, SOLUTION INFILTRATION; PERINEURAL
Status: DISCONTINUED | OUTPATIENT
Start: 2022-11-11 | End: 2022-11-11 | Stop reason: HOSPADM

## 2022-11-11 NOTE — DISCHARGE SUMMARY
Discharge Note  Short Stay      SUMMARY     Admit Date: 11/11/2022    Attending Physician: Camden Hernandez      Discharge Physician: Camden Hernandez      Discharge Date: 11/11/2022 8:54 AM    Procedure(s) (LRB):  INJECTION, RIGHT GTB CONTRAST DIRECT REFERRAL (Right)    Final Diagnosis: Primary osteoarthritis of right hip [M16.11]    Disposition: Home or self care    Patient Instructions:   Current Discharge Medication List        CONTINUE these medications which have NOT CHANGED    Details   apixaban (ELIQUIS) 5 mg Tab Take 1 tablet by mouth twice daily  Qty: 180 tablet, Refills: 3    Associated Diagnoses: Paroxysmal atrial fibrillation      ascorbic acid, vitamin C, (VITAMIN C) 250 MG tablet Take 250 mg by mouth once daily.      b complex vitamins capsule Take 1 capsule by mouth once daily.      bisacodyL (DULCOLAX) 5 mg EC tablet Take 5 mg by mouth once daily.      CALCIUM CARBONATE/VITAMIN D3 (CALCIUM 600 + D,3, ORAL) Take 1 tablet by mouth once daily.       cholecalciferol, vitamin D3, (VITAMIN D3) 50 mcg (2,000 unit) Cap Take 1 capsule by mouth once daily.      coenzyme Q10 200 mg capsule Take 200 mg by mouth once daily.      DEXTRAN 70/HYPROMELLOSE (ARTIFICIAL TEARS, PF, OPHT) Place 1 drop into both eyes as needed.      levothyroxine (SYNTHROID) 100 MCG tablet Take 1 tablet (100 mcg total) by mouth before breakfast.  Qty: 90 tablet, Refills: 0    Comments: Please inactivate all prior scripts with same name and strength including any scripts on hold.      losartan (COZAAR) 100 MG tablet Take 1 tablet by mouth once daily  Qty: 90 tablet, Refills: 0    Associated Diagnoses: Hypertension, unspecified type      magnesium oxide 400 mg magnesium Tab Take 1 tablet by mouth once daily.      melatonin 10 mg Tab Take 1 tablet by mouth every evening.      !! metoprolol succinate (TOPROL-XL) 50 MG 24 hr tablet Take 1 tablet (50 mg total) by mouth 2 (two) times daily.  Qty: 180 tablet, Refills: 3    Associated Diagnoses:  Paroxysmal atrial fibrillation      !! metoprolol succinate (TOPROL-XL) 50 MG 24 hr tablet Take 1 tablet (50 mg total) by mouth once daily.  Qty: 60 tablet, Refills: 0    Comments: .      MULTIVITAMIN W-MINERALS/LUTEIN (CENTRUM SILVER ORAL) Take 1 tablet by mouth once daily.      oxymetazoline, PF, (UPNEEQ, PF,) 0.1 % Dpet Apply 1 drop to eye once daily.  Qty: 180 each, Refills: 3    Associated Diagnoses: Ptosis of both eyelids      vit A/vit C/vit E/zinc/copper (ICAPS AREDS ORAL) Take 1 tablet by mouth 2 (two) times a day.      zolpidem (AMBIEN) 5 MG Tab TAKE 1 TABLET BY MOUTH NIGHTLY AS NEEDED  Qty: 45 tablet, Refills: 0    Associated Diagnoses: Insomnia, unspecified type       !! - Potential duplicate medications found. Please discuss with provider.              Discharge Diagnosis: Primary osteoarthritis of right hip [M16.11]  Condition on Discharge: Stable with no complications to procedure   Diet on Discharge: Same as before.  Activity: as per instruction sheet.  Discharge to: Home with a responsible adult.  Follow up: 2-4 weeks

## 2022-11-11 NOTE — INTERVAL H&P NOTE
The patient was examined and no significant changes were noted from the updated H&P or last clinic note.    The risks and benefits of this procedure, including alternative therapies, were discussed with the patient.  The discussion of risks included infection, bleeding, need for additional procedures or surgery, nerve damage, paralysis, adverse medication reaction(s), stroke, and if appropriate for the procedure, death.  Questions regarding the procedure, risks, expected outcome, and possible side effects were solicited and answered to Tiffany's satisfaction.  Tiffany Masterson wishes to proceed with the injection or procedure as confirmed by written consent.

## 2022-11-11 NOTE — DISCHARGE INSTRUCTIONS
Thank you for allowing us to care for you today. You may receive a survey about the care we provided. Your feedback is valuable and helps us provide excellent care throughout the community.     Home Care Instructions for Pain Management:    1. DIET:   You may resume your normal diet today.   2. BATHING:   You may shower with luke warm water. No tub baths or anything that will soak injection sites under water for the next 24 hours.  3. DRESSING:   You may remove your bandage today.   4. ACTIVITY LEVEL:   You may resume your normal activities 24 hrs after your procedure. Nothing strenuous today.  5. MEDICATIONS:   You may resume your normal medications today. To restart blood thinners, ask your doctor.  6. DRIVING    If you have received any sedatives by mouth today, you may not drive for 12 hours.    If you have received any sedation through your IV, you may not drive for 24 hrs.   7. SPECIAL INSTRUCTIONS:   No heat to the injection site for 24 hrs including, hot bath or shower, heating pad, moist heat, or hot tubs.    Use ice pack to injection site for any pain or discomfort.  Apply ice packs for 20 minute intervals as needed.    IF you have diabetes, be sure to monitor your blood sugar more closely. IF your injection contained steroids your blood sugar levels may become higher than normal.    If you are still having pain upon discharge:  Your pain may improve over the next 48 hours. The anesthetic (numbing medication) works immediately to 48 hours. IF your injection contained a steroid (anti-inflammatory medication), it takes approximately 3 days to start feeling relief and 7-10 days to see your greatest results from the medication. It is possible you may need subsequent injections. This would be discussed at your follow up appointment with pain management or your referring doctor.    Please call the PAIN MANAGEMENT office at [phone] or ON CALL pager at [phone] if you experienced any:   -Weakness or  loss of sensation  -Fever > 101.5  -Pain uncontrolled with oral medications   -Persistent nausea, vomiting, or diarrhea  -Redness or drainage from the injection sites, or any other worrisome concerns.   If physician on call was not reached or could not communicate with our office for any reason please go to the nearest emergency department. Adult Procedural Sedation Instructions    Recovery After Procedural Sedation (Adult)  You have been given medicine by vein to make you sleep during your surgery. This may have included both a pain medicine and sleeping medicine. Most of the effects have worn off. But you may still have some drowsiness for the next 6 to 8 hours.  Home care  Follow these guidelines when you get home:  For the next 8 hours, you should be watched by a responsible adult. This person should make sure your condition is not getting worse.  Don't drink any alcohol for the next 24 hours.  Don't drive, operate dangerous machinery, or make important business or personal decisions during the next 24 hours.  Note: Your healthcare provider may tell you not to take any medicine by mouth for pain or sleep in the next 4 hours. These medicines may react with the medicines you were given in the hospital. This could cause a much stronger response than usual.  Follow-up care  Follow up with your healthcare provider if you are not alert and back to your usual level of activity within 12 hours.  When to seek medical advice  Call your healthcare provider right away if any of these occur:  Drowsiness gets worse  Weakness or dizziness gets worse  Repeated vomiting  You can't be awakened   Date Last Reviewed: 10/18/2016  © 9350-1353 [email or web address]. [address] [place] [address], [place] [address]. All rights reserved. This information is not intended as a substitute for professional medical care. Always follow your healthcare professional's instructions.

## 2022-11-11 NOTE — OP NOTE
Greater Trochanteric Bursa Injection under Fluoroscopic Guidance    The procedure, risks, benefits, and options were discussed with the patient. There are no contraindications to the procedure. The patent expressed understanding and agreed to the procedure. Informed written consent was obtained prior to the start of the procedure and can be found in the patient's chart.    PATIENT NAME: Tiffany Masterson   MRN: [id number]     DATE OF PROCEDURE: 11/11/2022    PROCEDURE: Right Greater Trochanteric Bursa Injection under Fluoroscopic Guidance    PRE-OP DIAGNOSIS: Primary osteoarthritis of right hip [M16.11]    POST-OP DIAGNOSIS: Same    PHYSICIAN: Camden Hernandez MD    ASSISTANTS: Jamel Crawford MD    MEDICATIONS INJECTED: Preservative-free Kenalog 40mg with 7cc of Bupivacine 0.25%     LOCAL ANESTHETIC INJECTED: Xylocaine 2%     SEDATION: None    ESTIMATED BLOOD LOSS: None    COMPLICATIONS: None    TECHNIQUE: Time-out was performed to identify the patient and procedure to be performed. With the patient laying in a prone position, the surgical area was prepped and draped in the usual sterile fashion using ChloraPrep and a fenestrated drape. The area overlying the greater trochanteric bursa was determined under fluoroscopy guidance. Skin anesthesia was achieved by injecting Lidocaine 2% over the injection site. The greater trochanteric bursa was then approached with a 22 gauge, 5 inch spinal quinke needle that was introduced under fluoroscopic guidance in the AP view. Once the needle tip was in the area of the bursa, and there was no blood aspiration, Contrast dye  Omnipaque (240mg/mL) was injected to confirm placement and there was no vascular runoff. 4 mL of the medication mixture listed above was injected slowly. The needles were removed and bleeding was nil. A sterile dressing was applied. No specimens collected. The patient tolerated the procedure well.    The patient was monitored after the procedure in the recovery area.  They were given post-procedure and discharge instructions to follow at home. The patient was discharged in a stable condition.    I reviewed and edited the fellow's note. I conducted my own interview and physical examination. I agree with the findings. I was present and supervising all critical portions of the procedure.    Camden Hernandez MD

## 2022-12-02 ENCOUNTER — HOSPITAL ENCOUNTER (OUTPATIENT)
Dept: RADIOLOGY | Facility: CLINIC | Age: 87
Discharge: HOME | End: 2022-12-02
Attending: INTERNAL MEDICINE
Payer: MEDICARE

## 2022-12-02 DIAGNOSIS — Z78.0 ASYMPTOMATIC MENOPAUSAL STATE: ICD-10-CM

## 2022-12-02 PROCEDURE — 77080 DXA BONE DENSITY AXIAL: CPT | Mod: 26,,, | Performed by: INTERNAL MEDICINE

## 2022-12-02 PROCEDURE — 77080 DXA BONE DENSITY AXIAL: CPT | Mod: TC

## 2022-12-02 PROCEDURE — 77080 DEXA BONE DENSITY SPINE HIP: ICD-10-PCS | Mod: 26,,, | Performed by: INTERNAL MEDICINE

## 2022-12-05 ENCOUNTER — OFFICE VISIT (OUTPATIENT)
Dept: INTERNAL MEDICINE | Facility: CLINIC | Age: 87
End: 2022-12-05
Payer: MEDICARE

## 2022-12-05 VITALS
WEIGHT: 175.25 LBS | SYSTOLIC BLOOD PRESSURE: 136 MMHG | HEART RATE: 64 BPM | OXYGEN SATURATION: 98 % | DIASTOLIC BLOOD PRESSURE: 60 MMHG | HEIGHT: 66 IN | BODY MASS INDEX: 28.16 KG/M2

## 2022-12-05 DIAGNOSIS — Z00.00 ROUTINE GENERAL MEDICAL EXAMINATION AT A HEALTH CARE FACILITY: Primary | ICD-10-CM

## 2022-12-05 DIAGNOSIS — Z12.31 SCREENING MAMMOGRAM FOR BREAST CANCER: ICD-10-CM

## 2022-12-05 PROCEDURE — 3078F DIAST BP <80 MM HG: CPT | Mod: CPTII,S$GLB,, | Performed by: INTERNAL MEDICINE

## 2022-12-05 PROCEDURE — 99397 PR PREVENTIVE VISIT,EST,65 & OVER: ICD-10-PCS | Mod: S$GLB,,, | Performed by: INTERNAL MEDICINE

## 2022-12-05 PROCEDURE — 99999 PR PBB SHADOW E&M-EST. PATIENT-LVL IV: CPT | Mod: PBBFAC,,, | Performed by: INTERNAL MEDICINE

## 2022-12-05 PROCEDURE — 3078F PR MOST RECENT DIASTOLIC BLOOD PRESSURE < 80 MM HG: ICD-10-PCS | Mod: CPTII,S$GLB,, | Performed by: INTERNAL MEDICINE

## 2022-12-05 PROCEDURE — 3288F PR FALLS RISK ASSESSMENT DOCUMENTED: ICD-10-PCS | Mod: CPTII,S$GLB,, | Performed by: INTERNAL MEDICINE

## 2022-12-05 PROCEDURE — 99397 PER PM REEVAL EST PAT 65+ YR: CPT | Mod: S$GLB,,, | Performed by: INTERNAL MEDICINE

## 2022-12-05 PROCEDURE — 3075F SYST BP GE 130 - 139MM HG: CPT | Mod: CPTII,S$GLB,, | Performed by: INTERNAL MEDICINE

## 2022-12-05 PROCEDURE — 1159F MED LIST DOCD IN RCRD: CPT | Mod: CPTII,S$GLB,, | Performed by: INTERNAL MEDICINE

## 2022-12-05 PROCEDURE — 99999 PR PBB SHADOW E&M-EST. PATIENT-LVL IV: ICD-10-PCS | Mod: PBBFAC,,, | Performed by: INTERNAL MEDICINE

## 2022-12-05 PROCEDURE — 1101F PT FALLS ASSESS-DOCD LE1/YR: CPT | Mod: CPTII,S$GLB,, | Performed by: INTERNAL MEDICINE

## 2022-12-05 PROCEDURE — 3288F FALL RISK ASSESSMENT DOCD: CPT | Mod: CPTII,S$GLB,, | Performed by: INTERNAL MEDICINE

## 2022-12-05 PROCEDURE — 1126F AMNT PAIN NOTED NONE PRSNT: CPT | Mod: CPTII,S$GLB,, | Performed by: INTERNAL MEDICINE

## 2022-12-05 PROCEDURE — 1101F PR PT FALLS ASSESS DOC 0-1 FALLS W/OUT INJ PAST YR: ICD-10-PCS | Mod: CPTII,S$GLB,, | Performed by: INTERNAL MEDICINE

## 2022-12-05 PROCEDURE — 1126F PR PAIN SEVERITY QUANTIFIED, NO PAIN PRESENT: ICD-10-PCS | Mod: CPTII,S$GLB,, | Performed by: INTERNAL MEDICINE

## 2022-12-05 PROCEDURE — 1159F PR MEDICATION LIST DOCUMENTED IN MEDICAL RECORD: ICD-10-PCS | Mod: CPTII,S$GLB,, | Performed by: INTERNAL MEDICINE

## 2022-12-05 PROCEDURE — 1157F PR ADVANCE CARE PLAN OR EQUIV PRESENT IN MEDICAL RECORD: ICD-10-PCS | Mod: CPTII,S$GLB,, | Performed by: INTERNAL MEDICINE

## 2022-12-05 PROCEDURE — 1157F ADVNC CARE PLAN IN RCRD: CPT | Mod: CPTII,S$GLB,, | Performed by: INTERNAL MEDICINE

## 2022-12-05 PROCEDURE — 3075F PR MOST RECENT SYSTOLIC BLOOD PRESS GE 130-139MM HG: ICD-10-PCS | Mod: CPTII,S$GLB,, | Performed by: INTERNAL MEDICINE

## 2022-12-05 NOTE — PROGRESS NOTES
Chief Complaint: Annual exam and Follow up of multiple issues.      HPI: This is a 87 year old woman who presents for follow up of multiple issues.    She had a pizza with red sauce and garlic powder on it. She had an upset stomach and took one dose of peptobismol.   Since then she had black stools. She has been eating a lot of spinach lately. No bloody stools. She is not taking an iron supplement. She is taking eliquis due to a fib    She has had right hip pain - she had an injection in the right hip on 11/11/22 and right hip is better.   She has knee pain, shoulder pain as well. She is not taking anything for pain.      She has not had any more episodes of atrial fibrillation or palpitations.   She was hospitalized from 12/21/21 to 12/23/21 due to atrial fibrillation.  She stayed up all night then got ready for a dinner party then had 12 people for dinner and washed dishes until 11 pm.  The next day she had palpitations which brought her to the ED.   She continues to take metoprolol succinate 50 mg twice daily and eliquis 5 mg twice daily.  Energy level is good.   No bleeding.  No chest pain, shortness of breath or palpitations. She continues to take losartan 100 mg daily.      She is off pravastatin due to this pain. SHe is taking co enzyme Q 10 200 mg daily. Rare muscle spasms.       She had her gall bladder removed on 5/17/21.  Since the surgery she has not had nausea or vomiting, constipation, diarrhea. She is back eating her normal foods with smaller portions.   She no longer has constipoation. She is taking a  Magnesium and a dulcolax daily.      She has occular migraines in the past. She has had one occular migrane since our last visit due to being stressed from a project. She stayed up all night doing a project. She feels that she has never had a TIA in the past.   It was thought she never had a TIA - it was an occular migraine.  .       She is taking unisom 5 mg with melatonin 10 mg at bedtime.  sleep  patterns have not changed. Some days are better than others.     She falls asleep around 9:30 to midnight.   At midnight she gets up and goes to watch TV - she falls asleep watching the TV - generally at 3-4 am.       Sleep study 12/17/21 revealed moderate sleep apnea.  She does not desire a cpap machine.      She is very active. She goes up and down her 2 story home. She mows the lawn and swims. No chest pain, shortness of breath, or palpitations.      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 She is now hypothyroid and takes levothyroxine 100 mcg daily.   .              Past Medical History:   Diagnosis Date    Arthritis      Family history of malignant neoplasm of gastrointestinal tract mother    Graves disease       now hypothryoid - no surgery or medicine. resolved on its own    History of colonoscopy       unremarkable 2007 by Dr. Javier.  Barium enema revealed diverticular disease.    Hyperlipidemia      Hypertension      Hypothyroidism      Migraine, ophthalmoplegic      Personal history of colonic polyps      TIA (transient ischemic attack)       with a normal angiogram in the past, Ocular migraines reported by patient, not TIA                 Past Surgical History:   Procedure Laterality Date    CATARACT EXTRACTION   2012     right/ Dr. Lexis Nicolsa     COLONOSCOPY         2014 polyps    COLONOSCOPY N/A 11/7/2016     Procedure: COLONOSCOPY;  Surgeon: Bebeto Gonzalez MD;  [facility]  Service: Endoscopy;  Laterality: N/A;    COLONOSCOPY N/A 3/9/2020     Procedure: COLONOSCOPY;  Surgeon: Bebeto Gonzalez MD;  [facility]  Service: Endoscopy;  Laterality: N/A;    COLONOSCOPY W/ POLYPECTOMY   6/2013     polyp of colon    ENDOSCOPIC ULTRASOUND OF UPPER GASTROINTESTINAL TRACT N/A 4/29/2021     Procedure: ULTRASOUND, UPPER GI TRACT, ENDOSCOPIC;  Surgeon: Dalton Johnson MD;  [facility]  Service: Endoscopy;  Laterality: N/A;  Postprandial  nausea vomiting epigastric 0.9 cm stone and sludge seen within the gallbladder lumen.  No wall thickening or pericholecystic fluid.  0.6 cm stone seen within the distal common bile duct at the level of the pancreatic head.  There is dil    ERCP N/A 4/29/2021     Procedure: ERCP (ENDOSCOPIC RETROGRADE CHOLANGIOPANCREATOGRAPHY);  Surgeon: Dalton Johnson MD;  Location: [address]);  Service: Endoscopy;  Laterality: N/A;  Postprandial nausea and vomit 0.9 cm stone and sludge seen within the gallbladder lumen.  No wall thickening or pericholecystic fluid.  0.6 cm stone seen within the distal common bile duct at the level of the pancreatic head.  There i    EYE SURGERY   11-10-14     right retina/Dr. Dalton Sierra ([facility])    HYSTEROSCOPIC POLYPECTOMY OF UTERUS N/A 7/2/2018     Procedure: POLYPECTOMY, UTERUS, HYSTEROSCOPIC;  Surgeon: Elliot Vanessa IV, MD;  Location: [facility];  Service: OB/GYN;  Laterality: N/A;    JOINT REPLACEMENT   3/23/2011     left total hip replacement    TONSILLECTOMY         pt was 11 years old      Social History                   Socioeconomic History    Marital status:        Spouse name: Not on file    Number of children: Not on file    Years of education: Not on file    Highest education level: Not on file   Occupational History    Not on file   Tobacco Use    Smoking status: Never Smoker    Smokeless tobacco: Never Used    Tobacco comment: The patient is not getting any regular exercise at this time.  She does enjoy traveling to [place].   Substance and Sexual Activity    Alcohol use: Yes       Comment: At least 1 cocktail every evening.     Drug use: No    Sexual activity: Yes       Partners: Male       Birth control/protection: Post-menopausal       Comment:  64 years    Other Topics Concern    Not on file   Social History Narrative    Not on file      Social Determinants of Health            Financial Resource Strain: Low Risk     Difficulty of Paying Living  "Expenses: Not hard at all   Food Insecurity: No Food Insecurity    Worried About Running Out of Food in the Last Year: Never true    Ran Out of Food in the Last Year: Never true   Transportation Needs: No Transportation Needs    Lack of Transportation (Medical): No    Lack of Transportation (Non-Medical): No   Physical Activity: Unknown    Days of Exercise per Week: 0 days    Minutes of Exercise per Session: Not on file   Stress: Stress Concern Present    Feeling of Stress : To some extent   Social Connections: Unknown    Frequency of Communication with Friends and Family: More than three times a week    Frequency of Social Gatherings with Friends and Family: More than three times a week    Attends [employer] Services: Not on file    Active Member of Clubs or Organizations: Not on file    Attends Club or Organization Meetings: More than 4 times per year    Marital Status:                       Family Status   Relation Name Status    Mother Tiffany Sutton     Father ColLakesha Sutton     Other great aunt (Not Specified)    Pat Aunt   (Not Specified)    Brother   Alive    Neg Hx   (Not Specified)               Meds and allergies: updated on [facility]           Physical exam:    /60 (BP Location: Left arm, Patient Position: Sitting)   Pulse 64   Ht 5' 6" (1.676 m)   Wt 79.5 kg (175 lb 4.3 oz)   SpO2 98%   BMI 28.29 kg/m²           General: alert, oriented x 3, no apparent distress.  Affect normal  HEENT: Conjunctivae: anicteric, PERRL, EOMI, TM clear, Oralpharynx clear  Neck: supple, no thyroid enlargement, no cervical lymphadenopathy  Resp: effort normal, lungs clear bilaterally  CV: Regular rate and rhythm without murmurs, gallops or rubs, no lower extremity edema,   Abdomen: soft, non-distended, non-tender, bowel sounds present, no hepatosplenomegaly.  Umbilical hernia that is easily reducible.   BREAST: no abn seen, no nodules palpated, no axillary lad      Rectal "  - stool brown and heme negative.      Labs 12/2/22    Assessment/Plan:    Annual exam - discussed diet, exercise and safety issues.     1. Hip pain -due to arthritis of the right hip -      2. HTN - stable     3. Constipation - s/p colonoscopy - resolved     4. Sleep disorder stable     5. Hyperlipidemia  - currently off pravastatin due to body pain .      6. Anemia - stable     7. Varicose vein - pt reassured - wear compression stockings if aches.      8. Low vitamin B12 level - B complex vitamin      9. Umbilical hernia -easily reducible - montior for now. Discussed signs of incarceration.      MMG 12/2021 - ordered  BMD 12/2022- osteopenia

## 2022-12-18 ENCOUNTER — PATIENT MESSAGE (OUTPATIENT)
Dept: INTERNAL MEDICINE | Facility: CLINIC | Age: 87
End: 2022-12-18
Payer: MEDICARE

## 2023-01-05 ENCOUNTER — PATIENT MESSAGE (OUTPATIENT)
Dept: INTERNAL MEDICINE | Facility: CLINIC | Age: 88
End: 2023-01-05
Payer: MEDICARE

## 2023-02-02 ENCOUNTER — HOSPITAL ENCOUNTER (OUTPATIENT)
Dept: RADIOLOGY | Facility: HOSPITAL | Age: 88
Discharge: HOME | End: 2023-02-02
Attending: INTERNAL MEDICINE
Payer: MEDICARE

## 2023-02-02 DIAGNOSIS — Z12.31 SCREENING MAMMOGRAM FOR BREAST CANCER: ICD-10-CM

## 2023-02-02 PROCEDURE — 77063 BREAST TOMOSYNTHESIS BI: CPT | Mod: 26,HCNC,, | Performed by: RADIOLOGY

## 2023-02-02 PROCEDURE — 77067 MAMMO DIGITAL SCREENING BILAT WITH TOMO: ICD-10-PCS | Mod: 26,HCNC,, | Performed by: RADIOLOGY

## 2023-02-02 PROCEDURE — 77063 MAMMO DIGITAL SCREENING BILAT WITH TOMO: ICD-10-PCS | Mod: 26,HCNC,, | Performed by: RADIOLOGY

## 2023-02-02 PROCEDURE — 77067 SCR MAMMO BI INCL CAD: CPT | Mod: TC,HCNC

## 2023-02-02 PROCEDURE — 77067 SCR MAMMO BI INCL CAD: CPT | Mod: 26,HCNC,, | Performed by: RADIOLOGY

## 2023-02-07 DIAGNOSIS — Z00.00 ENCOUNTER FOR MEDICARE ANNUAL WELLNESS EXAM: ICD-10-CM

## 2023-02-09 DIAGNOSIS — Z00.00 ENCOUNTER FOR MEDICARE ANNUAL WELLNESS EXAM: ICD-10-CM

## 2023-02-13 ENCOUNTER — OFFICE VISIT (OUTPATIENT)
Dept: DERMATOLOGY | Facility: CLINIC | Age: 88
End: 2023-02-13
Payer: MEDICARE

## 2023-02-13 VITALS — BODY MASS INDEX: 28.25 KG/M2 | WEIGHT: 175 LBS

## 2023-02-13 DIAGNOSIS — L82.1 SEBORRHEIC KERATOSES: Primary | ICD-10-CM

## 2023-02-13 DIAGNOSIS — R21 RASH: ICD-10-CM

## 2023-02-13 DIAGNOSIS — L81.4 LENTIGINES: ICD-10-CM

## 2023-02-13 DIAGNOSIS — L71.9 ROSACEA: ICD-10-CM

## 2023-02-13 PROCEDURE — 1157F ADVNC CARE PLAN IN RCRD: CPT | Mod: HCNC,CPTII,S$GLB, | Performed by: DERMATOLOGY

## 2023-02-13 PROCEDURE — 99999 PR PBB SHADOW E&M-EST. PATIENT-LVL III: ICD-10-PCS | Mod: PBBFAC,HCNC,, | Performed by: DERMATOLOGY

## 2023-02-13 PROCEDURE — 17110 PR DESTRUCTION BENIGN LESIONS UP TO 14: ICD-10-PCS | Mod: HCNC,S$GLB,, | Performed by: DERMATOLOGY

## 2023-02-13 PROCEDURE — 17110 DESTRUCTION B9 LES UP TO 14: CPT | Mod: HCNC,S$GLB,, | Performed by: DERMATOLOGY

## 2023-02-13 PROCEDURE — 1160F PR REVIEW ALL MEDS BY PRESCRIBER/CLIN PHARMACIST DOCUMENTED: ICD-10-PCS | Mod: HCNC,CPTII,S$GLB, | Performed by: DERMATOLOGY

## 2023-02-13 PROCEDURE — 1159F PR MEDICATION LIST DOCUMENTED IN MEDICAL RECORD: ICD-10-PCS | Mod: HCNC,CPTII,S$GLB, | Performed by: DERMATOLOGY

## 2023-02-13 PROCEDURE — 1157F PR ADVANCE CARE PLAN OR EQUIV PRESENT IN MEDICAL RECORD: ICD-10-PCS | Mod: HCNC,CPTII,S$GLB, | Performed by: DERMATOLOGY

## 2023-02-13 PROCEDURE — 99213 OFFICE O/P EST LOW 20 MIN: CPT | Mod: HCNC,25,S$GLB, | Performed by: DERMATOLOGY

## 2023-02-13 PROCEDURE — 1126F PR PAIN SEVERITY QUANTIFIED, NO PAIN PRESENT: ICD-10-PCS | Mod: HCNC,CPTII,S$GLB, | Performed by: DERMATOLOGY

## 2023-02-13 PROCEDURE — 1159F MED LIST DOCD IN RCRD: CPT | Mod: HCNC,CPTII,S$GLB, | Performed by: DERMATOLOGY

## 2023-02-13 PROCEDURE — 99213 PR OFFICE/OUTPT VISIT, EST, LEVL III, 20-29 MIN: ICD-10-PCS | Mod: HCNC,25,S$GLB, | Performed by: DERMATOLOGY

## 2023-02-13 PROCEDURE — 1126F AMNT PAIN NOTED NONE PRSNT: CPT | Mod: HCNC,CPTII,S$GLB, | Performed by: DERMATOLOGY

## 2023-02-13 PROCEDURE — 1160F RVW MEDS BY RX/DR IN RCRD: CPT | Mod: HCNC,CPTII,S$GLB, | Performed by: DERMATOLOGY

## 2023-02-13 PROCEDURE — 99999 PR PBB SHADOW E&M-EST. PATIENT-LVL III: CPT | Mod: PBBFAC,HCNC,, | Performed by: DERMATOLOGY

## 2023-02-13 NOTE — PROGRESS NOTES
Subjective:       Patient ID:  Tiffany Masterson is a 87 y.o. female who presents for   Chief Complaint   Patient presents with    Skin Check     Would like check of few new spots on face and arms.        Review of Systems   Constitutional:  Negative for fever.   Skin:  Negative for itching and rash.   Hematologic/Lymphatic: Does not bruise/bleed easily.      Objective:    Physical Exam   Constitutional: She appears well-developed and well-nourished. No distress.   Neurological: She is alert and oriented to person, place, and time. She is not disoriented.   Psychiatric: She has a normal mood and affect.   Skin:   Areas Examined (abnormalities noted in diagram):   Head / Face Inspection Performed  Neck Inspection Performed  RUE Inspected  LUE Inspection Performed                 Diagram Legend     Erythematous scaling macule/papule c/w actinic keratosis       Vascular papule c/w angioma      Pigmented verrucoid papule/plaque c/w seborrheic keratosis      Yellow umbilicated papule c/w sebaceous hyperplasia      Irregularly shaped tan macule c/w lentigo     1-2 mm smooth white papules consistent with Milia      Movable subcutaneous cyst with punctum c/w epidermal inclusion cyst      Subcutaneous movable cyst c/w pilar cyst      Firm pink to brown papule c/w dermatofibroma      Pedunculated fleshy papule(s) c/w skin tag(s)      Evenly pigmented macule c/w junctional nevus     Mildly variegated pigmented, slightly irregular-bordered macule c/w mildly atypical nevus      Flesh colored to evenly pigmented papule c/w intradermal nevus       Pink pearly papule/plaque c/w basal cell carcinoma      Erythematous hyperkeratotic cursted plaque c/w SCC      Surgical scar with no sign of skin cancer recurrence      Open and closed comedones      Inflammatory papules and pustules      Verrucoid papule consistent consistent with wart     Erythematous eczematous patches and plaques     Dystrophic onycholytic nail with subungual  "debris c/w onychomycosis     Umbilicated papule    Erythematous-base heme-crusted tan verrucoid plaque consistent with inflamed seborrheic keratosis     Erythematous Silvery Scaling Plaque c/w Psoriasis     See annotation      Assessment / Plan:        Seborrheic keratoses  Brochure provided    Cryosurgery procedure note:    Verbal consent from the patient is obtained including, but not limited to, risk of hypopigmentation/hyperpigmentation, scar, recurrence of lesion. Liquid nitrogen cryosurgery is applied to 1 lesion medial to left eyebrow to produce a freeze injury.       Rash  -     Ambulatory referral/consult to Dermatology    Lentigines  The "ABCD" rules to observe pigmented lesions were reviewed.   No lesions suspicious for malignancy noted.    Recommend daily sun protection/avoidance and use of at least SPF 30, broad spectrum sunscreen (OTC drug).       Rosacea  Mild no tx needed     Also brittle nails will use bertha smith hard as nails         Follow up in about 1 year (around 2/13/2024).  "

## 2023-02-14 ENCOUNTER — OFFICE VISIT (OUTPATIENT)
Dept: CARDIOLOGY | Facility: CLINIC | Age: 88
End: 2023-02-14
Payer: MEDICARE

## 2023-02-14 VITALS
SYSTOLIC BLOOD PRESSURE: 132 MMHG | WEIGHT: 177.25 LBS | HEIGHT: 66 IN | BODY MASS INDEX: 28.49 KG/M2 | HEART RATE: 66 BPM | DIASTOLIC BLOOD PRESSURE: 84 MMHG

## 2023-02-14 DIAGNOSIS — I10 ESSENTIAL (PRIMARY) HYPERTENSION: Primary | ICD-10-CM

## 2023-02-14 DIAGNOSIS — I48.0 PAROXYSMAL ATRIAL FIBRILLATION: ICD-10-CM

## 2023-02-14 DIAGNOSIS — N18.32 STAGE 3B CHRONIC KIDNEY DISEASE: ICD-10-CM

## 2023-02-14 PROCEDURE — 99999 PR PBB SHADOW E&M-EST. PATIENT-LVL IV: ICD-10-PCS | Mod: PBBFAC,,, | Performed by: INTERNAL MEDICINE

## 2023-02-14 PROCEDURE — 99999 PR PBB SHADOW E&M-EST. PATIENT-LVL IV: CPT | Mod: PBBFAC,,, | Performed by: INTERNAL MEDICINE

## 2023-02-14 PROCEDURE — 3075F PR MOST RECENT SYSTOLIC BLOOD PRESS GE 130-139MM HG: ICD-10-PCS | Mod: CPTII,S$GLB,, | Performed by: INTERNAL MEDICINE

## 2023-02-14 PROCEDURE — 1126F AMNT PAIN NOTED NONE PRSNT: CPT | Mod: CPTII,S$GLB,, | Performed by: INTERNAL MEDICINE

## 2023-02-14 PROCEDURE — 93000 EKG 12-LEAD: ICD-10-PCS | Mod: S$GLB,,, | Performed by: INTERNAL MEDICINE

## 2023-02-14 PROCEDURE — 1157F PR ADVANCE CARE PLAN OR EQUIV PRESENT IN MEDICAL RECORD: ICD-10-PCS | Mod: CPTII,S$GLB,, | Performed by: INTERNAL MEDICINE

## 2023-02-14 PROCEDURE — 3075F SYST BP GE 130 - 139MM HG: CPT | Mod: CPTII,S$GLB,, | Performed by: INTERNAL MEDICINE

## 2023-02-14 PROCEDURE — 1159F MED LIST DOCD IN RCRD: CPT | Mod: CPTII,S$GLB,, | Performed by: INTERNAL MEDICINE

## 2023-02-14 PROCEDURE — 3288F FALL RISK ASSESSMENT DOCD: CPT | Mod: CPTII,S$GLB,, | Performed by: INTERNAL MEDICINE

## 2023-02-14 PROCEDURE — 99214 OFFICE O/P EST MOD 30 MIN: CPT | Mod: 25,S$GLB,, | Performed by: INTERNAL MEDICINE

## 2023-02-14 PROCEDURE — 1160F RVW MEDS BY RX/DR IN RCRD: CPT | Mod: CPTII,S$GLB,, | Performed by: INTERNAL MEDICINE

## 2023-02-14 PROCEDURE — 1101F PT FALLS ASSESS-DOCD LE1/YR: CPT | Mod: CPTII,S$GLB,, | Performed by: INTERNAL MEDICINE

## 2023-02-14 PROCEDURE — 3079F DIAST BP 80-89 MM HG: CPT | Mod: CPTII,S$GLB,, | Performed by: INTERNAL MEDICINE

## 2023-02-14 PROCEDURE — 99214 PR OFFICE/OUTPT VISIT, EST, LEVL IV, 30-39 MIN: ICD-10-PCS | Mod: 25,S$GLB,, | Performed by: INTERNAL MEDICINE

## 2023-02-14 PROCEDURE — 1159F PR MEDICATION LIST DOCUMENTED IN MEDICAL RECORD: ICD-10-PCS | Mod: CPTII,S$GLB,, | Performed by: INTERNAL MEDICINE

## 2023-02-14 PROCEDURE — 3288F PR FALLS RISK ASSESSMENT DOCUMENTED: ICD-10-PCS | Mod: CPTII,S$GLB,, | Performed by: INTERNAL MEDICINE

## 2023-02-14 PROCEDURE — 1101F PR PT FALLS ASSESS DOC 0-1 FALLS W/OUT INJ PAST YR: ICD-10-PCS | Mod: CPTII,S$GLB,, | Performed by: INTERNAL MEDICINE

## 2023-02-14 PROCEDURE — 1126F PR PAIN SEVERITY QUANTIFIED, NO PAIN PRESENT: ICD-10-PCS | Mod: CPTII,S$GLB,, | Performed by: INTERNAL MEDICINE

## 2023-02-14 PROCEDURE — 1157F ADVNC CARE PLAN IN RCRD: CPT | Mod: CPTII,S$GLB,, | Performed by: INTERNAL MEDICINE

## 2023-02-14 PROCEDURE — 1160F PR REVIEW ALL MEDS BY PRESCRIBER/CLIN PHARMACIST DOCUMENTED: ICD-10-PCS | Mod: CPTII,S$GLB,, | Performed by: INTERNAL MEDICINE

## 2023-02-14 PROCEDURE — 93000 ELECTROCARDIOGRAM COMPLETE: CPT | Mod: S$GLB,,, | Performed by: INTERNAL MEDICINE

## 2023-02-14 PROCEDURE — 3079F PR MOST RECENT DIASTOLIC BLOOD PRESSURE 80-89 MM HG: ICD-10-PCS | Mod: CPTII,S$GLB,, | Performed by: INTERNAL MEDICINE

## 2023-02-14 NOTE — PROGRESS NOTES
HISTORY:    87-year-old female with a history of ocular migraines, hypertension, and paroxysmal atrial fibrillation.     The patient had her 1st clinical presentation for atrial fibrillation on December 21, 2021 when she noticed her heart racing with some irregularity.  She sought care in the emergency department and was admitted overnight. She cardioverted after IV Dilt pushes and has seemingly remained in sinus rhythm since.  Inpatient evaluation demonstrated a normal TSH and left ventricular ejection fraction without any structural disease.  She was discharged on Eliquis therapy and metoprolol therapy and has tolerated these medicines without issue.     Prior to her presentation she had no previous diagnosis of cardiovascular disease other than hypertension.  She had no history of coronary artery disease, myocardial infarction, congestive heart failure, cardiomyopathy, or stroke.  She had no previous diagnosis of atrial fibrillation or symptoms of palpitations.     At baseline the patient reports excellent activity levels without significant limitation.  She is very social and actively participates in many different activities throughout the day.  Her capacity is much greater than her peers.  The patient denies any symptoms of chest pain, shortness of breath, or dyspnea on exertion.     She currently tolerates Eliquis 5 x 2, metoprolol succinate 50 x 2, and losartan 100 x 1. No palpitations or bleeding.    PHYSICAL EXAM:    Vitals:    02/14/23 0941   BP: 132/84   Pulse: 66       NAD, A+Ox3.  No jvd, no bruit.  RRR nml s1,s2. No murmurs.  CTA B no wheezes or crackles.  2+ B radial and 1+ B DP/PT. No edema.    LABS/STUDIES (imaging reviewed during clinic visit):    December 2022 CBC is normal with a hemoglobin of 12.2.  Creatinine 1.3 with BUN of 16.  Albumin 4.1.   HDL 54.  Triglycerides 112.  December 2021 TSH is normal.  Troponin was negative and BNP was 184. 2020 LDL was 58 and HDL 41.  ECG February  2023 NSR no Qs/Sts. December 21, 2021 initially demonstrated atrial fibrillation with RVR.  No Q-waves.  Lateral ST deviations noted. Follow-up ECG demonstrated normal sinus rhythm with no Q-waves and nonspecific lateral ST abnormalities that were old.  TTE December 2021 demonstrates normal LV size and systolic function with an EF of 65%.  Mild AI noted. Normal CVP.    ASSESSMENT & PLAN:    1. Essential (primary) hypertension    2. Paroxysmal atrial fibrillation    3. Stage 3b chronic kidney disease        Orders Placed This Encounter    IN OFFICE EKG 12-LEAD (to [place])        The patient has paroxysmal atrial fibrillation.  She remains in sinus rhythm on beta-blocker therapy and is tolerating metoprolol succinate from 50 mg twice a day.  She has an elevated chads Vasc score and is tolerating Eliquis 5 mg b.i.d. (her creatinine is less than 1.5 and her weight is greater than 60 kg).  We had a conversation about the importance of anticoagulation in mitigating her elevated stroke risk and she understands the potential benefits and risks of treatment with Eliquis at this time.    Patient has a history of hypertension.  Will continue losartan and metoprolol.    Bao Yap MD

## 2023-02-17 ENCOUNTER — TELEPHONE (OUTPATIENT)
Dept: ORTHOPEDICS | Facility: CLINIC | Age: 88
End: 2023-02-17
Payer: MEDICARE

## 2023-02-17 NOTE — TELEPHONE ENCOUNTER
Attempted to reach pt in regards to rescheduling appt made for (2/18/23). Left pt a VM letting her know that there may have been a glitch in the system, we dont see patients on weekends. I also included in the voicemail that I rescheduled her for 3/15/23 at 8 AM with  and if she have any questions or may need to reschedule, she can give me a call back at [phone]

## 2023-02-22 ENCOUNTER — OFFICE VISIT (OUTPATIENT)
Dept: ORTHOPEDICS | Facility: CLINIC | Age: 88
End: 2023-02-22
Payer: MEDICARE

## 2023-02-22 VITALS — HEIGHT: 66 IN | WEIGHT: 176.56 LBS | BODY MASS INDEX: 28.38 KG/M2

## 2023-02-22 DIAGNOSIS — M17.0 PRIMARY OSTEOARTHRITIS OF BOTH KNEES: Primary | ICD-10-CM

## 2023-02-22 PROCEDURE — 20610 DRAIN/INJ JOINT/BURSA W/O US: CPT | Mod: HCNC,50,S$GLB, | Performed by: PHYSICIAN ASSISTANT

## 2023-02-22 PROCEDURE — 1157F PR ADVANCE CARE PLAN OR EQUIV PRESENT IN MEDICAL RECORD: ICD-10-PCS | Mod: HCNC,CPTII,S$GLB, | Performed by: PHYSICIAN ASSISTANT

## 2023-02-22 PROCEDURE — 99999 PR PBB SHADOW E&M-EST. PATIENT-LVL II: CPT | Mod: PBBFAC,HCNC,, | Performed by: PHYSICIAN ASSISTANT

## 2023-02-22 PROCEDURE — 1125F AMNT PAIN NOTED PAIN PRSNT: CPT | Mod: HCNC,CPTII,S$GLB, | Performed by: PHYSICIAN ASSISTANT

## 2023-02-22 PROCEDURE — 1125F PR PAIN SEVERITY QUANTIFIED, PAIN PRESENT: ICD-10-PCS | Mod: HCNC,CPTII,S$GLB, | Performed by: PHYSICIAN ASSISTANT

## 2023-02-22 PROCEDURE — 99999 PR PBB SHADOW E&M-EST. PATIENT-LVL II: ICD-10-PCS | Mod: PBBFAC,HCNC,, | Performed by: PHYSICIAN ASSISTANT

## 2023-02-22 PROCEDURE — 1157F ADVNC CARE PLAN IN RCRD: CPT | Mod: HCNC,CPTII,S$GLB, | Performed by: PHYSICIAN ASSISTANT

## 2023-02-22 PROCEDURE — 99213 PR OFFICE/OUTPT VISIT, EST, LEVL III, 20-29 MIN: ICD-10-PCS | Mod: 25,HCNC,S$GLB, | Performed by: PHYSICIAN ASSISTANT

## 2023-02-22 PROCEDURE — 99213 OFFICE O/P EST LOW 20 MIN: CPT | Mod: 25,HCNC,S$GLB, | Performed by: PHYSICIAN ASSISTANT

## 2023-02-22 PROCEDURE — 20610 PR DRAIN/INJECT LARGE JOINT/BURSA: ICD-10-PCS | Mod: HCNC,50,S$GLB, | Performed by: PHYSICIAN ASSISTANT

## 2023-02-22 RX ORDER — TRIAMCINOLONE ACETONIDE 40 MG/ML
40 INJECTION, SUSPENSION INTRA-ARTICULAR; INTRAMUSCULAR
Status: COMPLETED | OUTPATIENT
Start: 2023-02-22 | End: 2023-02-22

## 2023-02-22 RX ORDER — GABAPENTIN 100 MG/1
100 CAPSULE ORAL 3 TIMES DAILY
Qty: 90 CAPSULE | Refills: 0 | Status: SHIPPED | OUTPATIENT
Start: 2023-02-22 | End: 2023-04-28

## 2023-02-22 RX ADMIN — TRIAMCINOLONE ACETONIDE 40 MG: 40 INJECTION, SUSPENSION INTRA-ARTICULAR; INTRAMUSCULAR at 08:02

## 2023-02-22 NOTE — PROGRESS NOTES
CC:  Knee pain    HX:  Tiffany Masterson returns for evaluation of left knee pain. She was last seen by me 11/2/2022.  Today she is doing well but notes she went to a [employer] ball 2/3 and was dancing all night.  Her knee started hurting the next day.  She has a known history of osteoarthritis of the bilateral knee. She localizes the pain medial and describes the pain as achy. She has tried NSAID's.  She has tried prior injection therapy several years ago.     PE:  On physical examination there is an effusion in the knee.  The knee is ligamentally stable to varus/valgus stress.  There is no warmth or erythema. There is no specific pain over the pes anserine bursa. ROM is 5 to 110.    ROM of the hip does not reproduce pain.  There is no significant distal edema, and there is no calf tenderness to palpation.     Impression:  Diagnoses and all orders for this visit:    Primary osteoarthritis of both knees    Other orders  -     gabapentin (NEURONTIN) 100 MG capsule; Take 1 capsule (100 mg total) by mouth 3 (three) times daily.  -     triamcinolone acetonide injection 40 mg          Plan:  The conservative options including NSAIDs, activity modification, physical therapy, corticosteroid injection, and viscosupplimentation were discussed. She is not interested in surgical intervention at this time.    Plan for steroid injection today.  Follow up as needed.    PROCEDURE:  I have explained the risks, benefits, and alternatives of the procedure in detail.  The patient voices understanding and all questions have been answered.  The patient agrees to proceed as planned. So after I performed a sterile prep of the skin in the normal fashion the left knee is injected using a 22 gauge needle from the anterolateral approach with a combination of 4cc 1% plain lidocaine and 40 mg of kenalog.  The patient is cautioned an immediate relief of pain is secondary to the local anesthetic and will be temporary.  After the anesthetic  wears off there may be a increase in pain that may last for a few hours or a few days and they should use ice to help alleviate this flair up of pain.

## 2023-04-28 ENCOUNTER — PATIENT MESSAGE (OUTPATIENT)
Dept: ORTHOPEDICS | Facility: CLINIC | Age: 88
End: 2023-04-28
Payer: MEDICARE

## 2023-04-28 DIAGNOSIS — M17.0 PRIMARY OSTEOARTHRITIS OF BOTH KNEES: Primary | ICD-10-CM

## 2023-04-28 RX ORDER — GABAPENTIN 100 MG/1
CAPSULE ORAL
Qty: 90 CAPSULE | Refills: 0 | Status: SHIPPED | OUTPATIENT
Start: 2023-04-28 | End: 2023-06-07 | Stop reason: SDUPTHER

## 2023-05-24 ENCOUNTER — OFFICE VISIT (OUTPATIENT)
Dept: PRIMARY CARE CLINIC | Facility: CLINIC | Age: 88
End: 2023-05-24
Payer: MEDICARE

## 2023-05-24 VITALS
DIASTOLIC BLOOD PRESSURE: 62 MMHG | TEMPERATURE: 98 F | HEART RATE: 64 BPM | WEIGHT: 175.94 LBS | HEIGHT: 66 IN | OXYGEN SATURATION: 98 % | RESPIRATION RATE: 18 BRPM | SYSTOLIC BLOOD PRESSURE: 144 MMHG | BODY MASS INDEX: 28.27 KG/M2

## 2023-05-24 DIAGNOSIS — I48.0 PAROXYSMAL ATRIAL FIBRILLATION: ICD-10-CM

## 2023-05-24 DIAGNOSIS — Z85.828 HX OF SKIN CANCER, BASAL CELL: ICD-10-CM

## 2023-05-24 DIAGNOSIS — N18.32 STAGE 3B CHRONIC KIDNEY DISEASE: ICD-10-CM

## 2023-05-24 DIAGNOSIS — I83.93 ASYMPTOMATIC VARICOSE VEINS OF BOTH LOWER EXTREMITIES: ICD-10-CM

## 2023-05-24 DIAGNOSIS — Z00.00 PREVENTATIVE HEALTH CARE: ICD-10-CM

## 2023-05-24 DIAGNOSIS — D12.6 COLON ADENOMAS: ICD-10-CM

## 2023-05-24 DIAGNOSIS — H04.123 DRY EYES, BILATERAL: ICD-10-CM

## 2023-05-24 DIAGNOSIS — M65.342 TRIGGER RING FINGER OF LEFT HAND: ICD-10-CM

## 2023-05-24 DIAGNOSIS — R49.0 HOARSENESS, CHRONIC: ICD-10-CM

## 2023-05-24 DIAGNOSIS — K42.9 UMBILICAL HERNIA WITHOUT OBSTRUCTION AND WITHOUT GANGRENE: ICD-10-CM

## 2023-05-24 DIAGNOSIS — J30.1 SEASONAL ALLERGIC RHINITIS DUE TO POLLEN: Primary | ICD-10-CM

## 2023-05-24 DIAGNOSIS — G47.01 INSOMNIA DUE TO MEDICAL CONDITION: ICD-10-CM

## 2023-05-24 DIAGNOSIS — I10 PRIMARY HYPERTENSION: ICD-10-CM

## 2023-05-24 PROBLEM — D50.9 IRON DEFICIENCY ANEMIA: Status: RESOLVED | Noted: 2021-09-29 | Resolved: 2023-05-24

## 2023-05-24 PROBLEM — N39.0 FREQUENT UTI: Status: RESOLVED | Noted: 2018-09-30 | Resolved: 2023-05-24

## 2023-05-24 PROBLEM — R74.01 TRANSAMINITIS: Status: RESOLVED | Noted: 2021-03-19 | Resolved: 2023-05-24

## 2023-05-24 PROBLEM — Z98.890 STATUS POST HYSTEROSCOPIC POLYPECTOMY: Status: RESOLVED | Noted: 2018-07-02 | Resolved: 2023-05-24

## 2023-05-24 PROBLEM — N36.2 URETHRAL CARUNCLE: Status: RESOLVED | Noted: 2020-09-15 | Resolved: 2023-05-24

## 2023-05-24 PROBLEM — N39.46 MIXED STRESS AND URGE URINARY INCONTINENCE: Status: RESOLVED | Noted: 2018-09-30 | Resolved: 2023-05-24

## 2023-05-24 PROBLEM — G47.9 SLEEP DISORDER: Status: RESOLVED | Noted: 2021-11-08 | Resolved: 2023-05-24

## 2023-05-24 PROBLEM — N30.00 ACUTE CYSTITIS WITHOUT HEMATURIA: Status: RESOLVED | Noted: 2021-12-22 | Resolved: 2023-05-24

## 2023-05-24 PROBLEM — Z74.09 IMPAIRED FUNCTIONAL MOBILITY, BALANCE, GAIT, AND ENDURANCE: Status: RESOLVED | Noted: 2021-06-04 | Resolved: 2023-05-24

## 2023-05-24 PROBLEM — Z01.818 PREOP TESTING: Status: RESOLVED | Noted: 2021-05-17 | Resolved: 2023-05-24

## 2023-05-24 PROBLEM — Z90.49 HISTORY OF CHOLECYSTECTOMY: Status: RESOLVED | Noted: 2021-06-10 | Resolved: 2023-05-24

## 2023-05-24 PROBLEM — R93.89 ABNORMAL MRI: Status: RESOLVED | Noted: 2020-10-16 | Resolved: 2023-05-24

## 2023-05-24 PROBLEM — N94.89 ENDOMETRIAL MASS: Status: RESOLVED | Noted: 2018-06-29 | Resolved: 2023-05-24

## 2023-05-24 PROCEDURE — 99999 PR PBB SHADOW E&M-EST. PATIENT-LVL V: CPT | Mod: PBBFAC,,, | Performed by: INTERNAL MEDICINE

## 2023-05-24 PROCEDURE — 99397 PR PREVENTIVE VISIT,EST,65 & OVER: ICD-10-PCS | Mod: S$GLB,,, | Performed by: INTERNAL MEDICINE

## 2023-05-24 PROCEDURE — 1157F ADVNC CARE PLAN IN RCRD: CPT | Mod: CPTII,S$GLB,, | Performed by: INTERNAL MEDICINE

## 2023-05-24 PROCEDURE — 99397 PER PM REEVAL EST PAT 65+ YR: CPT | Mod: S$GLB,,, | Performed by: INTERNAL MEDICINE

## 2023-05-24 PROCEDURE — 1101F PT FALLS ASSESS-DOCD LE1/YR: CPT | Mod: CPTII,S$GLB,, | Performed by: INTERNAL MEDICINE

## 2023-05-24 PROCEDURE — 1157F PR ADVANCE CARE PLAN OR EQUIV PRESENT IN MEDICAL RECORD: ICD-10-PCS | Mod: CPTII,S$GLB,, | Performed by: INTERNAL MEDICINE

## 2023-05-24 PROCEDURE — 1159F MED LIST DOCD IN RCRD: CPT | Mod: CPTII,S$GLB,, | Performed by: INTERNAL MEDICINE

## 2023-05-24 PROCEDURE — 3288F FALL RISK ASSESSMENT DOCD: CPT | Mod: CPTII,S$GLB,, | Performed by: INTERNAL MEDICINE

## 2023-05-24 PROCEDURE — 1159F PR MEDICATION LIST DOCUMENTED IN MEDICAL RECORD: ICD-10-PCS | Mod: CPTII,S$GLB,, | Performed by: INTERNAL MEDICINE

## 2023-05-24 PROCEDURE — 99999 PR PBB SHADOW E&M-EST. PATIENT-LVL V: ICD-10-PCS | Mod: PBBFAC,,, | Performed by: INTERNAL MEDICINE

## 2023-05-24 PROCEDURE — 1126F PR PAIN SEVERITY QUANTIFIED, NO PAIN PRESENT: ICD-10-PCS | Mod: CPTII,S$GLB,, | Performed by: INTERNAL MEDICINE

## 2023-05-24 PROCEDURE — 3078F DIAST BP <80 MM HG: CPT | Mod: CPTII,S$GLB,, | Performed by: INTERNAL MEDICINE

## 2023-05-24 PROCEDURE — 1126F AMNT PAIN NOTED NONE PRSNT: CPT | Mod: CPTII,S$GLB,, | Performed by: INTERNAL MEDICINE

## 2023-05-24 PROCEDURE — 3078F PR MOST RECENT DIASTOLIC BLOOD PRESSURE < 80 MM HG: ICD-10-PCS | Mod: CPTII,S$GLB,, | Performed by: INTERNAL MEDICINE

## 2023-05-24 PROCEDURE — 1101F PR PT FALLS ASSESS DOC 0-1 FALLS W/OUT INJ PAST YR: ICD-10-PCS | Mod: CPTII,S$GLB,, | Performed by: INTERNAL MEDICINE

## 2023-05-24 PROCEDURE — 3077F SYST BP >= 140 MM HG: CPT | Mod: CPTII,S$GLB,, | Performed by: INTERNAL MEDICINE

## 2023-05-24 PROCEDURE — 3288F PR FALLS RISK ASSESSMENT DOCUMENTED: ICD-10-PCS | Mod: CPTII,S$GLB,, | Performed by: INTERNAL MEDICINE

## 2023-05-24 PROCEDURE — 3077F PR MOST RECENT SYSTOLIC BLOOD PRESSURE >= 140 MM HG: ICD-10-PCS | Mod: CPTII,S$GLB,, | Performed by: INTERNAL MEDICINE

## 2023-05-24 RX ORDER — FLUTICASONE PROPIONATE 50 MCG
SPRAY, SUSPENSION (ML) NASAL
Qty: 16 G | Refills: 3 | Status: SHIPPED | OUTPATIENT
Start: 2023-05-24 | End: 2023-11-28

## 2023-05-24 NOTE — ASSESSMENT & PLAN NOTE
Eliminate Tylenol with benadryl since too drying  Drink hot tea with lemon and honey  Rest voice  Suck on Vit C lozenges   Flonase 1 sq bid after saline spray

## 2023-05-24 NOTE — PROGRESS NOTES
[facility] Progress Note      Chief Complaint     Annual Exam, Dry Eye, Blurred Vision, Migraine, spots on skin  ( Nose and two spots on chest ), Temporomandibular Joint Pain, Constipation (Pt stated mother and father sister dead from colon cancer. Pt would like to check colon ), and Hoarse      Subjective:      History of Present Illness:  Tiffany Masterson is a 87 y.o. female former pt of Dr. Blanton would like to establish care but has a few issues to discuss as below:     Varicose veins: had dilated vv in calves but now better    Dry eyes: does not like to use her Sustane drops but admits to irritation and pain.     Insomnia: switched from Unisom/Melatonin to Tylenol PM 2 tabs with gabapentin 100mg qhs and feels awake in am and able to sleep better but only awakens twice not 3 times per night.    Hx BCC on nose and would like derm surveillance     Trigger 4th left finger: can release it easily and prefers to observe now    Hoarse x 4-5 mo: has a lot of mucous in her throat; denies pain, GERD symptoms, acid backwash    Hypothyroidism: TSH WNL 2022    Ocular Migraine: stable    Osteopenia and Vit D def'y: on Vit D/Ca supplements with Vit D level 37 in 2022    Atrial fib and HTN with CKD 3 b GFR 40 ml/min (creat 1.3)  stable on metoprolol ER 50mg bid and losartan 100mg daily ; HR 60s with -180/60-70s ; keeps daily log; ECHO 2021 with diastolic Grade 1 LV diastolid dysfunction with EF 65%  Home Bps 123-167/60-80s with HR 50-81    Hypothyroidism: normal TSH on Levo 100mcg po daily     SH: social alcohol; no tobacco, drugs; has 3 sons  FH: mom  colon cancer at 74y/o; Paternal aunt in mid 70s with colon cancer     Past Medical History:  Past Medical History:   Diagnosis Date    Abnormal MRI 10/16/2020    Acute cystitis without hematuria 2021    Arthritis     Atrial fibrillation     Cataract     Elevated liver enzymes     Endometrial mass 2018    Epiretinal  membrane (ERM) of right eye     Family history of malignant neoplasm of gastrointestinal tract mother    Frequent UTI 9/30/2018    Graves disease     now hypothryoid - no surgery or medicine. resolved on its own    History of cholecystectomy 6/10/2021    History of colonoscopy     unremarkable 2007 by Dr. Javier.  Barium enema revealed diverticular disease.    Hyperlipidemia     Hypertension     Hypertriglyceridemia 4/19/2013    Continue statin for secondary stroke prevention    Hypothyroidism     Impaired functional mobility, balance, gait, and endurance 6/4/2021    Iron deficiency anemia 9/29/2021    Migraine, ophthalmoplegic     Mixed stress and urge urinary incontinence 9/30/2018    Ocular migraine     Personal history of colonic polyps     Preop testing 5/17/2021    S/P colonoscopic polypectomy 6/18/2013    Sleep disorder 11/8/2021    Status post hysteroscopic polypectomy 7/2/2018    TIA (transient ischemic attack)     with a normal angiogram in the past, Ocular migraines reported by patient, not TIA     Transaminitis 3/19/2021    Urethral caruncle 9/15/2020       Past Surgical History:  Past Surgical History:   Procedure Laterality Date    CATARACT EXTRACTION Right 2012    Dr. Lexis Nicolas     COLONOSCOPY      2014 polyps    COLONOSCOPY N/A 11/7/2016    Procedure: COLONOSCOPY;  Surgeon: Bebeto Gonzalez MD;  Location: [facility] ([address] [place] [address]);  Service: Endoscopy;  Laterality: N/A;    COLONOSCOPY N/A 3/9/2020    Procedure: COLONOSCOPY;  Surgeon: Bebeto Gonzalez MD;  [facility]  Service: Endoscopy;  Laterality: N/A;    COLONOSCOPY N/A 9/29/2021    Procedure: COLONOSCOPY;  Surgeon: Bebeto Gonzalez MD;  Location: [facility] ([address] [place] [address]);  Service: Endoscopy;  Laterality: N/A;  please schedule patient as soon as possible with me EGD colonoscopy with constipation bowel prep for iron deficiency anemia family history of colon cancer and personal history of colon polyps  9/17/21 ok for standard split  prep per Dr. Gonzalez-imani    COLONOSCOPY N/A 9/29/2021    Procedure: COLONOSCOPY;  Surgeon: Bebeto Gonzalez MD;  Location: [facility] (4TH FLR);  Service: Endoscopy;  Laterality: N/A;  Please schedule patient as soon as possible with me EGD colonoscopy with constipation bowel prep for iron deficiency anemia family history of colon cancer and personal history of colon polyps  9/17/21 Ok for standard split prep per Dr. Gonzalez-ml    COLONOSCOPY W/ POLYPECTOMY  6/2013    polyp of colon    ENDOSCOPIC ULTRASOUND OF UPPER GASTROINTESTINAL TRACT N/A 4/29/2021    Procedure: ULTRASOUND, UPPER GI TRACT, ENDOSCOPIC;  Surgeon: Dalton Johnson MD;  Location: [facility] (2ND FLR);  Service: Endoscopy;  Laterality: N/A;  Postprandial nausea vomiting epigastric 0.9 cm stone and sludge seen within the gallbladder lumen.  No wall thickening or pericholecystic fluid.  0.6 cm stone seen within the distal common bile duct at the level of the pancreatic head.  There is dil    ERCP N/A 4/29/2021    Procedure: ERCP (ENDOSCOPIC RETROGRADE CHOLANGIOPANCREATOGRAPHY);  Surgeon: Dalton Johnson MD;  Location: [facility] (2ND FLR);  Service: Endoscopy;  Laterality: N/A;  Postprandial nausea and vomit 0.9 cm stone and sludge seen within the gallbladder lumen.  No wall thickening or pericholecystic fluid.  0.6 cm stone seen within the distal common bile duct at the level of the pancreatic head.  There i    ESOPHAGOGASTRODUODENOSCOPY N/A 9/29/2021    Procedure: EGD (ESOPHAGOGASTRODUODENOSCOPY);  Surgeon: Bebeto Gonzalez MD;  Location: [facility] (4TH FLR);  Service: Endoscopy;  Laterality: N/A;  rapid covid test arrival 12pm - sm  9/27 arrival time for covid test/procedure confirmed with pt-rb    ESOPHAGOGASTRODUODENOSCOPY N/A 9/29/2021    Procedure: EGD (ESOPHAGOGASTRODUODENOSCOPY);  Surgeon: Bebeto Gonzalez MD;  Location: [facility] (4TH FLR);  Service: Endoscopy;  Laterality: N/A;  covid test 9/19/21 [facility], prep instr emailed -    EYE SURGERY   11-10-14    right retina/Dr. Dalton Sierra ([facility])    HYSTEROSCOPIC POLYPECTOMY OF UTERUS N/A 7/2/2018    Procedure: POLYPECTOMY, UTERUS, HYSTEROSCOPIC;  Surgeon: Elliot Vanessa IV, MD;  Location: [facility] OR;  Service: OB/GYN;  Laterality: N/A;    INJECTION OF JOINT Right 11/11/2022    Procedure: INJECTION, RIGHT GTB CONTRAST DIRECT REFERRAL;  Surgeon: Camden Hernandez MD;  Location: [facility];  Service: Pain Management;  Laterality: Right;    JOINT REPLACEMENT  3/23/2011    left total hip replacement    LAPAROSCOPIC CHOLECYSTECTOMY N/A 5/17/2021    Procedure: CHOLECYSTECTOMY, LAPAROSCOPIC;  Surgeon: Rayshawn Peralta Jr., MD;  Location: [address];  Service: General;  Laterality: N/A;    REMOVAL OF EPIRETINAL MEMBRANE Right     Dr. Padron    TONSILLECTOMY      pt was 11 years old       Allergies:  Review of patient's allergies indicates:   Allergen Reactions    Levaquin [levofloxacin]      Joint pain    Hydrochlorothiazide Other (See Comments)     Pain in joints and depression per pt    Hydroquinone Itching       Home Medications:  Current Outpatient Medications   Medication Sig Dispense Refill    apixaban (ELIQUIS) 5 mg Tab Take 1 tablet (5 mg total) by mouth 2 (two) times daily. 180 tablet 3    ascorbic acid, vitamin C, (VITAMIN C) 250 MG tablet Take 250 mg by mouth once daily.      b complex vitamins capsule Take 1 capsule by mouth once daily.      bisacodyL (DULCOLAX) 5 mg EC tablet Take 5 mg by mouth once daily.      CALCIUM CARBONATE/VITAMIN D3 (CALCIUM 600 + D,3, ORAL) Take 1 tablet by mouth once daily.       cholecalciferol, vitamin D3, (VITAMIN D3) 50 mcg (2,000 unit) Cap Take 1 capsule by mouth once daily.      coenzyme Q10 200 mg capsule Take 200 mg by mouth once daily.      DEXTRAN 70/HYPROMELLOSE (ARTIFICIAL TEARS, PF, OPHT) Place 1 drop into both eyes as needed.      gabapentin (NEURONTIN) 100 MG capsule TAKE 1 CAPSULE BY MOUTH THREE TIMES DAILY 90 capsule 0    levothyroxine (SYNTHROID)  100 MCG tablet Take 1 tablet (100 mcg total) by mouth before breakfast. 90 tablet 0    losartan (COZAAR) 100 MG tablet Take 1 tablet (100 mg total) by mouth once daily. 90 tablet 0    magnesium oxide 400 mg magnesium Tab Take 1 tablet by mouth once daily.      metoprolol succinate (TOPROL-XL) 50 MG 24 hr tablet Take 1 tablet (50 mg total) by mouth 2 (two) times daily. 180 tablet 0    MULTIVITAMIN W-MINERALS/LUTEIN (CENTRUM SILVER ORAL) Take 1 tablet by mouth once daily.      vit A/vit C/vit E/zinc/copper (ICAPS AREDS ORAL) Take 1 tablet by mouth 2 (two) times a day.      fluticasone propionate (FLONASE) 50 mcg/actuation nasal spray Clean sinuses/nares with ocean nasal spray then use flonase 1 sq bid 16 g 3    sodium chloride (SALINE NASAL) 0.65 % nasal spray 1 spray by Nasal route as needed for Congestion. 104 mL 3     No current facility-administered medications for this visit.        Family History:  Family History   Problem Relation Age of Onset    Colon cancer Mother 75    Lung cancer Father 75    Diabetes Other     Diabetes Paternal Aunt     Colon cancer Paternal Aunt 80    Prostate cancer Brother     Breast cancer Neg Hx     Ovarian cancer Neg Hx     Celiac disease Neg Hx     Cataracts Neg Hx     Glaucoma Neg Hx     Macular degeneration Neg Hx        Social History:  Social History     Tobacco Use    Smoking status: Never    Smokeless tobacco: Never    Tobacco comments:     The patient is not getting any regular exercise at this time.  She does enjoy traveling to [place].   Substance Use Topics    Alcohol use: Yes     Comment: occasionally    Drug use: No       Review of Systems:  Pertinent positives and negatives listed in HPI. All other systems are reviewed and are negative.    Health Maintaince :   Health Maintenance Topics with due status: Not Due       Topic Last Completion Date    TETANUS VACCINE 09/20/2018    Colonoscopy 09/29/2021    Lipid Panel 12/02/2022    DEXA Scan 12/02/2022           Eye:  "UTD  Dental:  UTD    Immunizations:   Tdap: n/a.  Influenza: UTD.  Pneumovax: UTD  Shingrex x 2: UTD  COVID: needs bivalent booster  Hepatitis C:   Cancer Screening:  PAP: UTD n/a  Mammogram: UTD 2/2023  Colonoscopy: 5 polyps <5mm in 9/2021  all tubular adenomas neg dysplasia/cancer- repeat in 3 years 9/2024  DEXA:  12/2022 Lumbar spine (L1-L4):              BMD is 0.934 g/cm2, T-score is -1.0, and Z-score is 1.8.     Total hip:                                BMD is 0.678 g/cm2, T-score is -2.2, and Z-score is 0.2.     Femoral neck:                          BMD is 0.664 g/cm2, T-score is -1.7, and Z-score is 0.9.     Distal 1/3 radius:                      Not applicable     FRAX:     34% risk of a major osteoporotic fracture in the next 10 years.     20% risk of hip fracture in the next 10 years.  LDCT:     The ASCVD Risk score (Emeka DK, et al., 2019) failed to calculate for the following reasons:    The 2019 ASCVD risk score is only valid for ages 40 to 79      Objective:   BP (!) 144/62 (BP Location: Right arm, Patient Position: Sitting, BP Method: Medium (Automatic))   Pulse 64   Temp 97.7 °F (36.5 °C)   Resp 18   Ht 5' 6" (1.676 m)   Wt 79.8 kg (175 lb 14.8 oz)   SpO2 98%   BMI 28.40 kg/m²      Body mass index is 28.4 kg/m².       Physical Examination:  General: Alert and awake in no apparent distress  HEENT: Normocephalic and atraumatic; Tms WNL  Eyes:  PERRL; EOMi with anicteric sclera and clear conjunctivae  Mouth:  Oropharynx clear and without exudate; moist mucous membranes  Neck:   Cervical nodes not enlarged; supple; no bruits  Cardio:  Regular rate and rhythm with normal S1 and S2; no murmurs or rubs  Resp:  CTAB; respirations unlabored; no wheezes, crackles or rhonchi  Abdom: Soft, NTND with normoactive bowel sounds; negative HSM; large reducible umbilical hernia   Extrem: Warm and well-perfused with no clubbing, cyanosis or edema; mild LE varicose veins calves bilaterally with LE " edema  Skin:  No rashes, lesions, or color changes  Pulses:  2+ and symmetric distally  Neuro:  AAOx3; cooperative and pleasant with no focal deficits    Laboratory:      Most Recent Data:  Lab Results   Component Value Date    WBC 7.17 12/02/2022    HGB 12.2 12/02/2022    HCT 38.0 12/02/2022     12/02/2022    CHOL 214 (H) 12/02/2022    TRIG 112 12/02/2022    HDL 54 12/02/2022    ALT 22 12/02/2022    AST 23 12/02/2022     (L) 12/02/2022    K 4.4 12/02/2022     12/02/2022    BUN 16 12/02/2022    CO2 26 12/02/2022    TSH 2.776 12/02/2022    INR 1.0 05/09/2013    HGBA1C 5.8 (H) 08/31/2020              CBC:   WBC   Date Value Ref Range Status   12/02/2022 7.17 3.90 - 12.70 K/uL Final     Hemoglobin   Date Value Ref Range Status   12/02/2022 12.2 12.0 - 16.0 g/dL Final     Hematocrit   Date Value Ref Range Status   12/02/2022 38.0 37.0 - 48.5 % Final     Platelets   Date Value Ref Range Status   12/02/2022 227 150 - 450 K/uL Final     MCV   Date Value Ref Range Status   12/02/2022 95 82 - 98 fL Final     RDW   Date Value Ref Range Status   12/02/2022 13.0 11.5 - 14.5 % Final     BMP:   Sodium   Date Value Ref Range Status   12/02/2022 135 (L) 136 - 145 mmol/L Final     Potassium   Date Value Ref Range Status   12/02/2022 4.4 3.5 - 5.1 mmol/L Final     Chloride   Date Value Ref Range Status   12/02/2022 103 95 - 110 mmol/L Final     CO2   Date Value Ref Range Status   12/02/2022 26 23 - 29 mmol/L Final     BUN   Date Value Ref Range Status   12/02/2022 16 8 - 23 mg/dL Final     Creatinine   Date Value Ref Range Status   12/02/2022 1.3 0.5 - 1.4 mg/dL Final     Glucose   Date Value Ref Range Status   12/02/2022 96 70 - 110 mg/dL Final     Calcium   Date Value Ref Range Status   12/02/2022 9.3 8.7 - 10.5 mg/dL Final     Magnesium   Date Value Ref Range Status   12/21/2021 2.1 1.6 - 2.6 mg/dL Final     Phosphorus   Date Value Ref Range Status   06/21/2013 2.3 (L) 2.7 - 4.5 mg/dl Final     LFTs:   Total  "Protein   Date Value Ref Range Status   12/02/2022 7.1 6.0 - 8.4 g/dL Final     Albumin   Date Value Ref Range Status   12/02/2022 4.1 3.5 - 5.2 g/dL Final     Total Bilirubin   Date Value Ref Range Status   12/02/2022 0.8 0.1 - 1.0 mg/dL Final     Comment:     For infants and newborns, interpretation of results should be based  on gestational age, weight and in agreement with clinical  observations.    Premature Infant recommended reference ranges:  Up to 24 hours.............<8.0 mg/dL  Up to 48 hours............<12.0 mg/dL  3-5 days..................<15.0 mg/dL  6-29 days.................<15.0 mg/dL       AST   Date Value Ref Range Status   12/02/2022 23 10 - 40 U/L Final     Alkaline Phosphatase   Date Value Ref Range Status   12/02/2022 102 55 - 135 U/L Final     ALT   Date Value Ref Range Status   12/02/2022 22 10 - 44 U/L Final     Coags:   INR   Date Value Ref Range Status   05/09/2013 1.0 0.8 - 1.2 Final     Comment:     ACCP Guideline for Coumadin usage recommends a "target range" of  2.0 - 3.0 for INR for all indicators except mechanical heart valves  and antiphospholipid syndromes which should use 2.5 - 3.5.  .     FLP:      Lab Results   Component Value Date    CHOL 214 (H) 12/02/2022    CHOL 156 08/01/2020    CHOL 172 01/09/2020     Lab Results   Component Value Date    HDL 54 12/02/2022    HDL 41 08/01/2020    HDL 40 01/09/2020     Lab Results   Component Value Date    LDLCALC 137.6 12/02/2022    LDLCALC 58.2 (L) 08/01/2020    LDLCALC 75.4 01/09/2020     Lab Results   Component Value Date    TRIG 112 12/02/2022    TRIG 284 (H) 08/01/2020    TRIG 283 (H) 01/09/2020     Lab Results   Component Value Date    CHOLHDL 25.2 12/02/2022    CHOLHDL 26.3 08/01/2020    CHOLHDL 23.3 01/09/2020      DM:      Hemoglobin A1C   Date Value Ref Range Status   08/31/2020 5.8 (H) 4.0 - 5.6 % Final     Comment:     ADA Screening Guidelines:  5.7-6.4%  Consistent with prediabetes  >or=6.5%  Consistent with diabetes  High " levels of fetal hemoglobin interfere with the HbA1C  assay. Heterozygous hemoglobin variants (HbS, HgC, etc)do  not significantly interfere with this assay.   However, presence of multiple variants may affect accuracy.       LDL Cholesterol   Date Value Ref Range Status   12/02/2022 137.6 63.0 - 159.0 mg/dL Final     Comment:     The National Cholesterol Education Program (NCEP) has set the  following guidelines (reference values) for LDL Cholesterol:  Optimal.......................<130 mg/dL  Borderline High...............130-159 mg/dL  High..........................160-189 mg/dL  Very High.....................>190 mg/dL       Creatinine   Date Value Ref Range Status   12/02/2022 1.3 0.5 - 1.4 mg/dL Final     Thyroid:   TSH   Date Value Ref Range Status   12/02/2022 2.776 0.400 - 4.000 uIU/mL Final     Free T4   Date Value Ref Range Status   12/02/2016 1.05 0.71 - 1.51 ng/dL Final     Anemia:   Iron   Date Value Ref Range Status   09/18/2021 47 30 - 160 ug/dL Final     TIBC   Date Value Ref Range Status   09/18/2021 330 250 - 450 ug/dL Final     Ferritin   Date Value Ref Range Status   09/18/2021 251 20.0 - 300.0 ng/mL Final     Vitamin B-12   Date Value Ref Range Status   12/02/2022 652 210 - 950 pg/mL Final     Cardiac:   Troponin I   Date Value Ref Range Status   12/21/2021 0.014 0.000 - 0.026 ng/mL Final     Comment:     The reference interval for Troponin I represents the 99th percentile   cutoff   for our facility and is consistent with 3rd generation assay   performance.       BNP   Date Value Ref Range Status   12/21/2021 184 (H) 0 - 99 pg/mL Final     Comment:     Values of less than 100 pg/ml are consistent with non-CHF populations.     Urinalysis:   Urine Culture, Routine   Date Value Ref Range Status   12/22/2021   Final    Multiple organisms isolated. None in predominance.  Repeat if   12/22/2021 clinically necessary.  Final     Color, UA   Date Value Ref Range Status   01/10/2022 Yellow Yellow, Straw,  Chinyere Final     Clarity, UA   Date Value Ref Range Status   10/29/2020   Final     Comment:     normal      Specific Gravity, UA   Date Value Ref Range Status   01/10/2022 1.020 1.005 - 1.030 Final     Nitrite, UA   Date Value Ref Range Status   01/10/2022 Negative Negative Final     Ketones, UA   Date Value Ref Range Status   01/10/2022 Negative Negative Final     Urobilinogen, UA   Date Value Ref Range Status   10/29/2020   Final     Comment:     normal   09/14/2018 Negative <2.0 EU/dL Final     WBC, UA   Date Value Ref Range Status   01/10/2022 1 0 - 5 /hpf Final       Other Results:  EKG (my interpretation):     Radiology:  Mammo Digital Screening Bilat w/ Bill  Narrative: Result:  Mammo Digital Screening Bilat w/ Bill    History:  Patient is 87 y.o. and is seen for a screening mammogram.    Films Compared:  Prior images (if available) were compared.     Findings:   This procedure was performed using tomosynthesis.   Computer-aided detection was utilized in the interpretation of this   examination.    The breasts have scattered areas of fibroglandular density. There is no   evidence of suspicious masses, microcalcifications or architectural   distortion.  Impression:    No mammographic evidence of malignancy.    BI-RADS Category 1: Negative    Recommendation:  Routine screening mammogram in 1 year, or as clinically indicated.          Assessment/Plan     Tiffany Masterson is a 87 y.o. female with:  1. Seasonal allergic rhinitis due to pollen  Assessment & Plan:  Start ocean nasal spray followed by flonase 1 sq bid  Stop Tylenol pm     Orders:  -     fluticasone propionate (FLONASE) 50 mcg/actuation nasal spray; Clean sinuses/nares with ocean nasal spray then use flonase 1 sq bid  Dispense: 16 g; Refill: 3  -     sodium chloride (SALINE NASAL) 0.65 % nasal spray; 1 spray by Nasal route as needed for Congestion.  Dispense: 104 mL; Refill: 3    2. Insomnia due to medical condition  Assessment & Plan:  Try Gabapentin  200mg at night OR Gabapentin 200mg with Tylenol pm x 1 tab for pain and insomnia      3. Trigger ring finger of left hand  Assessment & Plan:  Cont to observe for now and then consider injection if worsens or persists      4. Hx of skin cancer, basal cell  Assessment & Plan:  Refer to Derm for skin surveillance  Wear SPF 50 sunscreen and above, hats, avoid sun exposure from 10am - 3pm    Orders:  -     Ambulatory referral/consult to Dermatology; Future; Expected date: 05/24/2023    5. Hoarseness, chronic  Assessment & Plan:  Eliminate Tylenol with benadryl since too drying  Drink hot tea with lemon and honey  Rest voice  Suck on Vit C lozenges   Flonase 1 sq bid after saline spray       6. Dry eyes, bilateral  -     Ambulatory referral/consult to Ophthalmology; Future; Expected date: 05/24/2023    7. Paroxysmal atrial fibrillation  Assessment & Plan:  Now in NSR on Metoprolol ER 50mg bid and Eliquis 5 mg po bid      8. Umbilical hernia without obstruction and without gangrene  Assessment & Plan:  Cont to closely monitor - pt instructed to go to ER if cannot reduce, severe abdominal pain, discoloration of abdomen      9. Colon adenomas  Overview:  5 polyps <5mm in 9/2021  all tubular adenomas neg dysplasia/cancer- repeat in 3 years 9/2024    Assessment & Plan:  Repeat colon in 9/2024      10. Primary hypertension  Assessment & Plan:  Relatively well-controlled on losartan 100mg daily and metoprolol ER 50mg po bid  -pt without CP, SOB, FLORES at this time      11. Asymptomatic varicose veins of both lower extremities  Assessment & Plan:  Wear compression socks and elevate legs      12. Stage 3b chronic kidney disease  Assessment & Plan:  Pt encouraged to hydrate medardo on losartan      13. Preventative health care  Assessment & Plan:  Will get labs drawn in 3 months for annual   Refer to eye MD for routine care  Get COVID bivalent booster  Encourage walking as much as possible               I spent a total of 70 minutes on  the day of the visit.This includes face to face time and non-face to face time preparing to see the patient (eg, review of tests), obtaining and/or reviewing separately obtained history, documenting clinical information in the electronic or other health record, independently interpreting results and communicating results to the patient/family/caregiver, or care coordinator.   Code Status:     Rebeca Dumont MD          I

## 2023-05-24 NOTE — PATIENT INSTRUCTIONS
Eliminate Tylenol with benadryl since too drying  Drink hot tea with lemon and honey  Rest voice  Suck on Vit C lozenges   Flonase 1 sq bid after saline spray     Can increase gabapentin 200mg at bedtime    Refer to Derm for skin surveillance    Wear compression socks up to knees and elevate legs    Use Sustane at least daily and up to 4 times per day to lubricate eyes.     Refer to ophthalmology

## 2023-05-25 NOTE — ASSESSMENT & PLAN NOTE
Refer to Derm for skin surveillance  Wear SPF 50 sunscreen and above, hats, avoid sun exposure from 10am - 3pm

## 2023-05-25 NOTE — ASSESSMENT & PLAN NOTE
Cont to closely monitor - pt instructed to go to ER if cannot reduce, severe abdominal pain, discoloration of abdomen

## 2023-05-25 NOTE — ASSESSMENT & PLAN NOTE
Relatively well-controlled on losartan 100mg daily and metoprolol ER 50mg po bid  -pt without CP, SOB, FLORES at this time

## 2023-05-25 NOTE — ASSESSMENT & PLAN NOTE
Will get labs drawn in 3 months for annual   Refer to eye MD for routine care  Get COVID bivalent booster  Encourage walking as much as possible

## 2023-05-29 ENCOUNTER — TELEPHONE (OUTPATIENT)
Dept: INTERNAL MEDICINE | Facility: CLINIC | Age: 88
End: 2023-05-29
Payer: MEDICARE

## 2023-05-29 NOTE — TELEPHONE ENCOUNTER
----- Message from Milind Vasquez sent at 5/29/2023 12:52 PM CDT -----  Contact: [phone]  Pt missed a call and would like a call back.

## 2023-06-07 DIAGNOSIS — I10 HYPERTENSION, UNSPECIFIED TYPE: ICD-10-CM

## 2023-06-07 DIAGNOSIS — Z00.00 PREVENTATIVE HEALTH CARE: Primary | ICD-10-CM

## 2023-06-07 DIAGNOSIS — M17.0 PRIMARY OSTEOARTHRITIS OF BOTH KNEES: Primary | ICD-10-CM

## 2023-06-07 RX ORDER — FLUNISOLIDE 0.25 MG/ML
1 SOLUTION NASAL 2 TIMES DAILY
Qty: 1 EACH | Refills: 5 | Status: SHIPPED | OUTPATIENT
Start: 2023-06-07 | End: 2023-09-13

## 2023-06-07 RX ORDER — MOMETASONE FUROATE 50 UG/1
SPRAY, METERED NASAL
Qty: 90 G | Refills: 3 | Status: CANCELLED | OUTPATIENT
Start: 2023-06-07

## 2023-06-07 RX ORDER — LOSARTAN POTASSIUM 100 MG/1
100 TABLET ORAL DAILY
Qty: 90 TABLET | Refills: 3 | Status: SHIPPED | OUTPATIENT
Start: 2023-06-07

## 2023-06-07 RX ORDER — MOMETASONE FUROATE 50 UG/1
2 SPRAY, METERED NASAL DAILY
Qty: 90 EACH | Refills: 3 | Status: SHIPPED | OUTPATIENT
Start: 2023-06-07 | End: 2023-09-13

## 2023-06-07 RX ORDER — GABAPENTIN 100 MG/1
100 CAPSULE ORAL 3 TIMES DAILY
Qty: 90 CAPSULE | Refills: 3 | Status: SHIPPED | OUTPATIENT
Start: 2023-06-07 | End: 2024-03-07

## 2023-06-07 RX ORDER — LEVOTHYROXINE SODIUM 100 UG/1
100 TABLET ORAL
Qty: 90 TABLET | Refills: 3 | Status: SHIPPED | OUTPATIENT
Start: 2023-06-07 | End: 2023-09-16 | Stop reason: SDUPTHER

## 2023-06-07 NOTE — TELEPHONE ENCOUNTER
No care due was identified.  [facility] Embedded Care Due Messages. Reference number: [id number].   6/07/2023 3:17:24 PM CDT

## 2023-06-07 NOTE — TELEPHONE ENCOUNTER
----- Message from Erin Khan sent at 6/7/2023  3:15 PM CDT -----  Contact: [phone]  [facility] pharmacy is needing to get the quantity for the nasonex 90 each please advise

## 2023-06-07 NOTE — TELEPHONE ENCOUNTER
Refill Routing Note   Refill Routing Note   Medication(s) are not appropriate for processing by [facility] for the following reason(s):      Clarification of medication (Rx) details    [facility] action(s):  Defer None identified        Medication reconciliation completed: No     Appointments  past 12m or future 3m with PCP    Date Provider   Last Visit   5/24/2023 Rebeca Dumont MD   Next Visit   Visit date not found Rebeca Dumont MD   ED visits in past 90 days: 0        Note composed:3:58 PM 06/07/2023

## 2023-06-08 ENCOUNTER — PATIENT OUTREACH (OUTPATIENT)
Dept: ADMINISTRATIVE | Facility: HOSPITAL | Age: 88
End: 2023-06-08
Payer: MEDICARE

## 2023-06-08 ENCOUNTER — PATIENT MESSAGE (OUTPATIENT)
Dept: ADMINISTRATIVE | Facility: HOSPITAL | Age: 88
End: 2023-06-08
Payer: MEDICARE

## 2023-06-15 ENCOUNTER — TELEPHONE (OUTPATIENT)
Dept: PRIMARY CARE CLINIC | Facility: CLINIC | Age: 88
End: 2023-06-15
Payer: MEDICARE

## 2023-06-15 NOTE — TELEPHONE ENCOUNTER
----- Message from Leo Fallon sent at 6/15/2023  3:20 PM CDT -----  Contact: [phone] @ patient  Good afternoon patient would like a call back to see if she she will be taking metoprolol succinate (TOPROL-XL) 50 MG 24 hr tablet. Please give patient a call [phone]

## 2023-06-19 ENCOUNTER — OFFICE VISIT (OUTPATIENT)
Dept: OPTOMETRY | Facility: CLINIC | Age: 88
End: 2023-06-19
Payer: MEDICARE

## 2023-06-19 DIAGNOSIS — H52.203 MYOPIA OF BOTH EYES WITH ASTIGMATISM AND PRESBYOPIA: ICD-10-CM

## 2023-06-19 DIAGNOSIS — H25.12 NUCLEAR SCLEROTIC CATARACT, LEFT: Primary | ICD-10-CM

## 2023-06-19 DIAGNOSIS — H02.883 MEIBOMIAN GLAND DYSFUNCTION (MGD) OF BOTH EYES: ICD-10-CM

## 2023-06-19 DIAGNOSIS — H52.4 MYOPIA OF BOTH EYES WITH ASTIGMATISM AND PRESBYOPIA: ICD-10-CM

## 2023-06-19 DIAGNOSIS — H52.13 MYOPIA OF BOTH EYES WITH ASTIGMATISM AND PRESBYOPIA: ICD-10-CM

## 2023-06-19 DIAGNOSIS — Z96.1 PSEUDOPHAKIA OF RIGHT EYE: ICD-10-CM

## 2023-06-19 DIAGNOSIS — H02.886 MEIBOMIAN GLAND DYSFUNCTION (MGD) OF BOTH EYES: ICD-10-CM

## 2023-06-19 PROCEDURE — 92015 DETERMINE REFRACTIVE STATE: CPT | Mod: S$GLB,,, | Performed by: OPTOMETRIST

## 2023-06-19 PROCEDURE — 1101F PT FALLS ASSESS-DOCD LE1/YR: CPT | Mod: CPTII,S$GLB,, | Performed by: OPTOMETRIST

## 2023-06-19 PROCEDURE — 92014 PR EYE EXAM, EST PATIENT,COMPREHESV: ICD-10-PCS | Mod: S$GLB,,, | Performed by: OPTOMETRIST

## 2023-06-19 PROCEDURE — 1159F MED LIST DOCD IN RCRD: CPT | Mod: CPTII,S$GLB,, | Performed by: OPTOMETRIST

## 2023-06-19 PROCEDURE — 1101F PR PT FALLS ASSESS DOC 0-1 FALLS W/OUT INJ PAST YR: ICD-10-PCS | Mod: CPTII,S$GLB,, | Performed by: OPTOMETRIST

## 2023-06-19 PROCEDURE — 92014 COMPRE OPH EXAM EST PT 1/>: CPT | Mod: S$GLB,,, | Performed by: OPTOMETRIST

## 2023-06-19 PROCEDURE — 92015 PR REFRACTION: ICD-10-PCS | Mod: S$GLB,,, | Performed by: OPTOMETRIST

## 2023-06-19 PROCEDURE — 99999 PR PBB SHADOW E&M-EST. PATIENT-LVL IV: ICD-10-PCS | Mod: PBBFAC,,, | Performed by: OPTOMETRIST

## 2023-06-19 PROCEDURE — 1157F ADVNC CARE PLAN IN RCRD: CPT | Mod: CPTII,S$GLB,, | Performed by: OPTOMETRIST

## 2023-06-19 PROCEDURE — 1159F PR MEDICATION LIST DOCUMENTED IN MEDICAL RECORD: ICD-10-PCS | Mod: CPTII,S$GLB,, | Performed by: OPTOMETRIST

## 2023-06-19 PROCEDURE — 1126F AMNT PAIN NOTED NONE PRSNT: CPT | Mod: CPTII,S$GLB,, | Performed by: OPTOMETRIST

## 2023-06-19 PROCEDURE — 1126F PR PAIN SEVERITY QUANTIFIED, NO PAIN PRESENT: ICD-10-PCS | Mod: CPTII,S$GLB,, | Performed by: OPTOMETRIST

## 2023-06-19 PROCEDURE — 3288F FALL RISK ASSESSMENT DOCD: CPT | Mod: CPTII,S$GLB,, | Performed by: OPTOMETRIST

## 2023-06-19 PROCEDURE — 1157F PR ADVANCE CARE PLAN OR EQUIV PRESENT IN MEDICAL RECORD: ICD-10-PCS | Mod: CPTII,S$GLB,, | Performed by: OPTOMETRIST

## 2023-06-19 PROCEDURE — 3288F PR FALLS RISK ASSESSMENT DOCUMENTED: ICD-10-PCS | Mod: CPTII,S$GLB,, | Performed by: OPTOMETRIST

## 2023-06-19 PROCEDURE — 99999 PR PBB SHADOW E&M-EST. PATIENT-LVL IV: CPT | Mod: PBBFAC,,, | Performed by: OPTOMETRIST

## 2023-06-19 NOTE — PROGRESS NOTES
HPI    Annual Exam   20 min late    Pt states Blurred Vision c Spec MRX     Pt denies F/F     Pt reports Dry/ Itchy/ Burning  Gtt: Yes Systane as needed    Feels mild relief      HX of ocular migraines   Last edited by Kiran Zazueta, OD on 6/19/2023  2:14 PM.            Assessment /Plan     For exam results, see Encounter Report.    Nuclear sclerotic cataract, left  -     Ambulatory referral/consult to Optometry  -Educated patient on presence of cataracts at today's exam, monitor at annual dilated fundus exam. 1-3 years surgical estimate.    Meibomian gland dysfunction (MGD) of both eyes  -     Ambulatory referral/consult to Ophthalmology  -Systane Complete QID     Pseudophakia of right eye  -clear, open, centered    Myopia of both eyes with astigmatism and presbyopia  Eyeglass Final Rx       Eyeglass Final Rx         Sphere Cylinder Axis Dist VA Add    Right -0.75 +2.75 170 20/40 +2.50    Left -2.50 +2.25 180 20/40 +2.50      Type: PAL    Expiration Date: 6/19/2024                  RTC 1 yr

## 2023-06-21 DIAGNOSIS — I48.0 PAROXYSMAL ATRIAL FIBRILLATION: ICD-10-CM

## 2023-06-21 RX ORDER — METOPROLOL SUCCINATE 50 MG/1
TABLET, EXTENDED RELEASE ORAL
Qty: 180 TABLET | Refills: 3 | Status: SHIPPED | OUTPATIENT
Start: 2023-06-21 | End: 2023-06-26 | Stop reason: SDUPTHER

## 2023-06-24 DIAGNOSIS — I48.0 PAROXYSMAL ATRIAL FIBRILLATION: ICD-10-CM

## 2023-06-26 ENCOUNTER — PATIENT MESSAGE (OUTPATIENT)
Dept: CARDIOLOGY | Facility: CLINIC | Age: 88
End: 2023-06-26
Payer: MEDICARE

## 2023-06-26 ENCOUNTER — PATIENT MESSAGE (OUTPATIENT)
Dept: INTERNAL MEDICINE | Facility: CLINIC | Age: 88
End: 2023-06-26
Payer: MEDICARE

## 2023-06-26 DIAGNOSIS — I48.0 PAROXYSMAL ATRIAL FIBRILLATION: ICD-10-CM

## 2023-06-26 RX ORDER — METOPROLOL SUCCINATE 50 MG/1
50 TABLET, EXTENDED RELEASE ORAL 2 TIMES DAILY
Qty: 180 TABLET | Refills: 3 | Status: SHIPPED | OUTPATIENT
Start: 2023-06-26

## 2023-06-26 RX ORDER — METOPROLOL SUCCINATE 50 MG/1
50 TABLET, EXTENDED RELEASE ORAL 2 TIMES DAILY
Qty: 180 TABLET | Refills: 3 | OUTPATIENT
Start: 2023-06-26

## 2023-06-27 ENCOUNTER — OFFICE VISIT (OUTPATIENT)
Dept: DERMATOLOGY | Facility: CLINIC | Age: 88
End: 2023-06-27
Payer: MEDICARE

## 2023-06-27 VITALS — WEIGHT: 175 LBS | BODY MASS INDEX: 28.25 KG/M2

## 2023-06-27 DIAGNOSIS — L71.9 ROSACEA: ICD-10-CM

## 2023-06-27 DIAGNOSIS — L81.4 LENTIGINES: ICD-10-CM

## 2023-06-27 DIAGNOSIS — Z00.00 PREVENTATIVE HEALTH CARE: ICD-10-CM

## 2023-06-27 DIAGNOSIS — L82.1 SEBORRHEIC KERATOSES: Primary | ICD-10-CM

## 2023-06-27 PROCEDURE — 99213 PR OFFICE/OUTPT VISIT, EST, LEVL III, 20-29 MIN: ICD-10-PCS | Mod: S$GLB,,, | Performed by: DERMATOLOGY

## 2023-06-27 PROCEDURE — 1157F PR ADVANCE CARE PLAN OR EQUIV PRESENT IN MEDICAL RECORD: ICD-10-PCS | Mod: CPTII,S$GLB,, | Performed by: DERMATOLOGY

## 2023-06-27 PROCEDURE — 1159F PR MEDICATION LIST DOCUMENTED IN MEDICAL RECORD: ICD-10-PCS | Mod: CPTII,S$GLB,, | Performed by: DERMATOLOGY

## 2023-06-27 PROCEDURE — 1160F RVW MEDS BY RX/DR IN RCRD: CPT | Mod: CPTII,S$GLB,, | Performed by: DERMATOLOGY

## 2023-06-27 PROCEDURE — 1126F AMNT PAIN NOTED NONE PRSNT: CPT | Mod: CPTII,S$GLB,, | Performed by: DERMATOLOGY

## 2023-06-27 PROCEDURE — 3288F FALL RISK ASSESSMENT DOCD: CPT | Mod: CPTII,S$GLB,, | Performed by: DERMATOLOGY

## 2023-06-27 PROCEDURE — 1126F PR PAIN SEVERITY QUANTIFIED, NO PAIN PRESENT: ICD-10-PCS | Mod: CPTII,S$GLB,, | Performed by: DERMATOLOGY

## 2023-06-27 PROCEDURE — 3288F PR FALLS RISK ASSESSMENT DOCUMENTED: ICD-10-PCS | Mod: CPTII,S$GLB,, | Performed by: DERMATOLOGY

## 2023-06-27 PROCEDURE — 99999 PR PBB SHADOW E&M-EST. PATIENT-LVL IV: ICD-10-PCS | Mod: PBBFAC,,, | Performed by: DERMATOLOGY

## 2023-06-27 PROCEDURE — 99999 PR PBB SHADOW E&M-EST. PATIENT-LVL IV: CPT | Mod: PBBFAC,,, | Performed by: DERMATOLOGY

## 2023-06-27 PROCEDURE — 99213 OFFICE O/P EST LOW 20 MIN: CPT | Mod: S$GLB,,, | Performed by: DERMATOLOGY

## 2023-06-27 PROCEDURE — 1101F PR PT FALLS ASSESS DOC 0-1 FALLS W/OUT INJ PAST YR: ICD-10-PCS | Mod: CPTII,S$GLB,, | Performed by: DERMATOLOGY

## 2023-06-27 PROCEDURE — 1101F PT FALLS ASSESS-DOCD LE1/YR: CPT | Mod: CPTII,S$GLB,, | Performed by: DERMATOLOGY

## 2023-06-27 PROCEDURE — 1157F ADVNC CARE PLAN IN RCRD: CPT | Mod: CPTII,S$GLB,, | Performed by: DERMATOLOGY

## 2023-06-27 PROCEDURE — 1160F PR REVIEW ALL MEDS BY PRESCRIBER/CLIN PHARMACIST DOCUMENTED: ICD-10-PCS | Mod: CPTII,S$GLB,, | Performed by: DERMATOLOGY

## 2023-06-27 PROCEDURE — 1159F MED LIST DOCD IN RCRD: CPT | Mod: CPTII,S$GLB,, | Performed by: DERMATOLOGY

## 2023-06-27 NOTE — PROGRESS NOTES
Subjective:      Patient ID:  Tiffany Masterson is a 87 y.o. female who presents for   Chief Complaint   Patient presents with    Skin Check     UBSE     Would like skin check no lesions of concern.     Spot - Follow-up  Affected locations: face, nose, chest, back and abdomen  Signs / symptoms: asymptomatic    Review of Systems   Constitutional:  Negative for fever, chills, weight loss, weight gain, fatigue, night sweats and malaise.   Skin:  Negative for daily sunscreen use, activity-related sunscreen use and wears hat.   Hematologic/Lymphatic: Does not bruise/bleed easily.     Objective:   Physical Exam   Constitutional: She appears well-developed and well-nourished. No distress.   Neurological: She is alert and oriented to person, place, and time. She is not disoriented.   Psychiatric: She has a normal mood and affect.   Skin:   Areas Examined (abnormalities noted in diagram):   Head / Face Inspection Performed  Neck Inspection Performed  RUE Inspected  LUE Inspection Performed               Diagram Legend     Erythematous scaling macule/papule c/w actinic keratosis       Vascular papule c/w angioma      Pigmented verrucoid papule/plaque c/w seborrheic keratosis      Yellow umbilicated papule c/w sebaceous hyperplasia      Irregularly shaped tan macule c/w lentigo     1-2 mm smooth white papules consistent with Milia      Movable subcutaneous cyst with punctum c/w epidermal inclusion cyst      Subcutaneous movable cyst c/w pilar cyst      Firm pink to brown papule c/w dermatofibroma      Pedunculated fleshy papule(s) c/w skin tag(s)      Evenly pigmented macule c/w junctional nevus     Mildly variegated pigmented, slightly irregular-bordered macule c/w mildly atypical nevus      Flesh colored to evenly pigmented papule c/w intradermal nevus       Pink pearly papule/plaque c/w basal cell carcinoma      Erythematous hyperkeratotic cursted plaque c/w SCC      Surgical scar with no sign of skin cancer recurrence      " Open and closed comedones      Inflammatory papules and pustules      Verrucoid papule consistent consistent with wart     Erythematous eczematous patches and plaques     Dystrophic onycholytic nail with subungual debris c/w onychomycosis     Umbilicated papule    Erythematous-base heme-crusted tan verrucoid plaque consistent with inflamed seborrheic keratosis     Erythematous Silvery Scaling Plaque c/w Psoriasis     See annotation      Assessment / Plan:        Seborrheic keratoses  Seborrheic keratosis scattered, told benign no treatment needed.  Brochure provided.      Preventative health care  -     Ambulatory referral/consult to Dermatology   No lesions suspicious for malignancy noted.    Recommend daily sun protection/avoidance and use of at least SPF 30, broad spectrum sunscreen (OTC drug).       Lentigines  The "ABCD" rules to observe pigmented lesions were reviewed.      Rosacea  No tx needed           Follow up in about 1 year (around 6/27/2024).  "

## 2023-06-28 ENCOUNTER — PATIENT MESSAGE (OUTPATIENT)
Dept: PRIMARY CARE CLINIC | Facility: CLINIC | Age: 88
End: 2023-06-28
Payer: MEDICARE

## 2023-06-29 ENCOUNTER — TELEPHONE (OUTPATIENT)
Dept: PRIMARY CARE CLINIC | Facility: CLINIC | Age: 88
End: 2023-06-29
Payer: MEDICARE

## 2023-06-29 NOTE — TELEPHONE ENCOUNTER
Pt says she missed a call from you, you wanted to talk to her about her medication, metoprolol succinate

## 2023-06-29 NOTE — TELEPHONE ENCOUNTER
----- Message from Kaylee Verde sent at 6/29/2023 12:26 PM CDT -----  Contact: [phone]  Pt is returning a call she missed from Dr. Dumont. Please call.            Thank you

## 2023-08-28 PROBLEM — Z00.00 PREVENTATIVE HEALTH CARE: Status: RESOLVED | Noted: 2021-05-17 | Resolved: 2023-08-28

## 2023-09-12 DIAGNOSIS — M25.552 BILATERAL HIP PAIN: ICD-10-CM

## 2023-09-12 DIAGNOSIS — M25.512 BILATERAL SHOULDER PAIN, UNSPECIFIED CHRONICITY: Primary | ICD-10-CM

## 2023-09-12 DIAGNOSIS — M25.551 BILATERAL HIP PAIN: ICD-10-CM

## 2023-09-12 DIAGNOSIS — M25.511 BILATERAL SHOULDER PAIN, UNSPECIFIED CHRONICITY: Primary | ICD-10-CM

## 2023-09-12 DIAGNOSIS — M25.561 ACUTE BILATERAL KNEE PAIN: ICD-10-CM

## 2023-09-12 DIAGNOSIS — M25.562 ACUTE BILATERAL KNEE PAIN: ICD-10-CM

## 2023-09-13 ENCOUNTER — LAB VISIT (OUTPATIENT)
Dept: LAB | Facility: HOSPITAL | Age: 88
End: 2023-09-13
Attending: INTERNAL MEDICINE
Payer: MEDICARE

## 2023-09-13 ENCOUNTER — OFFICE VISIT (OUTPATIENT)
Dept: PRIMARY CARE CLINIC | Facility: CLINIC | Age: 88
End: 2023-09-13
Payer: MEDICARE

## 2023-09-13 ENCOUNTER — PATIENT MESSAGE (OUTPATIENT)
Dept: ORTHOPEDICS | Facility: CLINIC | Age: 88
End: 2023-09-13
Payer: MEDICARE

## 2023-09-13 ENCOUNTER — PATIENT MESSAGE (OUTPATIENT)
Dept: PRIMARY CARE CLINIC | Facility: CLINIC | Age: 88
End: 2023-09-13

## 2023-09-13 ENCOUNTER — TELEPHONE (OUTPATIENT)
Dept: PRIMARY CARE CLINIC | Facility: CLINIC | Age: 88
End: 2023-09-13

## 2023-09-13 VITALS
WEIGHT: 176.56 LBS | HEIGHT: 66 IN | BODY MASS INDEX: 28.38 KG/M2 | SYSTOLIC BLOOD PRESSURE: 124 MMHG | OXYGEN SATURATION: 98 % | HEART RATE: 67 BPM | DIASTOLIC BLOOD PRESSURE: 72 MMHG

## 2023-09-13 DIAGNOSIS — I10 PRIMARY HYPERTENSION: ICD-10-CM

## 2023-09-13 DIAGNOSIS — H53.8 BLURRY VISION: ICD-10-CM

## 2023-09-13 DIAGNOSIS — E55.9 VITAMIN D DEFICIENCY: ICD-10-CM

## 2023-09-13 DIAGNOSIS — Z00.00 PREVENTATIVE HEALTH CARE: ICD-10-CM

## 2023-09-13 DIAGNOSIS — R49.0 HOARSENESS: Primary | ICD-10-CM

## 2023-09-13 DIAGNOSIS — I48.0 PAROXYSMAL ATRIAL FIBRILLATION: ICD-10-CM

## 2023-09-13 DIAGNOSIS — R53.81 PHYSICAL DECONDITIONING: ICD-10-CM

## 2023-09-13 DIAGNOSIS — J30.1 SEASONAL ALLERGIC RHINITIS DUE TO POLLEN: ICD-10-CM

## 2023-09-13 DIAGNOSIS — L57.0 MULTIPLE ACTINIC KERATOSES: ICD-10-CM

## 2023-09-13 PROBLEM — M62.81 GENERALIZED MUSCLE WEAKNESS: Status: ACTIVE | Noted: 2023-09-13

## 2023-09-13 LAB
25(OH)D3+25(OH)D2 SERPL-MCNC: 55 NG/ML (ref 30–96)
ALBUMIN SERPL BCP-MCNC: 4.2 G/DL (ref 3.5–5.2)
ALP SERPL-CCNC: 101 U/L (ref 55–135)
ALT SERPL W/O P-5'-P-CCNC: 22 U/L (ref 10–44)
ANION GAP SERPL CALC-SCNC: 11 MMOL/L (ref 8–16)
AST SERPL-CCNC: 28 U/L (ref 10–40)
BASOPHILS # BLD AUTO: 0.06 K/UL (ref 0–0.2)
BASOPHILS NFR BLD: 0.6 % (ref 0–1.9)
BILIRUB SERPL-MCNC: 0.4 MG/DL (ref 0.1–1)
BUN SERPL-MCNC: 21 MG/DL (ref 8–23)
CALCIUM SERPL-MCNC: 9.6 MG/DL (ref 8.7–10.5)
CHLORIDE SERPL-SCNC: 104 MMOL/L (ref 95–110)
CHOLEST SERPL-MCNC: 217 MG/DL (ref 120–199)
CHOLEST/HDLC SERPL: 5.6 {RATIO} (ref 2–5)
CO2 SERPL-SCNC: 23 MMOL/L (ref 23–29)
CREAT SERPL-MCNC: 1.1 MG/DL (ref 0.5–1.4)
DIFFERENTIAL METHOD: ABNORMAL
EOSINOPHIL # BLD AUTO: 0.2 K/UL (ref 0–0.5)
EOSINOPHIL NFR BLD: 2.2 % (ref 0–8)
ERYTHROCYTE [DISTWIDTH] IN BLOOD BY AUTOMATED COUNT: 13.2 % (ref 11.5–14.5)
EST. GFR  (NO RACE VARIABLE): 49.2 ML/MIN/1.73 M^2
GLUCOSE SERPL-MCNC: 89 MG/DL (ref 70–110)
HCT VFR BLD AUTO: 37.5 % (ref 37–48.5)
HDLC SERPL-MCNC: 39 MG/DL (ref 40–75)
HDLC SERPL: 18 % (ref 20–50)
HGB BLD-MCNC: 11.9 G/DL (ref 12–16)
IMM GRANULOCYTES # BLD AUTO: 0.04 K/UL (ref 0–0.04)
IMM GRANULOCYTES NFR BLD AUTO: 0.4 % (ref 0–0.5)
LDLC SERPL CALC-MCNC: 131 MG/DL (ref 63–159)
LYMPHOCYTES # BLD AUTO: 2.6 K/UL (ref 1–4.8)
LYMPHOCYTES NFR BLD: 27 % (ref 18–48)
MCH RBC QN AUTO: 30 PG (ref 27–31)
MCHC RBC AUTO-ENTMCNC: 31.7 G/DL (ref 32–36)
MCV RBC AUTO: 95 FL (ref 82–98)
MONOCYTES # BLD AUTO: 0.8 K/UL (ref 0.3–1)
MONOCYTES NFR BLD: 8.5 % (ref 4–15)
NEUTROPHILS # BLD AUTO: 5.9 K/UL (ref 1.8–7.7)
NEUTROPHILS NFR BLD: 61.3 % (ref 38–73)
NONHDLC SERPL-MCNC: 178 MG/DL
NRBC BLD-RTO: 0 /100 WBC
PLATELET # BLD AUTO: 230 K/UL (ref 150–450)
PMV BLD AUTO: 10.7 FL (ref 9.2–12.9)
POTASSIUM SERPL-SCNC: 4.2 MMOL/L (ref 3.5–5.1)
PROT SERPL-MCNC: 7.9 G/DL (ref 6–8.4)
RBC # BLD AUTO: 3.97 M/UL (ref 4–5.4)
SODIUM SERPL-SCNC: 138 MMOL/L (ref 136–145)
TRIGL SERPL-MCNC: 235 MG/DL (ref 30–150)
WBC # BLD AUTO: 9.58 K/UL (ref 3.9–12.7)

## 2023-09-13 PROCEDURE — 80053 COMPREHEN METABOLIC PANEL: CPT | Mod: HCNC | Performed by: INTERNAL MEDICINE

## 2023-09-13 PROCEDURE — 3074F SYST BP LT 130 MM HG: CPT | Mod: HCNC,CPTII,S$GLB, | Performed by: INTERNAL MEDICINE

## 2023-09-13 PROCEDURE — 1126F AMNT PAIN NOTED NONE PRSNT: CPT | Mod: HCNC,CPTII,S$GLB, | Performed by: INTERNAL MEDICINE

## 2023-09-13 PROCEDURE — 99215 PR OFFICE/OUTPT VISIT, EST, LEVL V, 40-54 MIN: ICD-10-PCS | Mod: HCNC,S$GLB,, | Performed by: INTERNAL MEDICINE

## 2023-09-13 PROCEDURE — 3288F FALL RISK ASSESSMENT DOCD: CPT | Mod: HCNC,CPTII,S$GLB, | Performed by: INTERNAL MEDICINE

## 2023-09-13 PROCEDURE — 1101F PR PT FALLS ASSESS DOC 0-1 FALLS W/OUT INJ PAST YR: ICD-10-PCS | Mod: HCNC,CPTII,S$GLB, | Performed by: INTERNAL MEDICINE

## 2023-09-13 PROCEDURE — 36415 COLL VENOUS BLD VENIPUNCTURE: CPT | Mod: HCNC,PN | Performed by: INTERNAL MEDICINE

## 2023-09-13 PROCEDURE — 84439 ASSAY OF FREE THYROXINE: CPT | Mod: HCNC | Performed by: INTERNAL MEDICINE

## 2023-09-13 PROCEDURE — 3288F PR FALLS RISK ASSESSMENT DOCUMENTED: ICD-10-PCS | Mod: HCNC,CPTII,S$GLB, | Performed by: INTERNAL MEDICINE

## 2023-09-13 PROCEDURE — 1157F ADVNC CARE PLAN IN RCRD: CPT | Mod: HCNC,CPTII,S$GLB, | Performed by: INTERNAL MEDICINE

## 2023-09-13 PROCEDURE — 1157F PR ADVANCE CARE PLAN OR EQUIV PRESENT IN MEDICAL RECORD: ICD-10-PCS | Mod: HCNC,CPTII,S$GLB, | Performed by: INTERNAL MEDICINE

## 2023-09-13 PROCEDURE — 99999 PR PBB SHADOW E&M-EST. PATIENT-LVL V: CPT | Mod: PBBFAC,HCNC,, | Performed by: INTERNAL MEDICINE

## 2023-09-13 PROCEDURE — 1159F MED LIST DOCD IN RCRD: CPT | Mod: HCNC,CPTII,S$GLB, | Performed by: INTERNAL MEDICINE

## 2023-09-13 PROCEDURE — 85025 COMPLETE CBC W/AUTO DIFF WBC: CPT | Mod: HCNC | Performed by: INTERNAL MEDICINE

## 2023-09-13 PROCEDURE — 3078F PR MOST RECENT DIASTOLIC BLOOD PRESSURE < 80 MM HG: ICD-10-PCS | Mod: HCNC,CPTII,S$GLB, | Performed by: INTERNAL MEDICINE

## 2023-09-13 PROCEDURE — 1101F PT FALLS ASSESS-DOCD LE1/YR: CPT | Mod: HCNC,CPTII,S$GLB, | Performed by: INTERNAL MEDICINE

## 2023-09-13 PROCEDURE — 99999 PR PBB SHADOW E&M-EST. PATIENT-LVL V: ICD-10-PCS | Mod: PBBFAC,HCNC,, | Performed by: INTERNAL MEDICINE

## 2023-09-13 PROCEDURE — 80061 LIPID PANEL: CPT | Mod: HCNC | Performed by: INTERNAL MEDICINE

## 2023-09-13 PROCEDURE — 1126F PR PAIN SEVERITY QUANTIFIED, NO PAIN PRESENT: ICD-10-PCS | Mod: HCNC,CPTII,S$GLB, | Performed by: INTERNAL MEDICINE

## 2023-09-13 PROCEDURE — 99417 PR PROLONGED SVC, OUTPT, W/WO DIRECT PT CONTACT,  EA ADDTL 15 MIN: ICD-10-PCS | Mod: HCNC,S$GLB,, | Performed by: INTERNAL MEDICINE

## 2023-09-13 PROCEDURE — 99417 PROLNG OP E/M EACH 15 MIN: CPT | Mod: HCNC,S$GLB,, | Performed by: INTERNAL MEDICINE

## 2023-09-13 PROCEDURE — 84443 ASSAY THYROID STIM HORMONE: CPT | Mod: HCNC | Performed by: INTERNAL MEDICINE

## 2023-09-13 PROCEDURE — 3074F PR MOST RECENT SYSTOLIC BLOOD PRESSURE < 130 MM HG: ICD-10-PCS | Mod: HCNC,CPTII,S$GLB, | Performed by: INTERNAL MEDICINE

## 2023-09-13 PROCEDURE — 99215 OFFICE O/P EST HI 40 MIN: CPT | Mod: HCNC,S$GLB,, | Performed by: INTERNAL MEDICINE

## 2023-09-13 PROCEDURE — 3078F DIAST BP <80 MM HG: CPT | Mod: HCNC,CPTII,S$GLB, | Performed by: INTERNAL MEDICINE

## 2023-09-13 PROCEDURE — 82306 VITAMIN D 25 HYDROXY: CPT | Mod: HCNC | Performed by: INTERNAL MEDICINE

## 2023-09-13 PROCEDURE — 1159F PR MEDICATION LIST DOCUMENTED IN MEDICAL RECORD: ICD-10-PCS | Mod: HCNC,CPTII,S$GLB, | Performed by: INTERNAL MEDICINE

## 2023-09-13 RX ORDER — HYDROQUINONE 40 MG/G
CREAM TOPICAL 2 TIMES DAILY
Qty: 57 G | Refills: 0 | Status: SHIPPED | OUTPATIENT
Start: 2023-09-13 | End: 2023-12-17

## 2023-09-13 NOTE — PATIENT INSTRUCTIONS
Get FLU/COVID/RSV vaccines     Refer to ophthalmology and ENT, Dr. Chatman    Refill Hydroquinone % cream bid     Labs today     Goal BP <130/80  -Check Bps at home weekly and prn symptoms for goal BP <130/80.  -Start healthy diet high in fiber (25-35 gm daily), low fat dairy, lean protein, low in saturated/trans fat (minimize cheese, creamy salad dressings); high in calcium/magnesium/potassium; 2.0 gm sodium diet; low in processed sugars and foods, ie  WHITE sugars, bread, pasta, rice, ice cream, cookies, cake, doughnuts  -Drink 6-8 glasses of water daily  -Moderate exercise 30 min 5 times per week or 75 min intense exercise biweekly   -Start 8-10 hour intermittent fasting diet   -Avoid eating 3 hours prior to bedtime  -Minimize NSAIDs      Refer to PT for Aquatherapy

## 2023-09-13 NOTE — PROGRESS NOTES
[facility] Progress Note      Chief Complaint     Follow-up, Hypertension, Hip Pain, Knee Pain, Shoulder Pain, Cough (Dark Flem and becoming Hoarse), Blurred Vision (Hear getting worst Ring fingers are locking up on both hands, always cold, mobility getting worst, hard to get up when she sit on the floor, ), Vaginal Discharge (Yellow ), trouble sleeping, and Dark spots on skin       Subjective:      History of Present Illness:  Tiffany Masterson is a 87 y.o. female     Presbyopia: her progressive glasses but still can't read fine print     Intermittent shoulder pain: taking gabapentin 200mg and tylenol 1gm qhs but still with limited ROM; has appt with Jenise Baron on 9/25/2023     Insomnia: takes 2-3 hour naps during the day so she has difficulty falling and staying asleep.     Persistent blurry vision with left cataract dx'ed by optometry 6/2023: pt states her progressive glasses do not improve her ability to read without taking off her glasses and would like to be re-evaluated for new glasses and another eval by ophthalmology    HTN with paroxysmal Atrial fib: checks am blood pressures daily with overall BP 98-150s/60-80s on losartan 100mg daily and metoprolol ER 50mg po bid, eliquis 5mg bid without bleeding   -I reviewed her daily log today  -denies CP, SOB, LE edema, palpitations    Chronic AR and Hoarseness x 6 wks; nasal congestion improved with nasal saline and flonase but c/o chronic hoarseness; denies PND, rhinorrhea but remains hoarse     Generalized with bilateral shoulder and hip weakness: has not been able to get up from sitting position on the floor; has difficulty getting off of the toilet without her side bars; had stopped swimming 3 years ago and now more sedentary  -denies falls and refuses ambulatory aide  Has appt with Jenise Baron on 9/25/2023 for f/u    Actinic keratoses on hands and arms: requests refill of Hydroquinone 4% cream  Past Medical History:  Past Medical  History:   Diagnosis Date    Abnormal MRI 10/16/2020    Acute cystitis without hematuria 12/22/2021    Arthritis     Atrial fibrillation     Cataract     Elevated liver enzymes     Endometrial mass 6/29/2018    Epiretinal membrane (ERM) of right eye     Family history of malignant neoplasm of gastrointestinal tract mother    Frequent UTI 9/30/2018    Graves disease     now hypothryoid - no surgery or medicine. resolved on its own    History of cholecystectomy 6/10/2021    History of colonoscopy     unremarkable 2007 by Dr. Javier.  Barium enema revealed diverticular disease.    Hyperlipidemia     Hypertension     Hypertriglyceridemia 4/19/2013    Continue statin for secondary stroke prevention    Hypothyroidism     Impaired functional mobility, balance, gait, and endurance 6/4/2021    Iron deficiency anemia 9/29/2021    Migraine, ophthalmoplegic     Mixed stress and urge urinary incontinence 9/30/2018    Ocular migraine     Personal history of colonic polyps     Preop testing 5/17/2021    S/P colonoscopic polypectomy 6/18/2013    Sleep disorder 11/8/2021    Status post hysteroscopic polypectomy 7/2/2018    TIA (transient ischemic attack)     with a normal angiogram in the past, Ocular migraines reported by patient, not TIA     Transaminitis 3/19/2021    Urethral caruncle 9/15/2020       Past Surgical History:  Past Surgical History:   Procedure Laterality Date    CATARACT EXTRACTION Right 2012    Dr. Lexis Nicolas     COLONOSCOPY      2014 polyps    COLONOSCOPY N/A 11/7/2016    Procedure: COLONOSCOPY;  Surgeon: Bebeto Gonzalez MD;  Location: [address]);  Service: Endoscopy;  Laterality: N/A;    COLONOSCOPY N/A 3/9/2020    Procedure: COLONOSCOPY;  Surgeon: Bebeto Gonzalez MD;  Location: [address]);  Service: Endoscopy;  Laterality: N/A;    COLONOSCOPY N/A 9/29/2021    Procedure: COLONOSCOPY;  Surgeon: Bebeto Gonzalez MD;  Location: [address]);  Service: Endoscopy;  Laterality: N/A;   please schedule patient as soon as possible with me EGD colonoscopy with constipation bowel prep for iron deficiency anemia family history of colon cancer and personal history of colon polyps  9/17/21 ok for standard split prep per Dr. Gonzalez-imani    COLONOSCOPY N/A 9/29/2021    Procedure: COLONOSCOPY;  Surgeon: Bebeto Gonzalez MD;  Location: [facility] (4TH FLR);  Service: Endoscopy;  Laterality: N/A;  Please schedule patient as soon as possible with me EGD colonoscopy with constipation bowel prep for iron deficiency anemia family history of colon cancer and personal history of colon polyps  9/17/21 Ok for standard split prep per Dr. Gonzalez-imani    COLONOSCOPY W/ POLYPECTOMY  6/2013    polyp of colon    ENDOSCOPIC ULTRASOUND OF UPPER GASTROINTESTINAL TRACT N/A 4/29/2021    Procedure: ULTRASOUND, UPPER GI TRACT, ENDOSCOPIC;  Surgeon: Dalton Johnson MD;  Location: [facility] (2ND FLR);  Service: Endoscopy;  Laterality: N/A;  Postprandial nausea vomiting epigastric 0.9 cm stone and sludge seen within the gallbladder lumen.  No wall thickening or pericholecystic fluid.  0.6 cm stone seen within the distal common bile duct at the level of the pancreatic head.  There is dil    ERCP N/A 4/29/2021    Procedure: ERCP (ENDOSCOPIC RETROGRADE CHOLANGIOPANCREATOGRAPHY);  Surgeon: Dalton Johnson MD;  Location: [facility] (2ND FLR);  Service: Endoscopy;  Laterality: N/A;  Postprandial nausea and vomit 0.9 cm stone and sludge seen within the gallbladder lumen.  No wall thickening or pericholecystic fluid.  0.6 cm stone seen within the distal common bile duct at the level of the pancreatic head.  There i    ESOPHAGOGASTRODUODENOSCOPY N/A 9/29/2021    Procedure: EGD (ESOPHAGOGASTRODUODENOSCOPY);  Surgeon: Bebeto Gonzalez MD;  Location: [facility] (4TH FLR);  Service: Endoscopy;  Laterality: N/A;  rapid covid test arrival 12pm - sm  9/27 arrival time for covid test/procedure confirmed with pt-rb    ESOPHAGOGASTRODUODENOSCOPY N/A  9/29/2021    Procedure: EGD (ESOPHAGOGASTRODUODENOSCOPY);  Surgeon: Bebeto Gonzalez MD;  Location: [facility] ENDO (4TH FLR);  Service: Endoscopy;  Laterality: N/A;  covid test 9/19/21 [facility], prep instr emailed -ml    EYE SURGERY  11-10-14    right retina/Dr. Dalton Sierra ([facility])    HYSTEROSCOPIC POLYPECTOMY OF UTERUS N/A 7/2/2018    Procedure: POLYPECTOMY, UTERUS, HYSTEROSCOPIC;  Surgeon: Elliot Vanessa IV, MD;  Location: [facility] OR;  Service: OB/GYN;  Laterality: N/A;    INJECTION OF JOINT Right 11/11/2022    Procedure: INJECTION, RIGHT GTB CONTRAST DIRECT REFERRAL;  Surgeon: Camden Hernandez MD;  Location: [facility] PAIN MGT;  Service: Pain Management;  Laterality: Right;    JOINT REPLACEMENT  3/23/2011    left total hip replacement    LAPAROSCOPIC CHOLECYSTECTOMY N/A 5/17/2021    Procedure: CHOLECYSTECTOMY, LAPAROSCOPIC;  Surgeon: Rayshawn Peralta Jr., MD;  Location: [facility] OR 2ND FLR;  Service: General;  Laterality: N/A;    REMOVAL OF EPIRETINAL MEMBRANE Right     Dr. Padron    TONSILLECTOMY      pt was 11 years old       Allergies:  Review of patient's allergies indicates:   Allergen Reactions    Levaquin [levofloxacin]      Joint pain    Hydrochlorothiazide Other (See Comments)     Pain in joints and depression per pt       Home Medications:  Current Outpatient Medications   Medication Sig Dispense Refill    apixaban (ELIQUIS) 5 mg Tab Take 1 tablet (5 mg total) by mouth 2 (two) times daily. 180 tablet 3    ascorbic acid, vitamin C, (VITAMIN C) 250 MG tablet Take 250 mg by mouth once daily.      b complex vitamins capsule Take 1 capsule by mouth once daily.      bisacodyL (DULCOLAX) 5 mg EC tablet Take 5 mg by mouth once daily.      CALCIUM CARBONATE/VITAMIN D3 (CALCIUM 600 + D,3, ORAL) Take 1 tablet by mouth once daily.       cholecalciferol, vitamin D3, (VITAMIN D3) 50 mcg (2,000 unit) Cap Take 1 capsule by mouth once daily.      coenzyme Q10 200 mg capsule Take 200 mg by mouth once daily.      DEXTRAN  70/HYPROMELLOSE (ARTIFICIAL TEARS, PF, OPHT) Place 1 drop into both eyes as needed.      fluticasone propionate (FLONASE) 50 mcg/actuation nasal spray Clean sinuses/nares with ocean nasal spray then use flonase 1 sq bid 16 g 3    gabapentin (NEURONTIN) 100 MG capsule Take 1 capsule (100 mg total) by mouth 3 (three) times daily. 90 capsule 3    levothyroxine (SYNTHROID) 100 MCG tablet Take 1 tablet (100 mcg total) by mouth before breakfast. 90 tablet 3    losartan (COZAAR) 100 MG tablet Take 1 tablet (100 mg total) by mouth once daily. 90 tablet 3    magnesium oxide 400 mg magnesium Tab Take 1 tablet by mouth once daily.      metoprolol succinate (TOPROL-XL) 50 MG 24 hr tablet Take 1 tablet (50 mg total) by mouth 2 (two) times daily. 180 tablet 3    MULTIVITAMIN W-MINERALS/LUTEIN (CENTRUM SILVER ORAL) Take 1 tablet by mouth once daily.      sodium chloride (SALINE NASAL) 0.65 % nasal spray 1 spray by Nasal route as needed for Congestion. 104 mL 3    vit A/vit C/vit E/zinc/copper (ICAPS AREDS ORAL) Take 1 tablet by mouth 2 (two) times a day.      hydroquinone 4 % Crea Apply topically 2 (two) times daily. 57 g 0     No current facility-administered medications for this visit.        Family History:  Family History   Problem Relation Age of Onset    Colon cancer Mother 75    Lung cancer Father 75    Diabetes Other     Diabetes Paternal Aunt     Colon cancer Paternal Aunt 80    Prostate cancer Brother     Breast cancer Neg Hx     Ovarian cancer Neg Hx     Celiac disease Neg Hx     Cataracts Neg Hx     Glaucoma Neg Hx     Macular degeneration Neg Hx        Social History:  Social History     Tobacco Use    Smoking status: Never    Smokeless tobacco: Never    Tobacco comments:     The patient is not getting any regular exercise at this time.  She does enjoy traveling to [place].   Substance Use Topics    Alcohol use: Yes     Comment: occasionally    Drug use: No       Review of Systems:  Pertinent positives and negatives  "listed in HPI. All other systems are reviewed and are negative.    Health Maintaince :   Health Maintenance Topics with due status: Not Due       Topic Last Completion Date    TETANUS VACCINE 09/20/2018    Colonoscopy 09/29/2021    DEXA Scan 12/02/2022    Lipid Panel 09/13/2023           Eye: UTD with optometry 6/2023  Dental: UTD    Immunizations:     The ASCVD Risk score (Emeka CONTRERAS, et al., 2019) failed to calculate for the following reasons:    The 2019 ASCVD risk score is only valid for ages 40 to 79      Objective:   /72   Pulse 67   Ht 5' 6" (1.676 m)   Wt 80.1 kg (176 lb 9.4 oz)   SpO2 98%   BMI 28.50 kg/m²      Body mass index is 28.5 kg/m².       Physical Examination:  General: Alert and awake in no apparent distress  HEENT: Normocephalic and atraumatic; Tms WNL; right nasal congestion  Eyes:  PERRL; EOMi with anicteric sclera and clear conjunctivae  Mouth:  Oropharynx clear and without exudate; moist mucous membranes; no OP cobblestoning  Neck:   Cervical nodes not enlarged; supple; no bruits  Cardio:  Regular rate and rhythm with normal S1 and S2; no murmurs or rubs  Resp:  CTAB; respirations unlabored; no wheezes, crackles or rhonchi  Abdom: Soft, NTND with normoactive bowel sounds; negative HSM  Extrem: Warm and well-perfused with no clubbing, cyanosis or edema  Skin:  Diffuse light brown hyperpigmented 3-5 mm macules on dorsum of hands and forearms   Pulses:  2+ and symmetric distally  Neuro:  AAOx3; cooperative and pleasant with no focal deficits    Laboratory:      Most Recent Data:  Lab Results   Component Value Date    WBC 9.58 09/13/2023    HGB 11.9 (L) 09/13/2023    HCT 37.5 09/13/2023     09/13/2023    CHOL 217 (H) 09/13/2023    TRIG 235 (H) 09/13/2023    HDL 39 (L) 09/13/2023    ALT 22 09/13/2023    AST 28 09/13/2023     09/13/2023    K 4.2 09/13/2023     09/13/2023    BUN 21 09/13/2023    CO2 23 09/13/2023    TSH 2.776 12/02/2022    INR 1.0 05/09/2013    HGBA1C " 5.8 (H) 08/31/2020              CBC:   WBC   Date Value Ref Range Status   09/13/2023 9.58 3.90 - 12.70 K/uL Final     Hemoglobin   Date Value Ref Range Status   09/13/2023 11.9 (L) 12.0 - 16.0 g/dL Final     Hematocrit   Date Value Ref Range Status   09/13/2023 37.5 37.0 - 48.5 % Final     Platelets   Date Value Ref Range Status   09/13/2023 230 150 - 450 K/uL Final     MCV   Date Value Ref Range Status   09/13/2023 95 82 - 98 fL Final     RDW   Date Value Ref Range Status   09/13/2023 13.2 11.5 - 14.5 % Final     BMP:   Sodium   Date Value Ref Range Status   09/13/2023 138 136 - 145 mmol/L Final     Potassium   Date Value Ref Range Status   09/13/2023 4.2 3.5 - 5.1 mmol/L Final     Chloride   Date Value Ref Range Status   09/13/2023 104 95 - 110 mmol/L Final     CO2   Date Value Ref Range Status   09/13/2023 23 23 - 29 mmol/L Final     BUN   Date Value Ref Range Status   09/13/2023 21 8 - 23 mg/dL Final     Creatinine   Date Value Ref Range Status   09/13/2023 1.1 0.5 - 1.4 mg/dL Final     Glucose   Date Value Ref Range Status   09/13/2023 89 70 - 110 mg/dL Final     Calcium   Date Value Ref Range Status   09/13/2023 9.6 8.7 - 10.5 mg/dL Final     Magnesium   Date Value Ref Range Status   12/21/2021 2.1 1.6 - 2.6 mg/dL Final     Phosphorus   Date Value Ref Range Status   06/21/2013 2.3 (L) 2.7 - 4.5 mg/dl Final     LFTs:   Total Protein   Date Value Ref Range Status   09/13/2023 7.9 6.0 - 8.4 g/dL Final     Albumin   Date Value Ref Range Status   09/13/2023 4.2 3.5 - 5.2 g/dL Final     Total Bilirubin   Date Value Ref Range Status   09/13/2023 0.4 0.1 - 1.0 mg/dL Final     Comment:     For infants and newborns, interpretation of results should be based  on gestational age, weight and in agreement with clinical  observations.    Premature Infant recommended reference ranges:  Up to 24 hours.............<8.0 mg/dL  Up to 48 hours............<12.0 mg/dL  3-5 days..................<15.0 mg/dL  6-29  "days.................<15.0 mg/dL       AST   Date Value Ref Range Status   09/13/2023 28 10 - 40 U/L Final     Alkaline Phosphatase   Date Value Ref Range Status   09/13/2023 101 55 - 135 U/L Final     ALT   Date Value Ref Range Status   09/13/2023 22 10 - 44 U/L Final     Coags:   INR   Date Value Ref Range Status   05/09/2013 1.0 0.8 - 1.2 Final     Comment:     ACCP Guideline for Coumadin usage recommends a "target range" of  2.0 - 3.0 for INR for all indicators except mechanical heart valves  and antiphospholipid syndromes which should use 2.5 - 3.5.  .     FLP:      Lab Results   Component Value Date    CHOL 217 (H) 09/13/2023    CHOL 214 (H) 12/02/2022    CHOL 156 08/01/2020     Lab Results   Component Value Date    HDL 39 (L) 09/13/2023    HDL 54 12/02/2022    HDL 41 08/01/2020     Lab Results   Component Value Date    LDLCALC 131.0 09/13/2023    LDLCALC 137.6 12/02/2022    LDLCALC 58.2 (L) 08/01/2020     Lab Results   Component Value Date    TRIG 235 (H) 09/13/2023    TRIG 112 12/02/2022    TRIG 284 (H) 08/01/2020     Lab Results   Component Value Date    CHOLHDL 18.0 (L) 09/13/2023    CHOLHDL 25.2 12/02/2022    CHOLHDL 26.3 08/01/2020      DM:      Hemoglobin A1C   Date Value Ref Range Status   08/31/2020 5.8 (H) 4.0 - 5.6 % Final     Comment:     ADA Screening Guidelines:  5.7-6.4%  Consistent with prediabetes  >or=6.5%  Consistent with diabetes  High levels of fetal hemoglobin interfere with the HbA1C  assay. Heterozygous hemoglobin variants (HbS, HgC, etc)do  not significantly interfere with this assay.   However, presence of multiple variants may affect accuracy.       LDL Cholesterol   Date Value Ref Range Status   09/13/2023 131.0 63.0 - 159.0 mg/dL Final     Comment:     The National Cholesterol Education Program (NCEP) has set the  following guidelines (reference values) for LDL Cholesterol:  Optimal.......................<130 mg/dL  Borderline High...............130-159 " mg/dL  High..........................160-189 mg/dL  Very High.....................>190 mg/dL       Creatinine   Date Value Ref Range Status   09/13/2023 1.1 0.5 - 1.4 mg/dL Final     Thyroid:   TSH   Date Value Ref Range Status   12/02/2022 2.776 0.400 - 4.000 uIU/mL Final     Free T4   Date Value Ref Range Status   12/02/2016 1.05 0.71 - 1.51 ng/dL Final     Anemia:   Iron   Date Value Ref Range Status   09/18/2021 47 30 - 160 ug/dL Final     TIBC   Date Value Ref Range Status   09/18/2021 330 250 - 450 ug/dL Final     Ferritin   Date Value Ref Range Status   09/18/2021 251 20.0 - 300.0 ng/mL Final     Vitamin B-12   Date Value Ref Range Status   12/02/2022 652 210 - 950 pg/mL Final     Cardiac:   Troponin I   Date Value Ref Range Status   12/21/2021 0.014 0.000 - 0.026 ng/mL Final     Comment:     The reference interval for Troponin I represents the 99th percentile   cutoff   for our facility and is consistent with 3rd generation assay   performance.       BNP   Date Value Ref Range Status   12/21/2021 184 (H) 0 - 99 pg/mL Final     Comment:     Values of less than 100 pg/ml are consistent with non-CHF populations.     Urinalysis:   Urine Culture, Routine   Date Value Ref Range Status   12/22/2021   Final    Multiple organisms isolated. None in predominance.  Repeat if   12/22/2021 clinically necessary.  Final     Color, UA   Date Value Ref Range Status   01/10/2022 Yellow Yellow, Straw, [place] Final     Clarity, UA   Date Value Ref Range Status   10/29/2020   Final     Comment:     normal      Specific Gravity, UA   Date Value Ref Range Status   01/10/2022 1.020 1.005 - 1.030 Final     Nitrite, UA   Date Value Ref Range Status   01/10/2022 Negative Negative Final     Ketones, UA   Date Value Ref Range Status   01/10/2022 Negative Negative Final     Urobilinogen, UA   Date Value Ref Range Status   10/29/2020   Final     Comment:     normal   09/14/2018 Negative <2.0 EU/dL Final     WBC, UA   Date Value Ref Range  Status   01/10/2022 1 0 - 5 /hpf Final       Other Results:  EKG (my interpretation):     Radiology:  Mammo Digital Screening Bilat w/ Bill  Narrative: Result:  Mammo Digital Screening Bilat w/ Bill    History:  Patient is 87 y.o. and is seen for a screening mammogram.    Films Compared:  Prior images (if available) were compared.     Findings:   This procedure was performed using tomosynthesis.   Computer-aided detection was utilized in the interpretation of this   examination.    The breasts have scattered areas of fibroglandular density. There is no   evidence of suspicious masses, microcalcifications or architectural   distortion.  Impression:    No mammographic evidence of malignancy.    BI-RADS Category 1: Negative    Recommendation:  Routine screening mammogram in 1 year, or as clinically indicated.          Assessment/Plan     Tiffany Masterson is a 87 y.o. female with:  1. Hoarseness  -     Ambulatory referral/consult to ENT; Future; Expected date: 09/13/2023    2. Preventative health care  Assessment & Plan:  Get FLU/COVID/RSV vaccines     Refer to ophthalmology and ENT, Dr. Chatman    Refill Hydroquinone % cream bid     Labs today     Goal BP <130/80  -Check Bps at home weekly and prn symptoms for goal BP <130/80.  -Start healthy diet high in fiber (25-35 gm daily), low fat dairy, lean protein, low in saturated/trans fat (minimize cheese, creamy salad dressings); high in calcium/magnesium/potassium; 2.0 gm sodium diet; low in processed sugars and foods, ie  WHITE sugars, bread, pasta, rice, ice cream, cookies, cake, doughnuts  -Drink 6-8 glasses of water daily  -Moderate exercise 30 min 5 times per week or 75 min intense exercise biweekly   -Start 8-10 hour intermittent fasting diet   -Avoid eating 3 hours prior to bedtime  -Minimize NSAIDs              3. Vitamin D deficiency  -     Vitamin D; Future; Expected date: 09/13/2023    4. Primary hypertension  Assessment & Plan:  Stable on Losartan 100mg po  qhs  -Check Bps at home qod  and prn symptoms for goal BP <130/80.  -Start healthy diet high in fiber (25-35 gm daily), low fat dairy, lean protein, low in saturated/trans fat (minimize cheese, creamy salad dressings); high in calcium/magnesium/potassium; 2.0 gm sodium diet; low in processed sugars and foods, ie  WHITE sugars, bread, pasta, rice, ice cream, cookies, cake, doughnuts  -Drink 6-8 glasses of water daily  -Moderate exercise 30 min 5 times per week or 75 min intense exercise biweekly   -Start 8-10 hour intermittent fasting diet   -Avoid eating 3 hours prior to bedtime  --Minimize NSAIDs        Orders:  -     Lipid Panel; Future; Expected date: 09/13/2023  -     Urinalysis; Future; Expected date: 09/13/2023  -     Comprehensive Metabolic Panel; Future; Expected date: 09/13/2023  -     CBC Auto Differential; Future; Expected date: 09/13/2023  -     Microalbumin/Creatinine Ratio, Urine; Future; Expected date: 09/13/2023    5. Blurry vision  Overview:  Left cataract +1 6/2023    Assessment & Plan:  Persistent despite progressive glasses - refer to ophthlamology    Orders:  -     Ambulatory referral/consult to Ophthalmology; Future; Expected date: 09/13/2023    6. Paroxysmal atrial fibrillation  Assessment & Plan:  Now in NSR with HR 60s on BB and eliquis 5mg po bid   -no evidence of bleeding      7. Seasonal allergic rhinitis due to pollen  Assessment & Plan:  Cont ocean nasal spray and flonase 1 sq bid      8. Physical deconditioning  Assessment & Plan:  Refer for Aquatherapy to strengthen UE and LE  Start swimming again on days when not going to PT  Pt refuses cane or walker at this time.     Orders:  -     Ambulatory referral/consult to Physical/Occupational Therapy; Future; Expected date: 09/20/2023    9. Multiple actinic keratoses  Assessment & Plan:  Apply hydroquinone 4% cream bid     Orders:  -     hydroquinone 4 % Crea; Apply topically 2 (two) times daily.  Dispense: 57 g; Refill: 0             I  spent a total of 73 minutes on the day of the visit.This includes face to face time and non-face to face time preparing to see the patient (eg, review of tests), obtaining and/or reviewing separately obtained history, documenting clinical information in the electronic or other health record, independently interpreting results and communicating results to the patient/family/caregiver, or care coordinator.   Code Status:     Rebeca Dumont MD

## 2023-09-13 NOTE — ASSESSMENT & PLAN NOTE
Get FLU/COVID/RSV vaccines     Refer to ophthalmology and ENT, Dr. Chatman    Refill Hydroquinone % cream bid     Labs today     Goal BP <130/80  -Check Bps at home weekly and prn symptoms for goal BP <130/80.  -Start healthy diet high in fiber (25-35 gm daily), low fat dairy, lean protein, low in saturated/trans fat (minimize cheese, creamy salad dressings); high in calcium/magnesium/potassium; 2.0 gm sodium diet; low in processed sugars and foods, ie  WHITE sugars, bread, pasta, rice, ice cream, cookies, cake, doughnuts  -Drink 6-8 glasses of water daily  -Moderate exercise 30 min 5 times per week or 75 min intense exercise biweekly   -Start 8-10 hour intermittent fasting diet   -Avoid eating 3 hours prior to bedtime  -Minimize NSAIDs

## 2023-09-14 LAB
T4 FREE SERPL-MCNC: 0.99 NG/DL (ref 0.71–1.51)
TSH SERPL DL<=0.005 MIU/L-ACNC: 4.15 UIU/ML (ref 0.4–4)

## 2023-09-14 NOTE — ASSESSMENT & PLAN NOTE
Stable on Losartan 100mg po qhs  -Check Bps at home qod  and prn symptoms for goal BP <130/80.  -Start healthy diet high in fiber (25-35 gm daily), low fat dairy, lean protein, low in saturated/trans fat (minimize cheese, creamy salad dressings); high in calcium/magnesium/potassium; 2.0 gm sodium diet; low in processed sugars and foods, ie  WHITE sugars, bread, pasta, rice, ice cream, cookies, cake, doughnuts  -Drink 6-8 glasses of water daily  -Moderate exercise 30 min 5 times per week or 75 min intense exercise biweekly   -Start 8-10 hour intermittent fasting diet   -Avoid eating 3 hours prior to bedtime  --Minimize NSAIDs

## 2023-09-14 NOTE — ASSESSMENT & PLAN NOTE
Refer for Aquatherapy to strengthen UE and LE  Start swimming again on days when not going to PT  Pt refuses cane or walker at this time.

## 2023-09-15 ENCOUNTER — PATIENT MESSAGE (OUTPATIENT)
Dept: ORTHOPEDICS | Facility: CLINIC | Age: 88
End: 2023-09-15
Payer: MEDICARE

## 2023-09-15 ENCOUNTER — PATIENT MESSAGE (OUTPATIENT)
Dept: PRIMARY CARE CLINIC | Facility: CLINIC | Age: 88
End: 2023-09-15
Payer: MEDICARE

## 2023-09-16 ENCOUNTER — TELEPHONE (OUTPATIENT)
Dept: PRIMARY CARE CLINIC | Facility: CLINIC | Age: 88
End: 2023-09-16
Payer: MEDICARE

## 2023-09-16 DIAGNOSIS — E03.9 HYPOTHYROIDISM, UNSPECIFIED TYPE: Primary | ICD-10-CM

## 2023-09-16 DIAGNOSIS — E78.2 MIXED HYPERLIPIDEMIA: Primary | ICD-10-CM

## 2023-09-16 DIAGNOSIS — E78.5 HYPERLIPIDEMIA, UNSPECIFIED HYPERLIPIDEMIA TYPE: Primary | ICD-10-CM

## 2023-09-16 RX ORDER — ATORVASTATIN CALCIUM 10 MG/1
10 TABLET, FILM COATED ORAL DAILY
Qty: 90 TABLET | Refills: 3 | Status: SHIPPED | OUTPATIENT
Start: 2023-09-16 | End: 2024-09-15

## 2023-09-16 RX ORDER — LEVOTHYROXINE SODIUM 112 UG/1
112 TABLET ORAL
Qty: 90 TABLET | Refills: 3 | Status: SHIPPED | OUTPATIENT
Start: 2023-09-16

## 2023-09-20 DIAGNOSIS — M25.551 RIGHT HIP PAIN: ICD-10-CM

## 2023-09-20 DIAGNOSIS — M25.512 BILATERAL SHOULDER PAIN, UNSPECIFIED CHRONICITY: ICD-10-CM

## 2023-09-20 DIAGNOSIS — M25.562 ACUTE BILATERAL KNEE PAIN: Primary | ICD-10-CM

## 2023-09-20 DIAGNOSIS — M25.511 BILATERAL SHOULDER PAIN, UNSPECIFIED CHRONICITY: ICD-10-CM

## 2023-09-20 DIAGNOSIS — M25.561 ACUTE BILATERAL KNEE PAIN: Primary | ICD-10-CM

## 2023-09-20 DIAGNOSIS — M25.511 RIGHT SHOULDER PAIN, UNSPECIFIED CHRONICITY: ICD-10-CM

## 2023-09-20 DIAGNOSIS — M25.551 BILATERAL HIP PAIN: ICD-10-CM

## 2023-09-20 DIAGNOSIS — M25.552 BILATERAL HIP PAIN: ICD-10-CM

## 2023-09-20 DIAGNOSIS — M25.561 RIGHT KNEE PAIN, UNSPECIFIED CHRONICITY: Primary | ICD-10-CM

## 2023-09-27 ENCOUNTER — HOSPITAL ENCOUNTER (OUTPATIENT)
Dept: RADIOLOGY | Facility: HOSPITAL | Age: 88
Discharge: HOME | End: 2023-09-27
Attending: PHYSICIAN ASSISTANT
Payer: MEDICARE

## 2023-09-27 ENCOUNTER — OFFICE VISIT (OUTPATIENT)
Dept: ORTHOPEDICS | Facility: CLINIC | Age: 88
End: 2023-09-27
Payer: MEDICARE

## 2023-09-27 VITALS — WEIGHT: 180.81 LBS | BODY MASS INDEX: 29.06 KG/M2 | HEIGHT: 66 IN

## 2023-09-27 DIAGNOSIS — M19.012 PRIMARY OSTEOARTHRITIS OF BOTH SHOULDERS: Primary | ICD-10-CM

## 2023-09-27 DIAGNOSIS — M25.552 BILATERAL HIP PAIN: ICD-10-CM

## 2023-09-27 DIAGNOSIS — M16.11 PRIMARY OSTEOARTHRITIS OF RIGHT HIP: ICD-10-CM

## 2023-09-27 DIAGNOSIS — M25.551 BILATERAL HIP PAIN: ICD-10-CM

## 2023-09-27 DIAGNOSIS — M19.011 PRIMARY OSTEOARTHRITIS OF BOTH SHOULDERS: Primary | ICD-10-CM

## 2023-09-27 DIAGNOSIS — M25.562 ACUTE BILATERAL KNEE PAIN: ICD-10-CM

## 2023-09-27 DIAGNOSIS — M25.511 BILATERAL SHOULDER PAIN, UNSPECIFIED CHRONICITY: ICD-10-CM

## 2023-09-27 DIAGNOSIS — M17.0 PRIMARY OSTEOARTHRITIS OF BOTH KNEES: ICD-10-CM

## 2023-09-27 DIAGNOSIS — M25.561 ACUTE BILATERAL KNEE PAIN: ICD-10-CM

## 2023-09-27 DIAGNOSIS — M25.512 BILATERAL SHOULDER PAIN, UNSPECIFIED CHRONICITY: ICD-10-CM

## 2023-09-27 PROCEDURE — 1157F PR ADVANCE CARE PLAN OR EQUIV PRESENT IN MEDICAL RECORD: ICD-10-PCS | Mod: HCNC,CPTII,S$GLB, | Performed by: PHYSICIAN ASSISTANT

## 2023-09-27 PROCEDURE — 73030 XR SHOULDER COMPLETE 2 OR MORE VIEWS BILATERAL: ICD-10-PCS | Mod: 26,50,HCNC, | Performed by: RADIOLOGY

## 2023-09-27 PROCEDURE — 1159F MED LIST DOCD IN RCRD: CPT | Mod: HCNC,CPTII,S$GLB, | Performed by: PHYSICIAN ASSISTANT

## 2023-09-27 PROCEDURE — 3288F FALL RISK ASSESSMENT DOCD: CPT | Mod: HCNC,CPTII,S$GLB, | Performed by: PHYSICIAN ASSISTANT

## 2023-09-27 PROCEDURE — 73521 X-RAY EXAM HIPS BI 2 VIEWS: CPT | Mod: 26,HCNC,, | Performed by: RADIOLOGY

## 2023-09-27 PROCEDURE — 99214 OFFICE O/P EST MOD 30 MIN: CPT | Mod: 25,HCNC,S$GLB, | Performed by: PHYSICIAN ASSISTANT

## 2023-09-27 PROCEDURE — 20610 PR DRAIN/INJECT LARGE JOINT/BURSA: ICD-10-PCS | Mod: 50,HCNC,S$GLB, | Performed by: PHYSICIAN ASSISTANT

## 2023-09-27 PROCEDURE — 73030 X-RAY EXAM OF SHOULDER: CPT | Mod: 26,50,HCNC, | Performed by: RADIOLOGY

## 2023-09-27 PROCEDURE — 1125F PR PAIN SEVERITY QUANTIFIED, PAIN PRESENT: ICD-10-PCS | Mod: HCNC,CPTII,S$GLB, | Performed by: PHYSICIAN ASSISTANT

## 2023-09-27 PROCEDURE — 1101F PT FALLS ASSESS-DOCD LE1/YR: CPT | Mod: HCNC,CPTII,S$GLB, | Performed by: PHYSICIAN ASSISTANT

## 2023-09-27 PROCEDURE — 73564 X-RAY EXAM KNEE 4 OR MORE: CPT | Mod: 26,50,HCNC, | Performed by: RADIOLOGY

## 2023-09-27 PROCEDURE — 3288F PR FALLS RISK ASSESSMENT DOCUMENTED: ICD-10-PCS | Mod: HCNC,CPTII,S$GLB, | Performed by: PHYSICIAN ASSISTANT

## 2023-09-27 PROCEDURE — 99214 PR OFFICE/OUTPT VISIT, EST, LEVL IV, 30-39 MIN: ICD-10-PCS | Mod: 25,HCNC,S$GLB, | Performed by: PHYSICIAN ASSISTANT

## 2023-09-27 PROCEDURE — 73564 X-RAY EXAM KNEE 4 OR MORE: CPT | Mod: TC,50,HCNC

## 2023-09-27 PROCEDURE — 1101F PR PT FALLS ASSESS DOC 0-1 FALLS W/OUT INJ PAST YR: ICD-10-PCS | Mod: HCNC,CPTII,S$GLB, | Performed by: PHYSICIAN ASSISTANT

## 2023-09-27 PROCEDURE — 73521 XR HIPS BILATERAL 2 VIEW INCL AP PELVIS: ICD-10-PCS | Mod: 26,HCNC,, | Performed by: RADIOLOGY

## 2023-09-27 PROCEDURE — 99999 PR PBB SHADOW E&M-EST. PATIENT-LVL IV: CPT | Mod: PBBFAC,HCNC,, | Performed by: PHYSICIAN ASSISTANT

## 2023-09-27 PROCEDURE — 73521 X-RAY EXAM HIPS BI 2 VIEWS: CPT | Mod: TC,HCNC

## 2023-09-27 PROCEDURE — 73030 X-RAY EXAM OF SHOULDER: CPT | Mod: TC,50,HCNC

## 2023-09-27 PROCEDURE — 20610 DRAIN/INJ JOINT/BURSA W/O US: CPT | Mod: 50,HCNC,S$GLB, | Performed by: PHYSICIAN ASSISTANT

## 2023-09-27 PROCEDURE — 1125F AMNT PAIN NOTED PAIN PRSNT: CPT | Mod: HCNC,CPTII,S$GLB, | Performed by: PHYSICIAN ASSISTANT

## 2023-09-27 PROCEDURE — 1159F PR MEDICATION LIST DOCUMENTED IN MEDICAL RECORD: ICD-10-PCS | Mod: HCNC,CPTII,S$GLB, | Performed by: PHYSICIAN ASSISTANT

## 2023-09-27 PROCEDURE — 99999 PR PBB SHADOW E&M-EST. PATIENT-LVL IV: ICD-10-PCS | Mod: PBBFAC,HCNC,, | Performed by: PHYSICIAN ASSISTANT

## 2023-09-27 PROCEDURE — 1157F ADVNC CARE PLAN IN RCRD: CPT | Mod: HCNC,CPTII,S$GLB, | Performed by: PHYSICIAN ASSISTANT

## 2023-09-27 PROCEDURE — 73564 XR KNEE ORTHO BILAT WITH FLEXION: ICD-10-PCS | Mod: 26,50,HCNC, | Performed by: RADIOLOGY

## 2023-09-27 RX ORDER — TRIAMCINOLONE ACETONIDE 40 MG/ML
80 INJECTION, SUSPENSION INTRA-ARTICULAR; INTRAMUSCULAR
Status: COMPLETED | OUTPATIENT
Start: 2023-09-27 | End: 2023-09-27

## 2023-09-27 RX ORDER — TRAMADOL HYDROCHLORIDE 50 MG/1
50 TABLET ORAL EVERY 6 HOURS PRN
Qty: 42 TABLET | Refills: 0 | Status: SHIPPED | OUTPATIENT
Start: 2023-09-27 | End: 2023-09-27

## 2023-09-27 RX ORDER — TRAMADOL HYDROCHLORIDE 50 MG/1
50 TABLET ORAL EVERY 6 HOURS PRN
Qty: 42 TABLET | Refills: 0 | Status: SHIPPED | OUTPATIENT
Start: 2023-09-27 | End: 2023-10-07

## 2023-09-27 RX ADMIN — TRIAMCINOLONE ACETONIDE 80 MG: 40 INJECTION, SUSPENSION INTRA-ARTICULAR; INTRAMUSCULAR at 08:09

## 2023-09-27 NOTE — PROGRESS NOTES
"  DATE: 9/27/2023  PATIENT: Tiffany Masterson    Attending Physician: Michael De Dios M.D.    HISTORY:  Tiffany Masterson is a 87 y.o. female who returns to me today for evaluation of bilateral shoulder pain, bilateral knee pain and right hip pain.  She was last seen by me 2/22/2023.  Today she is doing well but notes she is having a lot of shoulder pain, particularly at night.      The Patient denies myelopathic symptoms such as handwriting changes or difficulty with buttons/coins/keys. Denies perineal paresthesias, bowel/bladder dysfunction.      EXAM:  Ht 5' 6" (1.676 m)   Wt 82 kg (180 lb 12.8 oz)   BMI 29.18 kg/m²     Physical exam stable. Neuro exam stable.       IMAGING:    Today I personally reviewed imaging of the hips, knees and shoulders.  There is DJD in the bilateral shoulders, right hip and bilateral knees.     Body mass index is 29.18 kg/m².    Hemoglobin A1C   Date Value Ref Range Status   08/31/2020 5.8 (H) 4.0 - 5.6 % Final     Comment:     ADA Screening Guidelines:  5.7-6.4%  Consistent with prediabetes  >or=6.5%  Consistent with diabetes  High levels of fetal hemoglobin interfere with the HbA1C  assay. Heterozygous hemoglobin variants (HbS, HgC, etc)do  not significantly interfere with this assay.   However, presence of multiple variants may affect accuracy.           ASSESSMENT/PLAN:    Tiffany was seen today for shoulder pain, knee pain and hip pain.    Diagnoses and all orders for this visit:    Primary osteoarthritis of both shoulders    Primary osteoarthritis of both knees    Primary osteoarthritis of right hip    Other orders  -     triamcinolone acetonide injection 80 mg        Today we discussed options.  We will do steroid injections in the bilateral shoulders today.  She will think about a hip replacement and let me know what she decides.      PROCEDURE:  I have explained the risks, benefits, and alternatives of the procedure in detail.  The patient voices understanding and all questions " have been answered.  Verbal consent obtained and the patient agrees to proceed as planned.  So after I performed a sterile prep of the skin in the normal fashion the bilateral shoulder is injected from the posterior approach using a 22 gauge needle with a combination of 4cc 1% plain lidocaine and 40 mg of kenalog. The patient is cautioned an immediate relief of pain is secondary to the local anesthetic and will be temporary.  After the anesthetic wears off there may be a increase in pain that may last for a few hours or a few days and they should use ice to help alleviate this flair up of pain.

## 2023-11-06 ENCOUNTER — LAB VISIT (OUTPATIENT)
Dept: LAB | Facility: HOSPITAL | Age: 88
End: 2023-11-06
Attending: INTERNAL MEDICINE
Payer: MEDICARE

## 2023-11-06 DIAGNOSIS — E78.5 HYPERLIPIDEMIA, UNSPECIFIED HYPERLIPIDEMIA TYPE: ICD-10-CM

## 2023-11-06 LAB
ALBUMIN SERPL BCP-MCNC: 3.9 G/DL (ref 3.5–5.2)
ALP SERPL-CCNC: 103 U/L (ref 55–135)
ALT SERPL W/O P-5'-P-CCNC: 27 U/L (ref 10–44)
AST SERPL-CCNC: 25 U/L (ref 10–40)
BILIRUB DIRECT SERPL-MCNC: 0.2 MG/DL (ref 0.1–0.3)
BILIRUB SERPL-MCNC: 0.5 MG/DL (ref 0.1–1)
CHOLEST SERPL-MCNC: 136 MG/DL (ref 120–199)
CHOLEST/HDLC SERPL: 2.8 {RATIO} (ref 2–5)
HDLC SERPL-MCNC: 49 MG/DL (ref 40–75)
HDLC SERPL: 36 % (ref 20–50)
LDLC SERPL CALC-MCNC: 65.6 MG/DL (ref 63–159)
NONHDLC SERPL-MCNC: 87 MG/DL
PROT SERPL-MCNC: 7 G/DL (ref 6–8.4)
TRIGL SERPL-MCNC: 107 MG/DL (ref 30–150)

## 2023-11-06 PROCEDURE — 80061 LIPID PANEL: CPT | Mod: HCNC | Performed by: INTERNAL MEDICINE

## 2023-11-06 PROCEDURE — 36415 COLL VENOUS BLD VENIPUNCTURE: CPT | Mod: HCNC,PN | Performed by: INTERNAL MEDICINE

## 2023-11-06 PROCEDURE — 80076 HEPATIC FUNCTION PANEL: CPT | Mod: HCNC | Performed by: INTERNAL MEDICINE

## 2023-11-28 DIAGNOSIS — J30.1 SEASONAL ALLERGIC RHINITIS DUE TO POLLEN: ICD-10-CM

## 2023-11-28 RX ORDER — FLUTICASONE PROPIONATE 50 MCG
SPRAY, SUSPENSION (ML) NASAL
Qty: 48 G | Refills: 3 | Status: SHIPPED | OUTPATIENT
Start: 2023-11-28

## 2023-11-28 NOTE — TELEPHONE ENCOUNTER
Refill Decision Note   Tiffany Masterson  is requesting a refill authorization.  Brief Assessment and Rationale for Refill:  Approve     Medication Therapy Plan:         Comments:     Note composed:12:12 PM 11/28/2023             Appointments     Last Visit   9/13/2023 Rebeca Dumont MD   Next Visit   Visit date not found Rebeca Dumont MD

## 2023-11-28 NOTE — TELEPHONE ENCOUNTER
No care due was identified.  [facility] Embedded Care Due Messages. Reference number: [id number].   11/28/2023 12:01:14 PM CST

## 2023-11-30 DIAGNOSIS — I48.0 PAROXYSMAL ATRIAL FIBRILLATION: Primary | ICD-10-CM

## 2023-12-05 ENCOUNTER — CLINICAL SUPPORT (OUTPATIENT)
Dept: REHABILITATION | Facility: HOSPITAL | Age: 88
End: 2023-12-05
Attending: INTERNAL MEDICINE
Payer: MEDICARE

## 2023-12-05 DIAGNOSIS — R53.81 PHYSICAL DECONDITIONING: ICD-10-CM

## 2023-12-05 PROCEDURE — 97161 PT EVAL LOW COMPLEX 20 MIN: CPT | Mod: HCNC

## 2023-12-05 NOTE — PROGRESS NOTES
[facility]   Physical Therapy Initial Evaluation     Date: 12/5/2023   Name: Tiffany Masterson  Clinic Number: [id number]    Therapy Diagnosis:   Encounter Diagnosis   Name Primary?    Physical deconditioning      Physician: Rebeca Dumont MD    Physician Orders: Aquatic Therapy   Medical Diagnosis from Referral: Pyhysical Deconditioning  Evaluation Date: 12/5/2023  Authorization Period Expiration: 9/12/24  Plan of Care Expiration: 2/5/24  Visit # / Visits authorized: 1/ 1     Precautions: Standard and Fall    Time In: 8:55 am    Time Out: 9:40 am   Total Appointment Time (timed & untimed codes): 45 minutes      SUBJECTIVE   Date of onset: chronic    History of current condition - Tiffany reports: her joints are getting older and starting to feel pain in her shoulders, hips and knees. She stated she has always lived an active lifestyle and wants to maintain that level of activity.  She has a history of L ISABELA in 2011 and currently takes Gabapentin and Tylenol as needed for pain.  She did Aquatic PT following her L ISABELA which she loved and felt very beneficial so wants to get back into the pool.     Pain rolling in bed ons ides when getting in the bed for the rest room.   Falls: none    Prior Therapy: Aquatic PT in 2011 following L ISABELA  Social History: lives with their spouse in 2 story house with bedroom upstairs. She has always done well with with the stairs but it has slowed down and goes step over step.   Occupation: retired  Prior Level of Function: Independent  Current Level of Function: She is independent but has some pain and requires some increased time to negotiate stairs and with bed mobility.     Pain:  Current 2/10, worst 9/10, best 0/10   Location: B shoulders at night, B hips and B knees (R greater than L)   Description: times with sharp and shooting and times with an ache.  Aggravating Factors: being tired  Easing Factors: Takes Tylenol (2 in AM and 2 at night when  needed)    Patients goals: To strengthen her joints and be more physically active     Medical History:   Past Medical History:   Diagnosis Date    Abnormal MRI 10/16/2020    Acute cystitis without hematuria 12/22/2021    Arthritis     Atrial fibrillation     Cataract     Elevated liver enzymes     Endometrial mass 6/29/2018    Epiretinal membrane (ERM) of right eye     Family history of malignant neoplasm of gastrointestinal tract mother    Frequent UTI 9/30/2018    Graves disease     now hypothryoid - no surgery or medicine. resolved on its own    History of cholecystectomy 6/10/2021    History of colonoscopy     unremarkable 2007 by Dr. Javier.  Barium enema revealed diverticular disease.    Hyperlipidemia     Hypertension     Hypertriglyceridemia 4/19/2013    Continue statin for secondary stroke prevention    Hypothyroidism     Impaired functional mobility, balance, gait, and endurance 6/4/2021    Iron deficiency anemia 9/29/2021    Migraine, ophthalmoplegic     Mixed stress and urge urinary incontinence 9/30/2018    Ocular migraine     Personal history of colonic polyps     Preop testing 5/17/2021    S/P colonoscopic polypectomy 6/18/2013    Sleep disorder 11/8/2021    Status post hysteroscopic polypectomy 7/2/2018    TIA (transient ischemic attack)     with a normal angiogram in the past, Ocular migraines reported by patient, not TIA     Transaminitis 3/19/2021    Urethral caruncle 9/15/2020       Surgical History:   Tiffany Masterson  has a past surgical history that includes Tonsillectomy; Joint replacement (3/23/2011); Colonoscopy w/ polypectomy (6/2013); Eye surgery (11-10-14); Colonoscopy; Colonoscopy (N/A, 11/7/2016); Hysteroscopic polypectomy of uterus (N/A, 7/2/2018); Colonoscopy (N/A, 3/9/2020); Endoscopic ultrasound of upper gastrointestinal tract (N/A, 4/29/2021); ERCP (N/A, 4/29/2021); Laparoscopic cholecystectomy (N/A, 5/17/2021); Esophagogastroduodenoscopy (N/A, 9/29/2021); Colonoscopy (N/A,  2021); Esophagogastroduodenoscopy (N/A, 2021); Colonoscopy (N/A, 2021); Removal of epiretinal membrane (Right); Cataract extraction (Right, ); and Injection of joint (Right, 2022).    Medications:   Tiffany has a current medication list which includes the following prescription(s): apixaban, apixaban, ascorbic acid (vitamin c), atorvastatin, b complex vitamins, bisacodyl, calcium carbonate/vitamin d3, cholecalciferol (vitamin d3), coenzyme q10, dextran 70/hypromellose, fluticasone propionate, gabapentin, levothyroxine, losartan, magnesium oxide, metoprolol succinate, folic acid/multivit-min/lutein, sodium chloride, and vit a/vit c/vit e/zinc/copper.    Allergies:   Review of patient's allergies indicates:   Allergen Reactions    Levaquin [levofloxacin]      Joint pain    Hydrochlorothiazide Other (See Comments)     Pain in joints and depression per pt        Pool contraindications, including but not limited to, incontinence, seizures, fever/GI issues were reviewed with the patient. Patient agrees that based on their knowledge and medical history, they are appropriate for Aquatic Therapy.     OBJECTIVE     TU seconds without AD. She ambulates with reduced step length B with more reduced step length on the L compared to the R. She has reduced stance phase on the left.      5 Times Sit to Stand: 24 seconds with use of B hands on seat for support. The patient attempted to perform without the use of her hands but unable to complete.     4 square step test 11 seconds      Gait: The patient ambulates without AD with reduced step length B with more reduced step length on the L compared to the R. She has reduced stance phase on the left.      Strength:  Shoulder Right Left   Flexion 4/5 4/5   Abduction 4/5 4/5   ER 4-/5 4-/5   IR 4/5 4/5        Lower Extremity Strength  Right LE   Left LE     Knee extension: 4/5 Knee extension: 4/5   Knee flexion: 4/5 Knee flexion: 4-/5   Hip flexion: 4/5 Hip  flexion: 4-/5   Hip IR 4-/5 Hip IR  4-/5   Hip ER 4-5 Hip ER 4-/5   Hip abduction: 4-/5 Hip abduction: 4-/5   Ankle dorsiflexion: 4+/5 Ankle dorsiflexion: 4/5   Ankle plantarflexion: 4/5 Ankle plantarflexion: 4/5        Flexibility:   Hamstring R:Moderate restriction       L: Attempted but pain L anterior thigh     Piriformis R: Moderate restirction     L: Unable to assess due to L ISABELA      PATIENT EDUCATION AND HOME EXERCISES     Education provided:   - Diagnosis, prognosis, relevant anatomy, role of therapy      ASSESSMENT     Tiffany is a 88 y.o. female referred to outpatient Physical Therapy with a medical diagnosis of Physical Deconditioning. Patient presents with decreased B LE and UE strength, limited B LE flexibility, increased time to perform 5 times sit to stand and TUG leading to functional limitations that impact the patient's ability to perform ADLs and preferred activities at prior level of function.     Patient prognosis is Good.     Patient will benefit from skilled outpatient Physical Therapy to address the deficits stated above and in the chart below, provide patient /family education, and to maximize patientt's level of independence.     Plan of care discussed with patient: Yes    Patient's spiritual, cultural and educational needs considered and patient is agreeable to the plan of care and goals as stated below:     Anticipated Barriers for therapy: None    Medical Necessity is demonstrated by the following  History  Co-morbidities and personal factors that may impact the plan of care Co-morbidities:   Past Medical History:   Diagnosis Date    Abnormal MRI 10/16/2020    Acute cystitis without hematuria 12/22/2021    Arthritis     Atrial fibrillation     Cataract     Elevated liver enzymes     Endometrial mass 6/29/2018    Epiretinal membrane (ERM) of right eye     Family history of malignant neoplasm of gastrointestinal tract mother    Frequent UTI 9/30/2018    Graves disease     now hypothryoid -  no surgery or medicine. resolved on its own    History of cholecystectomy 6/10/2021    History of colonoscopy     unremarkable 2007 by Dr. Javier.  Barium enema revealed diverticular disease.    Hyperlipidemia     Hypertension     Hypertriglyceridemia 4/19/2013    Continue statin for secondary stroke prevention    Hypothyroidism     Impaired functional mobility, balance, gait, and endurance 6/4/2021    Iron deficiency anemia 9/29/2021    Migraine, ophthalmoplegic     Mixed stress and urge urinary incontinence 9/30/2018    Ocular migraine     Personal history of colonic polyps     Preop testing 5/17/2021    S/P colonoscopic polypectomy 6/18/2013    Sleep disorder 11/8/2021    Status post hysteroscopic polypectomy 7/2/2018    TIA (transient ischemic attack)     with a normal angiogram in the past, Ocular migraines reported by patient, not TIA     Transaminitis 3/19/2021    Urethral caruncle 9/15/2020       Personal Factors:   age     moderate   Examination  Body Structures and Functions, activity limitations and participation restrictions that may impact the plan of care Body Regions:   lower extremities  upper extremities    Body Systems:    strength  gross coordinated movement  balance  gait    Participation Restrictions:   None     Activity limitations:   Learning and applying knowledge  no deficits    General Tasks and Commands  no deficits    Communication  no deficits    Mobility  walking    Self care  no deficits    Domestic Life  doing house work (cleaning house, washing dishes, laundry)    Interactions/Relationships  no deficits    Life Areas  no deficits    Community and Social Life  no deficits         low   Clinical Presentation stable and uncomplicated low   Decision Making/ Complexity Score: low       Goals:  Short Term Goals: 6 weeks   1. Patient will be independent in HEP & progressions.  2. Patient will achieve TUG score of 14 sec to demonstrate improved mobility  3. The patient will achieve 5 sit to  stand score of 20 seconds to demonstrate improved transfers and endurance.     Long Term Goals: 12 weeks   1. The patient will demonstrate independence with extensive HEP.  2. Patient will achieve 5 sit to stand score of 18 seconds to demonstrate improved transfers & endurance.  3. Patent is able to demonstrate MMT 4+/5 B UE and LE without pain reports during testing.      PLAN   Plan of care Certification: 12/5/2023 to 2/5/24.    Outpatient Physical Therapy 1-2 times weekly for 12 weeks to include the following interventions: manual therapy, aquatic therapy, patient education, therapeutic exercise, therapeutic activities.    Patient may be seen by PTA as part of rehabilitation team.    Mariah Adan, PT      I CERTIFY THE NEED FOR THESE SERVICES FURNISHED UNDER THIS PLAN OF TREATMENT AND WHILE UNDER MY CARE   Physician's comments:     Physician's Signature: ___________________________________________________

## 2023-12-06 NOTE — PLAN OF CARE
[facility]   Physical Therapy Initial Evaluation     Date: 12/5/2023   Name: Tiffany Masterson  Clinic Number: [id number]    Therapy Diagnosis:   Encounter Diagnosis   Name Primary?    Physical deconditioning      Physician: Rebeca Dumont MD    Physician Orders: Aquatic Therapy   Medical Diagnosis from Referral: Pyhysical Deconditioning  Evaluation Date: 12/5/2023  Authorization Period Expiration: 9/12/24  Plan of Care Expiration: 2/5/24  Visit # / Visits authorized: 1/ 1     Precautions: Standard and Fall    Time In: 8:55 am    Time Out: 9:40 am   Total Appointment Time (timed & untimed codes): 45 minutes      SUBJECTIVE   Date of onset: chronic    History of current condition - Tiffany reports: her joints are getting older and starting to feel pain in her shoulders, hips and knees. She stated she has always lived an active lifestyle and wants to maintain that level of activity.  She has a history of L ISABELA in 2011 and currently takes Gabapentin and Tylenol as needed for pain.  She did Aquatic PT following her L ISABELA which she loved and felt very beneficial so wants to get back into the pool.     Pain rolling in bed ons ides when getting in the bed for the rest room.   Falls: none    Prior Therapy: Aquatic PT in 2011 following L ISABELA  Social History: lives with their spouse in 2 story house with bedroom upstairs. She has always done well with with the stairs but it has slowed down and goes step over step.   Occupation: retired  Prior Level of Function: Independent  Current Level of Function: She is independent but has some pain and requires some increased time to negotiate stairs and with bed mobility.     Pain:  Current 2/10, worst 9/10, best 0/10   Location: B shoulders at night, B hips and B knees (R greater than L)   Description: times with sharp and shooting and times with an ache.  Aggravating Factors: being tired  Easing Factors: Takes Tylenol (2 in AM and 2 at night when  needed)    Patients goals: To strengthen her joints and be more physically active     Medical History:   Past Medical History:   Diagnosis Date    Abnormal MRI 10/16/2020    Acute cystitis without hematuria 12/22/2021    Arthritis     Atrial fibrillation     Cataract     Elevated liver enzymes     Endometrial mass 6/29/2018    Epiretinal membrane (ERM) of right eye     Family history of malignant neoplasm of gastrointestinal tract mother    Frequent UTI 9/30/2018    Graves disease     now hypothryoid - no surgery or medicine. resolved on its own    History of cholecystectomy 6/10/2021    History of colonoscopy     unremarkable 2007 by Dr. Javier.  Barium enema revealed diverticular disease.    Hyperlipidemia     Hypertension     Hypertriglyceridemia 4/19/2013    Continue statin for secondary stroke prevention    Hypothyroidism     Impaired functional mobility, balance, gait, and endurance 6/4/2021    Iron deficiency anemia 9/29/2021    Migraine, ophthalmoplegic     Mixed stress and urge urinary incontinence 9/30/2018    Ocular migraine     Personal history of colonic polyps     Preop testing 5/17/2021    S/P colonoscopic polypectomy 6/18/2013    Sleep disorder 11/8/2021    Status post hysteroscopic polypectomy 7/2/2018    TIA (transient ischemic attack)     with a normal angiogram in the past, Ocular migraines reported by patient, not TIA     Transaminitis 3/19/2021    Urethral caruncle 9/15/2020       Surgical History:   Tiffany Masterson  has a past surgical history that includes Tonsillectomy; Joint replacement (3/23/2011); Colonoscopy w/ polypectomy (6/2013); Eye surgery (11-10-14); Colonoscopy; Colonoscopy (N/A, 11/7/2016); Hysteroscopic polypectomy of uterus (N/A, 7/2/2018); Colonoscopy (N/A, 3/9/2020); Endoscopic ultrasound of upper gastrointestinal tract (N/A, 4/29/2021); ERCP (N/A, 4/29/2021); Laparoscopic cholecystectomy (N/A, 5/17/2021); Esophagogastroduodenoscopy (N/A, 9/29/2021); Colonoscopy (N/A,  2021); Esophagogastroduodenoscopy (N/A, 2021); Colonoscopy (N/A, 2021); Removal of epiretinal membrane (Right); Cataract extraction (Right, ); and Injection of joint (Right, 2022).    Medications:   Tiffany has a current medication list which includes the following prescription(s): apixaban, apixaban, ascorbic acid (vitamin c), atorvastatin, b complex vitamins, bisacodyl, calcium carbonate/vitamin d3, cholecalciferol (vitamin d3), coenzyme q10, dextran 70/hypromellose, fluticasone propionate, gabapentin, levothyroxine, losartan, magnesium oxide, metoprolol succinate, folic acid/multivit-min/lutein, sodium chloride, and vit a/vit c/vit e/zinc/copper.    Allergies:   Review of patient's allergies indicates:   Allergen Reactions    Levaquin [levofloxacin]      Joint pain    Hydrochlorothiazide Other (See Comments)     Pain in joints and depression per pt        Pool contraindications, including but not limited to, incontinence, seizures, fever/GI issues were reviewed with the patient. Patient agrees that based on their knowledge and medical history, they are appropriate for Aquatic Therapy.     OBJECTIVE     TU seconds without AD. She ambulates with reduced step length B with more reduced step length on the L compared to the R. She has reduced stance phase on the left.      5 Times Sit to Stand: 24 seconds with use of B hands on seat for support. The patient attempted to perform without the use of her hands but unable to complete.     4 square step test 11 seconds      Gait: The patient ambulates without AD with reduced step length B with more reduced step length on the L compared to the R. She has reduced stance phase on the left.      Strength:  Shoulder Right Left   Flexion 4/5 4/5   Abduction 4/5 4/5   ER 4-/5 4-/5   IR 4/5 4/5        Lower Extremity Strength  Right LE   Left LE     Knee extension: 4/5 Knee extension: 4/5   Knee flexion: 4/5 Knee flexion: 4-/5   Hip flexion: 4/5 Hip  flexion: 4-/5   Hip IR 4-/5 Hip IR  4-/5   Hip ER 4-5 Hip ER 4-/5   Hip abduction: 4-/5 Hip abduction: 4-/5   Ankle dorsiflexion: 4+/5 Ankle dorsiflexion: 4/5   Ankle plantarflexion: 4/5 Ankle plantarflexion: 4/5        Flexibility:   Hamstring R:Moderate restriction       L: Attempted but pain L anterior thigh     Piriformis R: Moderate restirction     L: Unable to assess due to L ISABELA      PATIENT EDUCATION AND HOME EXERCISES     Education provided:   - Diagnosis, prognosis, relevant anatomy, role of therapy      ASSESSMENT     Tiffany is a 88 y.o. female referred to outpatient Physical Therapy with a medical diagnosis of Physical Deconditioning. Patient presents with decreased B LE and UE strength, limited B LE flexibility, increased time to perform 5 times sit to stand and TUG leading to functional limitations that impact the patient's ability to perform ADLs and preferred activities at prior level of function.     Patient prognosis is Good.     Patient will benefit from skilled outpatient Physical Therapy to address the deficits stated above and in the chart below, provide patient /family education, and to maximize patientt's level of independence.     Plan of care discussed with patient: Yes    Patient's spiritual, cultural and educational needs considered and patient is agreeable to the plan of care and goals as stated below:     Anticipated Barriers for therapy: None    Medical Necessity is demonstrated by the following  History  Co-morbidities and personal factors that may impact the plan of care Co-morbidities:   Past Medical History:   Diagnosis Date    Abnormal MRI 10/16/2020    Acute cystitis without hematuria 12/22/2021    Arthritis     Atrial fibrillation     Cataract     Elevated liver enzymes     Endometrial mass 6/29/2018    Epiretinal membrane (ERM) of right eye     Family history of malignant neoplasm of gastrointestinal tract mother    Frequent UTI 9/30/2018    Graves disease     now hypothryoid -  no surgery or medicine. resolved on its own    History of cholecystectomy 6/10/2021    History of colonoscopy     unremarkable 2007 by Dr. Javier.  Barium enema revealed diverticular disease.    Hyperlipidemia     Hypertension     Hypertriglyceridemia 4/19/2013    Continue statin for secondary stroke prevention    Hypothyroidism     Impaired functional mobility, balance, gait, and endurance 6/4/2021    Iron deficiency anemia 9/29/2021    Migraine, ophthalmoplegic     Mixed stress and urge urinary incontinence 9/30/2018    Ocular migraine     Personal history of colonic polyps     Preop testing 5/17/2021    S/P colonoscopic polypectomy 6/18/2013    Sleep disorder 11/8/2021    Status post hysteroscopic polypectomy 7/2/2018    TIA (transient ischemic attack)     with a normal angiogram in the past, Ocular migraines reported by patient, not TIA     Transaminitis 3/19/2021    Urethral caruncle 9/15/2020       Personal Factors:   age     moderate   Examination  Body Structures and Functions, activity limitations and participation restrictions that may impact the plan of care Body Regions:   lower extremities  upper extremities    Body Systems:    strength  gross coordinated movement  balance  gait    Participation Restrictions:   None     Activity limitations:   Learning and applying knowledge  no deficits    General Tasks and Commands  no deficits    Communication  no deficits    Mobility  walking    Self care  no deficits    Domestic Life  doing house work (cleaning house, washing dishes, laundry)    Interactions/Relationships  no deficits    Life Areas  no deficits    Community and Social Life  no deficits         low   Clinical Presentation stable and uncomplicated low   Decision Making/ Complexity Score: low       Goals:  Short Term Goals: 6 weeks   1. Patient will be independent in HEP & progressions.  2. Patient will achieve TUG score of 14 sec to demonstrate improved mobility  3. The patient will achieve 5 sit to  stand score of 20 seconds to demonstrate improved transfers and endurance.     Long Term Goals: 12 weeks   1. The patient will demonstrate independence with extensive HEP.  2. Patient will achieve 5 sit to stand score of 18 seconds to demonstrate improved transfers & endurance.  3. Patent is able to demonstrate MMT 4+/5 B UE and LE without pain reports during testing.      PLAN   Plan of care Certification: 12/5/2023 to 2/5/24.    Outpatient Physical Therapy 1-2 times weekly for 12 weeks to include the following interventions: manual therapy, aquatic therapy, patient education, therapeutic exercise, therapeutic activities.    Patient may be seen by PTA as part of rehabilitation team.    Mariah Adan, PT      I CERTIFY THE NEED FOR THESE SERVICES FURNISHED UNDER THIS PLAN OF TREATMENT AND WHILE UNDER MY CARE   Physician's comments:     Physician's Signature: ___________________________________________________

## 2023-12-08 ENCOUNTER — CLINICAL SUPPORT (OUTPATIENT)
Dept: REHABILITATION | Facility: HOSPITAL | Age: 88
End: 2023-12-08
Payer: MEDICARE

## 2023-12-08 DIAGNOSIS — M62.81 GENERALIZED MUSCLE WEAKNESS: ICD-10-CM

## 2023-12-08 DIAGNOSIS — R53.81 PHYSICAL DECONDITIONING: Primary | ICD-10-CM

## 2023-12-08 PROCEDURE — 97113 AQUATIC THERAPY/EXERCISES: CPT | Mod: HCNC

## 2023-12-08 NOTE — PROGRESS NOTES
[facility]   Physical Therapy Treatment Note     Name: Tiffany MOSS Pantego  Clinic Number: [id number]    Therapy Diagnosis:   Encounter Diagnoses   Name Primary?    Physical deconditioning Yes    Generalized muscle weakness      Physician: Rebeca Dumont MD    Visit Date: 12/8/2023    Physician Orders: Aquatic Therapy   Medical Diagnosis from Referral: Pyhysical Deconditioning  Evaluation Date: 12/5/2023  Authorization Period Expiration: 9/12/24  Plan of Care Expiration: 2/5/24  Visit # / Visits authorized: 1/ 1     Precautions: Standard and Fall      Time In: 8:44 am  (patient arrived late for scheduled appt time)  Time Out: 9:30 am   Total Billable Time: 45 minutes    SUBJECTIVE     Pt reports:she is feeling good and ready to get started     She was compliant with home exercise program.    Response to previous treatment: first pool visit  Functional change: initiated aquatic PT    Pain: 4/10  Location: bilateral back      OBJECTIVE     Objective Measures updated at progress report unless specified.     Treatment     Tiffany received aquatic therapeutic exercises to develop strength, endurance, ROM, flexibility, posture, and core stabilization for 45 minutes including:    FUNCTIONAL MOBILITY TRAINING x 2 laps each at beginning and 1 lap each at end of session  Walk forward/backward/lateral    STRETCHES 2 x 30sec  HS at steps    LE EX x 20  Squat  Heel raise with gluteal set  Hip abduction  Hip flex  Clam    Sit to stand from pool stool  LAQ seated--add resistance when tolerated    Walking marches x 2 laps--add as tolerated      UE EX/CORE  x 20--add as tolerated  Shoulder flex/ext TA activation paddles CLOSED  Shoulder horizontal abd/add TA activation paddles CLOSED  Shoulder abd/add with TA activation and paddles CLOSED    Mini squat with push/pull red kickboard    B shoulder row and extension with GTB      ENDURANCE  LTR x 2'  Bicycle in // bars x 2'      Patient Education and Home Exercises      Home Exercises Provided and Patient Education Provided     Education provided:   Role of aquatic therapy  Hydration post therapy      Written Home Exercises Provided: Patient instructed to cont prior HEP.   ASSESSMENT     The patient tolerated her initial pool session well. She required VC to improve upright posture with all standing and waking exercises as she had the tendency to forward lean as she fatigued. She reported feeling like she worked out but in a good way after the session.       Tiffany Is progressing well towards her goals.     Pt prognosis is Good.     Pt will continue to benefit from skilled outpatient physical therapy to address the deficits listed in the problem list box on initial evaluation, provide pt/family education and to maximize pt's level of independence in the home and community environment.     Pt's spiritual, cultural and educational needs considered and pt agreeable to plan of care and goals.     Anticipated barriers to physical therapy: none at this time    Goals:     Short Term Goals: 6 weeks   1. Patient will be independent in HEP & progressions.  2. Patient will achieve TUG score of 14 sec to demonstrate improved mobility  3. The patient will achieve 5 sit to stand score of 20 seconds to demonstrate improved transfers and endurance.     Long Term Goals: 12 weeks   1. The patient will demonstrate independence with extensive HEP.  2. Patient will achieve 5 sit to stand score of 18 seconds to demonstrate improved transfers & endurance.  3. Patent is able to demonstrate MMT 4+/5 B UE and LE without pain reports during testing.      PLAN   Plan of care Certification: 12/5/2023 to 2/5/24.    Outpatient Physical Therapy 1-2 times weekly for 12 weeks to include the following interventions: manual therapy, aquatic therapy, patient education, therapeutic exercise, therapeutic activities.    Mariah Adan, PT

## 2023-12-11 ENCOUNTER — CLINICAL SUPPORT (OUTPATIENT)
Dept: REHABILITATION | Facility: HOSPITAL | Age: 88
End: 2023-12-11
Payer: MEDICARE

## 2023-12-11 DIAGNOSIS — M62.81 GENERALIZED MUSCLE WEAKNESS: ICD-10-CM

## 2023-12-11 DIAGNOSIS — R53.81 PHYSICAL DECONDITIONING: Primary | ICD-10-CM

## 2023-12-11 PROCEDURE — 97113 AQUATIC THERAPY/EXERCISES: CPT | Mod: HCNC

## 2023-12-11 NOTE — PROGRESS NOTES
[facility]   Physical Therapy Treatment Note     [id number]    Therapy Diagnosis:   Encounter Diagnoses   Name Primary?    Physical deconditioning Yes    Generalized muscle weakness      Physician: Rebeca Dumont MD    Visit Date: 12/11/2023    Physician Orders: Aquatic Therapy   Medical Diagnosis from Referral: Pyhysical Deconditioning  Evaluation Date: 12/5/2023  Authorization Period Expiration: 9/12/24  Plan of Care Expiration: 2/5/24  Visit # / Visits authorized: 2/ 10     Precautions: Standard and Fall      Time In: 1:48 pm  (patient arrived late for scheduled appt time)  Time Out: 2:35 pm  Total Billable Time: 35 minutes    SUBJECTIVE     Pt reports:she was not sore after the last visit but very very tired.     She was compliant with home exercise program.    Response to previous treatment: slight soreness but feeling good.     Functional change: initiated aquatic PT    Pain: 4/10  Location: bilateral back      OBJECTIVE     Objective Measures updated at progress report unless specified.     Treatment     Tiffany received aquatic therapeutic exercises to develop strength, endurance, ROM, flexibility, posture, and core stabilization for 45 minutes including:    FUNCTIONAL MOBILITY TRAINING x 2 laps each at beginning and 1 lap each at end of session  Walk forward/backward/lateral    STRETCHES 2 x 30sec  HS at steps    LE EX x 20 reps   Squat  Heel raise with gluteal set  Hip abduction  Hip flex  March  LAQ  Clam--Unable to perform due to cramping so held     Sit to stand from pool stool  LAQ seated--add resistance when tolerated    Walking marches x 2 laps      UE EX/CORE  x 20--add when time allows  Shoulder flex/ext TA activation paddles CLOSED  Shoulder horizontal abd/add TA activation paddles CLOSED  Shoulder abd/add with TA activation and paddles CLOSED    Mini squat with push/pull red kickboard    B shoulder row and extension with  GTB    ENDURANCE  LTR x 2'  Bicycle in // bars x 2'      Patient Education and Home Exercises     Home Exercises Provided and Patient Education Provided     Education provided:   Role of aquatic therapy  Hydration post therapy      Written Home Exercises Provided: Patient instructed to cont prior HEP.   ASSESSMENT     The patient tolerated the additional exercises well today with some voiced fatigue but no increased pain. She stated the LTR feels very beneficial to her mobility in her back.       Tiffany Is progressing well towards her goals.     Pt prognosis is Good.     Pt will continue to benefit from skilled outpatient physical therapy to address the deficits listed in the problem list box on initial evaluation, provide pt/family education and to maximize pt's level of independence in the home and community environment.     Pt's spiritual, cultural and educational needs considered and pt agreeable to plan of care and goals.     Anticipated barriers to physical therapy: none at this time    Goals:     Short Term Goals: 6 weeks   1. Patient will be independent in HEP & progressions.  2. Patient will achieve TUG score of 14 sec to demonstrate improved mobility  3. The patient will achieve 5 sit to stand score of 20 seconds to demonstrate improved transfers and endurance.     Long Term Goals: 12 weeks   1. The patient will demonstrate independence with extensive HEP.  2. Patient will achieve 5 sit to stand score of 18 seconds to demonstrate improved transfers & endurance.  3. Patent is able to demonstrate MMT 4+/5 B UE and LE without pain reports during testing.      PLAN   Plan of care Certification: 12/5/2023 to 2/5/24.    Outpatient Physical Therapy 1-2 times weekly for 12 weeks to include the following interventions: manual therapy, aquatic therapy, patient education, therapeutic exercise, therapeutic activities.    Mariah Adan, PT

## 2023-12-14 NOTE — PROGRESS NOTES
[facility]   Physical Therapy Treatment Note     Name: Tiffany MOSS [facility] Number: [id number]    Therapy Diagnosis:   Encounter Diagnoses   Name Primary?    Physical deconditioning Yes    Generalized muscle weakness        Physician: Rebeca Dumont MD    Visit Date: 12/15/2023    Physician Orders: Aquatic Therapy   Medical Diagnosis from Referral: Pyhysical Deconditioning  Evaluation Date: 12/5/2023  Authorization Period Expiration: 12/31/2023  Plan of Care Expiration: 2/5/24  Visit # / Visits authorized: 3/ 10     Precautions: Standard and Fall      Time In: 8:00 am    Time Out: 9:00 am  Total Billable Time: 30 minutes    SUBJECTIVE     Pt reports: she feels great after sessions and is happy to be doing aquatic PT. She felt soreness in her hips after last session     She was compliant with home exercise program.    Response to previous treatment: slight soreness but feeling good.     Functional change: able to stand from toilet without use of arms     Pain: 4/10  Location: bilateral back      OBJECTIVE     Objective Measures updated at progress report unless specified.     Treatment     Tiffany received aquatic therapeutic exercises to develop strength, endurance, ROM, flexibility, posture, and core stabilization for 60 minutes including:    FUNCTIONAL MOBILITY TRAINING x 2 laps each at beginning and 1 lap each at end of session  Walk forward/backward/lateral    STRETCHES 2 x 30sec  HS at steps    LE EX x 20 reps   Squat  Heel raise with gluteal set  Hip abduction  Hip flex  March NP  LAQ  Clam--Unable to perform due to cramping so held     Sit to stand from pool stool  LAQ seated--add resistance when tolerated    Walking marches x 2 laps    UE EX/CORE  x 20   Shoulder flex/ext TA activation paddles OPEN  Shoulder horizontal abd/add TA activation paddles OPEN  Shoulder abd/add with TA activation and paddles OPEN    Mini squat with push/pull red kickboard incorporate NV     B shoulder  row and extension with GTB x15ea     ENDURANCE  LTR x 2'  Bicycle in // bars x 2'      Patient Education and Home Exercises     Home Exercises Provided and Patient Education Provided     Education provided:   Role of aquatic therapy  Hydration post therapy    Written Home Exercises Provided: Patient instructed to cont prior HEP.     ASSESSMENT     Introduced ue/core exercises today with min VC's for set up. Pt has c/o left shoulder pain which reduced upon opening the paddles, plan to progress to closed paddles as pt's strength and tolerance improves. Good tolerance to treatment session today, plan to continue to progress strengthening exercises per tolerance.    Tiffany Is progressing well towards her goals.     Pt prognosis is Good.     Pt will continue to benefit from skilled outpatient physical therapy to address the deficits listed in the problem list box on initial evaluation, provide pt/family education and to maximize pt's level of independence in the home and community environment.     Pt's spiritual, cultural and educational needs considered and pt agreeable to plan of care and goals.     Anticipated barriers to physical therapy: none at this time    Goals:     Short Term Goals: 6 weeks   1. Patient will be independent in HEP & progressions.  2. Patient will achieve TUG score of 14 sec to demonstrate improved mobility  3. The patient will achieve 5 sit to stand score of 20 seconds to demonstrate improved transfers and endurance.     Long Term Goals: 12 weeks   1. The patient will demonstrate independence with extensive HEP.  2. Patient will achieve 5 sit to stand score of 18 seconds to demonstrate improved transfers & endurance.  3. Patent is able to demonstrate MMT 4+/5 B UE and LE without pain reports during testing.      PLAN   Plan of care Certification: 12/5/2023 to 2/5/24.    Outpatient Physical Therapy 1-2 times weekly for 12 weeks to include the following interventions: manual therapy, aquatic therapy,  patient education, therapeutic exercise, therapeutic activities.    Maryam Jeffries, PT

## 2023-12-15 ENCOUNTER — CLINICAL SUPPORT (OUTPATIENT)
Dept: REHABILITATION | Facility: HOSPITAL | Age: 88
End: 2023-12-15
Payer: MEDICARE

## 2023-12-15 DIAGNOSIS — M62.81 GENERALIZED MUSCLE WEAKNESS: ICD-10-CM

## 2023-12-15 DIAGNOSIS — R53.81 PHYSICAL DECONDITIONING: Primary | ICD-10-CM

## 2023-12-15 PROCEDURE — 97113 AQUATIC THERAPY/EXERCISES: CPT | Mod: HCNC

## 2023-12-16 DIAGNOSIS — L57.0 MULTIPLE ACTINIC KERATOSES: ICD-10-CM

## 2023-12-17 RX ORDER — HYDROQUINONE 40 MG/G
CREAM TOPICAL 2 TIMES DAILY
Qty: 58 G | Refills: 0 | Status: SHIPPED | OUTPATIENT
Start: 2023-12-17

## 2023-12-18 ENCOUNTER — CLINICAL SUPPORT (OUTPATIENT)
Dept: REHABILITATION | Facility: HOSPITAL | Age: 88
End: 2023-12-18
Payer: MEDICARE

## 2023-12-18 DIAGNOSIS — R53.81 PHYSICAL DECONDITIONING: Primary | ICD-10-CM

## 2023-12-18 DIAGNOSIS — M62.81 GENERALIZED MUSCLE WEAKNESS: ICD-10-CM

## 2023-12-18 PROBLEM — Z00.00 PREVENTATIVE HEALTH CARE: Status: RESOLVED | Noted: 2021-05-17 | Resolved: 2023-12-18

## 2023-12-18 PROCEDURE — 97113 AQUATIC THERAPY/EXERCISES: CPT | Mod: HCNC

## 2023-12-18 NOTE — PROGRESS NOTES
[facility]   Physical Therapy Treatment Note     [id number]    Therapy Diagnosis:   Encounter Diagnoses   Name Primary?    Physical deconditioning Yes    Generalized muscle weakness        Physician: Rebeca Dumont MD    Visit Date: 12/18/2023    Physician Orders: Aquatic Therapy   Medical Diagnosis from Referral: Pyhysical Deconditioning  Evaluation Date: 12/5/2023  Authorization Period Expiration: 12/31/2023  Plan of Care Expiration: 2/5/24  Visit # / Visits authorized: 4/10     Precautions: Standard and Fall      Time In  12:50 pm (Patient arrived late to scheduled appt time)  Time Out: 1:45 pm  Total Billable Time: 30 minutes    SUBJECTIVE     Pt reports: she has some right shoulder soreness which started after the last visit.     She was compliant with home exercise program.    Response to previous treatment: slight soreness but feeling good.     Functional change: able to stand from toilet without use of arms     Pain: 4/10  Location: bilateral back      OBJECTIVE     Objective Measures updated at progress report unless specified.     Treatment     Tiffany received aquatic therapeutic exercises to develop strength, endurance, ROM, flexibility, posture, and core stabilization for 50 minutes including:    FUNCTIONAL MOBILITY TRAINING x 2 laps each at beginning and 1 lap each at end of session  Walk forward/backward/lateral    STRETCHES 2 x 30sec  HS at steps    LE EX x 25 reps   Squat  Heel raise with gluteal set  Hip abduction  Hip flex  LAQ    Clam--Unable to perform due to cramping so held     Sit to stand from pool stool  LAQ seated--add resistance when tolerated    Walking marches x 2 laps    UE EX/CORE  x 20   Shoulder flex/ext TA activation paddles OPEN  Shoulder horizontal abd/add TA activation paddles OPEN  Shoulder abd/add with TA activation and paddles OPEN    Mini squat with push/pull red kickboard--add when tolerate    B shoulder row and  extension with GTB x15ea--NP due to shoulder soreness    ENDURANCE  LTR x 2'  Bicycle in // bars x 2'      Patient Education and Home Exercises     Home Exercises Provided and Patient Education Provided     Education provided:   Role of aquatic therapy  Hydration post therapy    Written Home Exercises Provided: Patient instructed to cont prior HEP.     ASSESSMENT     The patient required some intermittent VC to avoid hip ER with hip abduction and to improve upright posture with all UE exercises. She reported some slight soreness with UE exercises but able to perform all reps.     Tiffany Is progressing well towards her goals.     Pt prognosis is Good.     Pt will continue to benefit from skilled outpatient physical therapy to address the deficits listed in the problem list box on initial evaluation, provide pt/family education and to maximize pt's level of independence in the home and community environment.     Pt's spiritual, cultural and educational needs considered and pt agreeable to plan of care and goals.     Anticipated barriers to physical therapy: none at this time    Goals:     Short Term Goals: 6 weeks   1. Patient will be independent in HEP & progressions.  2. Patient will achieve TUG score of 14 sec to demonstrate improved mobility  3. The patient will achieve 5 sit to stand score of 20 seconds to demonstrate improved transfers and endurance.     Long Term Goals: 12 weeks   1. The patient will demonstrate independence with extensive HEP.  2. Patient will achieve 5 sit to stand score of 18 seconds to demonstrate improved transfers & endurance.  3. Patent is able to demonstrate MMT 4+/5 B UE and LE without pain reports during testing.      PLAN   Plan of care Certification: 12/5/2023 to 2/5/24.    Outpatient Physical Therapy 1-2 times weekly for 12 weeks to include the following interventions: manual therapy, aquatic therapy, patient education, therapeutic exercise, therapeutic activities.    Mariah OKEEFE  Antionette, PT

## 2023-12-22 ENCOUNTER — CLINICAL SUPPORT (OUTPATIENT)
Dept: REHABILITATION | Facility: HOSPITAL | Age: 88
End: 2023-12-22
Payer: MEDICARE

## 2023-12-22 DIAGNOSIS — R53.81 PHYSICAL DECONDITIONING: Primary | ICD-10-CM

## 2023-12-22 DIAGNOSIS — M62.81 GENERALIZED MUSCLE WEAKNESS: ICD-10-CM

## 2023-12-22 PROCEDURE — 97113 AQUATIC THERAPY/EXERCISES: CPT | Mod: HCNC

## 2023-12-22 NOTE — PROGRESS NOTES
[facility]   Physical Therapy Treatment Note     [id number]    Therapy Diagnosis:   Encounter Diagnoses   Name Primary?    Physical deconditioning Yes    Generalized muscle weakness      Physician: Rebeca Dumont MD    Visit Date: 12/22/2023    Physician Orders: Aquatic Therapy   Medical Diagnosis from Referral: Pyhysical Deconditioning  Evaluation Date: 12/5/2023  Authorization Period Expiration: 12/31/2023  Plan of Care Expiration: 2/5/24  Visit # / Visits authorized: 5/10 + Eval    Precautions: Standard and Fall    Time In  8:25 am   Time Out: 9:25 am   Total Billable Time: 60 minutes    SUBJECTIVE     Pt reports: she was sore after the last visit but ready to get started today.     She was compliant with home exercise program.    Response to previous treatment: slight soreness but feeling good.     Functional change: able to stand from toilet without use of arms     Pain: 4/10  Location: bilateral back      OBJECTIVE     Objective Measures updated at progress report unless specified.     Treatment     Tiffany received aquatic therapeutic exercises to develop strength, endurance, ROM, flexibility, posture, and core stabilization for 60 minutes including:    FUNCTIONAL MOBILITY TRAINING x 2 laps each at beginning and 1 lap each at end of session  Walk forward/backward/lateral    STRETCHES 2 x 30sec  HS at steps    LE EX x 25 reps   Squat  Heel raise with gluteal set  Hip abduction  Hip flex  LAQ    Clam--Unable to perform due to cramping so held     Sit to stand from pool stool  LAQ seated--add resistance when tolerated    Walking marches x 2 laps    UE EX/CORE  x 20   Shoulder flex/ext TA activation paddles OPEN  Shoulder horizontal abd/add TA activation paddles OPEN  Shoulder abd/add with TA activation and paddles OPEN    Mini squat with push/pull red kickboard--add when tolerate    B shoulder row and extension with GTB x15ea--NP due to shoulder  "soreness    ENDURANCE  LTR x 2.5'  Bicycle in // bars x 2'      Patient Education and Home Exercises     Home Exercises Provided and Patient Education Provided     Education provided:   Role of aquatic therapy  Hydration post therapy    Written Home Exercises Provided: Patient instructed to cont prior HEP.     ASSESSMENT      Due to some increased soreness following the last visit her treatment was not advanced today with the exception of added 30" for LTR. She performed all exercises with moderate VC required for correct exercise execution.     Tiffany Is progressing well towards her goals.     Pt prognosis is Good.     Pt will continue to benefit from skilled outpatient physical therapy to address the deficits listed in the problem list box on initial evaluation, provide pt/family education and to maximize pt's level of independence in the home and community environment.     Pt's spiritual, cultural and educational needs considered and pt agreeable to plan of care and goals.     Anticipated barriers to physical therapy: none at this time    Goals:     Short Term Goals: 6 weeks   1. Patient will be independent in HEP & progressions.  2. Patient will achieve TUG score of 14 sec to demonstrate improved mobility  3. The patient will achieve 5 sit to stand score of 20 seconds to demonstrate improved transfers and endurance.     Long Term Goals: 12 weeks   1. The patient will demonstrate independence with extensive HEP.  2. Patient will achieve 5 sit to stand score of 18 seconds to demonstrate improved transfers & endurance.  3. Patent is able to demonstrate MMT 4+/5 B UE and LE without pain reports during testing.      PLAN   Plan of care Certification: 12/5/2023 to 2/5/24.    Outpatient Physical Therapy 1-2 times weekly for 12 weeks to include the following interventions: manual therapy, aquatic therapy, patient education, therapeutic exercise, therapeutic activities.    Mariah Adan, PT     "

## 2023-12-28 NOTE — PROGRESS NOTES
[facility]   Physical Therapy Treatment Note     Name: Tiffany Masterson  Clinic Number: [id number]    Therapy Diagnosis:   Encounter Diagnoses   Name Primary?    Physical deconditioning Yes    Generalized muscle weakness        Physician: Rebeca Dumont MD    Visit Date: 12/29/2023    Physician Orders: Aquatic Therapy   Medical Diagnosis from Referral: Pyhysical Deconditioning  Evaluation Date: 12/5/2023  Authorization Period Expiration: 12/31/2023  Plan of Care Expiration: 2/05/2024  Visit # / Visits authorized: 6/10 + Eval     Precautions: Standard and Fall    Time In  8:00 am 5  Time Out: 8:55 am   Total Billable Time: 55 minutes    SUBJECTIVE     Pt reports: she is feeling more sore in her soreness and hips this week. Has had a busier week than normal with [place] celebrations and attending a ball last night.     She was compliant with home exercise program.    Response to previous treatment: slight soreness but feeling good.     Functional change: able to stand from toilet without use of arms     Pain: 4/10  Location: bilateral back      OBJECTIVE     Objective Measures updated at progress report unless specified.     Treatment     Tiffany received aquatic therapeutic exercises to develop strength, endurance, ROM, flexibility, posture, and core stabilization for 55 minutes including:    FUNCTIONAL MOBILITY TRAINING x 2 laps each at beginning and 1 lap each at end of session  Walk forward/backward/lateral    STRETCHES 2 x 30sec  HS at steps    LE EX x 25 reps   Squat  Heel raise with gluteal set  Hip abduction  Hip flex  LAQ     Clam--Unable to perform due to cramping so held     Sit to stand from pool stool  LAQ seated--add resistance when tolerated    Walking marches x 2 laps HELD     UE EX/CORE  x 20 HELD   Shoulder flex/ext TA activation paddles OPEN  Shoulder horizontal abd/add TA activation paddles OPEN  Shoulder abd/add with TA activation and paddles OPEN    Mini squat with  push/pull red kickboard--add when tolerate    B shoulder row and extension with GTB x15ea--NP due to shoulder soreness    ENDURANCE  LTR x 2.5'  Bicycle in // bars x 2'      Patient Education and Home Exercises     Home Exercises Provided and Patient Education Provided     Education provided:   Role of aquatic therapy  Hydration post therapy    Written Home Exercises Provided: Patient instructed to cont prior HEP.     ASSESSMENT     Due to continued complaints of soreness, progressions were held today as well as several exercises. Discussed importance of rest at home and pt verbalizes understanding. Good tolerance to exercises performed with no c/o increase in s/s. Plan to continue to progress per pt tolerance.     Tiffany Is progressing well towards her goals.     Pt prognosis is Good.     Pt will continue to benefit from skilled outpatient physical therapy to address the deficits listed in the problem list box on initial evaluation, provide pt/family education and to maximize pt's level of independence in the home and community environment.     Pt's spiritual, cultural and educational needs considered and pt agreeable to plan of care and goals.     Anticipated barriers to physical therapy: none at this time    Goals:     Short Term Goals: 6 weeks   1. Patient will be independent in HEP & progressions.  2. Patient will achieve TUG score of 14 sec to demonstrate improved mobility  3. The patient will achieve 5 sit to stand score of 20 seconds to demonstrate improved transfers and endurance.     Long Term Goals: 12 weeks   1. The patient will demonstrate independence with extensive HEP.  2. Patient will achieve 5 sit to stand score of 18 seconds to demonstrate improved transfers & endurance.  3. Patent is able to demonstrate MMT 4+/5 B UE and LE without pain reports during testing.      PLAN   Plan of care Certification: 12/5/2023 to 2/05/2024.    Outpatient Physical Therapy 1-2 times weekly for 12 weeks to include the  following interventions: manual therapy, aquatic therapy, patient education, therapeutic exercise, therapeutic activities.    Maryam Jeffries, PT

## 2023-12-29 ENCOUNTER — CLINICAL SUPPORT (OUTPATIENT)
Dept: REHABILITATION | Facility: HOSPITAL | Age: 88
End: 2023-12-29
Payer: MEDICARE

## 2023-12-29 DIAGNOSIS — M62.81 GENERALIZED MUSCLE WEAKNESS: ICD-10-CM

## 2023-12-29 DIAGNOSIS — R53.81 PHYSICAL DECONDITIONING: Primary | ICD-10-CM

## 2023-12-29 PROCEDURE — 97113 AQUATIC THERAPY/EXERCISES: CPT | Mod: HCNC

## 2024-01-05 ENCOUNTER — CLINICAL SUPPORT (OUTPATIENT)
Dept: REHABILITATION | Facility: HOSPITAL | Age: 89
End: 2024-01-05
Payer: MEDICARE

## 2024-01-05 DIAGNOSIS — R53.81 PHYSICAL DECONDITIONING: Primary | ICD-10-CM

## 2024-01-05 DIAGNOSIS — M62.81 GENERALIZED MUSCLE WEAKNESS: ICD-10-CM

## 2024-01-05 PROCEDURE — 97113 AQUATIC THERAPY/EXERCISES: CPT | Mod: HCNC

## 2024-01-05 NOTE — PROGRESS NOTES
[facility]   Physical Therapy Treatment Note     Name: Tiffany Masterson  Clinic Number: [id number]    Therapy Diagnosis:   Encounter Diagnoses   Name Primary?    Physical deconditioning Yes    Generalized muscle weakness        Physician: Rebeca Dumont MD    Visit Date: 1/5/2024    Physician Orders: Aquatic Therapy   Medical Diagnosis from Referral: Pyhysical Deconditioning  Evaluation Date: 12/5/2023  Authorization Period Expiration: 12/31/2023  Plan of Care Expiration: 2/05/2024  Visit # / Visits authorized:1/20 8 visits total    Precautions: Standard and Fall    Time In  8:30 am   Time Out: 9:25 am  Total Billable Time:  55 minutes    SUBJECTIVE     Pt reports: she has some sharp LBP starting yesterday. She stated her pain is an 8/10 when it flashes which lasts ~3 minutes and happens several times a day.     She was compliant with home exercise program.    Response to previous treatment: slight soreness but feeling good.     Functional change: able to stand from toilet without use of arms     Pain: 4/10  Location: bilateral back      OBJECTIVE     Objective Measures updated at progress report unless specified.     Treatment     Tiffany received aquatic therapeutic exercises to develop strength, endurance, ROM, flexibility, posture, and core stabilization for 55  minutes including:    FUNCTIONAL MOBILITY TRAINING x 2 laps each at beginning and 1 lap each at end of session  Walk forward/backward/lateral    STRETCHES 2 x 30sec  HS at steps    LE EX x 25 reps   Squat  Heel raise with gluteal set  Hip abduction  Hip flex  Stationary March    Clam--Unable to perform due to cramping so held     Seated  Sit to stand  LAQ seated  Clam    Walking marches x 2 laps with focus on core stabilization      UE EX/CORE  x 20  Hold due to continued shoulder pain  Shoulder flex/ext TA activation paddles OPEN  Shoulder horizontal abd/add TA activation paddles OPEN  Shoulder abd/add with TA activation and  paddles OPEN    B shoulder row and extension with GTB x15ea    ENDURANCE  LTR x 2.5'  Bicycle in // bars x 2.5'      Patient Education and Home Exercises     Home Exercises Provided and Patient Education Provided     Education provided:   Role of aquatic therapy  Hydration post therapy    Written Home Exercises Provided: Patient instructed to cont prior HEP.     ASSESSMENT   The patient performed the exercises well today with moderate VC required to improve TA and core activation and avoid shoulder elevation with all exercises.     Tiffany Is progressing well towards her goals.     Pt prognosis is Good.     Pt will continue to benefit from skilled outpatient physical therapy to address the deficits listed in the problem list box on initial evaluation, provide pt/family education and to maximize pt's level of independence in the home and community environment.     Pt's spiritual, cultural and educational needs considered and pt agreeable to plan of care and goals.     Anticipated barriers to physical therapy: none at this time    Goals:     Short Term Goals: 6 weeks   1. Patient will be independent in HEP & progressions.  2. Patient will achieve TUG score of 14 sec to demonstrate improved mobility  3. The patient will achieve 5 sit to stand score of 20 seconds to demonstrate improved transfers and endurance.     Long Term Goals: 12 weeks   1. The patient will demonstrate independence with extensive HEP.  2. Patient will achieve 5 sit to stand score of 18 seconds to demonstrate improved transfers & endurance.  3. Patent is able to demonstrate MMT 4+/5 B UE and LE without pain reports during testing.      PLAN   Plan of care Certification: 12/5/2023 to 2/05/2024.    Outpatient Physical Therapy 1-2 times weekly for 12 weeks to include the following interventions: manual therapy, aquatic therapy, patient education, therapeutic exercise, therapeutic activities.    Maraih Adan, PT

## 2024-01-08 ENCOUNTER — CLINICAL SUPPORT (OUTPATIENT)
Dept: REHABILITATION | Facility: HOSPITAL | Age: 89
End: 2024-01-08
Payer: MEDICARE

## 2024-01-08 DIAGNOSIS — M62.81 GENERALIZED MUSCLE WEAKNESS: ICD-10-CM

## 2024-01-08 DIAGNOSIS — R53.81 PHYSICAL DECONDITIONING: Primary | ICD-10-CM

## 2024-01-08 PROCEDURE — 97113 AQUATIC THERAPY/EXERCISES: CPT | Mod: HCNC

## 2024-01-08 NOTE — PROGRESS NOTES
[facility]   Physical Therapy Treatment Note     Name: Tiffany Masterson  Clinic Number: [id number]    Therapy Diagnosis:   Encounter Diagnoses   Name Primary?    Physical deconditioning Yes    Generalized muscle weakness        Physician: Rebeca Dumont MD    Visit Date: 1/8/2024    Physician Orders: Aquatic Therapy   Medical Diagnosis from Referral: Pyhysical Deconditioning  Evaluation Date: 12/5/2023  Authorization Period Expiration: 12/31/2024  Plan of Care Expiration: 2/05/2024  Visit # / Visits authorized: 2/20 9 visits total    Precautions: Standard and Fall    Time In  9:00 am   Time Out: 9:55 am  Total Billable Time:  43 minutes    SUBJECTIVE     Pt reports: she continued to have some sharp LBP which she reports may be slightly improved since Friday. Starting to have trouble getting up from the toilet.    She was compliant with home exercise program.    Response to previous treatment: slight soreness but feeling good.     Functional change: able to stand from toilet without use of arms     Pain: 4/10  Location: bilateral back      OBJECTIVE     Objective Measures updated at progress report unless specified.     Treatment     Tiffany received aquatic therapeutic exercises to develop strength, endurance, ROM, flexibility, posture, and core stabilization for 55 minutes including:    FUNCTIONAL MOBILITY TRAINING x 2 laps each at beginning and 1 lap each at end of session  Walk forward/backward/lateral    STRETCHES 2 x 30sec  HS at steps    LE EX x 2 x 10 reps   Squat  Hip abduction - pain, completed seated clams with OTB instead  Hip flex - OTB  +Hamstring curl     Stationary March - held   Heel raise with gluteal set - held d/t pain    Seated  Sit to stand OTB  LAQ seated OTB  Clam OTB    Walking marches x 2 laps with focus on core stabilization held     UE EX/CORE  x 20    Shoulder flex/ext TA activation paddles OPEN  Shoulder horizontal abd/add TA activation paddles NP Pain with both  OPEN and AROM  Shoulder abd/add with TA activation and paddles NP pain with both OPEN and AROM    B shoulder row and extension with GTB x15ea NP    ENDURANCE  LTR x 2.5'  Bicycle in // bars x 2.5'      Patient Education and Home Exercises     Home Exercises Provided and Patient Education Provided     Education provided:   Role of aquatic therapy  Hydration post therapy    Written Home Exercises Provided: Patient instructed to cont prior HEP.     ASSESSMENT     Increase in s/s with unilateral lower extremity exercises, therefore treatment session focussed on DL lower extremity strengthening and stability. Discussed the importance of rest and not pushing through pain in order to maximize gains made in PT and improve her ability to perform ADLs outside of PT. Pt verbalizes understanding.    Tiffany Is progressing well towards her goals.     Pt prognosis is Good.     Pt will continue to benefit from skilled outpatient physical therapy to address the deficits listed in the problem list box on initial evaluation, provide pt/family education and to maximize pt's level of independence in the home and community environment.     Pt's spiritual, cultural and educational needs considered and pt agreeable to plan of care and goals.     Anticipated barriers to physical therapy: none at this time    Goals:     Short Term Goals: 6 weeks   1. Patient will be independent in HEP & progressions.  2. Patient will achieve TUG score of 14 sec to demonstrate improved mobility  3. The patient will achieve 5 sit to stand score of 20 seconds to demonstrate improved transfers and endurance.     Long Term Goals: 12 weeks   1. The patient will demonstrate independence with extensive HEP.  2. Patient will achieve 5 sit to stand score of 18 seconds to demonstrate improved transfers & endurance.  3. Patent is able to demonstrate MMT 4+/5 B UE and LE without pain reports during testing.      PLAN   Plan of care Certification: 12/5/2023 to  2/05/2024.    Outpatient Physical Therapy 1-2 times weekly for 12 weeks to include the following interventions: manual therapy, aquatic therapy, patient education, therapeutic exercise, therapeutic activities.    Maryam Jeffries, PT

## 2024-01-12 ENCOUNTER — CLINICAL SUPPORT (OUTPATIENT)
Dept: REHABILITATION | Facility: HOSPITAL | Age: 89
End: 2024-01-12
Payer: MEDICARE

## 2024-01-12 DIAGNOSIS — M62.81 GENERALIZED MUSCLE WEAKNESS: ICD-10-CM

## 2024-01-12 DIAGNOSIS — R53.81 PHYSICAL DECONDITIONING: Primary | ICD-10-CM

## 2024-01-12 PROCEDURE — 97113 AQUATIC THERAPY/EXERCISES: CPT | Mod: HCNC

## 2024-01-12 NOTE — PROGRESS NOTES
[facility]   Physical Therapy Treatment Note     Name: Tiffany Masterson  Clinic Number: [id number]    Therapy Diagnosis:   Encounter Diagnoses   Name Primary?    Physical deconditioning Yes    Generalized muscle weakness        Physician: Rebeca Dumont MD    Visit Date: 1/12/2024    Physician Orders: Aquatic Therapy   Medical Diagnosis from Referral: Pyhysical Deconditioning  Evaluation Date: 12/5/2023  Authorization Period Expiration: 12/31/2024  Plan of Care Expiration: 2/05/2024  Visit # / Visits authorized: 3/20 10 visits total    Precautions: Standard and Fall    Time In 8:30 am    Time Out: 9:25 am   Total Billable Time:  40 minutes    SUBJECTIVE     Pt reports: she is feeling aches and pains today. She stated she always has some pain but feels like the pool is good for her exercising.    She was compliant with home exercise program.    Response to previous treatment: slight soreness but feeling good.     Functional change: able to stand from toilet without use of arms     Pain: 0/10  Location: bilateral back      OBJECTIVE     Objective Measures updated at progress report unless specified.     Treatment     Tiffany received aquatic therapeutic exercises to develop strength, endurance, ROM, flexibility, posture, and core stabilization for 55 minutes including:    FUNCTIONAL MOBILITY TRAINING x 2 laps each at beginning and 1 lap each at end of session  Walk forward/backward/lateral    STRETCHES 2 x 30sec  HS at steps    LE EX x 2 x 10 reps   Squat  Hip abduction - no resistance   Hip flex - OTB--no resistance  Heel raise  Hamstring curl   Stationary March       Seated  Sit to stand OTB--1 x 10 and 1 x 6 due to feeling it in her left hip and R knee  LAQ seated OTB  Clam OTB    Walking marches x 2 laps with focus on core stabilization held     UE EX/CORE  x 20  --Hold all UE exercises today  Shoulder flex/ext TA activation paddles OPEN  Shoulder horizontal abd/add TA activation paddles NP  Pain with both OPEN and AROM previous visits  Shoulder abd/add with TA activation and paddles NP pain with both OPEN and AROM at previous visits     ENDURANCE  LTR x 2.5'  Bicycle in // bars x 2.5'      Patient Education and Home Exercises     Home Exercises Provided and Patient Education Provided     Education provided:   Role of aquatic therapy  Hydration post therapy    Written Home Exercises Provided: Patient instructed to cont prior HEP.     ASSESSMENT     Due to some overall elevated UE pain at the previous sessions all UE exercises were held today and treatment was focused on LE and core strength.  She was educated to let the PT know if anything was bothersome while she was performing the exercise so she could modify the exercise vs letting the PT know upon completion of the exercise. She voiced understanding and let the treating PT know that on her second set of sit to stand she noticed an increase of L hip and knee pain so remaining reps were held.     Tiffany Is progressing well towards her goals.     Pt prognosis is Good.     Pt will continue to benefit from skilled outpatient physical therapy to address the deficits listed in the problem list box on initial evaluation, provide pt/family education and to maximize pt's level of independence in the home and community environment.     Pt's spiritual, cultural and educational needs considered and pt agreeable to plan of care and goals.     Anticipated barriers to physical therapy: none at this time    Goals:     Short Term Goals: 6 weeks   1. Patient will be independent in HEP & progressions.  2. Patient will achieve TUG score of 14 sec to demonstrate improved mobility  3. The patient will achieve 5 sit to stand score of 20 seconds to demonstrate improved transfers and endurance.     Long Term Goals: 12 weeks   1. The patient will demonstrate independence with extensive HEP.  2. Patient will achieve 5 sit to stand score of 18 seconds to demonstrate improved  transfers & endurance.  3. Patent is able to demonstrate MMT 4+/5 B UE and LE without pain reports during testing.      PLAN   Plan of care Certification: 12/5/2023 to 2/05/2024.    Outpatient Physical Therapy 1-2 times weekly for 12 weeks to include the following interventions: manual therapy, aquatic therapy, patient education, therapeutic exercise, therapeutic activities.    Mariah Adan, PT

## 2024-01-19 ENCOUNTER — CLINICAL SUPPORT (OUTPATIENT)
Dept: REHABILITATION | Facility: HOSPITAL | Age: 89
End: 2024-01-19
Payer: MEDICARE

## 2024-01-19 DIAGNOSIS — R53.81 PHYSICAL DECONDITIONING: Primary | ICD-10-CM

## 2024-01-19 DIAGNOSIS — M62.81 GENERALIZED MUSCLE WEAKNESS: ICD-10-CM

## 2024-01-19 PROCEDURE — 97113 AQUATIC THERAPY/EXERCISES: CPT | Mod: HCNC

## 2024-01-19 NOTE — PROGRESS NOTES
"[facility]   Physical Therapy Treatment Note     Name: Tiffany Masterson  Clinic Number: [id number]    Therapy Diagnosis:   Encounter Diagnoses   Name Primary?    Physical deconditioning Yes    Generalized muscle weakness        Physician: Rebeca Dumont MD    Visit Date: 1/19/2024    Physician Orders: Aquatic Therapy   Medical Diagnosis from Referral: Pyhysical Deconditioning  Evaluation Date: 12/5/2023  Authorization Period Expiration: 12/31/2024  Plan of Care Expiration: 2/05/2024  Visit # / Visits authorized: 3/20 10 visits total    Precautions: Standard and Fall    Time In 8:30 am    Time Out: 9:15 am   Total Billable Time:  30 minutes    SUBJECTIVE     Pt reports: her pain levels have been strong which started a couple of days ago. She stated her pain is in her right shoulder, right hip and left knee. She stated her pain currently is a 2/10 but can vary throughout the day.     She was compliant with home exercise program.    Response to previous treatment: slight soreness but feeling good.     Functional change: able to stand from toilet without use of arms     Pain: 2/10  Location: bilateral back      OBJECTIVE     Objective Measures updated at progress report unless specified.     Treatment     Tiffany received aquatic therapeutic exercises to develop strength, endurance, ROM, flexibility, posture, and core stabilization for 45  minutes including:    FUNCTIONAL MOBILITY TRAINING x 2 laps each at beginning and 1 lap each at end of session  Walk forward/backward/lateral    STRETCHES 2 x 30sec  HS at steps  SKTC 20"x2     LE EX x 2 x 10 reps   Squat  Hip abduction   Hip flex   Heel raise  Hamstring curl   Stationary March       Seated--No resistance due to increased L hip pain with standing exercises  Sit to stand OTB--1 x 10 and 1 x 6 due to feeling it in her left hip  LAQ seated OTB  Clam OTB    Walking marches x 2 laps with focus on core stabilization held     UE EX/CORE  x 20  --Hold " all UE exercises today  Shoulder flex/ext TA activation paddles OPEN  Shoulder horizontal abd/add TA activation paddles NP Pain with both OPEN and AROM previous visits  Shoulder abd/add with TA activation and paddles NP pain with both OPEN and AROM at previous visits     ENDURANCE  LTR x 2.5'  Bicycle in // bars x 2.5'      Patient Education and Home Exercises     Home Exercises Provided and Patient Education Provided     Education provided:   Role of aquatic therapy  Hydration post therapy    Written Home Exercises Provided: Patient instructed to cont prior HEP.     ASSESSMENT     The patient reported some increased L hip pain upon completion of standing exercises so attempted L piriformis stretch but felt pain in her knee so performed SKTC which she felt a posterior hip stretch.     Tiffany Is progressing well towards her goals.     Pt prognosis is Good.     Pt will continue to benefit from skilled outpatient physical therapy to address the deficits listed in the problem list box on initial evaluation, provide pt/family education and to maximize pt's level of independence in the home and community environment.     Pt's spiritual, cultural and educational needs considered and pt agreeable to plan of care and goals.     Anticipated barriers to physical therapy: none at this time    Goals:     Short Term Goals: 6 weeks   1. Patient will be independent in HEP & progressions.  2. Patient will achieve TUG score of 14 sec to demonstrate improved mobility  3. The patient will achieve 5 sit to stand score of 20 seconds to demonstrate improved transfers and endurance.     Long Term Goals: 12 weeks   1. The patient will demonstrate independence with extensive HEP.  2. Patient will achieve 5 sit to stand score of 18 seconds to demonstrate improved transfers & endurance.  3. Patent is able to demonstrate MMT 4+/5 B UE and LE without pain reports during testing.      PLAN   Plan of care Certification: 12/5/2023 to  2/05/2024.    Outpatient Physical Therapy 1-2 times weekly for 12 weeks to include the following interventions: manual therapy, aquatic therapy, patient education, therapeutic exercise, therapeutic activities.    Mariah Adan, PT

## 2024-01-22 ENCOUNTER — CLINICAL SUPPORT (OUTPATIENT)
Dept: REHABILITATION | Facility: HOSPITAL | Age: 89
End: 2024-01-22
Payer: MEDICARE

## 2024-01-22 DIAGNOSIS — R53.81 PHYSICAL DECONDITIONING: Primary | ICD-10-CM

## 2024-01-22 DIAGNOSIS — M62.81 GENERALIZED MUSCLE WEAKNESS: ICD-10-CM

## 2024-01-22 PROCEDURE — 97113 AQUATIC THERAPY/EXERCISES: CPT | Mod: HCNC,CQ

## 2024-01-22 NOTE — PROGRESS NOTES
"[facility]   Physical Therapy Treatment Note     Name: Tiffany Masterson  Clinic Number: [id number]    Therapy Diagnosis:   Encounter Diagnoses   Name Primary?    Physical deconditioning Yes    Generalized muscle weakness          Physician: Rebeca Dumont MD    Visit Date: 1/22/2024    Physician Orders: Aquatic Therapy   Medical Diagnosis from Referral: Pyhysical Deconditioning  Evaluation Date: 12/5/2023  Authorization Period Expiration: 12/31/2024  Plan of Care Expiration: 2/05/2024  Visit # / Visits authorized: 5/20 12 visits total    Precautions: Standard and Fall    Time In 9:00 am    Time Out: 10:00 am   Total Billable Time:  60 minutes    SUBJECTIVE     Pt reports: her pain levels are about the same in the R shoulder, R hip, and left knee    She was compliant with home exercise program.    Response to previous treatment: slight soreness but feeling good.     Functional change: able to stand from toilet without use of arms     Pain: 2/10  Location: bilateral back      OBJECTIVE     Objective Measures updated at progress report unless specified.     Treatment     Tiffany received aquatic therapeutic exercises to develop strength, endurance, ROM, flexibility, posture, and core stabilization for 45  minutes including:    FUNCTIONAL MOBILITY TRAINING x 2 laps each at beginning and 1 lap each at end of session  Walk forward/backward/lateral    STRETCHES 2 x 30sec  HS at steps  SKTC 20"x2     LE EX x 2 x 10 reps   Squat  Hip abduction   Hip flex   Heel raise  Hamstring curl   Stationary March       Seated--No resistance due to increased L hip pain with standing exercises  Sit to stand OTB--1 x 10 and 1 x 6 due to feeling it in her left hip  LAQ seated OTB  Clam OTB    Walking marches x 2 laps with focus on core stabilization     UE EX/CORE  x 10    Shoulder flex/ext TA activation paddles OPEN  Shoulder horizontal abd/add TA activation paddles NP Pain with both OPEN and AROM previous visits " HELD  Shoulder abd/add with TA activation and paddles NP pain with both OPEN and AROM at previous visits     ENDURANCE  LTR x 2.5'  Bicycle in // bars x 2.5'      Patient Education and Home Exercises     Home Exercises Provided and Patient Education Provided     Education provided:   Role of aquatic therapy  Hydration post therapy    Written Home Exercises Provided: Patient instructed to cont prior HEP.     ASSESSMENT     The patient presents with good tolerance to treatment with continued hold on resistance with LE movements. Added in UE paddle movements limited to 10 reps with slight reports of discomfort in R shoulder, continued to hold horizontal abduction. No reports of hip pain with sit to stands or walking marches. Plan to progress per patient tolerance.     Tiffany Is progressing well towards her goals.     Pt prognosis is Good.     Pt will continue to benefit from skilled outpatient physical therapy to address the deficits listed in the problem list box on initial evaluation, provide pt/family education and to maximize pt's level of independence in the home and community environment.     Pt's spiritual, cultural and educational needs considered and pt agreeable to plan of care and goals.     Anticipated barriers to physical therapy: none at this time    Goals:     Short Term Goals: 6 weeks   1. Patient will be independent in HEP & progressions.  2. Patient will achieve TUG score of 14 sec to demonstrate improved mobility  3. The patient will achieve 5 sit to stand score of 20 seconds to demonstrate improved transfers and endurance.     Long Term Goals: 12 weeks   1. The patient will demonstrate independence with extensive HEP.  2. Patient will achieve 5 sit to stand score of 18 seconds to demonstrate improved transfers & endurance.  3. Patent is able to demonstrate MMT 4+/5 B UE and LE without pain reports during testing.      PLAN   Plan of care Certification: 12/5/2023 to 2/05/2024.    Outpatient Physical  Therapy 1-2 times weekly for 12 weeks to include the following interventions: manual therapy, aquatic therapy, patient education, therapeutic exercise, therapeutic activities.    MYRIAM DURAN, PTA

## 2024-01-26 ENCOUNTER — CLINICAL SUPPORT (OUTPATIENT)
Dept: REHABILITATION | Facility: HOSPITAL | Age: 89
End: 2024-01-26
Payer: MEDICARE

## 2024-01-26 DIAGNOSIS — M62.81 GENERALIZED MUSCLE WEAKNESS: ICD-10-CM

## 2024-01-26 DIAGNOSIS — R53.81 PHYSICAL DECONDITIONING: Primary | ICD-10-CM

## 2024-01-26 PROCEDURE — 97113 AQUATIC THERAPY/EXERCISES: CPT | Mod: HCNC

## 2024-01-26 NOTE — PROGRESS NOTES
"[facility]   Physical Therapy Treatment Note     Name: Tiffany Masterson  Clinic Number: [id number]    Therapy Diagnosis:   Encounter Diagnoses   Name Primary?    Physical deconditioning Yes    Generalized muscle weakness        Physician: Rebeca Dumont MD    Visit Date: 1/26/2024    Physician Orders: Aquatic Therapy   Medical Diagnosis from Referral: Pyhysical Deconditioning  Evaluation Date: 12/5/2023  Authorization Period Expiration: 12/31/2024  Plan of Care Expiration: 2/05/2024  Visit # / Visits authorized: 6/20 13 visits total    Precautions: Standard and Fall    Time In 8:20 am   Time Out: 9:20 am   Total Billable Time:  40 minutes    SUBJECTIVE     Pt reports: she is having real trouble with her joints and was in a lot of pain earlier in the week but overall feeling better today.     She was compliant with home exercise program.    Response to previous treatment: slight soreness but feeling good.     Functional change: able to stand from toilet without use of arms     Pain: 2/10  Location: bilateral back      OBJECTIVE     Objective Measures updated at progress report unless specified.     Treatment     Tiffany received aquatic therapeutic exercises to develop strength, endurance, ROM, flexibility, posture, and core stabilization for 60 minutes including:    FUNCTIONAL MOBILITY TRAINING x 2 laps each at beginning and 1 lap each at end of session  Walk forward/backward/lateral    STRETCHES 2 x 30sec  HS at steps  SKTC 20"x2     LE EX x 2 x 10 reps   Squat  Hip abduction   Hip flex   Heel raise  Hamstring curl   Stationary March     Seated--No resistance today, re-add OTB as tolerated  Sit to stand   LAQ seated  Clam     Walking marches x 2 laps with focus on core stabilization   Tandem balance x 2 laps with intermittent hold on bars for assist     Step up on 4 inch step x 10 reps B    UE EX/CORE  x 10  --Held due to pain  Shoulder flex/ext TA activation paddles OPEN  Shoulder " horizontal abd/add TA activation paddles NP Pain with both OPEN and AROM previous visits HELD  Shoulder abd/add with TA activation and paddles NP pain with both OPEN and AROM at previous visits     ENDURANCE  LTR x 2.5'  Bicycle in // bars x 2.5'      Patient Education and Home Exercises     Home Exercises Provided and Patient Education Provided     Education provided:   Role of aquatic therapy  Hydration post therapy    Written Home Exercises Provided: Patient instructed to cont prior HEP.     ASSESSMENT     The patient reported that some of the previous UE exercises were painful and her R shoulder is painful today so all UE exercises were held but LE exercises were advanced today.     Tiffany Is progressing well towards her goals.     Pt prognosis is Good.     Pt will continue to benefit from skilled outpatient physical therapy to address the deficits listed in the problem list box on initial evaluation, provide pt/family education and to maximize pt's level of independence in the home and community environment.     Pt's spiritual, cultural and educational needs considered and pt agreeable to plan of care and goals.     Anticipated barriers to physical therapy: none at this time    Goals:     Short Term Goals: 6 weeks   1. Patient will be independent in HEP & progressions.  2. Patient will achieve TUG score of 14 sec to demonstrate improved mobility  3. The patient will achieve 5 sit to stand score of 20 seconds to demonstrate improved transfers and endurance.     Long Term Goals: 12 weeks   1. The patient will demonstrate independence with extensive HEP.  2. Patient will achieve 5 sit to stand score of 18 seconds to demonstrate improved transfers & endurance.  3. Patent is able to demonstrate MMT 4+/5 B UE and LE without pain reports during testing.      PLAN   Plan of care Certification: 12/5/2023 to 2/05/2024.    Outpatient Physical Therapy 1-2 times weekly for 12 weeks to include the following interventions:  manual therapy, aquatic therapy, patient education, therapeutic exercise, therapeutic activities.    Mariah Adan, PT

## 2024-01-29 ENCOUNTER — CLINICAL SUPPORT (OUTPATIENT)
Dept: REHABILITATION | Facility: HOSPITAL | Age: 89
End: 2024-01-29
Payer: MEDICARE

## 2024-01-29 DIAGNOSIS — R53.81 PHYSICAL DECONDITIONING: Primary | ICD-10-CM

## 2024-01-29 DIAGNOSIS — M62.81 GENERALIZED MUSCLE WEAKNESS: ICD-10-CM

## 2024-01-29 PROCEDURE — 97113 AQUATIC THERAPY/EXERCISES: CPT | Mod: HCNC

## 2024-01-29 NOTE — PROGRESS NOTES
"[facility]   Physical Therapy Treatment Note     Name: Tiffany Masterson  Clinic Number: [id number]    Therapy Diagnosis:   Encounter Diagnoses   Name Primary?    Physical deconditioning Yes    Generalized muscle weakness        Physician: Rebeca Dumont MD    Visit Date: 1/29/2024    Physician Orders: Aquatic Therapy   Medical Diagnosis from Referral: Pyhysical Deconditioning  Evaluation Date: 12/5/2023  Authorization Period Expiration: 12/31/2024  Plan of Care Expiration: 2/05/2024  Visit # / Visits authorized: 7/20 14 visits total    Precautions: Standard and Fall    Time In 8:30 am   Time Out: 9:40 am   Total Billable Time:  30 minutes    SUBJECTIVE     Pt reports: she is good and bad. She stated her attitude is good and her joints ar bad. She stated that walking in here today her pain is a 2/10 but sometimes it is sharp and shooting and a 8/10.     She was compliant with home exercise program.    Response to previous treatment: slight soreness but feeling good.     Functional change: able to stand from toilet without use of arms     Pain: 2/10  Location: bilateral back      OBJECTIVE     Objective Measures updated at progress report unless specified.     Treatment     Tiffany received aquatic therapeutic exercises to develop strength, endurance, ROM, flexibility, posture, and core stabilization for 70 minutes including:    FUNCTIONAL MOBILITY TRAINING x 2 laps each at beginning and 1 lap each at end of session  Walk forward/backward/lateral    STRETCHES 2 x 30sec  HS at steps  SKTC 20"x2     LE EX x 2 x 12 reps   Squat  Hip abduction   Hip flex   Heel raise  Hamstring curl   Stationary March     Seated--No resistance today, re-add OTB as tolerated  Sit to stand   LAQ seated  Clam     Walking marches x 2 laps with focus on core stabilization --NP due to time constraints  Tandem balance x 2 laps with intermittent hold on bars for assist -NP due to time constraints    Step up on 4 inch step " x12 reps B    ENDURANCE  LTR x3'  Bicycle in // bars x 3'    UE EX/CORE  x 10  --Held due to pain  Shoulder flex/ext TA activation paddles OPEN  Shoulder horizontal abd/add TA activation paddles NP Pain with both OPEN and AROM previous visits HELD  Shoulder abd/add with TA activation and paddles NP pain with both OPEN and AROM at previous visits         Patient Education and Home Exercises     Home Exercises Provided and Patient Education Provided     Education provided:   Role of aquatic therapy  Hydration post therapy    Written Home Exercises Provided: Patient instructed to cont prior HEP.     ASSESSMENT     The patient performed all LE exercises for advanced reps to 2 x 12 today with some fatigue noted throughout the session but able to complete.     Tiffany Is progressing well towards her goals.     Pt prognosis is Good.     Pt will continue to benefit from skilled outpatient physical therapy to address the deficits listed in the problem list box on initial evaluation, provide pt/family education and to maximize pt's level of independence in the home and community environment.     Pt's spiritual, cultural and educational needs considered and pt agreeable to plan of care and goals.     Anticipated barriers to physical therapy: none at this time    Goals:     Short Term Goals: 6 weeks   1. Patient will be independent in HEP & progressions.  2. Patient will achieve TUG score of 14 sec to demonstrate improved mobility  3. The patient will achieve 5 sit to stand score of 20 seconds to demonstrate improved transfers and endurance.     Long Term Goals: 12 weeks   1. The patient will demonstrate independence with extensive HEP.  2. Patient will achieve 5 sit to stand score of 18 seconds to demonstrate improved transfers & endurance.  3. Patent is able to demonstrate MMT 4+/5 B UE and LE without pain reports during testing.      PLAN   Plan of care Certification: 12/5/2023 to 2/05/2024.    Outpatient Physical Therapy 1-2  times weekly for 12 weeks to include the following interventions: manual therapy, aquatic therapy, patient education, therapeutic exercise, therapeutic activities.    Mariah Adan, PT

## 2024-02-02 ENCOUNTER — CLINICAL SUPPORT (OUTPATIENT)
Dept: REHABILITATION | Facility: HOSPITAL | Age: 89
End: 2024-02-02
Payer: MEDICARE

## 2024-02-02 DIAGNOSIS — R53.81 PHYSICAL DECONDITIONING: Primary | ICD-10-CM

## 2024-02-02 DIAGNOSIS — M62.81 GENERALIZED MUSCLE WEAKNESS: ICD-10-CM

## 2024-02-02 NOTE — PROGRESS NOTES
"[facility]   Physical Therapy Treatment Note     Name: Tiffany Masterson  Clinic Number: [id number]    Therapy Diagnosis:   Encounter Diagnoses   Name Primary?    Physical deconditioning Yes    Generalized muscle weakness        Physician: Rebeca Dumont MD    Visit Date: 2/2/2024    Physician Orders: Aquatic Therapy   Medical Diagnosis from Referral: Pyhysical Deconditioning  Evaluation Date: 12/5/2023  Authorization Period Expiration: 12/31/2024  Plan of Care Expiration: 2/05/2024  Visit # / Visits authorized: 8/20 15 visits total    Precautions: Standard and Fall    Time In 8:30 am   Time Out:9:40 am   Total Billable Time:  40 minutes    SUBJECTIVE     Pt reports: she is doing well today.  She feels like she can get up the stairs easier step over step.     She was compliant with home exercise program.    Response to previous treatment: slight soreness but feeling good.     Functional change: able to stand from toilet without use of arms     Pain: 2/10  Location: bilateral back      OBJECTIVE     Objective Measures updated at progress report unless specified.     Treatment     Tiffany received aquatic therapeutic exercises to develop strength, endurance, ROM, flexibility, posture, and core stabilization for 70 minutes including:    FUNCTIONAL MOBILITY TRAINING x 2 laps each at beginning and 1 lap each at end of session  Walk forward/backward/lateral    STRETCHES 2 x 30sec  HS at steps  SKTC 20"x2     LE EX x 2 x 12 reps   Squat  Hip abduction   Hip flex   Heel raise  Hamstring curl   Stationary March     Seated--No resistance today, re-add OTB as tolerated  Sit to stand   LAQ seated  Clam     Walking marches x 2 laps with focus on core stabilization --NP due to time constraints  Tandem balance x 2 laps with intermittent hold on bars for assist -NP due to time constraints    Step up on 4 inch step x12 reps B    ENDURANCE  LTR x3'  Bicycle in // bars x 3'      UE EX/CORE  x 10  --Held due to " pain  Shoulder flex/ext TA activation paddles OPEN  Shoulder horizontal abd/add TA activation paddles NP Pain with both OPEN and AROM previous visits HELD  Shoulder abd/add with TA activation and paddles NP pain with both OPEN and AROM at previous visits     Patient Education and Home Exercises     Home Exercises Provided and Patient Education Provided     Education provided:   Role of aquatic therapy  Hydration post therapy    Written Home Exercises Provided: Patient instructed to cont prior HEP.     ASSESSMENT     The patient required reduced cueing for quality of exercises today. She did report some right hip discomfort with clams and hip abduction which improved with reduction of the height of execution.      Tiffany Is progressing well towards her goals.     Pt prognosis is Good.     Pt will continue to benefit from skilled outpatient physical therapy to address the deficits listed in the problem list box on initial evaluation, provide pt/family education and to maximize pt's level of independence in the home and community environment.     Pt's spiritual, cultural and educational needs considered and pt agreeable to plan of care and goals.     Anticipated barriers to physical therapy: none at this time    Goals:     Short Term Goals: 6 weeks   1. Patient will be independent in HEP & progressions.  2. Patient will achieve TUG score of 14 sec to demonstrate improved mobility  3. The patient will achieve 5 sit to stand score of 20 seconds to demonstrate improved transfers and endurance.     Long Term Goals: 12 weeks   1. The patient will demonstrate independence with extensive HEP.  2. Patient will achieve 5 sit to stand score of 18 seconds to demonstrate improved transfers & endurance.  3. Patent is able to demonstrate MMT 4+/5 B UE and LE without pain reports during testing.      PLAN   Plan of care Certification: 12/5/2023 to 2/05/2024.    Outpatient Physical Therapy 1-2 times weekly for 12 weeks to include the  following interventions: manual therapy, aquatic therapy, patient education, therapeutic exercise, therapeutic activities.    Mariah Adan, PT

## 2024-02-05 ENCOUNTER — CLINICAL SUPPORT (OUTPATIENT)
Dept: REHABILITATION | Facility: HOSPITAL | Age: 89
End: 2024-02-05
Payer: MEDICARE

## 2024-02-05 DIAGNOSIS — M62.81 GENERALIZED MUSCLE WEAKNESS: ICD-10-CM

## 2024-02-05 DIAGNOSIS — R53.81 PHYSICAL DECONDITIONING: Primary | ICD-10-CM

## 2024-02-05 PROCEDURE — 97113 AQUATIC THERAPY/EXERCISES: CPT | Mod: HCNC

## 2024-02-05 PROCEDURE — 97164 PT RE-EVAL EST PLAN CARE: CPT | Mod: HCNC

## 2024-02-05 NOTE — PLAN OF CARE
"[facility]   Physical Therapy Updated  POC Note      Name: Tiffany Masterson  Clinic Number: [id number]    Therapy Diagnosis:   Encounter Diagnoses   Name Primary?    Physical deconditioning Yes    Generalized muscle weakness      Physician: Rebeca Dumont MD    Visit Date: 2024      Physician Orders: Aquatic Therapy   Medical Diagnosis from Referral: Pyhysical Deconditioning  Evaluation Date: 2023  Authorization Period Expiration: 2024  Plan of Care Expiration: 3/5/24  Visit # / Visits authorized:  15 visits total      Precautions: Standard    Functional Level Prior to Evaluation:  Modified Independent    Time In: 8:30 am   Time Out: 9:40 am   Total Billable Time: 30 minutes    SUBJECTIVE      Update: The patient reports that her joints continue to hurt her at a 10/10 at worst but she does feel like she can go up and down stairs with step over step pattern. She stated getting up from a chair or the toilet continue to be difficult.  Her pain currently is a 1/10 but she stated she has a 10/10 pain at [place] in her right hip with walking. She stated it is "unexpected" and can happen 1- 9 x/day but it does not last chester than 10 tello seconds.       Pain:  Current 1/10, worst 10/10, best 0/10   Location: B shoulders at night, B hips and B knees (R greater than L)   Description: times with sharp and shooting and times with an ache.  Aggravating Factors: walking  Easing Factors: Takes Tylenol (2 in AM and 2 at night when needed)      OBJECTIVE      Update 24    TU/5/23 16 seconds without AD. She ambulates with reduced step length B with more reduced step length on the L compared to the R. She has reduced stance phase on the left.   24  14 seconds without AD. She continues to ambulate with reduced step length B with slightly shuffling gait.      5 Times Sit to Stand:   23 24 seconds with use of B hands on seat for support. The patient attempted to perform without " "the use of her hands but unable to complete.   2/5/24  28 seconds with use of B hands for support. She started feeling L knee and R hip pain after the 3rd rep.     4 square step test   12/5/23 11 seconds   2/5/24  11 seconds with no LOB noted.      Gait: The patient ambulates without AD with reduced step length B with more reduced step length on the L compared to the R. She has reduced stance phase on the left.      Strength:  Shoulder Right Left   Flexion 4/5 4/5   Abduction 4/5 4/5   ER 4-/5 4-/5   IR 4/5 4/5       Lower Extremity Strength  Right LE   Left LE     Knee extension: 4+/5 Knee extension: 4+/5   Knee flexion: 4/5 Knee flexion: 4/5   Hip flexion: 4/5 Hip flexion: 4/5   Hip IR 4-/5 Hip IR  4-/5   Hip ER 4-5 Hip ER 4-/5   Hip abduction: 4-/5 Hip abduction: 4-/5   Ankle dorsiflexion: 4+/5 Ankle dorsiflexion: 4/5   Ankle plantarflexion: 4/5 Ankle plantarflexion: 4/5      TREATMENT      Tiffany received aquatic therapeutic exercises to develop strength, endurance, ROM, flexibility, posture, and core stabilization for 70 minutes including:    FUNCTIONAL MOBILITY TRAINING x 2 laps each at beginning and 1 lap each at end of session  Walk forward/backward/lateral    STRETCHES 2 x 30sec  HS at steps  SKTC 20"x2     LE EX  x25 reps   Squat  Hip abduction   Hip flex   Heel raise  Hamstring curl   Stationary March     Seated--No resistance today, re-add OTB as tolerated  Sit to stand   LAQ seated  Clam     Walking marches x 2 laps with focus on core stabilization   Tandem balance x 2 laps with intermittent hold on bars for assist     Step up on 4 inch step x15 reps B    ENDURANCE  LTR x3'  Bicycle in // bars x 3'    UE EX/CORE  x 10  --Held due to pain  Shoulder flex/ext TA activation paddles OPEN  Shoulder horizontal abd/add TA activation paddles NP Pain with both OPEN and AROM previous visits HELD  Shoulder abd/add with TA activation and paddles NP pain with both OPEN and AROM at previous visits     Patient Education " and Home Exercises     Home Exercises Provided and Patient Education Provided     Education provided:   Role of aquatic therapy  Hydration post therapy    Written Home Exercises Provided: Patient instructed to cont prior HEP.     ASSESSMENT      Update:   The patient has attended 16 PT visits since the start of care. Subjectively she continues with 10/10 pain at the worst but reports functional improvement with ascending/descending stairs with reciprocal gait pattern. She stated her greatest difficulty is getting up from a chair and her continued pain levels.  Objectively she demonstrates improved time to complete the TUG but 4 second regression in performing the 5 times sit to stand and no change in 4 square step test. She has improved L Knee flex/ext L hip flexion and R knee extension MMT. She has overall had fair tolerance to treatment progressions with continued shoulder pain so all UE exercises which have been held due to continued pain reports.   She could benefit from 1 additional month of therapy to work on progressing strength, endurance and functional activities.     Previous Short Term Goals Status:       Short Term Goals: 6 weeks   1. Patient will be independent in HEP & progressions.--Ongoing as of 2/5/24  2. Patient will achieve TUG score of 14 sec to demonstrate improved mobility--MET as of 2/5/24  3. The patient will achieve 5 sit to stand score of 20 seconds to demonstrate improved transfers and endurance. --Regression since initial eval as of 2/5/24    Long Term Goals: 12 weeks   1. The patient will demonstrate independence with extensive HEP.  2. Patient will achieve 5 sit to stand score of 18 seconds to demonstrate improved transfers & endurance.  3. Patent is able to demonstrate MMT 4+/5 B UE and LE without pain reports during testing.      Long Term Goal Status: continue per initial plan of care.    Reasons for Recertification of Therapy:   To work on functional strength and endurance.     PLAN         Updated Certification Period: 2/5/24 to 3/5/24   Recommended Treatment Plan: 2 times per week for 4 weeks:  Aquatic Therapy      Mariah Adan, PT

## 2024-02-05 NOTE — PROGRESS NOTES
"[facility]   Physical Therapy Updated  POC Note      Name: Tiffany Masterson  Clinic Number: [id number]    Therapy Diagnosis:   Encounter Diagnoses   Name Primary?    Physical deconditioning Yes    Generalized muscle weakness      Physician: Rebeca Dumont MD    Visit Date: 2024      Physician Orders: Aquatic Therapy   Medical Diagnosis from Referral: Pyhysical Deconditioning  Evaluation Date: 2023  Authorization Period Expiration: 2024  Plan of Care Expiration: 3/5/24  Visit # / Visits authorized:  15 visits total      Precautions: Standard    Functional Level Prior to Evaluation:  Modified Independent    Time In: 8:30 am   Time Out: 9:40 am   Total Billable Time: 30 minutes    SUBJECTIVE      Update: The patient reports that her joints continue to hurt her at a 10/10 at worst but she does feel like she can go up and down stairs with step over step pattern. She stated getting up from a chair or the toilet continue to be difficult.  Her pain currently is a 1/10 but she stated she has a 10/10 pain at [place] in her right hip with walking. She stated it is "unexpected" and can happen 1- 9 x/day but it does not last chester than 10 tello seconds.       Pain:  Current 1/10, worst 10/10, best 0/10   Location: B shoulders at night, B hips and B knees (R greater than L)   Description: times with sharp and shooting and times with an ache.  Aggravating Factors: walking  Easing Factors: Takes Tylenol (2 in AM and 2 at night when needed)      OBJECTIVE      Update 24    TU/5/23 16 seconds without AD. She ambulates with reduced step length B with more reduced step length on the L compared to the R. She has reduced stance phase on the left.   24  14 seconds without AD. She continues to ambulate with reduced step length B with slightly shuffling gait.      5 Times Sit to Stand:   23 24 seconds with use of B hands on seat for support. The patient attempted to perform without " "the use of her hands but unable to complete.   2/5/24  28 seconds with use of B hands for support. She started feeling L knee and R hip pain after the 3rd rep.     4 square step test   12/5/23 11 seconds   2/5/24  11 seconds with no LOB noted.      Gait: The patient ambulates without AD with reduced step length B with more reduced step length on the L compared to the R. She has reduced stance phase on the left.      Strength:  Shoulder Right Left   Flexion 4/5 4/5   Abduction 4/5 4/5   ER 4-/5 4-/5   IR 4/5 4/5       Lower Extremity Strength  Right LE   Left LE     Knee extension: 4+/5 Knee extension: 4+/5   Knee flexion: 4/5 Knee flexion: 4/5   Hip flexion: 4/5 Hip flexion: 4/5   Hip IR 4-/5 Hip IR  4-/5   Hip ER 4-5 Hip ER 4-/5   Hip abduction: 4-/5 Hip abduction: 4-/5   Ankle dorsiflexion: 4+/5 Ankle dorsiflexion: 4/5   Ankle plantarflexion: 4/5 Ankle plantarflexion: 4/5      TREATMENT      Tiffany received aquatic therapeutic exercises to develop strength, endurance, ROM, flexibility, posture, and core stabilization for 70 minutes including:    FUNCTIONAL MOBILITY TRAINING x 2 laps each at beginning and 1 lap each at end of session  Walk forward/backward/lateral    STRETCHES 2 x 30sec  HS at steps  SKTC 20"x2     LE EX  x25 reps   Squat  Hip abduction   Hip flex   Heel raise  Hamstring curl   Stationary March     Seated--No resistance today, re-add OTB as tolerated  Sit to stand   LAQ seated  Clam     Walking marches x 2 laps with focus on core stabilization   Tandem balance x 2 laps with intermittent hold on bars for assist     Step up on 4 inch step x15 reps B    ENDURANCE  LTR x3'  Bicycle in // bars x 3'    UE EX/CORE  x 10  --Held due to pain  Shoulder flex/ext TA activation paddles OPEN  Shoulder horizontal abd/add TA activation paddles NP Pain with both OPEN and AROM previous visits HELD  Shoulder abd/add with TA activation and paddles NP pain with both OPEN and AROM at previous visits     Patient Education " and Home Exercises     Home Exercises Provided and Patient Education Provided     Education provided:   Role of aquatic therapy  Hydration post therapy    Written Home Exercises Provided: Patient instructed to cont prior HEP.     ASSESSMENT      Update:   The patient has attended 16 PT visits since the start of care. Subjectively she continues with 10/10 pain at the worst but reports functional improvement with ascending/descending stairs with reciprocal gait pattern. She stated her greatest difficulty is getting up from a chair and her continued pain levels.  Objectively she demonstrates improved time to complete the TUG but 4 second regression in performing the 5 times sit to stand and no change in 4 square step test. She has improved L Knee flex/ext L hip flexion and R knee extension MMT. She has overall had fair tolerance to treatment progressions with continued shoulder pain so all UE exercises which have been held due to continued pain reports.   She could benefit from 1 additional month of therapy to work on progressing strength, endurance and functional activities.     Previous Short Term Goals Status:       Short Term Goals: 6 weeks   1. Patient will be independent in HEP & progressions.--Ongoing as of 2/5/24  2. Patient will achieve TUG score of 14 sec to demonstrate improved mobility--MET as of 2/5/24  3. The patient will achieve 5 sit to stand score of 20 seconds to demonstrate improved transfers and endurance. --Regression since initial eval as of 2/5/24    Long Term Goals: 12 weeks   1. The patient will demonstrate independence with extensive HEP.  2. Patient will achieve 5 sit to stand score of 18 seconds to demonstrate improved transfers & endurance.  3. Patent is able to demonstrate MMT 4+/5 B UE and LE without pain reports during testing.      Long Term Goal Status: continue per initial plan of care.    Reasons for Recertification of Therapy:   To work on functional strength and endurance.     PLAN         Updated Certification Period: 2/5/24 to 3/5/24   Recommended Treatment Plan: 2 times per week for 4 weeks:  Aquatic Therapy      Mariah Adan, PT

## 2024-02-08 ENCOUNTER — OFFICE VISIT (OUTPATIENT)
Dept: PRIMARY CARE CLINIC | Facility: CLINIC | Age: 89
End: 2024-02-08
Payer: MEDICARE

## 2024-02-08 VITALS
WEIGHT: 176 LBS | SYSTOLIC BLOOD PRESSURE: 122 MMHG | OXYGEN SATURATION: 98 % | HEIGHT: 66 IN | HEART RATE: 77 BPM | DIASTOLIC BLOOD PRESSURE: 68 MMHG | BODY MASS INDEX: 28.28 KG/M2

## 2024-02-08 DIAGNOSIS — R49.0 HOARSENESS OF VOICE: ICD-10-CM

## 2024-02-08 DIAGNOSIS — Z12.31 ENCOUNTER FOR SCREENING MAMMOGRAM FOR MALIGNANT NEOPLASM OF BREAST: ICD-10-CM

## 2024-02-08 DIAGNOSIS — I48.0 PAROXYSMAL ATRIAL FIBRILLATION: ICD-10-CM

## 2024-02-08 DIAGNOSIS — N18.31 STAGE 3A CHRONIC KIDNEY DISEASE (CKD): ICD-10-CM

## 2024-02-08 DIAGNOSIS — M16.11 ARTHRITIS OF RIGHT HIP: ICD-10-CM

## 2024-02-08 DIAGNOSIS — M15.9 GENERALIZED OSTEOARTHRITIS OF MULTIPLE SITES: ICD-10-CM

## 2024-02-08 DIAGNOSIS — Z85.828 HX OF SKIN CANCER, BASAL CELL: ICD-10-CM

## 2024-02-08 DIAGNOSIS — I10 PRIMARY HYPERTENSION: ICD-10-CM

## 2024-02-08 DIAGNOSIS — L57.0 MULTIPLE ACTINIC KERATOSES: ICD-10-CM

## 2024-02-08 DIAGNOSIS — M16.11 PRIMARY OSTEOARTHRITIS OF RIGHT HIP: Primary | ICD-10-CM

## 2024-02-08 DIAGNOSIS — L60.3 BRITTLE NAILS: ICD-10-CM

## 2024-02-08 PROCEDURE — 1101F PT FALLS ASSESS-DOCD LE1/YR: CPT | Mod: HCNC,CPTII,S$GLB, | Performed by: INTERNAL MEDICINE

## 2024-02-08 PROCEDURE — 3288F FALL RISK ASSESSMENT DOCD: CPT | Mod: HCNC,CPTII,S$GLB, | Performed by: INTERNAL MEDICINE

## 2024-02-08 PROCEDURE — 99999 PR PBB SHADOW E&M-EST. PATIENT-LVL V: CPT | Mod: PBBFAC,HCNC,, | Performed by: INTERNAL MEDICINE

## 2024-02-08 PROCEDURE — 1159F MED LIST DOCD IN RCRD: CPT | Mod: HCNC,CPTII,S$GLB, | Performed by: INTERNAL MEDICINE

## 2024-02-08 PROCEDURE — 1157F ADVNC CARE PLAN IN RCRD: CPT | Mod: HCNC,CPTII,S$GLB, | Performed by: INTERNAL MEDICINE

## 2024-02-08 PROCEDURE — 1125F AMNT PAIN NOTED PAIN PRSNT: CPT | Mod: HCNC,CPTII,S$GLB, | Performed by: INTERNAL MEDICINE

## 2024-02-08 PROCEDURE — 99215 OFFICE O/P EST HI 40 MIN: CPT | Mod: HCNC,S$GLB,, | Performed by: INTERNAL MEDICINE

## 2024-02-08 RX ORDER — CHOLECALCIFEROL (VITAMIN D3) 125 MCG
1 CAPSULE ORAL DAILY
Qty: 90 TABLET | Refills: 3 | Status: SHIPPED | OUTPATIENT
Start: 2024-02-08

## 2024-02-08 NOTE — ASSESSMENT & PLAN NOTE
Take Tylenol  mg tid   Trial of gabapentin 300m at bedtime; trial gabapentin 100mg twice per day   Cont aquatherapy but when completed, will refer to regular PT to improve right shoulder ROM.

## 2024-02-08 NOTE — PATIENT INSTRUCTIONS
Start Biotin 5000 mcg daily     Refer to Dr. Murphy for hoarseness    Take Tylenol  mg tid 3 times per day   Trial of gabapentin 300m at bedtime; trial gabapentin 100mg twice per day   Cont aquatherapy     Get COVID booster     Refer to Derm     Schedule MMG     Refer to Dr. Stefan Valencia

## 2024-02-08 NOTE — ASSESSMENT & PLAN NOTE
Stable on losartan 100mg in pm metoprolol ER 50mg in am   -Check Bps at home weekly and prn symptoms for goal BP <130/80.  Avoid [employer] table salt; use Mrs. Thompson or Mata Rizvi no salt   -Start healthy diet high in fiber (25-35 gm daily), low fat dairy, lean protein, low in saturated/trans fat (minimize cheese, creamy salad dressings); high in calcium/magnesium/potassium; 1.5 gm sodium diet; low in processed sugars and foods, ie  WHITE sugars, bread, pasta, rice, ice cream, cookies, cake, doughnuts  -Drink 6-8 glasses of water daily  -Moderate exercise 30 min 5 times per week or 75 min intense exercise biweekly   -Start 8-10 hour intermittent fasting diet   -Avoid eating 3 hours prior to bedtime  -Minimize NSAIDs

## 2024-02-08 NOTE — PROGRESS NOTES
[facility] Progress Note      Chief Complaint     Generalized arthritis    Subjective:      History of Present Illness:  Tiffany Masterson is a 88 y.o. female     Generalized OA s/p left THR; right shoulder, left knee, right hip is the most painful   -going to aquatherapy x 2 mo pm biweekly without improvement in shoulder , hip, knee pain but improved abdominal girth  - takes gabapentin 200mg po qhs without much improvement    HTN: very well-controlled on Metoprolol ER in am and losartan 100mg qhs    Brittle nails: gets manicure     Hoarse after singing approx 4 mo ago after singing at a ; initially had blood in her saliva which has resolved    Actinic keratoses with hx basal cell cancer: needs derm appt     Paroxysmal AF: denies palpitations, chest, SOB, FLORES  .    Past Medical History:  Past Medical History:   Diagnosis Date    Abnormal MRI 10/16/2020    Acute cystitis without hematuria 2021    Arthritis     Atrial fibrillation     Cataract     Elevated liver enzymes     Endometrial mass 2018    Epiretinal membrane (ERM) of right eye     Family history of malignant neoplasm of gastrointestinal tract mother    Frequent UTI 2018    Graves disease     now hypothryoid - no surgery or medicine. resolved on its own    History of cholecystectomy 6/10/2021    History of colonoscopy     unremarkable  by Dr. Javier.  Barium enema revealed diverticular disease.    Hyperlipidemia     Hypertension     Hypertriglyceridemia 2013    Continue statin for secondary stroke prevention    Hypothyroidism     Impaired functional mobility, balance, gait, and endurance 2021    Iron deficiency anemia 2021    Migraine, ophthalmoplegic     Mixed stress and urge urinary incontinence 2018    Ocular migraine     Personal history of colonic polyps     Preop testing 2021    S/P colonoscopic polypectomy 2013    Sleep disorder 2021    Status post hysteroscopic  polypectomy 7/2/2018    TIA (transient ischemic attack)     with a normal angiogram in the past, Ocular migraines reported by patient, not TIA     Transaminitis 3/19/2021    Urethral caruncle 9/15/2020       Past Surgical History:  Past Surgical History:   Procedure Laterality Date    CATARACT EXTRACTION Right 2012    Dr. Lexis Nicolas     COLONOSCOPY      2014 polyps    COLONOSCOPY N/A 11/7/2016    Procedure: COLONOSCOPY;  Surgeon: Bebeto Gonzalez MD;  Location: [facility] ENDO (4TH FLR);  Service: Endoscopy;  Laterality: N/A;    COLONOSCOPY N/A 3/9/2020    Procedure: COLONOSCOPY;  Surgeon: Bebeto Gonzalez MD;  Location: [facility] ENDO (4TH FLR);  Service: Endoscopy;  Laterality: N/A;    COLONOSCOPY N/A 9/29/2021    Procedure: COLONOSCOPY;  Surgeon: Bebeto Gonzalez MD;  Location: [facility] ENDO (4TH FLR);  Service: Endoscopy;  Laterality: N/A;  please schedule patient as soon as possible with me EGD colonoscopy with constipation bowel prep for iron deficiency anemia family history of colon cancer and personal history of colon polyps  9/17/21 ok for standard split prep per Dr. Gonzalez-imani    COLONOSCOPY N/A 9/29/2021    Procedure: COLONOSCOPY;  Surgeon: Bebeto Gonzalez MD;  Location: [facility] ENDO (4TH FLR);  Service: Endoscopy;  Laterality: N/A;  Please schedule patient as soon as possible with me EGD colonoscopy with constipation bowel prep for iron deficiency anemia family history of colon cancer and personal history of colon polyps  9/17/21 Ok for standard split prep per Dr. Gonzalez-imani    COLONOSCOPY W/ POLYPECTOMY  6/2013    polyp of colon    ENDOSCOPIC ULTRASOUND OF UPPER GASTROINTESTINAL TRACT N/A 4/29/2021    Procedure: ULTRASOUND, UPPER GI TRACT, ENDOSCOPIC;  Surgeon: Dalton Johnson MD;  Location: [facility] ENDO (2ND FLR);  Service: Endoscopy;  Laterality: N/A;  Postprandial nausea vomiting epigastric 0.9 cm stone and sludge seen within the gallbladder lumen.  No wall thickening or pericholecystic fluid.  0.6 cm stone  seen within the distal common bile duct at the level of the pancreatic head.  There is dil    ERCP N/A 4/29/2021    Procedure: ERCP (ENDOSCOPIC RETROGRADE CHOLANGIOPANCREATOGRAPHY);  Surgeon: Dalton Johnson MD;  Location: [facility] (2ND FLR);  Service: Endoscopy;  Laterality: N/A;  Postprandial nausea and vomit 0.9 cm stone and sludge seen within the gallbladder lumen.  No wall thickening or pericholecystic fluid.  0.6 cm stone seen within the distal common bile duct at the level of the pancreatic head.  There i    ESOPHAGOGASTRODUODENOSCOPY N/A 9/29/2021    Procedure: EGD (ESOPHAGOGASTRODUODENOSCOPY);  Surgeon: Bebeto Gonzalez MD;  Location: [facility] (4TH FLR);  Service: Endoscopy;  Laterality: N/A;  rapid covid test arrival 12pm - sm  9/27 arrival time for covid test/procedure confirmed with pt-rb    ESOPHAGOGASTRODUODENOSCOPY N/A 9/29/2021    Procedure: EGD (ESOPHAGOGASTRODUODENOSCOPY);  Surgeon: Bebeto Gonzalez MD;  Location: [facility] ENDO (4TH FLR);  Service: Endoscopy;  Laterality: N/A;  covid test 9/19/21 [facility], prep instr emailed -    EYE SURGERY  11-10-14    right retina/Dr. Dalton Sierra ([facility])    HYSTEROSCOPIC POLYPECTOMY OF UTERUS N/A 7/2/2018    Procedure: POLYPECTOMY, UTERUS, HYSTEROSCOPIC;  Surgeon: Elliot Vanessa IV, MD;  Location: [facility];  Service: OB/GYN;  Laterality: N/A;    INJECTION OF JOINT Right 11/11/2022    Procedure: INJECTION, RIGHT GTB CONTRAST DIRECT REFERRAL;  Surgeon: Camden Hernandez MD;  Location: [facility] PAIN MGT;  Service: Pain Management;  Laterality: Right;    JOINT REPLACEMENT  3/23/2011    left total hip replacement    LAPAROSCOPIC CHOLECYSTECTOMY N/A 5/17/2021    Procedure: CHOLECYSTECTOMY, LAPAROSCOPIC;  Surgeon: Rayshawn Peralta Jr., MD;  Location: [facility] 2ND FLR;  Service: General;  Laterality: N/A;    REMOVAL OF EPIRETINAL MEMBRANE Right     Dr. Padron    TONSILLECTOMY      pt was 11 years old       Allergies:  Review of patient's allergies indicates:   Allergen  Reactions    Levaquin [levofloxacin]      Joint pain    Hydrochlorothiazide Other (See Comments)     Pain in joints and depression per pt       Home Medications:  Current Outpatient Medications   Medication Sig Dispense Refill    apixaban (ELIQUIS) 5 mg Tab Take 1 tablet (5 mg total) by mouth 2 (two) times daily. 180 tablet 3    ascorbic acid, vitamin C, (VITAMIN C) 250 MG tablet Take 250 mg by mouth once daily.      atorvastatin (LIPITOR) 10 MG tablet Take 1 tablet (10 mg total) by mouth once daily. 90 tablet 3    b complex vitamins capsule Take 1 capsule by mouth once daily.      bisacodyL (DULCOLAX) 5 mg EC tablet Take 5 mg by mouth once daily.      CALCIUM CARBONATE/VITAMIN D3 (CALCIUM 600 + D,3, ORAL) Take 1 tablet by mouth once daily.       cholecalciferol, vitamin D3, (VITAMIN D3) 50 mcg (2,000 unit) Cap Take 1 capsule by mouth once daily.      coenzyme Q10 200 mg capsule Take 200 mg by mouth once daily.      DEXTRAN 70/HYPROMELLOSE (ARTIFICIAL TEARS, PF, OPHT) Place 1 drop into both eyes as needed.      fluticasone propionate (FLONASE) 50 mcg/actuation nasal spray CLEAN SINUS/NARES WITH OCEAN NASAL SPRAY THEN USE FLONASE 1 SPRAY TWICE DAILY. 48 g 3    gabapentin (NEURONTIN) 100 MG capsule Take 1 capsule (100 mg total) by mouth 3 (three) times daily. 90 capsule 3    hydroquinone 4 % Crea APPLY  CREAM TOPICALLY TWICE DAILY 58 g 0    levothyroxine (SYNTHROID) 112 MCG tablet Take 1 tablet (112 mcg total) by mouth before breakfast. 90 tablet 3    losartan (COZAAR) 100 MG tablet Take 1 tablet (100 mg total) by mouth once daily. 90 tablet 3    magnesium oxide 400 mg magnesium Tab Take 1 tablet by mouth once daily.      metoprolol succinate (TOPROL-XL) 50 MG 24 hr tablet Take 1 tablet (50 mg total) by mouth 2 (two) times daily. 180 tablet 3    MULTIVITAMIN W-MINERALS/LUTEIN (CENTRUM SILVER ORAL) Take 1 tablet by mouth once daily.      sodium chloride (SALINE NASAL) 0.65 % nasal spray 1 spray by Nasal route as needed  "for Congestion. 104 mL 3    vit A/vit C/vit E/zinc/copper (ICAPS AREDS ORAL) Take 1 tablet by mouth 2 (two) times a day.      biotin 5,000 mcg TbDL Take 1 tablet (5,000 mcg total) by mouth Daily. 90 tablet 3     No current facility-administered medications for this visit.        Family History:  Family History   Problem Relation Age of Onset    Colon cancer Mother 75    Lung cancer Father 75    Diabetes Other     Diabetes Paternal Aunt     Colon cancer Paternal Aunt 80    Prostate cancer Brother     Breast cancer Neg Hx     Ovarian cancer Neg Hx     Celiac disease Neg Hx     Cataracts Neg Hx     Glaucoma Neg Hx     Macular degeneration Neg Hx        Social History:  Social History     Tobacco Use    Smoking status: Never    Smokeless tobacco: Never    Tobacco comments:     The patient is not getting any regular exercise at this time.  She does enjoy traveling to [place].   Substance Use Topics    Alcohol use: Yes     Comment: occasionally    Drug use: No       Review of Systems:  Pertinent positives and negatives listed in HPI. All other systems are reviewed and are negative.    Health Maintaince :   Health Maintenance Topics with due status: Not Due       Topic Last Completion Date    TETANUS VACCINE 09/20/2018    Colonoscopy 09/29/2021    DEXA Scan 12/02/2022    Lipid Panel 11/06/2023           Eye: UTD  Dental:     Immunizations:     COVID: needs   Hepatitis C:   Cancer Screening:    The ASCVD Risk score (Emeka DK, et al., 2019) failed to calculate for the following reasons:    The 2019 ASCVD risk score is only valid for ages 40 to 79      Objective:   /68 (BP Location: Right arm, Patient Position: Sitting, BP Method: Medium (Manual))   Pulse 77   Ht 5' 6" (1.676 m)   Wt 79.8 kg (176 lb)   SpO2 98%   BMI 28.41 kg/m²      Body mass index is 28.41 kg/m².       Physical Examination:  General: Alert and awake in no apparent distress; hoarse,low-pitched voice  HEENT: Normocephalic and atraumatic; Tms " WNL  Eyes:  PERRL; EOMi with anicteric sclera and clear conjunctivae  Mouth:  Oropharynx clear and without exudate; moist mucous membranes  Neck:   Cervical nodes not enlarged; supple; no bruits  Cardio:  Regular rate and rhythm with normal S1 and S2; no murmurs or rubs  Resp:  CTAB; respirations unlabored; no wheezes, crackles or rhonchi  Abdom: Soft, NTND with normoactive bowel sounds; negative HSM; reducible supra-umbilical hernia without skin discoloration, non-tender   Extrem: Warm and well-perfused with no clubbing, cyanosis or edema  Skin:  Actinic keratoses present on chest   Pulses:  2+ and symmetric distally  Neuro:  AAOx3; cooperative and pleasant with no focal deficits    Laboratory:      Most Recent Data:  Lab Results   Component Value Date    WBC 9.58 09/13/2023    HGB 11.9 (L) 09/13/2023    HCT 37.5 09/13/2023     09/13/2023    CHOL 136 11/06/2023    TRIG 107 11/06/2023    HDL 49 11/06/2023    ALT 27 11/06/2023    AST 25 11/06/2023     09/13/2023    K 4.2 09/13/2023     09/13/2023    BUN 21 09/13/2023    CO2 23 09/13/2023    TSH 4.154 (H) 09/13/2023    INR 1.0 05/09/2013    HGBA1C 5.8 (H) 08/31/2020              CBC:   WBC   Date Value Ref Range Status   09/13/2023 9.58 3.90 - 12.70 K/uL Final     Hemoglobin   Date Value Ref Range Status   09/13/2023 11.9 (L) 12.0 - 16.0 g/dL Final     Hematocrit   Date Value Ref Range Status   09/13/2023 37.5 37.0 - 48.5 % Final     Platelets   Date Value Ref Range Status   09/13/2023 230 150 - 450 K/uL Final     MCV   Date Value Ref Range Status   09/13/2023 95 82 - 98 fL Final     RDW   Date Value Ref Range Status   09/13/2023 13.2 11.5 - 14.5 % Final     BMP:   Sodium   Date Value Ref Range Status   09/13/2023 138 136 - 145 mmol/L Final     Potassium   Date Value Ref Range Status   09/13/2023 4.2 3.5 - 5.1 mmol/L Final     Chloride   Date Value Ref Range Status   09/13/2023 104 95 - 110 mmol/L Final     CO2   Date Value Ref Range Status  "  09/13/2023 23 23 - 29 mmol/L Final     BUN   Date Value Ref Range Status   09/13/2023 21 8 - 23 mg/dL Final     Creatinine   Date Value Ref Range Status   09/13/2023 1.1 0.5 - 1.4 mg/dL Final     Glucose   Date Value Ref Range Status   09/13/2023 89 70 - 110 mg/dL Final     Calcium   Date Value Ref Range Status   09/13/2023 9.6 8.7 - 10.5 mg/dL Final     Magnesium   Date Value Ref Range Status   12/21/2021 2.1 1.6 - 2.6 mg/dL Final     Phosphorus   Date Value Ref Range Status   06/21/2013 2.3 (L) 2.7 - 4.5 mg/dl Final     LFTs:   Total Protein   Date Value Ref Range Status   11/06/2023 7.0 6.0 - 8.4 g/dL Final     Albumin   Date Value Ref Range Status   11/06/2023 3.9 3.5 - 5.2 g/dL Final     Total Bilirubin   Date Value Ref Range Status   11/06/2023 0.5 0.1 - 1.0 mg/dL Final     Comment:     For infants and newborns, interpretation of results should be based  on gestational age, weight and in agreement with clinical  observations.    Premature Infant recommended reference ranges:  Up to 24 hours.............<8.0 mg/dL  Up to 48 hours............<12.0 mg/dL  3-5 days..................<15.0 mg/dL  6-29 days.................<15.0 mg/dL       AST   Date Value Ref Range Status   11/06/2023 25 10 - 40 U/L Final     Alkaline Phosphatase   Date Value Ref Range Status   11/06/2023 103 55 - 135 U/L Final     ALT   Date Value Ref Range Status   11/06/2023 27 10 - 44 U/L Final     Coags:   INR   Date Value Ref Range Status   05/09/2013 1.0 0.8 - 1.2 Final     Comment:     ACCP Guideline for Coumadin usage recommends a "target range" of  2.0 - 3.0 for INR for all indicators except mechanical heart valves  and antiphospholipid syndromes which should use 2.5 - 3.5.  .     FLP:      Lab Results   Component Value Date    CHOL 136 11/06/2023    CHOL 217 (H) 09/13/2023    CHOL 214 (H) 12/02/2022     Lab Results   Component Value Date    HDL 49 11/06/2023    HDL 39 (L) 09/13/2023    HDL 54 12/02/2022     Lab Results   Component " Value Date    LDLCALC 65.6 11/06/2023    LDLCALC 131.0 09/13/2023    LDLCALC 137.6 12/02/2022     Lab Results   Component Value Date    TRIG 107 11/06/2023    TRIG 235 (H) 09/13/2023    TRIG 112 12/02/2022     Lab Results   Component Value Date    CHOLHDL 36.0 11/06/2023    CHOLHDL 18.0 (L) 09/13/2023    CHOLHDL 25.2 12/02/2022      DM:      Hemoglobin A1C   Date Value Ref Range Status   08/31/2020 5.8 (H) 4.0 - 5.6 % Final     Comment:     ADA Screening Guidelines:  5.7-6.4%  Consistent with prediabetes  >or=6.5%  Consistent with diabetes  High levels of fetal hemoglobin interfere with the HbA1C  assay. Heterozygous hemoglobin variants (HbS, HgC, etc)do  not significantly interfere with this assay.   However, presence of multiple variants may affect accuracy.       LDL Cholesterol   Date Value Ref Range Status   11/06/2023 65.6 63.0 - 159.0 mg/dL Final     Comment:     The National Cholesterol Education Program (NCEP) has set the  following guidelines (reference values) for LDL Cholesterol:  Optimal.......................<130 mg/dL  Borderline High...............130-159 mg/dL  High..........................160-189 mg/dL  Very High.....................>190 mg/dL       Creatinine   Date Value Ref Range Status   09/13/2023 1.1 0.5 - 1.4 mg/dL Final     Thyroid:   TSH   Date Value Ref Range Status   09/13/2023 4.154 (H) 0.400 - 4.000 uIU/mL Final     Free T4   Date Value Ref Range Status   09/13/2023 0.99 0.71 - 1.51 ng/dL Final     Anemia:   Iron   Date Value Ref Range Status   09/18/2021 47 30 - 160 ug/dL Final     TIBC   Date Value Ref Range Status   09/18/2021 330 250 - 450 ug/dL Final     Ferritin   Date Value Ref Range Status   09/18/2021 251 20.0 - 300.0 ng/mL Final     Vitamin B-12   Date Value Ref Range Status   12/02/2022 652 210 - 950 pg/mL Final     Cardiac:   Troponin I   Date Value Ref Range Status   12/21/2021 0.014 0.000 - 0.026 ng/mL Final     Comment:     The reference interval for Troponin I  represents the 99th percentile   cutoff   for our facility and is consistent with 3rd generation assay   performance.       BNP   Date Value Ref Range Status   12/21/2021 184 (H) 0 - 99 pg/mL Final     Comment:     Values of less than 100 pg/ml are consistent with non-CHF populations.     Urinalysis:   Urine Culture, Routine   Date Value Ref Range Status   12/22/2021   Final    Multiple organisms isolated. None in predominance.  Repeat if   12/22/2021 clinically necessary.  Final     Color, UA   Date Value Ref Range Status   09/13/2023 Yellow Yellow, Straw, [place] Final     Clarity, UA   Date Value Ref Range Status   10/29/2020   Final     Comment:     normal      Specific Gravity, UA   Date Value Ref Range Status   09/13/2023 1.020 1.005 - 1.030 Final     Nitrite, UA   Date Value Ref Range Status   09/13/2023 Negative Negative Final     Ketones, UA   Date Value Ref Range Status   09/13/2023 Negative Negative Final     Urobilinogen, UA   Date Value Ref Range Status   10/29/2020   Final     Comment:     normal   09/14/2018 Negative <2.0 EU/dL Final     WBC, UA   Date Value Ref Range Status   09/13/2023 >100 (H) 0 - 5 /hpf Final       Other Results:  EKG (my interpretation):     Radiology:  X-Ray Shoulder Complete Bilateral  Narrative: EXAMINATION:  XR SHOULDER COMPLETE 2 OR MORE VIEWS BILATERAL    CLINICAL HISTORY:  Pain in right shoulder    TECHNIQUE:  Three views bilateral    COMPARISON:  06/05/2017    FINDINGS:  There is prominent glenohumeral joint degenerative changes bilaterally, noting joint space loss with osteophytosis, progressed from the prior exam.  No fractures.  No marrow replacement process.  Impression: No acute findings.    Electronically signed by: Rian Finney MD  Date:    09/27/2023  Time:    08:37  X-Ray Hips Bilateral 2 View Incl AP Pelvis  Narrative: EXAMINATION:  XR HIPS BILATERAL 2 VIEW INCL AP PELVIS    CLINICAL HISTORY:  Pain in right hip    TECHNIQUE:  AP view of the pelvis and frogleg  lateral views of both hips were performed.    COMPARISON:  09/24/2021    FINDINGS:  Left total hip prosthesis noted.  No evidence of hardware failure or loosening.  Alignment stable.  Advanced right hip degenerative changes, noting joint space loss with osteophytosis.  Prominent lower lumbar spondylosis noted.  Remote parasymphyseal fracture noted.  Osteopenia noted.  No marrow replacement process.  Impression: No acute findings.    Electronically signed by: Rian Finney MD  Date:    09/27/2023  Time:    08:35  X-ray Knee Ortho Bilateral with Flexion  Narrative: EXAMINATION:  XR KNEE ORTHO BILAT WITH FLEXION    CLINICAL HISTORY:  Pain in right knee    TECHNIQUE:  AP standing of both knees, PA flexion standing views of both knees, and Merchant views of both knees were performed.  Lateral views of both knees were also performed.    COMPARISON:  12/16/2014    FINDINGS:  Tricompartment degenerative changes, most prominent in the lateral compartment of the right knee and lateral patellofemoral compartments bilaterally, noting moderate joint space loss with osteophytosis.  No fractures.  No marrow replacement process.  There is prominent scattered calcific atherosclerosis.  Impression: No acute findings.    Electronically signed by: Rian Finney MD  Date:    09/27/2023  Time:    08:34          Assessment/Plan     Tiffany Masterson is a 88 y.o. female with:  1. Primary osteoarthritis of right hip  -     Cancel: Ambulatory referral/consult to Orthopedics; Future; Expected date: 02/08/2024  -     Ambulatory referral/consult to Orthopedics; Future; Expected date: 02/08/2024    2. Stage 3a chronic kidney disease (CKD)  Assessment & Plan:  Drink 6-8 glasses of water daily  Repeat labs       3. Primary hypertension  Assessment & Plan:  Stable on losartan 100mg in pm metoprolol ER 50mg in am   -Check Bps at home weekly and prn symptoms for goal BP <130/80.  Avoid Dobbins's table salt; use Mrs. Thompson or Mata Rizvi no salt   -Start  healthy diet high in fiber (25-35 gm daily), low fat dairy, lean protein, low in saturated/trans fat (minimize cheese, creamy salad dressings); high in calcium/magnesium/potassium; 1.5 gm sodium diet; low in processed sugars and foods, ie  WHITE sugars, bread, pasta, rice, ice cream, cookies, cake, doughnuts  -Drink 6-8 glasses of water daily  -Moderate exercise 30 min 5 times per week or 75 min intense exercise biweekly   -Start 8-10 hour intermittent fasting diet   -Avoid eating 3 hours prior to bedtime  -Minimize NSAIDs          4. Paroxysmal atrial fibrillation  Assessment & Plan:  Cont BB and Eliquis 5mg bid       5. Generalized osteoarthritis of multiple sites  Overview:  right shoulder, left knee, right hip are most painful  S/p left THR    Assessment & Plan:  Take Tylenol  mg tid   Trial of gabapentin 300m at bedtime; trial gabapentin 100mg twice per day   Cont aquatherapy but when completed, will refer to regular PT to improve right shoulder ROM.      6. Hoarseness of voice  Assessment & Plan:  Refer back to Dr. Murphy   Rest your voice  Hydrate and suck on lozenges     Orders:  -     Ambulatory referral/consult to ENT; Future; Expected date: 02/08/2024    7. Encounter for screening mammogram for malignant neoplasm of breast  -     Ambulatory referral/consult to Dermatology; Future; Expected date: 02/08/2024  -     Mammo Digital Screening Bilat; Future; Expected date: 02/08/2024    8. Multiple actinic keratoses  Assessment & Plan:  Refer to Derm      9. Hx of skin cancer, basal cell  Assessment & Plan:  Refer to Derm for surveillance      10. Arthritis of right hip  Assessment & Plan:  Refer to Dr. Aviles to consider right hip replacement  Cont aquatherapy  Take Tylenol AR tid       11. Brittle nails  Assessment & Plan:  Stop painting nails  Biotin 5000 mcg po daily  Cont Ca/Vit D daily      Other orders  -     biotin 5,000 mcg TbDL; Take 1 tablet (5,000 mcg total) by mouth Daily.  Dispense: 90  tablet; Refill: 3             I spent a total of 56 minutes on the day of the visit.This includes face to face time and non-face to face time preparing to see the patient (eg, review of tests), obtaining and/or reviewing separately obtained history, documenting clinical information in the electronic or other health record, independently interpreting results and communicating results to the patient/family/caregiver, or care coordinator.   Code Status:     Rebeca Dumont MD

## 2024-02-12 ENCOUNTER — CLINICAL SUPPORT (OUTPATIENT)
Dept: REHABILITATION | Facility: HOSPITAL | Age: 89
End: 2024-02-12
Payer: MEDICARE

## 2024-02-12 DIAGNOSIS — R53.81 PHYSICAL DECONDITIONING: Primary | ICD-10-CM

## 2024-02-12 DIAGNOSIS — M62.81 GENERALIZED MUSCLE WEAKNESS: ICD-10-CM

## 2024-02-12 PROCEDURE — 97113 AQUATIC THERAPY/EXERCISES: CPT | Mod: HCNC

## 2024-02-12 NOTE — PROGRESS NOTES
"[facility]   Physical Therapy Daily Treatment Note      Name: Tiffany Masterson  Clinic Number: [id number]    Therapy Diagnosis:   Encounter Diagnoses   Name Primary?    Physical deconditioning Yes    Generalized muscle weakness        Physician: Rebeca Dumont MD    Visit Date: 2/12/2024    Physician Orders: Aquatic Therapy   Medical Diagnosis from Referral: Pyhysical Deconditioning  Evaluation Date: 12/5/2023  Authorization Period Expiration: 12/31/2024  Plan of Care Expiration: 3/5/24  Visit # / Visits authorized: 10/20 17 visits total      Precautions: Standard    Functional Level Prior to Evaluation:  Modified Independent    Time In: 8:30 am   Time Out: 9:40 am  Total Billable Time: 40 minutes    SUBJECTIVE      The patient reports: She is feeling better today than last week.      Pain:  Current 1/10, worst 10/10, best 0/10   Location: B shoulders at night, B hips and B knees (R greater than L)   Description: times with sharp and shooting and times with an ache.  Aggravating Factors: walking  Easing Factors: Takes Tylenol (2 in AM and 2 at night when needed)      OBJECTIVE      Update 2/5/24    TREATMENT      Tiffany received aquatic therapeutic exercises to develop strength, endurance, ROM, flexibility, posture, and core stabilization for 70 minutes including:    FUNCTIONAL MOBILITY TRAINING x 2 laps each at beginning and 1 lap each at end of session  Walk forward/backward/lateral    STRETCHES 2 x 30sec  HS at steps  SKTC 20"x2     LE EX  x25 reps   Squat  Hip abduction   Hip flex   Heel raise  Hamstring curl   Stationary March     Seated--No resistance today, re-add OTB as tolerated  Sit to stand   LAQ seated  Clam     Walking marches x 2 laps with focus on core stabilization   Tandem balance x 2 laps with intermittent hold on bars for assist     Step up on 4 inch step x20 reps B    ENDURANCE  LTR x3'  Bicycle in // bars x 3'    UE EX/CORE  x 10  --Held due to pain  Shoulder flex/ext TA " activation paddles OPEN  Shoulder horizontal abd/add TA activation paddles NP Pain with both OPEN and AROM previous visits HELD  Shoulder abd/add with TA activation and paddles NP pain with both OPEN and AROM at previous visits     Patient Education and Home Exercises     Home Exercises Provided and Patient Education Provided     Education provided:   Role of aquatic therapy  Hydration post therapy    Written Home Exercises Provided: Patient instructed to cont prior HEP.     ASSESSMENT      The patient performed her exercises today with reduced cueing required and reduced pain.    Previous Short Term Goals Status:       Short Term Goals: 6 weeks   1. Patient will be independent in HEP & progressions.--Ongoing as of 2/5/24  2. Patient will achieve TUG score of 14 sec to demonstrate improved mobility--MET as of 2/5/24  3. The patient will achieve 5 sit to stand score of 20 seconds to demonstrate improved transfers and endurance. --Regression since initial eval as of 2/5/24    Long Term Goals: 12 weeks   1. The patient will demonstrate independence with extensive HEP.  2. Patient will achieve 5 sit to stand score of 18 seconds to demonstrate improved transfers & endurance.  3. Patent is able to demonstrate MMT 4+/5 B UE and LE without pain reports during testing.      Long Term Goal Status: continue per initial plan of care.    Reasons for Recertification of Therapy:   To work on functional strength and endurance.     PLAN        Updated Certification Period: 2/5/24 to 3/5/24   Recommended Treatment Plan: 2 times per week for 4 weeks:  Aquatic Therapy      Mariah Adan, PT

## 2024-02-16 ENCOUNTER — CLINICAL SUPPORT (OUTPATIENT)
Dept: REHABILITATION | Facility: HOSPITAL | Age: 89
End: 2024-02-16
Payer: MEDICARE

## 2024-02-16 ENCOUNTER — HOSPITAL ENCOUNTER (OUTPATIENT)
Dept: RADIOLOGY | Facility: HOSPITAL | Age: 89
Discharge: HOME | End: 2024-02-16
Attending: INTERNAL MEDICINE
Payer: MEDICARE

## 2024-02-16 DIAGNOSIS — Z12.31 ENCOUNTER FOR SCREENING MAMMOGRAM FOR MALIGNANT NEOPLASM OF BREAST: ICD-10-CM

## 2024-02-16 DIAGNOSIS — R53.81 PHYSICAL DECONDITIONING: Primary | ICD-10-CM

## 2024-02-16 DIAGNOSIS — M62.81 GENERALIZED MUSCLE WEAKNESS: ICD-10-CM

## 2024-02-16 PROCEDURE — 77067 SCR MAMMO BI INCL CAD: CPT | Mod: 26,HCNC,, | Performed by: RADIOLOGY

## 2024-02-16 PROCEDURE — 97113 AQUATIC THERAPY/EXERCISES: CPT | Mod: HCNC

## 2024-02-16 PROCEDURE — 77067 SCR MAMMO BI INCL CAD: CPT | Mod: TC,HCNC

## 2024-02-16 PROCEDURE — 77063 BREAST TOMOSYNTHESIS BI: CPT | Mod: 26,HCNC,, | Performed by: RADIOLOGY

## 2024-02-16 NOTE — PROGRESS NOTES
"  [facility]   Physical Therapy Daily Treatment Note      Name: Tiffany Masterson  Clinic Number: [id number]    Therapy Diagnosis:   Encounter Diagnoses   Name Primary?    Physical deconditioning Yes    Generalized muscle weakness        Physician: Rebeca Dumont MD    Visit Date: 2/16/2024    Physician Orders: Aquatic Therapy   Medical Diagnosis from Referral: Pyhysical Deconditioning  Evaluation Date: 12/5/2023  Authorization Period Expiration: 12/31/2024  Plan of Care Expiration: 3/5/24  Visit # / Visits authorized: 11/20 18 visits total      Precautions: Standard    Functional Level Prior to Evaluation:  Modified Independent    Time In: 8:30 am   Time Out: 9:40 am  Total Billable Time: 25 minutes    SUBJECTIVE      The patient reports: her pain is better today. She is not walking up the stairs like she was before but it is better.       Pain:  Current 1/10, worst 10/10, best 0/10   Location: B shoulders at night, B hips and B knees (R greater than L)   Description: times with sharp and shooting and times with an ache.  Aggravating Factors: walking  Easing Factors: Takes Tylenol (2 in AM and 2 at night when needed)      OBJECTIVE      Update 2/5/24    TREATMENT      Tiffany received aquatic therapeutic exercises to develop strength, endurance, ROM, flexibility, posture, and core stabilization for 70 minutes including:    FUNCTIONAL MOBILITY TRAINING x 2 laps each at beginning and 1 lap each at end of session  Walk forward/backward/lateral    STRETCHES 2 x 30sec  HS at steps  SKTC 20"x2     LE EX  x25 reps   Heel raise into Squat  Hip abduction   Hip flex   Hip ext  Hamstring curl   Stationary March     Seated--No resistance today, re-add OTB as tolerated  Sit to stand   LAQ seated--held due to time constraints  Clam     Walking marches x 2 laps with focus on core stabilization   Tandem balance x 2 laps with intermittent hold on bars for assist     Step up on 4 inch step x20 reps " B    ENDURANCE  LTR x3'  Bicycle in // bars x 3'    UE EX/CORE  x 10  --Held due to pain  Shoulder flex/ext TA activation paddles OPEN  Shoulder horizontal abd/add TA activation paddles NP Pain with both OPEN and AROM previous visits HELD  Shoulder abd/add with TA activation and paddles NP pain with both OPEN and AROM at previous visits     Patient Education and Home Exercises     Home Exercises Provided and Patient Education Provided     Education provided:   Role of aquatic therapy  Hydration post therapy    Written Home Exercises Provided: Patient instructed to cont prior HEP.     ASSESSMENT      The patient required some VC to perform exercises with the correct execution. With hip abduction she tended to perform with hip ER which improved following the cueing.     Previous Short Term Goals Status:       Short Term Goals: 6 weeks   1. Patient will be independent in HEP & progressions.--Ongoing as of 2/5/24  2. Patient will achieve TUG score of 14 sec to demonstrate improved mobility--MET as of 2/5/24  3. The patient will achieve 5 sit to stand score of 20 seconds to demonstrate improved transfers and endurance. --Regression since initial eval as of 2/5/24    Long Term Goals: 12 weeks   1. The patient will demonstrate independence with extensive HEP.  2. Patient will achieve 5 sit to stand score of 18 seconds to demonstrate improved transfers & endurance.  3. Patent is able to demonstrate MMT 4+/5 B UE and LE without pain reports during testing.      Long Term Goal Status: continue per initial plan of care.    Reasons for Recertification of Therapy:   To work on functional strength and endurance.     PLAN        Updated Certification Period: 2/5/24 to 3/5/24   Recommended Treatment Plan: 2 times per week for 4 weeks:  Aquatic Therapy      Mariah Adan, PT

## 2024-02-19 ENCOUNTER — CLINICAL SUPPORT (OUTPATIENT)
Dept: REHABILITATION | Facility: HOSPITAL | Age: 89
End: 2024-02-19
Payer: MEDICARE

## 2024-02-19 DIAGNOSIS — M62.81 GENERALIZED MUSCLE WEAKNESS: ICD-10-CM

## 2024-02-19 DIAGNOSIS — R53.81 PHYSICAL DECONDITIONING: Primary | ICD-10-CM

## 2024-02-19 PROCEDURE — 97113 AQUATIC THERAPY/EXERCISES: CPT | Mod: HCNC

## 2024-02-19 NOTE — PROGRESS NOTES
"  [facility]   Physical Therapy Daily Treatment Note      Name: Tiffany Masterson  Clinic Number: [id number]    Therapy Diagnosis:   Encounter Diagnoses   Name Primary?    Physical deconditioning Yes    Generalized muscle weakness        Physician: Rebeca Dumont MD    Visit Date: 2/19/2024    Physician Orders: Aquatic Therapy   Medical Diagnosis from Referral: Pyhysical Deconditioning  Evaluation Date: 12/5/2023  Authorization Period Expiration: 12/31/2024  Plan of Care Expiration: 3/5/24  Visit # / Visits authorized: 12/20 19 visits total      Precautions: Standard    Functional Level Prior to Evaluation:  Modified Independent    Time In: 8:30 am   Time Out: 9:45 am  Total Billable Time: 75 minutes    SUBJECTIVE      The patient reports: "I'm a little worse" She stated she had a party at her house over the weekend and her joints are irritated. She stated her pain is 7/10 in her hips today.       Pain:  Current 7/10, worst 10/10, best 0/10   Location: B shoulders at night, B hips and B knees (R greater than L)   Description: times with sharp and shooting and times with an ache.  Aggravating Factors: walking  Easing Factors: Takes Tylenol (2 in AM and 2 at night when needed)      OBJECTIVE      Update 2/5/24    TREATMENT      Tiffany received aquatic therapeutic exercises to develop strength, endurance, ROM, flexibility, posture, and core stabilization for 75 minutes including:    FUNCTIONAL MOBILITY TRAINING x 2 laps each at beginning and 1 lap each at end of session  Walk forward/backward/lateral    STRETCHES 2 x 30sec  HS at steps  SKTC 20"x2     LE EX  x25 reps   Heel raise into Squat  Hip abduction   Hip flex   Hip ext  Hamstring curl   Stationary March     Seated--No resistance today, re-add OTB as tolerated  Sit to stand   LAQ seated  Clam     Walking marches x 2 laps with focus on core stabilization   Tandem balance x 2 laps with intermittent hold on bars for assist     Step up on 4 " inch step x20 reps B    ENDURANCE  LTR x3'  Bicycle in // bars x 3'    UE EX/CORE  x 10  --Held due to pain  Shoulder flex/ext TA activation paddles OPEN  Shoulder horizontal abd/add TA activation paddles NP Pain with both OPEN and AROM previous visits HELD  Shoulder abd/add with TA activation and paddles NP pain with both OPEN and AROM at previous visits     Patient Education and Home Exercises     Home Exercises Provided and Patient Education Provided     Education provided:   Role of aquatic therapy  Hydration post therapy    Written Home Exercises Provided: Patient instructed to cont prior HEP.     ASSESSMENT      The patient performed all exercises with reduced cueing required to execute with the appropriate technique and posture.     Previous Short Term Goals Status:       Short Term Goals: 6 weeks   1. Patient will be independent in HEP & progressions.--Ongoing as of 2/5/24  2. Patient will achieve TUG score of 14 sec to demonstrate improved mobility--MET as of 2/5/24  3. The patient will achieve 5 sit to stand score of 20 seconds to demonstrate improved transfers and endurance. --Regression since initial eval as of 2/5/24    Long Term Goals: 12 weeks   1. The patient will demonstrate independence with extensive HEP.  2. Patient will achieve 5 sit to stand score of 18 seconds to demonstrate improved transfers & endurance.  3. Patent is able to demonstrate MMT 4+/5 B UE and LE without pain reports during testing.      Long Term Goal Status: continue per initial plan of care.    Reasons for Recertification of Therapy:   To work on functional strength and endurance.     PLAN        Updated Certification Period: 2/5/24 to 3/5/24   Recommended Treatment Plan: 2 times per week for 4 weeks:  Aquatic Therapy      Mariah Adan, PT

## 2024-02-26 ENCOUNTER — CLINICAL SUPPORT (OUTPATIENT)
Dept: REHABILITATION | Facility: HOSPITAL | Age: 89
End: 2024-02-26
Payer: MEDICARE

## 2024-02-26 DIAGNOSIS — R53.81 PHYSICAL DECONDITIONING: Primary | ICD-10-CM

## 2024-02-26 DIAGNOSIS — M62.81 GENERALIZED MUSCLE WEAKNESS: ICD-10-CM

## 2024-02-26 PROCEDURE — 97113 AQUATIC THERAPY/EXERCISES: CPT | Mod: HCNC

## 2024-02-26 NOTE — PROGRESS NOTES
"  [facility]   Physical Therapy Daily Treatment Note      Name: Tiffany Masterson  Clinic Number: [id number]    Therapy Diagnosis:   Encounter Diagnoses   Name Primary?    Physical deconditioning Yes    Generalized muscle weakness        Physician: Rebeca Dumont MD    Visit Date: 2/26/2024    Physician Orders: Aquatic Therapy   Medical Diagnosis from Referral: Pyhysical Deconditioning  Evaluation Date: 12/5/2023  Authorization Period Expiration: 12/31/2024  Plan of Care Expiration: 3/5/24  Visit # / Visits authorized: 13/20 20 visits total      Precautions: Standard    Functional Level Prior to Evaluation:  Modified Independent    Time In: 8:30 am   Time Out: 9:40 am   Total Billable Time: 30 minutes    SUBJECTIVE      The patient reports: she is a "5" today. She stated she is feeling most of her pain is in her hips when going up the stairs and getting up from the toilet.  She stated she had a trip to [place] last week and it was a long drive.       Pain:  Current 7/10, worst 10/10, best 0/10   Location: B shoulders at night, B hips and B knees (R greater than L)   Description: times with sharp and shooting and times with an ache.  Aggravating Factors: walking  Easing Factors: Takes Tylenol (2 in AM and 2 at night when needed)      OBJECTIVE      Update 2/5/24    TREATMENT      Tiffany received aquatic therapeutic exercises to develop strength, endurance, ROM, flexibility, posture, and core stabilization for 70 minutes including:    FUNCTIONAL MOBILITY TRAINING x 2 laps each at beginning and 1 lap each at end of session  Walk forward/backward/lateral    STRETCHES 2 x 30sec  HS at steps  SKTC 20"x2     LE EX  x25 reps   Heel raise into Squat  Hip abduction   Hip flex   Hip ext  Hamstring curl   Stationary March     Seated--No resistance today, re-add OTB as tolerated  Sit to stand   Clam     Walking marches x 2 laps with focus on core stabilization --reduced to 1 lap due to time " constraints  Tandem balance x 2 laps with intermittent hold on bars for assist --reduced to 1 lap due to time constraints    Step up on 4 inch step x20 reps B  Lateral step up on 4 inch step x 10 reps B    ENDURANCE- both reduced to 2' due to time constraints.   LTR x3'  Bicycle in // bars x 3'      UE EX/CORE  x 10  --Held due to pain  Shoulder flex/ext TA activation paddles OPEN  Shoulder horizontal abd/add TA activation paddles NP Pain with both OPEN and AROM previous visits HELD  Shoulder abd/add with TA activation and paddles NP pain with both OPEN and AROM at previous visits     Patient Education and Home Exercises     Home Exercises Provided and Patient Education Provided     Education provided:   Role of aquatic therapy  Hydration post therapy    Written Home Exercises Provided: Patient instructed to cont prior HEP.     ASSESSMENT      The patient required some increased cueing throughout the session today to avoid too much range of motion which created trunk compensations.     Previous Short Term Goals Status:       Short Term Goals: 6 weeks   1. Patient will be independent in HEP & progressions.--Ongoing as of 2/5/24  2. Patient will achieve TUG score of 14 sec to demonstrate improved mobility--MET as of 2/5/24  3. The patient will achieve 5 sit to stand score of 20 seconds to demonstrate improved transfers and endurance. --Regression since initial eval as of 2/5/24    Long Term Goals: 12 weeks   1. The patient will demonstrate independence with extensive HEP.  2. Patient will achieve 5 sit to stand score of 18 seconds to demonstrate improved transfers & endurance.  3. Patent is able to demonstrate MMT 4+/5 B UE and LE without pain reports during testing.      Long Term Goal Status: continue per initial plan of care.    Reasons for Recertification of Therapy:   To work on functional strength and endurance.     PLAN        Updated Certification Period: 2/5/24 to 3/5/24   Recommended Treatment Plan: 2 times  per week for 4 weeks:  Aquatic Therapy      Mariah Adan, PT

## 2024-03-01 ENCOUNTER — CLINICAL SUPPORT (OUTPATIENT)
Dept: REHABILITATION | Facility: HOSPITAL | Age: 89
End: 2024-03-01
Payer: MEDICARE

## 2024-03-01 DIAGNOSIS — M62.81 GENERALIZED MUSCLE WEAKNESS: ICD-10-CM

## 2024-03-01 DIAGNOSIS — R53.81 PHYSICAL DECONDITIONING: Primary | ICD-10-CM

## 2024-03-01 PROCEDURE — 97113 AQUATIC THERAPY/EXERCISES: CPT | Mod: HCNC

## 2024-03-01 NOTE — PROGRESS NOTES
"  [facility]   Physical Therapy Daily Treatment Note      Name: Tiffany Masterson  Clinic Number: [id number]    Therapy Diagnosis:   Encounter Diagnoses   Name Primary?    Physical deconditioning Yes    Generalized muscle weakness      Physician: Rebeca Dumont MD    Visit Date: 3/1/2024    Physician Orders: Aquatic Therapy   Medical Diagnosis from Referral: Physical Deconditioning  Evaluation Date: 12/5/2023  Authorization Period Expiration: 12/31/2024  Plan of Care Expiration: 3/5/24  Visit # / Visits authorized: 14/ 20 20 visits total       Precautions: Standard    Functional Level Prior to Evaluation:  Modified Independent    Time In: 9:15 am   Time Out: 10:20 am   Total Billable Time: 60 minutes    SUBJECTIVE      The patient reports: "I come from a  family and I grew up just pushing through the pain". She reports the pool has been "wonderful" and she has noticed a reduction in pain in general, but still has "sporadic" moments where pain increases in her hips and left knee.       Pain:  Current 7/10, worst 10/10, best 0/10   Location: B shoulders at night, B hips and B knees (R greater than L)   Description: times with sharp and shooting and times with an ache.  Aggravating Factors: walking  Easing Factors: Takes Tylenol (2 in AM and 2 at night when needed)      OBJECTIVE      Update 2/5/24    TREATMENT      Tiffany received aquatic therapeutic exercises to develop strength, endurance, ROM, flexibility, posture, and core stabilization for 65 minutes including:    FUNCTIONAL MOBILITY TRAINING x 2 laps each at beginning and 1 lap each at end of session  Walk forward/backward/lateral    STRETCHES 2 x 30sec  HS at steps  SKTC 20"x2     LE EX  x25 reps   Heel raise into Squat  Hip abduction   Hip flex   Hip ext  Hamstring curl   Stationary March     Seated  Sit to stand OTB  Clam OTB    Walking marches x 2 laps with focus on core stabilization   Tandem balance x 2 laps with " intermittent hold on bars for assist    Step up on 4 inch step x20 reps B  Lateral step up on 4 inch step x 10 reps B    ENDURANCE  LTR x3'  Bicycle in // bars x 3'      UE EX/CORE  x 10  --Held due to pain  Shoulder flex/ext TA activation paddles OPEN  Shoulder horizontal abd/add TA activation paddles NP Pain with both OPEN and AROM previous visits HELD  Shoulder abd/add with TA activation and paddles NP pain with both OPEN and AROM at previous visits     Patient Education and Home Exercises     Home Exercises Provided and Patient Education Provided     Education provided:   Role of aquatic therapy  Hydration post therapy    Written Home Exercises Provided: Patient instructed to cont prior HEP.     ASSESSMENT      Good tolerance to treatment session today with min VC's to remain in pain free range during standing hip ABD. Able to tolerate reincorporation of OTB with seated clams and sit to stand. Plan to continue to progress per pt tolerance.     Previous Short Term Goals Status:       Short Term Goals: 6 weeks   1. Patient will be independent in HEP & progressions.--Ongoing as of 2/5/24  2. Patient will achieve TUG score of 14 sec to demonstrate improved mobility--MET as of 2/5/24  3. The patient will achieve 5 sit to stand score of 20 seconds to demonstrate improved transfers and endurance. --Regression since initial eval as of 2/5/24    Long Term Goals: 12 weeks   1. The patient will demonstrate independence with extensive HEP.  2. Patient will achieve 5 sit to stand score of 18 seconds to demonstrate improved transfers & endurance.  3. Patent is able to demonstrate MMT 4+/5 B UE and LE without pain reports during testing.      Long Term Goal Status: continue per initial plan of care.    Reasons for Recertification of Therapy:   To work on functional strength and endurance.     PLAN        Updated Certification Period: 2/5/24 to 3/5/24   Recommended Treatment Plan: 2 times per week for 4 weeks:  Aquatic  Therapy      Maryam Jeffries, PT

## 2024-03-01 NOTE — PROGRESS NOTES
"  [facility]   Physical Therapy Daily Treatment Note      Name: Tiffany Masterson  Clinic Number: [id number]    Therapy Diagnosis:   Encounter Diagnoses   Name Primary?    Physical deconditioning Yes    Generalized muscle weakness        Physician: Rebeca Dumont MD    Visit Date: 3/4/2024    Physician Orders: Aquatic Therapy   Medical Diagnosis from Referral: Physical Deconditioning  Evaluation Date: 12/5/2023  Authorization Period Expiration: 12/31/2024  Plan of Care Expiration: 3/5/24  Visit # / Visits authorized: 15/ 20 21 visits total       Precautions: Standard    Functional Level Prior to Evaluation:  Modified Independent    Time In: 11:05 am   Time Out: 11:55 am   Total Billable Time: 50 minutes    SUBJECTIVE      The patient reports: she and her  cooked for the [employer] this past weekend and she did experience some increased pain while cooking, but afterwards she felt like things are back at baseline.       Pain:  Current 7/10, worst 10/10, best 0/10   Location: B shoulders at night, B hips and B knees (R greater than L)   Description: times with sharp and shooting and times with an ache.  Aggravating Factors: walking  Easing Factors: Takes Tylenol (2 in AM and 2 at night when needed)      OBJECTIVE      Update 2/5/24    TREATMENT      Tiffany received aquatic therapeutic exercises to develop strength, endurance, ROM, flexibility, posture, and core stabilization for 50 minutes including:    FUNCTIONAL MOBILITY TRAINING x 2 laps each at beginning and 1 lap each at end of session  Walk forward/backward/lateral    STRETCHES 2 x 30sec  HS at steps  SKTC 20"x2     LE EX  x25 reps   Heel raise into Squat  Hip abduction   Hip flex   Hip ext NP due to time constraints  Hamstring curl   Stationary March     Seated  Sit to stand OTB   Clam OTB    Walking marches x 2 laps with focus on core stabilization   Tandem balance x 2 laps with intermittent hold on bars for assist    Step up on 4 " inch step x20 reps B  Lateral step up on 4 inch step x 10 reps B    ENDURANCE  LTR x3'  Bicycle in // bars x 3'      UE EX/CORE  x 10  --Held due to pain  Shoulder flex/ext TA activation paddles OPEN  Shoulder horizontal abd/add TA activation paddles NP Pain with both OPEN and AROM previous visits HELD  Shoulder abd/add with TA activation and paddles NP pain with both OPEN and AROM at previous visits     Patient Education and Home Exercises     Home Exercises Provided and Patient Education Provided     Education provided:   Role of aquatic therapy  Hydration post therapy    Written Home Exercises Provided: Patient instructed to cont prior HEP.     ASSESSMENT      Tiffany demonstrates good tolerance to treatment session today. Alternated stance leg after each set of 10 during standing reps which helped to reduce overall onset of central LBP. Improvements noted with LTR/bicycle in // bars. Plan to continue to progress per pt tolerance.    Short Term Goals: 6 weeks   1. Patient will be independent in HEP & progressions.--Ongoing as of 2/5/24  2. Patient will achieve TUG score of 14 sec to demonstrate improved mobility--MET as of 2/5/24  3. The patient will achieve 5 sit to stand score of 20 seconds to demonstrate improved transfers and endurance. --Regression since initial eval as of 2/5/24    Long Term Goals: 12 weeks   1. The patient will demonstrate independence with extensive HEP.  2. Patient will achieve 5 sit to stand score of 18 seconds to demonstrate improved transfers & endurance.  3. Patent is able to demonstrate MMT 4+/5 B UE and LE without pain reports during testing.      Long Term Goal Status: continue per initial plan of care.    Reasons for Recertification of Therapy:   To work on functional strength and endurance.     PLAN        Updated Certification Period: 2/5/24 to 3/5/24   Recommended Treatment Plan: 2 times per week for 4 weeks:  Aquatic Therapy      Maryam Jeffries, PT

## 2024-03-03 NOTE — ASSESSMENT & PLAN NOTE
The patient has a new diagnosis of paroxysmal atrial fibrillation.  She remains in sinus rhythm at this time on beta-blocker therapy and is tolerating metoprolol succinate from 50 mg twice a day.  She has an elevated chads Vasc score and is tolerating Eliquis 5 mg b.i.d. (her creatinine is less than 1.5 and her weight is greater than 60 kg).  We had a conversation about the importance of anticoagulation in mitigating her elevated stroke risk and she understands the potential benefits and risks of treatment with Eliquis at this time.   hard copy, drawn during this pregnancy

## 2024-03-04 ENCOUNTER — CLINICAL SUPPORT (OUTPATIENT)
Dept: REHABILITATION | Facility: HOSPITAL | Age: 89
End: 2024-03-04
Payer: MEDICARE

## 2024-03-04 DIAGNOSIS — R53.81 PHYSICAL DECONDITIONING: Primary | ICD-10-CM

## 2024-03-04 DIAGNOSIS — M62.81 GENERALIZED MUSCLE WEAKNESS: ICD-10-CM

## 2024-03-04 PROCEDURE — 97113 AQUATIC THERAPY/EXERCISES: CPT | Mod: HCNC

## 2024-03-07 DIAGNOSIS — M17.0 PRIMARY OSTEOARTHRITIS OF BOTH KNEES: ICD-10-CM

## 2024-03-07 RX ORDER — GABAPENTIN 100 MG/1
100 CAPSULE ORAL 3 TIMES DAILY
Qty: 90 CAPSULE | Refills: 3 | Status: SHIPPED | OUTPATIENT
Start: 2024-03-07 | End: 2024-03-22

## 2024-03-07 NOTE — TELEPHONE ENCOUNTER
No care due was identified.  [facility] Embedded Care Due Messages. Reference number: [id number].   3/07/2024 2:09:05 PM CST

## 2024-03-08 ENCOUNTER — CLINICAL SUPPORT (OUTPATIENT)
Dept: REHABILITATION | Facility: HOSPITAL | Age: 89
End: 2024-03-08
Payer: MEDICARE

## 2024-03-08 DIAGNOSIS — R53.81 PHYSICAL DECONDITIONING: Primary | ICD-10-CM

## 2024-03-08 DIAGNOSIS — M62.81 GENERALIZED MUSCLE WEAKNESS: ICD-10-CM

## 2024-03-08 PROCEDURE — 97113 AQUATIC THERAPY/EXERCISES: CPT | Mod: HCNC

## 2024-03-08 NOTE — PROGRESS NOTES
"  [facility]   Physical Therapy Daily Treatment Note      Name: Tiffany Masterson  Clinic Number: [id number]    Therapy Diagnosis:   Encounter Diagnoses   Name Primary?    Physical deconditioning Yes    Generalized muscle weakness      Physician: Rebeca Dumont MD    Visit Date: 3/8/2024    Physician Orders: Aquatic Therapy   Medical Diagnosis from Referral: Physical Deconditioning  Evaluation Date: 12/5/2023  Authorization Period Expiration: 12/31/2024  Plan of Care Expiration: 3/5/24, re-eval next visit.  Visit # / Visits authorized: 16/ 20 23 visits total     Precautions: Standard    Functional Level Prior to Evaluation:  Modified Independent    Time In: 8:30 am   Time Out: 9:40 am   Total Billable Time: 30 minutes    SUBJECTIVE      The patient reports: she is doing pretty well today. She stated her pain is not bad today.       Pain:  Current 7/10, worst 10/10, best 0/10   Location: B shoulders at night, B hips and B knees (R greater than L)   Description: times with sharp and shooting and times with an ache.  Aggravating Factors: walking  Easing Factors: Takes Tylenol (2 in AM and 2 at night when needed)      OBJECTIVE      Update 2/5/24    TREATMENT      Tiffany received aquatic therapeutic exercises to develop strength, endurance, ROM, flexibility, posture, and core stabilization for 70 minutes including:    FUNCTIONAL MOBILITY TRAINING x 2 laps each at beginning and 1 lap each at end of session  Walk forward/backward/lateral    STRETCHES 2 x 30sec  HS at steps  SKTC 20"x2     LE EX  x25 reps   Heel raise into Squat  Hip abduction   Hip flex   Hip ext   Hamstring curl   Stationary March     Seated  Sit to stand OTB   Clam OTB x 7 then cramping in her feet and calves so held remaining    Walking marches x 2 laps with focus on core stabilization   Tandem balance x 2 laps with intermittent hold on bars for assist    Step up on 4 inch step x20 reps B  Lateral step up on 4 inch step x 10 " reps B    ENDURANCE  LTR x3'  Bicycle in // bars x 3'        UE EX/CORE  x 10  --Held due to pain  Shoulder flex/ext TA activation paddles OPEN  Shoulder horizontal abd/add TA activation paddles NP Pain with both OPEN and AROM previous visits HELD  Shoulder abd/add with TA activation and paddles NP pain with both OPEN and AROM at previous visits     Patient Education and Home Exercises     Home Exercises Provided and Patient Education Provided     Education provided:   Role of aquatic therapy  Hydration post therapy    Written Home Exercises Provided: Patient instructed to cont prior HEP.     ASSESSMENT      The patient required some overall increased cues for exercise execution today. She reported some cramping in her feet and calves with clam therefore held reaming reps today.     Short Term Goals: 6 weeks   1. Patient will be independent in HEP & progressions.--Ongoing as of 2/5/24  2. Patient will achieve TUG score of 14 sec to demonstrate improved mobility--MET as of 2/5/24  3. The patient will achieve 5 sit to stand score of 20 seconds to demonstrate improved transfers and endurance. --Regression since initial eval as of 2/5/24    Long Term Goals: 12 weeks   1. The patient will demonstrate independence with extensive HEP.  2. Patient will achieve 5 sit to stand score of 18 seconds to demonstrate improved transfers & endurance.  3. Patent is able to demonstrate MMT 4+/5 B UE and LE without pain reports during testing.      Long Term Goal Status: continue per initial plan of care.    Reasons for Recertification of Therapy:   To work on functional strength and endurance.     PLAN        Updated Certification Period: 2/5/24 to 3/5/24   Recommended Treatment Plan: 2 times per week for 4 weeks:  Aquatic Therapy      Mariah Adan, PT

## 2024-03-10 ENCOUNTER — PATIENT MESSAGE (OUTPATIENT)
Dept: PRIMARY CARE CLINIC | Facility: CLINIC | Age: 89
End: 2024-03-10
Payer: MEDICARE

## 2024-03-12 ENCOUNTER — TELEPHONE (OUTPATIENT)
Dept: PRIMARY CARE CLINIC | Facility: CLINIC | Age: 89
End: 2024-03-12
Payer: MEDICARE

## 2024-03-12 NOTE — TELEPHONE ENCOUNTER
----- Message from Hammad Jones sent at 3/11/2024  5:02 PM CDT -----  Type: General Call Back         Name of Caller:pt  Would the patient rather a call back or a response via [facility]? call  Best Call Back Number:[phone]   Additional Information: Pt states she received emailing in regards to scheduling an appt for tomorrow 03/12. Please call pt with further info and assistance. Thank you.

## 2024-03-18 ENCOUNTER — PATIENT MESSAGE (OUTPATIENT)
Dept: PRIMARY CARE CLINIC | Facility: CLINIC | Age: 89
End: 2024-03-18
Payer: MEDICARE

## 2024-03-19 ENCOUNTER — TELEPHONE (OUTPATIENT)
Dept: PRIMARY CARE CLINIC | Facility: CLINIC | Age: 89
End: 2024-03-19
Payer: MEDICARE

## 2024-03-19 NOTE — TELEPHONE ENCOUNTER
Ms Allen came in today at 4:15 pm  to schedule an appt. She stated it was better to come in person instead of the messages. She having severe knee Pain. She's not tech savvy and didn't want to try Virtual visit. The earliest in person visit is for 3/27 at 11:00 am we scheduled her for that one.

## 2024-03-21 ENCOUNTER — TELEPHONE (OUTPATIENT)
Dept: PRIMARY CARE CLINIC | Facility: CLINIC | Age: 89
End: 2024-03-21

## 2024-03-22 ENCOUNTER — OFFICE VISIT (OUTPATIENT)
Dept: PRIMARY CARE CLINIC | Facility: CLINIC | Age: 89
End: 2024-03-22
Payer: MEDICARE

## 2024-03-22 VITALS
HEART RATE: 67 BPM | BODY MASS INDEX: 28.38 KG/M2 | DIASTOLIC BLOOD PRESSURE: 58 MMHG | SYSTOLIC BLOOD PRESSURE: 110 MMHG | HEIGHT: 66 IN | WEIGHT: 176.56 LBS | OXYGEN SATURATION: 98 %

## 2024-03-22 DIAGNOSIS — M17.12 LOCALIZED OSTEOARTHRITIS OF LEFT KNEE: Primary | ICD-10-CM

## 2024-03-22 PROCEDURE — 3288F FALL RISK ASSESSMENT DOCD: CPT | Mod: HCNC,CPTII,S$GLB, | Performed by: INTERNAL MEDICINE

## 2024-03-22 PROCEDURE — 99999 PR PBB SHADOW E&M-EST. PATIENT-LVL V: CPT | Mod: PBBFAC,HCNC,, | Performed by: INTERNAL MEDICINE

## 2024-03-22 PROCEDURE — 1159F MED LIST DOCD IN RCRD: CPT | Mod: HCNC,CPTII,S$GLB, | Performed by: INTERNAL MEDICINE

## 2024-03-22 PROCEDURE — 1101F PT FALLS ASSESS-DOCD LE1/YR: CPT | Mod: HCNC,CPTII,S$GLB, | Performed by: INTERNAL MEDICINE

## 2024-03-22 PROCEDURE — 99213 OFFICE O/P EST LOW 20 MIN: CPT | Mod: 25,HCNC,S$GLB, | Performed by: INTERNAL MEDICINE

## 2024-03-22 PROCEDURE — 1157F ADVNC CARE PLAN IN RCRD: CPT | Mod: HCNC,CPTII,S$GLB, | Performed by: INTERNAL MEDICINE

## 2024-03-22 PROCEDURE — 1125F AMNT PAIN NOTED PAIN PRSNT: CPT | Mod: HCNC,CPTII,S$GLB, | Performed by: INTERNAL MEDICINE

## 2024-03-22 RX ORDER — TRAMADOL HYDROCHLORIDE 50 MG/1
50 TABLET ORAL EVERY 8 HOURS PRN
Qty: 60 TABLET | Refills: 1 | Status: SHIPPED | OUTPATIENT
Start: 2024-03-22 | End: 2024-06-13

## 2024-03-22 RX ORDER — OMEPRAZOLE 40 MG/1
40 CAPSULE, DELAYED RELEASE ORAL
COMMUNITY
Start: 2024-03-21

## 2024-03-22 RX ORDER — LIDOCAINE 50 MG/G
1 PATCH TOPICAL DAILY
Qty: 30 PATCH | Refills: 0 | Status: SHIPPED | OUTPATIENT
Start: 2024-03-22

## 2024-03-22 RX ORDER — DICLOFENAC SODIUM 10 MG/G
2 GEL TOPICAL 4 TIMES DAILY
Qty: 200 G | Refills: 3 | Status: SHIPPED | OUTPATIENT
Start: 2024-03-22

## 2024-03-22 NOTE — ASSESSMENT & PLAN NOTE
Refill Tramadol 50mg with Tylenol AR bid   Ice qid x 20 min,  voltaren gel 1% qid   Lidoderm 5% patch daily (Salon pas)  Avoid heavy lifting, pushing, squatting, climbing up and down stairs.   F/u ortho as scheduled to discuss TKR in the future    After thoroughly cleaning left anterior knee with povodine and then alcohol wipe allowed to dry; 1-1/2 ml 25 g needle was used to inject 1ml each of 1% lido, marcaine and kenalog into anterior left knee at site of greatest pain and swelling. Pt tolerated procedure without complications and band-aid was placed.

## 2024-03-22 NOTE — PATIENT INSTRUCTIONS
Refill Tramadol 50mg with Tylenol AR bid   Ice qid x 20 min,  voltaren gel 1% qid   Lidoderm 5% patch daily (Salon pas)

## 2024-03-22 NOTE — PROGRESS NOTES
[facility]ediatric[facility] Progress Note      Chief Complaint     Knee Pain and Health Maintenance   for 2 wks     Subjective:      History of Present Illness:  Tiffany Masterson is a 88 y.o. female     Acute on chronic moderate left knee OA by Xray 9/27/2023: sees ortho Jenise RIVAS who prescribed tramadol bid and pt now requests refill today; states gabapentin causes too many side effects; did aquatherapy which was helpful for core strengthening but not so much for her knee pain.   -co progressive pain and swelling of right knee with pain with certain movements but denies falls; still able to drive and do ADLs but c/o difficulty getting off of the toilet from sitting to stand and requests a toilet support  -pt requests injection in left knee since she has had injections in her right knee in the past with success by ortho    Past Medical History:  Past Medical History:   Diagnosis Date    Abnormal MRI 10/16/2020    Acute cystitis without hematuria 12/22/2021    Arthritis     Atrial fibrillation     Cataract     Elevated liver enzymes     Endometrial mass 6/29/2018    Epiretinal membrane (ERM) of right eye     Family history of malignant neoplasm of gastrointestinal tract mother    Frequent UTI 9/30/2018    Graves disease     now hypothryoid - no surgery or medicine. resolved on its own    History of cholecystectomy 6/10/2021    History of colonoscopy     unremarkable 2007 by Dr. Javier.  Barium enema revealed diverticular disease.    Hyperlipidemia     Hypertension     Hypertriglyceridemia 4/19/2013    Continue statin for secondary stroke prevention    Hypothyroidism     Impaired functional mobility, balance, gait, and endurance 6/4/2021    Iron deficiency anemia 9/29/2021    Migraine, ophthalmoplegic     Mixed stress and urge urinary incontinence 9/30/2018    Ocular migraine     Personal history of colonic polyps     Preop testing 5/17/2021    S/P colonoscopic polypectomy 6/18/2013    Sleep disorder  11/8/2021    Status post hysteroscopic polypectomy 7/2/2018    TIA (transient ischemic attack)     with a normal angiogram in the past, Ocular migraines reported by patient, not TIA     Transaminitis 3/19/2021    Urethral caruncle 9/15/2020       Past Surgical History:  Past Surgical History:   Procedure Laterality Date    CATARACT EXTRACTION Right 2012    Dr. Lexis Nicolas     COLONOSCOPY      2014 polyps    COLONOSCOPY N/A 11/7/2016    Procedure: COLONOSCOPY;  Surgeon: Bebeto Gonzalez MD;  Location: [facility] ENDO (4TH FLR);  Service: Endoscopy;  Laterality: N/A;    COLONOSCOPY N/A 3/9/2020    Procedure: COLONOSCOPY;  Surgeon: Bebeto Gonzalez MD;  Location: [facility] ENDO (4TH FLR);  Service: Endoscopy;  Laterality: N/A;    COLONOSCOPY N/A 9/29/2021    Procedure: COLONOSCOPY;  Surgeon: Bebeto Gonzalez MD;  Location: [facility] ENDO (4TH FLR);  Service: Endoscopy;  Laterality: N/A;  please schedule patient as soon as possible with me EGD colonoscopy with constipation bowel prep for iron deficiency anemia family history of colon cancer and personal history of colon polyps  9/17/21 ok for standard split prep per Dr. Gonzalez-ml    COLONOSCOPY N/A 9/29/2021    Procedure: COLONOSCOPY;  Surgeon: Bebeto Gonzalez MD;  Location: [facility] ENDO (4TH FLR);  Service: Endoscopy;  Laterality: N/A;  Please schedule patient as soon as possible with me EGD colonoscopy with constipation bowel prep for iron deficiency anemia family history of colon cancer and personal history of colon polyps  9/17/21 Ok for standard split prep per Dr. Gonzalez-imani    COLONOSCOPY W/ POLYPECTOMY  6/2013    polyp of colon    ENDOSCOPIC ULTRASOUND OF UPPER GASTROINTESTINAL TRACT N/A 4/29/2021    Procedure: ULTRASOUND, UPPER GI TRACT, ENDOSCOPIC;  Surgeon: Dalton Johnson MD;  Location: [facility] ENDO (2ND FLR);  Service: Endoscopy;  Laterality: N/A;  Postprandial nausea vomiting epigastric 0.9 cm stone and sludge seen within the gallbladder lumen.  No wall thickening or  pericholecystic fluid.  0.6 cm stone seen within the distal common bile duct at the level of the pancreatic head.  There is dil    ERCP N/A 4/29/2021    Procedure: ERCP (ENDOSCOPIC RETROGRADE CHOLANGIOPANCREATOGRAPHY);  Surgeon: Dalton Johnson MD;  Location: [facility] (2ND FLR);  Service: Endoscopy;  Laterality: N/A;  Postprandial nausea and vomit 0.9 cm stone and sludge seen within the gallbladder lumen.  No wall thickening or pericholecystic fluid.  0.6 cm stone seen within the distal common bile duct at the level of the pancreatic head.  There i    ESOPHAGOGASTRODUODENOSCOPY N/A 9/29/2021    Procedure: EGD (ESOPHAGOGASTRODUODENOSCOPY);  Surgeon: Bebeto Gonzalez MD;  Location: [facility] (4TH FLR);  Service: Endoscopy;  Laterality: N/A;  rapid covid test arrival 12pm - sm  9/27 arrival time for covid test/procedure confirmed with pt-rb    ESOPHAGOGASTRODUODENOSCOPY N/A 9/29/2021    Procedure: EGD (ESOPHAGOGASTRODUODENOSCOPY);  Surgeon: Bebeto Gonzalez MD;  Location: [facility] (4TH FLR);  Service: Endoscopy;  Laterality: N/A;  covid test 9/19/21 [facility] [place] emailed -ml    EYE SURGERY  11-10-14    right retina/Dr. Dalton Sierra ([facility])    HYSTEROSCOPIC POLYPECTOMY OF UTERUS N/A 7/2/2018    Procedure: POLYPECTOMY, UTERUS, HYSTEROSCOPIC;  Surgeon: Elliot Vanessa IV, MD;  Location: [facility] OR;  Service: OB/GYN;  Laterality: N/A;    INJECTION OF JOINT Right 11/11/2022    Procedure: INJECTION, RIGHT GTB CONTRAST DIRECT REFERRAL;  Surgeon: Camden Hernandez MD;  Location: [facility] PAIN MGT;  Service: Pain Management;  Laterality: Right;    JOINT REPLACEMENT  3/23/2011    left total hip replacement    LAPAROSCOPIC CHOLECYSTECTOMY N/A 5/17/2021    Procedure: CHOLECYSTECTOMY, LAPAROSCOPIC;  Surgeon: Rayshawn Peralta Jr., MD;  Location: [facility] 2ND FLR;  Service: General;  Laterality: N/A;    REMOVAL OF EPIRETINAL MEMBRANE Right     Dr. Padron    TONSILLECTOMY      pt was 11 years old       Allergies:  Review of  patient's allergies indicates:   Allergen Reactions    Levaquin [levofloxacin]      Joint pain    Hydrochlorothiazide Other (See Comments)     Pain in joints and depression per pt       Home Medications:  Current Outpatient Medications   Medication Sig Dispense Refill    apixaban (ELIQUIS) 5 mg Tab Take 1 tablet (5 mg total) by mouth 2 (two) times daily. 180 tablet 3    ascorbic acid, vitamin C, (VITAMIN C) 250 MG tablet Take 250 mg by mouth once daily.      atorvastatin (LIPITOR) 10 MG tablet Take 1 tablet (10 mg total) by mouth once daily. 90 tablet 3    b complex vitamins capsule Take 1 capsule by mouth once daily.      biotin 5,000 mcg TbDL Take 1 tablet (5,000 mcg total) by mouth Daily. 90 tablet 3    bisacodyL (DULCOLAX) 5 mg EC tablet Take 5 mg by mouth once daily.      CALCIUM CARBONATE/VITAMIN D3 (CALCIUM 600 + D,3, ORAL) Take 1 tablet by mouth once daily.       cholecalciferol, vitamin D3, (VITAMIN D3) 50 mcg (2,000 unit) Cap Take 1 capsule by mouth once daily.      coenzyme Q10 200 mg capsule Take 200 mg by mouth once daily.      DEXTRAN 70/HYPROMELLOSE (ARTIFICIAL TEARS, PF, OPHT) Place 1 drop into both eyes as needed.      fluticasone propionate (FLONASE) 50 mcg/actuation nasal spray CLEAN SINUS/NARES WITH OCEAN NASAL SPRAY THEN USE FLONASE 1 SPRAY TWICE DAILY. 48 g 3    hydroquinone 4 % Crea APPLY  CREAM TOPICALLY TWICE DAILY 58 g 0    levothyroxine (SYNTHROID) 112 MCG tablet Take 1 tablet (112 mcg total) by mouth before breakfast. 90 tablet 3    losartan (COZAAR) 100 MG tablet Take 1 tablet (100 mg total) by mouth once daily. 90 tablet 3    magnesium oxide 400 mg magnesium Tab Take 1 tablet by mouth once daily.      metoprolol succinate (TOPROL-XL) 50 MG 24 hr tablet Take 1 tablet (50 mg total) by mouth 2 (two) times daily. 180 tablet 3    MULTIVITAMIN W-MINERALS/LUTEIN (CENTRUM SILVER ORAL) Take 1 tablet by mouth once daily.      omeprazole (PRILOSEC) 40 MG capsule Take 40 mg by mouth.      sodium  chloride (SALINE NASAL) 0.65 % nasal spray 1 spray by Nasal route as needed for Congestion. 104 mL 3    vit A/vit C/vit E/zinc/copper (ICAPS AREDS ORAL) Take 1 tablet by mouth 2 (two) times a day.      diclofenac sodium (VOLTAREN) 1 % Gel Apply 2 g topically 4 (four) times daily. 200 g 3    LIDOcaine (LIDODERM) 5 % Place 1 patch onto the skin once daily. Remove & Discard patch within 12 hours or as directed by MD 30 patch 0    traMADoL (ULTRAM) 50 mg tablet Take 1 tablet (50 mg total) by mouth every 8 (eight) hours as needed for Pain. 60 tablet 1     No current facility-administered medications for this visit.        Family History:  Family History   Problem Relation Age of Onset    Colon cancer Mother 75    Lung cancer Father 75    Diabetes Other     Diabetes Paternal Aunt     Colon cancer Paternal Aunt 80    Prostate cancer Brother     Breast cancer Neg Hx     Ovarian cancer Neg Hx     Celiac disease Neg Hx     Cataracts Neg Hx     Glaucoma Neg Hx     Macular degeneration Neg Hx        Social History:  Social History     Tobacco Use    Smoking status: Never    Smokeless tobacco: Never    Tobacco comments:     The patient is not getting any regular exercise at this time.  She does enjoy traveling to [place].   Substance Use Topics    Alcohol use: Yes     Comment: occasionally    Drug use: No       Review of Systems:  Pertinent positives and negatives listed in HPI. All other systems are reviewed and are negative.    Health Maintaince :   Health Maintenance Topics with due status: Not Due       Topic Last Completion Date    TETANUS VACCINE 09/20/2018    Colonoscopy 09/29/2021    DEXA Scan 12/02/2022    Lipid Panel 11/06/2023           Eye:   Dental:     Immunizations:   T  The ASCVD Risk score (Emeka CONTRERAS, et al., 2019) failed to calculate for the following reasons:    The 2019 ASCVD risk score is only valid for ages 40 to 79      Objective:   BP (!) 110/58 (BP Location: Right arm, Patient Position: Sitting, BP  "Method: Medium (Manual))   Pulse 67   Ht 5' 6" (1.676 m)   Wt 80.1 kg (176 lb 9.4 oz)   SpO2 98%   BMI 28.50 kg/m²      Body mass index is 28.5 kg/m².       Physical Examination:  General: Alert and awake in no apparent distress  Extrem: Warm and well-perfused with no clubbing, cyanosis or edema; left knee with crepitus and tender , swollen anterior mid knee without erythema or overlying rash/infection   Skin:  No rashes, lesions, or color changes  Pulses:  2+ and symmetric distally  Neuro:  AAOx3; cooperative and pleasant with no focal deficits    Laboratory:      Most Recent Data:  Lab Results   Component Value Date    WBC 9.58 09/13/2023    HGB 11.9 (L) 09/13/2023    HCT 37.5 09/13/2023     09/13/2023    CHOL 136 11/06/2023    TRIG 107 11/06/2023    HDL 49 11/06/2023    ALT 27 11/06/2023    AST 25 11/06/2023     09/13/2023    K 4.2 09/13/2023     09/13/2023    BUN 21 09/13/2023    CO2 23 09/13/2023    TSH 4.154 (H) 09/13/2023    INR 1.0 05/09/2013    HGBA1C 5.8 (H) 08/31/2020              CBC:   WBC   Date Value Ref Range Status   09/13/2023 9.58 3.90 - 12.70 K/uL Final     Hemoglobin   Date Value Ref Range Status   09/13/2023 11.9 (L) 12.0 - 16.0 g/dL Final     Hematocrit   Date Value Ref Range Status   09/13/2023 37.5 37.0 - 48.5 % Final     Platelets   Date Value Ref Range Status   09/13/2023 230 150 - 450 K/uL Final     MCV   Date Value Ref Range Status   09/13/2023 95 82 - 98 fL Final     RDW   Date Value Ref Range Status   09/13/2023 13.2 11.5 - 14.5 % Final     BMP:   Sodium   Date Value Ref Range Status   09/13/2023 138 136 - 145 mmol/L Final     Potassium   Date Value Ref Range Status   09/13/2023 4.2 3.5 - 5.1 mmol/L Final     Chloride   Date Value Ref Range Status   09/13/2023 104 95 - 110 mmol/L Final     CO2   Date Value Ref Range Status   09/13/2023 23 23 - 29 mmol/L Final     BUN   Date Value Ref Range Status   09/13/2023 21 8 - 23 mg/dL Final     Creatinine   Date Value Ref " "Range Status   09/13/2023 1.1 0.5 - 1.4 mg/dL Final     Glucose   Date Value Ref Range Status   09/13/2023 89 70 - 110 mg/dL Final     Calcium   Date Value Ref Range Status   09/13/2023 9.6 8.7 - 10.5 mg/dL Final     Magnesium   Date Value Ref Range Status   12/21/2021 2.1 1.6 - 2.6 mg/dL Final     Phosphorus   Date Value Ref Range Status   06/21/2013 2.3 (L) 2.7 - 4.5 mg/dl Final     LFTs:   Total Protein   Date Value Ref Range Status   11/06/2023 7.0 6.0 - 8.4 g/dL Final     Albumin   Date Value Ref Range Status   11/06/2023 3.9 3.5 - 5.2 g/dL Final     Total Bilirubin   Date Value Ref Range Status   11/06/2023 0.5 0.1 - 1.0 mg/dL Final     Comment:     For infants and newborns, interpretation of results should be based  on gestational age, weight and in agreement with clinical  observations.    Premature Infant recommended reference ranges:  Up to 24 hours.............<8.0 mg/dL  Up to 48 hours............<12.0 mg/dL  3-5 days..................<15.0 mg/dL  6-29 days.................<15.0 mg/dL       AST   Date Value Ref Range Status   11/06/2023 25 10 - 40 U/L Final     Alkaline Phosphatase   Date Value Ref Range Status   11/06/2023 103 55 - 135 U/L Final     ALT   Date Value Ref Range Status   11/06/2023 27 10 - 44 U/L Final     Coags:   INR   Date Value Ref Range Status   05/09/2013 1.0 0.8 - 1.2 Final     Comment:     ACCP Guideline for Coumadin usage recommends a "target range" of  2.0 - 3.0 for INR for all indicators except mechanical heart valves  and antiphospholipid syndromes which should use 2.5 - 3.5.  .     FLP:      Lab Results   Component Value Date    CHOL 136 11/06/2023    CHOL 217 (H) 09/13/2023    CHOL 214 (H) 12/02/2022     Lab Results   Component Value Date    HDL 49 11/06/2023    HDL 39 (L) 09/13/2023    HDL 54 12/02/2022     Lab Results   Component Value Date    LDLCALC 65.6 11/06/2023    LDLCALC 131.0 09/13/2023    LDLCALC 137.6 12/02/2022     Lab Results   Component Value Date    TRIG 107 " 11/06/2023    TRIG 235 (H) 09/13/2023    TRIG 112 12/02/2022     Lab Results   Component Value Date    CHOLHDL 36.0 11/06/2023    CHOLHDL 18.0 (L) 09/13/2023    CHOLHDL 25.2 12/02/2022      DM:      Hemoglobin A1C   Date Value Ref Range Status   08/31/2020 5.8 (H) 4.0 - 5.6 % Final     Comment:     ADA Screening Guidelines:  5.7-6.4%  Consistent with prediabetes  >or=6.5%  Consistent with diabetes  High levels of fetal hemoglobin interfere with the HbA1C  assay. Heterozygous hemoglobin variants (HbS, HgC, etc)do  not significantly interfere with this assay.   However, presence of multiple variants may affect accuracy.       LDL Cholesterol   Date Value Ref Range Status   11/06/2023 65.6 63.0 - 159.0 mg/dL Final     Comment:     The National Cholesterol Education Program (NCEP) has set the  following guidelines (reference values) for LDL Cholesterol:  Optimal.......................<130 mg/dL  Borderline High...............130-159 mg/dL  High..........................160-189 mg/dL  Very High.....................>190 mg/dL       Creatinine   Date Value Ref Range Status   09/13/2023 1.1 0.5 - 1.4 mg/dL Final     Thyroid:   TSH   Date Value Ref Range Status   09/13/2023 4.154 (H) 0.400 - 4.000 uIU/mL Final     Free T4   Date Value Ref Range Status   09/13/2023 0.99 0.71 - 1.51 ng/dL Final     Anemia:   Iron   Date Value Ref Range Status   09/18/2021 47 30 - 160 ug/dL Final     TIBC   Date Value Ref Range Status   09/18/2021 330 250 - 450 ug/dL Final     Ferritin   Date Value Ref Range Status   09/18/2021 251 20.0 - 300.0 ng/mL Final     Vitamin B-12   Date Value Ref Range Status   12/02/2022 652 210 - 950 pg/mL Final     Cardiac:   Troponin I   Date Value Ref Range Status   12/21/2021 0.014 0.000 - 0.026 ng/mL Final     Comment:     The reference interval for Troponin I represents the 99th percentile   cutoff   for our facility and is consistent with 3rd generation assay   performance.       BNP   Date Value Ref Range  Status   12/21/2021 184 (H) 0 - 99 pg/mL Final     Comment:     Values of less than 100 pg/ml are consistent with non-CHF populations.     Urinalysis:   Urine Culture, Routine   Date Value Ref Range Status   12/22/2021   Final    Multiple organisms isolated. None in predominance.  Repeat if   12/22/2021 clinically necessary.  Final     Color, UA   Date Value Ref Range Status   09/13/2023 Yellow Yellow, Straw, [place] Final     Clarity, UA   Date Value Ref Range Status   10/29/2020   Final     Comment:     normal      Specific Gravity, UA   Date Value Ref Range Status   09/13/2023 1.020 1.005 - 1.030 Final     Nitrite, UA   Date Value Ref Range Status   09/13/2023 Negative Negative Final     Ketones, UA   Date Value Ref Range Status   09/13/2023 Negative Negative Final     Urobilinogen, UA   Date Value Ref Range Status   10/29/2020   Final     Comment:     normal   09/14/2018 Negative <2.0 EU/dL Final     WBC, UA   Date Value Ref Range Status   09/13/2023 >100 (H) 0 - 5 /hpf Final       Other Results:  EKG (my interpretation):     Radiology:  Mammo Digital Screening Bilat w/ Bill  Narrative: Result:   Mammo Digital Screening Bilat w/ Bill     History:  Patient is 88 y.o. and is seen for a screening mammogram.    Films Compared:  Compared to: 02/02/2023 Mammo Digital Screening Bilat w/ Bill and   12/30/2021 Mammo Digital Screening Bilat w/ Bill     Findings:  This procedure was performed using tomosynthesis. Computer-aided detection   was utilized in the interpretation of this examination.  The breasts have scattered areas of fibroglandular density. There is no   evidence of suspicious masses, calcifications, or other abnormal findings.  Impression: Bilateral  There is no mammographic evidence of malignancy.    BI-RADS Category:   Overall: 1 - Negative       Recommendation:  Routine screening mammogram in 1 year, or as clinically indicated.          Assessment/Plan     Tiffany Masterson is a 88 y.o. female with:  1.  Localized osteoarthritis of left knee  Assessment & Plan:  Refill Tramadol 50mg with Tylenol AR bid   Ice qid x 20 min,  voltaren gel 1% qid   Lidoderm 5% patch daily (Salon pas)  Avoid heavy lifting, pushing, squatting, climbing up and down stairs.   F/u ortho as scheduled to discuss TKR in the future    After thoroughly cleaning left anterior knee with povodine and then alcohol wipe allowed to dry; 1-1/2 ml 25 g needle was used to inject 1ml each of 1% lido, marcaine and kenalog into anterior left knee at site of greatest pain and swelling. Pt tolerated procedure without complications and band-aid was placed.     Orders:  -     diclofenac sodium (VOLTAREN) 1 % Gel; Apply 2 g topically 4 (four) times daily.  Dispense: 200 g; Refill: 3  -     traMADoL (ULTRAM) 50 mg tablet; Take 1 tablet (50 mg total) by mouth every 8 (eight) hours as needed for Pain.  Dispense: 60 tablet; Refill: 1  -     LIDOcaine (LIDODERM) 5 %; Place 1 patch onto the skin once daily. Remove & Discard patch within 12 hours or as directed by MD  Dispense: 30 patch; Refill: 0             I spent a total of 29 minutes on the day of the visit.This includes face to face time and non-face to face time preparing to see the patient (eg, review of tests), obtaining and/or reviewing separately obtained history, documenting clinical information in the electronic or other health record, independently interpreting results and communicating results to the patient/family/caregiver, or care coordinator.   Code Status:     Rebeca Dumont MD

## 2024-03-24 RX ORDER — TRIAMCINOLONE ACETONIDE 40 MG/ML
40 INJECTION, SUSPENSION INTRA-ARTICULAR; INTRAMUSCULAR
Status: SHIPPED | OUTPATIENT
Start: 2024-03-24

## 2024-03-24 RX ORDER — BUPIVACAINE HYDROCHLORIDE 2.5 MG/ML
1 INJECTION, SOLUTION EPIDURAL; INFILTRATION; INTRACAUDAL ONCE
Status: SHIPPED | OUTPATIENT
Start: 2024-03-24

## 2024-04-08 ENCOUNTER — PATIENT MESSAGE (OUTPATIENT)
Dept: PRIMARY CARE CLINIC | Facility: CLINIC | Age: 89
End: 2024-04-08
Payer: MEDICARE

## 2024-04-24 ENCOUNTER — OFFICE VISIT (OUTPATIENT)
Dept: DERMATOLOGY | Facility: CLINIC | Age: 89
End: 2024-04-24
Payer: MEDICARE

## 2024-04-24 DIAGNOSIS — D22.9 MULTIPLE BENIGN NEVI: ICD-10-CM

## 2024-04-24 DIAGNOSIS — L73.8 SEBACEOUS HYPERPLASIA: ICD-10-CM

## 2024-04-24 DIAGNOSIS — L72.0 EPIDERMAL INCLUSION CYST: ICD-10-CM

## 2024-04-24 DIAGNOSIS — Z12.83 SCREENING EXAM FOR SKIN CANCER: ICD-10-CM

## 2024-04-24 DIAGNOSIS — B35.1 ONYCHOMYCOSIS: ICD-10-CM

## 2024-04-24 DIAGNOSIS — D18.01 CHERRY ANGIOMA: ICD-10-CM

## 2024-04-24 DIAGNOSIS — L82.1 SEBORRHEIC KERATOSES: ICD-10-CM

## 2024-04-24 DIAGNOSIS — L82.0 SEBORRHEIC KERATOSES, INFLAMED: Primary | ICD-10-CM

## 2024-04-24 PROCEDURE — 99213 OFFICE O/P EST LOW 20 MIN: CPT | Mod: 25,S$GLB,, | Performed by: STUDENT IN AN ORGANIZED HEALTH CARE EDUCATION/TRAINING PROGRAM

## 2024-04-24 PROCEDURE — 3288F FALL RISK ASSESSMENT DOCD: CPT | Mod: CPTII,S$GLB,, | Performed by: STUDENT IN AN ORGANIZED HEALTH CARE EDUCATION/TRAINING PROGRAM

## 2024-04-24 PROCEDURE — 99999 PR PBB SHADOW E&M-EST. PATIENT-LVL III: CPT | Mod: PBBFAC,,, | Performed by: STUDENT IN AN ORGANIZED HEALTH CARE EDUCATION/TRAINING PROGRAM

## 2024-04-24 PROCEDURE — 1157F ADVNC CARE PLAN IN RCRD: CPT | Mod: CPTII,S$GLB,, | Performed by: STUDENT IN AN ORGANIZED HEALTH CARE EDUCATION/TRAINING PROGRAM

## 2024-04-24 PROCEDURE — 1101F PT FALLS ASSESS-DOCD LE1/YR: CPT | Mod: CPTII,S$GLB,, | Performed by: STUDENT IN AN ORGANIZED HEALTH CARE EDUCATION/TRAINING PROGRAM

## 2024-04-24 PROCEDURE — 1126F AMNT PAIN NOTED NONE PRSNT: CPT | Mod: CPTII,S$GLB,, | Performed by: STUDENT IN AN ORGANIZED HEALTH CARE EDUCATION/TRAINING PROGRAM

## 2024-04-24 PROCEDURE — 17110 DESTRUCTION B9 LES UP TO 14: CPT | Mod: S$GLB,,, | Performed by: STUDENT IN AN ORGANIZED HEALTH CARE EDUCATION/TRAINING PROGRAM

## 2024-04-24 PROCEDURE — 1159F MED LIST DOCD IN RCRD: CPT | Mod: CPTII,S$GLB,, | Performed by: STUDENT IN AN ORGANIZED HEALTH CARE EDUCATION/TRAINING PROGRAM

## 2024-04-24 PROCEDURE — 1160F RVW MEDS BY RX/DR IN RCRD: CPT | Mod: CPTII,S$GLB,, | Performed by: STUDENT IN AN ORGANIZED HEALTH CARE EDUCATION/TRAINING PROGRAM

## 2024-04-24 NOTE — PATIENT INSTRUCTIONS
Nail Kris Citra 2 nail strengthener    ____________________________________    CRYOSURGERY      Your doctor has used a method called cryosurgery to treat your skin condition. Cryosurgery refers to the use of very cold substances to treat a variety of skin conditions such as warts, pre-skin cancers, molluscum contagiosum, sun spots, and several benign growths. The substance we use in cryosurgery is liquid nitrogen and is so cold (-195 degrees Celsius) that is burns when administered.     Following treatment in the office, the skin may immediately burn and become red. You may find the area around the lesion is affected as well. It is sometimes necessary to treat not only the lesion, but a small area of the surrounding normal skin to achieve a good response.     A blister, and even a blood filled blister, may form after treatment.   This is a normal response. If the blister is painful, it is acceptable to sterilize a needle and with rubbing alcohol and gently pop the blister. It is important that you gently wash the area with soap and warm water as the blister fluid may contain wart virus if a wart was treated. Do no remove the roof of the blister.     The area treated can take anywhere from 1-3 weeks to heal. Healing time depends on the kind skin lesion treated, the location, and how aggressively the lesion was treated. It is recommended that the areas treated are covered with Vaseline or bacitracin ointment and a band-aid. If a band-aid is not practical, just ointment applied several times per day will do. Keeping these areas moist will speed the healing time.    Treatment with liquid nitrogen can leave a scar. In dark skin, it may be a light or dark scar, in light skin it may be a white or pink scar. These will generally fade with time, but may never go away completely.     If you have any concerns after your treatment, please feel free to call the office.       [address] [place] [address]/  [phone] FAX/ [email or web address]

## 2024-04-24 NOTE — PROGRESS NOTES
Subjective:      Patient ID:  Tiffany Masterson is a 88 y.o. female who presents for   Chief Complaint   Patient presents with    Skin Check     Pt here for TBSE    Pt denies h/o skin cancer     Patient with new are of concern:   Location: dark spots on legs  Previous treatments: Goldbone dark spot minimizing cream, hydroquinone- somewhat helped    Patient with new are of concern:   Location: fingernails and toenails  Previous treatments: biotin, ciclopirox 8% solution          Review of Systems   Skin:  Negative for daily sunscreen use, activity-related sunscreen use and wears hat.   Hematologic/Lymphatic: Bruises/bleeds easily (eliquis).       Objective:   Physical Exam   Constitutional: She appears well-developed and well-nourished. No distress.   Neurological: She is alert and oriented to person, place, and time. She is not disoriented.   Psychiatric: She has a normal mood and affect.   Skin:   Areas Examined (abnormalities noted in diagram):   Scalp / Hair Palpated and Inspected  Head / Face Inspection Performed  Neck Inspection Performed  Chest / Axilla Inspection Performed  Abdomen Inspection Performed  Back Inspection Performed  RUE Inspected  LUE Inspection Performed  RLE Inspected  LLE Inspection Performed  Nails and Digits Inspection Performed                     Diagram Legend     Erythematous scaling macule/papule c/w actinic keratosis       Vascular papule c/w angioma      Pigmented verrucoid papule/plaque c/w seborrheic keratosis      Yellow umbilicated papule c/w sebaceous hyperplasia      Irregularly shaped tan macule c/w lentigo     1-2 mm smooth white papules consistent with Milia      Movable subcutaneous cyst with punctum c/w epidermal inclusion cyst      Subcutaneous movable cyst c/w pilar cyst      Firm pink to brown papule c/w dermatofibroma      Pedunculated fleshy papule(s) c/w skin tag(s)      Evenly pigmented macule c/w junctional nevus     Mildly variegated pigmented, slightly  irregular-bordered macule c/w mildly atypical nevus      Flesh colored to evenly pigmented papule c/w intradermal nevus       Pink pearly papule/plaque c/w basal cell carcinoma      Erythematous hyperkeratotic cursted plaque c/w SCC      Surgical scar with no sign of skin cancer recurrence      Open and closed comedones      Inflammatory papules and pustules      Verrucoid papule consistent consistent with wart     Erythematous eczematous patches and plaques     Dystrophic onycholytic nail with subungual debris c/w onychomycosis     Umbilicated papule    Erythematous-base heme-crusted tan verrucoid plaque consistent with inflamed seborrheic keratosis     Erythematous Silvery Scaling Plaque c/w Psoriasis     See annotation      Assessment / Plan:        Seborrheic keratoses, inflamed  - lesions were itchy    Cryosurgery procedure note:    Verbal consent from the patient is obtained including, but not limited to, risk of hypopigmentation/hyperpigmentation, scar, recurrence of lesion. Liquid nitrogen cryosurgery is applied to 2 lesions to produce a freeze injury. The patient is aware that blisters may form and is instructed on wound care with gentle cleansing and use of vaseline ointment to keep moist until healed. The patient is supplied a handout on cryosurgery and is instructed to call if lesions do not completely resolve.    Onychomycosis  - not interested in po therapy. Offered ciclopirox lacquer. Patient would prefer dilute vinegar soaks qd x 10 minutes    Cherry angioma  Multiple benign nevi  Sebaceous hyperplasia  Seborrheic keratoses  Epidermal inclusion cyst  Reassurance given to patient. No treatment is necessary.   Treatment of benign, asymptomatic lesions may be considered cosmetic.    Screening exam for skin cancer  Total body skin examination performed today including at least 12 points as noted in physical examination. No lesions suspicious for malignancy noted.    Recommend daily sun  protection/avoidance, use of at least SPF 30, broad spectrum sunscreen (OTC drug), skin self examinations, and routine physician surveillance to optimize early detection             Follow up in about 1 year (around 4/24/2025) for TBSE.

## 2024-05-04 ENCOUNTER — PATIENT MESSAGE (OUTPATIENT)
Dept: OBSTETRICS AND GYNECOLOGY | Facility: CLINIC | Age: 89
End: 2024-05-04
Payer: MEDICARE

## 2024-05-07 ENCOUNTER — OFFICE VISIT (OUTPATIENT)
Dept: OBSTETRICS AND GYNECOLOGY | Facility: CLINIC | Age: 89
End: 2024-05-07
Payer: MEDICARE

## 2024-05-07 VITALS
BODY MASS INDEX: 27.28 KG/M2 | DIASTOLIC BLOOD PRESSURE: 68 MMHG | HEIGHT: 66 IN | WEIGHT: 169.75 LBS | SYSTOLIC BLOOD PRESSURE: 124 MMHG

## 2024-05-07 DIAGNOSIS — N95.2 VAGINAL ATROPHY: ICD-10-CM

## 2024-05-07 DIAGNOSIS — N36.2 URETHRAL CARUNCLE: ICD-10-CM

## 2024-05-07 DIAGNOSIS — N89.8 VAGINAL DISCHARGE: Primary | ICD-10-CM

## 2024-05-07 DIAGNOSIS — N95.0 PMB (POSTMENOPAUSAL BLEEDING): ICD-10-CM

## 2024-05-07 PROCEDURE — 1101F PT FALLS ASSESS-DOCD LE1/YR: CPT | Mod: CPTII,S$GLB,, | Performed by: OBSTETRICS & GYNECOLOGY

## 2024-05-07 PROCEDURE — 1126F AMNT PAIN NOTED NONE PRSNT: CPT | Mod: CPTII,S$GLB,, | Performed by: OBSTETRICS & GYNECOLOGY

## 2024-05-07 PROCEDURE — 1157F ADVNC CARE PLAN IN RCRD: CPT | Mod: CPTII,S$GLB,, | Performed by: OBSTETRICS & GYNECOLOGY

## 2024-05-07 PROCEDURE — 99213 OFFICE O/P EST LOW 20 MIN: CPT | Mod: S$GLB,,, | Performed by: OBSTETRICS & GYNECOLOGY

## 2024-05-07 PROCEDURE — 99999 PR PBB SHADOW E&M-EST. PATIENT-LVL IV: CPT | Mod: PBBFAC,,, | Performed by: OBSTETRICS & GYNECOLOGY

## 2024-05-07 PROCEDURE — 81514 NFCT DS BV&VAGINITIS DNA ALG: CPT | Performed by: OBSTETRICS & GYNECOLOGY

## 2024-05-07 PROCEDURE — 1159F MED LIST DOCD IN RCRD: CPT | Mod: CPTII,S$GLB,, | Performed by: OBSTETRICS & GYNECOLOGY

## 2024-05-07 PROCEDURE — 3288F FALL RISK ASSESSMENT DOCD: CPT | Mod: CPTII,S$GLB,, | Performed by: OBSTETRICS & GYNECOLOGY

## 2024-05-07 RX ORDER — CEFDINIR 300 MG/1
300 CAPSULE ORAL 2 TIMES DAILY
COMMUNITY
Start: 2024-05-06

## 2024-05-07 RX ORDER — ESTRADIOL 0.1 MG/G
CREAM VAGINAL
Qty: 42.5 G | Refills: 1 | Status: SHIPPED | OUTPATIENT
Start: 2024-05-07

## 2024-05-07 NOTE — PROGRESS NOTES
CC:  Vaginal spotting/vaginal discharge    HPI:  Tiffany Masterson  is a 88 y.o.  patient who presents today for evaluation of possible vaginal bleeding/vaginal spotting/vaginal discharge.  Patient reports that this discharge is brownish in nature.  It does collect on the patient's undergarments.  Patient denies pain or discomfort.  Patient reports that she is active sexually.  Patient is not using any local/vaginal estrogen.  No other gynecologic complaints today.  Patient has noticed this discharge/possible bleeding over past few months.    No LMP recorded. Patient is postmenopausal.    Past Medical History:   Diagnosis Date    Abnormal MRI 10/16/2020    Acute cystitis without hematuria 2021    Arthritis     Atrial fibrillation     Cataract     Elevated liver enzymes     Endometrial mass 2018    Epiretinal membrane (ERM) of right eye     Family history of malignant neoplasm of gastrointestinal tract mother    Frequent UTI 2018    Graves disease     now hypothryoid - no surgery or medicine. resolved on its own    History of cholecystectomy 6/10/2021    History of colonoscopy     unremarkable  by Dr. Javier.  Barium enema revealed diverticular disease.    Hyperlipidemia     Hypertension     Hypertriglyceridemia 2013    Continue statin for secondary stroke prevention    Hypothyroidism     Impaired functional mobility, balance, gait, and endurance 2021    Iron deficiency anemia 2021    Migraine, ophthalmoplegic     Mixed stress and urge urinary incontinence 2018    Ocular migraine     Personal history of colonic polyps     Preop testing 2021    S/P colonoscopic polypectomy 2013    Sleep disorder 2021    Status post hysteroscopic polypectomy 2018    TIA (transient ischemic attack)     with a normal angiogram in the past, Ocular migraines reported by patient, not TIA     Transaminitis 3/19/2021    Urethral caruncle 9/15/2020       Past Surgical History:    Procedure Laterality Date    CATARACT EXTRACTION Right 2012    Dr. Lexis Nicolas     CHOLECYSTECTOMY  2022    COLONOSCOPY      2014 polyps    COLONOSCOPY N/A 11/07/2016    Procedure: COLONOSCOPY;  Surgeon: Bebeto Gonzalez MD;  Location: [facility] (4TH FLR);  Service: Endoscopy;  Laterality: N/A;    COLONOSCOPY N/A 03/09/2020    Procedure: COLONOSCOPY;  Surgeon: Bebeto Gonzalez MD;  Location: [facility] (4TH FLR);  Service: Endoscopy;  Laterality: N/A;    COLONOSCOPY N/A 09/29/2021    Procedure: COLONOSCOPY;  Surgeon: Bebeto Gonzalez MD;  Location: [facility] (4TH FLR);  Service: Endoscopy;  Laterality: N/A;  please schedule patient as soon as possible with me EGD colonoscopy with constipation bowel prep for iron deficiency anemia family history of colon cancer and personal history of colon polyps  9/17/21 ok for standard split prep per Dr. Gonzalez-imani    COLONOSCOPY N/A 09/29/2021    Procedure: COLONOSCOPY;  Surgeon: Bebeto Gonzalez MD;  Location: [facility] (4TH FLR);  Service: Endoscopy;  Laterality: N/A;  Please schedule patient as soon as possible with me EGD colonoscopy with constipation bowel prep for iron deficiency anemia family history of colon cancer and personal history of colon polyps  9/17/21 Ok for standard split prep per Dr. Gonzalez-imani    COLONOSCOPY W/ POLYPECTOMY  06/2013    polyp of colon    ENDOSCOPIC ULTRASOUND OF UPPER GASTROINTESTINAL TRACT N/A 04/29/2021    Procedure: ULTRASOUND, UPPER GI TRACT, ENDOSCOPIC;  Surgeon: Dalton Johnson MD;  Location: [facility] (2ND FLR);  Service: Endoscopy;  Laterality: N/A;  Postprandial nausea vomiting epigastric 0.9 cm stone and sludge seen within the gallbladder lumen.  No wall thickening or pericholecystic fluid.  0.6 cm stone seen within the distal common bile duct at the level of the pancreatic head.  There is dil    ERCP N/A 04/29/2021    Procedure: ERCP (ENDOSCOPIC RETROGRADE CHOLANGIOPANCREATOGRAPHY);  Surgeon: Dalton Johnson MD;  Location:  [facility] ENDO (2ND FLR);  Service: Endoscopy;  Laterality: N/A;  Postprandial nausea and vomit 0.9 cm stone and sludge seen within the gallbladder lumen.  No wall thickening or pericholecystic fluid.  0.6 cm stone seen within the distal common bile duct at the level of the pancreatic head.  There i    ESOPHAGOGASTRODUODENOSCOPY N/A 09/29/2021    Procedure: EGD (ESOPHAGOGASTRODUODENOSCOPY);  Surgeon: Bebeto Gonzalez MD;  Location: [facility] ENDO (4TH FLR);  Service: Endoscopy;  Laterality: N/A;  rapid covid test arrival 12pm - sm  9/27 arrival time for covid test/procedure confirmed with pt-rb    ESOPHAGOGASTRODUODENOSCOPY N/A 09/29/2021    Procedure: EGD (ESOPHAGOGASTRODUODENOSCOPY);  Surgeon: Bebeto Gonzalez MD;  Location: [facility] ENDO (4TH FLR);  Service: Endoscopy;  Laterality: N/A;  covid test 9/19/21 [facility], prep instr emailed -    EYE SURGERY  11/10/2014    right retina/Dr. Dalton Sierra ([facility])    HYSTEROSCOPIC POLYPECTOMY OF UTERUS N/A 07/02/2018    Procedure: POLYPECTOMY, UTERUS, HYSTEROSCOPIC;  Surgeon: Elliot Vanessa IV, MD;  Location: [facility] OR;  Service: OB/GYN;  Laterality: N/A;    INJECTION OF JOINT Right 11/11/2022    Procedure: INJECTION, RIGHT GTB CONTRAST DIRECT REFERRAL;  Surgeon: Camden Hernandez MD;  Location: [facility] PAIN MGT;  Service: Pain Management;  Laterality: Right;    JOINT REPLACEMENT  03/23/2011    left total hip replacement    LAPAROSCOPIC CHOLECYSTECTOMY N/A 05/17/2021    Procedure: CHOLECYSTECTOMY, LAPAROSCOPIC;  Surgeon: Rayshawn Peralta Jr., MD;  Location: [facility] OR 2ND FLR;  Service: General;  Laterality: N/A;    REMOVAL OF EPIRETINAL MEMBRANE Right     Dr. Padron    TONSILLECTOMY      pt was 11 years old         ROS:  GENERAL: Feeling well overall.   SKIN: Denies rash or lesions.   HEAD: Denies head injury or headache.   NODES: Denies enlarged lymph nodes.   CHEST: Denies chest pain or shortness of breath.   CARDIOVASCULAR: Denies palpitations or left sided chest pain.   ABDOMEN: No  abdominal pain, nausea, vomiting or rectal bleeding.   URINARY: No dysuria, hematuria, or burning on urination.  REPRODUCTIVE: See HPI.   BREASTS: Denies pain, lumps, or nipple discharge.   HEMATOLOGIC: No easy bruisability or excessive bleeding.   MUSCULOSKELETAL: Denies joint pain or swelling.   NEUROLOGIC: Denies syncope or weakness.   PSYCHIATRIC: Denies depression.    PE:   APPEARANCE: Well nourished, well developed, in no acute distress.  ABDOMEN: Soft. No tenderness or masses.  Umbilical hernia noted. No CVA tenderness.  VULVA: No lesions. Normal female genitalia.  URETHRAL MEATUS: Normal size and location.  Urethral caruncle noted.  URETHRA: No masses, tenderness, prolapse or scarring.  VAGINA:  Atrophic.  Yellowish brownish discharge noted (consistent with atrophic vaginitis).  No blood in vaginal vault.  CERVIX: No lesions and discharge.  Stenotic os noted   UTERUS: Normal size, regular shape, mobile, non-tender, bladder base nontender.  ADNEXA: No masses, tenderness or CDS nodularity.  ANUS PERINEUM: Normal.    Diagnosis:  1. Vaginal discharge    2. Urethral caruncle    3. PMB (postmenopausal bleeding)    4. Vaginal atrophy      PLAN:    Orders Placed This Encounter    US Pelvis Comp with Transvag NON-OB (xpd    Vaginosis Screen by DNA Probe    estradioL (ESTRACE) 0.01 % (0.1 mg/gram) vaginal cream       Patient was counseled today on atrophy/atrophy treatment with vaginal cream.  ERx placed today.  Instructions on use verbally given by me today.    I have ordered pelvic sonogram to evaluate endometrial lining - Patient is discharge/bleeding is probably secondary to atrophy and urethral caruncle but I am recommending pelvic sonogram to evaluate endometrial lining.    Orders as above.  Vaginosis DNA probe performed today.    Follow-up:  KAILA Vanessa IV, MD      Answers submitted by the patient for this visit:  Vaginal Discharge Questionnaire  (Submitted on 5/7/2024)  Chief Complaint: Vaginal  discharge  Chronicity: chronic  Onset: more than 1 month ago  Frequency: constantly  Progression since onset: waxing and waning  Pain severity: mild  Affected side: both  Pregnant now?: No  abdominal pain: No  anorexia: No  back pain: No  chills: No  constipation: No  diarrhea: No  discolored urine: Yes  dysuria: No  fever: No  flank pain: No  frequency: Yes  headaches: No  hematuria: No  nausea: No  painful intercourse: No  rash: No  urgency: Yes  vomiting: Yes  Please select the characteristics of your discharge: : dark  Vaginal bleeding: spotting  Passing clots?: No  Passing tissue?: No  Aggravated by: activity  treatments tried: nothing  Improvement on treatment: no relief  Sexual activity: sexually active  Partner with STD symptoms: no  Birth control: nothing  Menstrual history: postmenopausal  STD: No  abdominal surgery: No   section: No  Ectopic pregnancy: No  Endometriosis: No  herpes simplex: No  gynecological surgery: No  menorrhagia: No  metrorrhagia: No  miscarriage: No  perineal abscess: No  PID: No  terminated pregnancy: No  vaginosis: No

## 2024-05-09 DIAGNOSIS — M25.552 BILATERAL HIP PAIN: ICD-10-CM

## 2024-05-09 DIAGNOSIS — M25.551 BILATERAL HIP PAIN: ICD-10-CM

## 2024-05-09 DIAGNOSIS — M17.0 PRIMARY OSTEOARTHRITIS OF BOTH KNEES: Primary | ICD-10-CM

## 2024-05-09 LAB
BACTERIAL VAGINOSIS DNA: NEGATIVE
CANDIDA GLABRATA DNA: NEGATIVE
CANDIDA KRUSEI DNA: NEGATIVE
CANDIDA RRNA VAG QL PROBE: NEGATIVE
T VAGINALIS RRNA GENITAL QL PROBE: NEGATIVE

## 2024-05-10 ENCOUNTER — HOSPITAL ENCOUNTER (OUTPATIENT)
Dept: RADIOLOGY | Facility: HOSPITAL | Age: 89
Discharge: HOME | End: 2024-05-10
Attending: ORTHOPAEDIC SURGERY
Payer: MEDICARE

## 2024-05-10 DIAGNOSIS — M25.551 BILATERAL HIP PAIN: ICD-10-CM

## 2024-05-10 DIAGNOSIS — M17.0 PRIMARY OSTEOARTHRITIS OF BOTH KNEES: ICD-10-CM

## 2024-05-10 DIAGNOSIS — M25.552 BILATERAL HIP PAIN: ICD-10-CM

## 2024-05-10 PROCEDURE — 73562 X-RAY EXAM OF KNEE 3: CPT | Mod: TC,50,HCNC

## 2024-05-10 PROCEDURE — 73562 X-RAY EXAM OF KNEE 3: CPT | Mod: 26,50,HCNC, | Performed by: RADIOLOGY

## 2024-05-10 PROCEDURE — 73521 X-RAY EXAM HIPS BI 2 VIEWS: CPT | Mod: TC,HCNC

## 2024-05-10 PROCEDURE — 73521 X-RAY EXAM HIPS BI 2 VIEWS: CPT | Mod: 26,HCNC,, | Performed by: INTERNAL MEDICINE

## 2024-05-20 ENCOUNTER — OFFICE VISIT (OUTPATIENT)
Dept: ORTHOPEDICS | Facility: CLINIC | Age: 89
End: 2024-05-20
Payer: MEDICARE

## 2024-05-20 VITALS — BODY MASS INDEX: 28.04 KG/M2 | WEIGHT: 168.31 LBS | HEIGHT: 65 IN

## 2024-05-20 DIAGNOSIS — M17.0 PRIMARY OSTEOARTHRITIS OF BOTH KNEES: Primary | ICD-10-CM

## 2024-05-20 DIAGNOSIS — M16.11 PRIMARY OSTEOARTHRITIS OF RIGHT HIP: ICD-10-CM

## 2024-05-20 PROCEDURE — 1157F ADVNC CARE PLAN IN RCRD: CPT | Mod: HCNC,CPTII,S$GLB, | Performed by: ORTHOPAEDIC SURGERY

## 2024-05-20 PROCEDURE — 3288F FALL RISK ASSESSMENT DOCD: CPT | Mod: HCNC,CPTII,S$GLB, | Performed by: ORTHOPAEDIC SURGERY

## 2024-05-20 PROCEDURE — 1101F PT FALLS ASSESS-DOCD LE1/YR: CPT | Mod: HCNC,CPTII,S$GLB, | Performed by: ORTHOPAEDIC SURGERY

## 2024-05-20 PROCEDURE — 99214 OFFICE O/P EST MOD 30 MIN: CPT | Mod: HCNC,S$GLB,, | Performed by: ORTHOPAEDIC SURGERY

## 2024-05-20 PROCEDURE — 1125F AMNT PAIN NOTED PAIN PRSNT: CPT | Mod: HCNC,CPTII,S$GLB, | Performed by: ORTHOPAEDIC SURGERY

## 2024-05-20 PROCEDURE — 99999 PR PBB SHADOW E&M-EST. PATIENT-LVL IV: CPT | Mod: PBBFAC,HCNC,, | Performed by: ORTHOPAEDIC SURGERY

## 2024-05-20 PROCEDURE — 1159F MED LIST DOCD IN RCRD: CPT | Mod: HCNC,CPTII,S$GLB, | Performed by: ORTHOPAEDIC SURGERY

## 2024-05-20 NOTE — PROGRESS NOTES
Subjective:      Patient ID: Tiffany Masterson is a 88 y.o. female.    Chief Complaint: Pain of the Right Knee, Pain of the Left Knee, and Pain of the Right Hip      HPI  Tiffany Masterson is a 88 year old female here with a 3-4  month history of right hip pain.  Left Total hip by Dr. [facility] in 2011. The patient is a retired teacher. There was not a history of trauma.  The pain is moderate The pain is located in the groin.  There is is radiation.  The pain radiates to the thigh.  The pain is described as achy. The patient has not had prior right  surgery. It is aggravated by sitting, standing, and walking.  It  is alleviated by rest.  She also has bilateral achy knee pain.  No radiation.  Worse with activity.  She has had recent knee injections.  There is not numbness or tingling of the lower extremity.  There is not back pain.  She  has tried medications and knee injections. They have helped somewhat.  She does have difficulty getting in or out of a car, getting dressed, or going up or down stairs.  The patient does not use an assistive device.    Past Medical History:   Diagnosis Date    Abnormal MRI 10/16/2020    Acute cystitis without hematuria 12/22/2021    Arthritis     Atrial fibrillation     Cataract     Elevated liver enzymes     Endometrial mass 6/29/2018    Epiretinal membrane (ERM) of right eye     Family history of malignant neoplasm of gastrointestinal tract mother    Frequent UTI 9/30/2018    Graves disease     now hypothryoid - no surgery or medicine. resolved on its own    History of cholecystectomy 6/10/2021    History of colonoscopy     unremarkable 2007 by Dr. Javier.  Barium enema revealed diverticular disease.    Hyperlipidemia     Hypertension     Hypertriglyceridemia 4/19/2013    Continue statin for secondary stroke prevention    Hypothyroidism     Impaired functional mobility, balance, gait, and endurance 6/4/2021    Iron deficiency anemia 9/29/2021    Migraine, ophthalmoplegic     Mixed  stress and urge urinary incontinence 9/30/2018    Ocular migraine     Personal history of colonic polyps     Preop testing 5/17/2021    S/P colonoscopic polypectomy 6/18/2013    Sleep disorder 11/8/2021    Status post hysteroscopic polypectomy 7/2/2018    TIA (transient ischemic attack)     with a normal angiogram in the past, Ocular migraines reported by patient, not TIA     Transaminitis 3/19/2021    Urethral caruncle 9/15/2020     Past Surgical History:   Procedure Laterality Date    CATARACT EXTRACTION Right 2012    Dr. Lexis Nicolas     CHOLECYSTECTOMY  2022    COLONOSCOPY      2014 polyps    COLONOSCOPY N/A 11/07/2016    Procedure: COLONOSCOPY;  Surgeon: Bebeto Gonzalez MD;  Location: [facility] ENDO (4TH FLR);  Service: Endoscopy;  Laterality: N/A;    COLONOSCOPY N/A 03/09/2020    Procedure: COLONOSCOPY;  Surgeon: Bebeto Gonzalez MD;  Location: [facility] ENDO (4TH FLR);  Service: Endoscopy;  Laterality: N/A;    COLONOSCOPY N/A 09/29/2021    Procedure: COLONOSCOPY;  Surgeon: Bebeto Gonzalez MD;  Location: [facility] ENDO (4TH FLR);  Service: Endoscopy;  Laterality: N/A;  please schedule patient as soon as possible with me EGD colonoscopy with constipation bowel prep for iron deficiency anemia family history of colon cancer and personal history of colon polyps  9/17/21 ok for standard split prep per Dr. Gonzalez-imani    COLONOSCOPY N/A 09/29/2021    Procedure: COLONOSCOPY;  Surgeon: Bebeto Gonzalez MD;  Location: [facility] ENDO (4TH FLR);  Service: Endoscopy;  Laterality: N/A;  Please schedule patient as soon as possible with me EGD colonoscopy with constipation bowel prep for iron deficiency anemia family history of colon cancer and personal history of colon polyps  9/17/21 Ok for standard split prep per Dr. Gonzalez-imani    COLONOSCOPY W/ POLYPECTOMY  06/2013    polyp of colon    ENDOSCOPIC ULTRASOUND OF UPPER GASTROINTESTINAL TRACT N/A 04/29/2021    Procedure: ULTRASOUND, UPPER GI TRACT, ENDOSCOPIC;  Surgeon: Dalton Johnson  MD;  Location: [facility] (2ND FLR);  Service: Endoscopy;  Laterality: N/A;  Postprandial nausea vomiting epigastric 0.9 cm stone and sludge seen within the gallbladder lumen.  No wall thickening or pericholecystic fluid.  0.6 cm stone seen within the distal common bile duct at the level of the pancreatic head.  There is dil    ERCP N/A 04/29/2021    Procedure: ERCP (ENDOSCOPIC RETROGRADE CHOLANGIOPANCREATOGRAPHY);  Surgeon: Dalton Johnson MD;  Location: [facility] (2ND FLR);  Service: Endoscopy;  Laterality: N/A;  Postprandial nausea and vomit 0.9 cm stone and sludge seen within the gallbladder lumen.  No wall thickening or pericholecystic fluid.  0.6 cm stone seen within the distal common bile duct at the level of the pancreatic head.  There i    ESOPHAGOGASTRODUODENOSCOPY N/A 09/29/2021    Procedure: EGD (ESOPHAGOGASTRODUODENOSCOPY);  Surgeon: Bebeto Gonzalez MD;  Location: [facility] (4TH FLR);  Service: Endoscopy;  Laterality: N/A;  rapid covid test arrival 12pm -   9/27 arrival time for covid test/procedure confirmed with pt-rb    ESOPHAGOGASTRODUODENOSCOPY N/A 09/29/2021    Procedure: EGD (ESOPHAGOGASTRODUODENOSCOPY);  Surgeon: Bebeto Gonzalez MD;  Location: [facility] (4TH FLR);  Service: Endoscopy;  Laterality: N/A;  covid test 9/19/21 [facility], prep instr emailed -ml    EYE SURGERY  11/10/2014    right retina/Dr. Dalton Sierra ([facility])    HYSTEROSCOPIC POLYPECTOMY OF UTERUS N/A 07/02/2018    Procedure: POLYPECTOMY, UTERUS, HYSTEROSCOPIC;  Surgeon: Elliot Vanessa IV, MD;  Location: [facility] OR;  Service: OB/GYN;  Laterality: N/A;    INJECTION OF JOINT Right 11/11/2022    Procedure: INJECTION, RIGHT GTB CONTRAST DIRECT REFERRAL;  Surgeon: Camden Hernandez MD;  Location: [facility] PAIN MGT;  Service: Pain Management;  Laterality: Right;    JOINT REPLACEMENT  03/23/2011    left total hip replacement    LAPAROSCOPIC CHOLECYSTECTOMY N/A 05/17/2021    Procedure: CHOLECYSTECTOMY, LAPAROSCOPIC;  Surgeon: Rayshawn CALDERON  Kathryn Caldwell MD;  Location: [facility] OR [address] [place] [address];  Service: General;  Laterality: N/A;    REMOVAL OF EPIRETINAL MEMBRANE Right     Dr. Padron    TONSILLECTOMY      pt was 11 years old     Family History   Problem Relation Name Age of Onset    Colon cancer Mother Tiffany Sutton 75    Lung cancer Father Col. Josue Sutton 75    Diabetes Other great aunt     Diabetes Paternal Aunt      Colon cancer Paternal Aunt  80    Prostate cancer Brother      Breast cancer Neg Hx      Ovarian cancer Neg Hx      Celiac disease Neg Hx      Cataracts Neg Hx      Glaucoma Neg Hx      Macular degeneration Neg Hx       Social History     Socioeconomic History    Marital status:    Tobacco Use    Smoking status: Never    Smokeless tobacco: Never    Tobacco comments:     The patient is not getting any regular exercise at this time.  She does enjoy traveling to [place].   Substance and Sexual Activity    Alcohol use: Yes     Comment: occasionally    Drug use: No    Sexual activity: Yes     Partners: Male     Birth control/protection: Post-menopausal     Comment:  64 years      Social Determinants of Health     Financial Resource Strain: Low Risk  (2/2/2024)    Overall Financial Resource Strain (CARDIA)     Difficulty of Paying Living Expenses: Not hard at all   Food Insecurity: No Food Insecurity (2/2/2024)    Hunger Vital Sign     Worried About Running Out of Food in the Last Year: Never true     Ran Out of Food in the Last Year: Never true   Transportation Needs: No Transportation Needs (2/2/2024)    PRAPARE - Transportation     Lack of Transportation (Medical): No     Lack of Transportation (Non-Medical): No   Physical Activity: Insufficiently Active (2/2/2024)    Exercise Vital Sign     Days of Exercise per Week: 2 days     Minutes of Exercise per Session: 60 min   Stress: No Stress Concern Present (2/2/2024)    [employer] [employer] - Occupational Stress Questionnaire     Feeling of  Stress : Only a little   Housing Stability: Low Risk  (2/2/2024)    Housing Stability Vital Sign     Unable to Pay for Housing in the Last Year: No     Number of Places Lived in the Last Year: 1     Unstable Housing in the Last Year: No     Current Outpatient Medications on File Prior to Visit   Medication Sig Dispense Refill    apixaban (ELIQUIS) 5 mg Tab Take 1 tablet (5 mg total) by mouth 2 (two) times daily. 180 tablet 3    ascorbic acid, vitamin C, (VITAMIN C) 250 MG tablet Take 250 mg by mouth once daily.      atorvastatin (LIPITOR) 10 MG tablet Take 1 tablet (10 mg total) by mouth once daily. 90 tablet 3    b complex vitamins capsule Take 1 capsule by mouth once daily.      biotin 5,000 mcg TbDL Take 1 tablet (5,000 mcg total) by mouth Daily. 90 tablet 3    bisacodyL (DULCOLAX) 5 mg EC tablet Take 5 mg by mouth once daily.      CALCIUM CARBONATE/VITAMIN D3 (CALCIUM 600 + D,3, ORAL) Take 1 tablet by mouth once daily.       cefdinir (OMNICEF) 300 MG capsule Take 300 mg by mouth 2 (two) times daily.      cholecalciferol, vitamin D3, (VITAMIN D3) 50 mcg (2,000 unit) Cap Take 1 capsule by mouth once daily.      coenzyme Q10 200 mg capsule Take 200 mg by mouth once daily.      DEXTRAN 70/HYPROMELLOSE (ARTIFICIAL TEARS, PF, OPHT) Place 1 drop into both eyes as needed.      fluticasone propionate (FLONASE) 50 mcg/actuation nasal spray CLEAN SINUS/NARES WITH OCEAN NASAL SPRAY THEN USE FLONASE 1 SPRAY TWICE DAILY. 48 g 3    hydroquinone 4 % Crea APPLY  CREAM TOPICALLY TWICE DAILY 58 g 0    levothyroxine (SYNTHROID) 112 MCG tablet Take 1 tablet (112 mcg total) by mouth before breakfast. 90 tablet 3    LIDOcaine (LIDODERM) 5 % Place 1 patch onto the skin once daily. Remove & Discard patch within 12 hours or as directed by MD 30 patch 0    losartan (COZAAR) 100 MG tablet Take 1 tablet (100 mg total) by mouth once daily. 90 tablet 3    magnesium oxide 400 mg magnesium Tab Take 1 tablet by mouth once daily.      metoprolol  succinate (TOPROL-XL) 50 MG 24 hr tablet Take 1 tablet (50 mg total) by mouth 2 (two) times daily. 180 tablet 3    MULTIVITAMIN W-MINERALS/LUTEIN (CENTRUM SILVER ORAL) Take 1 tablet by mouth once daily.      omeprazole (PRILOSEC) 40 MG capsule Take 40 mg by mouth.      sodium chloride (SALINE NASAL) 0.65 % nasal spray 1 spray by Nasal route as needed for Congestion. 104 mL 3    traMADoL (ULTRAM) 50 mg tablet Take 1 tablet (50 mg total) by mouth every 8 (eight) hours as needed for Pain. 60 tablet 1    vit A/vit C/vit E/zinc/copper (ICAPS AREDS ORAL) Take 1 tablet by mouth 2 (two) times a day.      diclofenac sodium (VOLTAREN) 1 % Gel Apply 2 g topically 4 (four) times daily. (Patient not taking: Reported on 5/7/2024) 200 g 3    estradioL (ESTRACE) 0.01 % (0.1 mg/gram) vaginal cream Place 0.5 g intravaginally q.h.s. x2 weeks; then, placed 0.5 g intravaginally twice weekly at bedtime (maintenance therapy). (Patient not taking: Reported on 5/20/2024) 42.5 g 1     Current Facility-Administered Medications on File Prior to Visit   Medication Dose Route Frequency Provider Last Rate Last Admin    BUPivacaine (PF) 0.25% (2.5 mg/ml) injection 2.5 mg  1 mL INTRABURSAL Once Rebeca Dumont MD        triamcinolone acetonide injection 40 mg  40 mg Intra-articular 1 time in Clinic/HOD Rebeca Dumont MD         Review of patient's allergies indicates:   Allergen Reactions    Levaquin [levofloxacin]      Joint pain    Hydrochlorothiazide Other (See Comments)     Pain in joints and depression per pt       Review of Systems   Constitutional: Negative for chills, fever and night sweats.   HENT:  Negative for hearing loss.    Eyes:  Negative for blurred vision and double vision.   Cardiovascular:  Negative for chest pain, claudication and leg swelling.   Respiratory:  Negative for shortness of breath.    Endocrine: Negative for polydipsia, polyphagia and polyuria.   Hematologic/Lymphatic: Negative for adenopathy and bleeding problem.  "Does not bruise/bleed easily.   Skin:  Negative for poor wound healing.   Gastrointestinal:  Negative for diarrhea and heartburn.   Genitourinary:  Negative for bladder incontinence.   Neurological:  Negative for focal weakness, headaches, numbness, paresthesias and sensory change.   Psychiatric/Behavioral:  The patient is not nervous/anxious.    Allergic/Immunologic: Negative for persistent infections.         Objective:      Body mass index is 28.01 kg/m².  Vitals:    05/20/24 1445   Weight: 76.4 kg (168 lb 5.1 oz)   Height: 5' 5" (1.651 m)         General    Constitutional: She is oriented to person, place, and time. She appears well-developed and well-nourished.   HENT:   Head: Normocephalic and atraumatic.   Eyes: EOM are normal.   Cardiovascular:  Normal rate.            Pulmonary/Chest: Effort normal.   Neurological: She is alert and oriented to person, place, and time.   Psychiatric: She has a normal mood and affect. Her behavior is normal.     General Musculoskeletal Exam   Gait: abnormal and antalgic   Pelvic Obliquity: none      Right Knee Exam     Inspection   Alignment:  normal  Erythema: absent  Scars: absent  Swelling: absent  Effusion: absent  Deformity: present (valgus)  Bruising: absent    Tenderness   The patient is tender to palpation of the lateral joint line.    Range of Motion   Extension:  0   Flexion:  120     Tests   Ligament Examination   Lachman: normal (-1 to 2mm)   MCL - Valgus: normal (0 to 2mm)  LCL - Varus: normal  Patella   Passive Patellar Tilt: neutral    Other   Popliteal (Baker's) Cyst: present  Sensation: normal    Left Knee Exam     Inspection   Alignment:  normal  Erythema: absent  Scars: absent  Swelling: present  Effusion: absent  Deformity: absent  Bruising: absent    Tenderness   The patient tender to palpation of the medial joint line.    Range of Motion   Extension:  0   Flexion:  120     Tests   Stability   Lachman: normal (-1 to 2mm)   MCL - Valgus: normal (0 to " 2mm)  LCL - Varus: normal (0 to 2mm)  Patella   Passive Patellar Tilt: neutral    Other   Popliteal (Baker's) Cyst: present  Sensation: normal    Right Hip Exam     Inspection   Scars: absent  Swelling: absent  Bruising: absent  No deformity of hip.  Quadriceps Atrophy:  Negative  Erythema: absent    Range of Motion   Abduction:  20   Adduction:  20   Extension:  0   Flexion:  100   External rotation:  20   Internal rotation:  15     Tests   Pain w/ forced internal rotation (AVELINA): absent  Stinchfield test: positive    Other   Sensation: normal  Left Hip Exam     Inspection   Scars: absent  Swelling: absent  Deformity of hip.  Quadriceps Atrophy:  negative  Erythema: absent  Bruising: absent    Range of Motion   Abduction:  25   Adduction:  20   Extension:  0   Flexion:  100   External rotation:  30   Internal rotation: 20     Tests   Pain w/ forced internal rotation (AVELINA): absent  Stinchfield test: negative    Other   Sensation: normal      Back (L-Spine & T-Spine) / Neck (C-Spine) Exam   Back exam is normal.      Muscle Strength   Right Lower Extremity   Hip Abduction: 5/5   Hip Adduction: 5/5   Hip Flexion: 5/5   Quadriceps:  5/5   Hamstrin/5   Ankle Dorsiflexion:  5/5   Left Lower Extremity   Hip Abduction: 5/5   Hip Adduction: 5/5   Hip Flexion: 5/5   Quadriceps:  5/5   Hamstrin/5   Ankle Dorsiflexion:  5/5     Reflexes     Left Side  Quadriceps:  2+    Right Side   Quadriceps:  2+    Vascular Exam     Right Pulses  Dorsalis Pedis:      2+          Left Pulses  Dorsalis Pedis:      2+          Capillary Refill  Right Hand: normal capillary refill  Left Hand: normal capillary refill        Edema  Right Upper Leg: absent  Right Lower Leg: absent  Left Upper Leg: absent  Left Lower Leg: absent    Hip radiographs taken week or so ago reviewed by me.  She has a well-fixed and positioned left total hip arthroplasty.  She has severe arthritic change of the right hip. Subchondral cysts, loss of articular  space, osteophytes and sclerosis.  Bilateral knee radiographs obtained today reviewed by me.  She has severe arthritic change of the right knee with a valgus deformity.  Kellgren Mars 4.    She has moderately severe arthritic change of the left knee with a varus deformity.  Kellgren Mars 3.           Assessment:       Encounter Diagnoses   Name Primary?    Primary osteoarthritis of right hip     Primary osteoarthritis of both knees Yes          Plan:       Tiffany was seen today for pain, pain and pain.    Diagnoses and all orders for this visit:    Primary osteoarthritis of both knees    Primary osteoarthritis of right hip  -     Ambulatory referral/consult to Orthopedics      Options were discussed.  We will pursue a course of aquatic therapy.  F/U in 6 weeks.

## 2024-05-21 ENCOUNTER — CLINICAL SUPPORT (OUTPATIENT)
Dept: REHABILITATION | Facility: HOSPITAL | Age: 89
End: 2024-05-21
Attending: ORTHOPAEDIC SURGERY
Payer: MEDICARE

## 2024-05-21 DIAGNOSIS — M16.11 PRIMARY OSTEOARTHRITIS OF RIGHT HIP: ICD-10-CM

## 2024-05-21 DIAGNOSIS — M17.0 PRIMARY OSTEOARTHRITIS OF BOTH KNEES: ICD-10-CM

## 2024-05-21 DIAGNOSIS — M16.11 ARTHRITIS OF RIGHT HIP: Primary | ICD-10-CM

## 2024-05-21 DIAGNOSIS — M17.12 LOCALIZED OSTEOARTHRITIS OF LEFT KNEE: ICD-10-CM

## 2024-05-21 DIAGNOSIS — M15.9 GENERALIZED OSTEOARTHRITIS OF MULTIPLE SITES: ICD-10-CM

## 2024-05-21 PROCEDURE — 97161 PT EVAL LOW COMPLEX 20 MIN: CPT | Mod: HCNC

## 2024-05-21 PROCEDURE — 97530 THERAPEUTIC ACTIVITIES: CPT | Mod: HCNC

## 2024-05-21 NOTE — PROGRESS NOTES
[facility]   Physical Therapy Initial Evaluation     Date: 5/21/2024   Name: Tiffany Masterson  Clinic Number: [id number]    Therapy Diagnosis:   Encounter Diagnoses   Name Primary?    Arthritis of right hip Yes    Generalized osteoarthritis of multiple sites     Localized osteoarthritis of left knee      Physician: Elliot Aviles MD    Physician Orders: PT Eval and Treat / Aquatic Therapy  Medical Diagnosis from Referral: M16.11 (ICD-10-CM) - Primary osteoarthritis of right hip; M17.0 (ICD-10-CM) - Primary osteoarthritis of both knees  Evaluation Date: 5/21/2024  Authorization Period Expiration: 5/20/2025  Plan of Care Expiration: 7/21/2024  Visit # / Visits authorized: 1/ 1 (Pending)     Precautions: Standard and Fall    Time In: 2:36 PM  Time Out: 3:45 PM  Total Appointment Time (timed & untimed codes): 65 minutes      SUBJECTIVE   Date of onset: ~ over 10-20 years ago (Most recent exacerbation March 2023)     History of current condition - Tiffany reports: that she was in aquatic therapy in 2023 for deconditioning. Most recently patient reports that both of her knees and right hip began to hurt in March of 2024 which caused her to require a walker to walk temporarily. Patient says that she doesn't pay attention to the pain, so she cannot really give a detailed history. She states that the one activity that is aggravating is stairs. At times getting on and off the toilet is difficult, sitting on a soft surface for long periods, standing for long periods and walking long distances is difficult. She uses Tramadol primarily for pain, and states that she lives an active life socially going out to movies and participating in clubs. She also mows the lawn. She reports that she has a history of a left hip replacement.     Falls: No    Prior Therapy: [facility] 2023- deconditioning   Social History: lives with their spouse in 2-story home with 1 [address] (bedroom upstairs)  Occupation: Retired-  "teacher nursery school   Prior Level of Function: Independent  Current Level of Function: Independent    Pain:  Current 0/10, worst 9/10, best 0/10   Location: right hip and bilateral knee    Description: Sharp and Shooting  Aggravating Factors: Standing, Walking, Lifting, and Getting out of bed/chair  Easing Factors: pain medication    Patients goals: "See what you can do for me".     Medical History:   Past Medical History:   Diagnosis Date    Abnormal MRI 10/16/2020    Acute cystitis without hematuria 12/22/2021    Arthritis     Atrial fibrillation     Cataract     Elevated liver enzymes     Endometrial mass 6/29/2018    Epiretinal membrane (ERM) of right eye     Family history of malignant neoplasm of gastrointestinal tract mother    Frequent UTI 9/30/2018    Graves disease     now hypothryoid - no surgery or medicine. resolved on its own    History of cholecystectomy 6/10/2021    History of colonoscopy     unremarkable 2007 by Dr. Javier.  Barium enema revealed diverticular disease.    Hyperlipidemia     Hypertension     Hypertriglyceridemia 4/19/2013    Continue statin for secondary stroke prevention    Hypothyroidism     Impaired functional mobility, balance, gait, and endurance 6/4/2021    Iron deficiency anemia 9/29/2021    Migraine, ophthalmoplegic     Mixed stress and urge urinary incontinence 9/30/2018    Ocular migraine     Personal history of colonic polyps     Preop testing 5/17/2021    S/P colonoscopic polypectomy 6/18/2013    Sleep disorder 11/8/2021    Status post hysteroscopic polypectomy 7/2/2018    TIA (transient ischemic attack)     with a normal angiogram in the past, Ocular migraines reported by patient, not TIA     Transaminitis 3/19/2021    Urethral caruncle 9/15/2020       Surgical History:   Tiffany Masterson  has a past surgical history that includes Tonsillectomy; Joint replacement (03/23/2011); Colonoscopy w/ polypectomy (06/2013); Eye surgery (11/10/2014); Colonoscopy; Colonoscopy " (N/A, 2016); Hysteroscopic polypectomy of uterus (N/A, 2018); Colonoscopy (N/A, 2020); Endoscopic ultrasound of upper gastrointestinal tract (N/A, 2021); ERCP (N/A, 2021); Laparoscopic cholecystectomy (N/A, 2021); Esophagogastroduodenoscopy (N/A, 2021); Colonoscopy (N/A, 2021); Esophagogastroduodenoscopy (N/A, 2021); Colonoscopy (N/A, 2021); Removal of epiretinal membrane (Right); Cataract extraction (Right, ); Injection of joint (Right, 2022); and Cholecystectomy ().    Medications:   Tiffany has a current medication list which includes the following prescription(s): apixaban, ascorbic acid (vitamin c), atorvastatin, b complex vitamins, biotin, bisacodyl, calcium carbonate/vitamin d3, cefdinir, cholecalciferol (vitamin d3), coenzyme q10, dextran 70/hypromellose, diclofenac sodium, estradiol, fluticasone propionate, hydroquinone, levothyroxine, lidocaine, losartan, magnesium oxide, metoprolol succinate, folic acid/multivit-min/lutein, omeprazole, sodium chloride, tramadol, and vit a/vit c/vit e/zinc/copper, and the following Facility-Administered Medications: bupivacaine (pf) 0.25% (2.5 mg/ml) and triamcinolone acetonide.    Allergies:   Review of patient's allergies indicates:   Allergen Reactions    Levaquin [levofloxacin]      Joint pain    Hydrochlorothiazide Other (See Comments)     Pain in joints and depression per pt        Pool contraindications, including but not limited to, incontinence, seizures, fever/GI issues were reviewed with the patient. Patient agrees that based on their knowledge and medical history, they are appropriate for Aquatic Therapy.     OBJECTIVE     TU seconds (P! Bilateral knees), No AD     5 Times Sit to Stand: 13 seconds (Definite use of hands)     Posture:  Decreased weight bearing through left lower extremity, right scapular protraction, right shoulder depressed, decreased lumbar lordosis, rounded shoulders,  forward head posture    Gait: Shuffling gait pattern, decreased shoulder girdle and thoracic rotation, decreased bilateral toe/heel off/hip extension/pelvic rotation, increased loading response time      Hip Range of Motion:   Right active Right Passive Left active  Left Passive   Flexion 90 P! 85 110 115   Abduction 38 40 40 43   Extension -2 -3 -6 -3   Ext. Rotation 30 32 36 40   Int. Rotation 18 23 40 43     Range of Motion:   Knee Right active Left Active   Flexion 125 P! 128   Extension 138 P! 140     Lower Extremity Strength  Right LE  Left LE    Quadriceps: 4+/5 Quadriceps: 4+/5   Hamstrings: 4+/5 Hamstrings: 4+/5   Iliopsoas: 4-/5 Iliopsoas: 4-/5   Hip extension:  3/5 Hip extension: 3/5   PGM: 4-/5 PGM: 3+/5   Hip ER: 3+/5 P! Hip ER: 3+/5   Hip IR: 3+/5 Hip IR: 3/5     Functional Tests:  Bridge Test: Left hip - 4 degrees from right hip, unable to reach full extension   SLS EO: Left 20 seconds; right 12 seconds    Special Tests:   TISHA: + Left  FABERs:  + Left   Hip Scour: -    Flexibility:    Anterior Drawer: R = - ; L = -    Lachmans: R = - ; L = -    Ortega's test: R = + ; L = +   Kishan test: R = + ; L = +     TREATMENT     Total Treatment time (time-based codes) separate from Evaluation: 8 minutes      Tiffany received the treatments listed below:      THERAPEUTIC ACTIVITIES to improve dynamic and functional performance for 8 minutes including LAQ RTB, Side lying clamshells RTB, Bridges, Figure-4 stretch supine, PPT for bed mobility & stability during transitional movements.    PATIENT EDUCATION AND HOME EXERCISES     Education provided:   - Diagnosis, prognosis, relevant anatomy, role of therapy    Written Home Exercises Provided: Yes. Exercises were reviewed and Tiffany was able to demonstrate them prior to the end of the session.  Tiffany demonstrated good  understanding of the education provided. See EMR under Patient Instructions for exercises provided during therapy sessions.    ASSESSMENT     Tiffany is a 88  y.o. female referred to outpatient Physical Therapy with a medical diagnosis of M16.11 (ICD-10-CM) - Primary osteoarthritis of right hip; M17.0 (ICD-10-CM) - Primary osteoarthritis of both knees. Patient presents with ROM, strength and functional limitations that impact the patient's ability to perform ADLs and preferred activities at prior level of function. Physical examination reveals painful and decreased gross right hip and bilateral knee range of motion;   Impaired bilateral hip extension, internal and external rotation MMT strength; gait abnormalities including a shuffling gait pattern, decreased shoulder girdle and thoracic rotation, decreased bilateral toe/heel off/hip extension/pelvic rotation; and positive bilateral Ortega, Kishan, Bridge, TISHA and AVELINA tests.   These impairments result in activity limitations with walking, transferring from sit to stand, squatting, and prolonged standing which lead to participation restrictions in general ADLs. The patient will benefit from skilled physical therapy to address activity and participation restrictions of above mentioned deficits with a PT program focusing on hip range of motion and strength, gait training, postural restoration and pelvic strengthening and endurance. Clinical presentation is currently stable due to consistent symptom presentation. Personal factors and comorbidities that may negatively effect plan of care include: time since injury onset. Based on today's evaluation findings and the composite of the patient's presentation, I consider the case to be of low complexity. Prognosis for PT intervention is good.     Patient prognosis is Good.     Patient will benefit from skilled outpatient Physical Therapy to address the deficits stated above and in the chart below, provide patient /family education, and to maximize patientt's level of independence.     Plan of care discussed with patient: Yes    Patient's spiritual, cultural and educational needs  considered and patient is agreeable to the plan of care and goals as stated below:     Anticipated Barriers for therapy: None    Medical Necessity is demonstrated by the following  History  Co-morbidities and personal factors that may impact the plan of care Co-morbidities:    Abnormal MRI     Acute cystitis without hematuria     Arthritis     Atrial fibrillation     Cataract     Elevated liver enzymes     Endometrial mass     Epiretinal membrane (ERM) of right eye     Family history of malignant neoplasm of gastrointestinal tract     Frequent UTI     Graves disease     now hypothryoid - no surgery or medicine. resolved on its own    History of cholecystectomy     History of colonoscopy     unremarkable 2007 by Dr. Javier.  Barium enema revealed diverticular disease.    Hyperlipidemia     Hypertension     Hypertriglyceridemia     Continue statin for secondary stroke prevention    Hypothyroidism     Impaired functional mobility, balance, gait, and endurance     Iron deficiency anemia     Migraine, ophthalmoplegic     Mixed stress and urge urinary incontinence     Ocular migraine     Personal history of colonic polyps     Preop testing     S/P colonoscopic polypectomy     Sleep disorder     Status post hysteroscopic polypectomy     TIA (transient ischemic attack)     with a normal angiogram in the past, Ocular migraines reported by patient, not TIA     Transaminitis     Urethral caruncle        Personal Factors:   age     high   Examination  Body Structures and Functions, activity limitations and participation restrictions that may impact the plan of care Body Regions:   lower extremities  trunk    Body Systems:    ROM  strength  transfers    Participation Restrictions:   None    Activity limitations:   Learning and applying knowledge  no deficits    General Tasks and Commands  no deficits    Communication  no deficits    Mobility  no deficits    Self care  no deficits    Domestic Life  no  deficits    Interactions/Relationships  no deficits    Life Areas  no deficits    Community and Social Life  no deficits         low   Clinical Presentation stable and uncomplicated low   Decision Making/ Complexity Score: moderate       Goals:  Short Term Goals: 6 weeks   1. Patient will be independent in HEP & progressions.  2. Patient will achieve TUG score of 11.3 sec to demonstrate improved mobility  3. The patient will achieve global right hip range of motion that is pain free and within functional limits to demonstrate improved transfers and endurance.   4. Patient able to transfer on and off of the toilet with no greater than 1/10 bilateral knee and hip pain.    Long Term Goals: 12 weeks   1. The patient will demonstrate independence with extensive HEP.  2. Patient will achieve 5 sit to stand score of 11.4 seconds to demonstrate improved transfers & endurance.  3. Patent is able to demonstrate MMT 4/5 on all lower quarter MMTs without pain reports during testing.    4. Patient able to walk 200 meters with least AD in order to return to community ambulation.    PLAN   Plan of care Certification: 5/21/2024 to 7/21/2024.    Outpatient Physical Therapy 1-2 times weekly for 8-10 weeks to include the following interventions: manual therapy, aquatic therapy, patient education, therapeutic exercise, therapeutic activities.    Patient may be seen by PTA as part of rehabilitation team.    Raquel Gupta, PT      I CERTIFY THE NEED FOR THESE SERVICES FURNISHED UNDER THIS PLAN OF TREATMENT AND WHILE UNDER MY CARE   Physician's comments:     Physician's Signature: ___________________________________________________

## 2024-05-22 ENCOUNTER — HOSPITAL ENCOUNTER (OUTPATIENT)
Dept: RADIOLOGY | Facility: HOSPITAL | Age: 89
Discharge: HOME | End: 2024-05-22
Attending: OBSTETRICS & GYNECOLOGY
Payer: MEDICARE

## 2024-05-22 DIAGNOSIS — N95.0 PMB (POSTMENOPAUSAL BLEEDING): ICD-10-CM

## 2024-05-22 PROCEDURE — 76830 TRANSVAGINAL US NON-OB: CPT | Mod: 26,HCNC,, | Performed by: RADIOLOGY

## 2024-05-22 PROCEDURE — 76830 TRANSVAGINAL US NON-OB: CPT | Mod: TC,HCNC

## 2024-05-22 PROCEDURE — 76856 US EXAM PELVIC COMPLETE: CPT | Mod: 26,HCNC,, | Performed by: RADIOLOGY

## 2024-05-22 NOTE — PLAN OF CARE
[facility]   Physical Therapy Initial Evaluation     Date: 5/21/2024   Name: Tiffany Masterson  Clinic Number: [id number]    Therapy Diagnosis:   Encounter Diagnoses   Name Primary?    Arthritis of right hip Yes    Generalized osteoarthritis of multiple sites     Localized osteoarthritis of left knee      Physician: Elliot Aviles MD    Physician Orders: PT Eval and Treat / Aquatic Therapy  Medical Diagnosis from Referral: M16.11 (ICD-10-CM) - Primary osteoarthritis of right hip; M17.0 (ICD-10-CM) - Primary osteoarthritis of both knees  Evaluation Date: 5/21/2024  Authorization Period Expiration: 5/20/2025  Plan of Care Expiration: 7/21/2024  Visit # / Visits authorized: 1/ 1 (Pending)     Precautions: Standard and Fall    Time In: 2:36 PM  Time Out: 3:45 PM  Total Appointment Time (timed & untimed codes): 65 minutes      SUBJECTIVE   Date of onset: ~ over 10-20 years ago (Most recent exacerbation March 2023)     History of current condition - Tiffany reports: that she was in aquatic therapy in 2023 for deconditioning. Most recently patient reports that both of her knees and right hip began to hurt in March of 2024 which caused her to require a walker to walk temporarily. Patient says that she doesn't pay attention to the pain, so she cannot really give a detailed history. She states that the one activity that is aggravating is stairs. At times getting on and off the toilet is difficult, sitting on a soft surface for long periods, standing for long periods and walking long distances is difficult. She uses Tramadol primarily for pain, and states that she lives an active life socially going out to movies and participating in clubs. She also mows the lawn. She reports that she has a history of a left hip replacement.     Falls: No    Prior Therapy: [facility] 2023- deconditioning   Social History: lives with their spouse in 2-story home with 1 [address] (bedroom upstairs)  Occupation: Retired-  "teacher nursery school   Prior Level of Function: Independent  Current Level of Function: Independent    Pain:  Current 0/10, worst 9/10, best 0/10   Location: right hip and bilateral knee    Description: Sharp and Shooting  Aggravating Factors: Standing, Walking, Lifting, and Getting out of bed/chair  Easing Factors: pain medication    Patients goals: "See what you can do for me".     Medical History:   Past Medical History:   Diagnosis Date    Abnormal MRI 10/16/2020    Acute cystitis without hematuria 12/22/2021    Arthritis     Atrial fibrillation     Cataract     Elevated liver enzymes     Endometrial mass 6/29/2018    Epiretinal membrane (ERM) of right eye     Family history of malignant neoplasm of gastrointestinal tract mother    Frequent UTI 9/30/2018    Graves disease     now hypothryoid - no surgery or medicine. resolved on its own    History of cholecystectomy 6/10/2021    History of colonoscopy     unremarkable 2007 by Dr. Javier.  Barium enema revealed diverticular disease.    Hyperlipidemia     Hypertension     Hypertriglyceridemia 4/19/2013    Continue statin for secondary stroke prevention    Hypothyroidism     Impaired functional mobility, balance, gait, and endurance 6/4/2021    Iron deficiency anemia 9/29/2021    Migraine, ophthalmoplegic     Mixed stress and urge urinary incontinence 9/30/2018    Ocular migraine     Personal history of colonic polyps     Preop testing 5/17/2021    S/P colonoscopic polypectomy 6/18/2013    Sleep disorder 11/8/2021    Status post hysteroscopic polypectomy 7/2/2018    TIA (transient ischemic attack)     with a normal angiogram in the past, Ocular migraines reported by patient, not TIA     Transaminitis 3/19/2021    Urethral caruncle 9/15/2020       Surgical History:   Tiffany Masterson  has a past surgical history that includes Tonsillectomy; Joint replacement (03/23/2011); Colonoscopy w/ polypectomy (06/2013); Eye surgery (11/10/2014); Colonoscopy; Colonoscopy " (N/A, 2016); Hysteroscopic polypectomy of uterus (N/A, 2018); Colonoscopy (N/A, 2020); Endoscopic ultrasound of upper gastrointestinal tract (N/A, 2021); ERCP (N/A, 2021); Laparoscopic cholecystectomy (N/A, 2021); Esophagogastroduodenoscopy (N/A, 2021); Colonoscopy (N/A, 2021); Esophagogastroduodenoscopy (N/A, 2021); Colonoscopy (N/A, 2021); Removal of epiretinal membrane (Right); Cataract extraction (Right, ); Injection of joint (Right, 2022); and Cholecystectomy ().    Medications:   Tiffany has a current medication list which includes the following prescription(s): apixaban, ascorbic acid (vitamin c), atorvastatin, b complex vitamins, biotin, bisacodyl, calcium carbonate/vitamin d3, cefdinir, cholecalciferol (vitamin d3), coenzyme q10, dextran 70/hypromellose, diclofenac sodium, estradiol, fluticasone propionate, hydroquinone, levothyroxine, lidocaine, losartan, magnesium oxide, metoprolol succinate, folic acid/multivit-min/lutein, omeprazole, sodium chloride, tramadol, and vit a/vit c/vit e/zinc/copper, and the following Facility-Administered Medications: bupivacaine (pf) 0.25% (2.5 mg/ml) and triamcinolone acetonide.    Allergies:   Review of patient's allergies indicates:   Allergen Reactions    Levaquin [levofloxacin]      Joint pain    Hydrochlorothiazide Other (See Comments)     Pain in joints and depression per pt        Pool contraindications, including but not limited to, incontinence, seizures, fever/GI issues were reviewed with the patient. Patient agrees that based on their knowledge and medical history, they are appropriate for Aquatic Therapy.     OBJECTIVE     TU seconds (P! Bilateral knees), No AD     5 Times Sit to Stand: 13 seconds (Definite use of hands)     Posture:  Decreased weight bearing through left lower extremity, right scapular protraction, right shoulder depressed, decreased lumbar lordosis, rounded shoulders,  forward head posture    Gait: Shuffling gait pattern, decreased shoulder girdle and thoracic rotation, decreased bilateral toe/heel off/hip extension/pelvic rotation, increased loading response time      Hip Range of Motion:   Right active Right Passive Left active  Left Passive   Flexion 90 P! 85 110 115   Abduction 38 40 40 43   Extension -2 -3 -6 -3   Ext. Rotation 30 32 36 40   Int. Rotation 18 23 40 43     Range of Motion:   Knee Right active Left Active   Flexion 125 P! 128   Extension 138 P! 140     Lower Extremity Strength  Right LE  Left LE    Quadriceps: 4+/5 Quadriceps: 4+/5   Hamstrings: 4+/5 Hamstrings: 4+/5   Iliopsoas: 4-/5 Iliopsoas: 4-/5   Hip extension:  3/5 Hip extension: 3/5   PGM: 4-/5 PGM: 3+/5   Hip ER: 3+/5 P! Hip ER: 3+/5   Hip IR: 3+/5 Hip IR: 3/5     Functional Tests:  Bridge Test: Left hip - 4 degrees from right hip, unable to reach full extension   SLS EO: Left 20 seconds; right 12 seconds    Special Tests:   TISHA: + Left  FABERs:  + Left   Hip Scour: -    Flexibility:    Anterior Drawer: R = - ; L = -    Lachmans: R = - ; L = -    Ortega's test: R = + ; L = +   Kishan test: R = + ; L = +     TREATMENT     Total Treatment time (time-based codes) separate from Evaluation: 8 minutes      Tiffany received the treatments listed below:      THERAPEUTIC ACTIVITIES to improve dynamic and functional performance for 8 minutes including LAQ RTB, Side lying clamshells RTB, Bridges, Figure-4 stretch supine, PPT for bed mobility & stability during transitional movements.    PATIENT EDUCATION AND HOME EXERCISES     Education provided:   - Diagnosis, prognosis, relevant anatomy, role of therapy    Written Home Exercises Provided: Yes. Exercises were reviewed and Tiffany was able to demonstrate them prior to the end of the session.  Tiffany demonstrated good  understanding of the education provided. See EMR under Patient Instructions for exercises provided during therapy sessions.    ASSESSMENT     Tiffany is a 88  y.o. female referred to outpatient Physical Therapy with a medical diagnosis of M16.11 (ICD-10-CM) - Primary osteoarthritis of right hip; M17.0 (ICD-10-CM) - Primary osteoarthritis of both knees. Patient presents with ROM, strength and functional limitations that impact the patient's ability to perform ADLs and preferred activities at prior level of function. Physical examination reveals painful and decreased gross right hip and bilateral knee range of motion;   Impaired bilateral hip extension, internal and external rotation MMT strength; gait abnormalities including a shuffling gait pattern, decreased shoulder girdle and thoracic rotation, decreased bilateral toe/heel off/hip extension/pelvic rotation; and positive bilateral Ortega, Kishan, Bridge, TISHA and AVELINA tests.   These impairments result in activity limitations with walking, transferring from sit to stand, squatting, and prolonged standing which lead to participation restrictions in general ADLs. The patient will benefit from skilled physical therapy to address activity and participation restrictions of above mentioned deficits with a PT program focusing on hip range of motion and strength, gait training, postural restoration and pelvic strengthening and endurance. Clinical presentation is currently stable due to consistent symptom presentation. Personal factors and comorbidities that may negatively effect plan of care include: time since injury onset. Based on today's evaluation findings and the composite of the patient's presentation, I consider the case to be of low complexity. Prognosis for PT intervention is good.     Patient prognosis is Good.     Patient will benefit from skilled outpatient Physical Therapy to address the deficits stated above and in the chart below, provide patient /family education, and to maximize patientt's level of independence.     Plan of care discussed with patient: Yes    Patient's spiritual, cultural and educational needs  considered and patient is agreeable to the plan of care and goals as stated below:     Anticipated Barriers for therapy: None    Medical Necessity is demonstrated by the following  History  Co-morbidities and personal factors that may impact the plan of care Co-morbidities:    Abnormal MRI     Acute cystitis without hematuria     Arthritis     Atrial fibrillation     Cataract     Elevated liver enzymes     Endometrial mass     Epiretinal membrane (ERM) of right eye     Family history of malignant neoplasm of gastrointestinal tract     Frequent UTI     Graves disease     now hypothryoid - no surgery or medicine. resolved on its own    History of cholecystectomy     History of colonoscopy     unremarkable 2007 by Dr. Javier.  Barium enema revealed diverticular disease.    Hyperlipidemia     Hypertension     Hypertriglyceridemia     Continue statin for secondary stroke prevention    Hypothyroidism     Impaired functional mobility, balance, gait, and endurance     Iron deficiency anemia     Migraine, ophthalmoplegic     Mixed stress and urge urinary incontinence     Ocular migraine     Personal history of colonic polyps     Preop testing     S/P colonoscopic polypectomy     Sleep disorder     Status post hysteroscopic polypectomy     TIA (transient ischemic attack)     with a normal angiogram in the past, Ocular migraines reported by patient, not TIA     Transaminitis     Urethral caruncle        Personal Factors:   age     high   Examination  Body Structures and Functions, activity limitations and participation restrictions that may impact the plan of care Body Regions:   lower extremities  trunk    Body Systems:    ROM  strength  transfers    Participation Restrictions:   None    Activity limitations:   Learning and applying knowledge  no deficits    General Tasks and Commands  no deficits    Communication  no deficits    Mobility  no deficits    Self care  no deficits    Domestic Life  no  deficits    Interactions/Relationships  no deficits    Life Areas  no deficits    Community and Social Life  no deficits         low   Clinical Presentation stable and uncomplicated low   Decision Making/ Complexity Score: moderate       Goals:  Short Term Goals: 6 weeks   1. Patient will be independent in HEP & progressions.  2. Patient will achieve TUG score of 11.3 sec to demonstrate improved mobility  3. The patient will achieve global right hip range of motion that is pain free and within functional limits to demonstrate improved transfers and endurance.   4. Patient able to transfer on and off of the toilet with no greater than 1/10 bilateral knee and hip pain.    Long Term Goals: 12 weeks   1. The patient will demonstrate independence with extensive HEP.  2. Patient will achieve 5 sit to stand score of 11.4 seconds to demonstrate improved transfers & endurance.  3. Patent is able to demonstrate MMT 4/5 on all lower quarter MMTs without pain reports during testing.    4. Patient able to walk 200 meters with least AD in order to return to community ambulation.    PLAN   Plan of care Certification: 5/21/2024 to 7/21/2024.    Outpatient Physical Therapy 1-2 times weekly for 8-10 weeks to include the following interventions: manual therapy, aquatic therapy, patient education, therapeutic exercise, therapeutic activities.    Patient may be seen by PTA as part of rehabilitation team.    Raquel Gupta, PT      I CERTIFY THE NEED FOR THESE SERVICES FURNISHED UNDER THIS PLAN OF TREATMENT AND WHILE UNDER MY CARE   Physician's comments:     Physician's Signature: ___________________________________________________

## 2024-05-27 ENCOUNTER — CLINICAL SUPPORT (OUTPATIENT)
Dept: REHABILITATION | Facility: HOSPITAL | Age: 89
End: 2024-05-27
Payer: MEDICARE

## 2024-05-27 DIAGNOSIS — M16.11 ARTHRITIS OF RIGHT HIP: Primary | ICD-10-CM

## 2024-05-27 DIAGNOSIS — M17.12 LOCALIZED OSTEOARTHRITIS OF LEFT KNEE: ICD-10-CM

## 2024-05-27 DIAGNOSIS — M15.9 GENERALIZED OSTEOARTHRITIS OF MULTIPLE SITES: ICD-10-CM

## 2024-05-27 PROCEDURE — 97113 AQUATIC THERAPY/EXERCISES: CPT | Mod: HCNC

## 2024-05-27 NOTE — PROGRESS NOTES
[facility] OUTPATIENT THERAPY AND WELLNESS   Physical Therapy Treatment Note     Name: Tiffany Masterson  [facility] Number: [id number]    Therapy Diagnosis:   Encounter Diagnoses   Name Primary?    Arthritis of right hip Yes    Generalized osteoarthritis of multiple sites     Localized osteoarthritis of left knee      Physician: Rebeca Dumont MD    Visit Date: 5/27/2024    Physician Orders: PT Eval and Treat / Aquatic Therapy  Medical Diagnosis from Referral: M16.11 (ICD-10-CM) - Primary osteoarthritis of right hip; M17.0 (ICD-10-CM) - Primary osteoarthritis of both knees  Evaluation Date: 5/21/2024  Authorization Period Expiration: 5/20/2025  Plan of Care Expiration: 7/21/2024  Visit # / Visits authorized: 17/20   Visit since most recent evaluation:   2     Precautions: Standard and Fall    PTA Visit #: 0/5     Time In: 8:35 am   Time Out: 9:10 am   Total Billable Time: 30 minutes    SUBJECTIVE     Pt reports: she has lots of pain on occasion in her B shoulders, knees and hips. She stated I came back because her doctor sent me. Upon performing a chart review and speaking with the patient. It was stated she was last seen in Aquatic PT for previous course of care 3/8/24 with increased knee pain 3/10/24 leading her to use a friends walker for a while with severe pain.  She stated that she saw the Ortho and they referred her back to Aquatic PT.     She stated she was in a lot of pain after the initial evaluation which started the second day and lasted several days.     She was not compliant with home exercise program.    Response to previous treatment: increased pain    Functional change: initiated aquatic PT    Pain: 0/10 currently, 9/10 with certain positions  Location: bilateral hips and knees.  Aggravating factors: Stairs, lying down       OBJECTIVE     Objective Measures updated at progress report unless specified.     Treatment     Tiffany received aquatic therapeutic exercises to develop strength, endurance, ROM,  flexibility, posture, and core stabilization for 35 minutes including:    FUNCTIONAL MOBILITY TRAINING x 2 laps each at beginning and 1 lap each at end of session  Walk forward/backward/lateral    STRETCHES 2 x 30sec  HS at steps--add next    LE EX x 10  Squat  Heel raise   Hip abduction/adduction  Hip flex/ext    Sit to stand from pool stool  LAQ    UE EX/CORE  x 10--Add as tolerated  Shoulder flex/ext TA activation paddles CLOSED  Shoulder horizontal abd/add TA activation paddles CLOSED  Shoulder abd/add with TA activation and paddles CLOSED      ENDURANCE  LTR x 2'  Bicycle in // bars x 3'--add as tolerated      Patient Education and Home Exercises     Home Exercises Provided and Patient Education Provided     Education provided:   Role of aquatic therapy  Hydration post therapy      Written Home Exercises Provided: Patient instructed to cont prior HEP.     ASSESSMENT     Due to her history of elevated pain levels with Aquatic PT and increased pain following the initial evaluation all exercises were performed at 10 reps today.  The reps and resistance will be progressed as tolerated by the patient.  The patient reported some L knee pain while performing the standing exercises on the R but this did not limit her ability to complete all reps.  She required some cueing to reduce ROM and to avoid trunk lean as compensation.     Tiffany Is progressing well towards her goals.     Pt prognosis is Fair.     Pt will continue to benefit from skilled outpatient physical therapy to address the deficits listed in the problem list box on initial evaluation, provide pt/family education and to maximize pt's level of independence in the home and community environment.     Pt's spiritual, cultural and educational needs considered and pt agreeable to plan of care and goals.     Anticipated barriers to physical therapy: Chronicity, and poor tolerance to Aquatic PT at previous course of care    Goals:   Short Term Goals: 6 weeks   1.  Patient will be independent in HEP & progressions.  2. Patient will achieve TUG score of 11.3 sec to demonstrate improved mobility  3. The patient will achieve global right hip range of motion that is pain free and within functional limits to demonstrate improved transfers and endurance.   4. Patient able to transfer on and off of the toilet with no greater than 1/10 bilateral knee and hip pain.    Long Term Goals: 12 weeks   1. The patient will demonstrate independence with extensive HEP.  2. Patient will achieve 5 sit to stand score of 11.4 seconds to demonstrate improved transfers & endurance.  3. Patent is able to demonstrate MMT 4/5 on all lower quarter MMTs without pain reports during testing.    4. Patient able to walk 200 meters with least AD in order to return to community ambulation.    PLAN   Progress POC as tolerated by the patient.     Plan of care Certification: 5/21/2024 to 7/21/2024.        Mariah Adan, PT

## 2024-05-31 ENCOUNTER — CLINICAL SUPPORT (OUTPATIENT)
Dept: REHABILITATION | Facility: HOSPITAL | Age: 89
End: 2024-05-31
Payer: MEDICARE

## 2024-05-31 DIAGNOSIS — M16.11 ARTHRITIS OF RIGHT HIP: Primary | ICD-10-CM

## 2024-05-31 DIAGNOSIS — M15.9 GENERALIZED OSTEOARTHRITIS OF MULTIPLE SITES: ICD-10-CM

## 2024-05-31 DIAGNOSIS — M17.12 LOCALIZED OSTEOARTHRITIS OF LEFT KNEE: ICD-10-CM

## 2024-05-31 PROCEDURE — 97113 AQUATIC THERAPY/EXERCISES: CPT | Mod: HCNC

## 2024-05-31 NOTE — PROGRESS NOTES
[facility]   Physical Therapy Treatment Note     [id number]    Therapy Diagnosis:   Encounter Diagnoses   Name Primary?    Arthritis of right hip Yes    Generalized osteoarthritis of multiple sites     Localized osteoarthritis of left knee      Physician: Rebeca Dumont MD    Visit Date: 5/31/2024    Physician Orders: PT Eval and Treat / Aquatic Therapy  Medical Diagnosis from Referral: M16.11 (ICD-10-CM) - Primary osteoarthritis of right hip; M17.0 (ICD-10-CM) - Primary osteoarthritis of both knees  Evaluation Date: 5/21/2024  Authorization Period Expiration: 5/20/2025  Plan of Care Expiration: 7/21/2024  Visit # / Visits authorized: 18/20   Visit since most recent evaluation:   2     Precautions: Standard and Fall    PTA Visit #: 0/5     Time In: 10:30 am   Time Out: 11:32 am   Total Billable Time: 30 minutes    SUBJECTIVE     Pt reports: That her knee pain arises when performing stairs, and shoulder pain when combing her hair.    She stated she was in a lot of pain after the initial evaluation which started the second day and lasted several days.     She was not compliant with home exercise program.    Response to previous treatment: increased pain    Functional change: initiated aquatic PT    Pain: 0/10 currently, 9/10 with certain positions  Location: bilateral hips and knees.  Aggravating factors: Stairs, lying down       OBJECTIVE     Objective Measures updated at progress report unless specified.     Treatment     Tiffany received aquatic therapeutic exercises to develop strength, endurance, ROM, flexibility, posture, and core stabilization for 60 minutes including:    FUNCTIONAL MOBILITY TRAINING x 2 laps each at beginning and 1 lap each at end of session  Walk forward/backward/lateral    STRETCHES 2 x 30sec  HS at steps--add next    LE EX x 10  Squat  Heel raise   Hip abduction/adduction  Hip flex/ext    Sit to stand from pool stool  LAQ    +UE  EX/CORE  x 12  Shoulder flex/ext TA activation paddles CLOSED  Shoulder horizontal abd/add TA activation paddles CLOSED  Shoulder abd/add with TA activation and paddles CLOSED    ENDURANCE  LTR x 2'  Bicycle in // bars x 3'--add as tolerated      Patient Education and Home Exercises     Home Exercises Provided and Patient Education Provided     Education provided:   Role of aquatic therapy  Hydration post therapy      Written Home Exercises Provided: Patient instructed to cont prior HEP.     ASSESSMENT     Patient able to tolerate small increase in dosage of all exercises with no reported pain provocation during treatment. Introduced core/upper extremity strengthening which patient tolerated well, however required moderate visual and verbal cues to achieve proper squat   Progress patient as tolerated.    Tiffany Is progressing well towards her goals.     Pt prognosis is Fair.     Pt will continue to benefit from skilled outpatient physical therapy to address the deficits listed in the problem list box on initial evaluation, provide pt/family education and to maximize pt's level of independence in the home and community environment.     Pt's spiritual, cultural and educational needs considered and pt agreeable to plan of care and goals.     Anticipated barriers to physical therapy: Chronicity, and poor tolerance to Aquatic PT at previous course of care    Goals:   Short Term Goals: 6 weeks   1. Patient will be independent in HEP & progressions.  2. Patient will achieve TUG score of 11.3 sec to demonstrate improved mobility  3. The patient will achieve global right hip range of motion that is pain free and within functional limits to demonstrate improved transfers and endurance.   4. Patient able to transfer on and off of the toilet with no greater than 1/10 bilateral knee and hip pain.    Long Term Goals: 12 weeks   1. The patient will demonstrate independence with extensive HEP.  2. Patient will achieve 5 sit to stand  score of 11.4 seconds to demonstrate improved transfers & endurance.  3. Patent is able to demonstrate MMT 4/5 on all lower quarter MMTs without pain reports during testing.    4. Patient able to walk 200 meters with least AD in order to return to community ambulation.    PLAN   Progress POC as tolerated by the patient.     Plan of care Certification: 5/21/2024 to 7/21/2024.        Raquel Gupta, PT

## 2024-06-03 ENCOUNTER — CLINICAL SUPPORT (OUTPATIENT)
Dept: REHABILITATION | Facility: HOSPITAL | Age: 89
End: 2024-06-03
Payer: MEDICARE

## 2024-06-03 DIAGNOSIS — M16.11 ARTHRITIS OF RIGHT HIP: Primary | ICD-10-CM

## 2024-06-03 DIAGNOSIS — M15.9 GENERALIZED OSTEOARTHRITIS OF MULTIPLE SITES: ICD-10-CM

## 2024-06-03 DIAGNOSIS — M17.12 LOCALIZED OSTEOARTHRITIS OF LEFT KNEE: ICD-10-CM

## 2024-06-03 PROCEDURE — 97113 AQUATIC THERAPY/EXERCISES: CPT | Mod: HCNC

## 2024-06-03 NOTE — PROGRESS NOTES
[facility]   Physical Therapy Treatment Note     [id number]    Therapy Diagnosis:   Encounter Diagnoses   Name Primary?    Arthritis of right hip Yes    Generalized osteoarthritis of multiple sites     Localized osteoarthritis of left knee      Physician: Rebeca Dumont MD    Visit Date: 6/3/2024    Physician Orders: PT Eval and Treat / Aquatic Therapy  Medical Diagnosis from Referral: M16.11 (ICD-10-CM) - Primary osteoarthritis of right hip; M17.0 (ICD-10-CM) - Primary osteoarthritis of both knees  Evaluation Date: 5/21/2024  Authorization Period Expiration: 5/20/2025  Plan of Care Expiration: 7/21/2024  Visit # / Visits authorized: 19/20   Visit since most recent evaluation:   4     Precautions: Standard and Fall    PTA Visit #: 0/5     Time In: 8:40 am  Time Out: 9:30 am   Total Billable Time: 25 minutes    SUBJECTIVE     Pt reports: she did well after the last visit and felt like the 12 reps were good.    She stated she was in a lot of pain after the initial evaluation which started the second day and lasted several days.     She was not compliant with home exercise program.    Response to previous treatment: no increases symptoms     Functional change: initiated aquatic PT    Pain: 0/10 currently, 9/10 with certain positions  Location: bilateral hips and knees.  Aggravating factors: Stairs, lying down       OBJECTIVE     Objective Measures updated at progress report unless specified.     Treatment     Tiffany received aquatic therapeutic exercises to develop strength, endurance, ROM, flexibility, posture, and core stabilization for 50  minutes including:    FUNCTIONAL MOBILITY TRAINING x 2 laps each at beginning and 1 lap each at end of session  Walk forward/backward/lateral    STRETCHES 2 x 30sec  HS at steps--add next    LE EX x 15  Squat  Heel raise   Hip abduction/adduction  Hip flex  Hip ext    Sit to stand from pool stool  LAQ    UE EX/CORE  x  15  Shoulder flex/ext TA activation paddles CLOSED  Shoulder horizontal abd/add TA activation paddles CLOSED  Shoulder abd/add with TA activation and paddles CLOSED    ENDURANCE  LTR x 3'  Bicycle in // bars x 3'--add as tolerated      Patient Education and Home Exercises     Home Exercises Provided and Patient Education Provided     Education provided:   Role of aquatic therapy  Hydration post therapy      Written Home Exercises Provided: Patient instructed to cont prior HEP.     ASSESSMENT     The patient tolerated the additional reps from 12 to 15 reps well today.  She did require some increased cueing for execution of hip extension to avoid lumbar extension.     Tiffany Is progressing well towards her goals.     Pt prognosis is Fair.     Pt will continue to benefit from skilled outpatient physical therapy to address the deficits listed in the problem list box on initial evaluation, provide pt/family education and to maximize pt's level of independence in the home and community environment.     Pt's spiritual, cultural and educational needs considered and pt agreeable to plan of care and goals.     Anticipated barriers to physical therapy: Chronicity, and poor tolerance to Aquatic PT at previous course of care    Goals:   Short Term Goals: 6 weeks   1. Patient will be independent in HEP & progressions.  2. Patient will achieve TUG score of 11.3 sec to demonstrate improved mobility  3. The patient will achieve global right hip range of motion that is pain free and within functional limits to demonstrate improved transfers and endurance.   4. Patient able to transfer on and off of the toilet with no greater than 1/10 bilateral knee and hip pain.    Long Term Goals: 12 weeks   1. The patient will demonstrate independence with extensive HEP.  2. Patient will achieve 5 sit to stand score of 11.4 seconds to demonstrate improved transfers & endurance.  3. Patent is able to demonstrate MMT 4/5 on all lower quarter MMTs  without pain reports during testing.    4. Patient able to walk 200 meters with least AD in order to return to community ambulation.    PLAN   Progress POC as tolerated by the patient.     Plan of care Certification: 5/21/2024 to 7/21/2024.        Mariah Adan, PT

## 2024-06-10 ENCOUNTER — CLINICAL SUPPORT (OUTPATIENT)
Dept: REHABILITATION | Facility: HOSPITAL | Age: 89
End: 2024-06-10
Payer: MEDICARE

## 2024-06-10 DIAGNOSIS — M15.9 GENERALIZED OSTEOARTHRITIS OF MULTIPLE SITES: ICD-10-CM

## 2024-06-10 DIAGNOSIS — M17.12 LOCALIZED OSTEOARTHRITIS OF LEFT KNEE: ICD-10-CM

## 2024-06-10 DIAGNOSIS — M16.11 ARTHRITIS OF RIGHT HIP: Primary | ICD-10-CM

## 2024-06-10 PROCEDURE — 97113 AQUATIC THERAPY/EXERCISES: CPT | Mod: HCNC

## 2024-06-10 NOTE — PROGRESS NOTES
[facility]PAT[facility] THERAPY AND WELLNESS   Physical Therapy Treatment Note     Name: Tiffany MOSS [facility] Number: [id number]    Therapy Diagnosis:   Encounter Diagnoses   Name Primary?    Arthritis of right hip Yes    Generalized osteoarthritis of multiple sites     Localized osteoarthritis of left knee      Physician: Rebeca Dumont MD    Visit Date: 6/10/2024    Physician Orders: PT Eval and Treat / Aquatic Therapy  Medical Diagnosis from Referral: M16.11 (ICD-10-CM) - Primary osteoarthritis of right hip; M17.0 (ICD-10-CM) - Primary osteoarthritis of both knees  Evaluation Date: 5/21/2024  Authorization Period Expiration: 5/20/2025  Plan of Care Expiration: 7/21/2024  Visit # / Visits authorized: 20/20   Visit since most recent evaluation:   5     Precautions: Standard and Fall    PTA Visit #: 0/5     Time In: 8:25 am   Time Out: 9:20 am   Total Billable Time: 55 minutes    SUBJECTIVE     Pt reports: she did well after the last week and was able to descend her steps via step over step but had to go up one step at a time.     She was not compliant with home exercise program.    Response to previous treatment: no increases symptoms     Functional change: improved ability to descend steps with improved quality.    Pain: 0/10 currently, 9/10 with certain positions  Location: bilateral hips and knees.  Aggravating factors: Stairs, lying down       OBJECTIVE     Objective Measures updated at progress report unless specified.     Treatment     Tiffany received aquatic therapeutic exercises to develop strength, endurance, ROM, flexibility, posture, and core stabilization for 55  minutes including:    FUNCTIONAL MOBILITY TRAINING x 2 laps each at beginning and 1 lap each at end of session  Walk forward/backward/lateral    STRETCHES 2 x 30sec  HS at steps--add next    LE EX x 15  Squat  Heel raise   Hip abduction  Hip flex  Hip ext    Sit to stand from pool stool  LAQ  Marches    Step ups on 4 inch step--add next visit as  tolerated    UE EX/CORE  x 15 with back away from the wall  Shoulder flex/ext TA activation paddles CLOSED  Shoulder horizontal abd/add TA activation paddles CLOSED  Shoulder abd/add with TA activation and paddles CLOSED    ENDURANCE  LTR x 3'  Bicycle in // bars x 3'--add as tolerated      Patient Education and Home Exercises     Home Exercises Provided and Patient Education Provided     Education provided:   Role of aquatic therapy  Hydration post therapy      Written Home Exercises Provided: Patient instructed to cont prior HEP.     ASSESSMENT     The patient has tolerated the slow progression of treatment with good tolerance compared to her previous course of care. She performed advanced seated marches today with no reports of increased symptoms and required overall reduced cueing for exercise execution and posture.  She did report some knee pain with LTR which she stated was unusual.  She is going to [place] at the end of the month so will finish with PT prior to leaving town for the summer.     Tiffany Is progressing well towards her goals.     Pt prognosis is Fair.     Pt will continue to benefit from skilled outpatient physical therapy to address the deficits listed in the problem list box on initial evaluation, provide pt/family education and to maximize pt's level of independence in the home and community environment.     Pt's spiritual, cultural and educational needs considered and pt agreeable to plan of care and goals.     Anticipated barriers to physical therapy: Chronicity, and poor tolerance to Aquatic PT at previous course of care    Goals:   Short Term Goals: 6 weeks   1. Patient will be independent in HEP & progressions.  2. Patient will achieve TUG score of 11.3 sec to demonstrate improved mobility  3. The patient will achieve global right hip range of motion that is pain free and within functional limits to demonstrate improved transfers and endurance.   4. Patient able to transfer on and  off of the toilet with no greater than 1/10 bilateral knee and hip pain.    Long Term Goals: 12 weeks   1. The patient will demonstrate independence with extensive HEP.  2. Patient will achieve 5 sit to stand score of 11.4 seconds to demonstrate improved transfers & endurance.  3. Patent is able to demonstrate MMT 4/5 on all lower quarter MMTs without pain reports during testing.    4. Patient able to walk 200 meters with least AD in order to return to community ambulation.    PLAN   Progress POC as tolerated by the patient.     Plan of care Certification: 5/21/2024 to 7/21/2024.        Mariah Adan, PT

## 2024-06-12 DIAGNOSIS — M17.12 LOCALIZED OSTEOARTHRITIS OF LEFT KNEE: ICD-10-CM

## 2024-06-13 RX ORDER — TRAMADOL HYDROCHLORIDE 50 MG/1
50 TABLET ORAL EVERY 8 HOURS PRN
Qty: 60 TABLET | Refills: 0 | Status: SHIPPED | OUTPATIENT
Start: 2024-06-13

## 2024-06-13 NOTE — TELEPHONE ENCOUNTER
Care Due:                  Date            Visit Type   Department     Provider  --------------------------------------------------------------------------------                                EP -                              PRIMARY      [facility] Primary  Last Visit: 03-      CARE ([facility])   Care           Rebeca Dumont  Next Visit: None Scheduled  None         None Found                                                            Last  Test          Frequency    Reason                     Performed    Due Date  --------------------------------------------------------------------------------    CBC.........  12 months..  diclofenac...............  09- 09-    CMP.........  12 months..  diclofenac, losartan.....  09- 09-    TSH.........  12 months..  levothyroxine............  09- 09-    [employer] Embedded Care Due Messages. Reference number: [id number].   6/12/2024 7:30:43 PM CDT

## 2024-06-14 ENCOUNTER — CLINICAL SUPPORT (OUTPATIENT)
Dept: REHABILITATION | Facility: HOSPITAL | Age: 89
End: 2024-06-14
Payer: MEDICARE

## 2024-06-14 DIAGNOSIS — M15.9 GENERALIZED OSTEOARTHRITIS OF MULTIPLE SITES: ICD-10-CM

## 2024-06-14 DIAGNOSIS — M16.11 ARTHRITIS OF RIGHT HIP: Primary | ICD-10-CM

## 2024-06-14 DIAGNOSIS — M17.12 LOCALIZED OSTEOARTHRITIS OF LEFT KNEE: ICD-10-CM

## 2024-06-14 PROCEDURE — 97113 AQUATIC THERAPY/EXERCISES: CPT | Mod: HCNC

## 2024-06-14 NOTE — PROGRESS NOTES
[facility]   Physical Therapy Treatment Note     [id number]    Therapy Diagnosis:   Encounter Diagnoses   Name Primary?    Arthritis of right hip Yes    Generalized osteoarthritis of multiple sites     Localized osteoarthritis of left knee      Physician: Rebeca Dumont MD    Visit Date: 6/14/2024    Physician Orders: PT Eval and Treat / Aquatic Therapy  Medical Diagnosis from Referral: M16.11 (ICD-10-CM) - Primary osteoarthritis of right hip; M17.0 (ICD-10-CM) - Primary osteoarthritis of both knees  Evaluation Date: 5/21/2024  Authorization Period Expiration: 5/20/2025  Plan of Care Expiration: 7/21/2024  Visit # / Visits authorized: 21/30   Visit since most recent evaluation:   5     Precautions: Standard and Fall    PTA Visit #: 0/5     Time In: 8:35 am   Time Out: 9:36 am   Total Billable Time: 55 minutes    SUBJECTIVE     Pt reports: she is doing better and better. She is able to perform steps with a reciprocal pattern. She says that she hasn't been doing the HEP at all.    She was not compliant with home exercise program.    Response to previous treatment: no increases symptoms     Functional change: improved ability to descend steps with improved quality.    Pain: 0/10 currently, 9/10 with certain positions  Location: bilateral hips and knees.  Aggravating factors: Stairs, lying down       OBJECTIVE     Objective Measures updated at progress report unless specified.     Treatment     Tiffany received aquatic therapeutic exercises to develop strength, endurance, ROM, flexibility, posture, and core stabilization for 55 minutes including:    FUNCTIONAL MOBILITY TRAINING x 2 laps each at beginning and 1 lap each at end of session  Walk forward/backward/lateral    STRETCHES 2 x 30sec  HS at steps--add next    LE EX x 20  Squat  Heel raise   Hip abduction  Hip flex  Hip ext    Sit to stand from pool stool  LAQ  Marches    Step ups on 4 inch step--add  next visit as tolerated    UE EX/CORE  x 20 with back away from the wall  Shoulder flex/ext TA activation paddles CLOSED  Shoulder horizontal abd/add TA activation paddles CLOSED  Shoulder abd/add with TA activation and paddles CLOSED (no paddle R UE)    ENDURANCE  LTR x 3'  Bicycle in // bars x 3'--add as tolerated    Patient Education and Home Exercises     Home Exercises Provided and Patient Education Provided     Education provided:   Role of aquatic therapy  Hydration post therapy    Written Home Exercises Provided: Patient instructed to cont prior HEP.     ASSESSMENT   Patient displays gradual improvement in global muscular endurance as noted by good tolerance to increased dosage of all exercises. Patient experiencing right shoulder pain provocation during glenohumeral abduction, therefore regressed to early ROM and no resistance solely on right side. Patient advised to adhere to HEP in order to allow for independent management upon cessation of treatment when patient leaves to the [facility] for summer vacation.    Tiffany Is progressing well towards her goals.     Pt prognosis is Fair.     Pt will continue to benefit from skilled outpatient physical therapy to address the deficits listed in the problem list box on initial evaluation, provide pt/family education and to maximize pt's level of independence in the home and community environment.     Pt's spiritual, cultural and educational needs considered and pt agreeable to plan of care and goals.     Anticipated barriers to physical therapy: Chronicity, and poor tolerance to Aquatic PT at previous course of care    Goals:   Short Term Goals: 6 weeks   1. Patient will be independent in HEP & progressions.  2. Patient will achieve TUG score of 11.3 sec to demonstrate improved mobility  3. The patient will achieve global right hip range of motion that is pain free and within functional limits to demonstrate improved transfers and endurance.   4. Patient able to  transfer on and off of the toilet with no greater than 1/10 bilateral knee and hip pain.    Long Term Goals: 12 weeks   1. The patient will demonstrate independence with extensive HEP.  2. Patient will achieve 5 sit to stand score of 11.4 seconds to demonstrate improved transfers & endurance.  3. Patent is able to demonstrate MMT 4/5 on all lower quarter MMTs without pain reports during testing.    4. Patient able to walk 200 meters with least AD in order to return to community ambulation.    PLAN   Progress POC as tolerated by the patient.     Plan of care Certification: 5/21/2024 to 7/21/2024.    Raquel Gupta, PT

## 2024-06-17 ENCOUNTER — CLINICAL SUPPORT (OUTPATIENT)
Dept: REHABILITATION | Facility: HOSPITAL | Age: 89
End: 2024-06-17
Payer: MEDICARE

## 2024-06-17 DIAGNOSIS — M17.12 LOCALIZED OSTEOARTHRITIS OF LEFT KNEE: ICD-10-CM

## 2024-06-17 DIAGNOSIS — M15.9 GENERALIZED OSTEOARTHRITIS OF MULTIPLE SITES: ICD-10-CM

## 2024-06-17 DIAGNOSIS — I10 HYPERTENSION, UNSPECIFIED TYPE: ICD-10-CM

## 2024-06-17 DIAGNOSIS — M16.11 ARTHRITIS OF RIGHT HIP: Primary | ICD-10-CM

## 2024-06-17 PROCEDURE — 97113 AQUATIC THERAPY/EXERCISES: CPT | Mod: HCNC

## 2024-06-17 RX ORDER — LOSARTAN POTASSIUM 100 MG/1
100 TABLET ORAL
Qty: 90 TABLET | Refills: 3 | Status: SHIPPED | OUTPATIENT
Start: 2024-06-17

## 2024-06-17 NOTE — TELEPHONE ENCOUNTER
Refill Decision Note   Tiffany Masterson  is requesting a refill authorization.  Brief Assessment and Rationale for Refill:  Approve     Medication Therapy Plan:         Comments:     Note composed:5:45 PM 06/17/2024

## 2024-06-17 NOTE — TELEPHONE ENCOUNTER
----- Message from Zoe Holley sent at 6/17/2024  2:33 PM CDT -----  Contact: Pt [phone]  Requesting an RX refill or new RX.    Is this a refill or new RX: refill    RX name and strength (copy/paste from chart):  losartan (COZAAR) 100 MG tablet    Is this a 30 day or 90 day RX: 90     Pharmacy name and phone # (copy/paste from chart):  [facility] [address] [facility]   Phone: [phone]  Fax: [phone]      The doctors have asked that we provide their patients with the following 2 reminders -- prescription refills can take up to 72 hours, and a friendly reminder that in the future you can use your [facility] account to request refills: yes

## 2024-06-17 NOTE — PROGRESS NOTES
"[facility]   Physical Therapy Treatment Note     Name: Tiffany Masterson  Clinic Number: [id number]    Therapy Diagnosis:   Encounter Diagnoses   Name Primary?    Arthritis of right hip Yes    Generalized osteoarthritis of multiple sites     Localized osteoarthritis of left knee      Physician: Rebeca Dumont MD    Visit Date: 6/17/2024    Physician Orders: PT Eval and Treat / Aquatic Therapy  Medical Diagnosis from Referral: M16.11 (ICD-10-CM) - Primary osteoarthritis of right hip; M17.0 (ICD-10-CM) - Primary osteoarthritis of both knees  Evaluation Date: 5/21/2024  Authorization Period Expiration: 5/20/2025  Plan of Care Expiration: 7/21/2024  Visit # / Visits authorized: 22/30   Visit since most recent evaluation:   7 + Eval     Precautions: Standard and Fall    PTA Visit #: 0/5     Time In: 8:40 am    Time Out: 9:40 am    Total Billable Time: 40 minutes    SUBJECTIVE     Pt reports: she is  "so-so" today due to spring cleaning. She stated her pain is in "no special spot."     She was not compliant with home exercise program.    Response to previous treatment: no increases symptoms     Functional change: improved ability to descend steps with improved quality.    Pain: 0/10 currently, 9/10 with certain positions  Location: bilateral hips and knees.  Aggravating factors: Stairs, lying down       OBJECTIVE     Objective Measures updated at progress report unless specified.     Treatment     Tiffany received aquatic therapeutic exercises to develop strength, endurance, ROM, flexibility, posture, and core stabilization for 60 minutes including:    FUNCTIONAL MOBILITY TRAINING x 2 laps each at beginning and 1 lap each at end of session  Walk forward/backward/lateral    STRETCHES 2 x 30sec  HS at steps--add next    LE EX x 20 reps  Squat  Heel raise   Hip abduction  Hip flex  Hip ext    Sit to stand from pool stool  LAQ  Marches    Step ups on 4 inch step--add next visit as tolerated    UE " EX/CORE  x 20 with back away from the wall  Shoulder flex/ext TA activation paddles CLOSED  Shoulder horizontal abd/add TA activation paddles CLOSED  Shoulder abd/add with TA activation and paddles CLOSED     ENDURANCE  LTR x 2'  Bicycle in // bars x 2'    Patient Education and Home Exercises     Home Exercises Provided and Patient Education Provided     Education provided:   Role of aquatic therapy  Hydration post therapy    Written Home Exercises Provided: Patient instructed to cont prior HEP.     ASSESSMENT   The patient performed the UE exercises with no increase in shoulder pain and no modifications required. She demonstrated improved quality of exercises with minimal cueing required for posture and exercise execution today.     Tiffany Is progressing well towards her goals.     Pt prognosis is Fair.     Pt will continue to benefit from skilled outpatient physical therapy to address the deficits listed in the problem list box on initial evaluation, provide pt/family education and to maximize pt's level of independence in the home and community environment.     Pt's spiritual, cultural and educational needs considered and pt agreeable to plan of care and goals.     Anticipated barriers to physical therapy: Chronicity, and poor tolerance to Aquatic PT at previous course of care    Goals:   Short Term Goals: 6 weeks   1. Patient will be independent in HEP & progressions.  2. Patient will achieve TUG score of 11.3 sec to demonstrate improved mobility  3. The patient will achieve global right hip range of motion that is pain free and within functional limits to demonstrate improved transfers and endurance.   4. Patient able to transfer on and off of the toilet with no greater than 1/10 bilateral knee and hip pain.    Long Term Goals: 12 weeks   1. The patient will demonstrate independence with extensive HEP.  2. Patient will achieve 5 sit to stand score of 11.4 seconds to demonstrate improved transfers & endurance.  3.  Patent is able to demonstrate MMT 4/5 on all lower quarter MMTs without pain reports during testing.    4. Patient able to walk 200 meters with least AD in order to return to community ambulation.    PLAN   Progress POC as tolerated by the patient.     Plan of care Certification: 5/21/2024 to 7/21/2024.    Mariah Adan, PT

## 2024-06-17 NOTE — TELEPHONE ENCOUNTER
No care due was identified.  [facility] Embedded Care Due Messages. Reference number: [id number].   6/17/2024 11:16:26 AM CDT

## 2024-06-21 ENCOUNTER — CLINICAL SUPPORT (OUTPATIENT)
Dept: REHABILITATION | Facility: HOSPITAL | Age: 89
End: 2024-06-21
Payer: MEDICARE

## 2024-06-21 DIAGNOSIS — M16.11 ARTHRITIS OF RIGHT HIP: Primary | ICD-10-CM

## 2024-06-21 DIAGNOSIS — M17.12 LOCALIZED OSTEOARTHRITIS OF LEFT KNEE: ICD-10-CM

## 2024-06-21 DIAGNOSIS — M15.9 GENERALIZED OSTEOARTHRITIS OF MULTIPLE SITES: ICD-10-CM

## 2024-06-21 PROCEDURE — 97113 AQUATIC THERAPY/EXERCISES: CPT | Mod: HCNC

## 2024-06-21 NOTE — PROGRESS NOTES
[facility]   Physical Therapy Treatment Note     Name: Tiffany [facility] Number: [id number]    Therapy Diagnosis:   Encounter Diagnoses   Name Primary?    Arthritis of right hip Yes    Generalized osteoarthritis of multiple sites     Localized osteoarthritis of left knee      Physician: Rebeca Dumont MD    Visit Date: 6/21/2024    Physician Orders: PT Eval and Treat / Aquatic Therapy  Medical Diagnosis from Referral: M16.11 (ICD-10-CM) - Primary osteoarthritis of right hip; M17.0 (ICD-10-CM) - Primary osteoarthritis of both knees  Evaluation Date: 5/21/2024  Authorization Period Expiration: 5/20/2025  Plan of Care Expiration: 7/21/2024  Visit # / Visits authorized: 23/30   Visit since most recent evaluation:   8 + Eval     Precautions: Standard and Fall    PTA Visit #: 0/5     Time In:8:45 am --Patient arrived late for scheduled appt time.   Time Out: 9:40  am    Total Billable Time: 55 minutes    SUBJECTIVE     Pt reports: she is ok today. She stated her shoulders are hurting today.     She was not compliant with home exercise program.    Response to previous treatment: no increases symptoms     Functional change: improved ability to descend steps with improved quality.    Pain: 0/10 currently, 9/10 with certain positions  Location: bilateral hips and knees.  Aggravating factors: Stairs, lying down       OBJECTIVE     Objective Measures updated at progress report unless specified.     Treatment     Tiffany received aquatic therapeutic exercises to develop strength, endurance, ROM, flexibility, posture, and core stabilization for 55 minutes including:    FUNCTIONAL MOBILITY TRAINING x 2 laps each at beginning and 1 lap each at end of session  Walk forward/backward/lateral    STRETCHES 2 x 30sec  HS at steps--add next    LE EX x 20 reps  Squat  Heel raise   Hip abduction  Hip flex  Hip ext    Sit to stand from pool stool  LAQ  Marches    Step ups on 4 inch step--add as tolerated    UE  EX/CORE  x 20 with back away from the wall  Shoulder flex/ext TA activation paddles CLOSED  Shoulder horizontal abd/add TA activation paddles CLOSED  Shoulder abd/add with TA activation and paddles CLOSED     ENDURANCE  LTR x 2'  Bicycle in // bars x 2'    Patient Education and Home Exercises     Home Exercises Provided and Patient Education Provided     Education provided:   Role of aquatic therapy  Hydration post therapy    Written Home Exercises Provided: Patient instructed to cont prior HEP.     ASSESSMENT   The patient's treatment was not progressed due to time constraints and arriving late for scheduled appt time.   She required some VC to stay on task with counting her counting today to avoid overdoing it.  She required reduced cueing for posture and correct ROM of LE exercises today with good carry over from the previous session.     Tiffany Is progressing well towards her goals.     Pt prognosis is Fair.     Pt will continue to benefit from skilled outpatient physical therapy to address the deficits listed in the problem list box on initial evaluation, provide pt/family education and to maximize pt's level of independence in the home and community environment.     Pt's spiritual, cultural and educational needs considered and pt agreeable to plan of care and goals.     Anticipated barriers to physical therapy: Chronicity, and poor tolerance to Aquatic PT at previous course of care    Goals:   Short Term Goals: 6 weeks   1. Patient will be independent in HEP & progressions.  2. Patient will achieve TUG score of 11.3 sec to demonstrate improved mobility  3. The patient will achieve global right hip range of motion that is pain free and within functional limits to demonstrate improved transfers and endurance.   4. Patient able to transfer on and off of the toilet with no greater than 1/10 bilateral knee and hip pain.    Long Term Goals: 12 weeks   1. The patient will demonstrate independence with extensive  HEP.  2. Patient will achieve 5 sit to stand score of 11.4 seconds to demonstrate improved transfers & endurance.  3. Patent is able to demonstrate MMT 4/5 on all lower quarter MMTs without pain reports during testing.    4. Patient able to walk 200 meters with least AD in order to return to community ambulation.    PLAN   Progress POC as tolerated by the patient.     Plan of care Certification: 5/21/2024 to 7/21/2024.    Mariah Adan, PT

## 2024-06-24 ENCOUNTER — CLINICAL SUPPORT (OUTPATIENT)
Dept: REHABILITATION | Facility: HOSPITAL | Age: 89
End: 2024-06-24
Payer: MEDICARE

## 2024-06-24 DIAGNOSIS — M15.9 GENERALIZED OSTEOARTHRITIS OF MULTIPLE SITES: ICD-10-CM

## 2024-06-24 DIAGNOSIS — M17.12 LOCALIZED OSTEOARTHRITIS OF LEFT KNEE: ICD-10-CM

## 2024-06-24 DIAGNOSIS — M16.11 ARTHRITIS OF RIGHT HIP: Primary | ICD-10-CM

## 2024-06-24 PROCEDURE — 97113 AQUATIC THERAPY/EXERCISES: CPT | Mod: HCNC

## 2024-06-24 NOTE — PROGRESS NOTES
[facility]   Physical Therapy Treatment Note     [id number]    Therapy Diagnosis:   Encounter Diagnoses   Name Primary?    Arthritis of right hip Yes    Generalized osteoarthritis of multiple sites     Localized osteoarthritis of left knee      Physician: Rebeca Dumont MD    Visit Date: 6/24/2024    Physician Orders: PT Eval and Treat / Aquatic Therapy  Medical Diagnosis from Referral: M16.11 (ICD-10-CM) - Primary osteoarthritis of right hip; M17.0 (ICD-10-CM) - Primary osteoarthritis of both knees  Evaluation Date: 5/21/2024  Authorization Period Expiration: 5/20/2025  Plan of Care Expiration: 7/21/2024  Visit # / Visits authorized: 24/30   Visit since most recent evaluation:   9 + Eval     Precautions: Standard and Fall    PTA Visit #: 0/5     Time In:8:30 am   Time Out: 9:30 am    Total Billable Time: 60 minutes    SUBJECTIVE     Pt reports: she is leaving for [place] Saturday. She reports she will have access to a pool to perform her exercises and there is a water aerobics class up there also.      She was not compliant with home exercise program.    Response to previous treatment: no increases symptoms     Functional change: improved ability to descend steps with improved quality.    Pain: 0/10 currently, 9/10 with certain positions  Location: bilateral hips and knees.  Aggravating factors: Stairs, lying down       OBJECTIVE     Objective Measures updated at progress report unless specified.     Treatment     Tiffany received aquatic therapeutic exercises to develop strength, endurance, ROM, flexibility, posture, and core stabilization for 60 minutes including:    FUNCTIONAL MOBILITY TRAINING x 2 laps each at beginning and 1 lap each at end of session  Walk forward/backward/lateral    STRETCHES 2 x 30sec  HS at steps--add next    LE EX x 20 reps  Squat  Heel raise   Hip abduction  Hip flex  Hip ext    Sit to stand from pool  stool  LAQ  Marches    Step ups on 4 inch step--add as tolerated    UE EX/CORE  x 20 with back away from the wall  Shoulder flex/ext TA activation paddles CLOSED  Shoulder horizontal abd/add TA activation paddles CLOSED  Shoulder abd/add with TA activation and paddles CLOSED     ENDURANCE  LTR x 2'  Bicycle in // bars x 2'    Patient Education and Home Exercises     Home Exercises Provided and Patient Education Provided     Education provided:   Role of aquatic therapy  Hydration post therapy    Written Home Exercises Provided: Patient instructed to cont prior HEP.     ASSESSMENT   The patient was issued and extensive Aquatic HEP to perform while she is out of town. She was able to perform her exercises today with minimal cueing required for appropriate posture and speed.       Tiffany Is progressing well towards her goals.     Pt prognosis is Fair.     Pt will continue to benefit from skilled outpatient physical therapy to address the deficits listed in the problem list box on initial evaluation, provide pt/family education and to maximize pt's level of independence in the home and community environment.     Pt's spiritual, cultural and educational needs considered and pt agreeable to plan of care and goals.     Anticipated barriers to physical therapy: Chronicity, and poor tolerance to Aquatic PT at previous course of care    Goals:   Short Term Goals: 6 weeks   1. Patient will be independent in HEP & progressions.  2. Patient will achieve TUG score of 11.3 sec to demonstrate improved mobility  3. The patient will achieve global right hip range of motion that is pain free and within functional limits to demonstrate improved transfers and endurance.   4. Patient able to transfer on and off of the toilet with no greater than 1/10 bilateral knee and hip pain.    Long Term Goals: 12 weeks   1. The patient will demonstrate independence with extensive HEP.  2. Patient will achieve 5 sit to stand score of 11.4 seconds to  demonstrate improved transfers & endurance.  3. Patent is able to demonstrate MMT 4/5 on all lower quarter MMTs without pain reports during testing.    4. Patient able to walk 200 meters with least AD in order to return to community ambulation.    PLAN   The patient is on hold from PT today as she is traveling to [place] for the remainder of the summer.     Plan of care Certification: 5/21/2024 to 7/21/2024.    Mariah Adan, PT

## 2024-07-21 ENCOUNTER — PATIENT MESSAGE (OUTPATIENT)
Dept: PRIMARY CARE CLINIC | Facility: CLINIC | Age: 89
End: 2024-07-21
Payer: MEDICARE

## 2024-07-21 DIAGNOSIS — M17.12 LOCALIZED OSTEOARTHRITIS OF LEFT KNEE: ICD-10-CM

## 2024-07-22 RX ORDER — TRAMADOL HYDROCHLORIDE 50 MG/1
50 TABLET ORAL EVERY 8 HOURS PRN
Qty: 60 TABLET | Refills: 0 | Status: SHIPPED | OUTPATIENT
Start: 2024-07-22

## 2024-07-22 NOTE — TELEPHONE ENCOUNTER
No care due was identified.  [facility] Embedded Care Due Messages. Reference number: [id number].   7/22/2024 11:54:38 AM CDT

## 2024-08-12 ENCOUNTER — TELEPHONE (OUTPATIENT)
Dept: CARDIOLOGY | Facility: CLINIC | Age: 89
End: 2024-08-12
Payer: MEDICARE

## 2024-08-12 ENCOUNTER — PATIENT MESSAGE (OUTPATIENT)
Dept: CARDIOLOGY | Facility: CLINIC | Age: 89
End: 2024-08-12
Payer: MEDICARE

## 2024-08-12 NOTE — TELEPHONE ENCOUNTER
Contacted pt to assist in scheduling an appointment with Dr. Yap, left vm to contact Fani for assistance in scheduling appointment.

## 2024-08-16 ENCOUNTER — TELEPHONE (OUTPATIENT)
Dept: CARDIOLOGY | Facility: CLINIC | Age: 89
End: 2024-08-16
Payer: MEDICARE

## 2024-08-16 DIAGNOSIS — M25.561 ACUTE BILATERAL KNEE PAIN: Primary | ICD-10-CM

## 2024-08-16 DIAGNOSIS — M16.11 PRIMARY OSTEOARTHRITIS OF RIGHT HIP: ICD-10-CM

## 2024-08-16 DIAGNOSIS — M25.562 ACUTE BILATERAL KNEE PAIN: Primary | ICD-10-CM

## 2024-08-16 NOTE — TELEPHONE ENCOUNTER
Called patient to schedule appt. no answer left message that appt. is 8/30 at 3:20 with Dr. Yap. RJ

## 2024-08-19 ENCOUNTER — OFFICE VISIT (OUTPATIENT)
Dept: ORTHOPEDICS | Facility: CLINIC | Age: 89
End: 2024-08-19
Payer: MEDICARE

## 2024-08-19 ENCOUNTER — HOSPITAL ENCOUNTER (OUTPATIENT)
Dept: RADIOLOGY | Facility: HOSPITAL | Age: 89
Discharge: HOME | End: 2024-08-19
Attending: ORTHOPAEDIC SURGERY
Payer: MEDICARE

## 2024-08-19 VITALS — BODY MASS INDEX: 27.58 KG/M2 | HEIGHT: 65 IN | WEIGHT: 165.56 LBS

## 2024-08-19 DIAGNOSIS — M16.11 PRIMARY OSTEOARTHRITIS OF RIGHT HIP: ICD-10-CM

## 2024-08-19 DIAGNOSIS — M16.11 PRIMARY OSTEOARTHRITIS OF RIGHT HIP: Primary | ICD-10-CM

## 2024-08-19 DIAGNOSIS — M25.562 ACUTE BILATERAL KNEE PAIN: ICD-10-CM

## 2024-08-19 DIAGNOSIS — M17.0 PRIMARY OSTEOARTHRITIS OF BOTH KNEES: ICD-10-CM

## 2024-08-19 DIAGNOSIS — M25.561 ACUTE BILATERAL KNEE PAIN: ICD-10-CM

## 2024-08-19 PROCEDURE — 99213 OFFICE O/P EST LOW 20 MIN: CPT | Mod: HCNC,S$GLB,, | Performed by: ORTHOPAEDIC SURGERY

## 2024-08-19 PROCEDURE — 1101F PT FALLS ASSESS-DOCD LE1/YR: CPT | Mod: HCNC,CPTII,S$GLB, | Performed by: ORTHOPAEDIC SURGERY

## 2024-08-19 PROCEDURE — 1157F ADVNC CARE PLAN IN RCRD: CPT | Mod: HCNC,CPTII,S$GLB, | Performed by: ORTHOPAEDIC SURGERY

## 2024-08-19 PROCEDURE — 1125F AMNT PAIN NOTED PAIN PRSNT: CPT | Mod: HCNC,CPTII,S$GLB, | Performed by: ORTHOPAEDIC SURGERY

## 2024-08-19 PROCEDURE — 1159F MED LIST DOCD IN RCRD: CPT | Mod: HCNC,CPTII,S$GLB, | Performed by: ORTHOPAEDIC SURGERY

## 2024-08-19 PROCEDURE — 73562 X-RAY EXAM OF KNEE 3: CPT | Mod: 26,50,HCNC, | Performed by: RADIOLOGY

## 2024-08-19 PROCEDURE — 3288F FALL RISK ASSESSMENT DOCD: CPT | Mod: HCNC,CPTII,S$GLB, | Performed by: ORTHOPAEDIC SURGERY

## 2024-08-19 PROCEDURE — 99999 PR PBB SHADOW E&M-EST. PATIENT-LVL II: CPT | Mod: PBBFAC,HCNC,, | Performed by: ORTHOPAEDIC SURGERY

## 2024-08-19 PROCEDURE — 73562 X-RAY EXAM OF KNEE 3: CPT | Mod: TC,50,HCNC

## 2024-08-19 PROCEDURE — 73502 X-RAY EXAM HIP UNI 2-3 VIEWS: CPT | Mod: TC,HCNC,RT

## 2024-08-19 PROCEDURE — 73502 X-RAY EXAM HIP UNI 2-3 VIEWS: CPT | Mod: 26,HCNC,RT, | Performed by: RADIOLOGY

## 2024-08-19 RX ORDER — GABAPENTIN 100 MG/1
100 CAPSULE ORAL 3 TIMES DAILY
COMMUNITY

## 2024-08-19 NOTE — PROGRESS NOTES
"Subjective:      Patient ID: Tiffany Masterson is a 88 y.o. female.    Chief Complaint: Pain of the Right Knee, Pain of the Left Knee, and Pain of the Right Hip      HPI    Tiffany Masterson has right hip pain.  The pain is unchanged. It is still tolerable. The pain is located in the groin.  There  is radiation to the thigh.   The pain is described as achy. The pain is aggravated by stairs.  It is alleviated by rest.  There is bilateral knee pain.  Right>Left. Pain is also worse with stairs.  No radiation.    Review of Systems   Constitutional: Negative for chills, fever and night sweats.   HENT:  Negative for hearing loss.    Eyes:  Negative for blurred vision and double vision.   Cardiovascular:  Negative for chest pain, claudication and leg swelling.   Respiratory:  Negative for shortness of breath.    Endocrine: Negative for polydipsia, polyphagia and polyuria.   Hematologic/Lymphatic: Negative for adenopathy and bleeding problem. Does not bruise/bleed easily.   Skin:  Negative for poor wound healing.   Gastrointestinal:  Negative for diarrhea and heartburn.   Genitourinary:  Negative for bladder incontinence.   Neurological:  Negative for focal weakness, headaches, numbness, paresthesias and sensory change.   Psychiatric/Behavioral:  The patient is not nervous/anxious.    Allergic/Immunologic: Negative for persistent infections.         Objective:      Body mass index is 27.55 kg/m².  Vitals:    08/19/24 1433   Weight: 75.1 kg (165 lb 9.1 oz)   Height: 5' 5" (1.651 m)           General    Constitutional: She is oriented to person, place, and time. She appears well-developed and well-nourished.   HENT:   Head: Normocephalic and atraumatic.   Eyes: EOM are normal.   Cardiovascular:  Normal rate.            Pulmonary/Chest: Effort normal.   Neurological: She is alert and oriented to person, place, and time.   Psychiatric: She has a normal mood and affect. Her behavior is normal.     General Musculoskeletal Exam "   Gait: normal   Pelvic Obliquity: none      Right Knee Exam     Inspection   Alignment:  normal  Erythema: absent  Scars: absent  Swelling: absent  Effusion: absent  Deformity: present (valgus)  Bruising: absent    Tenderness   The patient is tender to palpation of the lateral joint line.    Range of Motion   Extension:  0   Flexion:  120     Tests   Ligament Examination   Lachman: normal (-1 to 2mm)   MCL - Valgus: normal (0 to 2mm)  LCL - Varus: normal  Patella   Passive Patellar Tilt: neutral    Other   Sensation: normal    Left Knee Exam     Inspection   Alignment:  normal  Erythema: absent  Scars: absent  Swelling: absent  Effusion: absent  Deformity: present (vaus)  Bruising: absent    Tenderness   The patient tender to palpation of the medial joint line.    Range of Motion   Extension:  0   Flexion:  120     Tests   Stability   Lachman: normal (-1 to 2mm)   MCL - Valgus: normal (0 to 2mm)  LCL - Varus: normal (0 to 2mm)  Patella   Passive Patellar Tilt: neutral    Other   Sensation: normal    Right Hip Exam     Inspection   Scars: absent  Swelling: absent  Bruising: absent  No deformity of hip.  Quadriceps Atrophy:  Negative  Erythema: absent    Range of Motion   Abduction:  20   Adduction:  20   Extension:  0   Flexion:  100   External rotation:  20   Internal rotation:  20     Tests   Pain w/ forced internal rotation (AVELINA): absent  Stinchfield test: negative    Other   Sensation: normal  Left Hip Exam     Inspection   Scars: present  Swelling: absent  No deformity of hip.  Quadriceps Atrophy:  negative  Erythema: absent  Bruising: absent    Range of Motion   Abduction:  25   Adduction:  20   Extension:  0   Flexion:  100   External rotation:  30   Internal rotation: 25     Tests   Pain w/ forced internal rotation (AVELINA): absent  Stinchfield test: negative    Other   Sensation: normal      Back (L-Spine & T-Spine) / Neck (C-Spine) Exam   Back exam is normal.      Muscle Strength   Right Lower Extremity    Hip Abduction: 5/5   Hip Adduction: 5/5   Hip Flexion: 5/5   Quadriceps:  5/5   Hamstrin/5   Ankle Dorsiflexion:  5/5   Left Lower Extremity   Hip Abduction: 5/5   Hip Adduction: 5/5   Hip Flexion: 5/5   Quadriceps:  5/5   Hamstrin/5   Ankle Dorsiflexion:  5/5     Vascular Exam     Right Pulses  Dorsalis Pedis:      2+          Left Pulses  Dorsalis Pedis:      2+          Capillary Refill  Right Hand: normal capillary refill  Left Hand: normal capillary refill        Edema  Right Upper Leg: absent  Right Lower Leg: absent  Left Upper Leg: absent  Left Lower Leg: absent    Radiographs obtained today and reviewed by me.  There is bilateral knee x-rays.  There is Kellgren Mars grade 3-4 changes in both knees.  There is a valgus deformity in the left and a varus deformity on the right.  Hip radiographs obtained today and reviewed by me demonstrate a well-fixed and positioned left total hip arthroplasty.  There is moderately severe arthritic change of the right hip.          Assessment:       Encounter Diagnoses   Name Primary?    Primary osteoarthritis of right hip Yes    Primary osteoarthritis of both knees           Plan:       Tiffany was seen today for pain, pain and pain.    Diagnoses and all orders for this visit:    Primary osteoarthritis of right hip    Primary osteoarthritis of both knees      Presently we will continue to observe this.  She will call or return if the symptoms worsen in either the hips or the knees.

## 2024-08-20 ENCOUNTER — TELEPHONE (OUTPATIENT)
Dept: ORTHOPEDICS | Facility: CLINIC | Age: 89
End: 2024-08-20
Payer: MEDICARE

## 2024-08-20 ENCOUNTER — TELEPHONE (OUTPATIENT)
Dept: PRIMARY CARE CLINIC | Facility: CLINIC | Age: 89
End: 2024-08-20
Payer: MEDICARE

## 2024-08-20 DIAGNOSIS — M15.9 GENERALIZED OSTEOARTHRITIS OF MULTIPLE SITES: Primary | ICD-10-CM

## 2024-08-20 DIAGNOSIS — G89.29 CHRONIC PAIN OF BOTH SHOULDERS: Primary | ICD-10-CM

## 2024-08-20 DIAGNOSIS — M25.512 CHRONIC PAIN OF BOTH SHOULDERS: Primary | ICD-10-CM

## 2024-08-20 DIAGNOSIS — M25.511 CHRONIC PAIN OF BOTH SHOULDERS: Primary | ICD-10-CM

## 2024-08-20 NOTE — TELEPHONE ENCOUNTER
Spoke c pt. Offered and Confirmed appt location & time c Dr. Martínez 09/03/24 with R/L shoulder XR prior. Pt expressed understanding & was thankful.

## 2024-08-20 NOTE — TELEPHONE ENCOUNTER
Spoke c pts , patient was in a zoom call and could not accept the call. Requested a call back to assist with scheduling. Patient verbalized understanding and was thankful.   Bilateral shoulder XR ordered.

## 2024-08-30 ENCOUNTER — OFFICE VISIT (OUTPATIENT)
Dept: CARDIOLOGY | Facility: CLINIC | Age: 89
End: 2024-08-30
Payer: MEDICARE

## 2024-08-30 VITALS
HEIGHT: 65 IN | BODY MASS INDEX: 27.63 KG/M2 | WEIGHT: 165.81 LBS | HEART RATE: 65 BPM | DIASTOLIC BLOOD PRESSURE: 66 MMHG | SYSTOLIC BLOOD PRESSURE: 152 MMHG

## 2024-08-30 DIAGNOSIS — I48.0 PAROXYSMAL ATRIAL FIBRILLATION: ICD-10-CM

## 2024-08-30 DIAGNOSIS — N18.31 STAGE 3A CHRONIC KIDNEY DISEASE (CKD): ICD-10-CM

## 2024-08-30 DIAGNOSIS — E78.49 OTHER HYPERLIPIDEMIA: ICD-10-CM

## 2024-08-30 DIAGNOSIS — I10 PRIMARY HYPERTENSION: Primary | ICD-10-CM

## 2024-08-30 PROCEDURE — 99999 PR PBB SHADOW E&M-EST. PATIENT-LVL III: CPT | Mod: PBBFAC,,, | Performed by: INTERNAL MEDICINE

## 2024-08-30 NOTE — PROGRESS NOTES
HISTORY:    88-year-old female with a history of pAfib, hypertension, OA s/p LTHR, ocular migraines.     The patient had her 1st clinical presentation for atrial fibrillation on December 21, 2021 when she noticed her heart racing with some irregularity.  She sought care in the emergency department and was admitted overnight. She cardioverted after IV Dilt pushes and has seemingly remained in sinus rhythm since.      Comes in for annual follow-up.     Feels well. No CP, SOB, or FLORES.    At baseline the patient reports excellent activity levels without significant limitation.  She is very social and actively participates in many different activities throughout the day.  Her capacity is much greater than her peers.  Her biggest complaints are arthritis related.    She has history of coronary artery disease, myocardial infarction, congestive heart failure, cardiomyopathy, or stroke.      She currently tolerates apixiban 5 x 2, metoprolol succinate 50 x 2, losartan 100 x 1, and atorvastatin 10x1. No palpitations or bleeding. Controlled Bps on home log.    PHYSICAL EXAM:    Vitals:    08/30/24 1402   BP: (!) 152/66   Pulse: 65         NAD, A+Ox3.  No jvd, no bruit.  RRR nml s1,s2. No murmurs.  CTA B no wheezes or crackles.  No edema.    LABS/STUDIES (imaging reviewed during clinic visit):    September 2023 hemoglobin 11.9/MCV 95.  Creatinine 1.1/BUN 21/GFR 50.  Albumin 3.9.  /HDL 49/LDL 65/.  TSH 4.1 with a normal free T4.    ECG February 2023 NSR no Qs/Sts. December 21, 2021 initially demonstrated atrial fibrillation with RVR.  No Q-waves.  Lateral ST deviations noted. Follow-up ECG demonstrated normal sinus rhythm with no Q-waves and nonspecific lateral ST abnormalities that were old.  TTE December 2021 demonstrates normal LV size and systolic function with an EF of 65%.  Mild AI noted. Normal CVP.    ASSESSMENT & PLAN:    1. Primary hypertension    2. Paroxysmal atrial fibrillation    3. Stage 3a chronic  kidney disease (CKD)    4. Other hyperlipidemia                  The patient has paroxysmal atrial fibrillation.  She remains in sinus rhythm on beta-blocker therapy and is tolerating metoprolol succinate from 50 mg twice a day.  She has an elevated chads Vasc score and is tolerating Eliquis 5 mg b.i.d. (her creatinine is less than 1.5 and her weight is greater than 60 kg).  We had a conversation about the importance of anticoagulation in mitigating her elevated stroke risk and she understands the potential benefits and risks of treatment with Eliquis at this time.    Patient has a history of hypertension.  Will continue losartan and metoprolol. Bps controlled on home log.    Now on atorvastatin 10x1 which is reasonable.    Follow up in about 1 year (around 8/30/2025).      Bao Yap MD

## 2024-09-03 ENCOUNTER — HOSPITAL ENCOUNTER (OUTPATIENT)
Dept: RADIOLOGY | Facility: OTHER | Age: 89
Discharge: HOME | End: 2024-09-03
Attending: ORTHOPAEDIC SURGERY
Payer: MEDICARE

## 2024-09-03 ENCOUNTER — OFFICE VISIT (OUTPATIENT)
Dept: ORTHOPEDICS | Facility: CLINIC | Age: 89
End: 2024-09-03
Payer: MEDICARE

## 2024-09-03 VITALS — HEIGHT: 65 IN | WEIGHT: 165.81 LBS | BODY MASS INDEX: 27.63 KG/M2

## 2024-09-03 DIAGNOSIS — M25.512 CHRONIC PAIN OF BOTH SHOULDERS: ICD-10-CM

## 2024-09-03 DIAGNOSIS — M25.512 CHRONIC PAIN OF BOTH SHOULDERS: Primary | ICD-10-CM

## 2024-09-03 DIAGNOSIS — M25.511 CHRONIC PAIN OF BOTH SHOULDERS: ICD-10-CM

## 2024-09-03 DIAGNOSIS — M25.511 CHRONIC PAIN OF BOTH SHOULDERS: Primary | ICD-10-CM

## 2024-09-03 DIAGNOSIS — G89.29 CHRONIC PAIN OF BOTH SHOULDERS: ICD-10-CM

## 2024-09-03 DIAGNOSIS — G89.29 CHRONIC PAIN OF BOTH SHOULDERS: Primary | ICD-10-CM

## 2024-09-03 PROCEDURE — 73030 X-RAY EXAM OF SHOULDER: CPT | Mod: TC,50,HCNC,FY

## 2024-09-03 PROCEDURE — 99204 OFFICE O/P NEW MOD 45 MIN: CPT | Mod: 25,HCNC,S$GLB, | Performed by: ORTHOPAEDIC SURGERY

## 2024-09-03 PROCEDURE — 1101F PT FALLS ASSESS-DOCD LE1/YR: CPT | Mod: HCNC,CPTII,S$GLB, | Performed by: ORTHOPAEDIC SURGERY

## 2024-09-03 PROCEDURE — 20610 DRAIN/INJ JOINT/BURSA W/O US: CPT | Mod: HCNC,50,S$GLB, | Performed by: ORTHOPAEDIC SURGERY

## 2024-09-03 PROCEDURE — 3288F FALL RISK ASSESSMENT DOCD: CPT | Mod: HCNC,CPTII,S$GLB, | Performed by: ORTHOPAEDIC SURGERY

## 2024-09-03 PROCEDURE — 1125F AMNT PAIN NOTED PAIN PRSNT: CPT | Mod: HCNC,CPTII,S$GLB, | Performed by: ORTHOPAEDIC SURGERY

## 2024-09-03 PROCEDURE — 73030 X-RAY EXAM OF SHOULDER: CPT | Mod: 26,50,HCNC, | Performed by: RADIOLOGY

## 2024-09-03 PROCEDURE — 1157F ADVNC CARE PLAN IN RCRD: CPT | Mod: HCNC,CPTII,S$GLB, | Performed by: ORTHOPAEDIC SURGERY

## 2024-09-03 PROCEDURE — 99999 PR PBB SHADOW E&M-EST. PATIENT-LVL II: CPT | Mod: PBBFAC,HCNC,, | Performed by: ORTHOPAEDIC SURGERY

## 2024-09-03 RX ADMIN — TRIAMCINOLONE ACETONIDE 80 MG: 40 INJECTION, SUSPENSION INTRA-ARTICULAR; INTRAMUSCULAR at 08:09

## 2024-09-03 NOTE — PROGRESS NOTES
[facility]  History & Physical  Orthopedics    SUBJECTIVE:        Chief Complaint: bilateral shoulder pain    Referring Provider: Elliot Aviles MD     History of Present Illness:  Patient is a 88 y.o. right hand dominant female who presents today with complaints of bilateral shoulder pain for 1 year now. She states she is very active and her right more than left shoulder has been giving her significant issues with certain movements. She also states her right hip and knees are also causing some pain which she sees Dr. Aviles. She had her left hip replaced in 2011.     The patient is a/an retired.    Onset of symptoms/DOI was 1 year ago.    Symptoms are aggravated by activity and movement.    Symptoms are alleviated by rest and medication.    Symptoms consist of pain and decreased ROM.    Attempted treatment(s) and/or interventions include activity modifications, rest, rest, activity modification, and anti-inflammatory medications.     The patient denies any fevers, chills, N/V, D/C and presents for evaluation.       Past Medical History:   Diagnosis Date    Abnormal MRI 10/16/2020    Acute cystitis without hematuria 12/22/2021    Arthritis     Atrial fibrillation     Cataract     Elevated liver enzymes     Endometrial mass 6/29/2018    Epiretinal membrane (ERM) of right eye     Family history of malignant neoplasm of gastrointestinal tract mother    Frequent UTI 9/30/2018    Graves disease     now hypothryoid - no surgery or medicine. resolved on its own    History of cholecystectomy 6/10/2021    History of colonoscopy     unremarkable 2007 by Dr. Javier.  Barium enema revealed diverticular disease.    Hyperlipidemia     Hypertension     Hypertriglyceridemia 4/19/2013    Continue statin for secondary stroke prevention    Hypothyroidism     Impaired functional mobility, balance, gait, and endurance 6/4/2021    Iron deficiency anemia 9/29/2021    Migraine, ophthalmoplegic     Mixed stress  and urge urinary incontinence 9/30/2018    Ocular migraine     Personal history of colonic polyps     Preop testing 5/17/2021    S/P colonoscopic polypectomy 6/18/2013    Sleep disorder 11/8/2021    Status post hysteroscopic polypectomy 7/2/2018    TIA (transient ischemic attack)     with a normal angiogram in the past, Ocular migraines reported by patient, not TIA     Transaminitis 3/19/2021    Urethral caruncle 9/15/2020     Past Surgical History:   Procedure Laterality Date    CATARACT EXTRACTION Right 2012    Dr. Lexis Nicolas     CHOLECYSTECTOMY  2022    COLONOSCOPY      2014 polyps    COLONOSCOPY N/A 11/07/2016    Procedure: COLONOSCOPY;  Surgeon: Bebeto Gonzalez MD;  Location: [facility] ENDO (4TH FLR);  Service: Endoscopy;  Laterality: N/A;    COLONOSCOPY N/A 03/09/2020    Procedure: COLONOSCOPY;  Surgeon: Bebeto Gonzalez MD;  Location: [facility] ENDO (4TH FLR);  Service: Endoscopy;  Laterality: N/A;    COLONOSCOPY N/A 09/29/2021    Procedure: COLONOSCOPY;  Surgeon: Bebeto Gonzalez MD;  Location: [facility] ENDO (4TH FLR);  Service: Endoscopy;  Laterality: N/A;  please schedule patient as soon as possible with me EGD colonoscopy with constipation bowel prep for iron deficiency anemia family history of colon cancer and personal history of colon polyps  9/17/21 ok for standard split prep per Dr. Gonzalez-imani    COLONOSCOPY N/A 09/29/2021    Procedure: COLONOSCOPY;  Surgeon: Bebeto Gonzalez MD;  Location: [facility] ENDO (4TH FLR);  Service: Endoscopy;  Laterality: N/A;  Please schedule patient as soon as possible with me EGD colonoscopy with constipation bowel prep for iron deficiency anemia family history of colon cancer and personal history of colon polyps  9/17/21 Ok for standard split prep per Dr. Gonzalez-imani    COLONOSCOPY W/ POLYPECTOMY  06/2013    polyp of colon    ENDOSCOPIC ULTRASOUND OF UPPER GASTROINTESTINAL TRACT N/A 04/29/2021    Procedure: ULTRASOUND, UPPER GI TRACT, ENDOSCOPIC;  Surgeon: Dalton Johnson MD;   Location: [facility] (2ND FLR);  Service: Endoscopy;  Laterality: N/A;  Postprandial nausea vomiting epigastric 0.9 cm stone and sludge seen within the gallbladder lumen.  No wall thickening or pericholecystic fluid.  0.6 cm stone seen within the distal common bile duct at the level of the pancreatic head.  There is dil    ERCP N/A 04/29/2021    Procedure: ERCP (ENDOSCOPIC RETROGRADE CHOLANGIOPANCREATOGRAPHY);  Surgeon: Dalton Johnson MD;  Location: [facility] (2ND FLR);  Service: Endoscopy;  Laterality: N/A;  Postprandial nausea and vomit 0.9 cm stone and sludge seen within the gallbladder lumen.  No wall thickening or pericholecystic fluid.  0.6 cm stone seen within the distal common bile duct at the level of the pancreatic head.  There i    ESOPHAGOGASTRODUODENOSCOPY N/A 09/29/2021    Procedure: EGD (ESOPHAGOGASTRODUODENOSCOPY);  Surgeon: Bebeto Gonzalez MD;  Location: [facility] (4TH FLR);  Service: Endoscopy;  Laterality: N/A;  rapid covid test arrival 12pm - sm  9/27 arrival time for covid test/procedure confirmed with pt-rb    ESOPHAGOGASTRODUODENOSCOPY N/A 09/29/2021    Procedure: EGD (ESOPHAGOGASTRODUODENOSCOPY);  Surgeon: Bebeto Gonzalez MD;  Location: [facility] (4TH FLR);  Service: Endoscopy;  Laterality: N/A;  covid test 9/19/21 [facility], prep instr emailed -ml    EYE SURGERY  11/10/2014    right retina/Dr. Dalton Sierra ([facility])    HYSTEROSCOPIC POLYPECTOMY OF UTERUS N/A 07/02/2018    Procedure: POLYPECTOMY, UTERUS, HYSTEROSCOPIC;  Surgeon: Elliot Vanessa IV, MD;  Location: [facility] OR;  Service: OB/GYN;  Laterality: N/A;    INJECTION OF JOINT Right 11/11/2022    Procedure: INJECTION, RIGHT GTB CONTRAST DIRECT REFERRAL;  Surgeon: Camden Hernandez MD;  Location: [facility] PAIN MGT;  Service: Pain Management;  Laterality: Right;    JOINT REPLACEMENT  03/23/2011    left total hip replacement    LAPAROSCOPIC CHOLECYSTECTOMY N/A 05/17/2021    Procedure: CHOLECYSTECTOMY, LAPAROSCOPIC;  Surgeon: Rayshawn Peralta  MD Javi;  Location: [facility] OR [address] [place] [address];  Service: General;  Laterality: N/A;    REMOVAL OF EPIRETINAL MEMBRANE Right     Dr. Padron    TONSILLECTOMY      pt was 11 years old     Review of patient's allergies indicates:   Allergen Reactions    Levaquin [levofloxacin]      Joint pain    Hydrochlorothiazide Other (See Comments)     Pain in joints and depression per pt     Social History     Social History Narrative    Not on file     Family History   Problem Relation Name Age of Onset    Colon cancer Mother Tiffany [place] 75    Lung cancer Father ColLakesha Parmar [place] 75    Diabetes Other great aunt     Diabetes Paternal Aunt      Colon cancer Paternal Aunt  80    Prostate cancer Brother      Breast cancer Neg Hx      Ovarian cancer Neg Hx      Celiac disease Neg Hx      Cataracts Neg Hx      Glaucoma Neg Hx      Macular degeneration Neg Hx           Current Outpatient Medications:     apixaban (ELIQUIS) 5 mg Tab, Take 1 tablet (5 mg total) by mouth 2 (two) times daily., Disp: 180 tablet, Rfl: 3    ascorbic acid, vitamin C, (VITAMIN C) 250 MG tablet, Take 250 mg by mouth once daily., Disp: , Rfl:     atorvastatin (LIPITOR) 10 MG tablet, Take 1 tablet (10 mg total) by mouth once daily., Disp: 90 tablet, Rfl: 3    b complex vitamins capsule, Take 1 capsule by mouth once daily., Disp: , Rfl:     biotin 5,000 mcg TbDL, Take 1 tablet (5,000 mcg total) by mouth Daily., Disp: 90 tablet, Rfl: 3    bisacodyL (DULCOLAX) 5 mg EC tablet, Take 5 mg by mouth once daily., Disp: , Rfl:     CALCIUM CARBONATE/VITAMIN D3 (CALCIUM 600 + D,3, ORAL), Take 1 tablet by mouth once daily. , Disp: , Rfl:     cefdinir (OMNICEF) 300 MG capsule, Take 300 mg by mouth 2 (two) times daily., Disp: , Rfl:     cholecalciferol, vitamin D3, (VITAMIN D3) 50 mcg (2,000 unit) Cap, Take 1 capsule by mouth once daily., Disp: , Rfl:     coenzyme Q10 200 mg capsule, Take 200 mg by mouth once daily., Disp: , Rfl:     DEXTRAN 70/HYPROMELLOSE  (ARTIFICIAL TEARS, PF, OPHT), Place 1 drop into both eyes as needed., Disp: , Rfl:     diclofenac sodium (VOLTAREN) 1 % Gel, Apply 2 g topically 4 (four) times daily., Disp: 200 g, Rfl: 3    estradioL (ESTRACE) 0.01 % (0.1 mg/gram) vaginal cream, Place 0.5 g intravaginally q.h.s. x2 weeks; then, placed 0.5 g intravaginally twice weekly at bedtime (maintenance therapy)., Disp: 42.5 g, Rfl: 1    fluticasone propionate (FLONASE) 50 mcg/actuation nasal spray, CLEAN SINUS/NARES WITH OCEAN NASAL SPRAY THEN USE FLONASE 1 SPRAY TWICE DAILY., Disp: 48 g, Rfl: 3    gabapentin (NEURONTIN) 100 MG capsule, Take 100 mg by mouth 3 (three) times daily., Disp: , Rfl:     hydroquinone 4 % Crea, APPLY  CREAM TOPICALLY TWICE DAILY, Disp: 58 g, Rfl: 0    levothyroxine (SYNTHROID) 112 MCG tablet, Take 1 tablet (112 mcg total) by mouth before breakfast., Disp: 90 tablet, Rfl: 3    LIDOcaine (LIDODERM) 5 %, Place 1 patch onto the skin once daily. Remove & Discard patch within 12 hours or as directed by MD, Disp: 30 patch, Rfl: 0    losartan (COZAAR) 100 MG tablet, Take 1 tablet by mouth once daily, Disp: 90 tablet, Rfl: 3    magnesium oxide 400 mg magnesium Tab, Take 1 tablet by mouth once daily., Disp: , Rfl:     metoprolol succinate (TOPROL-XL) 50 MG 24 hr tablet, Take 1 tablet (50 mg total) by mouth 2 (two) times daily., Disp: 180 tablet, Rfl: 3    MULTIVITAMIN W-MINERALS/LUTEIN (CENTRUM SILVER ORAL), Take 1 tablet by mouth once daily., Disp: , Rfl:     omeprazole (PRILOSEC) 40 MG capsule, Take 40 mg by mouth., Disp: , Rfl:     sodium chloride (SALINE NASAL) 0.65 % nasal spray, 1 spray by Nasal route as needed for Congestion., Disp: 104 mL, Rfl: 3    traMADoL (ULTRAM) 50 mg tablet, Take 1 tablet (50 mg total) by mouth every 8 (eight) hours as needed for Pain., Disp: 60 tablet, Rfl: 0    vit A/vit C/vit E/zinc/copper (ICAPS AREDS ORAL), Take 1 tablet by mouth 2 (two) times a day., Disp: , Rfl:     Current Facility-Administered  "Medications:     BUPivacaine (PF) 0.25% (2.5 mg/ml) injection 2.5 mg, 1 mL, INTRABURSAL, Once, Rebeca Dumont MD    triamcinolone acetonide injection 40 mg, 40 mg, Intra-articular, 1 time in Clinic/HOD, Rebeca Dumont MD      Review of Systems:  As per HPI otherwise noncontributory    OBJECTIVE:      Vital Signs (Most Recent):  Vitals:    09/03/24 0843   Weight: 75.2 kg (165 lb 12.6 oz)   Height: 5' 5" (1.651 m)     Body mass index is 27.59 kg/m².      Physical Exam:    Bialteral Shoulder  - Skin intact throughout  - no swelling around the shoulder and proximal humerus   - no ecchymosis  - no erythema  - no signs of cellulitis  - TTP at anterior and lateral aspects of the shoulders  - No tenderness to palpation of proximal or middle humerus  - No tenderness to palpation of proximal or middle radius and ulna   - No tenderness to palpation of wrist or hand   - ROM at elbow normal  - ROM at bilateral Shoulder slightly limited  - Full painless ROM of the wrist and fingers  - Compartments soft  - SILT of the brachial and antebrachial cutaneous nerves  - Motor intact biceps, triceps, brachialis, deltoid, pronation, supination   - Motor intact to the EPL, EDC, RACHEL, EDM, FPL, FDS, FDP  - Palpable ulnar and radial arteries   - Brisk capillary refill    Diagnostic Results:     Imaging - I independently viewed the patient's imaging as well as the radiology report.  Xrays of the patient's bilateral  demonstrate no evidence of any acute fractures or dislocations but she has significant glenohumeral arthritis bilaterally    ASSESSMENT/PLAN:      88 y.o. yo female with bilateral shoulder OA  Plan: The patient and I had a thorough discussion today.  We discussed the working diagnosis as well as several other potential alternative diagnoses.  Treatment options were discussed, both conservative and surgical.  Conservative treatment options would include things such as activity modifications, workplace modifications, a period of " rest, oral vs topical OTC and prescription anti-inflammatory medications, occupational therapy, splinting/bracing, immobilization, corticosteroid injections, and others.  Surgical options were discussed as well.     At this time, the patient would like to proceed with a trial of steroid injection in the right shoulder at this time given she has more pain and limitations with this side. She is considering a shoulder replacement and would like to think about it at this time, provided booklet with information.    Should the patient's symptoms worsen, persist, or fail to improve they should return for reevaluation and I would be happy to see them back anytime.      Iftikhar Vazquez MD PGY3

## 2024-09-10 DIAGNOSIS — M17.12 LOCALIZED OSTEOARTHRITIS OF LEFT KNEE: ICD-10-CM

## 2024-09-10 DIAGNOSIS — I48.0 PAROXYSMAL ATRIAL FIBRILLATION: ICD-10-CM

## 2024-09-10 DIAGNOSIS — E03.9 HYPOTHYROIDISM, UNSPECIFIED TYPE: ICD-10-CM

## 2024-09-10 DIAGNOSIS — E78.2 MIXED HYPERLIPIDEMIA: ICD-10-CM

## 2024-09-10 RX ORDER — TRAMADOL HYDROCHLORIDE 50 MG/1
50 TABLET ORAL EVERY 8 HOURS PRN
Qty: 60 TABLET | Refills: 0 | Status: SHIPPED | OUTPATIENT
Start: 2024-09-10

## 2024-09-10 RX ORDER — ATORVASTATIN CALCIUM 10 MG/1
10 TABLET, FILM COATED ORAL
Qty: 90 TABLET | Refills: 0 | Status: SHIPPED | OUTPATIENT
Start: 2024-09-10

## 2024-09-10 RX ORDER — LEVOTHYROXINE SODIUM 112 UG/1
112 TABLET ORAL
Qty: 90 TABLET | Refills: 0 | Status: SHIPPED | OUTPATIENT
Start: 2024-09-10

## 2024-09-10 NOTE — PROCEDURES
Occupational Therapy  Patient not seen in therapy.     Patient with other health care provider    Per RN, pt is being discharged at this time.  No acute OT needs to be addressed at this time.       OBJECTIVE                   Documented in the chart in the following areas: Assessment. Plan.      Therapy procedure time and total treatment time can be found documented on the Time Entry flowsheet   Large Joint Aspiration/Injection: L glenohumeral    Date/Time: 9/3/2024 8:45 AM    Performed by: Kourtney Reynoso MD  Authorized by: Kourtney Reynoso MD    Consent Done?:  Yes (Verbal)  Indications:  Arthritis  Timeout: prior to procedure the correct patient, procedure, and site was verified      Local anesthesia used?: Yes    Anesthesia:  Local infiltration  Local anesthetic:  Lidocaine 1% without epinephrine    Details:  Needle Size:  22 G  Approach:  Posterior  Location:  Shoulder  Site:  L glenohumeral  Medications:  80 mg triamcinolone acetonide 40 mg/mL

## 2024-09-10 NOTE — PROGRESS NOTES
Patient seen and examined  88 y.o. yo female with bilateral shoulder OA  Plan: The patient and I had a thorough discussion today.  We discussed the working diagnosis as well as several other potential alternative diagnoses.  Treatment options were discussed, both conservative and surgical.  Conservative treatment options would include things such as activity modifications, workplace modifications, a period of rest, oral vs topical OTC and prescription anti-inflammatory medications, occupational therapy, splinting/bracing, immobilization, corticosteroid injections, and others.  Surgical options were discussed as well.      At this time, the patient would like to proceed with a trial of steroid injection in the right shoulder at this time given she has more pain and limitations with this side. She is considering a shoulder replacement and would like to think about it at this time, provided booklet with information.

## 2024-09-10 NOTE — TELEPHONE ENCOUNTER
Refill Routing Note   Medication(s) are not appropriate for processing by [facility] for the following reason(s):        Outside of protocol    [facility] action(s):  Route  Approve     Requires labs : Yes             Appointments  past 12m or future 3m with PCP    Date Provider   Last Visit   3/22/2024 Rebeca Dumont MD   Next Visit   9/19/2024 Rebeca Dumont MD   ED visits in past 90 days: 0        Note composed:6:09 PM 09/10/2024

## 2024-09-10 NOTE — PROCEDURES
Large Joint Aspiration/Injection: R glenohumeral    Date/Time: 9/3/2024 8:45 AM    Performed by: Kourtney Reynoso MD  Authorized by: Kourtney Reynoso MD    Consent Done?:  Yes (Verbal)  Indications:  Arthritis  Timeout: prior to procedure the correct patient, procedure, and site was verified      Local anesthesia used?: Yes    Anesthesia:  Local infiltration  Local anesthetic:  Lidocaine 1% without epinephrine    Details:  Needle Size:  22 G  Approach:  Posterior  Location:  Shoulder  Site:  R glenohumeral  Medications:  80 mg triamcinolone acetonide 40 mg/mL

## 2024-09-10 NOTE — TELEPHONE ENCOUNTER
Care Due:                  Date            Visit Type   Department     Provider  --------------------------------------------------------------------------------                                EP -                              PRIMARY      [facility]  Last Visit: 03-      CARE ([facility])   Care           Rebeca Dumont                              MYCHART                              FOLLOWUP/OF  [facility]  Next Visit: 09-      [facility] VISIT   Care           Rebeca Dumont                                                            Last  Test          Frequency    Reason                     Performed    Due Date  --------------------------------------------------------------------------------    CBC.........  12 months..  diclofenac...............  09- 09-    CMP.........  12 months..  atorvastatin, diclofenac,   09- 09-                             losartan.................    Lipid Panel.  12 months..  atorvastatin.............  11-   10-    TSH.........  12 months..  levothyroxine............  09- 09-    [facility] Embedded Care Due Messages. Reference number: [id number].   9/10/2024 6:06:35 PM CDT

## 2024-09-12 RX ORDER — METOPROLOL SUCCINATE 50 MG/1
50 TABLET, EXTENDED RELEASE ORAL 2 TIMES DAILY
Qty: 180 TABLET | Refills: 3 | Status: SHIPPED | OUTPATIENT
Start: 2024-09-12

## 2024-09-19 ENCOUNTER — LAB VISIT (OUTPATIENT)
Dept: LAB | Facility: HOSPITAL | Age: 89
End: 2024-09-19
Attending: INTERNAL MEDICINE
Payer: MEDICARE

## 2024-09-19 ENCOUNTER — OFFICE VISIT (OUTPATIENT)
Dept: PRIMARY CARE CLINIC | Facility: CLINIC | Age: 89
End: 2024-09-19
Payer: MEDICARE

## 2024-09-19 VITALS
DIASTOLIC BLOOD PRESSURE: 52 MMHG | HEIGHT: 65 IN | SYSTOLIC BLOOD PRESSURE: 138 MMHG | OXYGEN SATURATION: 98 % | WEIGHT: 158.75 LBS | BODY MASS INDEX: 26.45 KG/M2 | HEART RATE: 56 BPM

## 2024-09-19 DIAGNOSIS — E55.9 VITAMIN D DEFICIENCY DISEASE: ICD-10-CM

## 2024-09-19 DIAGNOSIS — D12.6 COLON ADENOMAS: ICD-10-CM

## 2024-09-19 DIAGNOSIS — J30.1 SEASONAL ALLERGIC RHINITIS DUE TO POLLEN: ICD-10-CM

## 2024-09-19 DIAGNOSIS — K42.9 UMBILICAL HERNIA WITHOUT OBSTRUCTION AND WITHOUT GANGRENE: ICD-10-CM

## 2024-09-19 DIAGNOSIS — R09.82 PND (POST-NASAL DRIP): ICD-10-CM

## 2024-09-19 DIAGNOSIS — H53.8 BLURRY VISION: ICD-10-CM

## 2024-09-19 DIAGNOSIS — I10 PRIMARY HYPERTENSION: ICD-10-CM

## 2024-09-19 DIAGNOSIS — E03.9 HYPOTHYROIDISM, UNSPECIFIED TYPE: ICD-10-CM

## 2024-09-19 DIAGNOSIS — Z12.11 SCREENING FOR COLON CANCER: ICD-10-CM

## 2024-09-19 DIAGNOSIS — E78.2 MIXED HYPERLIPIDEMIA: ICD-10-CM

## 2024-09-19 DIAGNOSIS — M81.0 OSTEOPOROSIS WITHOUT CURRENT PATHOLOGICAL FRACTURE, UNSPECIFIED OSTEOPOROSIS TYPE: ICD-10-CM

## 2024-09-19 DIAGNOSIS — L98.9 SKIN LESIONS: ICD-10-CM

## 2024-09-19 DIAGNOSIS — Z13.820 ENCOUNTER FOR SCREENING FOR OSTEOPOROSIS: ICD-10-CM

## 2024-09-19 DIAGNOSIS — I48.0 PAROXYSMAL ATRIAL FIBRILLATION: ICD-10-CM

## 2024-09-19 DIAGNOSIS — Z23 NEED FOR VACCINATION: ICD-10-CM

## 2024-09-19 DIAGNOSIS — R49.0 HOARSENESS OF VOICE: ICD-10-CM

## 2024-09-19 DIAGNOSIS — Z00.00 PREVENTATIVE HEALTH CARE: Primary | ICD-10-CM

## 2024-09-19 DIAGNOSIS — N18.31 STAGE 3A CHRONIC KIDNEY DISEASE (CKD): ICD-10-CM

## 2024-09-19 DIAGNOSIS — M15.9 GENERALIZED OSTEOARTHRITIS OF MULTIPLE SITES: ICD-10-CM

## 2024-09-19 DIAGNOSIS — M19.90 GENERALIZED ARTHRITIS: ICD-10-CM

## 2024-09-19 PROBLEM — R53.81 PHYSICAL DECONDITIONING: Status: RESOLVED | Noted: 2023-09-13 | Resolved: 2024-09-19

## 2024-09-19 PROBLEM — M16.11 ARTHRITIS OF RIGHT HIP: Status: RESOLVED | Noted: 2021-11-08 | Resolved: 2024-09-19

## 2024-09-19 LAB
25(OH)D3+25(OH)D2 SERPL-MCNC: 48 NG/ML (ref 30–96)
ALBUMIN SERPL BCP-MCNC: 4 G/DL (ref 3.5–5.2)
ALP SERPL-CCNC: 98 U/L (ref 55–135)
ALT SERPL W/O P-5'-P-CCNC: 19 U/L (ref 10–44)
ANION GAP SERPL CALC-SCNC: 8 MMOL/L (ref 8–16)
AST SERPL-CCNC: 23 U/L (ref 10–40)
BILIRUB SERPL-MCNC: 0.6 MG/DL (ref 0.1–1)
BUN SERPL-MCNC: 17 MG/DL (ref 8–23)
CALCIUM SERPL-MCNC: 9.5 MG/DL (ref 8.7–10.5)
CHLORIDE SERPL-SCNC: 103 MMOL/L (ref 95–110)
CHOLEST SERPL-MCNC: 141 MG/DL (ref 120–199)
CHOLEST/HDLC SERPL: 3.2 {RATIO} (ref 2–5)
CO2 SERPL-SCNC: 22 MMOL/L (ref 23–29)
CREAT SERPL-MCNC: 1.2 MG/DL (ref 0.5–1.4)
ERYTHROCYTE [DISTWIDTH] IN BLOOD BY AUTOMATED COUNT: 13.7 % (ref 11.5–14.5)
EST. GFR  (NO RACE VARIABLE): 44.1 ML/MIN/1.73 M^2
GLUCOSE SERPL-MCNC: 92 MG/DL (ref 70–110)
HCT VFR BLD AUTO: 35.2 % (ref 37–48.5)
HDLC SERPL-MCNC: 44 MG/DL (ref 40–75)
HDLC SERPL: 31.2 % (ref 20–50)
HGB BLD-MCNC: 11.1 G/DL (ref 12–16)
LDLC SERPL CALC-MCNC: 76.2 MG/DL (ref 63–159)
MAGNESIUM SERPL-MCNC: 2 MG/DL (ref 1.6–2.6)
MCH RBC QN AUTO: 29.5 PG (ref 27–31)
MCHC RBC AUTO-ENTMCNC: 31.5 G/DL (ref 32–36)
MCV RBC AUTO: 94 FL (ref 82–98)
NONHDLC SERPL-MCNC: 97 MG/DL
PLATELET # BLD AUTO: 223 K/UL (ref 150–450)
PMV BLD AUTO: 10.8 FL (ref 9.2–12.9)
POTASSIUM SERPL-SCNC: 4.7 MMOL/L (ref 3.5–5.1)
PROT SERPL-MCNC: 7.1 G/DL (ref 6–8.4)
RBC # BLD AUTO: 3.76 M/UL (ref 4–5.4)
SODIUM SERPL-SCNC: 133 MMOL/L (ref 136–145)
T4 FREE SERPL-MCNC: 1.02 NG/DL (ref 0.71–1.51)
TRIGL SERPL-MCNC: 104 MG/DL (ref 30–150)
TSH SERPL DL<=0.005 MIU/L-ACNC: 2.16 UIU/ML (ref 0.4–4)
WBC # BLD AUTO: 10.55 K/UL (ref 3.9–12.7)

## 2024-09-19 PROCEDURE — 80061 LIPID PANEL: CPT | Mod: HCNC | Performed by: INTERNAL MEDICINE

## 2024-09-19 PROCEDURE — 84439 ASSAY OF FREE THYROXINE: CPT | Mod: HCNC | Performed by: INTERNAL MEDICINE

## 2024-09-19 PROCEDURE — 3288F FALL RISK ASSESSMENT DOCD: CPT | Mod: HCNC,CPTII,S$GLB, | Performed by: INTERNAL MEDICINE

## 2024-09-19 PROCEDURE — 1159F MED LIST DOCD IN RCRD: CPT | Mod: HCNC,CPTII,S$GLB, | Performed by: INTERNAL MEDICINE

## 2024-09-19 PROCEDURE — 1157F ADVNC CARE PLAN IN RCRD: CPT | Mod: HCNC,CPTII,S$GLB, | Performed by: INTERNAL MEDICINE

## 2024-09-19 PROCEDURE — 82306 VITAMIN D 25 HYDROXY: CPT | Mod: HCNC | Performed by: INTERNAL MEDICINE

## 2024-09-19 PROCEDURE — 84443 ASSAY THYROID STIM HORMONE: CPT | Mod: HCNC | Performed by: INTERNAL MEDICINE

## 2024-09-19 PROCEDURE — 99397 PER PM REEVAL EST PAT 65+ YR: CPT | Mod: 25,HCNC,S$GLB, | Performed by: INTERNAL MEDICINE

## 2024-09-19 PROCEDURE — 1126F AMNT PAIN NOTED NONE PRSNT: CPT | Mod: HCNC,CPTII,S$GLB, | Performed by: INTERNAL MEDICINE

## 2024-09-19 PROCEDURE — 85027 COMPLETE CBC AUTOMATED: CPT | Mod: HCNC | Performed by: INTERNAL MEDICINE

## 2024-09-19 PROCEDURE — G0008 ADMIN INFLUENZA VIRUS VAC: HCPCS | Mod: HCNC,S$GLB,, | Performed by: INTERNAL MEDICINE

## 2024-09-19 PROCEDURE — 90653 IIV ADJUVANT VACCINE IM: CPT | Mod: HCNC,S$GLB,, | Performed by: INTERNAL MEDICINE

## 2024-09-19 PROCEDURE — 99999 PR PBB SHADOW E&M-EST. PATIENT-LVL V: CPT | Mod: PBBFAC,HCNC,, | Performed by: INTERNAL MEDICINE

## 2024-09-19 PROCEDURE — 83735 ASSAY OF MAGNESIUM: CPT | Mod: HCNC | Performed by: INTERNAL MEDICINE

## 2024-09-19 PROCEDURE — 36415 COLL VENOUS BLD VENIPUNCTURE: CPT | Mod: HCNC,PN | Performed by: INTERNAL MEDICINE

## 2024-09-19 PROCEDURE — 80053 COMPREHEN METABOLIC PANEL: CPT | Mod: HCNC | Performed by: INTERNAL MEDICINE

## 2024-09-19 PROCEDURE — 1101F PT FALLS ASSESS-DOCD LE1/YR: CPT | Mod: HCNC,CPTII,S$GLB, | Performed by: INTERNAL MEDICINE

## 2024-09-19 RX ORDER — GABAPENTIN 100 MG/1
100 CAPSULE ORAL 2 TIMES DAILY
Qty: 180 CAPSULE | Refills: 3 | Status: SHIPPED | OUTPATIENT
Start: 2024-09-19

## 2024-09-19 NOTE — PATIENT INSTRUCTIONS
Use saline then flonase 1 sq per nostril  qhs     Refer to Dr. Pickett after 10/25/2024    Eye MD at [facility]    Refer for Colon now     DEXA at main 12/2024     Flu and labs today     Refilled gabapentin

## 2024-09-19 NOTE — ASSESSMENT & PLAN NOTE
stable on tramadol bid; gabapentin 100mg lunch and dinner and tylenol ES with each tab ie qid; osteobiflex and Coq10  and tumeric at noon   F/u Dr. Aviles

## 2024-09-19 NOTE — ASSESSMENT & PLAN NOTE
stable on tramadol bid; gabapentin 100mg lunch and dinner and tylenol ES with each tab ie qid; osteobiflex and CoQ10  and tumeric at noon   F/u Dr. Aviles as scheduled    Start Aquatherapy when available

## 2024-09-19 NOTE — ASSESSMENT & PLAN NOTE
well-controlled on metoprolol ER 50mg bid; losartan 100mg daily;   -Check Bps at home weekly and prn symptoms for goal BP <130/80.  Avoid Dobbins's table salt; use Mrs. Thompson or Mata Rizvi no salt   -Start healthy diet high in fiber (25-35 gm daily), low fat dairy, lean protein, low in saturated/trans fat (minimize cheese, creamy salad dressings); high in calcium/magnesium/potassium; 1.5 gm sodium diet; low in processed sugars and foods, ie  WHITE sugars, bread, pasta, rice, ice cream, cookies, cake, doughnuts  -Drink 6-8 glasses of water daily  -Moderate exercise 30 min 5 times per week or 75 min intense exercise biweekly   -Start 8-10 hour intermittent fasting diet   -Avoid eating 3 hours prior to bedtime  -Reduce alcohol to 1-2 servings (1 serving = 1 oz wine, 1 oz hard liquor, 12 oz beer) per day  -Avoid smoking, vaping, stimulant drugs/mediations ie illicit drugs, pseudo-ephedrine  -Use ADD/ADHD meds sparingly  -Minimize NSAIDs

## 2024-09-19 NOTE — PROGRESS NOTES
[facility]ediatric[facility] Progress Note      Chief Complaint     Arthritis, Health Maintenance, and Follow-up       Subjective:      History of Present Illness:  Tiffany Masterson is a 88 y.o. female     Right shoulder OA with mild AC joint DJD and bilateral glenohumeral articulations by xray 9/03/2024 / right hip DJD OA: left THR; Bilateral knees with DJD with narrowing of the right lateral and the left medial tibiofemoral joint spaces. Narrowing of the patellofemoral joint spaces also noted bilaterally on 8/19/2024 on xray   -s/p injection by Dr. Martínez in right shoulder on 9/03/2024 with mild improvement  -stable on tramadol bid; gabapentin 100mg lunch and dinner and tylenol ES with each tab ie qid; osteobiflex and Coq10  and tumeric at noon   -pt is on the waiting list for Aquatherapy to strengthen core and helps with generalized arthritis     Now has bed bugs in her home: washing sheets, linens and spraying with alcohol; possible fumigation; tenting costs $9000    Desire referral to optometry     HTN with CKD 3a: well-controlled on metoprolol ER 50mg bid; losartan 100mg daily; needs labs today    HLD: taking lipitor 10mg daily with Co-enzyme 10     Tubular adenoma: due colon  now      Hx BCC now with  new facial lesions on left cheek and right upper lip: needs to make appt with derm Dr. Pickett 4/2024      Trigger 4th left finger: can release it easily and prefers to observe now     PND causing hoarseness: saline then flonase 1 sq qhs     Hypothyroidism: euthyroid on Levo 112mcg po daily      Ocular Migraine: stable     Osteoporosis and Vit D def'y: on Vit D/Ca supplements with Vit D level 37 in 12/2022; repeat DEXA in 12/2024;  refuses medication      Atrial fib and HTN with CKD 3 b GFR 40 ml/min (creat 1.3)  stable on Eliquis 5mg po bid, metoprolol ER 50mg bid and losartan 100mg daily ; HR 60s with -180/60-70s ; keeps daily log; ECHO 12/2021 with diastolic Grade 1 LV diastolid dysfunction with  EF 65%  Home Bps stable; denies CP, SOB, FLORES      Hypothyroidism: normal TSH on Levo 112mcg po daily      SH: social alcohol; no tobacco, drugs; has 3 sons  FH: mom  colon cancer at 74y/o; Paternal aunt in mid 70s with colon cancer .    Past Medical History:  Past Medical History:   Diagnosis Date    Abnormal MRI 10/16/2020    Acute cystitis without hematuria 2021    Arthritis     Arthritis of right hip 2021    Atrial fibrillation     Cataract     Elevated liver enzymes     Endometrial mass 2018    Epiretinal membrane (ERM) of right eye     Family history of malignant neoplasm of gastrointestinal tract mother    Frequent UTI 2018    Generalized arthritis 2024    Shoulders, knees, hips      Graves disease     now hypothryoid - no surgery or medicine. resolved on its own    History of cholecystectomy 06/10/2021    History of colonoscopy     unremarkable  by Dr. Javier.  Barium enema revealed diverticular disease.    Hyperlipidemia     Hypertension     Hypertriglyceridemia 2013    Continue statin for secondary stroke prevention    Hypothyroidism     Impaired functional mobility, balance, gait, and endurance 2021    Iron deficiency anemia 2021    Migraine, ophthalmoplegic     Mixed stress and urge urinary incontinence 2018    Ocular migraine     Personal history of colonic polyps     Physical deconditioning 2023    Preop testing 2021    S/P colonoscopic polypectomy 2013    Sleep disorder 2021    Status post hysteroscopic polypectomy 2018    TIA (transient ischemic attack)     with a normal angiogram in the past, Ocular migraines reported by patient, not TIA     Transaminitis 2021    Urethral caruncle 09/15/2020       Past Surgical History:  Past Surgical History:   Procedure Laterality Date    CATARACT EXTRACTION Right     Dr. Lexis Nicolas     CHOLECYSTECTOMY      COLONOSCOPY      2014 polyps    COLONOSCOPY N/A  11/07/2016    Procedure: COLONOSCOPY;  Surgeon: Bebeto Gonzalez MD;  Location: [facility] ENDO (4TH FLR);  Service: Endoscopy;  Laterality: N/A;    COLONOSCOPY N/A 03/09/2020    Procedure: COLONOSCOPY;  Surgeon: Bebeto Gonzalez MD;  Location: [facility] ENDO (4TH FLR);  Service: Endoscopy;  Laterality: N/A;    COLONOSCOPY N/A 09/29/2021    Procedure: COLONOSCOPY;  Surgeon: Bebeto Gonzalez MD;  Location: [facility] ENDO (4TH FLR);  Service: Endoscopy;  Laterality: N/A;  please schedule patient as soon as possible with me EGD colonoscopy with constipation bowel prep for iron deficiency anemia family history of colon cancer and personal history of colon polyps  9/17/21 ok for standard split prep per Dr. Gonzalez-imani    COLONOSCOPY N/A 09/29/2021    Procedure: COLONOSCOPY;  Surgeon: Bebeto Gonzalez MD;  Location: [facility] ENDO (4TH FLR);  Service: Endoscopy;  Laterality: N/A;  Please schedule patient as soon as possible with me EGD colonoscopy with constipation bowel prep for iron deficiency anemia family history of colon cancer and personal history of colon polyps  9/17/21 Ok for standard split prep per Dr. Gonzalez-imani    COLONOSCOPY W/ POLYPECTOMY  06/2013    polyp of colon    ENDOSCOPIC ULTRASOUND OF UPPER GASTROINTESTINAL TRACT N/A 04/29/2021    Procedure: ULTRASOUND, UPPER GI TRACT, ENDOSCOPIC;  Surgeon: Dalton Johnson MD;  Location: [facility] (2ND FLR);  Service: Endoscopy;  Laterality: N/A;  Postprandial nausea vomiting epigastric 0.9 cm stone and sludge seen within the gallbladder lumen.  No wall thickening or pericholecystic fluid.  0.6 cm stone seen within the distal common bile duct at the level of the pancreatic head.  There is dil    ERCP N/A 04/29/2021    Procedure: ERCP (ENDOSCOPIC RETROGRADE CHOLANGIOPANCREATOGRAPHY);  Surgeon: Dalton Johnson MD;  Location: [facility] (2ND FLR);  Service: Endoscopy;  Laterality: N/A;  Postprandial nausea and vomit 0.9 cm stone and sludge seen within the gallbladder lumen.  No wall  thickening or pericholecystic fluid.  0.6 cm stone seen within the distal common bile duct at the level of the pancreatic head.  There i    ESOPHAGOGASTRODUODENOSCOPY N/A 09/29/2021    Procedure: EGD (ESOPHAGOGASTRODUODENOSCOPY);  Surgeon: Bebeto Gonzalez MD;  Location: [facility] ENDO (4TH FLR);  Service: Endoscopy;  Laterality: N/A;  rapid covid test arrival 12pm -   9/27 arrival time for covid test/procedure confirmed with pt-rb    ESOPHAGOGASTRODUODENOSCOPY N/A 09/29/2021    Procedure: EGD (ESOPHAGOGASTRODUODENOSCOPY);  Surgeon: Bebeto Gonzalez MD;  Location: [facility] ENDO (4TH FLR);  Service: Endoscopy;  Laterality: N/A;  [place]19[place] [facility] [place]    EYE SURGERY  11/10/2014    right retina/Dr. Dalton Sierra ([facility])    HYSTEROSCOPIC POLYPECTOMY OF UTERUS N/A 07/02/2018    Procedure: POLYPECTOMY, UTERUS, HYSTEROSCOPIC;  Surgeon: Elliot Vanessa IV, MD;  Location: [facility] OR;  Service: OB/GYN;  Laterality: N/A;    INJECTION OF JOINT Right 11/11/2022    Procedure: INJECTION, RIGHT GTB CONTRAST DIRECT REFERRAL;  Surgeon: Camden Hernandez MD;  Location: [facility] PAIN MGT;  Service: Pain Management;  Laterality: Right;    JOINT REPLACEMENT  03/23/2011    left total hip replacement    LAPAROSCOPIC CHOLECYSTECTOMY N/A 05/17/2021    Procedure: CHOLECYSTECTOMY, LAPAROSCOPIC;  Surgeon: Rayshawn Peralta Jr., MD;  Location: [facility] 2ND FLR;  Service: General;  Laterality: N/A;    REMOVAL OF EPIRETINAL MEMBRANE Right     Dr. Padron    TONSILLECTOMY      pt was 11 years old       Allergies:  Review of patient's allergies indicates:   Allergen Reactions    Levaquin [levofloxacin]      Joint pain    Hydrochlorothiazide Other (See Comments)     Pain in joints and depression per pt       Home Medications:  Current Outpatient Medications   Medication Sig Dispense Refill    apixaban (ELIQUIS) 5 mg Tab Take 1 tablet (5 mg total) by mouth 2 (two) times daily. 180 tablet 3    ascorbic acid, vitamin C, (VITAMIN C) 250 MG  tablet Take 250 mg by mouth once daily.      atorvastatin (LIPITOR) 10 MG tablet Take 1 tablet by mouth once daily 90 tablet 0    b complex vitamins capsule Take 1 capsule by mouth once daily.      biotin 5,000 mcg TbDL Take 1 tablet (5,000 mcg total) by mouth Daily. 90 tablet 3    bisacodyL (DULCOLAX) 5 mg EC tablet Take 5 mg by mouth once daily.      CALCIUM CARBONATE/VITAMIN D3 (CALCIUM 600 + D,3, ORAL) Take 1 tablet by mouth once daily.       cholecalciferol, vitamin D3, (VITAMIN D3) 50 mcg (2,000 unit) Cap Take 1 capsule by mouth once daily.      coenzyme Q10 200 mg capsule Take 200 mg by mouth once daily.      DEXTRAN 70/HYPROMELLOSE (ARTIFICIAL TEARS, PF, OPHT) Place 1 drop into both eyes as needed.      diclofenac sodium (VOLTAREN) 1 % Gel Apply 2 g topically 4 (four) times daily. 200 g 3    estradioL (ESTRACE) 0.01 % (0.1 mg/gram) vaginal cream Place 0.5 g intravaginally q.h.s. x2 weeks; then, placed 0.5 g intravaginally twice weekly at bedtime (maintenance therapy). 42.5 g 1    fluticasone propionate (FLONASE) 50 mcg/actuation nasal spray CLEAN SINUS/NARES WITH OCEAN NASAL SPRAY THEN USE FLONASE 1 SPRAY TWICE DAILY. 48 g 3    hydroquinone 4 % Crea APPLY  CREAM TOPICALLY TWICE DAILY 58 g 0    levothyroxine (SYNTHROID) 112 MCG tablet TAKE 1 TABLET BY MOUTH BEFORE BREAKFAST 90 tablet 0    LIDOcaine (LIDODERM) 5 % Place 1 patch onto the skin once daily. Remove & Discard patch within 12 hours or as directed by MD 30 patch 0    losartan (COZAAR) 100 MG tablet Take 1 tablet by mouth once daily 90 tablet 3    magnesium oxide 400 mg magnesium Tab Take 1 tablet by mouth once daily.      metoprolol succinate (TOPROL-XL) 50 MG 24 hr tablet Take 1 tablet by mouth twice daily 180 tablet 3    MULTIVITAMIN W-MINERALS/LUTEIN (CENTRUM SILVER ORAL) Take 1 tablet by mouth once daily.      omeprazole (PRILOSEC) 40 MG capsule Take 40 mg by mouth.      sodium chloride (SALINE NASAL) 0.65 % nasal spray 1 spray by Nasal route as  needed for Congestion. 104 mL 3    traMADoL (ULTRAM) 50 mg tablet Take 1 tablet (50 mg total) by mouth every 8 (eight) hours as needed. 60 tablet 0    vit A/vit C/vit E/zinc/copper (ICAPS AREDS ORAL) Take 1 tablet by mouth 2 (two) times a day.      gabapentin (NEURONTIN) 100 MG capsule Take 1 capsule (100 mg total) by mouth 2 (two) times daily. 180 capsule 3     Current Facility-Administered Medications   Medication Dose Route Frequency Provider Last Rate Last Admin    triamcinolone acetonide injection 40 mg  40 mg Intra-articular 1 time in Clinic/HOD Rebeca Dumont MD            Family History:  Family History   Problem Relation Name Age of Onset    Colon cancer Mother Tiffany Sutton 75    Lung cancer Father ColLakesha Sutton 75    Diabetes Other great aunt     Diabetes Paternal Aunt      Colon cancer Paternal Aunt  80    Prostate cancer Brother      Breast cancer Neg Hx      Ovarian cancer Neg Hx      Celiac disease Neg Hx      Cataracts Neg Hx      Glaucoma Neg Hx      Macular degeneration Neg Hx         Social History:  Social History     Tobacco Use    Smoking status: Never    Smokeless tobacco: Never    Tobacco comments:     The patient is not getting any regular exercise at this time.  She does enjoy traveling to [place].   Substance Use Topics    Alcohol use: Yes     Comment: occasionally    Drug use: No       Review of Systems:  Pertinent positives and negatives listed in HPI. All other systems are reviewed and are negative.    Health Maintaince :   Health Maintenance Topics with due status: Not Due       Topic Last Completion Date    TETANUS VACCINE 09/20/2018    Lipid Panel 09/19/2024           Eye: refer to optometry  Dental:     Immunizations:   Hepatitis C:   Cancer Screening:  PAP: UTD   Mammogram: UTD 2/2024 WNL   Colonoscopy: due now   DEXA: 12/2022   Lumbar spine (L1-L4):              BMD is 0.934 g/cm2, T-score is -1.0, and Z-score is 1.8.     Total hip:                  "               BMD is 0.678 g/cm2, T-score is -2.2, and Z-score is 0.2.     Femoral neck:                          BMD is 0.664 g/cm2, T-score is -1.7, and Z-score is 0.9.     Distal 1/3 radius:                      Not applicable     FRAX:     34% risk of a major osteoporotic fracture in the next 10 years.     20% risk of hip fracture in the next 10 years.     LDCT:     The ASCVD Risk score (Emeka DK, et al., 2019) failed to calculate for the following reasons:    The 2019 ASCVD risk score is only valid for ages 40 to 79      Objective:   BP (!) 138/52 (BP Location: Left arm, Patient Position: Sitting, BP Method: Medium (Manual))   Pulse (!) 56   Ht 5' 5" (1.651 m)   Wt 72 kg (158 lb 11.7 oz)   SpO2 98%   BMI 26.41 kg/m²      Body mass index is 26.41 kg/m².       Physical Examination:  General: Alert and awake in no apparent distress  HEENT: Normocephalic and atraumatic; Tms WNL  Eyes:  PERRL; EOMi with anicteric sclera and clear conjunctivae  Mouth:  Oropharynx clear and without exudate; moist mucous membranes  Neck:   Cervical nodes not enlarged (No submental, submandibular, preauricular, posterior auricular or occipital adenopathy); supple; no bruits  Cardio:  Regular rate and rhythm with normal S1 and S2; no murmurs or rubs  Resp:  CTAB; respirations unlabored; no wheezes, crackles or rhonchi  Abdom: Soft, NTND with normoactive bowel sounds; negative HSM; soft, reducible supraumbilical hernia  Extrem: Warm and well-perfused with no clubbing, cyanosis or edema  Skin:  New flesh-colored papules on right upper lip and cheek  Pulses:  2+ and symmetric distally  Neuro:  AAOx3; cooperative and pleasant with no focal deficits    Laboratory:      Most Recent Data:  Lab Results   Component Value Date    WBC 10.55 09/19/2024    HGB 11.1 (L) 09/19/2024    HCT 35.2 (L) 09/19/2024     09/19/2024    CHOL 141 09/19/2024    TRIG 104 09/19/2024    HDL 44 09/19/2024    ALT 19 09/19/2024    AST 23 09/19/2024    NA " 133 (L) 09/19/2024    K 4.7 09/19/2024     09/19/2024    BUN 17 09/19/2024    CO2 22 (L) 09/19/2024    TSH 2.158 09/19/2024    INR 1.0 05/09/2013    HGBA1C 5.8 (H) 08/31/2020              CBC:   WBC   Date Value Ref Range Status   09/19/2024 10.55 3.90 - 12.70 K/uL Final     Hemoglobin   Date Value Ref Range Status   09/19/2024 11.1 (L) 12.0 - 16.0 g/dL Final     Hematocrit   Date Value Ref Range Status   09/19/2024 35.2 (L) 37.0 - 48.5 % Final     Platelets   Date Value Ref Range Status   09/19/2024 223 150 - 450 K/uL Final     MCV   Date Value Ref Range Status   09/19/2024 94 82 - 98 fL Final     RDW   Date Value Ref Range Status   09/19/2024 13.7 11.5 - 14.5 % Final     BMP:   Sodium   Date Value Ref Range Status   09/19/2024 133 (L) 136 - 145 mmol/L Final     Potassium   Date Value Ref Range Status   09/19/2024 4.7 3.5 - 5.1 mmol/L Final     Chloride   Date Value Ref Range Status   09/19/2024 103 95 - 110 mmol/L Final     CO2   Date Value Ref Range Status   09/19/2024 22 (L) 23 - 29 mmol/L Final     BUN   Date Value Ref Range Status   09/19/2024 17 8 - 23 mg/dL Final     Creatinine   Date Value Ref Range Status   09/19/2024 1.2 0.5 - 1.4 mg/dL Final     Glucose   Date Value Ref Range Status   09/19/2024 92 70 - 110 mg/dL Final     Calcium   Date Value Ref Range Status   09/19/2024 9.5 8.7 - 10.5 mg/dL Final     Magnesium   Date Value Ref Range Status   09/19/2024 2.0 1.6 - 2.6 mg/dL Final     Phosphorus   Date Value Ref Range Status   06/21/2013 2.3 (L) 2.7 - 4.5 mg/dl Final     LFTs:   Total Protein   Date Value Ref Range Status   09/19/2024 7.1 6.0 - 8.4 g/dL Final     Albumin   Date Value Ref Range Status   09/19/2024 4.0 3.5 - 5.2 g/dL Final     Total Bilirubin   Date Value Ref Range Status   09/19/2024 0.6 0.1 - 1.0 mg/dL Final     Comment:     For infants and newborns, interpretation of results should be based  on gestational age, weight and in agreement with clinical  observations.    Premature  "Infant recommended reference ranges:  Up to 24 hours.............<8.0 mg/dL  Up to 48 hours............<12.0 mg/dL  3-5 days..................<15.0 mg/dL  6-29 days.................<15.0 mg/dL       AST   Date Value Ref Range Status   09/19/2024 23 10 - 40 U/L Final     Alkaline Phosphatase   Date Value Ref Range Status   09/19/2024 98 55 - 135 U/L Final     ALT   Date Value Ref Range Status   09/19/2024 19 10 - 44 U/L Final     Coags:   INR   Date Value Ref Range Status   05/09/2013 1.0 0.8 - 1.2 Final     Comment:     ACCP Guideline for Coumadin usage recommends a "target range" of  2.0 - 3.0 for INR for all indicators except mechanical heart valves  and antiphospholipid syndromes which should use 2.5 - 3.5.  .     FLP:      Lab Results   Component Value Date    CHOL 141 09/19/2024    CHOL 136 11/06/2023    CHOL 217 (H) 09/13/2023     Lab Results   Component Value Date    HDL 44 09/19/2024    HDL 49 11/06/2023    HDL 39 (L) 09/13/2023     Lab Results   Component Value Date    LDLCALC 76.2 09/19/2024    LDLCALC 65.6 11/06/2023    LDLCALC 131.0 09/13/2023     Lab Results   Component Value Date    TRIG 104 09/19/2024    TRIG 107 11/06/2023    TRIG 235 (H) 09/13/2023     Lab Results   Component Value Date    CHOLHDL 31.2 09/19/2024    CHOLHDL 36.0 11/06/2023    CHOLHDL 18.0 (L) 09/13/2023      DM:      Hemoglobin A1C   Date Value Ref Range Status   08/31/2020 5.8 (H) 4.0 - 5.6 % Final     Comment:     ADA Screening Guidelines:  5.7-6.4%  Consistent with prediabetes  >or=6.5%  Consistent with diabetes  High levels of fetal hemoglobin interfere with the HbA1C  assay. Heterozygous hemoglobin variants (HbS, HgC, etc)do  not significantly interfere with this assay.   However, presence of multiple variants may affect accuracy.       LDL Cholesterol   Date Value Ref Range Status   09/19/2024 76.2 63.0 - 159.0 mg/dL Final     Comment:     The National Cholesterol Education Program (NCEP) has set the  following guidelines " (reference values) for LDL Cholesterol:  Optimal.......................<130 mg/dL  Borderline High...............130-159 mg/dL  High..........................160-189 mg/dL  Very High.....................>190 mg/dL       Creatinine   Date Value Ref Range Status   09/19/2024 1.2 0.5 - 1.4 mg/dL Final     Thyroid:   TSH   Date Value Ref Range Status   09/19/2024 2.158 0.400 - 4.000 uIU/mL Final     Free T4   Date Value Ref Range Status   09/19/2024 1.02 0.71 - 1.51 ng/dL Final     Anemia:   Iron   Date Value Ref Range Status   09/18/2021 47 30 - 160 ug/dL Final     TIBC   Date Value Ref Range Status   09/18/2021 330 250 - 450 ug/dL Final     Ferritin   Date Value Ref Range Status   09/18/2021 251 20.0 - 300.0 ng/mL Final     Vitamin B-12   Date Value Ref Range Status   12/02/2022 652 210 - 950 pg/mL Final     Cardiac:   Troponin I   Date Value Ref Range Status   12/21/2021 0.014 0.000 - 0.026 ng/mL Final     Comment:     The reference interval for Troponin I represents the 99th percentile   cutoff   for our facility and is consistent with 3rd generation assay   performance.       BNP   Date Value Ref Range Status   12/21/2021 184 (H) 0 - 99 pg/mL Final     Comment:     Values of less than 100 pg/ml are consistent with non-CHF populations.     Urinalysis:   Urine Culture, Routine   Date Value Ref Range Status   12/22/2021   Final    Multiple organisms isolated. None in predominance.  Repeat if   12/22/2021 clinically necessary.  Final     Color, UA   Date Value Ref Range Status   09/19/2024 Yellow Yellow, Straw, [place] Final     Clarity, UA   Date Value Ref Range Status   10/29/2020   Final     Comment:     normal      Specific Gravity, UA   Date Value Ref Range Status   09/19/2024 1.020 1.005 - 1.030 Final     Nitrite, UA   Date Value Ref Range Status   09/19/2024 Positive (A) Negative Final     Ketones, UA   Date Value Ref Range Status   09/19/2024 Negative Negative Final     Urobilinogen, UA   Date Value Ref Range  Status   10/29/2020   Final     Comment:     normal   09/14/2018 Negative <2.0 EU/dL Final     WBC, UA   Date Value Ref Range Status   09/19/2024 23 (H) 0 - 5 /hpf Final       Other Results:  EKG (my interpretation):     Radiology:  X-Ray Shoulder Complete Bilateral  Narrative: EXAMINATION:  XR SHOULDER COMPLETE 2 OR MORE VIEWS BILATERAL    CLINICAL HISTORY:  Pain in right shoulder    TECHNIQUE:  Bilateral shoulder radiograph series.    COMPARISON:  09/27/2023    FINDINGS:  Bilateral glenohumeral articulations appear intact with DJD characterized by joint space loss and osteophytosis.  Mild AC joint DJD.  No acute fracture, dislocation, or osseous destruction.  Impression: As above.    Electronically signed by: Marcellus Barkley  Date:    09/03/2024  Time:    08:46          Assessment/Plan     Tiffany Masterson is a 88 y.o. female with:  1. Preventative health care  Assessment & Plan:  Get COVID vaccine    'Use saline then flonase 1 sq per nostril  qhs     Refer to Dr. Pickett after 10/25/2024    Eye MD at [facility]    DEXA at [facility] 12/2024     Flu and labs today     Refilled gabapentin     Orders:  -     Ambulatory referral/consult to Optometry; Future; Expected date: 09/19/2024    2. Screening for colon cancer  -     Ambulatory referral/consult to Endo Procedure ; Future; Expected date: 09/20/2024    3. Encounter for screening for osteoporosis    4. Generalized arthritis  Overview:  Shoulders, knees, hips    Assessment & Plan:  stable on tramadol bid; gabapentin 100mg lunch and dinner and tylenol ES with each tab ie qid; osteobiflex and Coq10  and tumeric at noon   F/u Dr. Aviles       5. PND (post-nasal drip)  Assessment & Plan:   Use saline then flonase 1 sq per nostril  qhs       6. Generalized osteoarthritis of multiple sites  Overview:  Bilateral but right shoulder >left, bilateral knees, right hip are most painful  S/p left THR    Assessment & Plan:  stable on tramadol bid; gabapentin 100mg lunch and  dinner and tylenol ES with each tab ie qid; osteobiflex and CoQ10  and tumeric at noon   F/u Dr. Aviles as scheduled    Start Aquatherapy when available    Orders:  -     gabapentin (NEURONTIN) 100 MG capsule; Take 1 capsule (100 mg total) by mouth 2 (two) times daily.  Dispense: 180 capsule; Refill: 3    7. Skin lesions  Assessment & Plan:  Refer to Dr. Pickett in 10/2024 when they return from [place]    Orders:  -     Ambulatory referral/consult to Dermatology; Future; Expected date: 09/26/2024    8. Vitamin D deficiency disease  Assessment & Plan:  Check Vit D level     Orders:  -     Vitamin D; Future; Expected date: 09/19/2024    9. Primary hypertension  Assessment & Plan:  well-controlled on metoprolol ER 50mg bid; losartan 100mg daily;   -Check Bps at home weekly and prn symptoms for goal BP <130/80.  Avoid Dobbins's table salt; use Mrs. Thompson or Mata Rizvi no salt   -Start healthy diet high in fiber (25-35 gm daily), low fat dairy, lean protein, low in saturated/trans fat (minimize cheese, creamy salad dressings); high in calcium/magnesium/potassium; 1.5 gm sodium diet; low in processed sugars and foods, ie  WHITE sugars, bread, pasta, rice, ice cream, cookies, cake, doughnuts  -Drink 6-8 glasses of water daily  -Moderate exercise 30 min 5 times per week or 75 min intense exercise biweekly   -Start 8-10 hour intermittent fasting diet   -Avoid eating 3 hours prior to bedtime  -Reduce alcohol to 1-2 servings (1 serving = 1 oz wine, 1 oz hard liquor, 12 oz beer) per day  -Avoid smoking, vaping, stimulant drugs/mediations ie illicit drugs, pseudo-ephedrine  -Use ADD/ADHD meds sparingly  -Minimize NSAIDs        Orders:  -     Magnesium; Future; Expected date: 09/19/2024  -     Microalbumin/Creatinine Ratio, Urine; Future; Expected date: 09/19/2024  -     Urinalysis; Future; Expected date: 09/19/2024  -     Comprehensive Metabolic Panel; Future; Expected date: 09/19/2024  -     CBC Without Differential;  Future; Expected date: 09/19/2024    10. Mixed hyperlipidemia  Assessment & Plan:  Check FLP on lipitor 10mg daily with Co-enzyme 10     Orders:  -     Lipid Panel; Future; Expected date: 09/19/2024    11. Hypothyroidism, unspecified type  Assessment & Plan:  Check TSH on Levo 112 mcg po daily     Orders:  -     TSH; Future; Expected date: 09/19/2024  -     T4, FREE; Future; Expected date: 09/19/2024    12. Osteoporosis without current pathological fracture, unspecified osteoporosis type  Assessment & Plan:  Cont Vit D and calcium, weight-bearing  Pt refuses intervention with bisphosphonates, prolia, etc    Orders:  -     DXA Bone Density Axial Skeleton 1 or more sites; Future; Expected date: 12/02/2024    13. Need for vaccination  -     Influenza - Trivalent (Adjuvanted)    14. Blurry vision  Overview:  Left cataract +1 6/2023    Assessment & Plan:  Refer to optometry for new glasses      15. Hoarseness of voice  Overview:  Neg laryngoscopy by Dr. Murphy    Assessment & Plan:  Use nasal saline irrigation then flonase 1 sq bid qhs every day      16. Colon adenomas  Overview:  5 polyps <5mm in 9/2021  all tubular adenomas neg dysplasia/cancer- repeat in 3 years 9/2024    Assessment & Plan:  Refer for colonoscopy now       17. Umbilical hernia without obstruction and without gangrene  Assessment & Plan:  Monitor closely for evidence for perforation, inability to reduce, increasing size, intractable pain      18. Seasonal allergic rhinitis due to pollen  Assessment & Plan:  Cont nasal saline and flonase 1 sq bid       19. Paroxysmal atrial fibrillation  Assessment & Plan:  Asymptomatic in NSR on BB and Eliquis 5mg bid without bleeding      20. Stage 3a chronic kidney disease (CKD)  Assessment & Plan:  Check labs today  Drink 4-6 glasses of water daily  Avoid NSAIDs               I spent a total of 50 minutes on the day of the visit.This includes face to face time and non-face to face time preparing to see the patient  (eg, review of tests), obtaining and/or reviewing separately obtained history, documenting clinical information in the electronic or other health record, independently interpreting results and communicating results to the patient/family/caregiver, or care coordinator.   Code Status:     Rebeca Dumont MD History of Present Illness

## 2024-09-20 NOTE — ASSESSMENT & PLAN NOTE
Monitor closely for evidence for perforation, inability to reduce, increasing size, intractable pain

## 2024-09-20 NOTE — ASSESSMENT & PLAN NOTE
Get COVID vaccine    'Use saline then flonase 1 sq per nostril  qhs     Refer to Dr. Pickett after 10/25/2024    Eye MD at [facility]    DEXA at [facility] 12/2024     Flu and labs today     Refilled gabapentin

## 2024-09-29 RX ORDER — TRIAMCINOLONE ACETONIDE 40 MG/ML
80 INJECTION, SUSPENSION INTRA-ARTICULAR; INTRAMUSCULAR
Status: DISCONTINUED | OUTPATIENT
Start: 2024-09-03 | End: 2024-09-29 | Stop reason: HOSPADM

## 2024-10-11 PROCEDURE — G0180 MD CERTIFICATION HHA PATIENT: HCPCS | Mod: ,,, | Performed by: INTERNAL MEDICINE

## 2024-10-25 ENCOUNTER — OFFICE VISIT (OUTPATIENT)
Dept: PRIMARY CARE CLINIC | Facility: CLINIC | Age: 89
End: 2024-10-25
Payer: MEDICARE

## 2024-10-25 VITALS
OXYGEN SATURATION: 98 % | BODY MASS INDEX: 25.68 KG/M2 | HEART RATE: 60 BPM | HEIGHT: 65 IN | SYSTOLIC BLOOD PRESSURE: 122 MMHG | WEIGHT: 154.13 LBS | DIASTOLIC BLOOD PRESSURE: 76 MMHG

## 2024-10-25 DIAGNOSIS — I10 PRIMARY HYPERTENSION: ICD-10-CM

## 2024-10-25 DIAGNOSIS — I48.0 PAROXYSMAL ATRIAL FIBRILLATION: ICD-10-CM

## 2024-10-25 DIAGNOSIS — S06.9X5S TRAUMATIC BRAIN INJURY, WITH LOSS OF CONSCIOUSNESS GREATER THAN 24 HOURS WITH RETURN TO PRE-EXISTING CONSCIOUS LEVEL, SEQUELA: Primary | ICD-10-CM

## 2024-10-25 DIAGNOSIS — Z00.00 PREVENTATIVE HEALTH CARE: ICD-10-CM

## 2024-10-25 PROBLEM — S06.9XAA TBI (TRAUMATIC BRAIN INJURY): Status: ACTIVE | Noted: 2024-10-25

## 2024-10-25 PROCEDURE — 99999 PR PBB SHADOW E&M-EST. PATIENT-LVL IV: CPT | Mod: PBBFAC,HCNC,, | Performed by: INTERNAL MEDICINE

## 2024-10-25 PROCEDURE — 99215 OFFICE O/P EST HI 40 MIN: CPT | Mod: HCNC,S$GLB,, | Performed by: INTERNAL MEDICINE

## 2024-10-25 PROCEDURE — 1157F ADVNC CARE PLAN IN RCRD: CPT | Mod: HCNC,CPTII,S$GLB, | Performed by: INTERNAL MEDICINE

## 2024-10-25 PROCEDURE — 1126F AMNT PAIN NOTED NONE PRSNT: CPT | Mod: HCNC,CPTII,S$GLB, | Performed by: INTERNAL MEDICINE

## 2024-10-25 PROCEDURE — 1159F MED LIST DOCD IN RCRD: CPT | Mod: HCNC,CPTII,S$GLB, | Performed by: INTERNAL MEDICINE

## 2024-10-25 RX ORDER — METOPROLOL TARTRATE 50 MG/1
1 TABLET ORAL 2 TIMES DAILY
COMMUNITY

## 2024-10-25 RX ORDER — ACETAMINOPHEN 500 MG
500 TABLET ORAL EVERY 6 HOURS PRN
COMMUNITY

## 2024-10-25 RX ORDER — DILTIAZEM HYDROCHLORIDE 90 MG/1
90 TABLET, FILM COATED ORAL EVERY 8 HOURS
COMMUNITY

## 2024-10-25 NOTE — PATIENT INSTRUCTIONS
Traumatic brain injury, with loss of consciousness greater than 24 hours with return to pre-existing conscious level, sequela  Assessment & Plan:  Refer to Neurology asa for ongoing evaluation     Orders:  -     Ambulatory referral/consult to Neurology; Future; Expected date: 10/25/2024    Paroxysmal atrial fibrillation  Assessment & Plan:  Check BP and HR daily   Keep BP >110/60 and <130/80  Keep HR 55-80 beats per minute (Never <55 or >100)  Cont Metoprolol ER 50mg twice per day   Cont Diltiazem 90mg 3 times per day - drop to twice per day if HR <55 OR BP <110/60    Make appt with cardiologist   Cont Eliquis 5mg bid       Primary hypertension  Assessment & Plan:  well-controlled on metoprolol ER 50mg bid; losartan 100mg daily;   Cont diltiazem 90mg 3 times per day unless BP <110/60 or HR <55 then change to twice per day   -Check Bps at home weekly and prn symptoms for goal BP <130/80.  Avoid Dobbins's table salt; use Mrs. Thompson or Mata Rizvi no salt   -Start healthy diet high in fiber (25-35 gm daily), low fat dairy, lean protein, low in saturated/trans fat (minimize cheese, creamy salad dressings); high in calcium/magnesium/potassium; 1.5 gm sodium diet; low in processed sugars and foods, ie  WHITE sugars, bread, pasta, rice, ice cream, cookies, cake, doughnuts  -Drink 6-8 glasses of water daily  -Moderate exercise 30 min 5 times per week or 75 min intense exercise biweekly   -Start 8-10 hour intermittent fasting diet   -Avoid eating 3 hours prior to bedtime  -Reduce alcohol to 1-2 servings (1 serving = 1 oz wine, 1 oz hard liquor, 12 oz beer) per day  -Avoid smoking, vaping, stimulant drugs/mediations ie illicit drugs, pseudo-ephedrine  -Use ADD/ADHD meds sparingly  -Minimize NSAIDs          Preventative health care  Assessment & Plan:  Get COVID vaccine  CT head scheduled on 11/18/2024 and hearing eval at [facility] f/u 11/20/2024 with Dr. Emili Rod    Make appt with cardiologist  Refer to Neurologist

## 2024-10-25 NOTE — PROGRESS NOTES
[facility] Progress Note      Chief Complaint     Follow-up       Subjective:      History of Present Illness:  Tiffany Masterson is a 89 y.o. female     S/p head trauma after slipping off her stairs while carrying a handful of power cables on 9/22/2024; she fell on her right side and right occiput with LOC with GCS 9 intubated in the [facility] ICU and then extubated on 9/27/2024 and then discharged on 10/10/2024;  pt was d'xed with left Left frontotemporal subarachnoid hemorrhage and small focus of intraparenchymal hemorrhage in the left cerebellum. Nondisplaced fracture occipital bone extending to the right mastoid temporal bone with the fracture plane also involving the right stylomastoid foramen and right jugular foramen  Pt had  serial head Cts and was able to go home rather than inpatient rehab since she was able to recover so quickly and is now getting home PT.  -has another CT head scheduled on 11/18/2024 and hearing eval at [facility]   -has hospital f/u 11/20/2024 with Dr. Emili Rod  -her po intake is now normal and she has a walker but does not feel she needs it; denies dizziness, vertigo, weakness  -her hubby feels she is less tolerant and is sometimes more emotional/irrational in her decision-making  -she is sleeping well and feels well-rested    9/22/2024: Ct head: Left frontotemporal subarachnoid hemorrhage again noted including a small focus of intraparenchymal hemorrhage in the left cerebellum. No new hemorrhage.     Right temporal bone: Acute nondisplaced fracture occipital bone extending to the right mastoid temporal bone with the fracture plane also involving the right stylomastoid foramen (series 201 image 39) and right jugular foramen (series 202 image 101). Trace acute blood products in the right mastoid air cells and middle ear cleft.     Atrial Fib:  pt was resumed on home Eliquis 5mg bid and was placed on Cardizem 90mg tid in addition to her original Metoprolol ER 50mg po  bid; not check BP or HR at home but denies palpitations,CP, SOB, LE edema.    Hypertension: controlled on losartan 100mg daily, cardizem 90mg tid, metoprolol ER 50mg bid     Past Medical History:  Past Medical History:   Diagnosis Date    Abnormal MRI 10/16/2020    Acute cystitis without hematuria 12/22/2021    Arthritis     Arthritis of right hip 11/08/2021    Atrial fibrillation     Cataract     Elevated liver enzymes     Endometrial mass 06/29/2018    Epiretinal membrane (ERM) of right eye     Family history of malignant neoplasm of gastrointestinal tract mother    Frequent UTI 09/30/2018    Generalized arthritis 09/19/2024    Shoulders, knees, hips      Graves disease     now hypothryoid - no surgery or medicine. resolved on its own    History of cholecystectomy 06/10/2021    History of colonoscopy     unremarkable 2007 by Dr. Javier.  Barium enema revealed diverticular disease.    Hyperlipidemia     Hypertension     Hypertriglyceridemia 04/19/2013    Continue statin for secondary stroke prevention    Hypothyroidism     Impaired functional mobility, balance, gait, and endurance 06/04/2021    Iron deficiency anemia 09/29/2021    Migraine, ophthalmoplegic     Mixed stress and urge urinary incontinence 09/30/2018    Ocular migraine     Personal history of colonic polyps     Physical deconditioning 09/13/2023    Preop testing 05/17/2021    S/P colonoscopic polypectomy 06/18/2013    Sleep disorder 11/08/2021    Status post hysteroscopic polypectomy 07/02/2018    TIA (transient ischemic attack)     with a normal angiogram in the past, Ocular migraines reported by patient, not TIA     Transaminitis 03/19/2021    Urethral caruncle 09/15/2020       Past Surgical History:  Past Surgical History:   Procedure Laterality Date    CATARACT EXTRACTION Right 2012    Dr. Lexis Nicolas     CHOLECYSTECTOMY  2022    COLONOSCOPY      2014 polyps    COLONOSCOPY N/A 11/07/2016    Procedure: COLONOSCOPY;  Surgeon: Bebeto Gonzalez MD;   Location: [facility] ENDO (4TH FLR);  Service: Endoscopy;  Laterality: N/A;    COLONOSCOPY N/A 03/09/2020    Procedure: COLONOSCOPY;  Surgeon: Bebeto Gonzalez MD;  Location: [facility] ENDO (4TH FLR);  Service: Endoscopy;  Laterality: N/A;    COLONOSCOPY N/A 09/29/2021    Procedure: COLONOSCOPY;  Surgeon: Bebeto Gonzalez MD;  Location: [facility] (4TH FLR);  Service: Endoscopy;  Laterality: N/A;  please schedule patient as soon as possible with me EGD colonoscopy with constipation bowel prep for iron deficiency anemia family history of colon cancer and personal history of colon polyps  9/17/21 ok for standard split prep per Dr. Gonzalez-imani    COLONOSCOPY N/A 09/29/2021    Procedure: COLONOSCOPY;  Surgeon: Bebeto Gonzalez MD;  Location: [facility] (4TH FLR);  Service: Endoscopy;  Laterality: N/A;  Please schedule patient as soon as possible with me EGD colonoscopy with constipation bowel prep for iron deficiency anemia family history of colon cancer and personal history of colon polyps  9/17/21 Ok for standard split prep per Dr. Gonzalez-imani    COLONOSCOPY W/ POLYPECTOMY  06/2013    polyp of colon    ENDOSCOPIC ULTRASOUND OF UPPER GASTROINTESTINAL TRACT N/A 04/29/2021    Procedure: ULTRASOUND, UPPER GI TRACT, ENDOSCOPIC;  Surgeon: Dalton Johnson MD;  Location: [facility] (2ND FLR);  Service: Endoscopy;  Laterality: N/A;  Postprandial nausea vomiting epigastric 0.9 cm stone and sludge seen within the gallbladder lumen.  No wall thickening or pericholecystic fluid.  0.6 cm stone seen within the distal common bile duct at the level of the pancreatic head.  There is dil    ERCP N/A 04/29/2021    Procedure: ERCP (ENDOSCOPIC RETROGRADE CHOLANGIOPANCREATOGRAPHY);  Surgeon: Dalton Johnson MD;  Location: [facility] (2ND FLR);  Service: Endoscopy;  Laterality: N/A;  Postprandial nausea and vomit 0.9 cm stone and sludge seen within the gallbladder lumen.  No wall thickening or pericholecystic fluid.  0.6 cm stone seen within the distal  common bile duct at the level of the pancreatic head.  There i    ESOPHAGOGASTRODUODENOSCOPY N/A 09/29/2021    Procedure: EGD (ESOPHAGOGASTRODUODENOSCOPY);  Surgeon: Bebeto Gonzalez MD;  Location: [facility] (4TH FLR);  Service: Endoscopy;  Laterality: N/A;  rapid covid test arrival 12pm -   9/27 arrival time for covid test/procedure confirmed with pt-rb    ESOPHAGOGASTRODUODENOSCOPY N/A 09/29/2021    Procedure: EGD (ESOPHAGOGASTRODUODENOSCOPY);  Surgeon: Bebeto Gonzalez MD;  Location: [facility] ENDO (4TH FLR);  Service: Endoscopy;  Laterality: N/A;  [place] [facility] [place]    EYE SURGERY  11/10/2014    right retina/Dr. Dalton Sierra ([facility])    HYSTEROSCOPIC POLYPECTOMY OF UTERUS N/A 07/02/2018    Procedure: POLYPECTOMY, UTERUS, HYSTEROSCOPIC;  Surgeon: Elliot Vanessa IV, MD;  Location: [facility] OR;  Service: OB/GYN;  Laterality: N/A;    INJECTION OF JOINT Right 11/11/2022    Procedure: INJECTION, RIGHT GTB CONTRAST DIRECT REFERRAL;  Surgeon: Camden Hernandez MD;  Location: [facility] PAIN MGT;  Service: Pain Management;  Laterality: Right;    JOINT REPLACEMENT  03/23/2011    left total hip replacement    LAPAROSCOPIC CHOLECYSTECTOMY N/A 05/17/2021    Procedure: CHOLECYSTECTOMY, LAPAROSCOPIC;  Surgeon: Rayshawn Peralta Jr., MD;  Location: [facility] 2ND FLR;  Service: General;  Laterality: N/A;    REMOVAL OF EPIRETINAL MEMBRANE Right     Dr. Padron    TONSILLECTOMY      pt was 11 years old       Allergies:  Review of patient's allergies indicates:   Allergen Reactions    Levaquin [levofloxacin]      Joint pain    Hydrochlorothiazide Other (See Comments)     Pain in joints and depression per pt       Home Medications:  Current Outpatient Medications   Medication Sig Dispense Refill    acetaminophen (TYLENOL) 500 MG tablet Take 500 mg by mouth every 6 (six) hours as needed.      ascorbic acid, vitamin C, (VITAMIN C) 250 MG tablet Take 250 mg by mouth once daily.      atorvastatin (LIPITOR) 10 MG  tablet Take 1 tablet by mouth once daily 90 tablet 0    b complex vitamins capsule Take 1 capsule by mouth once daily.      biotin 5,000 mcg TbDL Take 1 tablet (5,000 mcg total) by mouth Daily. 90 tablet 3    bisacodyL (DULCOLAX) 5 mg EC tablet Take 5 mg by mouth once daily.      CALCIUM CARBONATE/VITAMIN D3 (CALCIUM 600 + D,3, ORAL) Take 1 tablet by mouth once daily.       cholecalciferol, vitamin D3, (VITAMIN D3) 50 mcg (2,000 unit) Cap Take 1 capsule by mouth once daily.      coenzyme Q10 200 mg capsule Take 200 mg by mouth once daily.      DEXTRAN 70/HYPROMELLOSE (ARTIFICIAL TEARS, PF, OPHT) Place 1 drop into both eyes as needed.      diclofenac sodium (VOLTAREN) 1 % Gel Apply 2 g topically 4 (four) times daily. 200 g 3    diltiaZEM (CARDIZEM) 90 MG tablet Take 90 mg by mouth every 8 (eight) hours.      estradioL (ESTRACE) 0.01 % (0.1 mg/gram) vaginal cream Place 0.5 g intravaginally q.h.s. x2 weeks; then, placed 0.5 g intravaginally twice weekly at bedtime (maintenance therapy). 42.5 g 1    fluticasone (VERAMYST) 27.5 mcg/actuation nasal spray 2 sprays by Nasal route.      fluticasone propionate (FLONASE) 50 mcg/actuation nasal spray CLEAN SINUS/NARES WITH OCEAN NASAL SPRAY THEN USE FLONASE 1 SPRAY TWICE DAILY. 48 g 3    gabapentin (NEURONTIN) 100 MG capsule Take 1 capsule (100 mg total) by mouth 2 (two) times daily. 180 capsule 3    hydroquinone 4 % Crea APPLY  CREAM TOPICALLY TWICE DAILY 58 g 0    levothyroxine (SYNTHROID) 112 MCG tablet TAKE 1 TABLET BY MOUTH BEFORE BREAKFAST 90 tablet 0    LIDOcaine (LIDODERM) 5 % Place 1 patch onto the skin once daily. Remove & Discard patch within 12 hours or as directed by MD 30 patch 0    losartan (COZAAR) 100 MG tablet Take 1 tablet by mouth once daily 90 tablet 3    magnesium oxide 400 mg magnesium Tab Take 1 tablet by mouth once daily.      metoprolol succinate (TOPROL-XL) 50 MG 24 hr tablet Take 1 tablet by mouth twice daily 180 tablet 3    metoprolol tartrate  (LOPRESSOR) 50 MG tablet Take 1 tablet by mouth 2 (two) times daily.      MULTIVITAMIN W-MINERALS/LUTEIN (CENTRUM SILVER ORAL) Take 1 tablet by mouth once daily.      omeprazole (PRILOSEC) 40 MG capsule Take 40 mg by mouth.      sodium chloride (SALINE NASAL) 0.65 % nasal spray 1 spray by Nasal route as needed for Congestion. 104 mL 3    traMADoL (ULTRAM) 50 mg tablet Take 1 tablet (50 mg total) by mouth every 8 (eight) hours as needed. 60 tablet 0    vit A/vit C/vit E/zinc/copper (ICAPS AREDS ORAL) Take 1 tablet by mouth 2 (two) times a day.       Current Facility-Administered Medications   Medication Dose Route Frequency Provider Last Rate Last Admin    triamcinolone acetonide injection 40 mg  40 mg Intra-articular 1 time in Clinic/HOD Rebeca Dumont MD            Family History:  Family History   Problem Relation Name Age of Onset    Colon cancer Mother Tiffany [place] 75    Lung cancer Father Col. Josue Parmar [place] 75    Diabetes Other great aunt     Diabetes Paternal Aunt      Colon cancer Paternal Aunt  80    Prostate cancer Brother      Breast cancer Neg Hx      Ovarian cancer Neg Hx      Celiac disease Neg Hx      Cataracts Neg Hx      Glaucoma Neg Hx      Macular degeneration Neg Hx         Social History:  Social History     Tobacco Use    Smoking status: Never    Smokeless tobacco: Never    Tobacco comments:     The patient is not getting any regular exercise at this time.  She does enjoy traveling to [place].   Substance Use Topics    Alcohol use: Yes     Comment: occasionally    Drug use: No       Review of Systems:  Pertinent positives and negatives listed in HPI. All other systems are reviewed and are negative.    Health Maintaince :   Health Maintenance Topics with due status: Not Due       Topic Last Completion Date    TETANUS VACCINE 09/20/2018    Lipid Panel 09/19/2024           Eye:   Dental:     Immunizations:     The ASCVD Risk score (Emeka CONTRERAS, et al., 2019) failed to  "calculate for the following reasons:    The 2019 ASCVD risk score is only valid for ages 40 to 79      Objective:   /76 (BP Location: Left arm, Patient Position: Sitting)   Pulse 60   Ht 5' 5" (1.651 m)   Wt 69.9 kg (154 lb 1.6 oz)   SpO2 98%   BMI 25.64 kg/m²      Body mass index is 25.64 kg/m².       Physical Examination:  General: Alert and awake in no apparent distress  Eyes:  PERRL; EOMi with anicteric sclera and clear conjunctivae  Mouth:  Oropharynx clear and without exudate; moist mucous membranes  Neck:   Cervical nodes not enlarged (No submental, submandibular, preauricular, posterior auricular or occipital adenopathy); supple; no bruits  Cardio:  Irregularly irregular rate and rhythm with normal S1 and S2; no murmurs or rubs  Resp:  CTAB; respirations unlabored; no wheezes, crackles or rhonchi  Extrem: Warm and well-perfused with no clubbing, cyanosis or edema  Skin:  No rashes, lesions, or color changes  Pulses:  2+ and symmetric distally  Neuro:  AAOx3; cooperative and pleasant with no focal deficits    Laboratory:      Most Recent Data:  Lab Results   Component Value Date    WBC 8.4 10/10/2024    HGB 10.8 (L) 10/10/2024    HCT 32.0 (L) 10/10/2024     09/19/2024    CHOL 141 09/19/2024    TRIG 104 09/19/2024    HDL 44 09/19/2024    ALT 19 09/19/2024    AST 23 09/19/2024     (L) 09/19/2024    K 4.7 09/19/2024     09/19/2024    BUN 17 09/19/2024    CO2 22 (L) 09/19/2024    TSH 3.41 09/26/2024    INR 1.1 09/22/2024    HGBA1C 5.8 (H) 08/31/2020              CBC:   WBC   Date Value Ref Range Status   10/10/2024 8.4 4.5 - 11.0 103/uL Final   09/19/2024 10.55 3.90 - 12.70 K/uL Final     Hemoglobin   Date Value Ref Range Status   10/10/2024 10.8 (L) 12.0 - 16.0 gm/dL Final   09/19/2024 11.1 (L) 12.0 - 16.0 g/dL Final     Hematocrit   Date Value Ref Range Status   10/10/2024 32.0 (L) 35.0 - 46.0 % Final   09/19/2024 35.2 (L) 37.0 - 48.5 % Final     Platelets   Date Value Ref Range " Status   09/19/2024 223 150 - 450 K/uL Final     MCV   Date Value Ref Range Status   10/10/2024 92.1 80.0 - 100.0 fL Final   09/19/2024 94 82 - 98 fL Final     RDW   Date Value Ref Range Status   10/10/2024 14.5 11.5 - 14.5 % Final   09/19/2024 13.7 11.5 - 14.5 % Final     BMP:   Sodium   Date Value Ref Range Status   09/19/2024 133 (L) 136 - 145 mmol/L Final     Potassium   Date Value Ref Range Status   09/19/2024 4.7 3.5 - 5.1 mmol/L Final     Chloride   Date Value Ref Range Status   09/19/2024 103 95 - 110 mmol/L Final     CO2   Date Value Ref Range Status   09/19/2024 22 (L) 23 - 29 mmol/L Final     BUN   Date Value Ref Range Status   09/19/2024 17 8 - 23 mg/dL Final     Creatinine   Date Value Ref Range Status   09/19/2024 1.2 0.5 - 1.4 mg/dL Final     Glucose   Date Value Ref Range Status   09/19/2024 92 70 - 110 mg/dL Final     Calcium   Date Value Ref Range Status   09/19/2024 9.5 8.7 - 10.5 mg/dL Final     Magnesium   Date Value Ref Range Status   09/19/2024 2.0 1.6 - 2.6 mg/dL Final     Phosphorus   Date Value Ref Range Status   06/21/2013 2.3 (L) 2.7 - 4.5 mg/dl Final     LFTs:   Total Protein   Date Value Ref Range Status   09/19/2024 7.1 6.0 - 8.4 g/dL Final     Albumin   Date Value Ref Range Status   09/19/2024 4.0 3.5 - 5.2 g/dL Final     Total Bilirubin   Date Value Ref Range Status   09/19/2024 0.6 0.1 - 1.0 mg/dL Final     Comment:     For infants and newborns, interpretation of results should be based  on gestational age, weight and in agreement with clinical  observations.    Premature Infant recommended reference ranges:  Up to 24 hours.............<8.0 mg/dL  Up to 48 hours............<12.0 mg/dL  3-5 days..................<15.0 mg/dL  6-29 days.................<15.0 mg/dL       AST   Date Value Ref Range Status   09/19/2024 23 10 - 40 U/L Final     Alkaline Phosphatase   Date Value Ref Range Status   09/19/2024 98 55 - 135 U/L Final     ALT   Date Value Ref Range Status   09/19/2024 19 10 - 44  "U/L Final     Coags:   INR   Date Value Ref Range Status   09/22/2024 1.1 0.9 - 1.2 Final   05/09/2013 1.0 0.8 - 1.2 Final     Comment:     ACCP Guideline for Coumadin usage recommends a "target range" of  2.0 - 3.0 for INR for all indicators except mechanical heart valves  and antiphospholipid syndromes which should use 2.5 - 3.5.  .     FLP:      Lab Results   Component Value Date    CHOL 141 09/19/2024    CHOL 136 11/06/2023    CHOL 217 (H) 09/13/2023     Lab Results   Component Value Date    HDL 44 09/19/2024    HDL 49 11/06/2023    HDL 39 (L) 09/13/2023     Lab Results   Component Value Date    LDLCALC 76.2 09/19/2024    LDLCALC 65.6 11/06/2023    LDLCALC 131.0 09/13/2023     Lab Results   Component Value Date    TRIG 104 09/19/2024    TRIG 107 11/06/2023    TRIG 235 (H) 09/13/2023     Lab Results   Component Value Date    CHOLHDL 31.2 09/19/2024    CHOLHDL 36.0 11/06/2023    CHOLHDL 18.0 (L) 09/13/2023      DM:      Hemoglobin A1C   Date Value Ref Range Status   08/31/2020 5.8 (H) 4.0 - 5.6 % Final     Comment:     ADA Screening Guidelines:  5.7-6.4%  Consistent with prediabetes  >or=6.5%  Consistent with diabetes  High levels of fetal hemoglobin interfere with the HbA1C  assay. Heterozygous hemoglobin variants (HbS, HgC, etc)do  not significantly interfere with this assay.   However, presence of multiple variants may affect accuracy.       LDL Cholesterol   Date Value Ref Range Status   09/19/2024 76.2 63.0 - 159.0 mg/dL Final     Comment:     The National Cholesterol Education Program (NCEP) has set the  following guidelines (reference values) for LDL Cholesterol:  Optimal.......................<130 mg/dL  Borderline High...............130-159 mg/dL  High..........................160-189 mg/dL  Very High.....................>190 mg/dL       Creatinine   Date Value Ref Range Status   09/19/2024 1.2 0.5 - 1.4 mg/dL Final     Thyroid:   TSH   Date Value Ref Range Status   09/26/2024 3.41 0.50 - 5.00 uIU/mL " Final   09/19/2024 2.158 0.400 - 4.000 uIU/mL Final     Free T4   Date Value Ref Range Status   09/19/2024 1.02 0.71 - 1.51 ng/dL Final     Anemia:   Iron   Date Value Ref Range Status   09/18/2021 47 30 - 160 ug/dL Final     TIBC   Date Value Ref Range Status   09/18/2021 330 250 - 450 ug/dL Final     Ferritin   Date Value Ref Range Status   09/18/2021 251 20.0 - 300.0 ng/mL Final     Vitamin B-12   Date Value Ref Range Status   12/02/2022 652 210 - 950 pg/mL Final     Cardiac:   Troponin I   Date Value Ref Range Status   12/21/2021 0.014 0.000 - 0.026 ng/mL Final     Comment:     The reference interval for Troponin I represents the 99th percentile   cutoff   for our facility and is consistent with 3rd generation assay   performance.       BNP   Date Value Ref Range Status   12/21/2021 184 (H) 0 - 99 pg/mL Final     Comment:     Values of less than 100 pg/ml are consistent with non-CHF populations.     Urinalysis:   Urine Culture, Routine   Date Value Ref Range Status   12/22/2021   Final    Multiple organisms isolated. None in predominance.  Repeat if   12/22/2021 clinically necessary.  Final     Color, UA   Date Value Ref Range Status   09/19/2024 Yellow Yellow, Straw, [place] Final     Clarity, UA   Date Value Ref Range Status   10/29/2020   Final     Comment:     normal      Specific Gravity, UA   Date Value Ref Range Status   09/19/2024 1.020 1.005 - 1.030 Final     Nitrite, UA   Date Value Ref Range Status   09/19/2024 Positive (A) Negative Final     Ketones, UA   Date Value Ref Range Status   09/19/2024 Negative Negative Final     Urobilinogen, UA   Date Value Ref Range Status   10/29/2020   Final     Comment:     normal   09/14/2018 Negative <2.0 EU/dL Final     WBC, UA   Date Value Ref Range Status   09/19/2024 23 (H) 0 - 5 /hpf Final       Other Results:  EKG (my interpretation):     Radiology:  X-Ray Shoulder Complete Bilateral  Narrative: EXAMINATION:  XR SHOULDER COMPLETE 2 OR MORE VIEWS  BILATERAL    CLINICAL HISTORY:  Pain in right shoulder    TECHNIQUE:  Bilateral shoulder radiograph series.    COMPARISON:  09/27/2023    FINDINGS:  Bilateral glenohumeral articulations appear intact with DJD characterized by joint space loss and osteophytosis.  Mild AC joint DJD.  No acute fracture, dislocation, or osseous destruction.  Impression: As above.    Electronically signed by: Marcellus Barkley  Date:    09/03/2024  Time:    08:46          Assessment/Plan     Tiffany Masterson is a 89 y.o. female with:  1. Traumatic brain injury, with loss of consciousness greater than 24 hours with return to pre-existing conscious level, sequela  Overview:  9/22/2024: Ct head: Left frontotemporal subarachnoid hemorrhage again noted including a small focus of intraparenchymal hemorrhage in the left cerebellum. No new hemorrhage.     Assessment & Plan:  Refer to Neurology asap for ongoing evaluation   Use walker for support and balance  Cont home PT   Avoid all NSAIDs and ASA      Orders:  -     Ambulatory referral/consult to Neurology; Future; Expected date: 10/25/2024    2. Paroxysmal atrial fibrillation  Assessment & Plan:  Check BP and HR daily   Keep BP >110/60 and <130/80  Keep HR 55-80 beats per minute (Never <55 or >100)  Cont Metoprolol ER 50mg twice per day   Cont Diltiazem 90mg 3 times per day - drop to twice per day if HR <55 OR BP <110/60    Make appt with cardiologist   Cont Eliquis 5mg bid and monitor for bleeding  Avoid all NSAIDs and ASA   Use walker for balance       3. Primary hypertension  Assessment & Plan:  well-controlled on metoprolol ER 50mg bid; losartan 100mg daily;   Cont diltiazem 90mg 3 times per day unless BP <110/60 or HR <55 then change to twice per day   -Check Bps at home weekly and prn symptoms for goal BP <130/80.  Avoid Dobbins's table salt; use Mrs. Thompson or Mata Rizvi no salt   -Start healthy diet high in fiber (25-35 gm daily), low fat dairy, lean protein, low in saturated/trans fat  (minimize cheese, creamy salad dressings); high in calcium/magnesium/potassium; 1.5 gm sodium diet; low in processed sugars and foods, ie  WHITE sugars, bread, pasta, rice, ice cream, cookies, cake, doughnuts  -Drink 6-8 glasses of water daily  -Moderate exercise 30 min 5 times per week or 75 min intense exercise biweekly   -Start 8-10 hour intermittent fasting diet   -Avoid eating 3 hours prior to bedtime  -Reduce alcohol to 1-2 servings (1 serving = 1 oz wine, 1 oz hard liquor, 12 oz beer) per day  -Avoid smoking, vaping, stimulant drugs/mediations ie illicit drugs, pseudo-ephedrine  -Use ADD/ADHD meds sparingly  -Minimize NSAIDs          4. Preventative health care  Assessment & Plan:  Get COVID vaccine  Keep CT head scheduled on 11/18/2024 [facility] with Dr. Emili Rod    Make appt with cardiologist  Refer to Neurologist                I spent a total of 33 minutes on the day of the visit.This includes face to face time and non-face to face time preparing to see the patient (eg, review of tests), obtaining and/or reviewing separately obtained history, documenting clinical information in the electronic or other health record, independently interpreting results and communicating results to the patient/family/caregiver, or care coordinator.   Code Status:     Rebeca Dumont MD

## 2024-10-28 ENCOUNTER — PATIENT MESSAGE (OUTPATIENT)
Dept: PRIMARY CARE CLINIC | Facility: CLINIC | Age: 89
End: 2024-10-28
Payer: MEDICARE

## 2024-10-28 NOTE — TELEPHONE ENCOUNTER
Pt f/u on 10/25 office visit, states the AVS says to stop taking Eliquis (AVS view and it does state that on the first page)    Note states to continue Eliquis 5 mg bid and monitor for bleeding; med is not active on med list    Should pt continue to take Eliquis 5 mg twice daily?

## 2024-11-04 ENCOUNTER — OFFICE VISIT (OUTPATIENT)
Dept: DERMATOLOGY | Facility: CLINIC | Age: 89
End: 2024-11-04
Payer: MEDICARE

## 2024-11-04 DIAGNOSIS — T14.8XXA ABRASION: Primary | ICD-10-CM

## 2024-11-04 PROCEDURE — 1159F MED LIST DOCD IN RCRD: CPT | Mod: HCNC,CPTII,S$GLB, | Performed by: STUDENT IN AN ORGANIZED HEALTH CARE EDUCATION/TRAINING PROGRAM

## 2024-11-04 PROCEDURE — 3288F FALL RISK ASSESSMENT DOCD: CPT | Mod: HCNC,CPTII,S$GLB, | Performed by: STUDENT IN AN ORGANIZED HEALTH CARE EDUCATION/TRAINING PROGRAM

## 2024-11-04 PROCEDURE — 99213 OFFICE O/P EST LOW 20 MIN: CPT | Mod: HCNC,S$GLB,, | Performed by: STUDENT IN AN ORGANIZED HEALTH CARE EDUCATION/TRAINING PROGRAM

## 2024-11-04 PROCEDURE — 1157F ADVNC CARE PLAN IN RCRD: CPT | Mod: HCNC,CPTII,S$GLB, | Performed by: STUDENT IN AN ORGANIZED HEALTH CARE EDUCATION/TRAINING PROGRAM

## 2024-11-04 PROCEDURE — 1160F RVW MEDS BY RX/DR IN RCRD: CPT | Mod: HCNC,CPTII,S$GLB, | Performed by: STUDENT IN AN ORGANIZED HEALTH CARE EDUCATION/TRAINING PROGRAM

## 2024-11-04 PROCEDURE — 99999 PR PBB SHADOW E&M-EST. PATIENT-LVL IV: CPT | Mod: PBBFAC,HCNC,, | Performed by: STUDENT IN AN ORGANIZED HEALTH CARE EDUCATION/TRAINING PROGRAM

## 2024-11-04 PROCEDURE — 1126F AMNT PAIN NOTED NONE PRSNT: CPT | Mod: HCNC,CPTII,S$GLB, | Performed by: STUDENT IN AN ORGANIZED HEALTH CARE EDUCATION/TRAINING PROGRAM

## 2024-11-04 PROCEDURE — 1100F PTFALLS ASSESS-DOCD GE2>/YR: CPT | Mod: HCNC,CPTII,S$GLB, | Performed by: STUDENT IN AN ORGANIZED HEALTH CARE EDUCATION/TRAINING PROGRAM

## 2024-11-04 RX ORDER — MUPIROCIN 20 MG/G
OINTMENT TOPICAL 3 TIMES DAILY
Qty: 30 G | Refills: 1 | Status: SHIPPED | OUTPATIENT
Start: 2024-11-04

## 2024-11-04 NOTE — PROGRESS NOTES
Subjective:      Patient ID:  Tiffany Masterson is a 89 y.o. female who presents for   Chief Complaint   Patient presents with    Mass     Back of neck , scalp      Pt states she has lumps on back of neck and scalp  Pt states this has been present since the last visit  Pt states no sx at this time       Last TBSE 4/2024. Due 4/2025    Here today for lumps on scalp    Review of Systems   Skin:  Negative for daily sunscreen use, activity-related sunscreen use and wears hat.   Hematologic/Lymphatic: Bruises/bleeds easily (eliquis).       Objective:   Physical Exam   Constitutional: She appears well-developed and well-nourished. No distress.   Neurological: She is alert and oriented to person, place, and time. She is not disoriented.   Psychiatric: She has a normal mood and affect.   Skin:   Areas Examined (abnormalities noted in diagram):   Scalp / Hair Palpated and Inspected  Head / Face Inspection Performed       Diagram Legend     Erythematous scaling macule/papule c/w actinic keratosis       Vascular papule c/w angioma      Pigmented verrucoid papule/plaque c/w seborrheic keratosis      Yellow umbilicated papule c/w sebaceous hyperplasia      Irregularly shaped tan macule c/w lentigo     1-2 mm smooth white papules consistent with Milia      Movable subcutaneous cyst with punctum c/w epidermal inclusion cyst      Subcutaneous movable cyst c/w pilar cyst      Firm pink to brown papule c/w dermatofibroma      Pedunculated fleshy papule(s) c/w skin tag(s)      Evenly pigmented macule c/w junctional nevus     Mildly variegated pigmented, slightly irregular-bordered macule c/w mildly atypical nevus      Flesh colored to evenly pigmented papule c/w intradermal nevus       Pink pearly papule/plaque c/w basal cell carcinoma      Erythematous hyperkeratotic cursted plaque c/w SCC      Surgical scar with no sign of skin cancer recurrence      Open and closed comedones      Inflammatory papules and pustules      Verrucoid  papule consistent consistent with wart     Erythematous eczematous patches and plaques     Dystrophic onycholytic nail with subungual debris c/w onychomycosis     Umbilicated papule    Erythematous-base heme-crusted tan verrucoid plaque consistent with inflamed seborrheic keratosis     Erythematous Silvery Scaling Plaque c/w Psoriasis     See annotation                Assessment / Plan:        Abrasion  -     mupirocin (BACTROBAN) 2 % ointment; Apply topically 3 (three) times daily. Apply to wound on head until healed  Dispense: 30 g; Refill: 1      - laceration / abrasion from fall in September. Had a large adherent crust overlying wound and stuck to hair. Numbed with 1% lido. Crust removed with forceps revealing about 5x5mm abrasion with exuberant granulation tissue. Granulation tissue treated with electrodessication then hemostasis with monsels  - start mupirocin tid   - wash every 1-2 days in shower  F/u 3-4 weeks

## 2024-11-26 DIAGNOSIS — E78.2 MIXED HYPERLIPIDEMIA: ICD-10-CM

## 2024-11-26 DIAGNOSIS — M15.9 GENERALIZED OSTEOARTHRITIS OF MULTIPLE SITES: ICD-10-CM

## 2024-11-26 DIAGNOSIS — I10 HYPERTENSION, UNSPECIFIED TYPE: ICD-10-CM

## 2024-11-26 DIAGNOSIS — E03.9 HYPOTHYROIDISM, UNSPECIFIED TYPE: ICD-10-CM

## 2024-11-26 RX ORDER — APIXABAN 5 MG/1
5 TABLET, FILM COATED ORAL 2 TIMES DAILY
Qty: 180 TABLET | Refills: 3 | Status: SHIPPED | OUTPATIENT
Start: 2024-11-26

## 2024-11-26 RX ORDER — LEVOTHYROXINE SODIUM 112 UG/1
112 TABLET ORAL
Qty: 90 TABLET | Refills: 3 | Status: SHIPPED | OUTPATIENT
Start: 2024-11-26

## 2024-11-26 RX ORDER — GABAPENTIN 100 MG/1
100 CAPSULE ORAL 3 TIMES DAILY
Qty: 90 CAPSULE | Refills: 3 | Status: SHIPPED | OUTPATIENT
Start: 2024-11-26

## 2024-11-26 RX ORDER — LOSARTAN POTASSIUM 100 MG/1
100 TABLET ORAL
Qty: 90 TABLET | Refills: 3 | Status: SHIPPED | OUTPATIENT
Start: 2024-11-26

## 2024-11-26 RX ORDER — ATORVASTATIN CALCIUM 10 MG/1
10 TABLET, FILM COATED ORAL
Qty: 90 TABLET | Refills: 3 | Status: SHIPPED | OUTPATIENT
Start: 2024-11-26

## 2024-11-26 NOTE — TELEPHONE ENCOUNTER
Refill Decision Note   Tiffany Masterson  is requesting a refill authorization.  Brief Assessment and Rationale for Refill:  Approve     Medication Therapy Plan:         Comments:     Note composed:1:17 PM 11/26/2024

## 2024-11-26 NOTE — TELEPHONE ENCOUNTER
No care due was identified.  [facility] Embedded Care Due Messages. Reference number: [id number].   11/26/2024 11:05:17 AM CST

## 2024-11-26 NOTE — TELEPHONE ENCOUNTER
No care due was identified.  [facility] Embedded Care Due Messages. Reference number: [id number].   11/26/2024 10:54:37 AM CST

## 2024-11-29 ENCOUNTER — PATIENT MESSAGE (OUTPATIENT)
Dept: PRIMARY CARE CLINIC | Facility: CLINIC | Age: 89
End: 2024-11-29
Payer: MEDICARE

## 2024-11-29 DIAGNOSIS — F07.81 POST CONCUSSIVE SYNDROME: Primary | ICD-10-CM

## 2024-12-03 ENCOUNTER — TELEPHONE (OUTPATIENT)
Dept: NEUROLOGY | Facility: CLINIC | Age: 89
End: 2024-12-03
Payer: MEDICARE

## 2024-12-03 ENCOUNTER — TELEPHONE (OUTPATIENT)
Dept: NEUROSURGERY | Facility: CLINIC | Age: 89
End: 2024-12-03
Payer: MEDICARE

## 2024-12-03 ENCOUNTER — OFFICE VISIT (OUTPATIENT)
Dept: NEUROLOGY | Facility: CLINIC | Age: 89
End: 2024-12-03
Payer: MEDICARE

## 2024-12-03 VITALS — SYSTOLIC BLOOD PRESSURE: 143 MMHG | DIASTOLIC BLOOD PRESSURE: 72 MMHG | HEART RATE: 59 BPM

## 2024-12-03 DIAGNOSIS — I60.9 SAH (SUBARACHNOID HEMORRHAGE): ICD-10-CM

## 2024-12-03 DIAGNOSIS — S06.0X9S CONCUSSION WITH LOSS OF CONSCIOUSNESS, SEQUELA: Primary | ICD-10-CM

## 2024-12-03 DIAGNOSIS — I60.9 SUBARACHNOID HEMORRHAGE: Primary | ICD-10-CM

## 2024-12-03 DIAGNOSIS — S13.4XXS WHIPLASH INJURIES, SEQUELA: ICD-10-CM

## 2024-12-03 DIAGNOSIS — S06.9X5S TRAUMATIC BRAIN INJURY, WITH LOSS OF CONSCIOUSNESS GREATER THAN 24 HOURS WITH RETURN TO PRE-EXISTING CONSCIOUS LEVEL, SEQUELA: ICD-10-CM

## 2024-12-03 DIAGNOSIS — R41.89 COGNITIVE CHANGES: ICD-10-CM

## 2024-12-03 DIAGNOSIS — G57.92 NEUROPATHY OF LEFT FOOT: ICD-10-CM

## 2024-12-03 DIAGNOSIS — R51.9 NONINTRACTABLE HEADACHE, UNSPECIFIED CHRONICITY PATTERN, UNSPECIFIED HEADACHE TYPE: ICD-10-CM

## 2024-12-03 DIAGNOSIS — R94.01 NONSPECIFIC ABNORMAL ELECTROENCEPHALOGRAM (EEG): ICD-10-CM

## 2024-12-03 DIAGNOSIS — R26.89 IMBALANCE: ICD-10-CM

## 2024-12-03 PROCEDURE — 99999 PR PBB SHADOW E&M-EST. PATIENT-LVL V: CPT | Mod: PBBFAC,HCNC,, | Performed by: STUDENT IN AN ORGANIZED HEALTH CARE EDUCATION/TRAINING PROGRAM

## 2024-12-03 NOTE — PATIENT INSTRUCTIONS
We have ordered an EEG to r/o seizure-like activity. Patient had an EEG at [facility] that was undeterminable   Referral for vestibular therapy for imbalance  Referral for Neurosurgery to f/u on [facility]   Please retrieve the images from [facility] on CD and bring them to medical records at [facility] so that physicians may review.  Referral for speech for cognitive impairment  Start beginner's yoga and advance as tolerated  You do not feel your feet well due to neuropathy. Please wear footwear with good tread at all times. This recommendation includes wearing socks with tread on them. Make sure all of your walkways, hallways, and maddi are well lit at all times day and night. Use night lights to light up commonly used pathways in your home, meaning from the bedroom to the bathroom or kitchen. Make sure all of your walkways, hallways, and maddi are clear of obstacles at all times. Obstacles include decorations, rugs/mats, pet beds, shoes, and clothes. Make sure you have good hand holds to grab onto as you walk around your home.

## 2024-12-03 NOTE — PROGRESS NOTES
"Chief Complaint: Fall    Subjective:     History of Present Illness    Referring Provider:  Date of Injury: 9/22/2024, 11/30/2024  Accompanied by:        12/03/2024: Tiffany Masterson is a 89 y.o. female pmhx, HTN, HLD, Afib on eliquis presents to the clinic for concussion evaluation. On 9/22/2024, the patient was walking up the stairs at 2am with papers in her both hands when her  heard a loud noise. He waited a few minutes, called out her name and found her at the base of the stairs +LOC, with blood pooled around her head. Per , when he found her on the floor after she regained consciousness the patient repeat the same phrase "please, help me". The patient does not remember falling and woke in the hospital a few days later. The patient states that all of symptoms resolved after the first event. However per her , the patient has generalized weakness (she was bedridden for 2wks in the hospital), cognitive impairment (she struggles to follow/comprehend instructions) since the Sep event. On 11/30/2024, she was trying to stand from sitting on the side of the bed and she could not find her balance. She tumbled over onto the carpet with no impact to the head, no LOC. She needed assistance to get up from the floor. Immediately, endorses possible lightheaded upon rising from the floor; denies photophobia, phonophobia, n/v, blurred vision, diplopia, neck pain or headache. Currently, denies headaches, photophobia, phonophobia, n/v, blurred vision, diplopia, neck pain. Sleep has been poor prior to the  Sep 2024 injury. She does nap throughout the day, avg 7 to 8 nonconsecutive hours in a 24hr period.  Denies emotional liability, depression, anxiety, SI or HI. Denies difficulty concentrating. Per , he believes it has declined by 40% in concentration since September. Per , she is more confused than forgetful. She has been had only two falls and denies any near-missed falls. Endorses " urinary incontinence since prior to the first event in September and after has started to have bowel incontinence. Denies REM behavior.     Current Outpatient Medications on File Prior to Visit   Medication Sig Dispense Refill    acetaminophen (TYLENOL) 500 MG tablet Take 500 mg by mouth every 6 (six) hours as needed.      ascorbic acid, vitamin C, (VITAMIN C) 250 MG tablet Take 250 mg by mouth once daily.      atorvastatin (LIPITOR) 10 MG tablet Take 1 tablet by mouth once daily 90 tablet 3    b complex vitamins capsule Take 1 capsule by mouth once daily.      biotin 5,000 mcg TbDL Take 1 tablet (5,000 mcg total) by mouth Daily. 90 tablet 3    bisacodyL (DULCOLAX) 5 mg EC tablet Take 5 mg by mouth once daily.      CALCIUM CARBONATE/VITAMIN D3 (CALCIUM 600 + D,3, ORAL) Take 1 tablet by mouth once daily.       cholecalciferol, vitamin D3, (VITAMIN D3) 50 mcg (2,000 unit) Cap Take 1 capsule by mouth once daily.      coenzyme Q10 200 mg capsule Take 200 mg by mouth once daily.      DEXTRAN 70/HYPROMELLOSE (ARTIFICIAL TEARS, PF, OPHT) Place 1 drop into both eyes as needed.      diclofenac sodium (VOLTAREN) 1 % Gel Apply 2 g topically 4 (four) times daily. 200 g 3    diltiaZEM (CARDIZEM) 90 MG tablet Take 90 mg by mouth every 8 (eight) hours.      ELIQUIS 5 mg Tab Take 1 tablet by mouth twice daily 180 tablet 3    estradioL (ESTRACE) 0.01 % (0.1 mg/gram) vaginal cream Place 0.5 g intravaginally q.h.s. x2 weeks; then, placed 0.5 g intravaginally twice weekly at bedtime (maintenance therapy). 42.5 g 1    fluticasone (VERAMYST) 27.5 mcg/actuation nasal spray 2 sprays by Nasal route.      fluticasone propionate (FLONASE) 50 mcg/actuation nasal spray CLEAN SINUS/NARES WITH OCEAN NASAL SPRAY THEN USE FLONASE 1 SPRAY TWICE DAILY. 48 g 3    gabapentin (NEURONTIN) 100 MG capsule TAKE 1 CAPSULE BY MOUTH THREE TIMES DAILY 90 capsule 3    hydroquinone 4 % Crea APPLY  CREAM TOPICALLY TWICE DAILY 58 g 0    levothyroxine (SYNTHROID)  112 MCG tablet TAKE 1 TABLET BY MOUTH BEFORE BREAKFAST 90 tablet 3    LIDOcaine (LIDODERM) 5 % Place 1 patch onto the skin once daily. Remove & Discard patch within 12 hours or as directed by MD 30 patch 0    losartan (COZAAR) 100 MG tablet Take 1 tablet by mouth once daily 90 tablet 3    magnesium oxide 400 mg magnesium Tab Take 1 tablet by mouth once daily.      metoprolol succinate (TOPROL-XL) 50 MG 24 hr tablet Take 1 tablet by mouth twice daily 180 tablet 3    metoprolol tartrate (LOPRESSOR) 50 MG tablet Take 1 tablet by mouth 2 (two) times daily.      MULTIVITAMIN W-MINERALS/LUTEIN (CENTRUM SILVER ORAL) Take 1 tablet by mouth once daily.      mupirocin (BACTROBAN) 2 % ointment Apply topically 3 (three) times daily. Apply to wound on head until healed 30 g 1    omeprazole (PRILOSEC) 40 MG capsule Take 40 mg by mouth.      sodium chloride (SALINE NASAL) 0.65 % nasal spray 1 spray by Nasal route as needed for Congestion. 104 mL 3    traMADoL (ULTRAM) 50 mg tablet Take 1 tablet (50 mg total) by mouth every 8 (eight) hours as needed. 60 tablet 0    vit A/vit C/vit E/zinc/copper (ICAPS AREDS ORAL) Take 1 tablet by mouth 2 (two) times a day.       Current Facility-Administered Medications on File Prior to Visit   Medication Dose Route Frequency Provider Last Rate Last Admin    triamcinolone acetonide injection 40 mg  40 mg Intra-articular 1 time in Clinic/HOD Rebeca Dumont MD           Review of patient's allergies indicates:   Allergen Reactions    Levaquin [levofloxacin]      Joint pain    Hydrochlorothiazide Other (See Comments)     Pain in joints and depression per pt       Family History   Problem Relation Name Age of Onset    Colon cancer Mother Tiffany [place] 75    Lung cancer Father ColLakesha Parmar [place] 75    Diabetes Other great aunt     Diabetes Paternal Aunt      Colon cancer Paternal Aunt  80    Prostate cancer Brother      Breast cancer Neg Hx      Ovarian cancer Neg Hx       Celiac disease Neg Hx      Cataracts Neg Hx      Glaucoma Neg Hx      Macular degeneration Neg Hx         Social History     Tobacco Use    Smoking status: Never    Smokeless tobacco: Never    Tobacco comments:     The patient is not getting any regular exercise at this time.  She does enjoy traveling to [place].   Substance Use Topics    Alcohol use: Yes     Comment: occasionally    Drug use: No       Review of Systems  Constitutional: No fevers, no chills, +losing weight (roughly 20lb since Jan 2024) due to lack of appetite  Eye/Vision: See HPI  Ear/Nose/Mouth/Throat: See HPI; +cough, no runny nose, no sore throat  Respiratory: No shortness of breath, + problems breathing  Cardiovascular: No chest pain  Gastrointestinal: See HPI, + diarrhea, + constipation  Genitourinary: No dysuria  Musculoskeletal: See HPI  Integumentary: No skin changes  Neurologic: See HPI  Psychiatric: Denies depression, denies anxiety, denies SI and HI.      Objective:     Vitals:    12/03/24 1251   BP: (!) 143/72   Pulse: (!) 59       General: Alert and awake, Well nourished, Well groomed, No acute distress, no photophobia with 60 Hz hypersensitivity.  Eyes: Pupils are equal, round and reactive to light; Extraocular movements are intact; Normal conjunctiva; no nystagmus; Visual fields are intact bilaterally in all cardinal directions; Head thrust negative bilaterally. VOR cancellation WNL  HENT: Normocephalic, Rinne test positive bilaterally, Oral mucosa is moist, No pharyngeal erythema.  Neck: Supple  No Stiffness  Patient has occipital point tenderness over the right lesser occipital nerve without induction of headaches with no jump sign and no twitch response and no referred pain: trace  No high, medial cervical pain with lateral movement of C1 over C2 and with isometric neck flexion and extension  Fluid patient turnaround with concurrent neck movement in direction of torso movement.  No bilateral paraspinal cervical muscle spasm  "present  Cardiovascular: Normal rate, Irregular rhythm, No murmur, No edema; no carotid bruits noted.  Musculoskeletal: No swelling.  Spine/torso exam: Spine/ torso exam is within normal limits   Integumentary: Warm, Dry, Intact, No pallor, No rash.    Neurologic Exam  Mental Status: orientated to time, person, and place; good recent and remote memory; attention and concentration WNL; naming intact; adequate fund of knowledge. No aphasia or dysarthria. Repetition intact. Follows complex commands    Cranial Nerves: as above, V1-V3 temperature sensation WNL bilaterally, face symmetric, symmetrical palatal rise, SCM 5/5 bilaterally, tongue protrusion midline and movements WNL  no saccadic intrusions of volitional ocular smooth pursuits  no saccadic dysmetria  no pain with sustained upgaze and convergence  no visual motion sensitivity/dizziness produced with rapid eye movements or neck movements  non-lateralizing Paz tuning fork exam  no convergence insufficiency with no diplopia developed > 5 " accommodation    Muscle Tone/Motor Function: Normal bulk and tone throughout. No drift. Normal rapidly alternating movements. No tremors. No abnormal movements                                                                                                          Right                   Left                                  Deltoid          5/5                      5/5                                  Biceps          5/5                      5/5                                  Triceps         5/5                      5/5                                  Iliopsoas       5/5                     5/5                                  Quadriceps   5/5                     5/5                                  Hamstring     5/5                     5/5                                  Dorsiflexion   5/5                     5/5    Sensory: Vibration sensation diminished in L foot, Temperature sensation diminished in all ext, " Temperature sensation intact in face, Negative Romberg, falls on tandem stance    Reflexes: Symmetrical DTR's, Biceps 2+, Brachioradialis 2+, Patellar 2+, No Wartenberg or Lhermitte reflexes noted    Coordination: No truncal ataxia. Finger to nose WNL bilaterally    Gait: Gait WNL, Heel to toe walking impaired    Labs:  Lab Visit on 09/19/2024   Component Date Value Ref Range Status    Magnesium 09/19/2024 2.0  1.6 - 2.6 mg/dL Final    Sodium 09/19/2024 133 (L)  136 - 145 mmol/L Final    Potassium 09/19/2024 4.7  3.5 - 5.1 mmol/L Final    Chloride 09/19/2024 103  95 - 110 mmol/L Final    CO2 09/19/2024 22 (L)  23 - 29 mmol/L Final    Glucose 09/19/2024 92  70 - 110 mg/dL Final    BUN 09/19/2024 17  8 - 23 mg/dL Final    Creatinine 09/19/2024 1.2  0.5 - 1.4 mg/dL Final    Calcium 09/19/2024 9.5  8.7 - 10.5 mg/dL Final    Total Protein 09/19/2024 7.1  6.0 - 8.4 g/dL Final    Albumin 09/19/2024 4.0  3.5 - 5.2 g/dL Final    Total Bilirubin 09/19/2024 0.6  0.1 - 1.0 mg/dL Final    Comment: For infants and newborns, interpretation of results should be based  on gestational age, weight and in agreement with clinical  observations.    Premature Infant recommended reference ranges:  Up to 24 hours.............<8.0 mg/dL  Up to 48 hours............<12.0 mg/dL  3-5 days..................<15.0 mg/dL  6-29 days.................<15.0 mg/dL      Alkaline Phosphatase 09/19/2024 98  55 - 135 U/L Final    AST 09/19/2024 23  10 - 40 U/L Final    ALT 09/19/2024 19  10 - 44 U/L Final    eGFR 09/19/2024 44.1 (A)  >60 mL/min/1.73 m^2 Final    Anion Gap 09/19/2024 8  8 - 16 mmol/L Final    WBC 09/19/2024 10.55  3.90 - 12.70 K/uL Final    RBC 09/19/2024 3.76 (L)  4.00 - 5.40 M/uL Final    Hemoglobin 09/19/2024 11.1 (L)  12.0 - 16.0 g/dL Final    Hematocrit 09/19/2024 35.2 (L)  37.0 - 48.5 % Final    MCV 09/19/2024 94  82 - 98 fL Final    MCH 09/19/2024 29.5  27.0 - 31.0 pg Final    MCHC 09/19/2024 31.5 (L)  32.0 - 36.0 g/dL Final    RDW  09/19/2024 13.7  11.5 - 14.5 % Final    Platelets 09/19/2024 223  150 - 450 K/uL Final    MPV 09/19/2024 10.8  9.2 - 12.9 fL Final    Cholesterol 09/19/2024 141  120 - 199 mg/dL Final    Comment: The National Cholesterol Education Program (NCEP) has set the  following guidelines (reference ranges) for Cholesterol:  Optimal.....................<200 mg/dL  Borderline High.............200-239 mg/dL  High........................> or = 240 mg/dL      Triglycerides 09/19/2024 104  30 - 150 mg/dL Final    Comment: The National Cholesterol Education Program (NCEP) has set the  following guidelines (reference values) for triglycerides:  Normal......................<150 mg/dL  Borderline High.............150-199 mg/dL  High........................200-499 mg/dL      HDL 09/19/2024 44  40 - 75 mg/dL Final    Comment: The National Cholesterol Education Program (NCEP) has set the  following guidelines (reference values) for HDL Cholesterol:  Low...............<40 mg/dL  Optimal...........>60 mg/dL      LDL Cholesterol 09/19/2024 76.2  63.0 - 159.0 mg/dL Final    Comment: The National Cholesterol Education Program (NCEP) has set the  following guidelines (reference values) for LDL Cholesterol:  Optimal.......................<130 mg/dL  Borderline High...............130-159 mg/dL  High..........................160-189 mg/dL  Very High.....................>190 mg/dL      HDL/Cholesterol Ratio 09/19/2024 31.2  20.0 - 50.0 % Final    Total Cholesterol/HDL Ratio 09/19/2024 3.2  2.0 - 5.0 Final    Non-HDL Cholesterol 09/19/2024 97  mg/dL Final    Comment: Risk category and Non-HDL cholesterol goals:  Coronary heart disease (CHD)or equivalent (10-year risk of CHD >20%):  Non-HDL cholesterol goal     <130 mg/dL  Two or more CHD risk factors and 10-year risk of CHD <= 20%:  Non-HDL cholesterol goal     <160 mg/dL  0 to 1 CHD risk factor:  Non-HDL cholesterol goal     <190 mg/dL      Vit D, 25-Hydroxy 09/19/2024 48  30 - 96 ng/mL Final     Comment: Vitamin D deficiency.........<10 ng/mL                              Vitamin D insufficiency......10-29 ng/mL       Vitamin D sufficiency........> or equal to 30 ng/mL  Vitamin D toxicity............>100 ng/mL      TSH 09/19/2024 2.158  0.400 - 4.000 uIU/mL Final    Free T4 09/19/2024 1.02  0.71 - 1.51 ng/dL Final   Lab Visit on 09/19/2024   Component Date Value Ref Range Status    Microalbumin, Urine 09/19/2024 9.0  ug/mL Final    Creatinine, Urine 09/19/2024 123.0  15.0 - 325.0 mg/dL Final    Microalb/Creat Ratio 09/19/2024 7.3  0.0 - 30.0 ug/mg Final    Specimen UA 09/19/2024 Urine, Unspecified   Final    Color, UA 09/19/2024 Yellow  Yellow, Straw, [place] Final    Appearance, UA 09/19/2024 Hazy (A)  Clear Final    pH, UA 09/19/2024 6.0  5.0 - 8.0 Final    Specific Gravity, UA 09/19/2024 1.020  1.005 - 1.030 Final    Protein, UA 09/19/2024 Negative  Negative Final    Comment: Recommend a 24 hour urine protein or a urine   protein/creatinine ratio if globulin induced proteinuria is  clinically suspected.      Glucose, UA 09/19/2024 Negative  Negative Final    Ketones, UA 09/19/2024 Negative  Negative Final    Bilirubin (UA) 09/19/2024 Negative  Negative Final    Occult Blood UA 09/19/2024 Negative  Negative Final    Nitrite, UA 09/19/2024 Positive (A)  Negative Final    Leukocytes, UA 09/19/2024 1+ (A)  Negative Final    RBC, UA 09/19/2024 2  0 - 4 /hpf Final    WBC, UA 09/19/2024 23 (H)  0 - 5 /hpf Final    Bacteria 09/19/2024 Moderate (A)  None-Occ /hpf Final    Squam Epithel, UA 09/19/2024 1  /hpf Final    Microscopic Comment 09/19/2024 SEE COMMENT   Final    Comment: Other formed elements not mentioned in the report are not   present in the microscopic examination.         I personally reviewed all current labs noted in today's chart.    CT Head Without Contrast on 11/18/2024  Imaging:  FINDINGS:   There is prominence of the ventricles and cerebral sulci according with patient's age.   Faint  posttraumatic encephalomalacia in the left frontal pole. The rest of the brain parenchyma has relatively poor differentiation between gray and white matter. No acute ischemic or hemorrhagic event is observed.   There is no mass effect or midline shift.   The brainstem, without significant findings. Moderate cerebellar atrophy with prominence of the folia.     Healed right occipital fracture. No new bone injury is observed.   The orbits are unremarkable.   The paranasal sinuses, middle ears and mastoids are clear.    MRI Brain (9/27)  FINDINGS:  Subarachnoid hemorrhage is present, predominantly overlying the left frontal convexities. Hemorrhagic contusion within the left cerebellar hemisphere/left middle cerebellar peduncle. Mild intraventricular hemorrhage layering within the posterior horns of the lateral ventricles. Trace subdural hemorrhage overlying the bilateral posterior parietal and occipital convexities. Punctate focal infarct involving the left frontal periventricular white matter. Cerebral volume loss with ex vacuo dilatation of the ventricular system. No midline shift. Basal cisterns are patent. No abnormal contrast enhancement. Orbits are symmetric. Fluid within the nasopharynx. Paranasal sinuses are clear. Right mastoid effusion.  IMPRESSION:   1. Subarachnoid hemorrhage, predominantly overlying the left frontal convexities  2. Trace intraventricular hemorrhage layering within the posterior horns of the lateral ventricles  3. Trace subdural hemorrhages overlying the posterior parietal and occipital convexities  4. Small ischemic infarct involving the left frontal periventricular white matter     CT TEMPORAL BONES WITHOUT CONTRAST on 9/22/2024  FINDINGS:   Right Temporal Bone: Acute nondisplaced fracture occipital bone extending to the right mastoid temporal bone with the fracture plane also involving the right stylomastoid foramen (series 201 image 39) and right jugular foramen (series 202 image 101).  Trace acute blood products in the right mastoid air cells and middle ear cleft. The malleus, incus and stapes are intact and normally aligned; ossicular chain disruption. The external auditory canal is normal. The tympanic membrane is thin and not retracted. The oval and round windows are normal. No pneumolabyrinth. The facial nerve is normal in its course. The cochlea, vestibule and semicircular canals are normal. The vestibular aqueduct is not dilated. The internal carotid artery is normal in its course.     Left Temporal Bone: The external auditory canal is normal. The tympanic membrane is thin and not retracted. The middle ear cavity is clear. The malleus, incus and stapes are present. The incudostapedial joint is normal. The oval and round windows are normal. No pneumolabyrinth. The facial nerve is normal in its course. The cochlea, vestibule and semicircular canals are normal. The vestibular aqueduct is not dilated. The internal carotid artery is normal in its course.     CTH w/o constant on 9/22/2024  FINDINGS:   There are scattered left-sided frontotemporal foci of subarachnoid hemorrhage. There is small amount of left frontal parafalcine hemorrhage. Additionally, there is a small focus of intraparenchymal hemorrhage involving the left cerebellum.     There is right occipital scalp contusion/hematoma. There is an underlying nondisplaced right occipital bone fracture, which extends through the mastoid part of the temporal bone. There is a right mastoid effusion present. There is fluid in the right internal auditory canal. There are vascular calcifications     There is proportional ventricular and sulcal prominence for patient age. There are low-attenuation changes without mass effect within the bilateral periventricular white matter which are nonspecific, but most compatible with microvascular ischemic changes of indeterminate age. There are calcifications in the distribution of the carotid siphons. No shift  of the midline structures or mass effect is seen. No lesion of the skull base or calvarium is identified. The visualized paranasal sinuses are clear. The visualized orbits are unremarkable, besides right-sided lens desiccation   Impression  Left-sided supratentorial and infratentorial hemorrhage as above.  Nondisplaced right occipital bone fracture and overlying scalp hematoma. Right mastoid portion of temporal bone nondisplaced fracture.      I was unable to personally reviewed the images as the studies were performed at outside facility. However, I did review all findings in the radiographical report.       Assessment:       ICD-10-CM ICD-9-CM    1. Concussion with loss of consciousness, sequela  S06.0X9S 907.0 Ambulatory referral/consult to Physical/Occupational Therapy      2. Whiplash injuries, sequela  S13.4XXS 905.7 Ambulatory referral/consult to Physical/Occupational Therapy      3. Imbalance  R26.89 781.2 Ambulatory referral/consult to Physical/Occupational Therapy      4. Cognitive changes  R41.89 799.59 Ambulatory referral/consult to Speech Therapy      5. SAH (subarachnoid hemorrhage)  I60.9 430 EEG,w/awake & asleep record      Ambulatory referral/consult to Neurosurgery      6. Nonspecific abnormal electroencephalogram (EEG)  R94.01 794.02 EEG,w/awake & asleep record      7. Neuropathy of left foot  G57.92 355.8          Tiffany Masterson is a 89 y.o. female pmhx, HTN, HLD, Afib on eliquis presents to the clinic for concussion evaluation. On exam, has occipital point tenderness over the right lesser occipital nerve without induction of headaches with no jump sign and no twitch response and no referred pain, imbalance, irregular rhythm, vibration sensation diminished in L foot, temperature sensation diminished in all ext, and peripheral neuropathy in L foot. We discussed that there is currently no universally accepted definition of concussion. We discussed that concussion is a traumatic brain injury. We  discussed that concussion can occur as a result of an impact to the head or to the body. We discussed that our clinic considers concussion definitive if there is transient disruption of brain activity such as with loss of consciousness, amnesia as it relates to the day of the injury, or reports of neurological dysfunction on the day of injury. We discussed typical course, signs & symptoms, diagnostic findings, and treatment for concussion. The patient would benefit from speech therapy as her  states that her cognition has declined since the Sep 2024 event. In addition, the patient would benefit from vestibular for her balance. Since the patient has not followed-up with neurosurgery since the event in September and expressed their desire to transfer their medical to [facility], we will refer to neurosurgery.     Plan:     We have ordered an EEG to r/o seizure-like activity. Patient had an EEG at [facility] that was undeterminable   Referral for vestibular therapy for imbalance  Referral for Neurosurgery to f/u on SAH   Please retrieve the images from [facility] on CD and bring them to medical records at [facility] so that physicians may review.  Referral for speech for cognitive impairment  Start beginner's yoga and advance as tolerated  You do not feel your feet well due to neuropathy. Please wear footwear with good tread at all times. This recommendation includes wearing socks with tread on them. Make sure all of your walkways, hallways, and maddi are well lit at all times day and night. Use night lights to light up commonly used pathways in your home, meaning from the bedroom to the bathroom or kitchen. Make sure all of your walkways, hallways, and maddi are clear of obstacles at all times. Obstacles include decorations, rugs/mats, pet beds, shoes, and clothes. Make sure you have good hand holds to grab onto as you walk around your home.     90 minutes were spent on the date of this patient encounter, which includes:  preparing to see the patient, reviewing previous history, obtaining new patient history, performing the physical exam, counseling and educating the patient and/or family/caregiver, ordering necessary medications or tests or referrals, documenting in the electronic medical record, coordinating care.    Visit today included increased complexity associated with the care of the episodic problem  addressed and managing the longitudinal care of the patient due to the serious and/or complex managed problem(s) SAH, cognitive impairment and multiple injuries.    I discussed the patient's evaluation, assessment and plan with Dr. Boyer.    Mirlande Moore MD  [employer]

## 2024-12-03 NOTE — TELEPHONE ENCOUNTER
Spoke to Pt about scheduling an appt. Pt is scheduled for today at 1 pm. Pt was advised to arrive 30 min early and informed about the 15 min ana period.

## 2024-12-04 ENCOUNTER — PATIENT MESSAGE (OUTPATIENT)
Dept: DERMATOLOGY | Facility: CLINIC | Age: 89
End: 2024-12-04
Payer: MEDICARE

## 2024-12-05 ENCOUNTER — OFFICE VISIT (OUTPATIENT)
Dept: PRIMARY CARE CLINIC | Facility: CLINIC | Age: 89
End: 2024-12-05
Payer: MEDICARE

## 2024-12-05 VITALS
HEIGHT: 65 IN | OXYGEN SATURATION: 97 % | WEIGHT: 156.06 LBS | BODY MASS INDEX: 26 KG/M2 | SYSTOLIC BLOOD PRESSURE: 120 MMHG | DIASTOLIC BLOOD PRESSURE: 64 MMHG | HEART RATE: 90 BPM

## 2024-12-05 DIAGNOSIS — I10 PRIMARY HYPERTENSION: ICD-10-CM

## 2024-12-05 DIAGNOSIS — S06.9X9S TRAUMATIC BRAIN INJURY WITH LOSS OF CONSCIOUSNESS, SEQUELA: Primary | ICD-10-CM

## 2024-12-05 DIAGNOSIS — H90.3 SENSORINEURAL HEARING LOSS (SNHL) OF BOTH EARS: ICD-10-CM

## 2024-12-05 DIAGNOSIS — I48.19 PERSISTENT ATRIAL FIBRILLATION: ICD-10-CM

## 2024-12-05 PROBLEM — S06.0X9A CONCUSSION WITH LOSS OF CONSCIOUSNESS: Status: ACTIVE | Noted: 2024-12-05

## 2024-12-05 PROBLEM — S06.9XAA TBI (TRAUMATIC BRAIN INJURY): Status: RESOLVED | Noted: 2024-10-25 | Resolved: 2024-12-05

## 2024-12-05 PROCEDURE — 99999 PR PBB SHADOW E&M-EST. PATIENT-LVL V: CPT | Mod: PBBFAC,,, | Performed by: INTERNAL MEDICINE

## 2024-12-05 RX ORDER — CHOLECALCIFEROL (VITAMIN D3) 25 MCG
2 TABLET ORAL DAILY
COMMUNITY

## 2024-12-05 NOTE — ASSESSMENT & PLAN NOTE
As per Neurology rec 12/03/2024  We have ordered an EEG to r/o seizure-like activity. [place] [facility] [place]   Referral for vestibular therapy for imbalance  Referral for Neurosurgery to f/u on [facility] on 12/30 at 9am   Please retrieve the images from [facility] [place] and bring them to medical records at [facility] so that physicians may review.  Referral for speech for cognitive impairment  Start beginner's yoga and advance as tolerated  You do not feel your feet well due to neuropathy. Please wear footwear with good tread at all times. This recommendation includes wearing socks with tread on them. Make sure all of your walkways, hallways, and maddi are well lit at all times day and night. Use night lights to light up commonly used pathways in your home, meaning from the bedroom to the bathroom or kitchen. Make sure all of your walkways, hallways, and maddi are clear of obstacles at all times. Obstacles include decorations, rugs/mats, pet beds, shoes, and clothes. Make sure you have good hand holds to grab onto as you walk around your home.     Keep appt on 12/23/2024 for CT head    Keep eye appt on 1/15/2024    Drink Boost when appetite is poor.     Drink 4-6 cups of water daily

## 2024-12-05 NOTE — PROGRESS NOTES
[facility] Progress Note      Chief Complaint     Follow-up, Fall, and Health Maintenance       Subjective:      History of Present Illness:  Tiffany Masterson is a 89 y.o. female     Post-concussive syndrome: her  is concerned that she is sleeping more and eating less medardo dinner with decreased balance/memory/stamina; pt has only lost 2 lbs since her concussion in 9/2024.   Sleeps 3 hours in the afternoon now and 4 hours in the evening.  She does eat lunch and snacks on pretzels/popcorn so not very hunger during dinner but tries to eat the high-protein foods and veggies; admits that she does not drink enough liquids.   Pt is falling asleep while working at the computer and when sitting on the side of the bed.   -has repeat 12/23/2024 CT head at [facility]   Had f/u with [facility], Dr. Johnson, on 12/03/2024 for evaluation post-concussion:   We have ordered an EEG to r/o seizure-like activity. [place] [facility] [place]   Referral for vestibular therapy for imbalance  Referral for Neurosurgery to f/u on SAH   Please retrieve the images from [facility] [place] and bring them to medical records at [facility] so that physicians may review.  Referral for speech for cognitive impairment  Start beginner's yoga and advance as tolerated  You do not feel your feet well due to neuropathy. Please wear footwear with good tread at all times. This recommendation includes wearing socks with tread on them. Make sure all of your walkways, hallways, and maddi are well lit at all times day and night. Use night lights to light up commonly used pathways in your home, meaning from the bedroom to the bathroom or kitchen. Make sure all of your walkways, hallways, and maddi are clear of obstacles at all times. Obstacles include decorations, rugs/mats, pet beds, shoes, and clothes. Make sure you have good hand holds to grab onto as you walk around your home.     Pt has upcoming  appt with NS at [facility] on 12/30 at 9am for f/u SAH     Pt had hearing evaluation at [facility] on 11/18/2024 and was dx'ed with mild to severe SNL and hearing aids were recommended    Hypertension/Persistent Atrial fib: stable on metoprolol ER 50mg bid and Cardizem 90 tid; losartan 100mg daily without CP, SOB, palpitations  -takes Eliquis 50mg bid without bleeding      Past Medical History:  Past Medical History:   Diagnosis Date    Abnormal MRI 10/16/2020    Acute cystitis without hematuria 12/22/2021    Arthritis     Arthritis of right hip 11/08/2021    Atrial fibrillation     Cataract     Elevated liver enzymes     Endometrial mass 06/29/2018    Epiretinal membrane (ERM) of right eye     Family history of malignant neoplasm of gastrointestinal tract mother    Frequent UTI 09/30/2018    Generalized arthritis 09/19/2024    Shoulders, knees, hips      Graves disease     now hypothryoid - no surgery or medicine. resolved on its own    History of cholecystectomy 06/10/2021    History of colonoscopy     unremarkable 2007 by Dr. Javier.  Barium enema revealed diverticular disease.    Hyperlipidemia     Hypertension     Hypertriglyceridemia 04/19/2013    Continue statin for secondary stroke prevention    Hypothyroidism     Impaired functional mobility, balance, gait, and endurance 06/04/2021    Iron deficiency anemia 09/29/2021    Migraine, ophthalmoplegic     Mixed stress and urge urinary incontinence 09/30/2018    Ocular migraine     Personal history of colonic polyps     Physical deconditioning 09/13/2023    Preop testing 05/17/2021    S/P colonoscopic polypectomy 06/18/2013    Sleep disorder 11/08/2021    Status post hysteroscopic polypectomy 07/02/2018    TBI (traumatic brain injury) 10/25/2024    9/22/2024: Ct head: Left frontotemporal subarachnoid hemorrhage again noted including a small focus of intraparenchymal hemorrhage in the left cerebellum. No new hemorrhage.       TIA (transient ischemic attack)     with a normal  angiogram in the past, Ocular migraines reported by patient, not TIA     Transaminitis 03/19/2021    Urethral caruncle 09/15/2020       Past Surgical History:  Past Surgical History:   Procedure Laterality Date    CATARACT EXTRACTION Right 2012    Dr. Lexis Nicolas     CHOLECYSTECTOMY  2022    COLONOSCOPY      2014 polyps    COLONOSCOPY N/A 11/07/2016    Procedure: COLONOSCOPY;  Surgeon: Bebeto Gonzalez MD;  Location: [facility] ENDO (4TH FLR);  Service: Endoscopy;  Laterality: N/A;    COLONOSCOPY N/A 03/09/2020    Procedure: COLONOSCOPY;  Surgeon: Bebeto Gonzalez MD;  Location: [facility] ENDO (4TH FLR);  Service: Endoscopy;  Laterality: N/A;    COLONOSCOPY N/A 09/29/2021    Procedure: COLONOSCOPY;  Surgeon: Bebeto Gonzalez MD;  Location: [facility] ENDO (4TH FLR);  Service: Endoscopy;  Laterality: N/A;  please schedule patient as soon as possible with me EGD colonoscopy with constipation bowel prep for iron deficiency anemia family history of colon cancer and personal history of colon polyps  9/17/21 ok for standard split prep per Dr. Gonzalez-imani    COLONOSCOPY N/A 09/29/2021    Procedure: COLONOSCOPY;  Surgeon: Bebeto Gonzalez MD;  Location: [facility] ENDO (4TH FLR);  Service: Endoscopy;  Laterality: N/A;  Please schedule patient as soon as possible with me EGD colonoscopy with constipation bowel prep for iron deficiency anemia family history of colon cancer and personal history of colon polyps  9/17/21 Ok for standard split prep per Dr. Gonzalez-imani    COLONOSCOPY W/ POLYPECTOMY  06/2013    polyp of colon    ENDOSCOPIC ULTRASOUND OF UPPER GASTROINTESTINAL TRACT N/A 04/29/2021    Procedure: ULTRASOUND, UPPER GI TRACT, ENDOSCOPIC;  Surgeon: Dalton Johnson MD;  Location: [facility] ENDO (2ND FLR);  Service: Endoscopy;  Laterality: N/A;  Postprandial nausea vomiting epigastric 0.9 cm stone and sludge seen within the gallbladder lumen.  No wall thickening or pericholecystic fluid.  0.6 cm stone seen within the distal common bile duct at  the level of the pancreatic head.  There is dil    ERCP N/A 04/29/2021    Procedure: ERCP (ENDOSCOPIC RETROGRADE CHOLANGIOPANCREATOGRAPHY);  Surgeon: Dalton Johnson MD;  Location: [facility] ENDO (2ND FLR);  Service: Endoscopy;  Laterality: N/A;  Postprandial nausea and vomit 0.9 cm stone and sludge seen within the gallbladder lumen.  No wall thickening or pericholecystic fluid.  0.6 cm stone seen within the distal common bile duct at the level of the pancreatic head.  There i    ESOPHAGOGASTRODUODENOSCOPY N/A 09/29/2021    Procedure: EGD (ESOPHAGOGASTRODUODENOSCOPY);  Surgeon: Bebeto Gonzalez MD;  Location: [facility] ENDO (4TH FLR);  Service: Endoscopy;  Laterality: N/A;  rapid covid test arrival 12pm - sm  9/27 arrival time for covid test/procedure confirmed with pt-rb    ESOPHAGOGASTRODUODENOSCOPY N/A 09/29/2021    Procedure: EGD (ESOPHAGOGASTRODUODENOSCOPY);  Surgeon: Bebeto Gonzalez MD;  Location: [facility] ENDO (4TH FLR);  Service: Endoscopy;  Laterality: N/A;  covid [place] 9[place]19[place]21 [facility] [place]    EYE SURGERY  11/10/2014    right retina/Dr. Dalton Sierra ([facility])    HYSTEROSCOPIC POLYPECTOMY OF UTERUS N/A 07/02/2018    Procedure: POLYPECTOMY, UTERUS, HYSTEROSCOPIC;  Surgeon: Elliot Vanessa IV, MD;  Location: [facility] OR;  Service: OB/GYN;  Laterality: N/A;    INJECTION OF JOINT Right 11/11/2022    Procedure: INJECTION, RIGHT GTB CONTRAST DIRECT REFERRAL;  Surgeon: Camden Hernandez MD;  Location: [facility] PAIN MGT;  Service: Pain Management;  Laterality: Right;    JOINT REPLACEMENT  03/23/2011    left total hip replacement    LAPAROSCOPIC CHOLECYSTECTOMY N/A 05/17/2021    Procedure: CHOLECYSTECTOMY, LAPAROSCOPIC;  Surgeon: Rayshawn Peralta Jr., MD;  Location: [facility] OR 2ND FLR;  Service: General;  Laterality: N/A;    REMOVAL OF EPIRETINAL MEMBRANE Right     Dr. Padron    TONSILLECTOMY      pt was 11 years old       Allergies:  Review of patient's allergies indicates:   Allergen Reactions    Levaquin [levofloxacin]       Joint pain    Hydrochlorothiazide Other (See Comments)     Pain in joints and depression per pt       Home Medications:  Current Outpatient Medications   Medication Sig Dispense Refill    acetaminophen (TYLENOL) 500 MG tablet Take 500 mg by mouth every 6 (six) hours as needed.      ascorbic acid, vitamin C, (VITAMIN C) 250 MG tablet Take 250 mg by mouth once daily.      atorvastatin (LIPITOR) 10 MG tablet Take 1 tablet by mouth once daily 90 tablet 3    b complex vitamins capsule Take 1 capsule by mouth once daily.      biotin 5,000 mcg TbDL Take 1 tablet (5,000 mcg total) by mouth Daily. 90 tablet 3    bisacodyL (DULCOLAX) 5 mg EC tablet Take 5 mg by mouth once daily.      CALCIUM CARBONATE/VITAMIN D3 (CALCIUM 600 + D,3, ORAL) Take 1 tablet by mouth once daily.       cholecalciferol, vitamin D3, (VITAMIN D3) 50 mcg (2,000 unit) Cap Take 1 capsule by mouth once daily.      coenzyme Q10 200 mg capsule Take 200 mg by mouth once daily.      DEXTRAN 70/HYPROMELLOSE (ARTIFICIAL TEARS, PF, OPHT) Place 1 drop into both eyes as needed.      diclofenac sodium (VOLTAREN) 1 % Gel Apply 2 g topically 4 (four) times daily. 200 g 3    diltiaZEM (CARDIZEM) 90 MG tablet Take 90 mg by mouth every 8 (eight) hours.      ELIQUIS 5 mg Tab Take 1 tablet by mouth twice daily 180 tablet 3    estradioL (ESTRACE) 0.01 % (0.1 mg/gram) vaginal cream Place 0.5 g intravaginally q.h.s. x2 weeks; then, placed 0.5 g intravaginally twice weekly at bedtime (maintenance therapy). 42.5 g 1    fluticasone (VERAMYST) 27.5 mcg/actuation nasal spray 2 sprays by Nasal route.      fluticasone propionate (FLONASE) 50 mcg/actuation nasal spray CLEAN SINUS/NARES WITH OCEAN NASAL SPRAY THEN USE FLONASE 1 SPRAY TWICE DAILY. 48 g 3    gabapentin (NEURONTIN) 100 MG capsule TAKE 1 CAPSULE BY MOUTH THREE TIMES DAILY 90 capsule 3    hydroquinone 4 % Crea APPLY  CREAM TOPICALLY TWICE DAILY 58 g 0    levothyroxine (SYNTHROID) 112 MCG tablet TAKE 1 TABLET BY MOUTH  BEFORE BREAKFAST 90 tablet 3    LIDOcaine (LIDODERM) 5 % Place 1 patch onto the skin once daily. Remove & Discard patch within 12 hours or as directed by MD 30 patch 0    losartan (COZAAR) 100 MG tablet Take 1 tablet by mouth once daily 90 tablet 3    magnesium oxide 400 mg magnesium Tab Take 1 tablet by mouth once daily.      metoprolol succinate (TOPROL-XL) 50 MG 24 hr tablet Take 1 tablet by mouth twice daily 180 tablet 3    MULTIVITAMIN W-MINERALS/LUTEIN (CENTRUM SILVER ORAL) Take 1 tablet by mouth once daily.      mupirocin (BACTROBAN) 2 % ointment Apply topically 3 (three) times daily. Apply to wound on head until healed 30 g 1    omeprazole (PRILOSEC) 40 MG capsule Take 40 mg by mouth.      sodium chloride (SALINE NASAL) 0.65 % nasal spray 1 spray by Nasal route as needed for Congestion. 104 mL 3    traMADoL (ULTRAM) 50 mg tablet Take 1 tablet (50 mg total) by mouth every 8 (eight) hours as needed. 60 tablet 0    vit A/vit C/vit E/zinc/copper (ICAPS AREDS ORAL) Take 1 tablet by mouth 2 (two) times a day.      vitamin D (VITAMIN D3) 1000 units Tab Take 2 tablets by mouth once daily.       Current Facility-Administered Medications   Medication Dose Route Frequency Provider Last Rate Last Admin    triamcinolone acetonide injection 40 mg  40 mg Intra-articular 1 time in Clinic/HOD Rebeca Dumont MD            Family History:  Family History   Problem Relation Name Age of Onset    Colon cancer Mother Tiffany Frank [place] 75    Lung cancer Father Col. Josue Parmar [place] 75    Diabetes Other great aunt     Diabetes Paternal Aunt      Colon cancer Paternal Aunt  80    Prostate cancer Brother      Breast cancer Neg Hx      Ovarian cancer Neg Hx      Celiac disease Neg Hx      Cataracts Neg Hx      Glaucoma Neg Hx      Macular degeneration Neg Hx         Social History:  Social History     Tobacco Use    Smoking status: Never    Smokeless tobacco: Never    Tobacco comments:     The patient is not getting  "any regular exercise at this time.  She does enjoy traveling to [place].   Substance Use Topics    Alcohol use: Yes     Comment: occasionally    Drug use: No       Review of Systems:  Pertinent positives and negatives listed in HPI. All other systems are reviewed and are negative.    Health Maintaince :   Health Maintenance Topics with due status: Not Due       Topic Last Completion Date    TETANUS VACCINE 09/20/2018    Lipid Panel 09/19/2024           Eye:   Dental:     Immunizations:     The ASCVD Risk score (Emeka CONTRERAS, et al., 2019) failed to calculate for the following reasons:    The 2019 ASCVD risk score is only valid for ages 40 to 79      Objective:   /64 (BP Location: Left arm, Patient Position: Sitting)   Pulse 90   Ht 5' 5" (1.651 m)   Wt 70.8 kg (156 lb 1.4 oz)   SpO2 97%   BMI 25.97 kg/m²      Body mass index is 25.97 kg/m².       Physical Examination:  General: Alert and awake in no apparent distress  Neck:   Cervical nodes not enlarged (No submental, submandibular, preauricular, posterior auricular or occipital adenopathy); supple; no bruits  Cardio:  Regular rate and rhythm with normal S1 and S2; no murmurs or rubs  Resp:  CTAB; respirations unlabored; no wheezes, crackles or rhonchi  Abdom: Soft, NTND with normoactive bowel sounds; negative HSM  Extrem: Warm and well-perfused with no clubbing, cyanosis or edema  Skin:  No rashes, lesions, or color changes  Neuro:  AAOx3; cooperative and pleasant with no focal deficits; memory 2/3 at 1 min and 5 min, 3/3 immediate; able to draw clock and place hands at 2:15 but did not place all numbers on the clock; able to write her signature    Laboratory:      Most Recent Data:  Lab Results   Component Value Date    WBC 8.4 10/10/2024    HGB 10.8 (L) 10/10/2024    HCT 32.0 (L) 10/10/2024     09/19/2024    CHOL 141 09/19/2024    TRIG 104 09/19/2024    HDL 44 09/19/2024    ALT 19 09/19/2024    AST 23 09/19/2024     (L) 09/19/2024    K 4.7 " 09/19/2024     09/19/2024    BUN 17 09/19/2024    CO2 22 (L) 09/19/2024    TSH 3.41 09/26/2024    INR 1.1 09/22/2024    HGBA1C 5.8 (H) 08/31/2020              CBC:   WBC   Date Value Ref Range Status   10/10/2024 8.4 4.5 - 11.0 103/uL Final   09/19/2024 10.55 3.90 - 12.70 K/uL Final     Hemoglobin   Date Value Ref Range Status   10/10/2024 10.8 (L) 12.0 - 16.0 gm/dL Final   09/19/2024 11.1 (L) 12.0 - 16.0 g/dL Final     Hematocrit   Date Value Ref Range Status   10/10/2024 32.0 (L) 35.0 - 46.0 % Final   09/19/2024 35.2 (L) 37.0 - 48.5 % Final     Platelets   Date Value Ref Range Status   09/19/2024 223 150 - 450 K/uL Final     MCV   Date Value Ref Range Status   10/10/2024 92.1 80.0 - 100.0 fL Final   09/19/2024 94 82 - 98 fL Final     RDW   Date Value Ref Range Status   10/10/2024 14.5 11.5 - 14.5 % Final   09/19/2024 13.7 11.5 - 14.5 % Final     BMP:   Sodium   Date Value Ref Range Status   09/19/2024 133 (L) 136 - 145 mmol/L Final     Potassium   Date Value Ref Range Status   09/19/2024 4.7 3.5 - 5.1 mmol/L Final     Chloride   Date Value Ref Range Status   09/19/2024 103 95 - 110 mmol/L Final     CO2   Date Value Ref Range Status   09/19/2024 22 (L) 23 - 29 mmol/L Final     BUN   Date Value Ref Range Status   09/19/2024 17 8 - 23 mg/dL Final     Creatinine   Date Value Ref Range Status   09/19/2024 1.2 0.5 - 1.4 mg/dL Final     Glucose   Date Value Ref Range Status   09/19/2024 92 70 - 110 mg/dL Final     Calcium   Date Value Ref Range Status   09/19/2024 9.5 8.7 - 10.5 mg/dL Final     Magnesium   Date Value Ref Range Status   09/19/2024 2.0 1.6 - 2.6 mg/dL Final     Phosphorus   Date Value Ref Range Status   06/21/2013 2.3 (L) 2.7 - 4.5 mg/dl Final     LFTs:   Total Protein   Date Value Ref Range Status   09/19/2024 7.1 6.0 - 8.4 g/dL Final     Albumin   Date Value Ref Range Status   09/19/2024 4.0 3.5 - 5.2 g/dL Final     Total Bilirubin   Date Value Ref Range Status   09/19/2024 0.6 0.1 - 1.0 mg/dL  "Final     Comment:     For infants and newborns, interpretation of results should be based  on gestational age, weight and in agreement with clinical  observations.    Premature Infant recommended reference ranges:  Up to 24 hours.............<8.0 mg/dL  Up to 48 hours............<12.0 mg/dL  3-5 days..................<15.0 mg/dL  6-29 days.................<15.0 mg/dL       AST   Date Value Ref Range Status   09/19/2024 23 10 - 40 U/L Final     Alkaline Phosphatase   Date Value Ref Range Status   09/19/2024 98 55 - 135 U/L Final     ALT   Date Value Ref Range Status   09/19/2024 19 10 - 44 U/L Final     Coags:   INR   Date Value Ref Range Status   09/22/2024 1.1 0.9 - 1.2 Final   05/09/2013 1.0 0.8 - 1.2 Final     Comment:     ACCP Guideline for Coumadin usage recommends a "target range" of  2.0 - 3.0 for INR for all indicators except mechanical heart valves  and antiphospholipid syndromes which should use 2.5 - 3.5.  .     FLP:      Lab Results   Component Value Date    CHOL 141 09/19/2024    CHOL 136 11/06/2023    CHOL 217 (H) 09/13/2023     Lab Results   Component Value Date    HDL 44 09/19/2024    HDL 49 11/06/2023    HDL 39 (L) 09/13/2023     Lab Results   Component Value Date    LDLCALC 76.2 09/19/2024    LDLCALC 65.6 11/06/2023    LDLCALC 131.0 09/13/2023     Lab Results   Component Value Date    TRIG 104 09/19/2024    TRIG 107 11/06/2023    TRIG 235 (H) 09/13/2023     Lab Results   Component Value Date    CHOLHDL 31.2 09/19/2024    CHOLHDL 36.0 11/06/2023    CHOLHDL 18.0 (L) 09/13/2023      DM:      Hemoglobin A1C   Date Value Ref Range Status   08/31/2020 5.8 (H) 4.0 - 5.6 % Final     Comment:     ADA Screening Guidelines:  5.7-6.4%  Consistent with prediabetes  >or=6.5%  Consistent with diabetes  High levels of fetal hemoglobin interfere with the HbA1C  assay. Heterozygous hemoglobin variants (HbS, HgC, etc)do  not significantly interfere with this assay.   However, presence of multiple variants may affect " accuracy.       LDL Cholesterol   Date Value Ref Range Status   09/19/2024 76.2 63.0 - 159.0 mg/dL Final     Comment:     The National Cholesterol Education Program (NCEP) has set the  following guidelines (reference values) for LDL Cholesterol:  Optimal.......................<130 mg/dL  Borderline High...............130-159 mg/dL  High..........................160-189 mg/dL  Very High.....................>190 mg/dL       Creatinine   Date Value Ref Range Status   09/19/2024 1.2 0.5 - 1.4 mg/dL Final     Thyroid:   TSH   Date Value Ref Range Status   09/26/2024 3.41 0.50 - 5.00 uIU/mL Final   09/19/2024 2.158 0.400 - 4.000 uIU/mL Final     Free T4   Date Value Ref Range Status   09/19/2024 1.02 0.71 - 1.51 ng/dL Final     Anemia:   Iron   Date Value Ref Range Status   09/18/2021 47 30 - 160 ug/dL Final     TIBC   Date Value Ref Range Status   09/18/2021 330 250 - 450 ug/dL Final     Ferritin   Date Value Ref Range Status   09/18/2021 251 20.0 - 300.0 ng/mL Final     Vitamin B-12   Date Value Ref Range Status   12/02/2022 652 210 - 950 pg/mL Final     Cardiac:   Troponin I   Date Value Ref Range Status   12/21/2021 0.014 0.000 - 0.026 ng/mL Final     Comment:     The reference interval for Troponin I represents the 99th percentile   cutoff   for our facility and is consistent with 3rd generation assay   performance.       BNP   Date Value Ref Range Status   12/21/2021 184 (H) 0 - 99 pg/mL Final     Comment:     Values of less than 100 pg/ml are consistent with non-CHF populations.     Urinalysis:   Urine Culture, Routine   Date Value Ref Range Status   12/22/2021   Final    Multiple organisms isolated. None in predominance.  Repeat if   12/22/2021 clinically necessary.  Final     Color, UA   Date Value Ref Range Status   09/19/2024 Yellow Yellow, Straw, Chinyere Final     Clarity, UA   Date Value Ref Range Status   10/29/2020   Final     Comment:     normal      Specific Gravity, UA   Date Value Ref Range Status    09/19/2024 1.020 1.005 - 1.030 Final     Nitrite, UA   Date Value Ref Range Status   09/19/2024 Positive (A) Negative Final     Ketones, UA   Date Value Ref Range Status   09/19/2024 Negative Negative Final     Urobilinogen, UA   Date Value Ref Range Status   10/29/2020   Final     Comment:     normal   09/14/2018 Negative <2.0 EU/dL Final     WBC, UA   Date Value Ref Range Status   09/19/2024 23 (H) 0 - 5 /hpf Final       Other Results:  EKG (my interpretation):     Radiology:  X-Ray Shoulder Complete Bilateral  Narrative: EXAMINATION:  XR SHOULDER COMPLETE 2 OR MORE VIEWS BILATERAL    CLINICAL HISTORY:  Pain in right shoulder    TECHNIQUE:  Bilateral shoulder radiograph series.    COMPARISON:  09/27/2023    FINDINGS:  Bilateral glenohumeral articulations appear intact with DJD characterized by joint space loss and osteophytosis.  Mild AC joint DJD.  No acute fracture, dislocation, or osseous destruction.  Impression: As above.    Electronically signed by: Marcellus Barkley  Date:    09/03/2024  Time:    08:46          Assessment/Plan     Tiffany Masterson is a 89 y.o. female with:  1. Traumatic brain injury with loss of consciousness, sequela  Overview:  9/22/2024: Ct head: Left frontotemporal subarachnoid hemorrhage again noted including a small focus of intraparenchymal hemorrhage in the left cerebellum. No new hemorrhage.     Assessment & Plan:  As per Neurology rec 12/03/2024  We have ordered an EEG to r/o seizure-like activity. Patient had an EEG at [facility] that was undeterminable   Referral for vestibular therapy for imbalance  Referral for Neurosurgery to f/u on [facility] on 12/30 at 9am   Please retrieve the images from [facility] on CD and bring them to medical records at [facility] so that physicians may review.  Referral for speech for cognitive impairment  Start beginner's yoga and advance as tolerated  You do not feel your feet well due to neuropathy. Please wear footwear with good tread at all times. This  recommendation includes wearing socks with tread on them. Make sure all of your walkways, hallways, and maddi are well lit at all times day and night. Use night lights to light up commonly used pathways in your home, meaning from the bedroom to the bathroom or kitchen. Make sure all of your walkways, hallways, and maddi are clear of obstacles at all times. Obstacles include decorations, rugs/mats, pet beds, shoes, and clothes. Make sure you have good hand holds to grab onto as you walk around your home.     Keep appt on 12/23/2024 for CT head    Keep eye appt on 1/15/2024    Drink Boost when appetite is poor.     Drink 4-6 cups of water daily       2. Sensorineural hearing loss (SNHL) of both ears  Assessment & Plan:  Audiology evaluation on 11/18/2024 at [facility] recommended ENT eval for hearing aides      3. Primary hypertension  Assessment & Plan:  well-controlled on metoprolol ER 50mg bid; losartan 100mg daily;   Cont diltiazem 90mg 3 times per day unless BP <110/60 or HR <55 then change to twice per day   -Check Bps at home weekly and prn symptoms for goal BP <130/80.  Avoid Dobbins's table salt; use Mrs. Thompson or Mata Rizvi no salt   -Start healthy diet high in fiber (25-35 gm daily), low fat dairy, lean protein, low in saturated/trans fat (minimize cheese, creamy salad dressings); high in calcium/magnesium/potassium; 1.5 gm sodium diet; low in processed sugars and foods, ie  WHITE sugars, bread, pasta, rice, ice cream, cookies, cake, doughnuts  -Drink 6-8 glasses of water daily  -Moderate exercise 30 min 5 times per week or 75 min intense exercise biweekly   -Start 8-10 hour intermittent fasting diet   -Avoid eating 3 hours prior to bedtime  -Reduce alcohol to 1-2 servings (1 serving = 1 oz wine, 1 oz hard liquor, 12 oz beer) per day  -Avoid smoking, vaping, stimulant drugs/mediations ie illicit drugs, pseudo-ephedrine  -Use ADD/ADHD meds sparingly  -Minimize NSAIDs          4. Persistent atrial  fibrillation  Assessment & Plan:  Check BP and HR daily   Keep BP >110/60 and <130/80  Keep HR 55-80 beats per minute (Never <55 or >100)  Cont Metoprolol ER 50mg twice per day   Cont Diltiazem 90mg 3 times per day - drop to twice per day if HR <55 OR BP <110/60    Make appt with cardiologist   Cont Eliquis 5mg bid and monitor for bleeding  Avoid all NSAIDs and ASA   Use walker for balance                I spent a total of 40 minutes on the day of the visit.This includes face to face time and non-face to face time preparing to see the patient (eg, review of tests), obtaining and/or reviewing separately obtained history, documenting clinical information in the electronic or other health record, independently interpreting results and communicating results to the patient/family/caregiver, or care coordinator.   Code Status:     Rebeca Dumont MD

## 2024-12-06 NOTE — ASSESSMENT & PLAN NOTE
As per Neurology rec 12/03/2024  We have ordered an EEG to r/o seizure-like activity. Patient had an EEG at [facility] that was undeterminable   Referral for vestibular therapy for imbalance  Referral for Neurosurgery to f/u on SAH on 12/30 at 9am   Please retrieve the images from [facility] on CD and bring them to medical records at [facility] so that physicians may review.  Referral for speech for cognitive impairment  Start beginner's yoga and advance as tolerated  You do not feel your feet well due to neuropathy. Please wear footwear with good tread at all times. This recommendation includes wearing socks with tread on them. Make sure all of your walkways, hallways, and maddi are well lit at all times day and night. Use night lights to light up commonly used pathways in your home, meaning from the bedroom to the bathroom or kitchen. Make sure all of your walkways, hallways, and maddi are clear of obstacles at all times. Obstacles include decorations, rugs/mats, pet beds, shoes, and clothes. Make sure you have good hand holds to grab onto as you walk around your home.     Keep appt on 12/23/2024 for CT head    Keep eye appt on 1/15/2024    Drink Boost when appetite is poor.     Drink 4-6 cups of water daily

## 2024-12-06 NOTE — ASSESSMENT & PLAN NOTE
well-controlled on metoprolol ER 50mg bid; losartan 100mg daily;   Cont diltiazem 90mg 3 times per day unless BP <110/60 or HR <55 then change to twice per day   -Check Bps at home weekly and prn symptoms for goal BP <130/80.  Avoid Dobbins's table salt; use Mrs. Thompson or Mata Rizvi no salt   -Start healthy diet high in fiber (25-35 gm daily), low fat dairy, lean protein, low in saturated/trans fat (minimize cheese, creamy salad dressings); high in calcium/magnesium/potassium; 1.5 gm sodium diet; low in processed sugars and foods, ie  WHITE sugars, bread, pasta, rice, ice cream, cookies, cake, doughnuts  -Drink 6-8 glasses of water daily  -Moderate exercise 30 min 5 times per week or 75 min intense exercise biweekly   -Start 8-10 hour intermittent fasting diet   -Avoid eating 3 hours prior to bedtime  -Reduce alcohol to 1-2 servings (1 serving = 1 oz wine, 1 oz hard liquor, 12 oz beer) per day  -Avoid smoking, vaping, stimulant drugs/mediations ie illicit drugs, pseudo-ephedrine  -Use ADD/ADHD meds sparingly  -Minimize NSAIDs

## 2024-12-06 NOTE — ASSESSMENT & PLAN NOTE
Audiology evaluation on 11/18/2024 at [facility] recommended ENT eval for hearing aides

## 2024-12-06 NOTE — ASSESSMENT & PLAN NOTE
Check BP and HR daily   Keep BP >110/60 and <130/80  Keep HR 55-80 beats per minute (Never <55 or >100)  Cont Metoprolol ER 50mg twice per day   Cont Diltiazem 90mg 3 times per day - drop to twice per day if HR <55 OR BP <110/60    Make appt with cardiologist   Cont Eliquis 5mg bid and monitor for bleeding  Avoid all NSAIDs and ASA   Use walker for balance

## 2024-12-07 ENCOUNTER — PATIENT MESSAGE (OUTPATIENT)
Dept: PRIMARY CARE CLINIC | Facility: CLINIC | Age: 89
End: 2024-12-07
Payer: MEDICARE

## 2024-12-16 ENCOUNTER — PATIENT MESSAGE (OUTPATIENT)
Dept: PRIMARY CARE CLINIC | Facility: CLINIC | Age: 89
End: 2024-12-16
Payer: MEDICARE

## 2024-12-23 ENCOUNTER — HOSPITAL ENCOUNTER (OUTPATIENT)
Dept: RADIOLOGY | Facility: HOSPITAL | Age: 89
Discharge: HOME | End: 2024-12-23
Attending: PHYSICIAN ASSISTANT
Payer: MEDICARE

## 2024-12-23 DIAGNOSIS — I60.9 SUBARACHNOID HEMORRHAGE: ICD-10-CM

## 2024-12-23 PROCEDURE — 70450 CT HEAD/BRAIN W/O DYE: CPT | Mod: 26,,, | Performed by: RADIOLOGY

## 2024-12-23 PROCEDURE — 70450 CT HEAD/BRAIN W/O DYE: CPT | Mod: TC

## 2024-12-26 ENCOUNTER — PATIENT MESSAGE (OUTPATIENT)
Dept: PRIMARY CARE CLINIC | Facility: CLINIC | Age: 89
End: 2024-12-26
Payer: MEDICARE

## 2024-12-26 NOTE — TELEPHONE ENCOUNTER
Pt is requesting appt for bilateral edema in legs. No available appts found. Please assist with scheduling a sooner appt.

## 2024-12-26 NOTE — TELEPHONE ENCOUNTER
Pts spouse(Mr Restrepo) reached out due to Mrs Allen having pronounced edema in both legs.     Photo attached.    Mr Restrepo would like to know if she should be concerned and what treatment is appropriate.

## 2024-12-30 ENCOUNTER — HOSPITAL ENCOUNTER (OUTPATIENT)
Dept: RADIOLOGY | Facility: HOSPITAL | Age: 89
Discharge: HOME | End: 2024-12-30
Attending: PHYSICIAN ASSISTANT
Payer: MEDICARE

## 2024-12-30 ENCOUNTER — TELEPHONE (OUTPATIENT)
Dept: ENDOSCOPY | Facility: HOSPITAL | Age: 89
End: 2024-12-30

## 2024-12-30 ENCOUNTER — OFFICE VISIT (OUTPATIENT)
Dept: NEUROSURGERY | Facility: CLINIC | Age: 89
End: 2024-12-30
Payer: MEDICARE

## 2024-12-30 ENCOUNTER — CLINICAL SUPPORT (OUTPATIENT)
Dept: ENDOSCOPY | Facility: HOSPITAL | Age: 89
End: 2024-12-30
Attending: INTERNAL MEDICINE
Payer: MEDICARE

## 2024-12-30 DIAGNOSIS — Z12.11 SCREENING FOR COLON CANCER: ICD-10-CM

## 2024-12-30 DIAGNOSIS — S06.0X9S CONCUSSION WITH LOSS OF CONSCIOUSNESS, SEQUELA: ICD-10-CM

## 2024-12-30 DIAGNOSIS — Z12.11 COLON CANCER SCREENING: Primary | ICD-10-CM

## 2024-12-30 DIAGNOSIS — I60.9 SAH (SUBARACHNOID HEMORRHAGE): ICD-10-CM

## 2024-12-30 DIAGNOSIS — I62.00 NONTRAUMATIC SUBDURAL HEMORRHAGE, UNSPECIFIED: ICD-10-CM

## 2024-12-30 DIAGNOSIS — R53.1 GENERALIZED WEAKNESS: ICD-10-CM

## 2024-12-30 DIAGNOSIS — I62.00 NONTRAUMATIC SUBDURAL HEMORRHAGE, UNSPECIFIED: Primary | ICD-10-CM

## 2024-12-30 PROCEDURE — 70450 CT HEAD/BRAIN W/O DYE: CPT | Mod: 26,HCNC,, | Performed by: STUDENT IN AN ORGANIZED HEALTH CARE EDUCATION/TRAINING PROGRAM

## 2024-12-30 PROCEDURE — 1100F PTFALLS ASSESS-DOCD GE2>/YR: CPT | Mod: HCNC,CPTII,S$GLB, | Performed by: PHYSICIAN ASSISTANT

## 2024-12-30 PROCEDURE — 99999 PR PBB SHADOW E&M-EST. PATIENT-LVL II: CPT | Mod: PBBFAC,HCNC,, | Performed by: PHYSICIAN ASSISTANT

## 2024-12-30 PROCEDURE — 99215 OFFICE O/P EST HI 40 MIN: CPT | Mod: HCNC,S$GLB,, | Performed by: PHYSICIAN ASSISTANT

## 2024-12-30 PROCEDURE — 1126F AMNT PAIN NOTED NONE PRSNT: CPT | Mod: HCNC,CPTII,S$GLB, | Performed by: PHYSICIAN ASSISTANT

## 2024-12-30 PROCEDURE — 70450 CT HEAD/BRAIN W/O DYE: CPT | Mod: TC,HCNC

## 2024-12-30 PROCEDURE — 1157F ADVNC CARE PLAN IN RCRD: CPT | Mod: HCNC,CPTII,S$GLB, | Performed by: PHYSICIAN ASSISTANT

## 2024-12-30 PROCEDURE — 3288F FALL RISK ASSESSMENT DOCD: CPT | Mod: HCNC,CPTII,S$GLB, | Performed by: PHYSICIAN ASSISTANT

## 2024-12-30 RX ORDER — SODIUM, POTASSIUM,MAG SULFATES 17.5-3.13G
1 SOLUTION, RECONSTITUTED, ORAL ORAL DAILY
Qty: 1 KIT | Refills: 0 | Status: ON HOLD | OUTPATIENT
Start: 2024-12-30 | End: 2025-01-01

## 2024-12-30 NOTE — TELEPHONE ENCOUNTER
Referral for procedure from PAT appointment      Spoke to patient to schedule procedure(s) Colonoscopy       Physician to perform procedure(s) Dr. SCOT Gonzalez  Date of Procedure (s) 2/3/25  Arrival Time 8:00 AM  Time of Procedure(s) 9:00 AM   Location of Procedure(s) [facility]  Type of Rx Prep sent to patient: Suprep  Instructions provided to patient via [facility]    Patient was informed on the following information and verbalized understanding. Screening questionnaire reviewed with patient and complete. If procedure requires anesthesia, a responsible adult needs to be present to accompany the patient home, patient cannot drive after receiving anesthesia. Appointment details are tentative, especially check-in time. Patient will receive a prep-op call 7 days prior to confirm check-in time for procedure. If applicable the patient should contact their pharmacy to verify Rx for procedure prep is ready for pick-up. Patient was advised to call the scheduling department at [phone] if pharmacy states no Rx is available. Patient was advised to call the endoscopy scheduling department if any questions or concerns arise.    Pt is currently taking Eliquis (apixaban). Message sent to Endoscopy clearance nurse per protocol to submit Eliquis (apixaban) hold.    Request for neurology clearance also sent to clearance nurse.      [facility]oscopy Scheduling Department

## 2024-12-30 NOTE — PROGRESS NOTES
Neurosurgery  History & Physical    SUBJECTIVE:     Chief Complaint: Traumatic SAH/IPH, follow up    History of Present Illness:  Tiffany Masterson is a 89 y.o. female with PMH significant for Afib on Eliquis, who presents for evaluation after fall with head trauma in September. Pt had an unwitnessed fall down some stairs at home on 9/22/24, was found down by . Presented to [facility] ED, CTH was notable for L tSAH and L cerebellar punctate IPH, as well as R non-displaced occipital bone fx extending just medial to the mastoid air cells. She also had a large R gluteal hematoma. GCS 9 on presentation. Eliquis was reversed. She was admitted to Trauma ICU. Required intubation 2/2 respiratory failure. She did not require any neurosurgical interventions. Her mental status and neurological exam improved significantly during hospitalization. Course c/b Afib with RVR. Home Eliquis was resumed on 10/6. Pt eventually discharged home with HH on 10/9.    Today, presents to clinic for neurosurgical follow-up of hemorrhages with updated CTH. Accompanied by . Has been well overall since discharge. She has had some ongoing balance difficulty and mild memory deficit. Has had 2 falls since being back home, one was at least a few weeks ago (unsure exactly when, lost balance while walking). 2nd fall was this morning (fell out of bed, hit head on floor, has some small facial lacerations on R side). No LOC, has been at baseline since fall. Has remained on Eliquis. She had a follow-up CTH on 12/23, no imaging today.     Review of patient's allergies indicates:   Allergen Reactions    Levaquin [levofloxacin]      Joint pain    Hydrochlorothiazide Other (See Comments)     Pain in joints and depression per pt       Current Outpatient Medications   Medication Sig Dispense Refill    acetaminophen (TYLENOL) 500 MG tablet Take 500 mg by mouth every 6 (six) hours as needed.      ascorbic acid, vitamin C, (VITAMIN C) 250 MG tablet Take 250 mg  by mouth once daily.      atorvastatin (LIPITOR) 10 MG tablet Take 1 tablet by mouth once daily 90 tablet 3    b complex vitamins capsule Take 1 capsule by mouth once daily.      biotin 5,000 mcg TbDL Take 1 tablet (5,000 mcg total) by mouth Daily. 90 tablet 3    bisacodyL (DULCOLAX) 5 mg EC tablet Take 5 mg by mouth once daily.      CALCIUM CARBONATE/VITAMIN D3 (CALCIUM 600 + D,3, ORAL) Take 1 tablet by mouth once daily.       cholecalciferol, vitamin D3, (VITAMIN D3) 50 mcg (2,000 unit) Cap Take 1 capsule by mouth once daily.      coenzyme Q10 200 mg capsule Take 200 mg by mouth once daily.      DEXTRAN 70/HYPROMELLOSE (ARTIFICIAL TEARS, PF, OPHT) Place 1 drop into both eyes as needed.      diltiaZEM (CARDIZEM) 90 MG tablet Take 90 mg by mouth every 8 (eight) hours.      ELIQUIS 5 mg Tab Take 1 tablet by mouth twice daily 180 tablet 3    estradioL (ESTRACE) 0.01 % (0.1 mg/gram) vaginal cream Place 0.5 g intravaginally q.h.s. x2 weeks; then, placed 0.5 g intravaginally twice weekly at bedtime (maintenance therapy). 42.5 g 1    fluticasone (VERAMYST) 27.5 mcg/actuation nasal spray 2 sprays by Nasal route.      fluticasone propionate (FLONASE) 50 mcg/actuation nasal spray CLEAN SINUS/NARES WITH OCEAN NASAL SPRAY THEN USE FLONASE 1 SPRAY TWICE DAILY. 48 g 3    gabapentin (NEURONTIN) 100 MG capsule TAKE 1 CAPSULE BY MOUTH THREE TIMES DAILY 90 capsule 3    hydroquinone 4 % Crea APPLY  CREAM TOPICALLY TWICE DAILY 58 g 0    levothyroxine (SYNTHROID) 112 MCG tablet TAKE 1 TABLET BY MOUTH BEFORE BREAKFAST 90 tablet 3    LIDOcaine (LIDODERM) 5 % Place 1 patch onto the skin once daily. Remove & Discard patch within 12 hours or as directed by MD 30 patch 0    losartan (COZAAR) 100 MG tablet Take 1 tablet by mouth once daily 90 tablet 3    magnesium oxide 400 mg magnesium Tab Take 1 tablet by mouth once daily.      metoprolol succinate (TOPROL-XL) 50 MG 24 hr tablet Take 1 tablet by mouth twice daily 180 tablet 3     MULTIVITAMIN W-MINERALS/LUTEIN (CENTRUM SILVER ORAL) Take 1 tablet by mouth once daily.      mupirocin (BACTROBAN) 2 % ointment Apply topically 3 (three) times daily. Apply to wound on head until healed 30 g 1    sodium chloride (SALINE NASAL) 0.65 % nasal spray 1 spray by Nasal route as needed for Congestion. 104 mL 3    vit A/vit C/vit E/zinc/copper (ICAPS AREDS ORAL) Take 1 tablet by mouth 2 (two) times a day.      vitamin D (VITAMIN D3) 1000 units Tab Take 2 tablets by mouth once daily.      sodium,potassium,mag sulfates (SUPREP BOWEL PREP KIT) 17.5-3.13-1.6 gram SolR Take 177 mLs by mouth once daily. Take as instructed by Endoscopy nurse  for 2 days 1 kit 0     Current Facility-Administered Medications   Medication Dose Route Frequency Provider Last Rate Last Admin    triamcinolone acetonide injection 40 mg  40 mg Intra-articular 1 time in Clinic/HOD Rebeca Dumont MD           Past Medical History:   Diagnosis Date    Abnormal MRI 10/16/2020    Acute cystitis without hematuria 12/22/2021    Arthritis     Arthritis of right hip 11/08/2021    Atrial fibrillation     Cataract     Elevated liver enzymes     Endometrial mass 06/29/2018    Epiretinal membrane (ERM) of right eye     Family history of malignant neoplasm of gastrointestinal tract mother    Frequent UTI 09/30/2018    Generalized arthritis 09/19/2024    Shoulders, knees, hips      Graves disease     now hypothryoid - no surgery or medicine. resolved on its own    History of cholecystectomy 06/10/2021    History of colonoscopy     unremarkable 2007 by Dr. Javier.  Barium enema revealed diverticular disease.    Hyperlipidemia     Hypertension     Hypertriglyceridemia 04/19/2013    Continue statin for secondary stroke prevention    Hypothyroidism     Impaired functional mobility, balance, gait, and endurance 06/04/2021    Iron deficiency anemia 09/29/2021    Migraine, ophthalmoplegic     Mixed stress and urge urinary incontinence 09/30/2018     Ocular migraine     Personal history of colonic polyps     Physical deconditioning 09/13/2023    Preop testing 05/17/2021    S/P colonoscopic polypectomy 06/18/2013    Sleep disorder 11/08/2021    Status post hysteroscopic polypectomy 07/02/2018    TBI (traumatic brain injury) 10/25/2024    9/22/2024: Ct head: Left frontotemporal subarachnoid hemorrhage again noted including a small focus of intraparenchymal hemorrhage in the left cerebellum. No new hemorrhage.       TIA (transient ischemic attack)     with a normal angiogram in the past, Ocular migraines reported by patient, not TIA     Transaminitis 03/19/2021    Urethral caruncle 09/15/2020     Past Surgical History:   Procedure Laterality Date    CATARACT EXTRACTION Right 2012    Dr. Lexis Nicolas     CHOLECYSTECTOMY  2022    COLONOSCOPY      2014 polyps    COLONOSCOPY N/A 11/07/2016    Procedure: COLONOSCOPY;  Surgeon: Bebeto Gonzalez MD;  [facility]  Service: Endoscopy;  Laterality: N/A;    COLONOSCOPY N/A 03/09/2020    Procedure: COLONOSCOPY;  Surgeon: Bebeto Gonzalez MD;  [facility]  Service: Endoscopy;  Laterality: N/A;    COLONOSCOPY N/A 09/29/2021    Procedure: COLONOSCOPY;  Surgeon: Bebeto Gonzalez MD;  [facility]  Service: Endoscopy;  Laterality: N/A;  please schedule patient as soon as possible with me EGD colonoscopy with constipation bowel prep for iron deficiency anemia family history of colon cancer and personal history of colon polyps  9/17/21 ok for standard split prep per Dr. Gonzalez-    COLONOSCOPY N/A 09/29/2021    Procedure: COLONOSCOPY;  Surgeon: Bebeto Gonzalez MD;  [facility]  Service: Endoscopy;  Laterality: N/A;  Please schedule patient as soon as possible with me EGD colonoscopy with constipation bowel prep for iron deficiency anemia family history of colon cancer and personal history of colon polyps  9/17/21 Ok for standard split prep per   Lisa-    COLONOSCOPY W/ POLYPECTOMY  06/2013    polyp of colon    ENDOSCOPIC ULTRASOUND OF UPPER GASTROINTESTINAL TRACT N/A 04/29/2021    Procedure: ULTRASOUND, UPPER GI TRACT, ENDOSCOPIC;  Surgeon: Dalton Johnson MD;  Location: [facility] (2ND FLR);  Service: Endoscopy;  Laterality: N/A;  Postprandial nausea vomiting epigastric 0.9 cm stone and sludge seen within the gallbladder lumen.  No wall thickening or pericholecystic fluid.  0.6 cm stone seen within the distal common bile duct at the level of the pancreatic head.  There is dil    ERCP N/A 04/29/2021    Procedure: ERCP (ENDOSCOPIC RETROGRADE CHOLANGIOPANCREATOGRAPHY);  Surgeon: Dalton Johnson MD;  Location: [facility] (2ND FLR);  Service: Endoscopy;  Laterality: N/A;  Postprandial nausea and vomit 0.9 cm stone and sludge seen within the gallbladder lumen.  No wall thickening or pericholecystic fluid.  0.6 cm stone seen within the distal common bile duct at the level of the pancreatic head.  There i    ESOPHAGOGASTRODUODENOSCOPY N/A 09/29/2021    Procedure: EGD (ESOPHAGOGASTRODUODENOSCOPY);  Surgeon: Bebeto Gonzalez MD;  Location: [facility] (4TH FLR);  Service: Endoscopy;  Laterality: N/A;  rapid covid test arrival 12pm - sm  9/27 arrival time for covid test/procedure confirmed with pt-rb    ESOPHAGOGASTRODUODENOSCOPY N/A 09/29/2021    Procedure: EGD (ESOPHAGOGASTRODUODENOSCOPY);  Surgeon: Bebeto Gonzalez MD;  Location: [facility] (4TH FLR);  Service: Endoscopy;  Laterality: N/A;  covid test 9/19/21 [facility], prep instr emailed -ml    EYE SURGERY  11/10/2014    right retina/Dr. Dalton Sierra ([facility])    HYSTEROSCOPIC POLYPECTOMY OF UTERUS N/A 07/02/2018    Procedure: POLYPECTOMY, UTERUS, HYSTEROSCOPIC;  Surgeon: Elliot Vanessa IV, MD;  Location: [facility] OR;  Service: OB/GYN;  Laterality: N/A;    INJECTION OF JOINT Right 11/11/2022    Procedure: INJECTION, RIGHT GTB CONTRAST DIRECT REFERRAL;  Surgeon: Camden Hernandez MD;  Location: [facility];   Service: Pain Management;  Laterality: Right;    JOINT REPLACEMENT  03/23/2011    left total hip replacement    LAPAROSCOPIC CHOLECYSTECTOMY N/A 05/17/2021    Procedure: CHOLECYSTECTOMY, LAPAROSCOPIC;  Surgeon: Rayshawn Peralta Jr., MD;  Location: [facility] OR [address] [place] [address];  Service: General;  Laterality: N/A;    REMOVAL OF EPIRETINAL MEMBRANE Right     Dr. Padron    TONSILLECTOMY      pt was 11 years old     Family History       Problem Relation (Age of Onset)    Colon cancer Mother (75), Paternal Aunt (80)    Diabetes Other, Paternal Aunt    Lung cancer Father (75)    Prostate cancer Brother          Social History     Socioeconomic History    Marital status:    Tobacco Use    Smoking status: Never    Smokeless tobacco: Never    Tobacco comments:     The patient is not getting any regular exercise at this time.  She does enjoy traveling to [place].   Substance and Sexual Activity    Alcohol use: Yes     Comment: occasionally    Drug use: No    Sexual activity: Yes     Partners: Male     Birth control/protection: Post-menopausal     Comment:  64 years      Social Drivers of Health     Financial Resource Strain: Low Risk  (2/2/2024)    Overall Financial Resource Strain (CARDIA)     Difficulty of Paying Living Expenses: Not hard at all   Food Insecurity: No Food Insecurity (9/22/2024)    Received from [facility]    Hunger Vital Sign     Worried About Running Out of Food in the Last Year: Never true     Ran Out of Food in the Last Year: Never true   Transportation Needs: No Transportation Needs (9/22/2024)    Received from [facility]    PRAPARE - Transportation     Lack of Transportation (Medical): No     Lack of Transportation (Non-Medical): No   Physical Activity: Insufficiently Active (2/2/2024)    Exercise Vital Sign     Days of Exercise per Week: 2 days     Minutes of Exercise per Session: 60 min   Stress: No Stress Concern Present (2/2/2024)    [employer] [place] [employer] - Occupational  Stress Questionnaire     Feeling of Stress : Only a little   Housing Stability: High Risk (9/22/2024)    Received from [facility]    Housing Stability Vital Sign     Unable to Pay for Housing in the Last Year: Yes     Number of Times Moved in the Last Year: 1     Homeless in the Last Year: Yes       Review of Systems  Positive per HPI, otherwise a pertinent ROS was performed and was negative.        OBJECTIVE:     Vital Signs  Pain Score: 0-No pain  There is no height or weight on file to calculate BMI.      Neurosurgery Physical Exam  General: well developed, well nourished, no distress.   Head: normocephalic. Small superficial lacerations to R forehead/periorbital area, no active bleeding.   Neurologic: Alert and oriented. Thought content appropriate.  GCS: E4 V5 M6; Total: 15  Mental Status: Awake, Alert, Oriented x 4  Language: No aphasia  Speech: No dysarthria  Cranial nerves: face symmetric, tongue midline, CN II-XII grossly intact.   Eyes: pupils equal, round, reactive to light with accomodation, EOMI.   Pulmonary: normal respirations, no signs of respiratory distress  Abdomen: soft, non-distended, not tender to palpation  Skin: Skin is warm, dry and intact. Pitting edema to BLE's, non-tender.    Sensory: intact to light touch throughout  Motor Strength: Moves all extremities spontaneously with good tone.  Full strength upper and lower extremities. No abnormal movements seen.     Finger-to-nose: intact bilaterally   Pronator drift: absent bilaterally        Diagnostic Results:  Imaging was independently reviewed by myself.    CTH 12/23/24:  - No acute hemorrhage, prior tSAH/IPH appear to have resolved; there are some small foci of encephalomalacia in the L frontal/temporal lobes that coincide with sequelae of prior traumatic hemorrhages  - Ventriculomegaly out of proportion to sulcal enlargement  - Non-displaced R occipital bone fracture, appears to be healing    ASSESSMENT/PLAN:     1. Nontraumatic subdural  hemorrhage, unspecified    2. SAH (subarachnoid hemorrhage)        Tiffany Masterson is a 89 y.o. female with PMH significant for Afib on Eliquis, s/p unwitnessed fall on 9/22/24, presented to [facility] ED, CTH was notable for L tSAH and L cerebellar punctate IPH, as well as R non-displaced occipital bone fx extending just medial to the mastoid air cells. Managed conservatively and pt had good neurological recovery. Has been back on her Eliquis.    Recent CTH 12/23 with resolution of prior hemorrhages, evidence of encephalomalacia as sequelae of trauma. However, had another fall with head trauma this am. Clinically stable.    - CTH today; if there is acute hemorrhage, would recommend presenting to ED  - If CTH today is stable, no need for neurosurgical follow-up  - We did discuss possible underlying diagnosis of NPH, as well as the workup and treatment for this condition. Would like to take more time to recover from TBI and if symptoms persist/worsen, can re-present to discuss possible high volume diagnostic LP  - Although no direct contraindication from my standpoint, discussed risk vs benefit of continuing on Eliquis in the long-term given multiple falls, she will discuss this with her cardiologist    Addendum:    CTH today reviewed, no acute hemorrhage, stable from prior. Continue with plan as above.      Diagnoses addressed and orders placed this encounter:      Nontraumatic subdural hemorrhage, unspecified  -     CT Head Without Contrast; Future; Expected date: 12/30/2024    SAH (subarachnoid hemorrhage)  -     Ambulatory referral/consult to Neurosurgery          Zamzam Marte PA-C  Department of Neurosurgery  [facility]    Time spent on this encounter: 62 minutes. This includes face to face time and non-face to face time preparing to see the patient (eg, review of tests), obtaining and/or reviewing separately obtained history, documenting clinical information in the electronic or other  health record, independently interpreting results and communicating results to the patient/family/caregiver, or care coordinator.

## 2025-01-01 ENCOUNTER — PATIENT MESSAGE (OUTPATIENT)
Dept: PRIMARY CARE CLINIC | Facility: CLINIC | Age: 89
End: 2025-01-01
Payer: MEDICARE

## 2025-01-02 ENCOUNTER — OFFICE VISIT (OUTPATIENT)
Dept: PRIMARY CARE CLINIC | Facility: CLINIC | Age: 89
End: 2025-01-02
Payer: MEDICARE

## 2025-01-02 ENCOUNTER — HOSPITAL ENCOUNTER (INPATIENT)
Facility: HOSPITAL | Age: 89
LOS: 3 days | Discharge: HOME HEALTH | DRG: 308 | End: 2025-01-07
Attending: EMERGENCY MEDICINE | Admitting: STUDENT IN AN ORGANIZED HEALTH CARE EDUCATION/TRAINING PROGRAM
Payer: MEDICARE

## 2025-01-02 ENCOUNTER — TELEPHONE (OUTPATIENT)
Dept: PRIMARY CARE CLINIC | Facility: CLINIC | Age: 89
End: 2025-01-02

## 2025-01-02 VITALS
SYSTOLIC BLOOD PRESSURE: 116 MMHG | BODY MASS INDEX: 27.99 KG/M2 | OXYGEN SATURATION: 97 % | WEIGHT: 168 LBS | HEART RATE: 87 BPM | DIASTOLIC BLOOD PRESSURE: 78 MMHG | HEIGHT: 65 IN

## 2025-01-02 DIAGNOSIS — I48.0 PAROXYSMAL ATRIAL FIBRILLATION: ICD-10-CM

## 2025-01-02 DIAGNOSIS — I50.9 HEART FAILURE: ICD-10-CM

## 2025-01-02 DIAGNOSIS — R60.9 EDEMA, UNSPECIFIED TYPE: ICD-10-CM

## 2025-01-02 DIAGNOSIS — R26.89 POOR BALANCE: ICD-10-CM

## 2025-01-02 DIAGNOSIS — N17.9 AKI (ACUTE KIDNEY INJURY): Primary | ICD-10-CM

## 2025-01-02 DIAGNOSIS — R00.0 TACHYCARDIA: ICD-10-CM

## 2025-01-02 DIAGNOSIS — R60.0 BILATERAL LEG EDEMA: ICD-10-CM

## 2025-01-02 DIAGNOSIS — I48.20 CHRONIC ATRIAL FIBRILLATION WITH RVR: Primary | ICD-10-CM

## 2025-01-02 DIAGNOSIS — N18.32 STAGE 3B CHRONIC KIDNEY DISEASE: ICD-10-CM

## 2025-01-02 DIAGNOSIS — R29.6 FREQUENT FALLS: ICD-10-CM

## 2025-01-02 DIAGNOSIS — R07.9 CHEST PAIN: ICD-10-CM

## 2025-01-02 DIAGNOSIS — I50.20 HEART FAILURE WITH REDUCED EJECTION FRACTION: ICD-10-CM

## 2025-01-02 DIAGNOSIS — R60.0 BILATERAL LEG EDEMA: Primary | ICD-10-CM

## 2025-01-02 DIAGNOSIS — I50.41 ACUTE COMBINED SYSTOLIC AND DIASTOLIC HEART FAILURE: ICD-10-CM

## 2025-01-02 DIAGNOSIS — I48.91 A-FIB: ICD-10-CM

## 2025-01-02 DIAGNOSIS — I48.20 CHRONIC ATRIAL FIBRILLATION WITH RVR: ICD-10-CM

## 2025-01-02 DIAGNOSIS — I48.91 ATRIAL FIBRILLATION, UNSPECIFIED TYPE: ICD-10-CM

## 2025-01-02 DIAGNOSIS — R06.02 SHORTNESS OF BREATH: ICD-10-CM

## 2025-01-02 DIAGNOSIS — I48.11 LONGSTANDING PERSISTENT ATRIAL FIBRILLATION: ICD-10-CM

## 2025-01-02 DIAGNOSIS — R06.09 DYSPNEA ON EXERTION: ICD-10-CM

## 2025-01-02 PROBLEM — E87.70 VOLUME OVERLOAD: Status: ACTIVE | Noted: 2025-01-02

## 2025-01-02 PROBLEM — N39.0 UTI (URINARY TRACT INFECTION): Status: ACTIVE | Noted: 2025-01-02

## 2025-01-02 PROBLEM — I51.89 DIASTOLIC DYSFUNCTION: Status: ACTIVE | Noted: 2025-01-02

## 2025-01-02 LAB
ALBUMIN SERPL BCP-MCNC: 3.5 G/DL (ref 3.5–5.2)
ALP SERPL-CCNC: 95 U/L (ref 40–150)
ALT SERPL W/O P-5'-P-CCNC: 40 U/L (ref 10–44)
ANION GAP SERPL CALC-SCNC: 10 MMOL/L (ref 8–16)
AST SERPL-CCNC: 35 U/L (ref 10–40)
BACTERIA #/AREA URNS AUTO: ABNORMAL /HPF
BASOPHILS # BLD AUTO: 0.03 K/UL (ref 0–0.2)
BASOPHILS NFR BLD: 0.3 % (ref 0–1.9)
BILIRUB SERPL-MCNC: 0.8 MG/DL (ref 0.1–1)
BILIRUB UR QL STRIP: NEGATIVE
BNP SERPL-MCNC: 1246 PG/ML (ref 0–99)
BUN SERPL-MCNC: 20 MG/DL (ref 8–23)
CALCIUM SERPL-MCNC: 9.3 MG/DL (ref 8.7–10.5)
CHLORIDE SERPL-SCNC: 106 MMOL/L (ref 95–110)
CLARITY UR REFRACT.AUTO: CLEAR
CO2 SERPL-SCNC: 20 MMOL/L (ref 23–29)
COLOR UR AUTO: YELLOW
CREAT SERPL-MCNC: 1.5 MG/DL (ref 0.5–1.4)
DIFFERENTIAL METHOD BLD: ABNORMAL
EOSINOPHIL # BLD AUTO: 0.1 K/UL (ref 0–0.5)
EOSINOPHIL NFR BLD: 0.5 % (ref 0–8)
ERYTHROCYTE [DISTWIDTH] IN BLOOD BY AUTOMATED COUNT: 14.3 % (ref 11.5–14.5)
EST. GFR  (NO RACE VARIABLE): 33.5 ML/MIN/1.73 M^2
GLUCOSE SERPL-MCNC: 115 MG/DL (ref 70–110)
GLUCOSE UR QL STRIP: NEGATIVE
HCT VFR BLD AUTO: 35.9 % (ref 37–48.5)
HGB BLD-MCNC: 11.8 G/DL (ref 12–16)
HGB UR QL STRIP: NEGATIVE
HYALINE CASTS UR QL AUTO: 1 /LPF
IMM GRANULOCYTES # BLD AUTO: 0.05 K/UL (ref 0–0.04)
IMM GRANULOCYTES NFR BLD AUTO: 0.5 % (ref 0–0.5)
INFLUENZA A, MOLECULAR: NOT DETECTED
INFLUENZA B, MOLECULAR: NOT DETECTED
KETONES UR QL STRIP: NEGATIVE
LEUKOCYTE ESTERASE UR QL STRIP: ABNORMAL
LYMPHOCYTES # BLD AUTO: 1.5 K/UL (ref 1–4.8)
LYMPHOCYTES NFR BLD: 15.6 % (ref 18–48)
MCH RBC QN AUTO: 29.9 PG (ref 27–31)
MCHC RBC AUTO-ENTMCNC: 32.9 G/DL (ref 32–36)
MCV RBC AUTO: 91 FL (ref 82–98)
MICROSCOPIC COMMENT: ABNORMAL
MONOCYTES # BLD AUTO: 0.9 K/UL (ref 0.3–1)
MONOCYTES NFR BLD: 9.7 % (ref 4–15)
NEUTROPHILS # BLD AUTO: 7.1 K/UL (ref 1.8–7.7)
NEUTROPHILS NFR BLD: 73.4 % (ref 38–73)
NITRITE UR QL STRIP: NEGATIVE
NRBC BLD-RTO: 0 /100 WBC
OHS QRS DURATION: 74 MS
OHS QTC CALCULATION: 486 MS
PH UR STRIP: 6 [PH] (ref 5–8)
PLATELET # BLD AUTO: 224 K/UL (ref 150–450)
PMV BLD AUTO: 10.9 FL (ref 9.2–12.9)
POTASSIUM SERPL-SCNC: 4.7 MMOL/L (ref 3.5–5.1)
PROT SERPL-MCNC: 6.6 G/DL (ref 6–8.4)
PROT UR QL STRIP: NEGATIVE
RBC # BLD AUTO: 3.94 M/UL (ref 4–5.4)
RBC #/AREA URNS AUTO: 2 /HPF (ref 0–4)
RSV AG BY MOLECULAR METHOD: NOT DETECTED
SARS-COV-2 RNA RESP QL NAA+PROBE: NOT DETECTED
SODIUM SERPL-SCNC: 136 MMOL/L (ref 136–145)
SP GR UR STRIP: 1.01 (ref 1–1.03)
SQUAMOUS #/AREA URNS AUTO: 2 /HPF
T4 FREE SERPL-MCNC: 1.2 NG/DL (ref 0.71–1.51)
TROPONIN I SERPL DL<=0.01 NG/ML-MCNC: 10 NG/L (ref 0–14)
TROPONIN I SERPL DL<=0.01 NG/ML-MCNC: 11 NG/L (ref 0–14)
TSH SERPL DL<=0.005 MIU/L-ACNC: 4.68 UIU/ML (ref 0.4–4)
URN SPEC COLLECT METH UR: ABNORMAL
WBC # BLD AUTO: 9.65 K/UL (ref 3.9–12.7)
WBC #/AREA URNS AUTO: 18 /HPF (ref 0–5)

## 2025-01-02 PROCEDURE — G0378 HOSPITAL OBSERVATION PER HR: HCPCS | Mod: HCNC

## 2025-01-02 PROCEDURE — 84484 ASSAY OF TROPONIN QUANT: CPT | Mod: 91,HCNC

## 2025-01-02 PROCEDURE — 84439 ASSAY OF FREE THYROXINE: CPT | Mod: HCNC | Performed by: EMERGENCY MEDICINE

## 2025-01-02 PROCEDURE — 93005 ELECTROCARDIOGRAM TRACING: CPT | Mod: HCNC

## 2025-01-02 PROCEDURE — 85025 COMPLETE CBC W/AUTO DIFF WBC: CPT | Mod: HCNC | Performed by: EMERGENCY MEDICINE

## 2025-01-02 PROCEDURE — 93010 ELECTROCARDIOGRAM REPORT: CPT | Mod: HCNC,76,, | Performed by: INTERNAL MEDICINE

## 2025-01-02 PROCEDURE — 63600175 PHARM REV CODE 636 W HCPCS: Mod: HCNC

## 2025-01-02 PROCEDURE — 83880 ASSAY OF NATRIURETIC PEPTIDE: CPT | Mod: HCNC | Performed by: EMERGENCY MEDICINE

## 2025-01-02 PROCEDURE — 99285 EMERGENCY DEPT VISIT HI MDM: CPT | Mod: 25,HCNC

## 2025-01-02 PROCEDURE — 96375 TX/PRO/DX INJ NEW DRUG ADDON: CPT | Mod: HCNC

## 2025-01-02 PROCEDURE — 80053 COMPREHEN METABOLIC PANEL: CPT | Mod: HCNC | Performed by: EMERGENCY MEDICINE

## 2025-01-02 PROCEDURE — 63600175 PHARM REV CODE 636 W HCPCS: Mod: HCNC | Performed by: EMERGENCY MEDICINE

## 2025-01-02 PROCEDURE — 96374 THER/PROPH/DIAG INJ IV PUSH: CPT | Mod: HCNC

## 2025-01-02 PROCEDURE — 84484 ASSAY OF TROPONIN QUANT: CPT | Mod: HCNC | Performed by: EMERGENCY MEDICINE

## 2025-01-02 PROCEDURE — 84443 ASSAY THYROID STIM HORMONE: CPT | Mod: HCNC | Performed by: EMERGENCY MEDICINE

## 2025-01-02 PROCEDURE — 0241U SARS-COV2 (COVID) WITH FLU/RSV BY PCR: CPT | Mod: HCNC

## 2025-01-02 PROCEDURE — 99999 PR PBB SHADOW E&M-EST. PATIENT-LVL III: CPT | Mod: PBBFAC,HCNC,, | Performed by: INTERNAL MEDICINE

## 2025-01-02 PROCEDURE — 25000003 PHARM REV CODE 250: Mod: HCNC

## 2025-01-02 PROCEDURE — 87186 SC STD MICRODIL/AGAR DIL: CPT | Mod: HCNC

## 2025-01-02 PROCEDURE — 87088 URINE BACTERIA CULTURE: CPT | Mod: HCNC

## 2025-01-02 PROCEDURE — 25000003 PHARM REV CODE 250: Mod: HCNC | Performed by: EMERGENCY MEDICINE

## 2025-01-02 PROCEDURE — 87086 URINE CULTURE/COLONY COUNT: CPT | Mod: HCNC

## 2025-01-02 PROCEDURE — 81001 URINALYSIS AUTO W/SCOPE: CPT | Mod: HCNC

## 2025-01-02 PROCEDURE — 96376 TX/PRO/DX INJ SAME DRUG ADON: CPT | Mod: HCNC

## 2025-01-02 PROCEDURE — 93010 ELECTROCARDIOGRAM REPORT: CPT | Mod: HCNC,,, | Performed by: INTERNAL MEDICINE

## 2025-01-02 RX ORDER — CEFTRIAXONE 1 G/1
1 INJECTION, POWDER, FOR SOLUTION INTRAMUSCULAR; INTRAVENOUS
Status: COMPLETED | OUTPATIENT
Start: 2025-01-03 | End: 2025-01-04

## 2025-01-02 RX ORDER — IBUPROFEN 200 MG
16 TABLET ORAL
Status: DISCONTINUED | OUTPATIENT
Start: 2025-01-02 | End: 2025-01-03

## 2025-01-02 RX ORDER — METOPROLOL SUCCINATE 50 MG/1
50 TABLET, EXTENDED RELEASE ORAL 2 TIMES DAILY
Status: DISCONTINUED | OUTPATIENT
Start: 2025-01-03 | End: 2025-01-03

## 2025-01-02 RX ORDER — LOSARTAN POTASSIUM 50 MG/1
100 TABLET ORAL DAILY
Status: DISCONTINUED | OUTPATIENT
Start: 2025-01-03 | End: 2025-01-07 | Stop reason: HOSPADM

## 2025-01-02 RX ORDER — LEVOTHYROXINE SODIUM 112 UG/1
112 TABLET ORAL
Status: DISCONTINUED | OUTPATIENT
Start: 2025-01-03 | End: 2025-01-07 | Stop reason: HOSPADM

## 2025-01-02 RX ORDER — IBUPROFEN 200 MG
24 TABLET ORAL
Status: DISCONTINUED | OUTPATIENT
Start: 2025-01-02 | End: 2025-01-03

## 2025-01-02 RX ORDER — CEFTRIAXONE 1 G/1
1 INJECTION, POWDER, FOR SOLUTION INTRAMUSCULAR; INTRAVENOUS
Status: COMPLETED | OUTPATIENT
Start: 2025-01-02 | End: 2025-01-02

## 2025-01-02 RX ORDER — ATORVASTATIN CALCIUM 10 MG/1
10 TABLET, FILM COATED ORAL DAILY
Status: DISCONTINUED | OUTPATIENT
Start: 2025-01-03 | End: 2025-01-07 | Stop reason: HOSPADM

## 2025-01-02 RX ORDER — POLYETHYLENE GLYCOL 3350 17 G/17G
17 POWDER, FOR SOLUTION ORAL DAILY PRN
Status: DISCONTINUED | OUTPATIENT
Start: 2025-01-02 | End: 2025-01-07 | Stop reason: HOSPADM

## 2025-01-02 RX ORDER — FUROSEMIDE 10 MG/ML
40 INJECTION INTRAMUSCULAR; INTRAVENOUS
Status: COMPLETED | OUTPATIENT
Start: 2025-01-02 | End: 2025-01-02

## 2025-01-02 RX ORDER — SODIUM CHLORIDE 0.9 % (FLUSH) 0.9 %
10 SYRINGE (ML) INJECTION EVERY 12 HOURS PRN
Status: DISCONTINUED | OUTPATIENT
Start: 2025-01-02 | End: 2025-01-07 | Stop reason: HOSPADM

## 2025-01-02 RX ORDER — DILTIAZEM HYDROCHLORIDE 5 MG/ML
10 INJECTION INTRAVENOUS
Status: COMPLETED | OUTPATIENT
Start: 2025-01-02 | End: 2025-01-02

## 2025-01-02 RX ORDER — FUROSEMIDE 10 MG/ML
40 INJECTION INTRAMUSCULAR; INTRAVENOUS ONCE
Status: COMPLETED | OUTPATIENT
Start: 2025-01-03 | End: 2025-01-03

## 2025-01-02 RX ORDER — GLUCAGON 1 MG
1 KIT INJECTION
Status: DISCONTINUED | OUTPATIENT
Start: 2025-01-02 | End: 2025-01-03

## 2025-01-02 RX ORDER — ACETAMINOPHEN 325 MG/1
650 TABLET ORAL EVERY 4 HOURS PRN
Status: DISCONTINUED | OUTPATIENT
Start: 2025-01-02 | End: 2025-01-03

## 2025-01-02 RX ORDER — GABAPENTIN 100 MG/1
100 CAPSULE ORAL 3 TIMES DAILY
Status: DISCONTINUED | OUTPATIENT
Start: 2025-01-03 | End: 2025-01-07 | Stop reason: HOSPADM

## 2025-01-02 RX ORDER — BISACODYL 10 MG/1
10 SUPPOSITORY RECTAL DAILY PRN
Status: DISCONTINUED | OUTPATIENT
Start: 2025-01-02 | End: 2025-01-07 | Stop reason: HOSPADM

## 2025-01-02 RX ORDER — ACETAMINOPHEN 500 MG
1000 TABLET ORAL EVERY 8 HOURS PRN
Status: DISCONTINUED | OUTPATIENT
Start: 2025-01-02 | End: 2025-01-03

## 2025-01-02 RX ORDER — DILTIAZEM HYDROCHLORIDE 5 MG/ML
10 INJECTION INTRAVENOUS
Status: DISCONTINUED | OUTPATIENT
Start: 2025-01-02 | End: 2025-01-02

## 2025-01-02 RX ORDER — NALOXONE HCL 0.4 MG/ML
0.02 VIAL (ML) INJECTION
Status: DISCONTINUED | OUTPATIENT
Start: 2025-01-02 | End: 2025-01-03

## 2025-01-02 RX ADMIN — DILTIAZEM HYDROCHLORIDE 10 MG: 5 INJECTION INTRAVENOUS at 06:01

## 2025-01-02 RX ADMIN — CEFTRIAXONE 1 G: 1 INJECTION, POWDER, FOR SOLUTION INTRAMUSCULAR; INTRAVENOUS at 08:01

## 2025-01-02 RX ADMIN — DILTIAZEM HYDROCHLORIDE 90 MG: 60 TABLET, FILM COATED ORAL at 07:01

## 2025-01-02 RX ADMIN — DILTIAZEM HYDROCHLORIDE 10 MG: 5 INJECTION INTRAVENOUS at 07:01

## 2025-01-02 RX ADMIN — FUROSEMIDE 40 MG: 10 INJECTION, SOLUTION INTRAVENOUS at 06:01

## 2025-01-02 NOTE — PATIENT INSTRUCTIONS
Longstanding persistent atrial fibrillation  Assessment & Plan:  Now in Atrial fib with RVR - needs to go to ER now  Keep BP >110/60 and <130/80  Keep HR 55-80 beats per minute (Never <55 or >100)  Cont Metoprolol ER 50mg twice per day   Cont Diltiazem 90mg 3 times per day - drop to twice per day if HR <55 OR BP <110/60    Make appt with cardiologist   Cont Eliquis 5mg bid and monitor for bleeding  Avoid all NSAIDs and ASA   Use walker for balance       Bilateral leg edema  Assessment & Plan:  Needs labs drawn now ie BNP, TSH, CMP  Go to ER for diuresis since too fragile to do it at home since fall risk with hx of SAH now with altered sensorium ie frequent falls, more forgetful       Frequent falls  Assessment & Plan:  Cont to be less stable medardo with profound LE edema       Poor balance  Assessment & Plan:  Needs to use walker/cane

## 2025-01-02 NOTE — ASSESSMENT & PLAN NOTE
Now in Atrial fib with RVR - needs to go to ER now  Keep BP >110/60 and <130/80  Keep HR 55-80 beats per minute (Never <55 or >100)  Cont Metoprolol ER 50mg twice per day   Cont Diltiazem 90mg 3 times per day - drop to twice per day if HR <55 OR BP <110/60    Make appt with cardiologist   Cont Eliquis 5mg bid and monitor for bleeding  Avoid all NSAIDs and ASA   Use walker for balance

## 2025-01-02 NOTE — ASSESSMENT & PLAN NOTE
Needs labs drawn now ie BNP, TSH, CMP  Go to ER for diuresis since too fragile to do it at home since fall risk with hx of SAH now with altered sensorium ie frequent falls, more forgetful

## 2025-01-02 NOTE — PROGRESS NOTES
[facility]/Pediatric[facility] Progress Note      Chief Complaint     Edema and Health Maintenance (Out of breath fast after walking, cognitive changes for worst since fall.)   for 3 months    Subjective:      History of Present Illness:  Tiffany Masterson is a 89 y.o. female     Progressive LE edema right >left x 3 months: pt now with freezing cold feet, worsening  LE edema with oozing clear fluids with FLORES, weakness, decreased po intake. Pt denies CP but has been sleeping upright on the side of her bed. She has chronic atrial fibrillation on diltiazem 90mg tid, metoprolol ER 50mg bid and Eliquis 5mg bid.  She admits to having fallen approx 1 wk ago with worsening of right LE edema now up to her right thigh. She denies pain, numbness, tingling of bilateral LE.     Past Medical History:  Past Medical History:   Diagnosis Date    Abnormal MRI 10/16/2020    Acute cystitis without hematuria 12/22/2021    Arthritis     Arthritis of right hip 11/08/2021    Atrial fibrillation     Cataract     Elevated liver enzymes     Endometrial mass 06/29/2018    Epiretinal membrane (ERM) of right eye     Family history of malignant neoplasm of gastrointestinal tract mother    Frequent UTI 09/30/2018    Generalized arthritis 09/19/2024    Shoulders, knees, hips      Graves disease     now hypothryoid - no surgery or medicine. resolved on its own    History of cholecystectomy 06/10/2021    History of colonoscopy     unremarkable 2007 by Dr. Javier.  Barium enema revealed diverticular disease.    Hyperlipidemia     Hypertension     Hypertriglyceridemia 04/19/2013    Continue statin for secondary stroke prevention    Hypothyroidism     Impaired functional mobility, balance, gait, and endurance 06/04/2021    Iron deficiency anemia 09/29/2021    Migraine, ophthalmoplegic     Mixed stress and urge urinary incontinence 09/30/2018    Ocular migraine     Personal history of colonic polyps     Physical deconditioning 09/13/2023    Preop  testing 05/17/2021    S/P colonoscopic polypectomy 06/18/2013    Sleep disorder 11/08/2021    Status post hysteroscopic polypectomy 07/02/2018    TBI (traumatic brain injury) 10/25/2024    9/22/2024: Ct head: Left frontotemporal subarachnoid hemorrhage again noted including a small focus of intraparenchymal hemorrhage in the left cerebellum. No new hemorrhage.       TIA (transient ischemic attack)     with a normal angiogram in the past, Ocular migraines reported by patient, not TIA     Transaminitis 03/19/2021    Urethral caruncle 09/15/2020       Past Surgical History:  Past Surgical History:   Procedure Laterality Date    CATARACT EXTRACTION Right 2012    Dr. Lexis Nicolas     CHOLECYSTECTOMY  2022    COLONOSCOPY      2014 polyps    COLONOSCOPY N/A 11/07/2016    Procedure: COLONOSCOPY;  Surgeon: Bebeto Gonzalez MD;  [facility]  Service: Endoscopy;  Laterality: N/A;    COLONOSCOPY N/A 03/09/2020    Procedure: COLONOSCOPY;  Surgeon: Bebeto Gonzalez MD;  [facility]  Service: Endoscopy;  Laterality: N/A;    COLONOSCOPY N/A 09/29/2021    Procedure: COLONOSCOPY;  Surgeon: Bebeto Gonzalez MD;  Location: [facility] ENDO ([facility]);  Service: Endoscopy;  Laterality: N/A;  please schedule patient as soon as possible with me EGD colonoscopy with constipation bowel prep for iron deficiency anemia family history of colon cancer and personal history of colon polyps  9/17/21 ok for standard split prep per Dr. Gonzalez-ml    COLONOSCOPY N/A 09/29/2021    Procedure: COLONOSCOPY;  Surgeon: Bebeto Gonzalez MD;  [facility]  Service: Endoscopy;  Laterality: N/A;  Please schedule patient as soon as possible with me EGD colonoscopy with constipation bowel prep for iron deficiency anemia family history of colon cancer and personal history of colon polyps  9/17/21 Ok for standard split prep per Dr. Gonzalez-ml    COLONOSCOPY W/ POLYPECTOMY  06/2013    polyp of colon     ENDOSCOPIC ULTRASOUND OF UPPER GASTROINTESTINAL TRACT N/A 04/29/2021    Procedure: ULTRASOUND, UPPER GI TRACT, ENDOSCOPIC;  Surgeon: Dalton Johnson MD;  Location: [facility] (2ND FLR);  Service: Endoscopy;  Laterality: N/A;  Postprandial nausea vomiting epigastric 0.9 cm stone and sludge seen within the gallbladder lumen.  No wall thickening or pericholecystic fluid.  0.6 cm stone seen within the distal common bile duct at the level of the pancreatic head.  There is dil    ERCP N/A 04/29/2021    Procedure: ERCP (ENDOSCOPIC RETROGRADE CHOLANGIOPANCREATOGRAPHY);  Surgeon: Dalton Johnson MD;  Location: [facility] (2ND FLR);  Service: Endoscopy;  Laterality: N/A;  Postprandial nausea and vomit 0.9 cm stone and sludge seen within the gallbladder lumen.  No wall thickening or pericholecystic fluid.  0.6 cm stone seen within the distal common bile duct at the level of the pancreatic head.  There i    ESOPHAGOGASTRODUODENOSCOPY N/A 09/29/2021    Procedure: EGD (ESOPHAGOGASTRODUODENOSCOPY);  Surgeon: Bebeto Gonzalez MD;  Location: [facility] (4TH FLR);  Service: Endoscopy;  Laterality: N/A;  rapid covid test arrival 12pm - sm  9/27 arrival time for covid test/procedure confirmed with pt-rb    ESOPHAGOGASTRODUODENOSCOPY N/A 09/29/2021    Procedure: EGD (ESOPHAGOGASTRODUODENOSCOPY);  Surgeon: Bebeto Gonzalez MD;  Location: [facility] (4TH FLR);  Service: Endoscopy;  Laterality: N/A;  covid test 9/19/21 [facility], prep instr emailed -    EYE SURGERY  11/10/2014    right retina/Dr. Dalton Sierra ([facility])    HYSTEROSCOPIC POLYPECTOMY OF UTERUS N/A 07/02/2018    Procedure: POLYPECTOMY, UTERUS, HYSTEROSCOPIC;  Surgeon: Elliot Vanessa IV, MD;  Location: [facility] OR;  Service: OB/GYN;  Laterality: N/A;    INJECTION OF JOINT Right 11/11/2022    Procedure: INJECTION, RIGHT GTB CONTRAST DIRECT REFERRAL;  Surgeon: Camden Hernandez MD;  Location: [facility] PAIN MGT;  Service: Pain Management;  Laterality: Right;    JOINT REPLACEMENT  03/23/2011     left total hip replacement    LAPAROSCOPIC CHOLECYSTECTOMY N/A 05/17/2021    Procedure: CHOLECYSTECTOMY, LAPAROSCOPIC;  Surgeon: Rayshawn Peralta Jr., MD;  Location: [facility] OR [address] [place] [address];  Service: General;  Laterality: N/A;    REMOVAL OF EPIRETINAL MEMBRANE Right     Dr. Padron    TONSILLECTOMY      pt was 11 years old       Allergies:  Review of patient's allergies indicates:   Allergen Reactions    Levaquin [levofloxacin]      Joint pain    Hydrochlorothiazide Other (See Comments)     Pain in joints and depression per pt       Home Medications:  Current Facility-Administered Medications   Medication Dose Route Frequency Provider Last Rate Last Admin    triamcinolone acetonide injection 40 mg  40 mg Intra-articular 1 time in Clinic/HOD Rebeca Dumont MD         Current Outpatient Medications   Medication Sig Dispense Refill    acetaminophen (TYLENOL) 500 MG tablet Take 500 mg by mouth every 6 (six) hours as needed.      ascorbic acid, vitamin C, (VITAMIN C) 250 MG tablet Take 250 mg by mouth once daily.      atorvastatin (LIPITOR) 10 MG tablet Take 1 tablet by mouth once daily 90 tablet 3    b complex vitamins capsule Take 1 capsule by mouth once daily.      biotin 5,000 mcg TbDL Take 1 tablet (5,000 mcg total) by mouth Daily. 90 tablet 3    bisacodyL (DULCOLAX) 5 mg EC tablet Take 5 mg by mouth once daily.      CALCIUM CARBONATE/VITAMIN D3 (CALCIUM 600 + D,3, ORAL) Take 1 tablet by mouth once daily.       cholecalciferol, vitamin D3, (VITAMIN D3) 50 mcg (2,000 unit) Cap Take 1 capsule by mouth once daily.      coenzyme Q10 200 mg capsule Take 200 mg by mouth once daily.      DEXTRAN 70/HYPROMELLOSE (ARTIFICIAL TEARS, PF, OPHT) Place 1 drop into both eyes as needed.      diltiaZEM (CARDIZEM) 90 MG tablet Take 90 mg by mouth every 8 (eight) hours.      ELIQUIS 5 mg Tab Take 1 tablet by mouth twice daily 180 tablet 3    estradioL (ESTRACE) 0.01 % (0.1 mg/gram) vaginal cream Place 0.5 g intravaginally q.h.s. x2  weeks; then, placed 0.5 g intravaginally twice weekly at bedtime (maintenance therapy). 42.5 g 1    fluticasone (VERAMYST) 27.5 mcg/actuation nasal spray 2 sprays by Nasal route.      fluticasone propionate (FLONASE) 50 mcg/actuation nasal spray CLEAN SINUS/NARES WITH OCEAN NASAL SPRAY THEN USE FLONASE 1 SPRAY TWICE DAILY. 48 g 3    gabapentin (NEURONTIN) 100 MG capsule TAKE 1 CAPSULE BY MOUTH THREE TIMES DAILY 90 capsule 3    hydroquinone 4 % Crea APPLY  CREAM TOPICALLY TWICE DAILY 58 g 0    levothyroxine (SYNTHROID) 112 MCG tablet TAKE 1 TABLET BY MOUTH BEFORE BREAKFAST 90 tablet 3    LIDOcaine (LIDODERM) 5 % Place 1 patch onto the skin once daily. Remove & Discard patch within 12 hours or as directed by MD 30 patch 0    losartan (COZAAR) 100 MG tablet Take 1 tablet by mouth once daily 90 tablet 3    magnesium oxide 400 mg magnesium Tab Take 1 tablet by mouth once daily.      metoprolol succinate (TOPROL-XL) 50 MG 24 hr tablet Take 1 tablet by mouth twice daily 180 tablet 3    MULTIVITAMIN W-MINERALS/LUTEIN (CENTRUM SILVER ORAL) Take 1 tablet by mouth once daily.      mupirocin (BACTROBAN) 2 % ointment Apply topically 3 (three) times daily. Apply to wound on head until healed 30 g 1    sodium chloride (SALINE NASAL) 0.65 % nasal spray 1 spray by Nasal route as needed for Congestion. 104 mL 3    vit A/vit C/vit E/zinc/copper (ICAPS AREDS ORAL) Take 1 tablet by mouth 2 (two) times a day.      vitamin D (VITAMIN D3) 1000 units Tab Take 2 tablets by mouth once daily.          Family History:  Family History   Problem Relation Name Age of Onset    Colon cancer Mother Tiffany [place] 75    Lung cancer Father ColLakesha [place] 75    Diabetes Other great aunt     Diabetes Paternal Aunt      Colon cancer Paternal Aunt  80    Prostate cancer Brother      Breast cancer Neg Hx      Ovarian cancer Neg Hx      Celiac disease Neg Hx      Cataracts Neg Hx      Glaucoma Neg Hx      Macular degeneration Neg  "Hx         Social History:  Social History     Tobacco Use    Smoking status: Never    Smokeless tobacco: Never    Tobacco comments:     The patient is not getting any regular exercise at this time.  She does enjoy traveling to [place].   Substance Use Topics    Alcohol use: Yes     Comment: occasionally    Drug use: No       Review of Systems:  Pertinent positives and negatives listed in HPI. All other systems are reviewed and are negative.    Health Maintaince :   Health Maintenance Topics with due status: Not Due       Topic Last Completion Date    TETANUS VACCINE 09/20/2018    Lipid Panel 09/19/2024             Immunizations:   The ASCVD Risk score (Emeka CONTRERAS, et al., 2019) failed to calculate for the following reasons:    The 2019 ASCVD risk score is only valid for ages 40 to 79      Objective:   /78 (BP Location: Left arm, Patient Position: Sitting)   Pulse 87   Ht 5' 5" (1.651 m)   Wt 76.2 kg (167 lb 15.9 oz)   SpO2 97%   BMI 27.96 kg/m²      Body mass index is 27.96 kg/m².       Physical Examination:  General: Alert and awake in no apparent distress  HEENT: Normocephalic and atraumatic; Tms WNL  Eyes:  PERRL; EOMi with anicteric sclera and clear conjunctivae  Mouth:  Oropharynx clear and without exudate; moist mucous membranes  Neck:   Cervical nodes not enlarged (No submental, submandibular, preauricular, posterior auricular or occipital adenopathy); supple; no bruits  Cardio:  Irregularly irregular with normal S1 and S2; tachycardic >100bpm; no murmurs or rubs  Resp:  CTAB; respirations unlabored; no wheezes, crackles or rhonchi  Abdom: Soft, NTND with normoactive bowel sounds; negative HSM  Extrem: Warm and well-perfused with no clubbing, cyanosis; oozing bullae on medial right calf with +4 edema from dorsum of foot up to right medial thigh; +3 LLE from dorsum to proximal calf; both feet very cold to touch; DP +2 bilaterally   Pulses:  2+ and symmetric distally  Neuro:  AAOx3; cooperative and " pleasant with no focal deficits    Laboratory:      Most Recent Data:  Lab Results   Component Value Date    WBC 9.65 01/02/2025    HGB 11.8 (L) 01/02/2025    HCT 35.9 (L) 01/02/2025     01/02/2025    CHOL 141 09/19/2024    TRIG 104 09/19/2024    HDL 44 09/19/2024    ALT 40 01/02/2025    AST 35 01/02/2025     01/02/2025    K 4.7 01/02/2025     01/02/2025    BUN 20 01/02/2025    CO2 20 (L) 01/02/2025    TSH 4.680 (H) 01/02/2025    INR 1.1 09/22/2024    HGBA1C 5.8 (H) 08/31/2020              CBC:   WBC   Date Value Ref Range Status   01/02/2025 9.65 3.90 - 12.70 K/uL Final     Hemoglobin   Date Value Ref Range Status   01/02/2025 11.8 (L) 12.0 - 16.0 g/dL Final     Hematocrit   Date Value Ref Range Status   01/02/2025 35.9 (L) 37.0 - 48.5 % Final     Platelets   Date Value Ref Range Status   01/02/2025 224 150 - 450 K/uL Final     MCV   Date Value Ref Range Status   01/02/2025 91 82 - 98 fL Final     RDW   Date Value Ref Range Status   01/02/2025 14.3 11.5 - 14.5 % Final     BMP:   Sodium   Date Value Ref Range Status   01/02/2025 136 136 - 145 mmol/L Final     Potassium   Date Value Ref Range Status   01/02/2025 4.7 3.5 - 5.1 mmol/L Final     Chloride   Date Value Ref Range Status   01/02/2025 106 95 - 110 mmol/L Final     CO2   Date Value Ref Range Status   01/02/2025 20 (L) 23 - 29 mmol/L Final     BUN   Date Value Ref Range Status   01/02/2025 20 8 - 23 mg/dL Final     Creatinine   Date Value Ref Range Status   01/02/2025 1.5 (H) 0.5 - 1.4 mg/dL Final     Glucose   Date Value Ref Range Status   01/02/2025 115 (H) 70 - 110 mg/dL Final     Calcium   Date Value Ref Range Status   01/02/2025 9.3 8.7 - 10.5 mg/dL Final     Magnesium   Date Value Ref Range Status   09/19/2024 2.0 1.6 - 2.6 mg/dL Final     Phosphorus   Date Value Ref Range Status   06/21/2013 2.3 (L) 2.7 - 4.5 mg/dl Final     LFTs:   Total Protein   Date Value Ref Range Status   01/02/2025 6.6 6.0 - 8.4 g/dL Final     Albumin   Date  "Value Ref Range Status   01/02/2025 3.5 3.5 - 5.2 g/dL Final     Total Bilirubin   Date Value Ref Range Status   01/02/2025 0.8 0.1 - 1.0 mg/dL Final     Comment:     For infants and newborns, interpretation of results should be based  on gestational age, weight and in agreement with clinical  observations.    Premature Infant recommended reference ranges:  Up to 24 hours.............<8.0 mg/dL  Up to 48 hours............<12.0 mg/dL  3-5 days..................<15.0 mg/dL  6-29 days.................<15.0 mg/dL       AST   Date Value Ref Range Status   01/02/2025 35 10 - 40 U/L Final     Alkaline Phosphatase   Date Value Ref Range Status   01/02/2025 95 40 - 150 U/L Final     ALT   Date Value Ref Range Status   01/02/2025 40 10 - 44 U/L Final     Coags:   INR   Date Value Ref Range Status   09/22/2024 1.1 0.9 - 1.2 Final   05/09/2013 1.0 0.8 - 1.2 Final     Comment:     ACCP Guideline for Coumadin usage recommends a "target range" of  2.0 - 3.0 for INR for all indicators except mechanical heart valves  and antiphospholipid syndromes which should use 2.5 - 3.5.  .     FLP:      Lab Results   Component Value Date    CHOL 141 09/19/2024    CHOL 136 11/06/2023    CHOL 217 (H) 09/13/2023     Lab Results   Component Value Date    HDL 44 09/19/2024    HDL 49 11/06/2023    HDL 39 (L) 09/13/2023     Lab Results   Component Value Date    LDLCALC 76.2 09/19/2024    LDLCALC 65.6 11/06/2023    LDLCALC 131.0 09/13/2023     Lab Results   Component Value Date    TRIG 104 09/19/2024    TRIG 107 11/06/2023    TRIG 235 (H) 09/13/2023     Lab Results   Component Value Date    CHOLHDL 31.2 09/19/2024    CHOLHDL 36.0 11/06/2023    CHOLHDL 18.0 (L) 09/13/2023      DM:      Hemoglobin A1C   Date Value Ref Range Status   08/31/2020 5.8 (H) 4.0 - 5.6 % Final     Comment:     ADA Screening Guidelines:  5.7-6.4%  Consistent with prediabetes  >or=6.5%  Consistent with diabetes  High levels of fetal hemoglobin interfere with the HbA1C  assay. " Heterozygous hemoglobin variants (HbS, HgC, etc)do  not significantly interfere with this assay.   However, presence of multiple variants may affect accuracy.       LDL Cholesterol   Date Value Ref Range Status   09/19/2024 76.2 63.0 - 159.0 mg/dL Final     Comment:     The National Cholesterol Education Program (NCEP) has set the  following guidelines (reference values) for LDL Cholesterol:  Optimal.......................<130 mg/dL  Borderline High...............130-159 mg/dL  High..........................160-189 mg/dL  Very High.....................>190 mg/dL       Creatinine   Date Value Ref Range Status   01/02/2025 1.5 (H) 0.5 - 1.4 mg/dL Final     Thyroid:   TSH   Date Value Ref Range Status   01/02/2025 4.680 (H) 0.400 - 4.000 uIU/mL Final     Free T4   Date Value Ref Range Status   01/02/2025 1.20 0.71 - 1.51 ng/dL Final     Anemia:   Iron   Date Value Ref Range Status   09/18/2021 47 30 - 160 ug/dL Final     TIBC   Date Value Ref Range Status   09/18/2021 330 250 - 450 ug/dL Final     Ferritin   Date Value Ref Range Status   09/18/2021 251 20.0 - 300.0 ng/mL Final     Vitamin B-12   Date Value Ref Range Status   12/02/2022 652 210 - 950 pg/mL Final     Cardiac:   Troponin I   Date Value Ref Range Status   12/21/2021 0.014 0.000 - 0.026 ng/mL Final     Comment:     The reference interval for Troponin I represents the 99th percentile   cutoff   for our facility and is consistent with 3rd generation assay   performance.       BNP   Date Value Ref Range Status   01/02/2025 1,246 (H) 0 - 99 pg/mL Final     Comment:     Values of less than 100 pg/ml are consistent with non-CHF populations.     Urinalysis:   Urine Culture, Routine   Date Value Ref Range Status   12/22/2021   Final    Multiple organisms isolated. None in predominance.  Repeat if   12/22/2021 clinically necessary.  Final     Color, UA   Date Value Ref Range Status   01/02/2025 Yellow Yellow, Straw, [place] Final     Clarity, UA   Date Value Ref Range  Status   10/29/2020   Final     Comment:     normal      Specific Gravity, UA   Date Value Ref Range Status   01/02/2025 1.010 1.005 - 1.030 Final     Nitrite, UA   Date Value Ref Range Status   01/02/2025 Negative Negative Final     Ketones, UA   Date Value Ref Range Status   01/02/2025 Negative Negative Final     Urobilinogen, UA   Date Value Ref Range Status   10/29/2020   Final     Comment:     normal   09/14/2018 Negative <2.0 EU/dL Final     WBC, UA   Date Value Ref Range Status   01/02/2025 18 (H) 0 - 5 /hpf Final       Other Results:  EKG (my interpretation):     Radiology:  X-Ray Chest AP Portable  Narrative: EXAMINATION:  XR CHEST AP PORTABLE    CLINICAL HISTORY:  CHF;    TECHNIQUE:  Single frontal view of the chest was performed.    COMPARISON:  12/21/2021    FINDINGS:  The cardiomediastinal silhouette is prominent, magnified by technique noting calcification of the aorta..  There is obscuration of the bilateral costophrenic angles suggesting effusions..  The trachea is midline.  The lungs are symmetrically expanded bilaterally with coarse interstitial attenuation bilaterally.  Developing lower lung zone consolidation not excluded..  There is no pneumothorax.  The osseous structures are remarkable for degenerative change and osteopenia..  Impression: 1. Bilateral pleural effusions with interstitial findings suggesting edema.  Developing lower lung zone consolidation not excluded.    Electronically signed by: Jude Elaine MD  Date:    01/02/2025  Time:    18:42          Assessment/Plan     Tiffany Masterson is a 89 y.o. female with:  1. Chronic atrial fibrillation with RVR  Assessment & Plan:  Now in Atrial fib with RVR - needs to go to ER now  Keep BP >110/60 and <130/80  Keep HR 55-80 beats per minute (Never <55 or >100)  Cont Metoprolol ER 50mg twice per day   Cont Diltiazem 90mg 3 times per day - drop to twice per day if HR <55 OR BP <110/60    Make appt with cardiologist   Cont Eliquis 5mg bid and  monitor for bleeding  Avoid all NSAIDs and ASA   Use walker for balance       2. Bilateral leg edema  Assessment & Plan:  Needs labs drawn now ie BNP, TSH, CMP  Go to ER for diuresis since too fragile to do it at home since fall risk with hx of SAH now with altered sensorium ie frequent falls, more forgetful       3. Frequent falls  Assessment & Plan:  Cont to be less stable medardo with profound LE edema       4. Poor balance  Assessment & Plan:  Needs to use walker/cane       5. Stage 3b chronic kidney disease  Assessment & Plan:  Likely due to dehydration/JAMARI - needs diuresis due to pooling of fluid in her legs               I spent a total of 39 minutes on the day of the visit.This includes face to face time and non-face to face time preparing to see the patient (eg, review of tests), obtaining and/or reviewing separately obtained history, documenting clinical information in the electronic or other health record, independently interpreting results and communicating results to the patient/family/caregiver, or care coordinator.   Code Status:     Rebeca Dumont MD

## 2025-01-02 NOTE — TELEPHONE ENCOUNTER
----- Message from Tech [place] sent at 1/2/2025  4:16 PM CST -----  Regarding: EKG order  Please put the EKG order in for the EKG performed on   1/2/25     ,  then attach the order to the EKG appointment or the MD appointment for the date performed.      EKG will not be read until order is received.    Thank you

## 2025-01-03 PROBLEM — I50.20 HEART FAILURE WITH REDUCED EJECTION FRACTION: Status: ACTIVE | Noted: 2025-01-02

## 2025-01-03 LAB
ANION GAP SERPL CALC-SCNC: 11 MMOL/L (ref 8–16)
ASCENDING AORTA: 3.33 CM
AV AREA BY CONTINUOUS VTI: 2 CM2
AV INDEX (PROSTH): 0.5
AV LVOT MEAN GRADIENT: 1 MMHG
AV LVOT PEAK GRADIENT: 2 MMHG
AV MEAN GRADIENT: 5 MMHG
AV PEAK GRADIENT: 7.8 MMHG
AV REGURGITATION PRESSURE HALF TIME: 381.44 MS
AV VALVE AREA BY VELOCITY RATIO: 2.1 CM²
AV VALVE AREA: 2.1 CM2
AV VELOCITY RATIO: 0.5
BSA FOR ECHO PROCEDURE: 1.87 M2
BUN SERPL-MCNC: 18 MG/DL (ref 8–23)
CALCIUM SERPL-MCNC: 9.3 MG/DL (ref 8.7–10.5)
CHLORIDE SERPL-SCNC: 105 MMOL/L (ref 95–110)
CO2 SERPL-SCNC: 20 MMOL/L (ref 23–29)
CREAT SERPL-MCNC: 1.4 MG/DL (ref 0.5–1.4)
CV ECHO LV RWT: 0.29 CM
DOP CALC AO PEAK VEL: 1.4 M/S
DOP CALC AO VTI: 27.9 CM
DOP CALC LVOT AREA: 4.2 CM2
DOP CALC LVOT DIAMETER: 2.3 CM
DOP CALC LVOT PEAK VEL: 0.7 M/S
DOP CALC LVOT STROKE VOLUME: 57.7 CM3
DOP CALCLVOT PEAK VEL VTI: 13.9 CM
E WAVE DECELERATION TIME: 143.21 MS
E/A RATIO: 4.06
E/E' RATIO: 21 M/S
ECHO EF ESTIMATED: 35 %
ECHO LV POSTERIOR WALL: 0.7 CM (ref 0.6–1.1)
ERYTHROCYTE [DISTWIDTH] IN BLOOD BY AUTOMATED COUNT: 14.4 % (ref 11.5–14.5)
EST. GFR  (NO RACE VARIABLE): 36.4 ML/MIN/1.73 M^2
FRACTIONAL SHORTENING: 16.3 % (ref 28–44)
GLUCOSE SERPL-MCNC: 102 MG/DL (ref 70–110)
HCT VFR BLD AUTO: 36 % (ref 37–48.5)
HGB BLD-MCNC: 11.4 G/DL (ref 12–16)
INTERVENTRICULAR SEPTUM: 0.8 CM (ref 0.6–1.1)
LA MAJOR: 5.87 CM
LA MINOR: 6.08 CM
LA WIDTH: 4.22 CM
LEFT ATRIUM SIZE: 4.92 CM
LEFT ATRIUM VOLUME INDEX MOD: 43.3 ML/M2
LEFT ATRIUM VOLUME INDEX: 57.3 ML/M2
LEFT ATRIUM VOLUME MOD: 79.64 ML
LEFT ATRIUM VOLUME: 105.41 CM3
LEFT INTERNAL DIMENSION IN SYSTOLE: 4.1 CM (ref 2.1–4)
LEFT VENTRICLE DIASTOLIC VOLUME INDEX: 61.76 ML/M2
LEFT VENTRICLE DIASTOLIC VOLUME: 113.64 ML
LEFT VENTRICLE MASS INDEX: 65.7 G/M2
LEFT VENTRICLE SYSTOLIC VOLUME INDEX: 40.4 ML/M2
LEFT VENTRICLE SYSTOLIC VOLUME: 74.34 ML
LEFT VENTRICULAR INTERNAL DIMENSION IN DIASTOLE: 4.9 CM (ref 3.5–6)
LEFT VENTRICULAR MASS: 120.8 G
LV LATERAL E/E' RATIO: 21
LV SEPTAL E/E' RATIO: 21
MAGNESIUM SERPL-MCNC: 2 MG/DL (ref 1.6–2.6)
MCH RBC QN AUTO: 29.1 PG (ref 27–31)
MCHC RBC AUTO-ENTMCNC: 31.7 G/DL (ref 32–36)
MCV RBC AUTO: 92 FL (ref 82–98)
MV PEAK A VEL: 0.31 M/S
MV PEAK E VEL: 1.26 M/S
OHS CV RV/LV RATIO: 0.76 CM
OHS LV EJECTION FRACTION SIMPSONS BIPLANE MOD: 30 %
OHS QRS DURATION: 72 MS
OHS QTC CALCULATION: 401 MS
PHOSPHATE SERPL-MCNC: 4 MG/DL (ref 2.7–4.5)
PISA TR MAX VEL: 2.68 M/S
PLATELET # BLD AUTO: 222 K/UL (ref 150–450)
PMV BLD AUTO: 10.9 FL (ref 9.2–12.9)
POTASSIUM SERPL-SCNC: 3.7 MMOL/L (ref 3.5–5.1)
RA MAJOR: 5.49 CM
RA PRESSURE ESTIMATED: 0 MMHG
RA WIDTH: 4.04 CM
RBC # BLD AUTO: 3.92 M/UL (ref 4–5.4)
RIGHT VENTRICLE DIASTOLIC BASEL DIMENSION: 3.7 CM
RV TB RVSP: 3 MMHG
SINUS: 3.22 CM
SODIUM SERPL-SCNC: 136 MMOL/L (ref 136–145)
STJ: 2.33 CM
TDI LATERAL: 0.06 M/S
TDI SEPTAL: 0.06 M/S
TDI: 0.06 M/S
TR MAX PG: 29 MMHG
TRICUSPID ANNULAR PLANE SYSTOLIC EXCURSION: 0.9 CM
TV PEAK GRADIENT: 29 MMHG
TV REST PULMONARY ARTERY PRESSURE: 29 MMHG
WBC # BLD AUTO: 11.48 K/UL (ref 3.9–12.7)
Z-SCORE OF LEFT VENTRICULAR DIMENSION IN END DIASTOLE: -0.41
Z-SCORE OF LEFT VENTRICULAR DIMENSION IN END SYSTOLE: 2.08

## 2025-01-03 PROCEDURE — 97165 OT EVAL LOW COMPLEX 30 MIN: CPT | Mod: HCNC

## 2025-01-03 PROCEDURE — 97530 THERAPEUTIC ACTIVITIES: CPT | Mod: HCNC

## 2025-01-03 PROCEDURE — 83735 ASSAY OF MAGNESIUM: CPT | Mod: HCNC | Performed by: INTERNAL MEDICINE

## 2025-01-03 PROCEDURE — 96376 TX/PRO/DX INJ SAME DRUG ADON: CPT

## 2025-01-03 PROCEDURE — 97535 SELF CARE MNGMENT TRAINING: CPT | Mod: HCNC

## 2025-01-03 PROCEDURE — 96374 THER/PROPH/DIAG INJ IV PUSH: CPT | Mod: 59

## 2025-01-03 PROCEDURE — 63600175 PHARM REV CODE 636 W HCPCS: Mod: HCNC | Performed by: INTERNAL MEDICINE

## 2025-01-03 PROCEDURE — 36415 COLL VENOUS BLD VENIPUNCTURE: CPT | Mod: HCNC | Performed by: INTERNAL MEDICINE

## 2025-01-03 PROCEDURE — 25000003 PHARM REV CODE 250: Mod: HCNC | Performed by: INTERNAL MEDICINE

## 2025-01-03 PROCEDURE — 96375 TX/PRO/DX INJ NEW DRUG ADDON: CPT

## 2025-01-03 PROCEDURE — 63600175 PHARM REV CODE 636 W HCPCS: Mod: HCNC | Performed by: STUDENT IN AN ORGANIZED HEALTH CARE EDUCATION/TRAINING PROGRAM

## 2025-01-03 PROCEDURE — 85027 COMPLETE CBC AUTOMATED: CPT | Mod: HCNC | Performed by: INTERNAL MEDICINE

## 2025-01-03 PROCEDURE — 25000003 PHARM REV CODE 250: Mod: HCNC | Performed by: STUDENT IN AN ORGANIZED HEALTH CARE EDUCATION/TRAINING PROGRAM

## 2025-01-03 PROCEDURE — G0378 HOSPITAL OBSERVATION PER HR: HCPCS | Mod: HCNC

## 2025-01-03 PROCEDURE — 96365 THER/PROPH/DIAG IV INF INIT: CPT | Mod: 59

## 2025-01-03 PROCEDURE — 80048 BASIC METABOLIC PNL TOTAL CA: CPT | Mod: HCNC | Performed by: INTERNAL MEDICINE

## 2025-01-03 PROCEDURE — 25500020 PHARM REV CODE 255: Mod: HCNC | Performed by: STUDENT IN AN ORGANIZED HEALTH CARE EDUCATION/TRAINING PROGRAM

## 2025-01-03 PROCEDURE — 84100 ASSAY OF PHOSPHORUS: CPT | Mod: HCNC | Performed by: INTERNAL MEDICINE

## 2025-01-03 RX ORDER — FUROSEMIDE 10 MG/ML
60 INJECTION INTRAMUSCULAR; INTRAVENOUS EVERY 12 HOURS
Status: DISCONTINUED | OUTPATIENT
Start: 2025-01-03 | End: 2025-01-04

## 2025-01-03 RX ORDER — ONDANSETRON 4 MG/1
4 TABLET, ORALLY DISINTEGRATING ORAL EVERY 6 HOURS PRN
Status: DISCONTINUED | OUTPATIENT
Start: 2025-01-03 | End: 2025-01-07 | Stop reason: HOSPADM

## 2025-01-03 RX ORDER — DIGOXIN 125 MCG
0.25 TABLET ORAL EVERY 6 HOURS
Status: COMPLETED | OUTPATIENT
Start: 2025-01-03 | End: 2025-01-04

## 2025-01-03 RX ORDER — FUROSEMIDE 10 MG/ML
40 INJECTION INTRAMUSCULAR; INTRAVENOUS DAILY
Status: DISCONTINUED | OUTPATIENT
Start: 2025-01-03 | End: 2025-01-03

## 2025-01-03 RX ORDER — TALC
6 POWDER (GRAM) TOPICAL NIGHTLY PRN
Status: DISCONTINUED | OUTPATIENT
Start: 2025-01-03 | End: 2025-01-07 | Stop reason: HOSPADM

## 2025-01-03 RX ORDER — DIGOXIN 125 MCG
0.5 TABLET ORAL ONCE
Status: COMPLETED | OUTPATIENT
Start: 2025-01-03 | End: 2025-01-03

## 2025-01-03 RX ORDER — METOPROLOL TARTRATE 1 MG/ML
5 INJECTION, SOLUTION INTRAVENOUS EVERY 5 MIN PRN
Status: DISCONTINUED | OUTPATIENT
Start: 2025-01-03 | End: 2025-01-04

## 2025-01-03 RX ORDER — ACETAMINOPHEN 325 MG/1
650 TABLET ORAL EVERY 4 HOURS PRN
Status: DISCONTINUED | OUTPATIENT
Start: 2025-01-03 | End: 2025-01-07 | Stop reason: HOSPADM

## 2025-01-03 RX ADMIN — FUROSEMIDE 40 MG: 10 INJECTION, SOLUTION INTRAVENOUS at 08:01

## 2025-01-03 RX ADMIN — LEVOTHYROXINE SODIUM 112 MCG: 112 TABLET ORAL at 06:01

## 2025-01-03 RX ADMIN — CEFTRIAXONE 1 G: 1 INJECTION, POWDER, FOR SOLUTION INTRAMUSCULAR; INTRAVENOUS at 09:01

## 2025-01-03 RX ADMIN — GABAPENTIN 100 MG: 100 CAPSULE ORAL at 02:01

## 2025-01-03 RX ADMIN — APIXABAN 5 MG: 5 TABLET, FILM COATED ORAL at 09:01

## 2025-01-03 RX ADMIN — DIGOXIN 0.5 MG: 125 TABLET ORAL at 01:01

## 2025-01-03 RX ADMIN — METOPROLOL SUCCINATE 75 MG: 50 TABLET, EXTENDED RELEASE ORAL at 08:01

## 2025-01-03 RX ADMIN — DILTIAZEM HYDROCHLORIDE 90 MG: 60 TABLET, FILM COATED ORAL at 06:01

## 2025-01-03 RX ADMIN — METOPROLOL SUCCINATE 75 MG: 50 TABLET, EXTENDED RELEASE ORAL at 09:01

## 2025-01-03 RX ADMIN — ATORVASTATIN CALCIUM 10 MG: 10 TABLET, FILM COATED ORAL at 08:01

## 2025-01-03 RX ADMIN — FUROSEMIDE 40 MG: 10 INJECTION, SOLUTION INTRAVENOUS at 06:01

## 2025-01-03 RX ADMIN — GABAPENTIN 100 MG: 100 CAPSULE ORAL at 09:01

## 2025-01-03 RX ADMIN — GABAPENTIN 100 MG: 100 CAPSULE ORAL at 08:01

## 2025-01-03 RX ADMIN — APIXABAN 5 MG: 5 TABLET, FILM COATED ORAL at 08:01

## 2025-01-03 RX ADMIN — LOSARTAN POTASSIUM 100 MG: 50 TABLET, FILM COATED ORAL at 08:01

## 2025-01-03 RX ADMIN — DIGOXIN 0.25 MG: 125 TABLET ORAL at 09:01

## 2025-01-03 RX ADMIN — HUMAN ALBUMIN MICROSPHERES AND PERFLUTREN 0.11 MG: 10; .22 INJECTION, SOLUTION INTRAVENOUS at 11:01

## 2025-01-03 RX ADMIN — FUROSEMIDE 60 MG: 10 INJECTION, SOLUTION INTRAMUSCULAR; INTRAVENOUS at 09:01

## 2025-01-03 NOTE — CARE UPDATE
"RAPID RESPONSE NURSE CHART REVIEW        Chart Reviewed: 01/03/2025, 11:23 AM    MRN: [id number]  Bed: 304/304 A    Dx: Chronic atrial fibrillation with RVR    Tiffany Masterson has a past medical history of Abnormal MRI, Acute cystitis without hematuria, Arthritis, Arthritis of right hip, Atrial fibrillation, Cataract, Elevated liver enzymes, Endometrial mass, Epiretinal membrane (ERM) of right eye, Family history of malignant neoplasm of gastrointestinal tract, Frequent UTI, Generalized arthritis, Graves disease, History of cholecystectomy, History of colonoscopy, Hyperlipidemia, Hypertension, Hypertriglyceridemia, Hypothyroidism, Impaired functional mobility, balance, gait, and endurance, Iron deficiency anemia, Migraine, ophthalmoplegic, Mixed stress and urge urinary incontinence, Ocular migraine, Personal history of colonic polyps, Physical deconditioning, Preop testing, S/P colonoscopic polypectomy, Sleep disorder, Status post hysteroscopic polypectomy, TBI (traumatic brain injury), TIA (transient ischemic attack), Transaminitis, and Urethral caruncle.    Last VS: /68 (BP Location: Left arm, Patient Position: Lying)   Pulse 75   Temp 97.8 °F (36.6 °C) (Oral)   Resp 18   Ht 5' 6" (1.676 m)   Wt 75 kg (165 lb 5.5 oz)   SpO2 97%   BMI 26.69 kg/m²     24H Vital Sign Range:  Temp:  [96.8 °F (36 °C)-98 °F (36.7 °C)]   Pulse:  []   Resp:  [17-20]   BP: ()/()   SpO2:  [93 %-100 %]     Level of Consciousness (AVPU): alert    Recent Labs     01/02/25  1729 01/03/25  0453   WBC 9.65 11.48   HGB 11.8* 11.4*   HCT 35.9* 36.0*    222       Recent Labs     01/02/25  1729 01/03/25  0453    136   K 4.7 3.7    105   CO2 20* 20*   BUN 20 18   CREATININE 1.5* 1.4   * 102   PHOS  --  4.0   MG  --  2.0      MEWS score: 3    Rounding completed with charge TATE hankins pt.tachycardic last night, primary team ordered home CCB and BB, team continuing to monitor. No additional " concerns verbalized at this time. Instructed to call [phone] for further concerns or assistance.    Cecilio Fallon RN

## 2025-01-03 NOTE — ASSESSMENT & PLAN NOTE
Patient has long standing persistent (>12 months) atrial fibrillation. Patient is currently in atrial fibrillation. [id number] Score: 3. The patients heart rate in the last 24 hours is as follows:  Pulse  Min: 87  Max: 170     Antiarrhythmics  diltiaZEM tablet 90 mg, Every 8 hours, Oral  metoprolol succinate (TOPROL-XL) 24 hr tablet 50 mg, 2 times daily, Oral    Anticoagulants  apixaban tablet 5 mg, 2 times daily, Oral    Plan  - Replete lytes with a goal of K>4, Mg >2  - Patient is anticoagulated, see medications listed above.  - Patient's afib is currently uncontrolled. Will continue current treatment

## 2025-01-03 NOTE — PLAN OF CARE
Problem: Adult Inpatient Plan of Care  Goal: Plan of Care Review  Outcome: Progressing  Flowsheets (Taken 1/2/2025 8092)  Plan of Care Reviewed With: patient     Problem: Fall Injury Risk  Goal: Absence of Fall and Fall-Related Injury  Outcome: Progressing  Pt with frequent/multiple falls  Bed alarm on at all times.  Instructed to call for assistance prior to getting OOB     Problem: Skin Injury Risk Increased  Goal: Skin Health and Integrity  Outcome: Progressing     Problem: Dysrhythmia  Goal: Normalized Cardiac Rhythm  Outcome: Progressing

## 2025-01-03 NOTE — ASSESSMENT & PLAN NOTE
Creatine slightly elevated on admission (Baseline ~1.1-1.3, was 1.5 on admission) BMP reviewed- noted Estimated Creatinine Clearance: 26.9 mL/min (A) (based on SCr of 1.5 mg/dL (H)). according to latest data. Based on current GFR, CKD stage is stage 3 - GFR 30-59.  Monitor UOP and serial BMP and adjust therapy as needed. Renally dose meds. Avoid nephrotoxic medications and procedures.

## 2025-01-03 NOTE — NURSING
2344  Received to 304-ambulated from stretcher to bed with SBA.  Noted a large amount of urine on stretcher (external cath not connected during transport).  Incontinence care provided and new external cath applied.  Pt is oriented but forgetful.  Denies pain,nausea, and/or cough. Appears SOB after ambulation but denies-O2 sats wnl on RA.  +3-4 edema noted to BLE.  Scattered bruising t/o, abrasion/scab to R side of face/forehead (r/t multiple falls).  Oriented to room, call light use, and current plan of care.  Tele showing Afib with rate anywhere from 108-147 (non-sustained).  Pt denies needs currently, will continue to monitor.     0630  sleeping soundly, arousable to voice.  Morning meds given and pt assisted to reposition.  Previous assessment remains unchanged.  Pt denies needs at this time.  Will continue to monitor and report to oncoming shift.

## 2025-01-03 NOTE — HPI
Tiffany Masterson is a pleasant 89 y.o. female w/ PMHx of atrial fibrillation (on apixaban), HFpEF, CKD3, HTN, hypothyroidism, DEE, HLD, migraines & recurrent falls recently (resulting in a SAH & skull fracture in Sept '24), who presented to [facility] ED on 1/2/2025 from her PCP's office w/ progressive bilateral LE edema. Pt reports she noticed increased swelling of her legs starting 2-3 months ago that has continued to worsen since then w/ interval development of SOB, along w/ FLORES, orthopnea, weakness & decreased PO intake. Swelling has gotten particularly bad over past couple days.  reports that swelling got so bad that legs were weeping clear fluid. No clear trigger/inciting event for exacerbation. Pt has had multiple falls over the last few months, most recently a few days prior to presentation where she sustained a small contusion to her forehead, but denies any residual pain or injury related to the fall. No recent illnesses or medication changes. Endorses compliance with home medications. Given severity of edema & pt's fragility (w/ recent falls), pt sent to ED for further evaluation & mgmt.     On arrival to ED, pt was noted to be in atrial fibrillation w/ HR sustaining 170s-190s. Luckily BP & SpO2 stable on RA. Labs notable for BNP 1246, troponin nml x2, Cr 1.5, bicarb 20, glucose 115, Hgb 11.8, TSH 4.68, & FT4 1.2. UA w/ 3+ LE, 18 WBCs & occasional bacteria. CXR showed interstitial attenuation c/w pulmonary edema, as well as b/l pleural effusions. COVID/Flu/RSV negative. Received pushes of IV diltiazem, IV lasix, and CTX. Admitted to Hospital Medicine Cardiac Stepdown Unit for further workup & mgmt.

## 2025-01-03 NOTE — SUBJECTIVE & OBJECTIVE
Interval History: Admitted yesterday for afib w/ RVR in setting of volume overload. Given 10mg IV diltiazem x2, 40mg IV lasix x2, and then continued on home PO diltiazem. HR improved from sustained 120s-130s (w/ jumps up to 170s) --> 90s-100s (w/ jumps to 120s-130s). BP down from admission but stable & pt remains on RA. UOP 1.4L. Pt overall feeling well this AM w/o SOB, palpitations, dizziness, chest pain or other complaints. Increasing metoprolol dose to 75mg BID. TTE showed drop in EF to 25-30% (down from > 55% in Sept '24). Diltiazem d/c'ed. Loaded w/ digoxin. Pending response to digoxin, may consult Cardiology (although given drop in EF, may consult Cardiology regardless). Urine cx pending; continuing CTX. Will continue to titrate meds for further volume removal & HR control.  Plan discussed w/ pt & family at bedside this AM.     Review of Systems   Constitutional:  Negative for chills, diaphoresis and fever.   HENT:  Negative for congestion, rhinorrhea, sore throat and trouble swallowing.    Eyes:  Negative for photophobia and visual disturbance.   Respiratory:  Negative for cough, shortness of breath and wheezing.    Cardiovascular:  Positive for leg swelling. Negative for chest pain and palpitations.   Gastrointestinal:  Negative for abdominal pain, diarrhea, nausea and vomiting.   Genitourinary:  Negative for difficulty urinating, dysuria and hematuria.   Musculoskeletal:  Negative for arthralgias, joint swelling and neck pain.   Neurological:  Negative for dizziness, light-headedness and headaches.   Psychiatric/Behavioral:  Negative for agitation, confusion and sleep disturbance.        Objective:     Vital Signs (Most Recent):  Temp: 97.8 °F (36.6 °C) (01/03/25 1521)  Pulse: 102 (01/03/25 1521)  Resp: 19 (01/03/25 1521)  BP: 108/62 (01/03/25 1521)  SpO2: (!) 92 % (01/03/25 1521) Vital Signs (24h Range):  Temp:  [96.8 °F (36 °C)-98 °F (36.7 °C)] 97.8 °F (36.6 °C)  Pulse:  [] 102  Resp:  [17-20]  19  SpO2:  [92 %-100 %] 92 %  BP: ()/() 108/62     Weight: 75 kg (165 lb 5.5 oz)  Body mass index is 26.69 kg/m².    Intake/Output Summary (Last 24 hours) at 1/3/2025 1651  Last data filed at 1/3/2025 1312  Gross per 24 hour   Intake 756 ml   Output 2325 ml   Net -1569 ml         Physical Exam  Constitutional:       General: She is not in acute distress.     Appearance: She is not toxic-appearing or diaphoretic.   HENT:      Head: Normocephalic and atraumatic.      Mouth/Throat:      Mouth: Mucous membranes are moist.   Eyes:      Conjunctiva/sclera: Conjunctivae normal.   Cardiovascular:      Rate and Rhythm: Tachycardia present. Rhythm irregular.      Heart sounds: Normal heart sounds.   Pulmonary:      Effort: Pulmonary effort is normal. No respiratory distress.      Breath sounds: Decreased breath sounds (bibasilar) and rales (diffuse) present. No wheezing or rhonchi.   Abdominal:      General: Bowel sounds are normal. There is no distension.      Palpations: Abdomen is soft.      Tenderness: There is no abdominal tenderness. There is no guarding.   Musculoskeletal:      Right lower leg: Edema (3+ pitting edema up to hips) present.      Left lower leg: Edema (3+ pitting edema up to hips) present.   Skin:     General: Skin is warm and dry.   Neurological:      General: No focal deficit present.      Mental Status: She is alert and oriented to person, place, and time. Mental status is at baseline.   Psychiatric:         Mood and Affect: Mood normal.         Behavior: Behavior normal.           Significant Labs: All pertinent labs within the past 24 hours have been reviewed.    Significant Imaging: I have reviewed all pertinent imaging results/findings within the past 24 hours.

## 2025-01-03 NOTE — ASSESSMENT & PLAN NOTE
TSH elevated at 4.680 on admission. Free T4 1.20. Recommend repeat as outpatient.     -Continue Levothyroxine 112 mcg daily

## 2025-01-03 NOTE — PT/OT/SLP EVAL
Occupational Therapy   Evaluation    Name: Tiffany Masterson  MRN: [id number]  Admitting Diagnosis: Chronic atrial fibrillation with RVR  Recent Surgery: * No surgery found *      Recommendations:     Discharge Recommendations: Low Intensity Therapy  Discharge Equipment Recommendations:  none  Barriers to discharge:  None    Assessment:     Tiffany Masterson is a 89 y.o. female with a medical diagnosis of Chronic atrial fibrillation with RVR.  She presents with performance deficits affecting function: weakness, impaired functional mobility, decreased safety awareness, impaired endurance, impaired self care skills, impaired balance, gait instability, decreased lower extremity function, edema.  Pt presents in bed, agreeable to tx, A&Ox4. She is kind, motivated, and seeking improvement in LE edema, weakness, and general activity tolerance for return to IND plof. She reports returning to ADL/ambulation IND since fall in Sept that led to significant hospital stay. She has support of her  but states no need for physical A. She reports not using RW or cane despite need for some physical A during ambulation this date and hx of repeat falls. Given deficits from baseline, pt is a good candidate for low intensity tx at the post acute level to address deficits impacting safety and IND with daily task in and around the home.      Rehab Prognosis: Good; patient would benefit from acute skilled OT services to address these deficits and reach maximum level of function.       Plan:     Patient to be seen 3 x/week to address the above listed problems via self-care/home management, therapeutic activities, therapeutic exercises, neuromuscular re-education  Plan of Care Expires: 01/24/25  Plan of Care Reviewed with: patient    Subjective     Chief Complaint: LE edema  Patient/Family Comments/goals: To return to PLOF    Occupational Profile:  Living Environment: 2fl home, states she has 1st floor Br/Ba but no desire to move her personal  things from upstairs room stating she will continue climbing stairs. Full flight ~15STE L HR upstairs. WIS and t/s combo with Gbs interior and SC.   Previous level of function: IND, not using her care or RW despite falls. She is not driving since medical team advised her not to, she enjoys going out with local friends and being social.   Equipment Used at Home: grab bar, shower chair, walker, rolling, cane, straight  Assistance upon Discharge: Spouse     Pain/Comfort:  Pain Rating 1: 0/10  Pain Rating Post-Intervention 1: 0/10    Patients cultural, spiritual, [employer] conflicts given the current situation: no    Objective:     Communicated with: Nsg prior to session.  Patient found HOB elevated with telemetry, PureWick upon OT entry to room.    General Precautions: Standard, fall  Orthopedic Precautions: N/A  Braces: N/A  Respiratory Status: Room air    Occupational Performance:    Bed Mobility:    Patient completed Scooting/Bridging with contact guard assistance  Patient completed Supine to Sit with minimum assistance for legs d/t severity of edema  Sits EOB SBA static, CGA dynamic    Functional Mobility/Transfers:  Patient completed Sit <> Stand Transfer with contact guard assistance  with  hand-held assist   Patient completed Bed <> Chair Transfer using Step Transfer technique with contact guard assistance with hand-held assist  Patient completed Toilet Transfer Step Transfer technique with contact guard assistance with  hand-held assist  Functional Mobility: Pt ambulates in room/restroom as in home distances with CGA L HH A, mild instability and R UE reaches for walls/handles. Utilize RW next session and encouraged use in home. She stands ~8m sinkside for facial and hand hygiene and oral care, reports no fatigue.     Activities of Daily Living:  Grooming: setup and extra time to manage packaging/manipulate small lids  Lower Body Dressing: moderate assistance for  socks, able to manage purewick brief once  threaded  Toileting: contact guard assistance while managing brief up and down for safety    Cognitive/Visual Perceptual:  Cognitive/Psychosocial Skills:     -       Oriented to: Person, Place, Time, and Situation   -       Follows Commands/attention:Follows multistep  commands  -       Safety awareness/insight to disability: aware of deficits and cautious but does not utilize AD      Physical Exam:  Dominant hand:    -       Right  Upper Extremity Range of Motion:     -       Right Upper Extremity: WFL  -       Left Upper Extremity: WFL  Upper Extremity Strength:    -       Right Upper Extremity: WFL  -       Left Upper Extremity: WFL  Fine Motor Coordination:    -       Intact for needs, but decreased    AMPAC 6 Click ADL:  AMPAC Total Score: 21    Treatment & Education:  -Education on energy conservation and task modification to maximize safety and (I) during ADLs and mobility  -Education on importance of OOB activity to improve overall activity tolerance and promote recovery  -Pt educated to call for assistance and to transfer with hospital staff only  -Provided education regarding role of OT, POC, & discharge recommendations with pt verbalizing understanding.      Pt had no further questions & when asked whether there were any concerns pt reported none.     Patient left up in chair with all lines intact, call button in reach, and nsg notified    GOALS:   Multidisciplinary Problems       Occupational Therapy Goals          Problem: Occupational Therapy    Goal Priority Disciplines Outcome Interventions   Occupational Therapy Goal     OT, PT/OT Progressing    Description: Goals to be met by: 01/24/25     Patient will increase functional independence with ADLs by performing:    UE Dressing with Modified [place].  LE Dressing with Modified [place].  Grooming while standing at sink with Modified [place].  Toileting from toilet with Modified [place] for hygiene and clothing management.   Toilet  transfer to toilet with Modified [place].    DME: No anticipated needs                         History:     Past Medical History:   Diagnosis Date    Abnormal MRI 10/16/2020    Acute cystitis without hematuria 12/22/2021    Arthritis     Arthritis of right hip 11/08/2021    Atrial fibrillation     Cataract     Elevated liver enzymes     Endometrial mass 06/29/2018    Epiretinal membrane (ERM) of right eye     Family history of malignant neoplasm of gastrointestinal tract mother    Frequent UTI 09/30/2018    Generalized arthritis 09/19/2024    Shoulders, knees, hips      Graves disease     now hypothryoid - no surgery or medicine. resolved on its own    History of cholecystectomy 06/10/2021    History of colonoscopy     unremarkable 2007 by Dr. Javier.  Barium enema revealed diverticular disease.    Hyperlipidemia     Hypertension     Hypertriglyceridemia 04/19/2013    Continue statin for secondary stroke prevention    Hypothyroidism     Impaired functional mobility, balance, gait, and endurance 06/04/2021    Iron deficiency anemia 09/29/2021    Migraine, ophthalmoplegic     Mixed stress and urge urinary incontinence 09/30/2018    Ocular migraine     Personal history of colonic polyps     Physical deconditioning 09/13/2023    Preop testing 05/17/2021    S/P colonoscopic polypectomy 06/18/2013    Sleep disorder 11/08/2021    Status post hysteroscopic polypectomy 07/02/2018    TBI (traumatic brain injury) 10/25/2024    9/22/2024: Ct head: Left frontotemporal subarachnoid hemorrhage again noted including a small focus of intraparenchymal hemorrhage in the left cerebellum. No new hemorrhage.       TIA (transient ischemic attack)     with a normal angiogram in the past, Ocular migraines reported by patient, not TIA     Transaminitis 03/19/2021    Urethral caruncle 09/15/2020         Past Surgical History:   Procedure Laterality Date    CATARACT EXTRACTION Right 2012    Dr. Lexis Nicolas     CHOLECYSTECTOMY  2022     COLONOSCOPY      2014 polyps    COLONOSCOPY N/A 11/07/2016    Procedure: COLONOSCOPY;  Surgeon: Bebeto Gonzalez MD;  Location: [facility] (4TH FLR);  Service: Endoscopy;  Laterality: N/A;    COLONOSCOPY N/A 03/09/2020    Procedure: COLONOSCOPY;  Surgeon: Bebeto Gonzalez MD;  Location: [facility] (4TH FLR);  Service: Endoscopy;  Laterality: N/A;    COLONOSCOPY N/A 09/29/2021    Procedure: COLONOSCOPY;  Surgeon: Bebeto Gonzalez MD;  Location: [facility] (4TH FLR);  Service: Endoscopy;  Laterality: N/A;  please schedule patient as soon as possible with me EGD colonoscopy with constipation bowel prep for iron deficiency anemia family history of colon cancer and personal history of colon polyps  9/17/21 ok for standard split prep per Dr. Gonzalez-ml    COLONOSCOPY N/A 09/29/2021    Procedure: COLONOSCOPY;  Surgeon: Bebeto Gonzalez MD;  Location: [facility] (4TH FLR);  Service: Endoscopy;  Laterality: N/A;  Please schedule patient as soon as possible with me EGD colonoscopy with constipation bowel prep for iron deficiency anemia family history of colon cancer and personal history of colon polyps  9/17/21 Ok for standard split prep per Dr. Gonzalez-imani    COLONOSCOPY W/ POLYPECTOMY  06/2013    polyp of colon    ENDOSCOPIC ULTRASOUND OF UPPER GASTROINTESTINAL TRACT N/A 04/29/2021    Procedure: ULTRASOUND, UPPER GI TRACT, ENDOSCOPIC;  Surgeon: Dalton Johnson MD;  Location: [facility] (2ND FLR);  Service: Endoscopy;  Laterality: N/A;  Postprandial nausea vomiting epigastric 0.9 cm stone and sludge seen within the gallbladder lumen.  No wall thickening or pericholecystic fluid.  0.6 cm stone seen within the distal common bile duct at the level of the pancreatic head.  There is dil    ERCP N/A 04/29/2021    Procedure: ERCP (ENDOSCOPIC RETROGRADE CHOLANGIOPANCREATOGRAPHY);  Surgeon: Dalton Johnson MD;  Location: [facility] (2ND FLR);  Service: Endoscopy;  Laterality: N/A;  Postprandial nausea and vomit 0.9 cm stone and sludge  seen within the gallbladder lumen.  No wall thickening or pericholecystic fluid.  0.6 cm stone seen within the distal common bile duct at the level of the pancreatic head.  There i    ESOPHAGOGASTRODUODENOSCOPY N/A 09/29/2021    Procedure: EGD (ESOPHAGOGASTRODUODENOSCOPY);  Surgeon: Bebeto Gonzalez MD;  Location: [facility] (4TH FLR);  Service: Endoscopy;  Laterality: N/A;  rapid covid test arrival 12pm -   9/27 arrival time for covid test/procedure confirmed with pt-rb    ESOPHAGOGASTRODUODENOSCOPY N/A 09/29/2021    Procedure: EGD (ESOPHAGOGASTRODUODENOSCOPY);  Surgeon: Bebeto Gonzalez MD;  Location: [facility] ENDO (4TH FLR);  Service: Endoscopy;  Laterality: N/A;  covid test 9/19/21 [facility] [place], [place], prep instr emailed -    EYE SURGERY  11/10/2014    right retina/Dr. Dalton Sierra ([facility])    HYSTEROSCOPIC POLYPECTOMY OF UTERUS N/A 07/02/2018    Procedure: POLYPECTOMY, UTERUS, HYSTEROSCOPIC;  Surgeon: Elliot Vanessa IV, MD;  Location: [facility] OR;  Service: OB/GYN;  Laterality: N/A;    INJECTION OF JOINT Right 11/11/2022    Procedure: INJECTION, RIGHT GTB CONTRAST DIRECT REFERRAL;  Surgeon: Camden Hernandez MD;  Location: [facility] PAIN MGT;  Service: Pain Management;  Laterality: Right;    JOINT REPLACEMENT  03/23/2011    left total hip replacement    LAPAROSCOPIC CHOLECYSTECTOMY N/A 05/17/2021    Procedure: CHOLECYSTECTOMY, LAPAROSCOPIC;  Surgeon: Rayshawn Peralta Jr., MD;  Location: [facility] 2ND FLR;  Service: General;  Laterality: N/A;    REMOVAL OF EPIRETINAL MEMBRANE Right     Dr. Padron    TONSILLECTOMY      pt was 11 years old       Time Tracking:     OT Date of Treatment: 01/03/25  OT Start Time: 0903  OT Stop Time: 0932  OT Total Time (min): 29 min    Billable Minutes:Evaluation 6  Self Care/Home Management 15  Therapeutic Activity 8    1/3/2025

## 2025-01-03 NOTE — ED PROVIDER NOTES
Encounter Date: 1/2/2025       History     Chief Complaint   Patient presents with    Edema     BLE edema beginning to weep.  New dx of afib about 2 months ago.       HPI  Tiffany Masterson is a 89 y.o. female, PMH hypothyroidism, HTN, A-fib, HLD, Basal cell ca of the skin, Osteoporosis presenting with complaints of progressive lower extremity swelling, now with straw-colored with liquid.  Patient denies any missed doses of her blood thinner.  She reports going to her PCP, Dr. Dumont for the symptoms and was told to present to the ED for further evaluation.  Patient also reporting progressive exertional dyspnea seems she was gotten worse over several months now.  Patient denying any chest pain or heart palpitations at the time of exam.  She reports adherence with all of her home medications and has noticed some increased frequency of urination without dysuria.  Denies fevers, nausea, vomiting, abdominal pain.   joined at bedside who helps provide HPI, he reports where she was had several falls over the past few months most recently within the last month and was seen and evaluated for this fall.  He states that since she began having falls she was had some cognitive decline that she was not recovering from.    Review of patient's allergies indicates:   Allergen Reactions    Levaquin [levofloxacin]      Joint pain    Hydrochlorothiazide Other (See Comments)     Pain in joints and depression per pt     Past Medical History:   Diagnosis Date    Abnormal MRI 10/16/2020    Acute cystitis without hematuria 12/22/2021    Arthritis     Arthritis of right hip 11/08/2021    Atrial fibrillation     Cataract     Elevated liver enzymes     Endometrial mass 06/29/2018    Epiretinal membrane (ERM) of right eye     Family history of malignant neoplasm of gastrointestinal tract mother    Frequent UTI 09/30/2018    Generalized arthritis 09/19/2024    Shoulders, knees, hips      Graves disease     now hypothryoid - no surgery or  medicine. resolved on its own    History of cholecystectomy 06/10/2021    History of colonoscopy     unremarkable 2007 by Dr. Javier.  Barium enema revealed diverticular disease.    Hyperlipidemia     Hypertension     Hypertriglyceridemia 04/19/2013    Continue statin for secondary stroke prevention    Hypothyroidism     Impaired functional mobility, balance, gait, and endurance 06/04/2021    Iron deficiency anemia 09/29/2021    Migraine, ophthalmoplegic     Mixed stress and urge urinary incontinence 09/30/2018    Ocular migraine     Personal history of colonic polyps     Physical deconditioning 09/13/2023    Preop testing 05/17/2021    S/P colonoscopic polypectomy 06/18/2013    Sleep disorder 11/08/2021    Status post hysteroscopic polypectomy 07/02/2018    TBI (traumatic brain injury) 10/25/2024    9/22/2024: Ct head: Left frontotemporal subarachnoid hemorrhage again noted including a small focus of intraparenchymal hemorrhage in the left cerebellum. No new hemorrhage.       TIA (transient ischemic attack)     with a normal angiogram in the past, Ocular migraines reported by patient, not TIA     Transaminitis 03/19/2021    Urethral caruncle 09/15/2020     Past Surgical History:   Procedure Laterality Date    CATARACT EXTRACTION Right 2012    Dr. Lexis Nicolas     CHOLECYSTECTOMY  2022    COLONOSCOPY      2014 polyps    COLONOSCOPY N/A 11/07/2016    Procedure: COLONOSCOPY;  Surgeon: Bebeto Gonzalez MD;  Location: [address]);  Service: Endoscopy;  Laterality: N/A;    COLONOSCOPY N/A 03/09/2020    Procedure: COLONOSCOPY;  Surgeon: Bebeto Gonzalez MD;  Location: [address]);  Service: Endoscopy;  Laterality: N/A;    COLONOSCOPY N/A 09/29/2021    Procedure: COLONOSCOPY;  Surgeon: Bebeto Gonzalez MD;  Location: [facility] ([address] [place] [address]);  Service: Endoscopy;  Laterality: N/A;  please schedule patient as soon as possible with me EGD colonoscopy with constipation bowel prep for iron deficiency anemia  family history of colon cancer and personal history of colon polyps  9/17/21 ok for standard split prep per Dr. Gonzalez-imani    COLONOSCOPY N/A 09/29/2021    Procedure: COLONOSCOPY;  Surgeon: Bebeto Gonzalez MD;  Location: [facility] (4TH FLR);  Service: Endoscopy;  Laterality: N/A;  Please schedule patient as soon as possible with me EGD colonoscopy with constipation bowel prep for iron deficiency anemia family history of colon cancer and personal history of colon polyps  9/17/21 Ok for standard split prep per Dr. Gonzalez-imani    COLONOSCOPY W/ POLYPECTOMY  06/2013    polyp of colon    ENDOSCOPIC ULTRASOUND OF UPPER GASTROINTESTINAL TRACT N/A 04/29/2021    Procedure: ULTRASOUND, UPPER GI TRACT, ENDOSCOPIC;  Surgeon: Dlaton Johnson MD;  Location: [facility] (2ND FLR);  Service: Endoscopy;  Laterality: N/A;  Postprandial nausea vomiting epigastric 0.9 cm stone and sludge seen within the gallbladder lumen.  No wall thickening or pericholecystic fluid.  0.6 cm stone seen within the distal common bile duct at the level of the pancreatic head.  There is dil    ERCP N/A 04/29/2021    Procedure: ERCP (ENDOSCOPIC RETROGRADE CHOLANGIOPANCREATOGRAPHY);  Surgeon: Dalton Johnson MD;  Location: [facility] (2ND FLR);  Service: Endoscopy;  Laterality: N/A;  Postprandial nausea and vomit 0.9 cm stone and sludge seen within the gallbladder lumen.  No wall thickening or pericholecystic fluid.  0.6 cm stone seen within the distal common bile duct at the level of the pancreatic head.  There i    ESOPHAGOGASTRODUODENOSCOPY N/A 09/29/2021    Procedure: EGD (ESOPHAGOGASTRODUODENOSCOPY);  Surgeon: Bebeto Gonzalez MD;  Location: [facility] (4TH FLR);  Service: Endoscopy;  Laterality: N/A;  rapid covid test arrival 12pm - sm  9/27 arrival time for covid test/procedure confirmed with pt-rb    ESOPHAGOGASTRODUODENOSCOPY N/A 09/29/2021    Procedure: EGD (ESOPHAGOGASTRODUODENOSCOPY);  Surgeon: Bebeto Gonzalez MD;  Location: [facility] (4TH FLR);   Service: Endoscopy;  Laterality: N/A;  covid test 9/19/21 [facility] [place]ml    EYE SURGERY  11/10/2014    right retina/Dr. Dalton Sierra ([facility])    HYSTEROSCOPIC POLYPECTOMY OF UTERUS N/A 07/02/2018    Procedure: POLYPECTOMY, UTERUS, HYSTEROSCOPIC;  Surgeon: Elliot Vanessa IV, MD;  Location: [facility] OR;  Service: OB/GYN;  Laterality: N/A;    INJECTION OF JOINT Right 11/11/2022    Procedure: INJECTION, RIGHT GTB CONTRAST DIRECT REFERRAL;  Surgeon: Camden Hernandez MD;  Location: [facility] PAIN MGT;  Service: Pain Management;  Laterality: Right;    JOINT REPLACEMENT  03/23/2011    left total hip replacement    LAPAROSCOPIC CHOLECYSTECTOMY N/A 05/17/2021    Procedure: CHOLECYSTECTOMY, LAPAROSCOPIC;  Surgeon: Rayshawn Peralta Jr., MD;  Location: [address];  Service: General;  Laterality: N/A;    REMOVAL OF EPIRETINAL MEMBRANE Right     Dr. Padron    TONSILLECTOMY      pt was 11 years old     Family History   Problem Relation Name Age of Onset    Colon cancer Mother Tiffany Umanzornhill 75    Lung cancer Father Col. Josue Umanzornhill 75    Diabetes Other great aunt     Diabetes Paternal Aunt      Colon cancer Paternal Aunt  80    Prostate cancer Brother      Breast cancer Neg Hx      Ovarian cancer Neg Hx      Celiac disease Neg Hx      Cataracts Neg Hx      Glaucoma Neg Hx      Macular degeneration Neg Hx       Social History     Tobacco Use    Smoking status: Never    Smokeless tobacco: Never    Tobacco comments:     The patient is not getting any regular exercise at this time.  She does enjoy traveling to [place].   Substance Use Topics    Alcohol use: Yes     Comment: occasionally    Drug use: No     Review of Systems    Physical Exam     Initial Vitals [01/02/25 1632]   BP Pulse Resp Temp SpO2   117/88 (!) 135 18 97.6 °F (36.4 °C) 96 %      MAP       --         Physical Exam    Nursing note and vitals reviewed.  Constitutional: She appears well-developed and well-nourished. She is not  diaphoretic. She is active and cooperative.  Non-toxic appearance. She does not appear ill. No distress.   HENT:   Head: Normocephalic.   Eyes: Conjunctivae and EOM are normal. No scleral icterus.   Cardiovascular:  Regular rhythm and intact distal pulses.  No extrasystoles are present.  Tachycardia present.   Exam reveals no gallop, no S3, no S4, no distant heart sounds and no friction rub.       No murmur heard.  Patient with AFib with a RVR at the time of my exam with heart rate ranging from 150s to 180s during exam.   Pulmonary/Chest: Breath sounds normal. No respiratory distress. She has no wheezes. She has no rhonchi. She has no rales.   Abdominal: Abdomen is soft. She exhibits no distension. There is no abdominal tenderness. There is no rebound and no guarding.   Musculoskeletal:         General: Edema present.      Comments: 2+ pitting edema of the bilateral lower extremity with oozing of serosanguineous liquid, without obvious skin breakdown or lesions.     Neurological: She is alert. She is not disoriented. GCS score is 15. GCS eye subscore is 4. GCS verbal subscore is 5. GCS motor subscore is 6.   Skin: Skin is warm and dry. There is pallor.   Psychiatric: She has a normal mood and affect. Her behavior is normal. Judgment and thought content normal.         ED Course   Procedures  Labs Reviewed   CBC W/ AUTO DIFFERENTIAL - Abnormal       Result Value    WBC 9.65      RBC 3.94 (*)     Hemoglobin 11.8 (*)     Hematocrit 35.9 (*)     MCV 91      MCH 29.9      MCHC 32.9      RDW 14.3      Platelets 224      MPV 10.9      Immature Granulocytes 0.5      Gran # (ANC) 7.1      Immature Grans (Abs) 0.05 (*)     Lymph # 1.5      Mono # 0.9      Eos # 0.1      Baso # 0.03      nRBC 0      Gran % 73.4 (*)     Lymph % 15.6 (*)     Mono % 9.7      Eosinophil % 0.5      Basophil % 0.3      Differential Method Automated     COMPREHENSIVE METABOLIC PANEL - Abnormal    Sodium 136      Potassium 4.7      Chloride 106       CO2 20 (*)     Glucose 115 (*)     BUN 20      Creatinine 1.5 (*)     Calcium 9.3      Total Protein 6.6      Albumin 3.5      Total Bilirubin 0.8      Alkaline Phosphatase 95      AST 35      ALT 40      eGFR 33.5 (*)     Anion Gap 10     B-TYPE NATRIURETIC PEPTIDE - Abnormal    BNP 1,246 (*)    TSH - Abnormal    TSH 4.680 (*)     Narrative:     ADD ON TSH PER DR ANNETTA MOORE/ORDER# [id number]   URINALYSIS, REFLEX TO URINE CULTURE - Abnormal    Specimen UA Urine, Clean Catch      Color, UA Yellow      Appearance, UA Clear      pH, UA 6.0      Specific Gravity, UA 1.010      Protein, UA Negative      Glucose, UA Negative      Ketones, UA Negative      Bilirubin (UA) Negative      Occult Blood UA Negative      Nitrite, UA Negative      Leukocytes, UA 3+ (*)     Narrative:     Specimen Source->Urine   URINALYSIS MICROSCOPIC - Abnormal    RBC, UA 2      WBC, UA 18 (*)     Bacteria Occasional      Squam Epithel, UA 2      Hyaline Casts, UA 1      Microscopic Comment SEE COMMENT      Narrative:     Specimen Source->Urine   CULTURE, URINE   TROPONIN I HIGH SENSITIVITY    Troponin I High Sensitivity 11     TROPONIN I HIGH SENSITIVITY    Troponin I High Sensitivity 10     TSH   SARS-COV2 (COVID) WITH FLU/RSV BY PCR    SARS-CoV2 (COVID-19) Qualitative PCR Not Detected      Influenza A, Molecular Not Detected      Influenza B, Molecular Not Detected      RSV Ag by Molecular Method Not Detected     T4, FREE    Free T4 1.20      Narrative:     ADD ON TSH PER DR ANNETTA MOORE/ORDER# [id number]        ECG Results              EKG 12-lead (Final result)        Collection Time Result Time QRS Duration [facility]HS QTC Calculation    01/02/25 18:08:48 01/03/25 08:16:52 72 401                     Final result by Interface, Lab In [facility] (01/03/25 08:16:59)                   Narrative:    Test Reason : R06.02,R00.0,    Vent. Rate : 177 BPM     Atrial Rate : 156 BPM     P-R Int :    ms          QRS Dur :  72 ms      QT Int : 234 ms        P-R-T Axes :     74 174 degrees    QTcB Int : 401 ms    Atrial fibrillation with rapid ventricular response  Low voltage QRS  Septal infarct (cited on or before 21-Dec-2021)  Abnormal ECG  When compared with ECG of 02-Jan-2025 16:36,  No significant change was found  Confirmed by Zack Solis ([id number]) on 1/3/2025 8:16:51 AM    Referred By: AAAREFERRAL SELF           Confirmed By: Zack Solis                                     EKG 12-lead (Final result)        Collection Time Result Time QRS Duration OHS QTC Calculation    01/02/25 16:36:05 01/02/25 17:43:02 74 486                     Final result by Interface, Lab In [facility] (01/02/25 17:43:10)                   Narrative:    Test Reason : R06.02,    Vent. Rate : 150 BPM     Atrial Rate :    BPM     P-R Int :    ms          QRS Dur :  74 ms      QT Int : 308 ms       P-R-T Axes :    -11 165 degrees    QTcB Int : 486 ms    Atrial fibrillation with rapid ventricular response  Anteroseptal infarct (cited on or before 21-Dec-2021)  Abnormal ECG  When compared with ECG of 02-Jan-2025 14:46,  No significant change was found  Confirmed by Zack Solis ([id number]) on 1/2/2025 5:42:59 PM    Referred By:            Confirmed By: Zack Solis                                  Imaging Results              X-Ray Chest AP Portable (Final result)  Result time 01/02/25 18:42:23      Final result by Jude Elaine MD (01/02/25 18:42:23)                   Impression:      1. Bilateral pleural effusions with interstitial findings suggesting edema.  Developing lower lung zone consolidation not excluded.      Electronically signed by: Jude Elaine MD  Date:    01/02/2025  Time:    18:42               Narrative:    EXAMINATION:  XR CHEST AP PORTABLE    CLINICAL HISTORY:  CHF;    TECHNIQUE:  Single frontal view of the chest was performed.    COMPARISON:  12/21/2021    FINDINGS:  The cardiomediastinal silhouette is prominent, magnified by technique noting calcification of the  aorta..  There is obscuration of the bilateral costophrenic angles suggesting effusions..  The trachea is midline.  The lungs are symmetrically expanded bilaterally with coarse interstitial attenuation bilaterally.  Developing lower lung zone consolidation not excluded..  There is no pneumothorax.  The osseous structures are remarkable for degenerative change and osteopenia..                                       Medications   atorvastatin tablet 10 mg (10 mg Oral Given 1/3/25 0823)   apixaban tablet 5 mg (5 mg Oral Given 1/3/25 0823)   gabapentin capsule 100 mg (100 mg Oral Given 1/3/25 1422)   levothyroxine tablet 112 mcg (112 mcg Oral Given 1/3/25 0628)   losartan tablet 100 mg (100 mg Oral Given 1/3/25 0823)   sodium chloride 0.9% flush 10 mL (has no administration in time range)   naloxone 0.4 mg/mL injection 0.02 mg (has no administration in time range)   glucose chewable tablet 16 g (has no administration in time range)   glucose chewable tablet 24 g (has no administration in time range)   dextrose 50% injection 12.5 g (has no administration in time range)   dextrose 50% injection 25 g (has no administration in time range)   glucagon (human recombinant) injection 1 mg (has no administration in time range)   bisacodyL suppository 10 mg (has no administration in time range)   polyethylene glycol packet 17 g (has no administration in time range)   cefTRIAXone injection 1 g (has no administration in time range)   furosemide injection 40 mg (40 mg Intravenous Given 1/3/25 0823)   metoprolol succinate 24 hr tablet 75 mg (75 mg Oral Given 1/3/25 0823)   acetaminophen tablet 650 mg (has no administration in time range)   digoxin tablet 0.5 mg (0.5 mg Oral Given 1/3/25 1345)     Followed by   digoxin tablet 0.25 mg (has no administration in time range)   diltiaZEM injection 10 mg (10 mg Intravenous Given 1/2/25 1851)   furosemide injection 40 mg (40 mg Intravenous Given 1/2/25 1851)   diltiaZEM tablet 90 mg (90 mg  Oral Given 1/2/25 1904)   diltiaZEM injection 10 mg (10 mg Intravenous Given 1/2/25 1956)   cefTRIAXone injection 1 g (1 g Intravenous Given 1/2/25 2032)   furosemide injection 40 mg (40 mg Intravenous Given 1/3/25 0628)   perflutren protein-A microsphr 0.22 mg/mL IV susp (0.11 mg Intravenous Given 1/3/25 1115)     Medical Decision Making  Tiffany Masterson  is a 89 y.o. female, presenting with complaints of lower extremity swelling progressive dyspnea on exertion, history of present illness obtained from pt and  at bedside as seen above. Patient able to provide adequate and detailed  history of present illness and tolerated physical exam well. Patient found to be in mild respiratory distress, unstable. Exam notable as above.     DDX includes but is not limited to:  AFib with RVR, SVT, fluid overload, seizure of exacerbation venous stasis, electrolyte mL, UTI, sepsis  She was in AFib with RVR at the time of arrival with a heart rate in 170-190s, she was gotten IV diltiazem 10 mg x2 as well as her home p.o. 90 mg with improvement in heart rates.  Displayed variability in heart rate, ranging 130s to 90s. UA looks like a UTI she has gotten Rocephin. She was an elevated BNP of 1200. Elevated TSH with normal T4. Delta trop was negative. Negative for COVID, flu, RSV. She was bilateral pleural effusions with pulmonary edema. We have started her on 40 of Lasix    See ED course for additional discussion.   Data Reviewed/Counseling: I have reviewed the patient's vital signs, nursing notes, and other relevant tests/information. Any incidental findings were discussed with the patient. I had a detailed discussion with the patient regarding the historical points, exam findings, and any diagnostic results supporting the diagnosis. Discussed case with hospital medicine, who agreed to evaluate and admit patient.  Disposition: Admitted     Amount and/or Complexity of Data Reviewed  Labs: ordered. Decision-making details  documented in ED Course.  Radiology:  Decision-making details documented in ED Course.    Risk  Prescription drug management.  Decision regarding hospitalization.               ED Course as of 01/03/25 1453   Thu Jan 02, 2025 1805 Temp: 97.6 °F (36.4 °C) [HB]   1806 WBC: 9.65 [HB]   1806 Immature Grans (Abs)(!): 0.05 [HB]   1806 Creatinine(!): 1.5  [place] [HB]   1806 Pulse(!): 135 [HB]   1828 BNP(!): 1,246 [HB]   1828 Troponin I High Sensitivity: 11 [HB]   1906 Pulse(!): 135 [HB]   1906 Pulse(!): 170 [HB]   1952 Patient continuing to have heart rates in the 160s 150s was given a 2nd dose of 10 mg IV diltiazem [HB]   1952 Troponin I High Sensitivity: 10  Stable delta [HB]   2004 TSH(!): 4.680  Pending free T4 [HB]   2008 SARS-CoV2 (COVID-19) Qualitative PCR: Not Detected [HB]   2008 Influenza A, Molecular: Not Detected [HB]   2008 Influenza B, Molecular: Not Detected [HB]   2008 RSV Ag by Molecular Method: Not Detected [HB]   2009 X-Ray Chest AP Portable  Bilateral pleural effusions with interstitial findings suggesting edema.  [HB]   2013 WBC, UA(!): 18 [HB]   2013 Leukocyte Esterase, UA(!): 3+ [HB]   2055 Free T4: 1.20 [HB]      ED Course User Index  [HB] Kayley Navarro MD                           Clinical Impression:  Final diagnoses:  [R06.02] Shortness of breath  [R00.0] Tachycardia  [N17.9] JAMARI (acute kidney injury) (Primary)  [I48.91] Atrial fibrillation, unspecified type  [R60.9] Edema, unspecified type  [R06.09] Dyspnea on exertion  [I48.91] A-fib          ED Disposition Condition    Observation                 Kayley Navarro MD  Resident  01/03/25 [id number]

## 2025-01-03 NOTE — PROGRESS NOTES
[facility]ye Ca - Cardiology [facility] Medicine  Progress Note    Patient Name: Tiffany Masterson  MRN: [id number]  Patient Class: OP- Observation   Admission Date: 1/2/2025  Length of Stay: 0 days  Attending Physician: Karissa García MD  Primary Care Provider: Rebeca Dumont MD        Subjective     Principal Problem: Chronic atrial fibrillation with RVR    HPI:  Tiffany Masterson is a 89 year old woman Afib, Hypothyroidism, HLD, HTN, Iron deficiency anemia, Migraines  and recurrent falls who presents today with a chief complaint of bilateral leg swelling.     Tiffany reports progressive lower extremity swelling and dyspnea over the last 2-3 days. No clear inciting incident but she notes she did suffer a fall about two days ago. She denies residual pain or injury related to this recent fall.  No recent illness or medication change. Denies history of heart failure. She was seen earlier today in her PCP's office and sent to the ED for further evaluation at that time.     Her ED work up was significant for Afib w/ RVR (HR's in 170s-190s), UA consistent with UTI, Elevated BNP at 1246, and a JAMARI with Cr of 1.5. CXR significant for bilateral pleural effusions. Flu, RSV, and Covid negative.  She was treated with IV diltiazem 10 mg x 2, Lasix 40 mg IV, and Ceftriaxone 1 g in the ED.       Overview/Hospital Course:  As below.     Interval History: Admitted yesterday for afib w/ RVR in setting of volume overload. Given 10mg IV diltiazem x2, 40mg IV lasix x2, and then continued on home PO diltiazem. HR improved from sustained 120s-130s (w/ jumps up to 170s) --> 90s-100s (w/ jumps to 120s-130s). BP down from admission but stable & pt remains on RA. UOP 1.4L. Pt overall feeling well this AM w/o SOB, palpitations, dizziness, chest pain or other complaints. Increasing metoprolol dose to 75mg BID. TTE showed drop in EF to 25-30% (down from > 55% in Sept '24). Diltiazem d/c'ed. Loaded w/ digoxin. Pending response to digoxin, may  consult Cardiology (although given drop in EF, may consult Cardiology regardless). Urine cx pending; continuing CTX. Will continue to titrate meds for further volume removal & HR control.  Plan discussed w/ pt & family at bedside this AM.     Review of Systems   Constitutional:  Negative for chills, diaphoresis and fever.   HENT:  Negative for congestion, rhinorrhea, sore throat and trouble swallowing.    Eyes:  Negative for photophobia and visual disturbance.   Respiratory:  Negative for cough, shortness of breath and wheezing.    Cardiovascular:  Positive for leg swelling. Negative for chest pain and palpitations.   Gastrointestinal:  Negative for abdominal pain, diarrhea, nausea and vomiting.   Genitourinary:  Negative for difficulty urinating, dysuria and hematuria.   Musculoskeletal:  Negative for arthralgias, joint swelling and neck pain.   Neurological:  Negative for dizziness, light-headedness and headaches.   Psychiatric/Behavioral:  Negative for agitation, confusion and sleep disturbance.        Objective:     Vital Signs (Most Recent):  Temp: 97.8 °F (36.6 °C) (01/03/25 1521)  Pulse: 102 (01/03/25 1521)  Resp: 19 (01/03/25 1521)  BP: 108/62 (01/03/25 1521)  SpO2: (!) 92 % (01/03/25 1521) Vital Signs (24h Range):  Temp:  [96.8 °F (36 °C)-98 °F (36.7 °C)] 97.8 °F (36.6 °C)  Pulse:  [] 102  Resp:  [17-20] 19  SpO2:  [92 %-100 %] 92 %  BP: ()/() 108/62     Weight: 75 kg (165 lb 5.5 oz)  Body mass index is 26.69 kg/m².    Intake/Output Summary (Last 24 hours) at 1/3/2025 1651  Last data filed at 1/3/2025 1312  Gross per 24 hour   Intake 756 ml   Output 2325 ml   Net -1569 ml         Physical Exam  Constitutional:       General: She is not in acute distress.     Appearance: She is not toxic-appearing or diaphoretic.   HENT:      Head: Normocephalic and atraumatic.      Mouth/Throat:      Mouth: Mucous membranes are moist.   Eyes:      Conjunctiva/sclera: Conjunctivae normal.   Cardiovascular:       Rate and Rhythm: Tachycardia present. Rhythm irregular.      Heart sounds: Normal heart sounds.   Pulmonary:      Effort: Pulmonary effort is normal. No respiratory distress.      Breath sounds: Decreased breath sounds (bibasilar) and rales (diffuse) present. No wheezing or rhonchi.   Abdominal:      General: Bowel sounds are normal. There is no distension.      Palpations: Abdomen is soft.      Tenderness: There is no abdominal tenderness. There is no guarding.   Musculoskeletal:      Right lower leg: Edema (3+ pitting edema up to hips) present.      Left lower leg: Edema (3+ pitting edema up to hips) present.   Skin:     General: Skin is warm and dry.   Neurological:      General: No focal deficit present.      Mental Status: She is alert and oriented to person, place, and time. Mental status is at baseline.   Psychiatric:         Mood and Affect: Mood normal.         Behavior: Behavior normal.           Significant Labs: All pertinent labs within the past 24 hours have been reviewed.    Significant Imaging: I have reviewed all pertinent imaging results/findings within the past 24 hours.        Assessment and Plan     Acute on chronic atrial fibrillation with RVR  Pt w/ long hx of chronic atrial fibrillation, managed w/ metoprolol & diltiazem (along w/ apixaban). [id number] score 5-6. On admission, EKG notable for atrial fibrillation w/ RVR (). Pt is asymptomatic. CXR & exam c/w volume overload. TTE showed drop in EF from > 55% in Sept '24 to 25-30% now. Favor volume overload 2/2 declining systolic function to be driving RVR exacerbation. Aside from CHF, etiology of uncontrolled afib unclear- TSH elevated but FT4 nml, troponin nml, no notable electrolyte derangements, no hypoxia, UA w/ e/o UTI (although pt largely asymptomatic & afebrile w/ nml WBC), & pt denies illicit substance use (UDS not collected on admit).   - Continue home apixaban  - D/c diltiazem given low EF  - Increase home metoprolol XL  to 75mg BID   - Digoxin 0.5 mcg x1 --> 0.25mcg Q6H x2, then likely decrease to 0.125mcg daily   - Metoprolol PRN for goal HR < 120   - Monitor electrolytes, replete PRN for goal K > 4 & Mg > 2  - Telemetry  - Volume mgmt as below   - Pending response to interventions above, consider Cardiology consult     Heart failure with reduced ejection fraction   Hx of HFpEF- last TTE (Sept '24) w/ LVEF > 55%, indeterminate diastolic function, mild AV sclerosis w/o stenosis, mild AI, mild TR & MR, mild LA dilation, & mild aortic & mitral annular calcification. Pt presented w/ progressive BLE edema. On admission, BNP 1246 w/ e/o volume overload on exam. CXR w/ b/l pleural effusions & e/o extensive pulmonary edema. Weight 75kg on admission. Repeat TTE showed LVEF 25-30%, mildly reduced RV systolic function, severe LA dilation, mild AS (ARRON by VTI 2.1, peak velocity 1.4, mean gradient 5, DI 0.5), mild AI, moderately thickened mitral leaflets, moderate TR, PASP 29; IVC not well visualized. Unclear etiology of pts declining EF. While uncontrolled afib considered, inverse favored. TSH elevated but FT4 nml, and troponin negative x2.   - IV lasix 60mg BID; titrate daily for goal net neg 1-2L/day  - Metoprolol as above  - Digoxin as above  - Continue home losartan   - Start Jardiance 10mg daily  - Possibly initiate spironolactone prior to discharge  - Strict I/Os & daily standing weights  - Fluid (1.5L/day) & sodium (2g) restriction  - Iron panel pending   - Monitor BMP w/ electrolyte replacement PRN  - Telemetry   - Mgmt of afib as above   - Consider Cardiology consult given drop in EF    UTI (urinary tract infection)  UA on admit w/ 3+ LE, 18 WBCs & occasional bacteria. Denies dysuria, hematuria, frequency or urgency. Generally would not treat as pt seemingly asymptomatic, but given RVR & recent falls, abx started.   - CTX x3 days  - Final urine C&S pending     Stage 3b chronic kidney disease  On admission, Cr 1.5 (baseline ~1.1-1.3)  "w/ BUN 20. UA w/ 3+ LE, 18 WBCs & occasional bacteria.   - Diuresis as above  - Monitor Cr & UOP  - Minimize nephrotoxic agents as able & renally-dose meds    Frequent falls  Generalized weakness  Pt/family report at least 4 falls since Sept '24. The fall in September seemingly mechanical (was carrying boxes up stairs in the dark and tripped) and resulted in pt falling backward w/ resulting ICH & occipital bone fracture. Pt's subsequent falls have all reportedly been when pt is getting out of bed or getting up to use the bathroom (usually at night), more suggestive of vasovagal/orthostatic etiology. Pt denies any assoc sx w/ falls- no dizziness, lightheadedness, palpitations, etc. Denies tripping on subsequent episodes- "just falls." Most recent fall a few days PTA resulted in small contusion on forehead but no major trauma/injuries.   - PT/OT  - Daily orthostatics     Mixed hyperlipidemia  Hx of mixed dyslipidemia. Last lipid panel (Sept '24) nml.   - Continue home statin     Primary hypertension  Hx of chronic HTN, managed w/ home regimen of losartan, metoprolol XL, & diltiazem. Normotensive on admission (110s-130s/70s-100s).   - Cardiac package as above    Hypothyroidism  Last TSH (Sept '24) nml at 2.158. Repeat TSH on admission elevated at 4.68, but FT4 nml at 1.2.   - Continue home levothyroxine 112 mcg daily  - Repeat labs outpatient per PCP         VTE Risk Mitigation (From admission, onward)           Ordered     apixaban tablet 5 mg  2 times daily         01/02/25 2255     Reason for No Pharmacological VTE Prophylaxis  Once        Question:  Reasons:  Answer:  Already adequately anticoagulated on oral Anticoagulants    01/02/25 2255     Place sequential compression device  Until discontinued         01/02/25 2255     IP VTE HIGH RISK PATIENT  Once         01/02/25 2255                    Discharge Planning   AREN: 1/5/2025     Code Status: Full Code   Medical Readiness for Discharge Date:   Discharge Plan A: " Home with family        Please place Justification for DME        Karissa García MD  Department of Hospital Medicine   [facility] - Cardiology Stepdown

## 2025-01-03 NOTE — ED TRIAGE NOTES
Patient identifiers verified and correct for Tiffany Acevedo  C/C: edema in feet, sent by PCP for afib RVR  APPEARANCE: awake and alert in NAD. PAIN  0/10  SKIN: warm, dry and intact. No breakdown or bruising.  MUSCULOSKELETAL: Patient moving all extremities spontaneously, edema noted. Pt reports weeping. Ambulates independently.  RESPIRATORY: short of breath on exertion  CARDIAC: Denies CP, 2+ distal pulses; no peripheral edema  ABDOMEN: S/ND/NT, Denies nausea  : voids spontaneously, denies difficulty  Neurologic: AAO x 4; follows commands equal strength in all extremities; denies numbness/tingling. Denies dizziness

## 2025-01-03 NOTE — PLAN OF CARE
[facility] - Cardiology Stepdown  Initial Discharge Assessment       Primary Care Provider: Rebeca Dumont MD    Admission Diagnosis: Shortness of breath [R06.02]  A-fib [I48.91]  Dyspnea on exertion [R06.09]  Tachycardia [R00.0]  JAMARI (acute kidney injury) [N17.9]  Chest pain [R07.9]  Atrial fibrillation, unspecified type [I48.91]  Edema, unspecified type [R60.9]    Admission Date: 1/2/2025  Expected Discharge Date: 1/5/2025    Transition of Care Barriers: None    Payor: HU[employer] MANAGED MEDICARE / Plan: HU[employer] MEDICARE HMO / Product Type: Capitation /     Extended Emergency Contact Information  Primary Emergency Contact: Lauro Benedict  Address: [address] [place] [address] [place] [address] [facility]  Home Phone: [phone]  Mobile Phone: [phone]  Relation: Spouse    Discharge Plan A: Home with family  Discharge Plan B: Home (Pt . reports she wants water therapy at [place] at discharge. She declines SNF or HH if recommended.)      [facility]  [place] - [address] [place] [id number]  Phone: [phone] Fax: [phone]    [facility] - [place] [address] [address] [facility] [address] [address]  Phone: [phone] Fax: [phone]      Initial Assessment (most recent)       Adult Discharge Assessment - 01/03/25 1104          Discharge Assessment    Assessment Type Discharge Planning Assessment     Confirmed/corrected address, phone number and insurance Yes     Source of Information patient     If unable to respond/provide information was family/caregiver contacted? Yes     Contact Name/Number Lauro Masterson [phone]     When was your last doctors appointment? 12/31/24     Does patient/caregiver understand observation status Yes     Communicated AREN with patient/caregiver Yes     Reason For Admission CHF, frequent falls     People in Home spouse     Facility  Arrived From: Home     Do you expect to return to your current living situation? Yes     Do you have help at home or someone to help you manage your care at home? Yes     Who are your caregiver(s) and their phone number(s)? Spouse     Prior to hospitilization cognitive status: Alert/Oriented     Current cognitive status: Alert/Oriented     Walking or Climbing Stairs ambulation difficulty, requires equipment;stair climbing difficulty, requires equipment     Mobility Management Pt has a cane and walker but chooses not to use them. Pt understands the risks from falls but reports that she prefers not to use assistive devices and is willing to take the risk.     Dressing/Bathing Difficulty no     Do you have any problems with: --   Pt and her elderly  both still drive.    Home Layout Bathroom on 2nd floor;Bedroom on 2nd floor     Equipment Currently Used at Home cane, straight;walker, standard     Readmission within 30 days? No     Patient currently being followed by outpatient case management? No     Do you take prescription medications? Yes     Do you have prescription coverage? Yes     Do you have any problems affording any of your prescribed medications? No     Is the patient taking medications as prescribed? yes     Who is going to help you get home at discharge? Spouse     How do you get to doctors appointments? car, drives self;family or friend will provide     Are you on dialysis? No     Do you take coumadin? No     Discharge Plan A Home with family     Discharge Plan B Home   Pt . reports she wants water therapy at [place] at discharge. She declines SNF or HH if recommended.    DME Needed Upon Discharge  none     Discharge Plan discussed with: Patient;Adult children     Transition of Care Barriers None        Physical Activity    On average, how many days per week do you engage in moderate to strenuous exercise (like a brisk walk)? 2 days     On average, how many minutes do you engage in exercise at this  "level? 30 min        Financial Resource Strain    How hard is it for you to pay for the very basics like food, housing, medical care, and heating? Not hard at all        Housing Stability    In the last 12 months, was there a time when you were not able to pay the mortgage or rent on time? No     At any time in the past 12 months, were you homeless or living in a shelter (including now)? No        Transportation Needs    Has the lack of transportation kept you from medical appointments, meetings, work or from getting things needed for daily living? No        Food Insecurity    Within the past 12 months, you worried that your food would run out before you got the money to buy more. Never true     Within the past 12 months, the food you bought just didn't last and you didn't have money to get more. Never true        Stress    Do you feel stress - tense, restless, nervous, or anxious, or unable to sleep at night because your mind is troubled all the time - these days? Not at all        Social Isolation    How often do you feel lonely or isolated from those around you?  Never        Alcohol Use    Q1: How often do you have a drink containing alcohol? 2-4 times a month     Q2: How many drinks containing alcohol do you have on a typical day when you are drinking? 1 or 2     Q3: How often do you have six or more drinks on one occasion? Never        Hospitalists Nowities    In the past 12 months has the electric, gas, oil, or water company threatened to shut off services in your home? No        Health Literacy    How often do you need to have someone help you when you read instructions, pamphlets, or other written material from your doctor or pharmacy? Rarely                      Pt resides with her  in a 2 story house and she climbs stairs in spite of having a "big fall" on the stairs in September that resulted in hospitalization. Pt has supportive family and friends if needed. Pt expressed interest in water therapy at " discharge and will discuss with inpatient therapist. SW following as needed.    Dispo: A. Home with family B. Home     Discharge Plan A and Plan B have been determined by review of patient's clinical status, future medical and therapeutic needs, and coverage/benefits for post-acute care in coordination with multidisciplinary team members.       JANET Chris  Case Management  [id number]

## 2025-01-03 NOTE — HOSPITAL COURSE
Admitted for afib w/ RVR in setting of volume overload. Given 10mg IV diltiazem x2, 40mg IV lasix x2, and then continued on home PO diltiazem. HR improved from sustained 120s-130s (w/ jumps up to 170s) --> 90s-100s (w/ jumps to 120s-130s). TTE showed drop in EF to 25-30% (down from > 55% in Sept '24). Diltiazem d/c'ed. Loaded w/ digoxin & metoprolol dose increased. Jardiance started. Cardiology consulted given drop in EF. Metoprolol transitioned to tartrate (to avoid potential bradycardia in event that pt converted to NSR). Continued on IV lasix w/ excellent response. Renal function slowly improving and BP stable allowing for uptitration of diuretics to IV lasix 60mg TID w/ robust response (up to 6+ liters UOP/day). While BP & renal function remained stable, pt started to experience some orthostatic dizziness for which diuresis downtitrated. Completed 3-day course of CTX, but developed dysuria. Phenazopyridine added. Urine cx grew MDRO/ESBL E.coli (resistant to CTX). Started on 3-day course of Bactrim. Euvolemia achieved 1/5 (note dry weight is 68.6 kg). IV diuresis d/c'ed. Pt started on spironolactone, and continued on metoprolol & losartan.

## 2025-01-03 NOTE — ASSESSMENT & PLAN NOTE
Pt w/ bilateral pleural effusion and worsening LE edema on admission. Concern for worsening heart failure. Most recent echo in 2021 significant for Grade I diastolic dysfunction with normal EF.   -BNP elevated at 1246 on admission.     Plan  -Will obtain updated Echo  -Continue IV Lasix 40 mg, convert to PO when appropriate.

## 2025-01-03 NOTE — PLAN OF CARE
Goals to be met by: 01/24/25     Patient will increase functional independence with ADLs by performing:    UE Dressing with Modified [place].  LE Dressing with Modified [place].  Grooming while standing at sink with Modified [place].  Toileting from toilet with Modified [place] for hygiene and clothing management.   Toilet transfer to toilet with Modified [place].    DME: No anticipated needs

## 2025-01-03 NOTE — ASSESSMENT & PLAN NOTE
Patient's blood pressure range in the last 24 hours was: BP  Min: 112/70  Max: 144/86.The patient's inpatient anti-hypertensive regimen is listed below:  Current Antihypertensives  diltiaZEM tablet 90 mg, Every 8 hours, Oral  losartan tablet 100 mg, Daily, Oral  metoprolol succinate (TOPROL-XL) 24 hr tablet 50 mg, 2 times daily, Oral  furosemide injection 40 mg, Once, Intravenous    Plan  - BP is controlled, no changes needed to their regimen

## 2025-01-03 NOTE — ASSESSMENT & PLAN NOTE
UA significant for elevated WBC and Leukocyte Esterase. Awaiting urine culture    Plan  -Ceftriaxone 1g daily, Will de-escalate antibiotics as appropriate.

## 2025-01-03 NOTE — SUBJECTIVE & OBJECTIVE
Past Medical History:   Diagnosis Date    Abnormal MRI 10/16/2020    Acute cystitis without hematuria 12/22/2021    Arthritis     Arthritis of right hip 11/08/2021    Atrial fibrillation     Cataract     Elevated liver enzymes     Endometrial mass 06/29/2018    Epiretinal membrane (ERM) of right eye     Family history of malignant neoplasm of gastrointestinal tract mother    Frequent UTI 09/30/2018    Generalized arthritis 09/19/2024    Shoulders, knees, hips      Graves disease     now hypothryoid - no surgery or medicine. resolved on its own    History of cholecystectomy 06/10/2021    History of colonoscopy     unremarkable 2007 by Dr. Javier.  Barium enema revealed diverticular disease.    Hyperlipidemia     Hypertension     Hypertriglyceridemia 04/19/2013    Continue statin for secondary stroke prevention    Hypothyroidism     Impaired functional mobility, balance, gait, and endurance 06/04/2021    Iron deficiency anemia 09/29/2021    Migraine, ophthalmoplegic     Mixed stress and urge urinary incontinence 09/30/2018    Ocular migraine     Personal history of colonic polyps     Physical deconditioning 09/13/2023    Preop testing 05/17/2021    S/P colonoscopic polypectomy 06/18/2013    Sleep disorder 11/08/2021    Status post hysteroscopic polypectomy 07/02/2018    TBI (traumatic brain injury) 10/25/2024    9/22/2024: Ct head: Left frontotemporal subarachnoid hemorrhage again noted including a small focus of intraparenchymal hemorrhage in the left cerebellum. No new hemorrhage.       TIA (transient ischemic attack)     with a normal angiogram in the past, Ocular migraines reported by patient, not TIA     Transaminitis 03/19/2021    Urethral caruncle 09/15/2020       Past Surgical History:   Procedure Laterality Date    CATARACT EXTRACTION Right 2012    Dr. Lexis Nicolas     CHOLECYSTECTOMY  2022    COLONOSCOPY      2014 polyps    COLONOSCOPY N/A 11/07/2016    Procedure: COLONOSCOPY;  Surgeon: Bebeto Gonzalez,  MD;  Location: [facility] (4TH FLR);  Service: Endoscopy;  Laterality: N/A;    COLONOSCOPY N/A 03/09/2020    Procedure: COLONOSCOPY;  Surgeon: Bebeto Gonzalez MD;  Location: [facility] (4TH FLR);  Service: Endoscopy;  Laterality: N/A;    COLONOSCOPY N/A 09/29/2021    Procedure: COLONOSCOPY;  Surgeon: Bebeto Gonzalez MD;  Location: [facility] (4TH FLR);  Service: Endoscopy;  Laterality: N/A;  please schedule patient as soon as possible with me EGD colonoscopy with constipation bowel prep for iron deficiency anemia family history of colon cancer and personal history of colon polyps  9/17/21 ok for standard split prep per Dr. Gonzalez-imani    COLONOSCOPY N/A 09/29/2021    Procedure: COLONOSCOPY;  Surgeon: Bebeto Gonzalez MD;  Location: [facility] (4TH FLR);  Service: Endoscopy;  Laterality: N/A;  Please schedule patient as soon as possible with me EGD colonoscopy with constipation bowel prep for iron deficiency anemia family history of colon cancer and personal history of colon polyps  9/17/21 Ok for standard split prep per Dr. Collins    COLONOSCOPY W/ POLYPECTOMY  06/2013    polyp of colon    ENDOSCOPIC ULTRASOUND OF UPPER GASTROINTESTINAL TRACT N/A 04/29/2021    Procedure: ULTRASOUND, UPPER GI TRACT, ENDOSCOPIC;  Surgeon: Dalton Johnson MD;  Location: [facility] (2ND FLR);  Service: Endoscopy;  Laterality: N/A;  Postprandial nausea vomiting epigastric 0.9 cm stone and sludge seen within the gallbladder lumen.  No wall thickening or pericholecystic fluid.  0.6 cm stone seen within the distal common bile duct at the level of the pancreatic head.  There is dil    ERCP N/A 04/29/2021    Procedure: ERCP (ENDOSCOPIC RETROGRADE CHOLANGIOPANCREATOGRAPHY);  Surgeon: Dalton Johnson MD;  Location: [facility] (2ND FLR);  Service: Endoscopy;  Laterality: N/A;  Postprandial nausea and vomit 0.9 cm stone and sludge seen within the gallbladder lumen.  No wall thickening or pericholecystic fluid.  0.6 cm stone seen within the  distal common bile duct at the level of the pancreatic head.  There i    ESOPHAGOGASTRODUODENOSCOPY N/A 09/29/2021    Procedure: EGD (ESOPHAGOGASTRODUODENOSCOPY);  Surgeon: Bebeto Gonzalez MD;  Location: [facility] (4TH FLR);  Service: Endoscopy;  Laterality: N/A;  rapid covid test arrival 12pm - sm  9/27 arrival time for covid test/procedure confirmed with pt-rb    ESOPHAGOGASTRODUODENOSCOPY N/A 09/29/2021    Procedure: EGD (ESOPHAGOGASTRODUODENOSCOPY);  Surgeon: Bebeto Gonzalez MD;  Location: [facility] ENDO (4TH FLR);  Service: Endoscopy;  Laterality: N/A;  [place] [facility] [place]    EYE SURGERY  11/10/2014    right retina/Dr. Dalton Sierra ([facility])    HYSTEROSCOPIC POLYPECTOMY OF UTERUS N/A 07/02/2018    Procedure: POLYPECTOMY, UTERUS, HYSTEROSCOPIC;  Surgeon: Elliot Vanessa IV, MD;  Location: [facility] OR;  Service: OB/GYN;  Laterality: N/A;    INJECTION OF JOINT Right 11/11/2022    Procedure: INJECTION, RIGHT GTB CONTRAST DIRECT REFERRAL;  Surgeon: Camden Hernandez MD;  Location: [facility] PAIN MGT;  Service: Pain Management;  Laterality: Right;    JOINT REPLACEMENT  03/23/2011    left total hip replacement    LAPAROSCOPIC CHOLECYSTECTOMY N/A 05/17/2021    Procedure: CHOLECYSTECTOMY, LAPAROSCOPIC;  Surgeon: Rayshawn Peralta Jr., MD;  Location: [facility] 2ND FLR;  Service: General;  Laterality: N/A;    REMOVAL OF EPIRETINAL MEMBRANE Right     Dr. Padron    TONSILLECTOMY      pt was 11 years old       Review of patient's allergies indicates:   Allergen Reactions    Levaquin [levofloxacin]      Joint pain    Hydrochlorothiazide Other (See Comments)     Pain in joints and depression per pt       No current facility-administered medications on file prior to encounter.     Current Outpatient Medications on File Prior to Encounter   Medication Sig    acetaminophen (TYLENOL) 500 MG tablet Take 500 mg by mouth every 6 (six) hours as needed.    ascorbic acid, vitamin C, (VITAMIN C) 250 MG tablet Take 250  mg by mouth once daily.    atorvastatin (LIPITOR) 10 MG tablet Take 1 tablet by mouth once daily    b complex vitamins capsule Take 1 capsule by mouth once daily.    biotin 5,000 mcg TbDL Take 1 tablet (5,000 mcg total) by mouth Daily.    bisacodyL (DULCOLAX) 5 mg EC tablet Take 5 mg by mouth once daily.    CALCIUM CARBONATE/VITAMIN D3 (CALCIUM 600 + D,3, ORAL) Take 1 tablet by mouth once daily.     cholecalciferol, vitamin D3, (VITAMIN D3) 50 mcg (2,000 unit) Cap Take 1 capsule by mouth once daily.    coenzyme Q10 200 mg capsule Take 200 mg by mouth once daily.    DEXTRAN 70/HYPROMELLOSE (ARTIFICIAL TEARS, PF, OPHT) Place 1 drop into both eyes as needed.    diltiaZEM (CARDIZEM) 90 MG tablet Take 90 mg by mouth every 8 (eight) hours.    ELIQUIS 5 mg Tab Take 1 tablet by mouth twice daily    estradioL (ESTRACE) 0.01 % (0.1 mg/gram) vaginal cream Place 0.5 g intravaginally q.h.s. x2 weeks; then, placed 0.5 g intravaginally twice weekly at bedtime (maintenance therapy).    fluticasone (VERAMYST) 27.5 mcg/actuation nasal spray 2 sprays by Nasal route.    fluticasone propionate (FLONASE) 50 mcg/actuation nasal spray CLEAN SINUS/NARES WITH OCEAN NASAL SPRAY THEN USE FLONASE 1 SPRAY TWICE DAILY.    gabapentin (NEURONTIN) 100 MG capsule TAKE 1 CAPSULE BY MOUTH THREE TIMES DAILY    hydroquinone 4 % Crea APPLY  CREAM TOPICALLY TWICE DAILY    levothyroxine (SYNTHROID) 112 MCG tablet TAKE 1 TABLET BY MOUTH BEFORE BREAKFAST    LIDOcaine (LIDODERM) 5 % Place 1 patch onto the skin once daily. Remove & Discard patch within 12 hours or as directed by MD    losartan (COZAAR) 100 MG tablet Take 1 tablet by mouth once daily    magnesium oxide 400 mg magnesium Tab Take 1 tablet by mouth once daily.    metoprolol succinate (TOPROL-XL) 50 MG 24 hr tablet Take 1 tablet by mouth twice daily    MULTIVITAMIN W-MINERALS/LUTEIN (CENTRUM SILVER ORAL) Take 1 tablet by mouth once daily.    mupirocin (BACTROBAN) 2 % ointment Apply topically 3  (three) times daily. Apply to wound on head until healed    sodium chloride (SALINE NASAL) 0.65 % nasal spray 1 spray by Nasal route as needed for Congestion.    [] sodium,potassium,mag sulfates (SUPREP BOWEL PREP KIT) 17.5-3.13-1.6 gram SolR Take 177 mLs by mouth once daily. Take as instructed by Endoscopy nurse  for 2 days    vit A/vit C/vit E/zinc/copper (ICAPS AREDS ORAL) Take 1 tablet by mouth 2 (two) times a day.    vitamin D (VITAMIN D3) 1000 units Tab Take 2 tablets by mouth once daily.     Family History       Problem Relation (Age of Onset)    Colon cancer Mother (75), Paternal Aunt (80)    Diabetes Other, Paternal Aunt    Lung cancer Father (75)    Prostate cancer Brother          Tobacco Use    Smoking status: Never    Smokeless tobacco: Never    Tobacco comments:     The patient is not getting any regular exercise at this time.  She does enjoy traveling to [place].   Substance and Sexual Activity    Alcohol use: Yes     Comment: occasionally    Drug use: No    Sexual activity: Yes     Partners: Male     Birth control/protection: Post-menopausal     Comment:  64 years      Review of Systems   Constitutional:  Positive for fatigue. Negative for chills and fever.   HENT:  Negative for congestion, ear pain, facial swelling, rhinorrhea, sinus pressure and sinus pain.    Eyes:  Negative for pain, redness and visual disturbance.   Respiratory:  Positive for shortness of breath. Negative for cough, chest tightness and wheezing.    Cardiovascular:  Positive for leg swelling. Negative for chest pain and palpitations.   Gastrointestinal:  Negative for abdominal pain, constipation, diarrhea, nausea and vomiting.   Genitourinary:  Negative for difficulty urinating.   Musculoskeletal:  Negative for arthralgias, back pain and joint swelling.   Skin:  Negative for pallor and rash.   Neurological:  Positive for weakness. Negative for dizziness, seizures, light-headedness, numbness and headaches.    Psychiatric/Behavioral:  Negative for agitation and behavioral problems.      Objective:     Vital Signs (Most Recent):  Temp: 97.7 °F (36.5 °C) (01/02/25 2029)  Pulse: 102 (01/02/25 2131)  Resp: 20 (01/02/25 2100)  BP: 117/61 (01/02/25 2132)  SpO2: 97 % (01/02/25 2131) Vital Signs (24h Range):  Temp:  [97.6 °F (36.4 °C)-98 °F (36.7 °C)] 97.7 °F (36.5 °C)  Pulse:  [] 102  Resp:  [17-20] 20  SpO2:  [95 %-100 %] 97 %  BP: (112-137)/() 117/61     Weight: 72.6 kg (160 lb)  Body mass index is 25.82 kg/m².     Physical Exam  Vitals and nursing note reviewed.   Constitutional:       General: She is not in acute distress.     Appearance: Normal appearance.   HENT:      Head: Normocephalic and atraumatic.      Nose: Nose normal.      Mouth/Throat:      Mouth: Mucous membranes are moist.   Eyes:      General: No scleral icterus.     Extraocular Movements: Extraocular movements intact.      Conjunctiva/sclera: Conjunctivae normal.   Cardiovascular:      Rate and Rhythm: Tachycardia present. Rhythm irregular.      Pulses: Normal pulses.      Heart sounds: Normal heart sounds. No murmur heard.     No friction rub. No gallop.   Pulmonary:      Effort: Pulmonary effort is normal. No respiratory distress.      Breath sounds: Examination of the right-lower field reveals decreased breath sounds and rales. Examination of the left-lower field reveals decreased breath sounds and rales. Decreased breath sounds and rales present. No wheezing.   Abdominal:      General: There is no distension.      Palpations: Abdomen is soft.      Tenderness: There is no abdominal tenderness. There is no guarding or rebound.   Musculoskeletal:      Cervical back: Neck supple. No tenderness.      Right lower leg: Edema present.      Left lower leg: Edema present.   Skin:     General: Skin is warm and dry.      Capillary Refill: Capillary refill takes less than 2 seconds.      Findings: No rash.   Neurological:      General: No focal deficit  present.      Mental Status: She is alert.   Psychiatric:         Mood and Affect: Mood normal.                Significant Labs: All pertinent labs within the past 24 hours have been reviewed.    Significant Imaging: I have reviewed all pertinent imaging results/findings within the past 24 hours.  CXR: I have reviewed all pertinent results/findings within the past 24 hours and my personal findings are:  Bilateral pleural effusions

## 2025-01-03 NOTE — H&P
[facility]  History & Physical    Patient Name: Tiffany Masterson  MRN: [id number]  Patient Class: OP- Observation  Admission Date: 1/2/2025  Attending Physician: Karissa García MD   Primary Care Provider: Rebeca Dumont MD         Patient information was obtained from patient and ER records.     Subjective:     Principal Problem:Chronic atrial fibrillation with RVR    Chief Complaint:   Chief Complaint   Patient presents with    Edema     BLE edema beginning to weep.  New dx of afib about 2 months ago.          HPI: Tiffany Masterson is a 89 year old woman Afib, Hypothyroidism, HLD, HTN, Iron deficiency anemia, Migraines  and recurrent falls who presents today with a chief complaint of bilateral leg swelling.     Tiffany reports progressive lower extremity swelling and dyspnea over the last 2-3 days. No clear inciting incident but she notes she did suffer a fall about two days ago. She denies residual pain or injury related to this recent fall.  No recent illness or medication change. Denies history of heart failure. She was seen earlier today in her PCP's office and sent to the ED for further evaluation at that time.     Her ED work up was significant for Afib w/ RVR (HR's in 170s-190s), UA consistent with UTI, Elevated BNP at 1246, and a JAMARI with Cr of 1.5. CXR significant for bilateral pleural effusions. Flu, RSV, and Covid negative.  She was treated with IV diltiazem 10 mg x 2, Lasix 40 mg IV, and Ceftriaxone 1 g in the ED.         No new subjective & objective note has been filed under this hospital service since the last note was generated.    Assessment/Plan:     * Chronic atrial fibrillation with RVR  Patient has long standing persistent (>12 months) atrial fibrillation. Patient is currently in atrial fibrillation. [id number] Score: 3. The patients heart rate in the last 24 hours is as follows:  Pulse  Min: 87  Max: 170     Antiarrhythmics  diltiaZEM tablet 90 mg, Every 8  hours, Oral  metoprolol succinate (TOPROL-XL) 24 hr tablet 50 mg, 2 times daily, Oral    Anticoagulants  apixaban tablet 5 mg, 2 times daily, Oral    Plan  - Replete lytes with a goal of K>4, Mg >2  - Patient is anticoagulated, see medications listed above.  - Patient's afib is currently uncontrolled. Will continue current treatment          Diastolic dysfunction  Pt w/ bilateral pleural effusion and worsening LE edema on admission. Concern for worsening heart failure. Most recent echo in 2021 significant for Grade I diastolic dysfunction with normal EF.   -BNP elevated at 1246 on admission.     Plan  -Will obtain updated Echo  -Continue IV Lasix 40 mg, convert to PO when appropriate.       UTI (urinary tract infection)  UA significant for elevated WBC and Leukocyte Esterase. Awaiting urine culture    Plan  -Ceftriaxone 1g daily, Will de-escalate antibiotics as appropriate.     Stage 3b chronic kidney disease  Creatine slightly elevated on admission (Baseline ~1.1-1.3, was 1.5 on admission) BMP reviewed- noted Estimated Creatinine Clearance: 26.9 mL/min (A) (based on SCr of 1.5 mg/dL (H)). according to latest data. Based on current GFR, CKD stage is stage 3 - GFR 30-59.  Monitor UOP and serial BMP and adjust therapy as needed. Renally dose meds. Avoid nephrotoxic medications and procedures.    Frequent falls  Hx of frequent falls. Most recent fall about 2 days before admission. No residual injury noted.     Plan  -PT to evaluate and treat. Pt may benefit from placement at discharge.     Primary hypertension  Patient's blood pressure range in the last 24 hours was: BP  Min: 112/70  Max: 144/86.The patient's inpatient anti-hypertensive regimen is listed below:  Current Antihypertensives  diltiaZEM tablet 90 mg, Every 8 hours, Oral  losartan tablet 100 mg, Daily, Oral  metoprolol succinate (TOPROL-XL) 24 hr tablet 50 mg, 2 times daily, Oral  furosemide injection 40 mg, Once, Intravenous    Plan  - BP is controlled, no  changes needed to their regimen    Hypothyroidism  TSH elevated at 4.680 on admission. Free T4 1.20. Recommend repeat as outpatient.     -Continue Levothyroxine 112 mcg daily      Mixed hyperlipidemia  Continue home Atorvastatin.     Generalized muscle weakness          VTE Risk Mitigation (From admission, onward)           Ordered     apixaban tablet 5 mg  2 times daily         01/02/25 2255     Reason for No Pharmacological VTE Prophylaxis  Once        Question:  Reasons:  Answer:  Already adequately anticoagulated on oral Anticoagulants    01/02/25 2255     Place sequential compression device  Until discontinued         01/02/25 2255     IP VTE HIGH RISK PATIENT  Once         01/02/25 2255                       On 01/03/2025, patient should be placed in hospital observation services under my care.             Manuel Lloyd MD  Department of Hospital Medicine  [facility] - Cardiology Stepdown

## 2025-01-03 NOTE — ASSESSMENT & PLAN NOTE
Hx of frequent falls. Most recent fall about 2 days before admission. No residual injury noted.     Plan  -PT to evaluate and treat. Pt may benefit from placement at discharge.

## 2025-01-04 PROBLEM — I50.41 ACUTE COMBINED SYSTOLIC AND DIASTOLIC HEART FAILURE: Status: ACTIVE | Noted: 2025-01-04

## 2025-01-04 PROBLEM — I48.11 LONGSTANDING PERSISTENT ATRIAL FIBRILLATION: Status: ACTIVE | Noted: 2025-01-04

## 2025-01-04 PROBLEM — D64.9 ANEMIA: Status: ACTIVE | Noted: 2025-01-04

## 2025-01-04 PROBLEM — I48.0 PAROXYSMAL ATRIAL FIBRILLATION: Status: ACTIVE | Noted: 2021-12-21

## 2025-01-04 LAB
ALBUMIN SERPL BCP-MCNC: 3.1 G/DL (ref 3.5–5.2)
ANION GAP SERPL CALC-SCNC: 11 MMOL/L (ref 8–16)
BUN SERPL-MCNC: 17 MG/DL (ref 8–23)
CALCIUM SERPL-MCNC: 9.2 MG/DL (ref 8.7–10.5)
CHLORIDE SERPL-SCNC: 103 MMOL/L (ref 95–110)
CO2 SERPL-SCNC: 24 MMOL/L (ref 23–29)
CREAT SERPL-MCNC: 1.3 MG/DL (ref 0.5–1.4)
ERYTHROCYTE [DISTWIDTH] IN BLOOD BY AUTOMATED COUNT: 14.5 % (ref 11.5–14.5)
EST. GFR  (NO RACE VARIABLE): 39.8 ML/MIN/1.73 M^2
FERRITIN SERPL-MCNC: 605 NG/ML (ref 20–300)
GLUCOSE SERPL-MCNC: 72 MG/DL (ref 70–110)
HCT VFR BLD AUTO: 40.1 % (ref 37–48.5)
HGB BLD-MCNC: 13.2 G/DL (ref 12–16)
IRON SERPL-MCNC: 32 UG/DL (ref 30–160)
MAGNESIUM SERPL-MCNC: 1.8 MG/DL (ref 1.6–2.6)
MCH RBC QN AUTO: 30.1 PG (ref 27–31)
MCHC RBC AUTO-ENTMCNC: 32.9 G/DL (ref 32–36)
MCV RBC AUTO: 92 FL (ref 82–98)
PHOSPHATE SERPL-MCNC: 4.4 MG/DL (ref 2.7–4.5)
PLATELET # BLD AUTO: 213 K/UL (ref 150–450)
PMV BLD AUTO: 10.8 FL (ref 9.2–12.9)
POTASSIUM SERPL-SCNC: 4 MMOL/L (ref 3.5–5.1)
RBC # BLD AUTO: 4.38 M/UL (ref 4–5.4)
SATURATED IRON: 11 % (ref 20–50)
SODIUM SERPL-SCNC: 138 MMOL/L (ref 136–145)
TOTAL IRON BINDING CAPACITY: 299 UG/DL (ref 250–450)
TRANSFERRIN SERPL-MCNC: 202 MG/DL (ref 200–375)
WBC # BLD AUTO: 10.69 K/UL (ref 3.9–12.7)

## 2025-01-04 PROCEDURE — 93010 ELECTROCARDIOGRAM REPORT: CPT | Mod: HCNC,,, | Performed by: INTERNAL MEDICINE

## 2025-01-04 PROCEDURE — 25000003 PHARM REV CODE 250: Mod: HCNC | Performed by: STUDENT IN AN ORGANIZED HEALTH CARE EDUCATION/TRAINING PROGRAM

## 2025-01-04 PROCEDURE — 97116 GAIT TRAINING THERAPY: CPT | Mod: HCNC

## 2025-01-04 PROCEDURE — 83540 ASSAY OF IRON: CPT | Mod: HCNC | Performed by: STUDENT IN AN ORGANIZED HEALTH CARE EDUCATION/TRAINING PROGRAM

## 2025-01-04 PROCEDURE — 80069 RENAL FUNCTION PANEL: CPT | Mod: HCNC | Performed by: STUDENT IN AN ORGANIZED HEALTH CARE EDUCATION/TRAINING PROGRAM

## 2025-01-04 PROCEDURE — 85027 COMPLETE CBC AUTOMATED: CPT | Mod: HCNC | Performed by: STUDENT IN AN ORGANIZED HEALTH CARE EDUCATION/TRAINING PROGRAM

## 2025-01-04 PROCEDURE — 82728 ASSAY OF FERRITIN: CPT | Mod: HCNC | Performed by: STUDENT IN AN ORGANIZED HEALTH CARE EDUCATION/TRAINING PROGRAM

## 2025-01-04 PROCEDURE — 36415 COLL VENOUS BLD VENIPUNCTURE: CPT | Mod: HCNC | Performed by: STUDENT IN AN ORGANIZED HEALTH CARE EDUCATION/TRAINING PROGRAM

## 2025-01-04 PROCEDURE — 96376 TX/PRO/DX INJ SAME DRUG ADON: CPT

## 2025-01-04 PROCEDURE — 63600175 PHARM REV CODE 636 W HCPCS: Mod: HCNC | Performed by: STUDENT IN AN ORGANIZED HEALTH CARE EDUCATION/TRAINING PROGRAM

## 2025-01-04 PROCEDURE — 25000003 PHARM REV CODE 250: Mod: HCNC | Performed by: INTERNAL MEDICINE

## 2025-01-04 PROCEDURE — 20600001 HC STEP DOWN PRIVATE ROOM: Mod: HCNC

## 2025-01-04 PROCEDURE — 93005 ELECTROCARDIOGRAM TRACING: CPT | Mod: HCNC

## 2025-01-04 PROCEDURE — 99223 1ST HOSP IP/OBS HIGH 75: CPT | Mod: HCNC,GC,, | Performed by: INTERNAL MEDICINE

## 2025-01-04 PROCEDURE — 83735 ASSAY OF MAGNESIUM: CPT | Mod: HCNC | Performed by: STUDENT IN AN ORGANIZED HEALTH CARE EDUCATION/TRAINING PROGRAM

## 2025-01-04 PROCEDURE — 97161 PT EVAL LOW COMPLEX 20 MIN: CPT | Mod: HCNC

## 2025-01-04 RX ORDER — MAGNESIUM SULFATE HEPTAHYDRATE 40 MG/ML
2 INJECTION, SOLUTION INTRAVENOUS ONCE
Status: COMPLETED | OUTPATIENT
Start: 2025-01-04 | End: 2025-01-04

## 2025-01-04 RX ORDER — METOPROLOL TARTRATE 25 MG/1
25 TABLET, FILM COATED ORAL 4 TIMES DAILY
Status: DISCONTINUED | OUTPATIENT
Start: 2025-01-04 | End: 2025-01-07

## 2025-01-04 RX ORDER — METOPROLOL SUCCINATE 100 MG/1
100 TABLET, EXTENDED RELEASE ORAL 2 TIMES DAILY
Status: DISCONTINUED | OUTPATIENT
Start: 2025-01-04 | End: 2025-01-04

## 2025-01-04 RX ORDER — DIGOXIN 125 MCG
0.12 TABLET ORAL DAILY
Status: DISCONTINUED | OUTPATIENT
Start: 2025-01-04 | End: 2025-01-07

## 2025-01-04 RX ORDER — PHENAZOPYRIDINE HYDROCHLORIDE 100 MG/1
100 TABLET, FILM COATED ORAL
Status: COMPLETED | OUTPATIENT
Start: 2025-01-04 | End: 2025-01-06

## 2025-01-04 RX ORDER — FUROSEMIDE 10 MG/ML
60 INJECTION INTRAMUSCULAR; INTRAVENOUS 3 TIMES DAILY
Status: DISCONTINUED | OUTPATIENT
Start: 2025-01-04 | End: 2025-01-05

## 2025-01-04 RX ADMIN — CEFTRIAXONE 1 G: 1 INJECTION, POWDER, FOR SOLUTION INTRAMUSCULAR; INTRAVENOUS at 08:01

## 2025-01-04 RX ADMIN — FUROSEMIDE 60 MG: 10 INJECTION, SOLUTION INTRAMUSCULAR; INTRAVENOUS at 08:01

## 2025-01-04 RX ADMIN — GABAPENTIN 100 MG: 100 CAPSULE ORAL at 08:01

## 2025-01-04 RX ADMIN — METOPROLOL TARTRATE 25 MG: 25 TABLET, FILM COATED ORAL at 08:01

## 2025-01-04 RX ADMIN — MAGNESIUM SULFATE HEPTAHYDRATE 2 G: 40 INJECTION, SOLUTION INTRAVENOUS at 08:01

## 2025-01-04 RX ADMIN — LOSARTAN POTASSIUM 100 MG: 50 TABLET, FILM COATED ORAL at 08:01

## 2025-01-04 RX ADMIN — PHENAZOPYRIDINE HYDROCHLORIDE 100 MG: 100 TABLET ORAL at 02:01

## 2025-01-04 RX ADMIN — APIXABAN 5 MG: 5 TABLET, FILM COATED ORAL at 08:01

## 2025-01-04 RX ADMIN — GABAPENTIN 100 MG: 100 CAPSULE ORAL at 02:01

## 2025-01-04 RX ADMIN — METOPROLOL SUCCINATE 100 MG: 50 TABLET, EXTENDED RELEASE ORAL at 08:01

## 2025-01-04 RX ADMIN — ATORVASTATIN CALCIUM 10 MG: 10 TABLET, FILM COATED ORAL at 08:01

## 2025-01-04 RX ADMIN — LEVOTHYROXINE SODIUM 112 MCG: 112 TABLET ORAL at 05:01

## 2025-01-04 RX ADMIN — DIGOXIN 0.12 MG: 125 TABLET ORAL at 09:01

## 2025-01-04 RX ADMIN — POTASSIUM BICARBONATE 25 MEQ: 978 TABLET, EFFERVESCENT ORAL at 08:01

## 2025-01-04 RX ADMIN — DIGOXIN 0.25 MG: 125 TABLET ORAL at 05:01

## 2025-01-04 RX ADMIN — EMPAGLIFLOZIN 10 MG: 10 TABLET, FILM COATED ORAL at 08:01

## 2025-01-04 RX ADMIN — FUROSEMIDE 60 MG: 10 INJECTION, SOLUTION INTRAMUSCULAR; INTRAVENOUS at 02:01

## 2025-01-04 NOTE — SUBJECTIVE & OBJECTIVE
Past Medical History:   Diagnosis Date    Abnormal MRI 10/16/2020    Acute cystitis without hematuria 12/22/2021    Arthritis     Arthritis of right hip 11/08/2021    Atrial fibrillation     Cataract     Elevated liver enzymes     Endometrial mass 06/29/2018    Epiretinal membrane (ERM) of right eye     Family history of malignant neoplasm of gastrointestinal tract mother    Frequent UTI 09/30/2018    Generalized arthritis 09/19/2024    Shoulders, knees, hips      Graves disease     now hypothryoid - no surgery or medicine. resolved on its own    History of cholecystectomy 06/10/2021    History of colonoscopy     unremarkable 2007 by Dr. Javier.  Barium enema revealed diverticular disease.    Hyperlipidemia     Hypertension     Hypertriglyceridemia 04/19/2013    Continue statin for secondary stroke prevention    Hypothyroidism     Impaired functional mobility, balance, gait, and endurance 06/04/2021    Iron deficiency anemia 09/29/2021    Migraine, ophthalmoplegic     Mixed stress and urge urinary incontinence 09/30/2018    Ocular migraine     Personal history of colonic polyps     Physical deconditioning 09/13/2023    Preop testing 05/17/2021    S/P colonoscopic polypectomy 06/18/2013    Sleep disorder 11/08/2021    Status post hysteroscopic polypectomy 07/02/2018    TBI (traumatic brain injury) 10/25/2024    9/22/2024: Ct head: Left frontotemporal subarachnoid hemorrhage again noted including a small focus of intraparenchymal hemorrhage in the left cerebellum. No new hemorrhage.       TIA (transient ischemic attack)     with a normal angiogram in the past, Ocular migraines reported by patient, not TIA     Transaminitis 03/19/2021    Urethral caruncle 09/15/2020       Past Surgical History:   Procedure Laterality Date    CATARACT EXTRACTION Right 2012    Dr. Lexis Nicolas     CHOLECYSTECTOMY  2022    COLONOSCOPY      2014 polyps    COLONOSCOPY N/A 11/07/2016    Procedure: COLONOSCOPY;  Surgeon: Bebeto Gonzalez,  MD;  Location: [facility] (4TH FLR);  Service: Endoscopy;  Laterality: N/A;    COLONOSCOPY N/A 03/09/2020    Procedure: COLONOSCOPY;  Surgeon: Bebeto Gonzalez MD;  Location: [facility] (4TH FLR);  Service: Endoscopy;  Laterality: N/A;    COLONOSCOPY N/A 09/29/2021    Procedure: COLONOSCOPY;  Surgeon: Bebeto Gonzalez MD;  Location: [facility] (4TH FLR);  Service: Endoscopy;  Laterality: N/A;  please schedule patient as soon as possible with me EGD colonoscopy with constipation bowel prep for iron deficiency anemia family history of colon cancer and personal history of colon polyps  9/17/21 ok for standard split prep per Dr. Gonzalez-imani    COLONOSCOPY N/A 09/29/2021    Procedure: COLONOSCOPY;  Surgeon: Bebeto Gonzalez MD;  Location: [facility] (4TH FLR);  Service: Endoscopy;  Laterality: N/A;  Please schedule patient as soon as possible with me EGD colonoscopy with constipation bowel prep for iron deficiency anemia family history of colon cancer and personal history of colon polyps  9/17/21 Ok for standard split prep per Dr. Collins    COLONOSCOPY W/ POLYPECTOMY  06/2013    polyp of colon    ENDOSCOPIC ULTRASOUND OF UPPER GASTROINTESTINAL TRACT N/A 04/29/2021    Procedure: ULTRASOUND, UPPER GI TRACT, ENDOSCOPIC;  Surgeon: Dalton Johnson MD;  Location: [facility] (2ND FLR);  Service: Endoscopy;  Laterality: N/A;  Postprandial nausea vomiting epigastric 0.9 cm stone and sludge seen within the gallbladder lumen.  No wall thickening or pericholecystic fluid.  0.6 cm stone seen within the distal common bile duct at the level of the pancreatic head.  There is dil    ERCP N/A 04/29/2021    Procedure: ERCP (ENDOSCOPIC RETROGRADE CHOLANGIOPANCREATOGRAPHY);  Surgeon: Dalton Johnson MD;  Location: [facility] (2ND FLR);  Service: Endoscopy;  Laterality: N/A;  Postprandial nausea and vomit 0.9 cm stone and sludge seen within the gallbladder lumen.  No wall thickening or pericholecystic fluid.  0.6 cm stone seen within the  distal common bile duct at the level of the pancreatic head.  There i    ESOPHAGOGASTRODUODENOSCOPY N/A 09/29/2021    Procedure: EGD (ESOPHAGOGASTRODUODENOSCOPY);  Surgeon: Bebeto Gonzalez MD;  Location: [facility] (4TH FLR);  Service: Endoscopy;  Laterality: N/A;  rapid covid test arrival 12pm - sm  9/27 arrival time for covid test/procedure confirmed with pt-rb    ESOPHAGOGASTRODUODENOSCOPY N/A 09/29/2021    Procedure: EGD (ESOPHAGOGASTRODUODENOSCOPY);  Surgeon: Bebeto Gonzalez MD;  Location: [facility] ENDO (4TH FLR);  Service: Endoscopy;  Laterality: N/A;  covid test 9/19/21 [facility] [place] instr emailed -    EYE SURGERY  11/10/2014    right retina/Dr. Dalton Sierra ([facility])    HYSTEROSCOPIC POLYPECTOMY OF UTERUS N/A 07/02/2018    Procedure: POLYPECTOMY, UTERUS, HYSTEROSCOPIC;  Surgeon: Elliot Vanessa IV, MD;  Location: [facility] OR;  Service: OB/GYN;  Laterality: N/A;    INJECTION OF JOINT Right 11/11/2022    Procedure: INJECTION, RIGHT GTB CONTRAST DIRECT REFERRAL;  Surgeon: Camden Hernandez MD;  Location: [facility] PAIN MGT;  Service: Pain Management;  Laterality: Right;    JOINT REPLACEMENT  03/23/2011    left total hip replacement    LAPAROSCOPIC CHOLECYSTECTOMY N/A 05/17/2021    Procedure: CHOLECYSTECTOMY, LAPAROSCOPIC;  Surgeon: Rayshawn Peralta Jr., MD;  Location: [facility] 2ND FLR;  Service: General;  Laterality: N/A;    REMOVAL OF EPIRETINAL MEMBRANE Right     Dr. Padron    TONSILLECTOMY      pt was 11 years old       Review of patient's allergies indicates:   Allergen Reactions    Levaquin [levofloxacin]      Joint pain    Hydrochlorothiazide Other (See Comments)     Pain in joints and depression per pt       No current facility-administered medications on file prior to encounter.     Current Outpatient Medications on File Prior to Encounter   Medication Sig    acetaminophen (TYLENOL) 500 MG tablet Take 500 mg by mouth every 6 (six) hours as needed.    ascorbic acid, vitamin C, (VITAMIN C) 250 MG tablet Take 250  mg by mouth once daily.    atorvastatin (LIPITOR) 10 MG tablet Take 1 tablet by mouth once daily    b complex vitamins capsule Take 1 capsule by mouth once daily.    biotin 5,000 mcg TbDL Take 1 tablet (5,000 mcg total) by mouth Daily.    bisacodyL (DULCOLAX) 5 mg EC tablet Take 5 mg by mouth once daily.    CALCIUM CARBONATE/VITAMIN D3 (CALCIUM 600 + D,3, ORAL) Take 1 tablet by mouth once daily.     cholecalciferol, vitamin D3, (VITAMIN D3) 50 mcg (2,000 unit) Cap Take 1 capsule by mouth once daily.    coenzyme Q10 200 mg capsule Take 200 mg by mouth once daily.    DEXTRAN 70/HYPROMELLOSE (ARTIFICIAL TEARS, PF, OPHT) Place 1 drop into both eyes as needed.    diltiaZEM (CARDIZEM) 90 MG tablet Take 90 mg by mouth every 8 (eight) hours.    ELIQUIS 5 mg Tab Take 1 tablet by mouth twice daily    estradioL (ESTRACE) 0.01 % (0.1 mg/gram) vaginal cream Place 0.5 g intravaginally q.h.s. x2 weeks; then, placed 0.5 g intravaginally twice weekly at bedtime (maintenance therapy).    fluticasone (VERAMYST) 27.5 mcg/actuation nasal spray 2 sprays by Nasal route.    fluticasone propionate (FLONASE) 50 mcg/actuation nasal spray CLEAN SINUS/NARES WITH OCEAN NASAL SPRAY THEN USE FLONASE 1 SPRAY TWICE DAILY.    gabapentin (NEURONTIN) 100 MG capsule TAKE 1 CAPSULE BY MOUTH THREE TIMES DAILY    hydroquinone 4 % Crea APPLY  CREAM TOPICALLY TWICE DAILY    levothyroxine (SYNTHROID) 112 MCG tablet TAKE 1 TABLET BY MOUTH BEFORE BREAKFAST    LIDOcaine (LIDODERM) 5 % Place 1 patch onto the skin once daily. Remove & Discard patch within 12 hours or as directed by MD    losartan (COZAAR) 100 MG tablet Take 1 tablet by mouth once daily    magnesium oxide 400 mg magnesium Tab Take 1 tablet by mouth once daily.    metoprolol succinate (TOPROL-XL) 50 MG 24 hr tablet Take 1 tablet by mouth twice daily    MULTIVITAMIN W-MINERALS/LUTEIN (CENTRUM SILVER ORAL) Take 1 tablet by mouth once daily.    mupirocin (BACTROBAN) 2 % ointment Apply topically 3  (three) times daily. Apply to wound on head until healed    sodium chloride (SALINE NASAL) 0.65 % nasal spray 1 spray by Nasal route as needed for Congestion.    vit A/vit C/vit E/zinc/copper (ICAPS AREDS ORAL) Take 1 tablet by mouth 2 (two) times a day.    vitamin D (VITAMIN D3) 1000 units Tab Take 2 tablets by mouth once daily.     Family History       Problem Relation (Age of Onset)    Colon cancer Mother (75), Paternal Aunt (80)    Diabetes Other, Paternal Aunt    Lung cancer Father (75)    Prostate cancer Brother          Tobacco Use    Smoking status: Never    Smokeless tobacco: Never    Tobacco comments:     The patient is not getting any regular exercise at this time.  She does enjoy traveling to [place].   Substance and Sexual Activity    Alcohol use: Yes     Comment: occasionally    Drug use: No    Sexual activity: Yes     Partners: Male     Birth control/protection: Post-menopausal     Comment:  64 years      Review of Systems   Constitutional: Positive for weight gain. Negative for chills and fever.   Cardiovascular:  Positive for leg swelling. Negative for chest pain, irregular heartbeat, near-syncope, palpitations and syncope.   Respiratory:  Negative for cough and shortness of breath.    Musculoskeletal:  Positive for falls.   Gastrointestinal: Negative.    Genitourinary:  Positive for dysuria and frequency.   Neurological:  Positive for loss of balance.     Objective:     Vital Signs (Most Recent):  Temp: 97.4 °F (36.3 °C) (01/04/25 1536)  Pulse: 85 (01/04/25 1536)  Resp: 18 (01/04/25 1536)  BP: 126/78 (01/04/25 1536)  SpO2: 97 % (01/04/25 1536) Vital Signs (24h Range):  Temp:  [97 °F (36.1 °C)-98.2 °F (36.8 °C)] 97.4 °F (36.3 °C)  Pulse:  [] 85  Resp:  [16-20] 18  SpO2:  [95 %-99 %] 97 %  BP: (124-147)/(67-97) 126/78     Weight:  (Patient can not stand)  Body mass index is 26.69 kg/m².    SpO2: 97 %         Intake/Output Summary (Last 24 hours) at 1/4/2025 6870  Last data filed at  "1/4/2025 1433  Gross per 24 hour   Intake 698 ml   Output 4125 ml   Net -3427 ml       Lines/Drains/Airways       Drain  Duration             Female External Urinary Catheter w/ Suction 01/02/25 1850 1 day              Peripheral Intravenous Line  Duration                  Peripheral IV - Single Lumen 01/03/25 1044 22 G Anterior;Proximal;Right Forearm 1 day                     Physical Exam  Constitutional:       General: She is not in acute distress.     Appearance: She is normal weight. She is not toxic-appearing.   Eyes:      General: No scleral icterus.  Cardiovascular:      Rate and Rhythm: Tachycardia present. Rhythm irregular.      Heart sounds:      No gallop.   Pulmonary:      Effort: Pulmonary effort is normal. No respiratory distress.   Abdominal:      General: There is no distension.      Palpations: Abdomen is soft.   Musculoskeletal:      Right lower leg: Edema present.      Left lower leg: Edema present.   Skin:     General: Skin is warm and dry.   Neurological:      Mental Status: She is alert.   Psychiatric:         Mood and Affect: Mood normal.         Thought Content: Thought content normal.          Significant Labs: CMP   Recent Labs   Lab 01/02/25  1729 01/03/25  0453 01/04/25  0647    136 138   K 4.7 3.7 4.0    105 103   CO2 20* 20* 24   * 102 72   BUN 20 18 17   CREATININE 1.5* 1.4 1.3   CALCIUM 9.3 9.3 9.2   PROT 6.6  --   --    ALBUMIN 3.5  --  3.1*   BILITOT 0.8  --   --    ALKPHOS 95  --   --    AST 35  --   --    ALT 40  --   --    ANIONGAP 10 11 11   , CBC   Recent Labs   Lab 01/02/25  1729 01/03/25  0453 01/04/25  0647   WBC 9.65 11.48 10.69   HGB 11.8* 11.4* 13.2   HCT 35.9* 36.0* 40.1    222 213   , INR No results for input(s): "INR", "PROTIME" in the last 48 hours., Lipid Panel No results for input(s): "CHOL", "HDL", "LDLCALC", "TRIG", "CHOLHDL" in the last 48 hours., Troponin   Recent Labs   Lab 01/02/25  1729 01/02/25  1909   TROPONINIHS 11 10   , and All " pertinent lab results from the last 24 hours have been reviewed.    Significant Imaging: Echocardiogram: Transthoracic echo (TTE) complete (Cupid Only):   Results for orders placed or performed during the hospital encounter of 01/02/25   Echo   Result Value Ref Range    LV Diastolic Volume 113.64 mL    Echo EF Estimated 35 %    LV Systolic Volume 74.34 mL    IVS 0.8 0.6 - 1.1 cm    LVIDd 4.9 3.5 - 6.0 cm    LVIDs 4.1 (A) 2.1 - 4.0 cm    LVOT diameter 2.3 cm    PW 0.7 0.6 - 1.1 cm    AV LVOT peak gradient 2 mmHg    LVOT mn grad 1 mmHg    LVOT peak surjit 0.7 m/s    LVOT peak VTI 13.9 cm    RV- jay basal diam 3.7 cm    LA size 4.92 cm    Left Atrium Major Axis 5.87 cm    Left Atrium Minor Axis 6.08 cm    LA Vol (MOD) 79.64 mL    RA Major Axis 5.49 cm    AV valve area 2.1 cm2    AV area by cont VTI 2.0 cm2    AV regurgitation pressure 1/2 time 381.44 ms    AV peak gradient 7.8 mmHg    AV mean gradient 5.0 mmHg    Ao peak surjit 1.4 m/s    Ao VTI 27.9 cm    MV Peak A Surjit 0.31 m/s    E wave deceleration time 143.21 ms    MV Peak E Surjit 1.26 m/s    E/A ratio 4.06     LV LATERAL E/E' RATIO 21.00     LV SEPTAL E/E' RATIO 21.00     TDI LATERAL 0.06 m/s    TDI SEPTAL 0.06 m/s    TV peak gradient 29 mmHg    TR Max Surjit 2.68 m/s    Ascending aorta 3.33 cm    STJ 2.33 cm    Sinus 3.22 cm    LA WIDTH 4.22 cm    RA Width 4.04 cm    TAPSE 0.90 cm    BSA 1.87 m2    LVOT stroke volume 57.7 cm3    LV Systolic Volume Index 40.4 mL/m2    LV Diastolic Volume Index 61.76 mL/m2    LVOT area 4.2 cm2    FS 16.3 (A) 28 - 44 %    Left Ventricle Relative Wall Thickness 0.29 cm    LV mass 120.8 g    LV Mass Index 65.7 g/m2    E/E' ratio 21.00 m/s    JEANMARIE 57.3 mL/m2    LA Vol 105.41 cm3    RV/LV Ratio 0.76 cm    JEANMARIE (MOD) 43.3 mL/m2    AV Velocity Ratio 0.50     AV index (prosthetic) 0.50     ARRON by Velocity Ratio 2.1 cm²    Triscuspid Valve Regurgitation Peak Gradient 29 mmHg    Mean e' 0.06 m/s    ZLVIDS 2.08     ZLVIDD -0.41     TV resting pulmonary  artery pressure 29 mmHg    RV TB RVSP 3 mmHg    Est. RA pres 0 mmHg    Holbrook's Biplane MOD Ejection Fraction 30 %    Narrative      Left Ventricle: The left ventricle is normal in size. Ventricular mass   is normal. Normal wall thickness. There is severely reduced systolic   function with a visually estimated ejection fraction of 25 - 30%.   Quantitated ejection fraction is 30%.    Right Ventricle: Systolic function is mildly reduced.    Left Atrium: Left atrium is severely dilated.    Aortic Valve: There is mild annular calcification present. There is   mild stenosis. Aortic valve area by VTI is 2.1 cm2. Aortic valve peak   velocity is 1.4 m/s. Mean gradient is 5.0 mmHg. The dimensionless index is   0.50. There is mild aortic regurgitation.    Mitral Valve: Moderately thickened leaflets.    Tricuspid Valve: There is moderate regurgitation.    Pulmonary Artery: The estimated pulmonary artery systolic pressure is   29 mmHg.    Pericardium: Left pleural effusion.    Irregularly irregular rhythm was present during the study

## 2025-01-04 NOTE — CONSULTS
[facility] - [facility]iology Stepdown  Cardiology  Consult Note    Patient Name: Tiffany Masterson  MRN: [id number]  Admission Date: 1/2/2025  Hospital Length of Stay: 0 days  Code Status: Full Code   Attending Provider: Karissa García MD   Consulting Provider: Satish Mallory MD  Primary Care Physician: Rebeca Dumont MD  Principal Problem:Paroxysmal atrial fibrillation    Patient information was obtained from patient, spouse/SO, and ER records.     Inpatient consult to Cardiology  Consult performed by: Satish Mallory MD  Consult ordered by: Karissa García MD  Reason for consult: AF w/ RVR        Subjective:     Chief Complaint:  LE Edema    HPI:   Mrs. Masterson is a 89 year old woman with paroxysmal atrial fibrillation, HFpEF, hypothyroidism, HTN, CKD3, migraines, hx tSAH w/ punctate cerebellar IPH, admitted for Afib w/ RVR with EF drop (65-> 30%) iso of UTI.     Cardiology consulted for Afib with RVR.    Patient presented for LE edema that started 2 weeks ago and has progressed. Denies palpitations, CP, SOB, orthopnea, or fever, chills. Endorses dysuria and some confusion reported by . On arrival to the ED patient found to be in AF w/ RVR to 170's. She was given IV dilt 10mg X2, then po dilt 90mg q8hrs for two doses. TTE revealed new drop in EF from 50% in 9/2024 to 30% iso of afib. She was transition to metoprolol succinate and digoxin loaded with improvement in HR though still intermittently RVR to 140's. Outpatient she was taking metoprolol succinate 50mg BID and dilt 90mg q8hrs. She is on eliquis 5mg BID but missed several doses this past week while ill. Patient has a history of multiple falls including one prior to this admission but CTH without evidence of bleed. She does have a history of a traumatic subarachnoid hemorrhage and punctate cerebellar IPH that she was admitted to [facility] for last year. At this time patient is asymptomatic, on metoprolol succinate 100mg BID, digoxin 125mcg daily, lasix 60mg  IV TID, and eliquis.     Past Medical History:   Diagnosis Date    Abnormal MRI 10/16/2020    Acute cystitis without hematuria 12/22/2021    Arthritis     Arthritis of right hip 11/08/2021    Atrial fibrillation     Cataract     Elevated liver enzymes     Endometrial mass 06/29/2018    Epiretinal membrane (ERM) of right eye     Family history of malignant neoplasm of gastrointestinal tract mother    Frequent UTI 09/30/2018    Generalized arthritis 09/19/2024    Shoulders, knees, hips      Graves disease     now hypothryoid - no surgery or medicine. resolved on its own    History of cholecystectomy 06/10/2021    History of colonoscopy     unremarkable 2007 by Dr. Javier.  Barium enema revealed diverticular disease.    Hyperlipidemia     Hypertension     Hypertriglyceridemia 04/19/2013    Continue statin for secondary stroke prevention    Hypothyroidism     Impaired functional mobility, balance, gait, and endurance 06/04/2021    Iron deficiency anemia 09/29/2021    Migraine, ophthalmoplegic     Mixed stress and urge urinary incontinence 09/30/2018    Ocular migraine     Personal history of colonic polyps     Physical deconditioning 09/13/2023    Preop testing 05/17/2021    S/P colonoscopic polypectomy 06/18/2013    Sleep disorder 11/08/2021    Status post hysteroscopic polypectomy 07/02/2018    TBI (traumatic brain injury) 10/25/2024    9/22/2024: Ct head: Left frontotemporal subarachnoid hemorrhage again noted including a small focus of intraparenchymal hemorrhage in the left cerebellum. No new hemorrhage.       TIA (transient ischemic attack)     with a normal angiogram in the past, Ocular migraines reported by patient, not TIA     Transaminitis 03/19/2021    Urethral caruncle 09/15/2020       Past Surgical History:   Procedure Laterality Date    CATARACT EXTRACTION Right 2012    Dr. Lexis Nicolas     CHOLECYSTECTOMY  2022    COLONOSCOPY      2014 polyps    COLONOSCOPY N/A 11/07/2016    Procedure: COLONOSCOPY;   Surgeon: Bebeto Gonzalez MD;  Location: [facility] (4TH FLR);  Service: Endoscopy;  Laterality: N/A;    COLONOSCOPY N/A 03/09/2020    Procedure: COLONOSCOPY;  Surgeon: Bebeto Gonzalez MD;  Location: [facility] (4TH FLR);  Service: Endoscopy;  Laterality: N/A;    COLONOSCOPY N/A 09/29/2021    Procedure: COLONOSCOPY;  Surgeon: Bebeto Gonzalez MD;  Location: [facility] (4TH FLR);  Service: Endoscopy;  Laterality: N/A;  please schedule patient as soon as possible with me EGD colonoscopy with constipation bowel prep for iron deficiency anemia family history of colon cancer and personal history of colon polyps  9/17/21 ok for standard split prep per Dr. Gonzalez-imani    COLONOSCOPY N/A 09/29/2021    Procedure: COLONOSCOPY;  Surgeon: Bebeto Gonzalez MD;  Location: [facility] (4TH FLR);  Service: Endoscopy;  Laterality: N/A;  Please schedule patient as soon as possible with me EGD colonoscopy with constipation bowel prep for iron deficiency anemia family history of colon cancer and personal history of colon polyps  9/17/21 Ok for standard split prep per Dr. Gonzalez-imani    COLONOSCOPY W/ POLYPECTOMY  06/2013    polyp of colon    ENDOSCOPIC ULTRASOUND OF UPPER GASTROINTESTINAL TRACT N/A 04/29/2021    Procedure: ULTRASOUND, UPPER GI TRACT, ENDOSCOPIC;  Surgeon: Dalton Johnson MD;  Location: [facility] (2ND FLR);  Service: Endoscopy;  Laterality: N/A;  Postprandial nausea vomiting epigastric 0.9 cm stone and sludge seen within the gallbladder lumen.  No wall thickening or pericholecystic fluid.  0.6 cm stone seen within the distal common bile duct at the level of the pancreatic head.  There is dil    ERCP N/A 04/29/2021    Procedure: ERCP (ENDOSCOPIC RETROGRADE CHOLANGIOPANCREATOGRAPHY);  Surgeon: Dalton Johnson MD;  Location: [facility] (2ND FLR);  Service: Endoscopy;  Laterality: N/A;  Postprandial nausea and vomit 0.9 cm stone and sludge seen within the gallbladder lumen.  No wall thickening or pericholecystic fluid.  0.6 cm  stone seen within the distal common bile duct at the level of the pancreatic head.  There i    ESOPHAGOGASTRODUODENOSCOPY N/A 09/29/2021    Procedure: EGD (ESOPHAGOGASTRODUODENOSCOPY);  Surgeon: Bebeto Gonzalez MD;  Location: [facility] (4TH FLR);  Service: Endoscopy;  Laterality: N/A;  rapid covid test arrival 12pm -   9/27 arrival time for covid test/procedure confirmed with pt-rb    ESOPHAGOGASTRODUODENOSCOPY N/A 09/29/2021    Procedure: EGD (ESOPHAGOGASTRODUODENOSCOPY);  Surgeon: Bebeto Gonzalez MD;  Location: [facility] (4TH FLR);  Service: Endoscopy;  Laterality: N/A;  covid test 9/19/21 [facility], prep instr emailed -    EYE SURGERY  11/10/2014    right retina/Dr. Dalton Sierra ([facility])    HYSTEROSCOPIC POLYPECTOMY OF UTERUS N/A 07/02/2018    Procedure: POLYPECTOMY, UTERUS, HYSTEROSCOPIC;  Surgeon: Elliot Vanessa IV, MD;  Location: [facility] OR;  Service: OB/GYN;  Laterality: N/A;    INJECTION OF JOINT Right 11/11/2022    Procedure: INJECTION, RIGHT GTB CONTRAST DIRECT REFERRAL;  Surgeon: Camden Hernandez MD;  Location: [facility] PAIN MGT;  Service: Pain Management;  Laterality: Right;    JOINT REPLACEMENT  03/23/2011    left total hip replacement    LAPAROSCOPIC CHOLECYSTECTOMY N/A 05/17/2021    Procedure: CHOLECYSTECTOMY, LAPAROSCOPIC;  Surgeon: Rayshawn Peralta Jr., MD;  Location: [facility] 2ND FLR;  Service: General;  Laterality: N/A;    REMOVAL OF EPIRETINAL MEMBRANE Right     Dr. Padron    TONSILLECTOMY      pt was 11 years old       Review of patient's allergies indicates:   Allergen Reactions    Levaquin [levofloxacin]      Joint pain    Hydrochlorothiazide Other (See Comments)     Pain in joints and depression per pt       No current facility-administered medications on file prior to encounter.     Current Outpatient Medications on File Prior to Encounter   Medication Sig    acetaminophen (TYLENOL) 500 MG tablet Take 500 mg by mouth every 6 (six) hours as needed.    ascorbic acid, vitamin C, (VITAMIN C)  250 MG tablet Take 250 mg by mouth once daily.    atorvastatin (LIPITOR) 10 MG tablet Take 1 tablet by mouth once daily    b complex vitamins capsule Take 1 capsule by mouth once daily.    biotin 5,000 mcg TbDL Take 1 tablet (5,000 mcg total) by mouth Daily.    bisacodyL (DULCOLAX) 5 mg EC tablet Take 5 mg by mouth once daily.    CALCIUM CARBONATE/VITAMIN D3 (CALCIUM 600 + D,3, ORAL) Take 1 tablet by mouth once daily.     cholecalciferol, vitamin D3, (VITAMIN D3) 50 mcg (2,000 unit) Cap Take 1 capsule by mouth once daily.    coenzyme Q10 200 mg capsule Take 200 mg by mouth once daily.    DEXTRAN 70/HYPROMELLOSE (ARTIFICIAL TEARS, PF, OPHT) Place 1 drop into both eyes as needed.    diltiaZEM (CARDIZEM) 90 MG tablet Take 90 mg by mouth every 8 (eight) hours.    ELIQUIS 5 mg Tab Take 1 tablet by mouth twice daily    estradioL (ESTRACE) 0.01 % (0.1 mg/gram) vaginal cream Place 0.5 g intravaginally q.h.s. x2 weeks; then, placed 0.5 g intravaginally twice weekly at bedtime (maintenance therapy).    fluticasone (VERAMYST) 27.5 mcg/actuation nasal spray 2 sprays by Nasal route.    fluticasone propionate (FLONASE) 50 mcg/actuation nasal spray CLEAN SINUS/NARES WITH OCEAN NASAL SPRAY THEN USE FLONASE 1 SPRAY TWICE DAILY.    gabapentin (NEURONTIN) 100 MG capsule TAKE 1 CAPSULE BY MOUTH THREE TIMES DAILY    hydroquinone 4 % Crea APPLY  CREAM TOPICALLY TWICE DAILY    levothyroxine (SYNTHROID) 112 MCG tablet TAKE 1 TABLET BY MOUTH BEFORE BREAKFAST    LIDOcaine (LIDODERM) 5 % Place 1 patch onto the skin once daily. Remove & Discard patch within 12 hours or as directed by MD    losartan (COZAAR) 100 MG tablet Take 1 tablet by mouth once daily    magnesium oxide 400 mg magnesium Tab Take 1 tablet by mouth once daily.    metoprolol succinate (TOPROL-XL) 50 MG 24 hr tablet Take 1 tablet by mouth twice daily    MULTIVITAMIN W-MINERALS/LUTEIN (CENTRUM SILVER ORAL) Take 1 tablet by mouth once daily.    mupirocin (BACTROBAN) 2 % ointment  Apply topically 3 (three) times daily. Apply to wound on head until healed    sodium chloride (SALINE NASAL) 0.65 % nasal spray 1 spray by Nasal route as needed for Congestion.    vit A/vit C/vit E/zinc/copper (ICAPS AREDS ORAL) Take 1 tablet by mouth 2 (two) times a day.    vitamin D (VITAMIN D3) 1000 units Tab Take 2 tablets by mouth once daily.     Family History       Problem Relation (Age of Onset)    Colon cancer Mother (75), Paternal Aunt (80)    Diabetes Other, Paternal Aunt    Lung cancer Father (75)    Prostate cancer Brother          Tobacco Use    Smoking status: Never    Smokeless tobacco: Never    Tobacco comments:     The patient is not getting any regular exercise at this time.  She does enjoy traveling to [place].   Substance and Sexual Activity    Alcohol use: Yes     Comment: occasionally    Drug use: No    Sexual activity: Yes     Partners: Male     Birth control/protection: Post-menopausal     Comment:  64 years      Review of Systems   Constitutional: Positive for weight gain. Negative for chills and fever.   Cardiovascular:  Positive for leg swelling. Negative for chest pain, irregular heartbeat, near-syncope, palpitations and syncope.   Respiratory:  Negative for cough and shortness of breath.    Musculoskeletal:  Positive for falls.   Gastrointestinal: Negative.    Genitourinary:  Positive for dysuria and frequency.   Neurological:  Positive for loss of balance.     Objective:     Vital Signs (Most Recent):  Temp: 97.4 °F (36.3 °C) (01/04/25 1536)  Pulse: 85 (01/04/25 1536)  Resp: 18 (01/04/25 1536)  BP: 126/78 (01/04/25 1536)  SpO2: 97 % (01/04/25 1536) Vital Signs (24h Range):  Temp:  [97 °F (36.1 °C)-98.2 °F (36.8 °C)] 97.4 °F (36.3 °C)  Pulse:  [] 85  Resp:  [16-20] 18  SpO2:  [95 %-99 %] 97 %  BP: (124-147)/(67-97) 126/78     Weight:  (Patient can not stand)  Body mass index is 26.69 kg/m².    SpO2: 97 %         Intake/Output Summary (Last 24 hours) at 1/4/2025 1639  Last  "data filed at 1/4/2025 1433  Gross per 24 hour   Intake 698 ml   Output 4125 ml   Net -3427 ml       Lines/Drains/Airways       Drain  Duration             Female External Urinary Catheter w/ Suction 01/02/25 1850 1 day              Peripheral Intravenous Line  Duration                  Peripheral IV - Single Lumen 01/03/25 1044 22 G Anterior;Proximal;Right Forearm 1 day                     Physical Exam  Constitutional:       General: She is not in acute distress.     Appearance: She is normal weight. She is not toxic-appearing.   Eyes:      General: No scleral icterus.  Cardiovascular:      Rate and Rhythm: Tachycardia present. Rhythm irregular.      Heart sounds:      No gallop.   Pulmonary:      Effort: Pulmonary effort is normal. No respiratory distress.   Abdominal:      General: There is no distension.      Palpations: Abdomen is soft.   Musculoskeletal:      Right lower leg: Edema present.      Left lower leg: Edema present.   Skin:     General: Skin is warm and dry.   Neurological:      Mental Status: She is alert.   Psychiatric:         Mood and Affect: Mood normal.         Thought Content: Thought content normal.          Significant Labs: CMP   Recent Labs   Lab 01/02/25  1729 01/03/25  0453 01/04/25  0647    136 138   K 4.7 3.7 4.0    105 103   CO2 20* 20* 24   * 102 72   BUN 20 18 17   CREATININE 1.5* 1.4 1.3   CALCIUM 9.3 9.3 9.2   PROT 6.6  --   --    ALBUMIN 3.5  --  3.1*   BILITOT 0.8  --   --    ALKPHOS 95  --   --    AST 35  --   --    ALT 40  --   --    ANIONGAP 10 11 11   , CBC   Recent Labs   Lab 01/02/25  1729 01/03/25  0453 01/04/25  0647   WBC 9.65 11.48 10.69   HGB 11.8* 11.4* 13.2   HCT 35.9* 36.0* 40.1    222 213   , INR No results for input(s): "INR", "PROTIME" in the last 48 hours., Lipid Panel No results for input(s): "CHOL", "HDL", "LDLCALC", "TRIG", "CHOLHDL" in the last 48 hours., Troponin   Recent Labs   Lab 01/02/25  1729 01/02/25  1909   TROPONINIHS 11 " 10   , and All pertinent lab results from the last 24 hours have been reviewed.    Significant Imaging: Echocardiogram: Transthoracic echo (TTE) complete ([facility] Only):   Results for orders placed or performed during the hospital encounter of 01/02/25   Echo   Result Value Ref Range    LV Diastolic Volume 113.64 mL    Echo EF Estimated 35 %    LV Systolic Volume 74.34 mL    IVS 0.8 0.6 - 1.1 cm    LVIDd 4.9 3.5 - 6.0 cm    LVIDs 4.1 (A) 2.1 - 4.0 cm    LVOT diameter 2.3 cm    PW 0.7 0.6 - 1.1 cm    AV LVOT peak gradient 2 mmHg    LVOT mn grad 1 mmHg    LVOT peak surjit 0.7 m/s    LVOT peak VTI 13.9 cm    RV- jay basal diam 3.7 cm    LA size 4.92 cm    Left Atrium Major Axis 5.87 cm    Left Atrium Minor Axis 6.08 cm    LA Vol (MOD) 79.64 mL    RA Major Axis 5.49 cm    AV valve area 2.1 cm2    AV area by cont VTI 2.0 cm2    AV regurgitation pressure 1/2 time 381.44 ms    AV peak gradient 7.8 mmHg    AV mean gradient 5.0 mmHg    Ao peak surjit 1.4 m/s    Ao VTI 27.9 cm    MV Peak A Surjit 0.31 m/s    E wave deceleration time 143.21 ms    MV Peak E Surjit 1.26 m/s    E/A ratio 4.06     LV LATERAL E/E' RATIO 21.00     LV SEPTAL E/E' RATIO 21.00     TDI LATERAL 0.06 m/s    TDI SEPTAL 0.06 m/s    TV peak gradient 29 mmHg    TR Max Surjit 2.68 m/s    Ascending aorta 3.33 cm    STJ 2.33 cm    Sinus 3.22 cm    LA WIDTH 4.22 cm    RA Width 4.04 cm    TAPSE 0.90 cm    BSA 1.87 m2    LVOT stroke volume 57.7 cm3    LV Systolic Volume Index 40.4 mL/m2    LV Diastolic Volume Index 61.76 mL/m2    LVOT area 4.2 cm2    FS 16.3 (A) 28 - 44 %    Left Ventricle Relative Wall Thickness 0.29 cm    LV mass 120.8 g    LV Mass Index 65.7 g/m2    E/E' ratio 21.00 m/s    JEANMARIE 57.3 mL/m2    LA Vol 105.41 cm3    RV/LV Ratio 0.76 cm    JEANMARIE (MOD) 43.3 mL/m2    AV Velocity Ratio 0.50     AV index (prosthetic) 0.50     ARRON by Velocity Ratio 2.1 cm²    Triscuspid Valve Regurgitation Peak Gradient 29 mmHg    Mean e' 0.06 m/s    ZLVIDS 2.08     ZLVIDD -0.41     TV  resting pulmonary artery pressure 29 mmHg    RV TB RVSP 3 mmHg    Est. RA pres 0 mmHg    Holbrook's Biplane MOD Ejection Fraction 30 %    Narrative      Left Ventricle: The left ventricle is normal in size. Ventricular mass   is normal. Normal wall thickness. There is severely reduced systolic   function with a visually estimated ejection fraction of 25 - 30%.   Quantitated ejection fraction is 30%.    Right Ventricle: Systolic function is mildly reduced.    Left Atrium: Left atrium is severely dilated.    Aortic Valve: There is mild annular calcification present. There is   mild stenosis. Aortic valve area by VTI is 2.1 cm2. Aortic valve peak   velocity is 1.4 m/s. Mean gradient is 5.0 mmHg. The dimensionless index is   0.50. There is mild aortic regurgitation.    Mitral Valve: Moderately thickened leaflets.    Tricuspid Valve: There is moderate regurgitation.    Pulmonary Artery: The estimated pulmonary artery systolic pressure is   29 mmHg.    Pericardium: Left pleural effusion.    Irregularly irregular rhythm was present during the study       Assessment and Plan:     * Paroxysmal atrial fibrillation  Patient has paroxysmal (<7 days) atrial fibrillation. Patient is currently in atrial fibrillation. [id number] Score: 3. The patients heart rate in the last 24 hours is as follows:  Pulse  Min: 81  Max: 130     Antiarrhythmics  metoprolol tartrate (LOPRESSOR) tablet 25 mg, 4 times daily, Oral    Anticoagulants  apixaban tablet 5 mg, 2 times daily, Oral    Plan  - Replete lytes with a goal of K>4, Mg >2  - Patient is anticoagulated, see medications listed above.  - Patient's afib is currently uncontrolled. Will adjust treatment as follows:    - changed metoprolol to tartrate 25mg q6hrs (if converts to sinus rhythm may become bradycardic on high BB dose)  - continue digoxin 125mcg  - continue telemetry  [ ] if remains in atrial fibrillation will plan for SONIA/DCCV Monday  [ ] NPO Sunday night at midnight        Heart  failure with reduced ejection fraction  Agree with diuresis for goal net negative of 1-2L per day        VTE Risk Mitigation (From admission, onward)           Ordered     apixaban tablet 5 mg  2 times daily         01/02/25 2255     Reason for No Pharmacological VTE Prophylaxis  Once        Question:  Reasons:  Answer:  Already adequately anticoagulated on oral Anticoagulants    01/02/25 2255     Place sequential compression device  Until discontinued         01/02/25 2255     IP VTE HIGH RISK PATIENT  Once         01/02/25 2255                    Thank you for your consult. I will follow-up with patient. Please contact us if you have any additional questions.    Satish Mallory MD  Cardiology   [facility] - Cardiology Stepdown

## 2025-01-04 NOTE — ASSESSMENT & PLAN NOTE
Patient has paroxysmal (<7 days) atrial fibrillation. Patient is currently in atrial fibrillation. [id number] Score: 3. The patients heart rate in the last 24 hours is as follows:  Pulse  Min: 81  Max: 130     Antiarrhythmics  metoprolol tartrate (LOPRESSOR) tablet 25 mg, 4 times daily, Oral    Anticoagulants  apixaban tablet 5 mg, 2 times daily, Oral    Plan  - Replete lytes with a goal of K>4, Mg >2  - Patient is anticoagulated, see medications listed above.  - Patient's afib is currently uncontrolled. Will adjust treatment as follows:    - changed metoprolol to tartrate 25mg q6hrs (if converts to sinus rhythm may become bradycardic on high BB dose)  - continue digoxin 125mcg  - continue telemetry  [ ] if remains in atrial fibrillation will plan for SONIA/DCCV Monday  [ ] NPO Sunday night at midnight

## 2025-01-04 NOTE — PLAN OF CARE
Problem: Adult Inpatient Plan of Care  Goal: Plan of Care Review  Outcome: Progressing  Flowsheets (Taken 1/3/2025 1952)  Plan of Care Reviewed With: patient     Problem: Fall Injury Risk  Goal: Absence of Fall and Fall-Related Injury  Outcome: Progressing   Bed alarm on at all times   Instructed to call for assistance as needed   Frequent rounds made t/o shift     Problem: Skin Injury Risk Increased  Goal: Skin Health and Integrity  Outcome: Progressing   Observed repositioning frequently      Problem: Heart Failure  Goal: Stable Heart Rate and Rhythm  Outcome: Progressing  Intervention: Monitor and Manage Cardiac Rhythm Effect  Flowsheets (Taken 1/3/2025 1952)  Dysrhythmia Management: (telemetry monitored.  medication per MAR) other (see comments)

## 2025-01-04 NOTE — HPI
Mrs. Masterson is a 89 year old woman with fib, HFpEF, hypothyroidism, HTN, CKD3 migraines admitted for Afib w/ RVR with EF drop (65-> 30%) iso of UTI.     Patient presented with symptomats of LE swelling and fatigue and was found to be in ADHF with AF w/ RVR iso of UTI. Patient received Dilt 10 IV X2, then dilt 90mg po q8hrs X2, prior to transitioning to metoprolol succinate 100mg and with dig load and daily 125 started.

## 2025-01-04 NOTE — PT/OT/SLP EVAL
Physical Therapy Evaluation    Patient Name:  Tiffany Masterson   MRN:  [id number]  Admit Date: 1/2/2025  Admitting Diagnosis:  Chronic atrial fibrillation with RVR  Length of Stay: 0 days  Recent Surgery: * No surgery found *      Recommendations:     Discharge Recommendations:  Low Intensity Therapy   Discharge Equipment Recommendations: none   Barriers to discharge: none    Assessment:     Tiffany Masterson is a 89 y.o. female admitted with a medical diagnosis of Chronic atrial fibrillation with RVR. Pt with improving mobility associated with diuresis. Pt was able to ambulate in the hallway with 1 person assistance and a RW. Will trial stair training next session. Will continue to progress mobility per patient's tolerance.      Problem List: weakness, impaired endurance, impaired cardiopulmonary response to activity, impaired functional mobility  Rehab Prognosis: Good; patient would benefit from acute skilled PT services to address these deficits and reach maximum level of function.      Plan:     During this hospitalization, patient to be seen 4 x/week to address the identified rehab impairments via gait training, therapeutic activities, therapeutic exercises, neuromuscular re-education and progress towards the established goals.    Plan of Care Expires:  02/02/25    Subjective   Communicated with RN prior to session.  Patient found supine upon PT entry to room, agreeable to evaluation. Tiffany Masterson's  present during session.    Chief Complaint: Edema (BLE edema beginning to weep.  New dx of afib about 2 months ago.  )    Patient/Family Comments/goals: to get better  Pain/Comfort:  Pain Rating 1: 0/10  Pain Rating Post-Intervention 1: 0/10    Living Environment:  Living Environment: 2fl home, states she has 1st floor Br/Ba but no desire to move her personal things from upstairs room stating she will continue climbing stairs. Full flight ~15STE L HR upstairs. WIS and t/s combo with Gbs interior and SC.  "  Previous level of function: IND, not using her care or RW despite falls. She is not driving since medical team advised her not to, she enjoys going out with local friends and being social.   Equipment Used at Home: grab bar, shower chair, walker, rolling, cane, straight  Assistance upon Discharge: Spouse     Objective:   Patient found with: telemetry, Jesús     General Precautions: Standard, Cardiac fall   Orthopedic Precautions:N/A   Braces: N/A   Oxygen Device: Room Air  Vitals: /87   Pulse 94   Temp 98.2 °F (36.8 °C) (Oral)   Resp 16   Ht 5' 6" (1.676 m)   Wt 75 kg (165 lb 5.5 oz)   SpO2 97%   BMI 26.69 kg/m²     Exams:  Cognition:   Alert and Cooperative  Ox4  Command following: Follows multistep  commands  Fluency: clear/fluent    RLE ROM: WFL  RLE Strength: grossly 4/5  LLE ROM: WFL  LLE Strength: grossly 4/5      Outcome Measures:  AM-PAC 6 CLICK MOBILITY  Turning over in bed (including adjusting bedclothes, sheets and blankets)?: 3  Sitting down on and standing up from a chair with arms (e.g., wheelchair, bedside commode, etc.): 3  Moving from lying on back to sitting on the side of the bed?: 3  Moving to and from a bed to a chair (including a wheelchair)?: 3  Need to walk in hospital room?: 3  Climbing 3-5 steps with a railing?: 2  Basic Mobility Total Score: 17     Functional Mobility:  Additional staff present: N/A  Bed Mobility:  Supine to Sit: minimum assistance; HOB flat  Scooting anteriorly to EOB to have both feet planted on floor: stand by assistance    Sitting Balance at Edge of Bed:  Assistance Level Required: Stand-by Assistance    Transfers:   Sit <> Stand Transfer: stand by assistance with rolling walker from EOB      Gait:   Patient ambulated: 120ft   Patient required: standy by assistance  Patient used: rolling walker  Gait Pattern observed: reciprocal gait  Gait Deviation(s): decreased step length, flexed posture, and decreased javier  Impairments due to: impaired balance, " decreased strength, decreased endurance, and decreased mobility for the last few days   Comments:   No LOB  No SOB; SpO2 92% on room air after ambulation trial   Verbal cueing for pacing, upright posture, and RW management       Therapeutic Activities, Exercises, & Education:   Educated pt on PT role/POC  Educated pt on importance of OOB activity and daily ambulation   Pt verbalized understanding     T/f to chair to increase tolerance to OOB activity and to create optimal positioning for lung expansion     Patient left up in chair with all lines intact, call button in reach, and RN notified and  present     GOALS:   Multidisciplinary Problems       Physical Therapy Goals          Problem: Physical Therapy    Goal Priority Disciplines Outcome Interventions   Physical Therapy Goal     PT, PT/OT Progressing    Description: Goals to be met by: 2025    Patient will increase functional independence with mobility by performin. Supine to sit with Supervision  2. Sit to stand transfer with Supervision using RW  3. Gait  x 150 feet with Supervision using Rolling Walker.   4. Ascend/descend 5 stair with bilateral Handrails Supervision.                          History:     Past Medical History:   Diagnosis Date    Abnormal MRI 10/16/2020    Acute cystitis without hematuria 2021    Arthritis     Arthritis of right hip 2021    Atrial fibrillation     Cataract     Elevated liver enzymes     Endometrial mass 2018    Epiretinal membrane (ERM) of right eye     Family history of malignant neoplasm of gastrointestinal tract mother    Frequent UTI 2018    Generalized arthritis 2024    Shoulders, knees, hips      Graves disease     now hypothryoid - no surgery or medicine. resolved on its own    History of cholecystectomy 06/10/2021    History of colonoscopy     unremarkable  by Dr. Javier.  Barium enema revealed diverticular disease.    Hyperlipidemia     Hypertension      Hypertriglyceridemia 04/19/2013    Continue statin for secondary stroke prevention    Hypothyroidism     Impaired functional mobility, balance, gait, and endurance 06/04/2021    Iron deficiency anemia 09/29/2021    Migraine, ophthalmoplegic     Mixed stress and urge urinary incontinence 09/30/2018    Ocular migraine     Personal history of colonic polyps     Physical deconditioning 09/13/2023    Preop testing 05/17/2021    S/P colonoscopic polypectomy 06/18/2013    Sleep disorder 11/08/2021    Status post hysteroscopic polypectomy 07/02/2018    TBI (traumatic brain injury) 10/25/2024    9/22/2024: Ct head: Left frontotemporal subarachnoid hemorrhage again noted including a small focus of intraparenchymal hemorrhage in the left cerebellum. No new hemorrhage.       TIA (transient ischemic attack)     with a normal angiogram in the past, Ocular migraines reported by patient, not TIA     Transaminitis 03/19/2021    Urethral caruncle 09/15/2020       Past Surgical History:   Procedure Laterality Date    CATARACT EXTRACTION Right 2012    Dr. Lexis Nicolas     CHOLECYSTECTOMY  2022    COLONOSCOPY      2014 polyps    COLONOSCOPY N/A 11/07/2016    Procedure: COLONOSCOPY;  Surgeon: Bebeto Gonzalez MD;  Location: [address]);  Service: Endoscopy;  Laterality: N/A;    COLONOSCOPY N/A 03/09/2020    Procedure: COLONOSCOPY;  Surgeon: Bebeto Gonzalez MD;  Location: [address]);  Service: Endoscopy;  Laterality: N/A;    COLONOSCOPY N/A 09/29/2021    Procedure: COLONOSCOPY;  Surgeon: Bebeto Gonzalez MD;  Location: [address]);  Service: Endoscopy;  Laterality: N/A;  please schedule patient as soon as possible with me EGD colonoscopy with constipation bowel prep for iron deficiency anemia family history of colon cancer and personal history of colon polyps  9/17/21 ok for standard split prep per Dr. Gonzalez-ml    COLONOSCOPY N/A 09/29/2021    Procedure: COLONOSCOPY;  Surgeon: Bebeto Gonzalez MD;   Location: [facility] (4TH FLR);  Service: Endoscopy;  Laterality: N/A;  Please schedule patient as soon as possible with me EGD colonoscopy with constipation bowel prep for iron deficiency anemia family history of colon cancer and personal history of colon polyps  9/17/21 Ok for standard split prep per Dr. Gonzalez-ml    COLONOSCOPY W/ POLYPECTOMY  06/2013    polyp of colon    ENDOSCOPIC ULTRASOUND OF UPPER GASTROINTESTINAL TRACT N/A 04/29/2021    Procedure: ULTRASOUND, UPPER GI TRACT, ENDOSCOPIC;  Surgeon: Dalton Johnson MD;  Location: [facility] (2ND FLR);  Service: Endoscopy;  Laterality: N/A;  Postprandial nausea vomiting epigastric 0.9 cm stone and sludge seen within the gallbladder lumen.  No wall thickening or pericholecystic fluid.  0.6 cm stone seen within the distal common bile duct at the level of the pancreatic head.  There is dil    ERCP N/A 04/29/2021    Procedure: ERCP (ENDOSCOPIC RETROGRADE CHOLANGIOPANCREATOGRAPHY);  Surgeon: Dalton Johnson MD;  Location: [facility] (2ND FLR);  Service: Endoscopy;  Laterality: N/A;  Postprandial nausea and vomit 0.9 cm stone and sludge seen within the gallbladder lumen.  No wall thickening or pericholecystic fluid.  0.6 cm stone seen within the distal common bile duct at the level of the pancreatic head.  There i    ESOPHAGOGASTRODUODENOSCOPY N/A 09/29/2021    Procedure: EGD (ESOPHAGOGASTRODUODENOSCOPY);  Surgeon: Bebeto Gonzalez MD;  Location: [facility] (4TH FLR);  Service: Endoscopy;  Laterality: N/A;  rapid covid test arrival 12pm - sm  9/27 arrival time for covid test/procedure confirmed with pt-rb    ESOPHAGOGASTRODUODENOSCOPY N/A 09/29/2021    Procedure: EGD (ESOPHAGOGASTRODUODENOSCOPY);  Surgeon: Bebeto Gonzalez MD;  Location: [facility] (4TH FLR);  Service: Endoscopy;  Laterality: N/A;  covid test 9/19/21 [facility], prep instr emailed -ml    EYE SURGERY  11/10/2014    right retina/Dr. Dalton Sierra ([facility])    HYSTEROSCOPIC POLYPECTOMY OF UTERUS N/A 07/02/2018     Procedure: POLYPECTOMY, UTERUS, HYSTEROSCOPIC;  Surgeon: Elliot Vanessa IV, MD;  Location: [facility] OR;  Service: OB/GYN;  Laterality: N/A;    INJECTION OF JOINT Right 11/11/2022    Procedure: INJECTION, RIGHT GTB CONTRAST DIRECT REFERRAL;  Surgeon: Camden Hernandez MD;  Location: [facility] PAIN MGT;  Service: Pain Management;  Laterality: Right;    JOINT REPLACEMENT  03/23/2011    left total hip replacement    LAPAROSCOPIC CHOLECYSTECTOMY N/A 05/17/2021    Procedure: CHOLECYSTECTOMY, LAPAROSCOPIC;  Surgeon: Rayshawn Peralta Jr., MD;  Location: [address];  Service: General;  Laterality: N/A;    REMOVAL OF EPIRETINAL MEMBRANE Right     Dr. Padron    TONSILLECTOMY      pt was 11 years old       Time Tracking:     PT Received On: 01/04/25  PT Start Time: 1108     PT Stop Time: 1123  PT Total Time (min): 15 min     Billable Minutes: Evaluation 5 and Gait Training 8

## 2025-01-04 NOTE — PROGRESS NOTES
[facility] Cardiology [facility] Medicine  Progress Note    Patient Name: Tiffany Masterson  MRN: [id number]  Patient Class: IP- Inpatient   Admission Date: 1/2/2025  Length of Stay: 0 days  Attending Physician: Karissa García MD  Primary Care Provider: Rebeca Dumont MD        Subjective     Principal Problem: Chronic atrial fibrillation with RVR    HPI:  Tiffany Masterson is a 89 y.o. female w/ PMHx of atrial fibrillation (on apixaban), HFpEF, CKD3, HTN, hypothyroidism, DEE, HLD, migraines & recurrent falls recently (resulting in a SAH & skull fracture in Sept '24), who presented to [facility] ED on 1/2/2025 from her PCP's office w/ progressive bilateral LE edema. Pt reports she noticed increased swelling of her legs starting 2-3 months ago that has continued to worsen since then w/ interval development of SOB, along w/ FLORES, orthopnea, weakness & decreased PO intake. Swelling has gotten particularly bad over past couple days.  reports that swelling got so bad that legs were weeping clear fluid. No clear trigger/inciting event for exacerbation. Pt has had multiple falls over the last few months, most recently a few days prior to presentation where she sustained a small contusion to her forehead, but denies any residual pain or injury related to the fall. No recent illnesses or medication changes. Endorses compliance with home medications. Given severity of edema & pt's fragility (w/ recent falls), pt sent to ED for further evaluation & mgmt.     On arrival to ED, pt was noted to be in atrial fibrillation w/ HR sustaining 170s-190s. Luckily BP & SpO2 stable on RA. Labs notable for BNP 1246, troponin nml x2, Cr 1.5, bicarb 20, glucose 115, Hgb 11.8, TSH 4.68, & FT4 1.2. UA w/ 3+ LE, 18 WBCs & occasional bacteria. CXR showed interstitial attenuation c/w pulmonary edema, as well as b/l pleural effusions. COVID/Flu/RSV negative. Received pushes of IV diltiazem, IV lasix, and CTX. Admitted to Hospital Medicine  Cardiac Stepdown Unit for further workup & mgmt.     Overview/Hospital Course:  Admitted for afib w/ RVR in setting of volume overload. Given 10mg IV diltiazem x2, 40mg IV lasix x2, and then continued on home PO diltiazem. HR improved from sustained 120s-130s (w/ jumps up to 170s) --> 90s-100s (w/ jumps to 120s-130s). TTE showed drop in EF to 25-30% (down from > 55% in Sept '24). Diltiazem d/c'ed. Loaded w/ digoxin & metoprolol dose increased. Cardiology consulted given drop in EF.     Interval History:  Received IV lasix 60mg TID yesterday w/ 1.95L UOP (net -1.13L). TTE showed drop in EF from > 55% in September to 25-30% now. Diltiazem d/c'ed, and pt loaded w/ digoxin & metoprolol dose increased. HR improved but still elevated (90s-120s). BP remains stable and pt doing well on RA. Renal function slowly improving. Cardiology consulted given new drop in EF w/ unclear trigger. Pt overall feeling better- endorses some continued FLORES & nonproductive cough, but improved from prior. No chest pain, palpitations, dizziness, etc. Remains on CTX (will complete 3-day course today). Aside from tachycardia, no other SIRS however pt endorses new dysuria at initiation of micturition. No hematuria, or other /GI sx. Final urine C&S pending. Phenazopyridine added (at low dose for 2 days given impaired renal function). Noted to have new rash w/ lesions/scabs of BLEs (image uploaded to Media). Pt denies pain or itching and only noticed them when her compression stockings were removed. Possibly 2/2 skin breaks from severe edema w/ prior weeping of legs. Will ask Derm to weigh in.     Review of Systems   Constitutional:  Negative for chills, diaphoresis and fever.   HENT:  Negative for congestion, rhinorrhea, sore throat and trouble swallowing.    Eyes:  Negative for photophobia and visual disturbance.   Respiratory:  Positive for cough (mild) and shortness of breath (with exertion). Negative for wheezing.    Cardiovascular:  Positive  for leg swelling. Negative for chest pain and palpitations.   Gastrointestinal:  Negative for abdominal pain, diarrhea, nausea and vomiting.   Genitourinary:  Negative for difficulty urinating, dysuria and hematuria.   Musculoskeletal:  Negative for arthralgias, joint swelling and neck pain.   Skin:  Positive for rash. Negative for wound.   Neurological:  Negative for dizziness, light-headedness and headaches.   Psychiatric/Behavioral:  Negative for agitation, confusion and sleep disturbance.        Objective:     Vital Signs (Most Recent):  Temp: 97.3 °F (36.3 °C) (01/04/25 1220)  Pulse: 106 (01/04/25 1220)  Resp: 18 (01/04/25 1220)  BP: 124/67 (01/04/25 1220)  SpO2: 99 % (01/04/25 1220) Vital Signs (24h Range):  Temp:  [97 °F (36.1 °C)-98.2 °F (36.8 °C)] 97.3 °F (36.3 °C)  Pulse:  [] 106  Resp:  [16-20] 18  SpO2:  [92 %-99 %] 99 %  BP: (108-147)/(62-97) 124/67     Weight:  (Patient can not stand)  Body mass index is 26.69 kg/m².    Intake/Output Summary (Last 24 hours) at 1/4/2025 1414  Last data filed at 1/4/2025 1117  Gross per 24 hour   Intake 698 ml   Output 2125 ml   Net -1427 ml         Physical Exam  Constitutional:       General: She is not in acute distress.     Appearance: She is not toxic-appearing or diaphoretic.   HENT:      Head: Normocephalic and atraumatic.      Mouth/Throat:      Mouth: Mucous membranes are moist.   Eyes:      Conjunctiva/sclera: Conjunctivae normal.   Cardiovascular:      Rate and Rhythm: Tachycardia present. Rhythm irregular.      Heart sounds: Normal heart sounds.   Pulmonary:      Effort: Pulmonary effort is normal. No respiratory distress.      Breath sounds: Decreased breath sounds (bibasilar) and rales (diffuse) present. No wheezing or rhonchi.   Abdominal:      General: Bowel sounds are normal. There is no distension.      Palpations: Abdomen is soft.      Tenderness: There is no abdominal tenderness. There is no guarding.   Musculoskeletal:      Right lower leg:  Edema (3+ pitting edema up to hips) present.      Left lower leg: Edema (3+ pitting edema up to hips) present.   Skin:     General: Skin is warm and dry.      Findings: Lesion and rash present.      Comments: See image below   Neurological:      General: No focal deficit present.      Mental Status: She is alert and oriented to person, place, and time. Mental status is at baseline.   Psychiatric:         Mood and Affect: Mood normal.         Behavior: Behavior normal.             Significant Labs: All pertinent labs within the past 24 hours have been reviewed.    Significant Imaging: I have reviewed all pertinent imaging results/findings within the past 24 hours.        Assessment and Plan     Acute on chronic atrial fibrillation with RVR  Pt w/ long hx of chronic atrial fibrillation, managed w/ metoprolol & diltiazem (along w/ apixaban). [id number] score 5-6. On admission, EKG notable for atrial fibrillation w/ RVR (). Pt is asymptomatic. CXR & exam c/w volume overload. TTE showed drop in EF from > 55% in Sept '24 to 25-30% now. Favor volume overload 2/2 declining systolic function to be driving RVR exacerbation. Aside from CHF, etiology of uncontrolled afib unclear- TSH elevated but FT4 nml, troponin nml, no notable electrolyte derangements, & no hypoxia. UA w/ e/o UTI (although pt largely asymptomatic & afebrile w/ nml WBC), & pt denies illicit substance use (UDS not collected on admit).   - Cardiology consulted; appreciate recs   - Continue home apixaban  - D/c diltiazem given low EF  - Increase home metoprolol XL to 100mg BID   - S/p digoxin 0.5 mcg x1 + 0.25mcg Q6H x2; continue at 0.125mcg daily   - Metoprolol PRN for goal HR < 120   - Monitor electrolytes, replete PRN for goal K > 4 & Mg > 2  - Telemetry  - Volume mgmt as below     Heart failure with reduced ejection fraction   Hx of HFpEF- last TTE (Sept '24) w/ LVEF > 55%, indeterminate diastolic function, mild AV sclerosis w/o stenosis, mild AI,  mild TR & MR, mild LA dilation, & mild aortic & mitral annular calcification. Pt presented w/ progressive BLE edema. On admission, BNP 1246 w/ e/o volume overload on exam. CXR w/ b/l pleural effusions & e/o extensive pulmonary edema. Weight 75kg on admission. Repeat TTE showed LVEF 25-30%, mildly reduced RV systolic function, severe LA dilation, mild AS (ARRON by VTI 2.1, peak velocity 1.4, mean gradient 5, DI 0.5), mild AI, moderately thickened mitral leaflets, moderate TR, PASP 29; IVC not well visualized. Unclear etiology of pts declining EF. While uncontrolled afib considered, inverse favored. TSH elevated but FT4 nml, and troponin negative x2.   - Cardiology consulted; appreciate recs  - IV lasix 60mg TID; titrate daily for goal net -2L/day  - Metoprolol as above  - Digoxin as above  - Continue home losartan   - Start Jardiance 10mg daily  - Possibly initiate spironolactone prior to discharge  - Iron panel pending   - Strict I/Os & daily standing weights  - Fluid (1.5L/day) & sodium (2g) restriction  - Monitor BMP w/ electrolyte replacement PRN  - Telemetry   - Mgmt of afib as above     UTI (urinary tract infection)  UA on admit w/ 3+ LE, 18 WBCs & occasional bacteria. Denies dysuria, hematuria, frequency or urgency. Generally would not treat as pt seemingly asymptomatic, but given RVR & recent falls, abx started. Interval development of mild dysuria upon initiation of micturition on 1/4.   - s/p CTX x3 days  - Final urine C&S pending   - Phenazopyridine 100mg BID (d/t impaired renal function, giving reduced dose x2 days only)     Stage 3b chronic kidney disease  On admission, Cr 1.5 (baseline ~1.1-1.3) w/ BUN 20. UA w/ 3+ LE, 18 WBCs & occasional bacteria.   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 The  "fall in September seemingly mechanical (was carrying boxes up stairs in the dark and tripped) and resulted in pt falling backward w/ resulting ICH & occipital bone fracture. Pt's subsequent falls have all reportedly been when pt is getting out of bed or getting up to use the bathroom (usually at night), more suggestive of vasovagal/orthostatic etiology. Pt denies any assoc sx w/ falls- no dizziness, lightheadedness, palpitations, etc. Denies tripping on subsequent episodes- "just falls." Most recent fall a few days PTA resulted in small contusion on forehead but no major trauma/injuries.   - PT/OT  - Daily orthostatics   - B vitamin lvls pending    Normocytic anemia   On admission, Hgb 11.8 (baseline mid 10s) w/ MCV 91. No e/o active bleeding. Last iron panel in 2021. Recent B12 from Sept '24 w/ low-nml B12. Anemia 2/2 CKD considered, although suspect contributing nutritional deficiency  - Iron panel, B12 & folate pending  - Monitor CBC & for e/o active bleeding  - Transfuse PRN for goal Hgb > 7    Mixed hyperlipidemia  Hx of mixed dyslipidemia. Last lipid panel (Sept '24) nml.   - Continue home statin     Primary hypertension  Hx of chronic HTN, managed w/ home regimen of losartan, metoprolol XL, & diltiazem. Normotensive on admission (110s-130s/70s-100s).   - Cardiac package as above    Hypothyroidism  Last TSH (Sept '24) nml at 2.158. Repeat TSH on admission elevated at 4.68, but FT4 nml at 1.2.   - Continue home levothyroxine 112 mcg daily  - Repeat labs outpatient per PCP         VTE Risk Mitigation (From admission, onward)           Ordered     apixaban tablet 5 mg  2 times daily         01/02/25 2255     Reason for No Pharmacological VTE Prophylaxis  Once        Question:  Reasons:  Answer:  Already adequately anticoagulated on oral Anticoagulants    01/02/25 2255     Place sequential compression device  Until discontinued         01/02/25 2255     IP VTE HIGH RISK PATIENT  Once         01/02/25 2255         "            Discharge Planning   AREN: 1/7/2025     Code Status: Full Code   Medical Readiness for Discharge Date:   Discharge Plan A: Home with family        Please place Justification for DME        Karissa García MD  Department of Hospital Medicine   [facility] - Cardiology Stepdown

## 2025-01-04 NOTE — HPI
Mrs. Masterson is a 89 year old woman with paroxysmal atrial fibrillation, HFpEF, hypothyroidism, HTN, CKD3, migraines, hx tSAH w/ punctate cerebellar IPH, admitted for Afib w/ RVR with EF drop (65-> 30%) iso of UTI.     Cardiology consulted for Afib with RVR.    Patient presented for LE edema that started 2 weeks ago and has progressed. Denies palpitations, CP, SOB, orthopnea, or fever, chills. Endorses dysuria and some confusion reported by . On arrival to the ED patient found to be in AF w/ RVR to 170's. She was given IV dilt 10mg X2, then po dilt 90mg q8hrs for two doses. TTE revealed new drop in EF from 50% in 9/2024 to 30% iso of afib. She was transition to metoprolol succinate and digoxin loaded with improvement in HR though still intermittently RVR to 140's. Outpatient she was taking metoprolol succinate 50mg BID and dilt 90mg q8hrs. She is on eliquis 5mg BID but missed several doses this past week while ill. Patient has a history of multiple falls including one prior to this admission but CTH without evidence of bleed. She does have a history of a traumatic subarachnoid hemorrhage and punctate cerebellar IPH that she was admitted to [facility] for last year. At this time patient is asymptomatic, on metoprolol succinate 100mg BID, digoxin 125mcg daily, lasix 60mg IV TID, and eliquis.

## 2025-01-04 NOTE — NURSING
"1950  Assessment completed (see flowsheets).  Noted to be shivering with HR elevated as high as 156, saturated with urine-unable to measure.  Assisted into chair, complete linen change & bed bath provided, new purwick applied.  Pt instructed to notify staff if feels wet/that purwick is leaking.  Discussed importance of accurate I&O & of adherence to fluid restriction.  Pt verbalized understanding/agreement however will need reinforcement often (forgetful).  No further needs or complaints voiced at this time.  Call light within reach and bed alarm on.  Will continue to monitor.     0346  sleeping soundly, resp even and unlabored.  No sign of pain or acute distress noted.  Pt not awakened at this time.  Tele remains Afib rate .  Bed alarm on, call light within reach, will continue to monitor    0550  Upon morning rounds pt stated "I'm wet"-external cath shifted slightly while pt asleep & pt now saturated with urine-unable to measure.  Assisted her into bathroom (dribbled urine as ambulated) where she voided an additional 300 ml.  Complete bath and clean linen provided. New Purwick applied.   Noted slightly less SOB with ambulation (since last night).  Previous assessment remains otherwise unchanged.  Will continue to monitor and report to oncoming shift.       "

## 2025-01-04 NOTE — SUBJECTIVE & OBJECTIVE
Interval History:   Received single dose of 40mg IV lasix yesterday w/ 1.6L UOP (net -670cc) for past 24hrs. Further diuresis held per Cardiology recs. NAEON. Remains in a-fib w/ HR ranging anywhere from 90s-140s. Soft BP, but renal function stable/improving. Pt overall feeling well. Reports some continued SOB intermittently (including at rest), but no notable cough, dizziness/lightheadedness, chest pain or other sx. States that compared to when she came in, she's feeling much better. Tired of being in bed and reports hips are starting to get achy; hopeful to get up to chair and even walk around with therapies later. Reports resolution of dysuria. Remains on Bactrim for MDRO UTI. Continuing on metoprolol & digoxin (and apixaban). Planning for SONIA/DCCV today. Some residual LE edema and bibasilar crackles on today's exam. Will likely start PO diuretic tmrw. Plan reviewed with patient at bedside on AM rounds.     Review of Systems   Constitutional:  Negative for chills, diaphoresis and fever.   HENT:  Negative for congestion, rhinorrhea, sore throat and trouble swallowing.    Eyes:  Negative for photophobia and visual disturbance.   Respiratory:  Positive for shortness of breath. Negative for cough and wheezing.    Cardiovascular:  Positive for leg swelling (improved). Negative for chest pain and palpitations.   Gastrointestinal:  Negative for abdominal pain, diarrhea, nausea and vomiting.   Genitourinary:  Negative for difficulty urinating, dysuria and hematuria.   Musculoskeletal:  Negative for arthralgias, joint swelling and neck pain.   Skin:  Positive for rash. Negative for wound.   Neurological:  Negative for dizziness, syncope, light-headedness and headaches.   Psychiatric/Behavioral:  Negative for agitation and confusion. The patient is not nervous/anxious.        Objective:     Vital Signs (Most Recent):  Temp: 97.3 °F (36.3 °C) (01/04/25 1220)  Pulse: 106 (01/04/25 1220)  Resp: 18 (01/04/25 1220)  BP: 124/67  (01/04/25 1220)  SpO2: 99 % (01/04/25 1220) Vital Signs (24h Range):  Temp:  [97 °F (36.1 °C)-98.2 °F (36.8 °C)] 97.3 °F (36.3 °C)  Pulse:  [] 106  Resp:  [16-20] 18  SpO2:  [92 %-99 %] 99 %  BP: (108-147)/(62-97) 124/67     Weight:  (Patient can not stand)  Body mass index is 26.69 kg/m².    Intake/Output Summary (Last 24 hours) at 1/4/2025 1414  Last data filed at 1/4/2025 1117  Gross per 24 hour   Intake 698 ml   Output 2125 ml   Net -1427 ml         Physical Exam  Constitutional:       General: She is not in acute distress.     Appearance: She is not toxic-appearing or diaphoretic.   HENT:      Head: Normocephalic and atraumatic.      Mouth/Throat:      Mouth: Mucous membranes are moist.   Eyes:      Conjunctiva/sclera: Conjunctivae normal.   Cardiovascular:      Rate and Rhythm: Tachycardia present. Rhythm irregular.      Heart sounds: Normal heart sounds.   Pulmonary:      Effort: Pulmonary effort is normal. No respiratory distress.      Breath sounds: Rales (bibasilar) present. No wheezing or rhonchi.   Abdominal:      General: Bowel sounds are normal. There is no distension.      Palpations: Abdomen is soft.      Tenderness: There is no abdominal tenderness. There is no guarding.   Musculoskeletal:      Right lower leg: Edema (trace to 1+) present.      Left lower leg: Edema (trace to 1+) present.   Skin:     General: Skin is warm and dry.      Findings: Lesion and rash present.      Comments: See image below   Neurological:      General: No focal deficit present.      Mental Status: She is alert and oriented to person, place, and time. Mental status is at baseline.   Psychiatric:         Mood and Affect: Mood normal.         Behavior: Behavior normal.               Significant Labs: All pertinent labs within the past 24 hours have been reviewed.    Significant Imaging: I have reviewed all pertinent imaging results/findings within the past 24 hours.

## 2025-01-04 NOTE — PLAN OF CARE
Problem: Physical Therapy  Goal: Physical Therapy Goal  Description: Goals to be met by: 2025    Patient will increase functional independence with mobility by performin. Supine to sit with Supervision  2. Sit to stand transfer with Supervision using RW  3. Gait  x 150 feet with Supervision using Rolling Walker.   4. Ascend/descend 5 stair with bilateral Handrails Supervision.     2025 1126 by Varsha Price, PT  Outcome: Progressing    Eval completed. Goals appropriate.

## 2025-01-04 NOTE — CARE UPDATE
"RAPID RESPONSE NURSE CHART REVIEW        Chart Reviewed: 01/03/2025, 10:33 PM    MRN: [id number]  Bed: 304/304 A    Dx: Chronic atrial fibrillation with RVR    Tiffany Masterson has a past medical history of Abnormal MRI, Acute cystitis without hematuria, Arthritis, Arthritis of right hip, Atrial fibrillation, Cataract, Elevated liver enzymes, Endometrial mass, Epiretinal membrane (ERM) of right eye, Family history of malignant neoplasm of gastrointestinal tract, Frequent UTI, Generalized arthritis, Graves disease, History of cholecystectomy, History of colonoscopy, Hyperlipidemia, Hypertension, Hypertriglyceridemia, Hypothyroidism, Impaired functional mobility, balance, gait, and endurance, Iron deficiency anemia, Migraine, ophthalmoplegic, Mixed stress and urge urinary incontinence, Ocular migraine, Personal history of colonic polyps, Physical deconditioning, Preop testing, S/P colonoscopic polypectomy, Sleep disorder, Status post hysteroscopic polypectomy, TBI (traumatic brain injury), TIA (transient ischemic attack), Transaminitis, and Urethral caruncle.    Last VS: /82 (BP Location: Left arm, Patient Position: Lying)   Pulse (!) 130 Comment: 118-150  Temp 97 °F (36.1 °C) (Axillary)   Resp 20   Ht 5' 6" (1.676 m)   Wt 75 kg (165 lb 5.5 oz)   SpO2 95%   BMI 26.69 kg/m²     24H Vital Sign Range:  Temp:  [96.8 °F (36 °C)-97.9 °F (36.6 °C)]   Pulse:  []   Resp:  [18-20]   BP: ()/(62-86)   SpO2:  [92 %-98 %]     Level of Consciousness (AVPU): alert    Recent Labs     01/02/25  1729 01/03/25  0453   WBC 9.65 11.48   HGB 11.8* 11.4*   HCT 35.9* 36.0*    222       Recent Labs     01/02/25  1729 01/03/25  0453    136   K 4.7 3.7    105   CO2 20* 20*   BUN 20 18   CREATININE 1.5* 1.4   * 102   PHOS  --  4.0   MG  --  2.0        No results for input(s): "PH", "PCO2", "PO2", "HCO3", "POCSATURATED", "BE" in the last 72 hours.     OXYGEN:             MEWS score: 4    Rounding " completed with charge TATE Vickers reports pt in Afib; -130s. PRN metoprolol available for sustained RVR. No additional concerns verbalized at this time. Instructed to call [phone] for further concerns or assistance.    Cecilia Campo RN

## 2025-01-04 NOTE — PLAN OF CARE
Plan of care discussed with patient/family.     Patient is free of fall or injury. Denies CP, SOB, or pain. All questions addressed;

## 2025-01-05 LAB
ALBUMIN SERPL BCP-MCNC: 2.9 G/DL (ref 3.5–5.2)
ANION GAP SERPL CALC-SCNC: 12 MMOL/L (ref 8–16)
BACTERIA UR CULT: ABNORMAL
BUN SERPL-MCNC: 17 MG/DL (ref 8–23)
CALCIUM SERPL-MCNC: 9.1 MG/DL (ref 8.7–10.5)
CHLORIDE SERPL-SCNC: 99 MMOL/L (ref 95–110)
CO2 SERPL-SCNC: 25 MMOL/L (ref 23–29)
CREAT SERPL-MCNC: 1.3 MG/DL (ref 0.5–1.4)
ERYTHROCYTE [DISTWIDTH] IN BLOOD BY AUTOMATED COUNT: 14 % (ref 11.5–14.5)
EST. GFR  (NO RACE VARIABLE): 39.8 ML/MIN/1.73 M^2
FOLATE SERPL-MCNC: 15.5 NG/ML (ref 4–24)
GLUCOSE SERPL-MCNC: 73 MG/DL (ref 70–110)
HCT VFR BLD AUTO: 41.3 % (ref 37–48.5)
HGB BLD-MCNC: 13.5 G/DL (ref 12–16)
MAGNESIUM SERPL-MCNC: 1.7 MG/DL (ref 1.6–2.6)
MCH RBC QN AUTO: 29.9 PG (ref 27–31)
MCHC RBC AUTO-ENTMCNC: 32.7 G/DL (ref 32–36)
MCV RBC AUTO: 91 FL (ref 82–98)
OHS QRS DURATION: 82 MS
OHS QTC CALCULATION: 416 MS
PHOSPHATE SERPL-MCNC: 4.5 MG/DL (ref 2.7–4.5)
PLATELET # BLD AUTO: 253 K/UL (ref 150–450)
PMV BLD AUTO: 11 FL (ref 9.2–12.9)
POTASSIUM SERPL-SCNC: 4.2 MMOL/L (ref 3.5–5.1)
RBC # BLD AUTO: 4.52 M/UL (ref 4–5.4)
SODIUM SERPL-SCNC: 136 MMOL/L (ref 136–145)
VIT B12 SERPL-MCNC: 727 PG/ML (ref 210–950)
WBC # BLD AUTO: 11.5 K/UL (ref 3.9–12.7)

## 2025-01-05 PROCEDURE — 36415 COLL VENOUS BLD VENIPUNCTURE: CPT | Mod: HCNC | Performed by: STUDENT IN AN ORGANIZED HEALTH CARE EDUCATION/TRAINING PROGRAM

## 2025-01-05 PROCEDURE — 20600001 HC STEP DOWN PRIVATE ROOM: Mod: HCNC

## 2025-01-05 PROCEDURE — 83735 ASSAY OF MAGNESIUM: CPT | Mod: HCNC | Performed by: STUDENT IN AN ORGANIZED HEALTH CARE EDUCATION/TRAINING PROGRAM

## 2025-01-05 PROCEDURE — 63600175 PHARM REV CODE 636 W HCPCS: Mod: HCNC | Performed by: STUDENT IN AN ORGANIZED HEALTH CARE EDUCATION/TRAINING PROGRAM

## 2025-01-05 PROCEDURE — 25000003 PHARM REV CODE 250: Mod: HCNC | Performed by: STUDENT IN AN ORGANIZED HEALTH CARE EDUCATION/TRAINING PROGRAM

## 2025-01-05 PROCEDURE — 85027 COMPLETE CBC AUTOMATED: CPT | Mod: HCNC | Performed by: STUDENT IN AN ORGANIZED HEALTH CARE EDUCATION/TRAINING PROGRAM

## 2025-01-05 PROCEDURE — 84207 ASSAY OF VITAMIN B-6: CPT | Mod: HCNC | Performed by: STUDENT IN AN ORGANIZED HEALTH CARE EDUCATION/TRAINING PROGRAM

## 2025-01-05 PROCEDURE — 84591 ASSAY OF NOS VITAMIN: CPT | Mod: HCNC | Performed by: STUDENT IN AN ORGANIZED HEALTH CARE EDUCATION/TRAINING PROGRAM

## 2025-01-05 PROCEDURE — 99232 SBSQ HOSP IP/OBS MODERATE 35: CPT | Mod: HCNC,GC,, | Performed by: INTERNAL MEDICINE

## 2025-01-05 PROCEDURE — 80069 RENAL FUNCTION PANEL: CPT | Mod: HCNC | Performed by: STUDENT IN AN ORGANIZED HEALTH CARE EDUCATION/TRAINING PROGRAM

## 2025-01-05 PROCEDURE — 82607 VITAMIN B-12: CPT | Mod: HCNC | Performed by: STUDENT IN AN ORGANIZED HEALTH CARE EDUCATION/TRAINING PROGRAM

## 2025-01-05 PROCEDURE — 27000207 HC ISOLATION: Mod: HCNC

## 2025-01-05 PROCEDURE — 82746 ASSAY OF FOLIC ACID SERUM: CPT | Mod: HCNC | Performed by: STUDENT IN AN ORGANIZED HEALTH CARE EDUCATION/TRAINING PROGRAM

## 2025-01-05 PROCEDURE — 84425 ASSAY OF VITAMIN B-1: CPT | Mod: HCNC | Performed by: STUDENT IN AN ORGANIZED HEALTH CARE EDUCATION/TRAINING PROGRAM

## 2025-01-05 PROCEDURE — 0HBKXZX EXCISION OF RIGHT LOWER LEG SKIN, EXTERNAL APPROACH, DIAGNOSTIC: ICD-10-PCS | Performed by: DERMATOLOGY

## 2025-01-05 PROCEDURE — 25000003 PHARM REV CODE 250: Mod: HCNC | Performed by: INTERNAL MEDICINE

## 2025-01-05 RX ORDER — SULFAMETHOXAZOLE AND TRIMETHOPRIM 800; 160 MG/1; MG/1
1 TABLET ORAL 2 TIMES DAILY
Status: DISCONTINUED | OUTPATIENT
Start: 2025-01-05 | End: 2025-01-07 | Stop reason: HOSPADM

## 2025-01-05 RX ORDER — FUROSEMIDE 10 MG/ML
40 INJECTION INTRAMUSCULAR; INTRAVENOUS 3 TIMES DAILY
Status: DISCONTINUED | OUTPATIENT
Start: 2025-01-05 | End: 2025-01-05

## 2025-01-05 RX ORDER — SPIRONOLACTONE 25 MG/1
25 TABLET ORAL DAILY
Status: DISCONTINUED | OUTPATIENT
Start: 2025-01-05 | End: 2025-01-07

## 2025-01-05 RX ORDER — MAGNESIUM SULFATE HEPTAHYDRATE 40 MG/ML
2 INJECTION, SOLUTION INTRAVENOUS ONCE
Status: COMPLETED | OUTPATIENT
Start: 2025-01-05 | End: 2025-01-05

## 2025-01-05 RX ORDER — FUROSEMIDE 10 MG/ML
40 INJECTION INTRAMUSCULAR; INTRAVENOUS EVERY 12 HOURS
Status: DISCONTINUED | OUTPATIENT
Start: 2025-01-05 | End: 2025-01-05

## 2025-01-05 RX ADMIN — GABAPENTIN 100 MG: 100 CAPSULE ORAL at 08:01

## 2025-01-05 RX ADMIN — METOPROLOL TARTRATE 25 MG: 25 TABLET, FILM COATED ORAL at 08:01

## 2025-01-05 RX ADMIN — FUROSEMIDE 40 MG: 10 INJECTION, SOLUTION INTRAMUSCULAR; INTRAVENOUS at 08:01

## 2025-01-05 RX ADMIN — GABAPENTIN 100 MG: 100 CAPSULE ORAL at 03:01

## 2025-01-05 RX ADMIN — APIXABAN 5 MG: 5 TABLET, FILM COATED ORAL at 08:01

## 2025-01-05 RX ADMIN — METOPROLOL TARTRATE 25 MG: 25 TABLET, FILM COATED ORAL at 04:01

## 2025-01-05 RX ADMIN — LOSARTAN POTASSIUM 100 MG: 50 TABLET, FILM COATED ORAL at 08:01

## 2025-01-05 RX ADMIN — PHENAZOPYRIDINE HYDROCHLORIDE 100 MG: 100 TABLET ORAL at 06:01

## 2025-01-05 RX ADMIN — DIGOXIN 0.12 MG: 125 TABLET ORAL at 08:01

## 2025-01-05 RX ADMIN — ATORVASTATIN CALCIUM 10 MG: 10 TABLET, FILM COATED ORAL at 08:01

## 2025-01-05 RX ADMIN — METOPROLOL TARTRATE 25 MG: 25 TABLET, FILM COATED ORAL at 01:01

## 2025-01-05 RX ADMIN — EMPAGLIFLOZIN 10 MG: 10 TABLET, FILM COATED ORAL at 08:01

## 2025-01-05 RX ADMIN — PHENAZOPYRIDINE HYDROCHLORIDE 100 MG: 100 TABLET ORAL at 04:01

## 2025-01-05 RX ADMIN — SULFAMETHOXAZOLE AND TRIMETHOPRIM 1 TABLET: 800; 160 TABLET ORAL at 08:01

## 2025-01-05 RX ADMIN — SPIRONOLACTONE 25 MG: 25 TABLET, FILM COATED ORAL at 03:01

## 2025-01-05 RX ADMIN — MAGNESIUM SULFATE HEPTAHYDRATE 2 G: 40 INJECTION, SOLUTION INTRAVENOUS at 08:01

## 2025-01-05 RX ADMIN — LEVOTHYROXINE SODIUM 112 MCG: 112 TABLET ORAL at 06:01

## 2025-01-05 NOTE — PROGRESS NOTES
[facility] - Cardiology [facility]  Progress Note    Patient Name: Tiffany Masterson  MRN: [id number]  Patient Class: IP- Inpatient   Admission Date: 1/2/2025  Length of Stay: 1 days  Attending Physician: Karissa García MD  Primary Care Provider: Rebeca Dumont MD        Subjective     Principal Problem: Paroxysmal atrial fibrillation    HPI:  Tiffany Masterson is a pleasant 89 y.o. female w/ PMHx of atrial fibrillation (on apixaban), HFpEF, CKD3, HTN, hypothyroidism, DEE, HLD, migraines & recurrent falls recently (resulting in a SAH & skull fracture in Sept '24), who presented to [facility] ED on 1/2/2025 from her PCP's office w/ progressive bilateral LE edema. Pt reports she noticed increased swelling of her legs starting 2-3 months ago that has continued to worsen since then w/ interval development of SOB, along w/ FLORES, orthopnea, weakness & decreased PO intake. Swelling has gotten particularly bad over past couple days.  reports that swelling got so bad that legs were weeping clear fluid. No clear trigger/inciting event for exacerbation. Pt has had multiple falls over the last few months, most recently a few days prior to presentation where she sustained a small contusion to her forehead, but denies any residual pain or injury related to the fall. No recent illnesses or medication changes. Endorses compliance with home medications. Given severity of edema & pt's fragility (w/ recent falls), pt sent to ED for further evaluation & mgmt.     On arrival to ED, pt was noted to be in atrial fibrillation w/ HR sustaining 170s-190s. Luckily BP & SpO2 stable on RA. Labs notable for BNP 1246, troponin nml x2, Cr 1.5, bicarb 20, glucose 115, Hgb 11.8, TSH 4.68, & FT4 1.2. UA w/ 3+ LE, 18 WBCs & occasional bacteria. CXR showed interstitial attenuation c/w pulmonary edema, as well as b/l pleural effusions. COVID/Flu/RSV negative. Received pushes of IV diltiazem, IV lasix, and CTX. Admitted to Hospital Medicine  Cardiac Stepdown Unit for further workup & mgmt.     Overview/Hospital Course:  Admitted for afib w/ RVR in setting of volume overload. Given 10mg IV diltiazem x2, 40mg IV lasix x2, and then continued on home PO diltiazem. HR improved from sustained 120s-130s (w/ jumps up to 170s) --> 90s-100s (w/ jumps to 120s-130s). TTE showed drop in EF to 25-30% (down from > 55% in Sept '24). Diltiazem d/c'ed. Loaded w/ digoxin & metoprolol dose increased. Jardiance started. Cardiology consulted given drop in EF. Metoprolol transitioned to tartrate (to avoid potential bradycardia in event that pt converted to NSR). Continued on IV lasix w/ excellent response. Renal function slowly improving and BP stable allowing for uptitration of diuretics to IV lasix 60mg TID w/ robust response (up to 6+ liters UOP/day). While BP & renal function remained stable, pt started to experience some orthostatic dizziness for which diuresis downtitrated. Completed 3-day course of CTX.     Interval History:  Received IV lasix 60mg TID yesterday w/ robust response of 6.5L UOP in addition to moderate amt of unmeasured UOP d/t purewick leaking (net -5L over past 24hrs; down >7L and 6.4kg since admit). Continued on digoxin 0.125mg and metoprolol switched to tartrate per Cardiology recs. HR mostly 90s-110s, however with intermittent jumps to 130s. Mild confusion overnight (also in setting of leaking purewick in middle of the night). A&Ox4 this AM. Orthostatic dizziness noted. BP & renal function stable, and negative orthostatic VS this AM. Pt overall feeling better- reports intermittent dizziness. Reports cough much improved, but notices increased exertional SOB. Reports some continued dysuria at initiation of micturition, but improved since starting Azo yesterday. Completed 3-day course of CTX although final urine C&S still pending. Given robust response to diuresis w/ orthostatic sx, will decrease lasix dose today. Cardiology following; potential  SONIA/DCCV tmrw.     Review of Systems   Constitutional:  Negative for chills, diaphoresis and fever.   HENT:  Negative for congestion, rhinorrhea, sore throat and trouble swallowing.    Eyes:  Negative for photophobia and visual disturbance.   Respiratory:  Positive for cough (mild) and shortness of breath (with exertion). Negative for wheezing.    Cardiovascular:  Positive for leg swelling (improved). Negative for chest pain and palpitations.   Gastrointestinal:  Negative for abdominal pain, diarrhea, nausea and vomiting.   Genitourinary:  Negative for difficulty urinating, dysuria and hematuria.   Musculoskeletal:  Negative for arthralgias, joint swelling and neck pain.   Skin:  Positive for rash. Negative for wound.   Neurological:  Positive for dizziness. Negative for syncope, light-headedness and headaches.   Psychiatric/Behavioral:  Negative for agitation and confusion. The patient is not nervous/anxious.        Objective:     Vital Signs (Most Recent):  Temp: 97.3 °F (36.3 °C) (01/04/25 1220)  Pulse: 106 (01/04/25 1220)  Resp: 18 (01/04/25 1220)  BP: 124/67 (01/04/25 1220)  SpO2: 99 % (01/04/25 1220) Vital Signs (24h Range):  Temp:  [97 °F (36.1 °C)-98.2 °F (36.8 °C)] 97.3 °F (36.3 °C)  Pulse:  [] 106  Resp:  [16-20] 18  SpO2:  [92 %-99 %] 99 %  BP: (108-147)/(62-97) 124/67     Weight:  (Patient can not stand)  Body mass index is 26.69 kg/m².    Intake/Output Summary (Last 24 hours) at 1/4/2025 1414  Last data filed at 1/4/2025 1117  Gross per 24 hour   Intake 698 ml   Output 2125 ml   Net -1427 ml         Physical Exam  Constitutional:       General: She is not in acute distress.     Appearance: She is not toxic-appearing or diaphoretic.   HENT:      Head: Normocephalic and atraumatic.      Mouth/Throat:      Mouth: Mucous membranes are moist.   Eyes:      Conjunctiva/sclera: Conjunctivae normal.   Cardiovascular:      Rate and Rhythm: Tachycardia present. Rhythm irregular.      Heart sounds: Normal  heart sounds.   Pulmonary:      Effort: Pulmonary effort is normal. No respiratory distress.      Breath sounds: Rales (bibasilar) present. No wheezing or rhonchi.   Abdominal:      General: Bowel sounds are normal. There is no distension.      Palpations: Abdomen is soft.      Tenderness: There is no abdominal tenderness. There is no guarding.   Musculoskeletal:      Right lower leg: Edema (1-2+ pitting edema up to thighs) present.      Left lower le+ Pitting Edema (1-2+ pitting edema up to thighs) present.   Skin:     General: Skin is warm and dry.      Findings: Lesion and rash present.      Comments: See image below   Neurological:      General: No focal deficit present.      Mental Status: She is alert and oriented to person, place, and time. Mental status is at baseline.   Psychiatric:         Mood and Affect: Mood normal.         Behavior: Behavior normal.               Significant Labs: All pertinent labs within the past 24 hours have been reviewed.    Significant Imaging: I have reviewed all pertinent imaging results/findings within the past 24 hours.        Assessment and Plan     Acute on chronic atrial fibrillation with RVR  Pt w/ long hx of chronic atrial fibrillation, managed w/ metoprolol & diltiazem (along w/ apixaban). [id number] score 5-6. On admission, EKG notable for atrial fibrillation w/ RVR (). Pt is asymptomatic. CXR & exam c/w volume overload. TTE showed drop in EF from > 55% in  to 25-30% now. Favor volume overload 2/2 declining systolic function to be driving RVR exacerbation. Aside from CHF, etiology of uncontrolled afib unclear- TSH elevated but FT4 nml, troponin nml, no notable electrolyte derangements, & no hypoxia. UA w/ e/o UTI (although pt largely asymptomatic & afebrile w/ nml WBC), & pt denies illicit substance use (UDS not collected on admit).   - Cardiology consulted; appreciate recs   - Possible SONIA/DCCV on 25; NPO @ [facility]   - Continue home apixaban  - D/c  diltiazem given low EF  - S/p digoxin 0.5 mcg x1 + 0.25mcg Q6H x2; continue at 0.125mcg daily   - Switched metoprolol to tartrate 25mg Q6H  - Metoprolol PRN for goal HR < 120   - Monitor electrolytes, replete PRN for goal K > 4 & Mg > 2  - Telemetry  - Volume mgmt as below     Heart failure with reduced ejection fraction   Hx of HFpEF- last TTE (Sept '24) w/ LVEF > 55%, indeterminate diastolic function, mild AV sclerosis w/o stenosis, mild AI, mild TR & MR, mild LA dilation, & mild aortic & mitral annular calcification. Pt presented w/ progressive BLE edema. On admission, BNP 1246 w/ e/o volume overload on exam. CXR w/ b/l pleural effusions & e/o extensive pulmonary edema. Weight 75kg on admission. Repeat TTE showed LVEF 25-30%, mildly reduced RV systolic function, severe LA dilation, mild AS (ARRON by VTI 2.1, peak velocity 1.4, mean gradient 5, DI 0.5), mild AI, moderately thickened mitral leaflets, moderate TR, PASP 29; IVC not well visualized. Unclear etiology of pts declining EF. While uncontrolled afib considered, inverse favored. TSH elevated but FT4 nml, and troponin negative x2.   - Cardiology consulted; appreciate recs  - IV lasix 40mg BID; titrate daily for goal net -1 to -1.5L/day  - Metoprolol as above  - Digoxin as above  - Continue home losartan   - Start Jardiance 10mg daily  - Possibly initiate spironolactone prior to discharge  - Strict I/Os & daily standing weights  - Fluid (1.5L/day) & sodium (2g) restriction  - Monitor BMP w/ electrolyte replacement PRN  - Telemetry   - Mgmt of afib as above     UTI (urinary tract infection)  UA on admit w/ 3+ LE, 18 WBCs & occasional bacteria. Denies dysuria, hematuria, frequency or urgency. Generally would not treat as pt seemingly asymptomatic, but given RVR & recent falls, abx started. Interval development of mild dysuria upon initiation of micturition on 1/4 that improved w/ phenazopyridine.   - s/p CTX x3 days  - Final urine C&S pending   - Phenazopyridine  "100mg BID (d/t impaired renal function, giving reduced dose x2 days only)     Stage 3b chronic kidney disease  On admission, Cr 1.5 (baseline ~1.1-1.3) w/ BUN 20. UA w/ 3+ LE, 18 WBCs & occasional bacteria.   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 The fall in September seemingly mechanical (was carrying boxes up stairs in the dark and tripped) and resulted in pt falling backward w/ resulting ICH & occipital bone fracture. Pt's subsequent falls have all reportedly been when pt is getting out of bed or getting up to use the bathroom (usually at night), more suggestive of vasovagal/orthostatic etiology. Pt denies any assoc sx w/ falls- no dizziness, lightheadedness, palpitations, etc. Denies tripping on subsequent episodes- "just falls." Most recent fall a few days PTA resulted in small contusion on forehead but no major trauma/injuries.   - PT/OT  - Daily orthostatics   - B vitamin lvls pending    Normocytic anemia   On admission, Hgb 11.8 (baseline mid 10s) w/ MCV 91. No e/o active bleeding. Last iron panel in 2021. Recent B12 from Sept '24 w/ low-nml B12. Anemia 2/2 CKD suspected, although contributing nutritional deficiency considered. Iron panel w/ low %sat (11), elevated ferritin (605), and nml iron (32), TIBC (299), & transferrin (202). Nml B12 (727) & folate (15.5).   - Vitamin B1, B3 & B6 pending   - Monitor CBC & for e/o active bleeding  - Transfuse PRN for goal Hgb > 7    Rash/lesions of bilateral lower extremities  New rash w/ lesions/scabs of BLEs noted on 1/4. Images uploaded to Media. Denies pain or itching and only noticed them when her compression stockings were removed. Possibly 2/2 skin breaks from severe edema w/ weeping of legs PTA. Surrounding erythema, but no tenderness, drainage or other " sx of infxn.   - Dermatology consulted; appreciate recs     Mixed hyperlipidemia  Hx of mixed dyslipidemia. Last lipid panel (Sept '24) nml.   - Continue home statin     Primary hypertension  Hx of chronic HTN, managed w/ home regimen of losartan, metoprolol XL, & diltiazem. Normotensive on arrival (110s-130s/70s-100s), and remained relatively normotensive throughout admission.   - Cardiac package as above    Hypothyroidism  Last TSH (Sept '24) nml at 2.158. Repeat TSH on admission elevated at 4.68, but FT4 nml at 1.2.   - Continue home levothyroxine 112 mcg daily  - Repeat labs outpatient per PCP         VTE Risk Mitigation (From admission, onward)           Ordered     apixaban tablet 5 mg  2 times daily         01/02/25 2255     Reason for No Pharmacological VTE Prophylaxis  Once        Question:  Reasons:  Answer:  Already adequately anticoagulated on oral Anticoagulants    01/02/25 2255     Place sequential compression device  Until discontinued         01/02/25 2255     IP VTE HIGH RISK PATIENT  Once         01/02/25 2255                    Discharge Planning   AREN: 1/7/2025     Code Status: Full Code   Medical Readiness for Discharge Date:   Discharge Plan A: Home with family        Please place Justification for DME        Karissa García MD  Department of Hospital Medicine   [facility] - Cardiology Stepdown

## 2025-01-05 NOTE — CONSULTS
[facility] - Cardiology Stepdown  Dermatology  Consult Note    Patient Name: Tiffany Masterson  MRN: [id number]  Admission Date: 1/2/2025  Hospital Length of Stay: 1 days  Attending Physician: Karissa García MD  Primary Care Provider: Rebeca Dumont MD     Inpatient consult to Dermatology  Consult performed by: Glenis Mcdermott MD  Consult ordered by: Karissa García MD        Dermatology Inpatient Consult Note:  1/5/2025  4:18 PM    Reason for consult:  rash on legs    HPI: Tiffany is a 89yoF w/ PMHx of heart failure, CKD3, HTN, hypothyroidism, HLD, and falls who was initally admitted for paroxysmal afib, volume overload, and a UTI.  Dermatology was consulted for the rash on legs. The patient has had swelling of legs for several months with SOB and FLORES. she has been receiving IV lasix with significant improvement in swelling of legs. The patient noted yesterday they took off her socks and noted a rash. The  states he noticed tiny red spots when the legs are swollen but now have crusted center.  The patient notes that she was outside for New Years. Doesn't recall any bites.  Not gardening.     Denies rashes anywhere else. Denies feves, chills, abdominal pain, or chest pain.     Most recent vitals have been stable other than occasional tachycardia to 14.   1/5/2024 labs CBC wnl, CMP wnl  1/2/2024 BNP 1246    Scheduled Meds:   apixaban  5 mg Oral BID    atorvastatin  10 mg Oral Daily    digoxin  0.125 mg Oral Daily    [START ON 1/9/2025] empagliflozin  10 mg Oral Daily    gabapentin  100 mg Oral TID    levothyroxine  112 mcg Oral Before breakfast    losartan  100 mg Oral Daily    metoprolol tartrate  25 mg Oral QID    phenazopyridine  100 mg Oral BID AC    spironolactone  25 mg Oral Daily    sulfamethoxazole-trimethoprim 800-160mg  1 tablet Oral BID     Continuous Infusions:  PRN Meds:.  Current Facility-Administered Medications:     acetaminophen, 650 mg, Oral, Q4H PRN    bisacodyL, 10 mg, Rectal, Daily PRN     melatonin, 6 mg, Oral, Nightly PRN    ondansetron, 4 mg, Oral, Q6H PRN    polyethylene glycol, 17 g, Oral, Daily PRN    sodium chloride 0.9%, 10 mL, Intravenous, Q12H PRN    Review of patient's allergies indicates:   Allergen Reactions    Levaquin [levofloxacin]      Joint pain    Hydrochlorothiazide Other (See Comments)     Pain in joints and depression per pt       Past Medical History:   Diagnosis Date    Abnormal MRI 10/16/2020    Acute cystitis without hematuria 12/22/2021    Arthritis     Arthritis of right hip 11/08/2021    Atrial fibrillation     Cataract     Elevated liver enzymes     Endometrial mass 06/29/2018    Epiretinal membrane (ERM) of right eye     Family history of malignant neoplasm of gastrointestinal tract mother    Frequent UTI 09/30/2018    Generalized arthritis 09/19/2024    Shoulders, knees, hips      Graves disease     now hypothryoid - no surgery or medicine. resolved on its own    History of cholecystectomy 06/10/2021    History of colonoscopy     unremarkable 2007 by Dr. Javier.  Barium enema revealed diverticular disease.    Hyperlipidemia     Hypertension     Hypertriglyceridemia 04/19/2013    Continue statin for secondary stroke prevention    Hypothyroidism     Impaired functional mobility, balance, gait, and endurance 06/04/2021    Iron deficiency anemia 09/29/2021    Migraine, ophthalmoplegic     Mixed stress and urge urinary incontinence 09/30/2018    Ocular migraine     Personal history of colonic polyps     Physical deconditioning 09/13/2023    Preop testing 05/17/2021    S/P colonoscopic polypectomy 06/18/2013    Sleep disorder 11/08/2021    Status post hysteroscopic polypectomy 07/02/2018    TBI (traumatic brain injury) 10/25/2024    9/22/2024: Ct head: Left frontotemporal subarachnoid hemorrhage again noted including a small focus of intraparenchymal hemorrhage in the left cerebellum. No new hemorrhage.       TIA (transient ischemic attack)     with a normal angiogram in the  past, Ocular migraines reported by patient, not TIA     Transaminitis 03/19/2021    Urethral caruncle 09/15/2020       Past Surgical History:   Procedure Laterality Date    CATARACT EXTRACTION Right 2012    Dr. Lexis Nicolas     CHOLECYSTECTOMY  2022    COLONOSCOPY      2014 polyps    COLONOSCOPY N/A 11/07/2016    Procedure: COLONOSCOPY;  Surgeon: Bebeto Gonzalez MD;  Location: [facility] ENDO (4TH FLR);  Service: Endoscopy;  Laterality: N/A;    COLONOSCOPY N/A 03/09/2020    Procedure: COLONOSCOPY;  Surgeon: Bebeto Gonzalez MD;  Location: [facility] ENDO (4TH FLR);  Service: Endoscopy;  Laterality: N/A;    COLONOSCOPY N/A 09/29/2021    Procedure: COLONOSCOPY;  Surgeon: Bebeto Gonzalez MD;  Location: [facility] ENDO (4TH FLR);  Service: Endoscopy;  Laterality: N/A;  please schedule patient as soon as possible with me EGD colonoscopy with constipation bowel prep for iron deficiency anemia family history of colon cancer and personal history of colon polyps  9/17/21 ok for standard split prep per Dr. Gonzalez-imani    COLONOSCOPY N/A 09/29/2021    Procedure: COLONOSCOPY;  Surgeon: Bebeto Gonzalez MD;  Location: [facility] ENDO (4TH FLR);  Service: Endoscopy;  Laterality: N/A;  Please schedule patient as soon as possible with me EGD colonoscopy with constipation bowel prep for iron deficiency anemia family history of colon cancer and personal history of colon polyps  9/17/21 Ok for standard split prep per Dr. Gonzalez-imani    COLONOSCOPY W/ POLYPECTOMY  06/2013    polyp of colon    ENDOSCOPIC ULTRASOUND OF UPPER GASTROINTESTINAL TRACT N/A 04/29/2021    Procedure: ULTRASOUND, UPPER GI TRACT, ENDOSCOPIC;  Surgeon: Dalton Johnson MD;  Location: [facility] ENDO (2ND FLR);  Service: Endoscopy;  Laterality: N/A;  Postprandial nausea vomiting epigastric 0.9 cm stone and sludge seen within the gallbladder lumen.  No wall thickening or pericholecystic fluid.  0.6 cm stone seen within the distal common bile duct at the level of the pancreatic head.  There  is dil    ERCP N/A 04/29/2021    Procedure: ERCP (ENDOSCOPIC RETROGRADE CHOLANGIOPANCREATOGRAPHY);  Surgeon: Dalton Johnson MD;  Location: [facility] (2ND FLR);  Service: Endoscopy;  Laterality: N/A;  Postprandial nausea and vomit 0.9 cm stone and sludge seen within the gallbladder lumen.  No wall thickening or pericholecystic fluid.  0.6 cm stone seen within the distal common bile duct at the level of the pancreatic head.  There i    ESOPHAGOGASTRODUODENOSCOPY N/A 09/29/2021    Procedure: EGD (ESOPHAGOGASTRODUODENOSCOPY);  Surgeon: Bebeto Gonzalez MD;  Location: [facility] ENDO (4TH FLR);  Service: Endoscopy;  Laterality: N/A;  rapid covid test arrival 12pm - sm  9/27 arrival time for covid test/procedure confirmed with pt-rb    ESOPHAGOGASTRODUODENOSCOPY N/A 09/29/2021    Procedure: EGD (ESOPHAGOGASTRODUODENOSCOPY);  Surgeon: Bebeto Gonzalez MD;  Location: [facility] (4TH FLR);  Service: Endoscopy;  Laterality: N/A;  covid test 9/19/21 [place], prep instr emailed -ml    EYE SURGERY  11/10/2014    right retina/Dr. Dalton Sierra ([facility])    HYSTEROSCOPIC POLYPECTOMY OF UTERUS N/A 07/02/2018    Procedure: POLYPECTOMY, UTERUS, HYSTEROSCOPIC;  Surgeon: Elliot Vanessa IV, MD;  Location: [facility];  Service: OB/GYN;  Laterality: N/A;    INJECTION OF JOINT Right 11/11/2022    Procedure: INJECTION, RIGHT GTB CONTRAST DIRECT REFERRAL;  Surgeon: Camden Hernandez MD;  Location: [facility] PAIN MGT;  Service: Pain Management;  Laterality: Right;    JOINT REPLACEMENT  03/23/2011    left total hip replacement    LAPAROSCOPIC CHOLECYSTECTOMY N/A 05/17/2021    Procedure: CHOLECYSTECTOMY, LAPAROSCOPIC;  Surgeon: Rayshawn Peralta Jr., MD;  Location: [facility] 2ND FLR;  Service: General;  Laterality: N/A;    REMOVAL OF EPIRETINAL MEMBRANE Right     Dr. Padron    TONSILLECTOMY      pt was 11 years old       Social History     Socioeconomic History    Marital status:    Tobacco Use    Smoking status: Never    Smokeless tobacco: Never     Tobacco comments:     The patient is not getting any regular exercise at this time.  She does enjoy traveling to [place].   Substance and Sexual Activity    Alcohol use: Yes     Comment: occasionally    Drug use: No    Sexual activity: Yes     Partners: Male     Birth control/protection: Post-menopausal     Comment:  64 years      Social Drivers of Health     Financial Resource Strain: Low Risk  (1/3/2025)    Overall Financial Resource Strain (CARDIA)     Difficulty of Paying Living Expenses: Not hard at all   Food Insecurity: No Food Insecurity (1/3/2025)    Hunger Vital Sign     Worried About Running Out of Food in the Last Year: Never true     Ran Out of Food in the Last Year: Never true   Transportation Needs: No Transportation Needs (1/3/2025)    TRANSPORTATION NEEDS     Transportation : No   Physical Activity: Insufficiently Active (1/3/2025)    Exercise Vital Sign     Days of Exercise per Week: 2 days     Minutes of Exercise per Session: 30 min   Stress: No Stress Concern Present (1/3/2025)    [employer] - Occupational Stress Questionnaire     Feeling of Stress : Not at all   Housing Stability: Low Risk  (1/3/2025)    Housing Stability Vital Sign     Unable to Pay for Housing in the Last Year: No     Homeless in the Last Year: No       Family History   Problem Relation Name Age of Onset    Colon cancer Mother Tiffany Sutton 75    Lung cancer Father ColLakesha Sutton 75    Diabetes Other great aunt     Diabetes Paternal Aunt      Colon cancer Paternal Aunt  80    Prostate cancer Brother      Breast cancer Neg Hx      Ovarian cancer Neg Hx      Celiac disease Neg Hx      Cataracts Neg Hx      Glaucoma Neg Hx      Macular degeneration Neg Hx         Review of Systems:  Constitutional:  no fevers, chills, fatigue, or malaise.  HEENT: no headaches, dysphagia, or mouth sores.  Ocular: no eye irritation or blurry vision.  Heme: no easy bruising or  bleeding.  Musculoskeletal: no arthralgias, joint swelling, or myalgias.  GI: no nausea, vomiting, or diarrhea.  : no genital sores, dysuria, or genital itching.  Neuro: no numbness or tingling.  CV: no chest pain or shortness of breath.  Skin: no pruritus, burning, pain, blisters or history of keloidal scarring    Physical Exam:  Vitals:    01/05/25 1511   BP:    Pulse: 92   Resp:    Temp:      General: NAD   Skin:  - Scalp: clear  - Face:clear  - Neck: no rash present  - Chest: no rash present  - Back:no rash present  - Abdomen:no rash present  - RUE: no rash present  - LUE: no rash present  - RLE: feet/ankle/shins with trace edema, R medial shin with multiple erythematous papules with central heme crusted ulcer arranged in linear fashion  - LLE:feet/ankle/shins with trace edema, L lateral shin/ankle  with multiple erythematous papules with hemecrusted center         Assessment and Plan:  Dermatitis  The patient with multiple erythematous papules centrally arranged in linear fashion. Timeline of appearance for the lesions unclear. Will biopsy today to confirm diagnosis DDX includes arthropod given linear disruption vs edema bullae (hx of volume overload, isolated to shin) vs vasculitis vs ecthyma (although clinically less suspicious for erythema)   -Punch biopsy today to confirm diagnosis   -Pt was previously on bactrim for UTI. Unclear if improved after abx. If results as infectious can consider keflex/dicloxacillin.    -Further treatment recommendations once the biopsy has resulted.     Punch biopsy procedure note:  Location: Right leg (shin)  Punch biopsy performed after verbal consent obtained. Area marked and prepped with alcohol. Approximately 3cc of 1% lidocaine with epinephrine injected. 4 mm disposable punch used to remove lesion. Hemostasis obtained and biopsy site closed with surgifoam. Pressure dressing placed Wound care instructions reviewed with patient and handout given. Patient tolerated  procedure well without complication.  Discussed biopsy site care including vas    Case discussed with dermatology attending, Dr. Cruz. Dermatology will continue to follow. Please contact us if you have any additional questions.     Glenis Mcdermott MD   Dermatology

## 2025-01-05 NOTE — ASSESSMENT & PLAN NOTE
Patient has paroxysmal (<7 days) atrial fibrillation. Patient is currently in atrial fibrillation. [id number] Score: 3. The patients heart rate in the last 24 hours is as follows:  Pulse  Min: 77  Max: 131     Antiarrhythmics  metoprolol tartrate (LOPRESSOR) tablet 25 mg, 4 times daily, Oral    Anticoagulants  apixaban tablet 5 mg, 2 times daily, Oral    Plan  - Replete lytes with a goal of K>4, Mg >2  - Patient is anticoagulated, see medications listed above.  - Patient's afib is currently uncontrolled. Will adjust treatment as follows:    - changed metoprolol to tartrate 25mg q6hrs (if converts to sinus rhythm may become bradycardic on high BB dose)  - continue digoxin 125mcg  - continue telemetry  - if remains in atrial fibrillation will plan for SONIA/DCCV Monday  -  NPO Sunday night at midnight

## 2025-01-05 NOTE — PLAN OF CARE
Problem: Adult Inpatient Plan of Care  Goal: Plan of Care Review  Outcome: Progressing  Flowsheets (Taken 1/4/2025 1936)  Plan of Care Reviewed With: patient  Goal: Optimal Comfort and Wellbeing  Outcome: Progressing     Problem: Fall Injury Risk  Goal: Absence of Fall and Fall-Related Injury  Outcome: Progressing   Bed alarm on @ all times.   Frequent safety rounds made     Problem: Heart Failure  Goal: Optimal Functional Ability  Outcome: Progressing   [place] encouraged

## 2025-01-05 NOTE — NURSING
"1934  Assessment completed, see flowsheets.  Oriented to all but date (thinks it's the ) and noted to be forgetful.  Intermittently productive cough with "not much" sputum out, unsure of appearance.  External cath leaked moderate amount of unmeasured urine.  Pt up to chair, linens changed, bath provided, and new purwick applied.  C/o feeling "a little dizzy" when stood back up after sitting in recliner-resolved once back to bed and HOB elevated.  Aware of POC for this shift with no questions or concerns voiced.  Snack provided, will continue to monitor.     0159  Sleeping quietly on/off.  Awoke while in room and when asked how she was she stated "not good, I'm naked-they took my clothes".  Reminded pt she was in the hospital & is wearing a hospital gown with her jacket on top of the blankets (because her vest got wet earlier). Verbalized understanding stating "good, thanks. I needed to hear that" then rolled over and shut eyes.  Bed alarm on & call light within reach, will continue to monitor.     0415  informed by phlebotomist that pt requesting assistance to sit up.  Actually Requesting to "walk around a bit, I'm very stiff".  Once she sat up on side of bed she became dizzy (resolved after sitting for a minute), VSS.  Attempted to check a standing BP as well however machine  and she wanted to sit back down (orthostatic VS daily).  External cath leaked a moderate amount of urine.  Care provided and device adjusted.  Left sitting in recliner with call light in hand.  Will continue to monitor.     0620  Continues to sit in recliner, states she is "dry and comfortable".  Morning meds administered.  Will continue to monitor and report to oncoming shift.     "

## 2025-01-05 NOTE — SUBJECTIVE & OBJECTIVE
Interval History: NAEON. Patient reports feeling well overall. Has no new concerns at this time. Still in afib/afib RVR on tele, remains HDS. Legs still mildly edematous - reports there is significant improvement compared to when she came in. Not stable when ambulating.   Endorses FLORES primarily.    Denies HA, fevers, chills, chest pain, palpitations, abdominal pain, N/V.     ROS  Objective:     Vital Signs (Most Recent):  Temp: 97.7 °F (36.5 °C) (01/05/25 0715)  Pulse: 94 (01/05/25 0714)  Resp: 16 (01/05/25 0714)  BP: (!) 108/57 (01/05/25 0829)  SpO2: 95 % (01/05/25 0714) Vital Signs (24h Range):  Temp:  [96.5 °F (35.8 °C)-98.1 °F (36.7 °C)] 97.7 °F (36.5 °C)  Pulse:  [] 94  Resp:  [16-20] 16  SpO2:  [95 %-99 %] 95 %  BP: ()/(57-78) 108/57     Weight: 68.6 kg (151 lb 3.8 oz)  Body mass index is 24.41 kg/m².     SpO2: 95 %         Intake/Output Summary (Last 24 hours) at 1/5/2025 0906  Last data filed at 1/5/2025 0903  Gross per 24 hour   Intake 2017 ml   Output 6800 ml   Net -4783 ml       Lines/Drains/Airways       Drain  Duration             Female External Urinary Catheter w/ Suction 01/02/25 1850 2 days              Peripheral Intravenous Line  Duration                  Peripheral IV - Single Lumen 01/03/25 1044 22 G Anterior;Proximal;Right Forearm 1 day                       Physical Exam  Constitutional:       General: She is not in acute distress.     Appearance: She is normal weight. She is not toxic-appearing.   Eyes:      General: No scleral icterus.  Cardiovascular:      Rate and Rhythm: Tachycardia present. Rhythm irregular.      Heart sounds:      No gallop.   Pulmonary:      Effort: Pulmonary effort is normal. No respiratory distress.   Abdominal:      General: There is no distension.      Palpations: Abdomen is soft.   Musculoskeletal:      Right lower leg: Edema present.      Left lower leg: Edema present.   Skin:     General: Skin is warm and dry.   Neurological:      Mental Status: She  is alert.   Psychiatric:         Mood and Affect: Mood normal.         Thought Content: Thought content normal.            Significant Labs: All pertinent lab results from the last 24 hours have been reviewed.    Significant Imaging: Echocardiogram: Transthoracic echo (TTE) complete (Cupid Only):   Results for orders placed or performed during the hospital encounter of 01/02/25   Echo   Result Value Ref Range    LV Diastolic Volume 113.64 mL    Echo EF Estimated 35 %    LV Systolic Volume 74.34 mL    IVS 0.8 0.6 - 1.1 cm    LVIDd 4.9 3.5 - 6.0 cm    LVIDs 4.1 (A) 2.1 - 4.0 cm    LVOT diameter 2.3 cm    PW 0.7 0.6 - 1.1 cm    AV LVOT peak gradient 2 mmHg    LVOT mn grad 1 mmHg    LVOT peak surjit 0.7 m/s    LVOT peak VTI 13.9 cm    RV- jya basal diam 3.7 cm    LA size 4.92 cm    Left Atrium Major Axis 5.87 cm    Left Atrium Minor Axis 6.08 cm    LA Vol (MOD) 79.64 mL    RA Major Axis 5.49 cm    AV valve area 2.1 cm2    AV area by cont VTI 2.0 cm2    AV regurgitation pressure 1/2 time 381.44 ms    AV peak gradient 7.8 mmHg    AV mean gradient 5.0 mmHg    Ao peak surjit 1.4 m/s    Ao VTI 27.9 cm    MV Peak A Surjit 0.31 m/s    E wave deceleration time 143.21 ms    MV Peak E Surjit 1.26 m/s    E/A ratio 4.06     LV LATERAL E/E' RATIO 21.00     LV SEPTAL E/E' RATIO 21.00     TDI LATERAL 0.06 m/s    TDI SEPTAL 0.06 m/s    TV peak gradient 29 mmHg    TR Max Surjit 2.68 m/s    Ascending aorta 3.33 cm    STJ 2.33 cm    Sinus 3.22 cm    LA WIDTH 4.22 cm    RA Width 4.04 cm    TAPSE 0.90 cm    BSA 1.87 m2    LVOT stroke volume 57.7 cm3    LV Systolic Volume Index 40.4 mL/m2    LV Diastolic Volume Index 61.76 mL/m2    LVOT area 4.2 cm2    FS 16.3 (A) 28 - 44 %    Left Ventricle Relative Wall Thickness 0.29 cm    LV mass 120.8 g    LV Mass Index 65.7 g/m2    E/E' ratio 21.00 m/s    JEANMARIE 57.3 mL/m2    LA Vol 105.41 cm3    RV/LV Ratio 0.76 cm    JEANMARIE (MOD) 43.3 mL/m2    AV Velocity Ratio 0.50     AV index (prosthetic) 0.50     ARRON by Velocity  Ratio 2.1 cm²    Triscuspid Valve Regurgitation Peak Gradient 29 mmHg    Mean e' 0.06 m/s    ZLVIDS 2.08     ZLVIDD -0.41     TV resting pulmonary artery pressure 29 mmHg    RV TB RVSP 3 mmHg    Est. RA pres 0 mmHg    Holbrook's Biplane MOD Ejection Fraction 30 %    Narrative      Left Ventricle: The left ventricle is normal in size. Ventricular mass   is normal. Normal wall thickness. There is severely reduced systolic   function with a visually estimated ejection fraction of 25 - 30%.   Quantitated ejection fraction is 30%.    Right Ventricle: Systolic function is mildly reduced.    Left Atrium: Left atrium is severely dilated.    Aortic Valve: There is mild annular calcification present. There is   mild stenosis. Aortic valve area by VTI is 2.1 cm2. Aortic valve peak   velocity is 1.4 m/s. Mean gradient is 5.0 mmHg. The dimensionless index is   0.50. There is mild aortic regurgitation.    Mitral Valve: Moderately thickened leaflets.    Tricuspid Valve: There is moderate regurgitation.    Pulmonary Artery: The estimated pulmonary artery systolic pressure is   29 mmHg.    Pericardium: Left pleural effusion.    Irregularly irregular rhythm was present during the study

## 2025-01-05 NOTE — PROGRESS NOTES
[facility] Step[facility]  Cardiology  Progress Note    Patient Name: Tiffany Masterson  MRN: [id number]  Admission Date: 1/2/2025  Hospital Length of Stay: 1 days  Code Status: Full Code   Attending Physician: Karissa García MD   Primary Care Physician: Rebeca Dumont MD  Expected Discharge Date: 1/7/2025  Principal Problem:Paroxysmal atrial fibrillation    Subjective:     Hospital Course:   No notes on file    Interval History: NAEON. Patient reports feeling well overall. Has no new concerns at this time. Still in afib/afib RVR on tele, remains HDS. Legs still mildly edematous - reports there is significant improvement compared to when she came in. Not stable when ambulating.   Endorses FLORES primarily.    Denies HA, fevers, chills, chest pain, palpitations, abdominal pain, N/V.     ROS  Objective:     Vital Signs (Most Recent):  Temp: 97.7 °F (36.5 °C) (01/05/25 0715)  Pulse: 94 (01/05/25 0714)  Resp: 16 (01/05/25 0714)  BP: (!) 108/57 (01/05/25 0829)  SpO2: 95 % (01/05/25 0714) Vital Signs (24h Range):  Temp:  [96.5 °F (35.8 °C)-98.1 °F (36.7 °C)] 97.7 °F (36.5 °C)  Pulse:  [] 94  Resp:  [16-20] 16  SpO2:  [95 %-99 %] 95 %  BP: ()/(57-78) 108/57     Weight: 68.6 kg (151 lb 3.8 oz)  Body mass index is 24.41 kg/m².     SpO2: 95 %         Intake/Output Summary (Last 24 hours) at 1/5/2025 0906  Last data filed at 1/5/2025 0903  Gross per 24 hour   Intake 2017 ml   Output 6800 ml   Net -4783 ml       Lines/Drains/Airways       Drain  Duration             Female External Urinary Catheter w/ Suction 01/02/25 1850 2 days              Peripheral Intravenous Line  Duration                  Peripheral IV - Single Lumen 01/03/25 1044 22 G Anterior;Proximal;Right Forearm 1 day                       Physical Exam  Constitutional:       General: She is not in acute distress.     Appearance: She is normal weight. She is not toxic-appearing.   Eyes:      General: No scleral icterus.  Cardiovascular:      Rate and  Rhythm: Tachycardia present. Rhythm irregular.      Heart sounds:      No gallop.   Pulmonary:      Effort: Pulmonary effort is normal. No respiratory distress.   Abdominal:      General: There is no distension.      Palpations: Abdomen is soft.   Musculoskeletal:      Right lower leg: Edema present.      Left lower leg: Edema present.   Skin:     General: Skin is warm and dry.   Neurological:      Mental Status: She is alert.   Psychiatric:         Mood and Affect: Mood normal.         Thought Content: Thought content normal.            Significant Labs: All pertinent lab results from the last 24 hours have been reviewed.    Significant Imaging: Echocardiogram: Transthoracic echo (TTE) complete (Cupid Only):   Results for orders placed or performed during the hospital encounter of 01/02/25   Echo   Result Value Ref Range    LV Diastolic Volume 113.64 mL    Echo EF Estimated 35 %    LV Systolic Volume 74.34 mL    IVS 0.8 0.6 - 1.1 cm    LVIDd 4.9 3.5 - 6.0 cm    LVIDs 4.1 (A) 2.1 - 4.0 cm    LVOT diameter 2.3 cm    PW 0.7 0.6 - 1.1 cm    AV LVOT peak gradient 2 mmHg    LVOT mn grad 1 mmHg    LVOT peak surjit 0.7 m/s    LVOT peak VTI 13.9 cm    RV- jay basal diam 3.7 cm    LA size 4.92 cm    Left Atrium Major Axis 5.87 cm    Left Atrium Minor Axis 6.08 cm    LA Vol (MOD) 79.64 mL    RA Major Axis 5.49 cm    AV valve area 2.1 cm2    AV area by cont VTI 2.0 cm2    AV regurgitation pressure 1/2 time 381.44 ms    AV peak gradient 7.8 mmHg    AV mean gradient 5.0 mmHg    Ao peak surjit 1.4 m/s    Ao VTI 27.9 cm    MV Peak A Surjit 0.31 m/s    E wave deceleration time 143.21 ms    MV Peak E Surjit 1.26 m/s    E/A ratio 4.06     LV LATERAL E/E' RATIO 21.00     LV SEPTAL E/E' RATIO 21.00     TDI LATERAL 0.06 m/s    TDI SEPTAL 0.06 m/s    TV peak gradient 29 mmHg    TR Max Surjit 2.68 m/s    Ascending aorta 3.33 cm    STJ 2.33 cm    Sinus 3.22 cm    LA WIDTH 4.22 cm    RA Width 4.04 cm    TAPSE 0.90 cm    BSA 1.87 m2    LVOT stroke volume  57.7 cm3    LV Systolic Volume Index 40.4 mL/m2    LV Diastolic Volume Index 61.76 mL/m2    LVOT area 4.2 cm2    FS 16.3 (A) 28 - 44 %    Left Ventricle Relative Wall Thickness 0.29 cm    LV mass 120.8 g    LV Mass Index 65.7 g/m2    E/E' ratio 21.00 m/s    JEANMARIE 57.3 mL/m2    LA Vol 105.41 cm3    RV/LV Ratio 0.76 cm    JEANMARIE (MOD) 43.3 mL/m2    AV Velocity Ratio 0.50     AV index (prosthetic) 0.50     ARRON by Velocity Ratio 2.1 cm²    Triscuspid Valve Regurgitation Peak Gradient 29 mmHg    Mean e' 0.06 m/s    ZLVIDS 2.08     ZLVIDD -0.41     TV resting pulmonary artery pressure 29 mmHg    RV TB RVSP 3 mmHg    Est. RA pres 0 mmHg    Holbrook's Biplane MOD Ejection Fraction 30 %    Narrative      Left Ventricle: The left ventricle is normal in size. Ventricular mass   is normal. Normal wall thickness. There is severely reduced systolic   function with a visually estimated ejection fraction of 25 - 30%.   Quantitated ejection fraction is 30%.    Right Ventricle: Systolic function is mildly reduced.    Left Atrium: Left atrium is severely dilated.    Aortic Valve: There is mild annular calcification present. There is   mild stenosis. Aortic valve area by VTI is 2.1 cm2. Aortic valve peak   velocity is 1.4 m/s. Mean gradient is 5.0 mmHg. The dimensionless index is   0.50. There is mild aortic regurgitation.    Mitral Valve: Moderately thickened leaflets.    Tricuspid Valve: There is moderate regurgitation.    Pulmonary Artery: The estimated pulmonary artery systolic pressure is   29 mmHg.    Pericardium: Left pleural effusion.    Irregularly irregular rhythm was present during the study       Assessment and Plan:     * Paroxysmal atrial fibrillation  Patient has paroxysmal (<7 days) atrial fibrillation. Patient is currently in atrial fibrillation. [id number] Score: 3. The patients heart rate in the last 24 hours is as follows:  Pulse  Min: 77  Max: 131     Antiarrhythmics  metoprolol tartrate (LOPRESSOR) tablet 25 mg, 4  times daily, Oral    Anticoagulants  apixaban tablet 5 mg, 2 times daily, Oral    Plan  - Replete lytes with a goal of K>4, Mg >2  - Patient is anticoagulated, see medications listed above.  - Patient's afib is currently uncontrolled. Will adjust treatment as follows:    - changed metoprolol to tartrate 25mg q6hrs (if converts to sinus rhythm may become bradycardic on high BB dose)  - continue digoxin 125mcg  - continue telemetry  - if remains in atrial fibrillation will plan for SONIA/DCCV Monday  -  NPO Sunday night at midnight        Heart failure with reduced ejection fraction  Hold diuretic  Bedside US done, collapsible IVC  Hold jardiance in setting of UTI - can resume when UTI has cleared  Start low dose aldactone  Repeat TTE in 3 months        VTE Risk Mitigation (From admission, onward)           Ordered     apixaban tablet 5 mg  2 times daily         01/02/25 2255     Reason for No Pharmacological VTE Prophylaxis  Once        Question:  Reasons:  Answer:  Already adequately anticoagulated on oral Anticoagulants    01/02/25 2255     Place sequential compression device  Until discontinued         01/02/25 2255     IP VTE HIGH RISK PATIENT  Once         01/02/25 2255                    Kalina Cheng DO  Cardiology  [facility]

## 2025-01-05 NOTE — ASSESSMENT & PLAN NOTE
Hold diuretic  Bedside US done, collapsible IVC  Hold jardiance in setting of UTI - can resume when UTI has cleared  Start low dose aldactone  Repeat TTE in 3 months

## 2025-01-05 NOTE — PLAN OF CARE
Plan of care discussed with patient. Patient is free of fall/trauma/injury. Denies CP, SOB, or pain/discomfort. Mg=1.7, replaced. NPO at mn for SONIA/DCCV. All questions addressed. Will continue to monitor

## 2025-01-06 ENCOUNTER — ANESTHESIA EVENT (OUTPATIENT)
Dept: MEDSURG UNIT | Facility: HOSPITAL | Age: 89
End: 2025-01-06
Payer: MEDICARE

## 2025-01-06 ENCOUNTER — ANESTHESIA (OUTPATIENT)
Dept: MEDSURG UNIT | Facility: HOSPITAL | Age: 89
End: 2025-01-06
Payer: MEDICARE

## 2025-01-06 LAB
ALBUMIN SERPL BCP-MCNC: 2.7 G/DL (ref 3.5–5.2)
ANION GAP SERPL CALC-SCNC: 15 MMOL/L (ref 8–16)
BSA FOR ECHO PROCEDURE: 1.79 M2
BUN SERPL-MCNC: 14 MG/DL (ref 8–23)
CALCIUM SERPL-MCNC: 8.5 MG/DL (ref 8.7–10.5)
CHLORIDE SERPL-SCNC: 96 MMOL/L (ref 95–110)
CO2 SERPL-SCNC: 22 MMOL/L (ref 23–29)
CREAT SERPL-MCNC: 1.2 MG/DL (ref 0.5–1.4)
EJECTION FRACTION: 30 %
ERYTHROCYTE [DISTWIDTH] IN BLOOD BY AUTOMATED COUNT: 13.8 % (ref 11.5–14.5)
EST. GFR  (NO RACE VARIABLE): 43.8 ML/MIN/1.73 M^2
GLUCOSE SERPL-MCNC: 79 MG/DL (ref 70–110)
HCT VFR BLD AUTO: 41.9 % (ref 37–48.5)
HGB BLD-MCNC: 13.9 G/DL (ref 12–16)
MAGNESIUM SERPL-MCNC: 1.7 MG/DL (ref 1.6–2.6)
MCH RBC QN AUTO: 30.2 PG (ref 27–31)
MCHC RBC AUTO-ENTMCNC: 33.2 G/DL (ref 32–36)
MCV RBC AUTO: 91 FL (ref 82–98)
PHOSPHATE SERPL-MCNC: 3.7 MG/DL (ref 2.7–4.5)
PLATELET # BLD AUTO: 225 K/UL (ref 150–450)
PMV BLD AUTO: 10.6 FL (ref 9.2–12.9)
POTASSIUM SERPL-SCNC: 3.8 MMOL/L (ref 3.5–5.1)
RBC # BLD AUTO: 4.61 M/UL (ref 4–5.4)
SODIUM SERPL-SCNC: 133 MMOL/L (ref 136–145)
WBC # BLD AUTO: 11.13 K/UL (ref 3.9–12.7)

## 2025-01-06 PROCEDURE — 36415 COLL VENOUS BLD VENIPUNCTURE: CPT | Mod: HCNC | Performed by: STUDENT IN AN ORGANIZED HEALTH CARE EDUCATION/TRAINING PROGRAM

## 2025-01-06 PROCEDURE — 93010 ELECTROCARDIOGRAM REPORT: CPT | Mod: HCNC,,, | Performed by: INTERNAL MEDICINE

## 2025-01-06 PROCEDURE — 20600001 HC STEP DOWN PRIVATE ROOM: Mod: HCNC

## 2025-01-06 PROCEDURE — D9220A PRA ANESTHESIA: Mod: HCNC,ANES,, | Performed by: ANESTHESIOLOGY

## 2025-01-06 PROCEDURE — 37000009 HC ANESTHESIA EA ADD 15 MINS: Mod: HCNC | Performed by: INTERNAL MEDICINE

## 2025-01-06 PROCEDURE — 92960 CARDIOVERSION ELECTRIC EXT: CPT | Mod: HCNC,,, | Performed by: INTERNAL MEDICINE

## 2025-01-06 PROCEDURE — 27000207 HC ISOLATION: Mod: HCNC

## 2025-01-06 PROCEDURE — 94761 N-INVAS EAR/PLS OXIMETRY MLT: CPT | Mod: HCNC

## 2025-01-06 PROCEDURE — 25000003 PHARM REV CODE 250: Mod: HCNC | Performed by: INTERNAL MEDICINE

## 2025-01-06 PROCEDURE — 83735 ASSAY OF MAGNESIUM: CPT | Mod: HCNC | Performed by: STUDENT IN AN ORGANIZED HEALTH CARE EDUCATION/TRAINING PROGRAM

## 2025-01-06 PROCEDURE — 5A2204Z RESTORATION OF CARDIAC RHYTHM, SINGLE: ICD-10-PCS | Performed by: INTERNAL MEDICINE

## 2025-01-06 PROCEDURE — 99232 SBSQ HOSP IP/OBS MODERATE 35: CPT | Mod: HCNC,GC,, | Performed by: INTERNAL MEDICINE

## 2025-01-06 PROCEDURE — 37000008 HC ANESTHESIA 1ST 15 MINUTES: Mod: HCNC | Performed by: INTERNAL MEDICINE

## 2025-01-06 PROCEDURE — 25000003 PHARM REV CODE 250: Mod: HCNC | Performed by: STUDENT IN AN ORGANIZED HEALTH CARE EDUCATION/TRAINING PROGRAM

## 2025-01-06 PROCEDURE — 93005 ELECTROCARDIOGRAM TRACING: CPT | Mod: HCNC

## 2025-01-06 PROCEDURE — D9220A PRA ANESTHESIA: Mod: HCNC,CRNA,, | Performed by: NURSE ANESTHETIST, CERTIFIED REGISTERED

## 2025-01-06 PROCEDURE — 63600175 PHARM REV CODE 636 W HCPCS: Mod: HCNC | Performed by: NURSE ANESTHETIST, CERTIFIED REGISTERED

## 2025-01-06 PROCEDURE — 25000003 PHARM REV CODE 250: Mod: HCNC | Performed by: NURSE ANESTHETIST, CERTIFIED REGISTERED

## 2025-01-06 PROCEDURE — 63600175 PHARM REV CODE 636 W HCPCS: Mod: HCNC | Performed by: STUDENT IN AN ORGANIZED HEALTH CARE EDUCATION/TRAINING PROGRAM

## 2025-01-06 PROCEDURE — 80069 RENAL FUNCTION PANEL: CPT | Mod: HCNC | Performed by: STUDENT IN AN ORGANIZED HEALTH CARE EDUCATION/TRAINING PROGRAM

## 2025-01-06 PROCEDURE — 92960 CARDIOVERSION ELECTRIC EXT: CPT | Mod: HCNC | Performed by: INTERNAL MEDICINE

## 2025-01-06 PROCEDURE — 85027 COMPLETE CBC AUTOMATED: CPT | Mod: HCNC | Performed by: STUDENT IN AN ORGANIZED HEALTH CARE EDUCATION/TRAINING PROGRAM

## 2025-01-06 RX ORDER — SODIUM CHLORIDE 0.9 % (FLUSH) 0.9 %
10 SYRINGE (ML) INJECTION
Status: DISCONTINUED | OUTPATIENT
Start: 2025-01-06 | End: 2025-01-07

## 2025-01-06 RX ORDER — ETOMIDATE 2 MG/ML
INJECTION INTRAVENOUS
Status: DISCONTINUED | OUTPATIENT
Start: 2025-01-06 | End: 2025-01-06

## 2025-01-06 RX ORDER — LIDOCAINE HYDROCHLORIDE 20 MG/ML
SOLUTION OROPHARYNGEAL
Status: DISCONTINUED | OUTPATIENT
Start: 2025-01-06 | End: 2025-01-06

## 2025-01-06 RX ORDER — PROPOFOL 10 MG/ML
VIAL (ML) INTRAVENOUS CONTINUOUS PRN
Status: DISCONTINUED | OUTPATIENT
Start: 2025-01-06 | End: 2025-01-06

## 2025-01-06 RX ORDER — MAGNESIUM SULFATE HEPTAHYDRATE 40 MG/ML
2 INJECTION, SOLUTION INTRAVENOUS ONCE
Status: COMPLETED | OUTPATIENT
Start: 2025-01-06 | End: 2025-01-06

## 2025-01-06 RX ORDER — GLUCAGON 1 MG
1 KIT INJECTION
Status: DISCONTINUED | OUTPATIENT
Start: 2025-01-06 | End: 2025-01-07

## 2025-01-06 RX ORDER — LIDOCAINE HYDROCHLORIDE 20 MG/ML
INJECTION, SOLUTION EPIDURAL; INFILTRATION; INTRACAUDAL; PERINEURAL
Status: DISCONTINUED | OUTPATIENT
Start: 2025-01-06 | End: 2025-01-06

## 2025-01-06 RX ADMIN — ATORVASTATIN CALCIUM 10 MG: 10 TABLET, FILM COATED ORAL at 09:01

## 2025-01-06 RX ADMIN — SODIUM CHLORIDE: 0.9 INJECTION, SOLUTION INTRAVENOUS at 02:01

## 2025-01-06 RX ADMIN — APIXABAN 5 MG: 5 TABLET, FILM COATED ORAL at 09:01

## 2025-01-06 RX ADMIN — METOPROLOL TARTRATE 25 MG: 25 TABLET, FILM COATED ORAL at 02:01

## 2025-01-06 RX ADMIN — SULFAMETHOXAZOLE AND TRIMETHOPRIM 1 TABLET: 800; 160 TABLET ORAL at 08:01

## 2025-01-06 RX ADMIN — ETOMIDATE 8 MG: 2 INJECTION INTRAVENOUS at 03:01

## 2025-01-06 RX ADMIN — GABAPENTIN 100 MG: 100 CAPSULE ORAL at 09:01

## 2025-01-06 RX ADMIN — SPIRONOLACTONE 25 MG: 25 TABLET, FILM COATED ORAL at 09:01

## 2025-01-06 RX ADMIN — PROPOFOL 50 MCG/KG/MIN: 10 INJECTION, EMULSION INTRAVENOUS at 03:01

## 2025-01-06 RX ADMIN — LEVOTHYROXINE SODIUM 112 MCG: 112 TABLET ORAL at 05:01

## 2025-01-06 RX ADMIN — DIGOXIN 0.12 MG: 125 TABLET ORAL at 09:01

## 2025-01-06 RX ADMIN — MAGNESIUM SULFATE HEPTAHYDRATE 2 G: 40 INJECTION, SOLUTION INTRAVENOUS at 11:01

## 2025-01-06 RX ADMIN — GABAPENTIN 100 MG: 100 CAPSULE ORAL at 02:01

## 2025-01-06 RX ADMIN — APIXABAN 5 MG: 5 TABLET, FILM COATED ORAL at 08:01

## 2025-01-06 RX ADMIN — POTASSIUM BICARBONATE 25 MEQ: 978 TABLET, EFFERVESCENT ORAL at 11:01

## 2025-01-06 RX ADMIN — LOSARTAN POTASSIUM 100 MG: 50 TABLET, FILM COATED ORAL at 09:01

## 2025-01-06 RX ADMIN — LIDOCAINE HYDROCHLORIDE 15 ML: 20 SOLUTION ORAL at 02:01

## 2025-01-06 RX ADMIN — GABAPENTIN 100 MG: 100 CAPSULE ORAL at 08:01

## 2025-01-06 RX ADMIN — SULFAMETHOXAZOLE AND TRIMETHOPRIM 1 TABLET: 800; 160 TABLET ORAL at 09:01

## 2025-01-06 RX ADMIN — METOPROLOL TARTRATE 25 MG: 25 TABLET, FILM COATED ORAL at 08:01

## 2025-01-06 RX ADMIN — PHENAZOPYRIDINE HYDROCHLORIDE 100 MG: 100 TABLET ORAL at 05:01

## 2025-01-06 RX ADMIN — METOPROLOL TARTRATE 25 MG: 25 TABLET, FILM COATED ORAL at 09:01

## 2025-01-06 RX ADMIN — LIDOCAINE HYDROCHLORIDE 80 MG: 20 INJECTION, SOLUTION EPIDURAL; INFILTRATION; INTRACAUDAL at 03:01

## 2025-01-06 RX ADMIN — METOPROLOL TARTRATE 25 MG: 25 TABLET, FILM COATED ORAL at 05:01

## 2025-01-06 NOTE — ASSESSMENT & PLAN NOTE
Hold diuretic  Bedside US done, collapsible IVC  Continue Jardiance   Consider decreasing spirolactone to 12.5 mg.   Repeat TTE in 3 months

## 2025-01-06 NOTE — ANESTHESIA PREPROCEDURE EVALUATION
01/06/2025  Tiffany Masterson is a 89 y.o., female.    Pre-operative evaluation for Procedure(s) (LRB):  Cardioversion or Defibrillation (N/A)  Transesophageal echo (SONIA) intra-procedure log documentation (N/A)    Tiffany Masterson is a 89 y.o. female     Patient Active Problem List   Diagnosis    Hypothyroidism    Primary hypertension    Vitamin D deficiency disease    Osteopenia    Ocular migraine    Vaginal atrophy    Uterovaginal prolapse    Cystocele, midline    Rectocele, female    Chronic constipation    Colon adenomas    Preventative health care    Insomnia due to medical condition    Paroxysmal atrial fibrillation    Umbilical hernia without obstruction and without gangrene    Trigger ring finger of left hand    Hoarseness of voice    Varicose veins of both lower extremities    Hx of skin cancer, basal cell    Dry eyes, bilateral    Seasonal allergic rhinitis due to pollen    Blurry vision    Multiple actinic keratoses    Generalized muscle weakness    Generalized osteoarthritis of multiple sites    Brittle nails    Localized osteoarthritis of left knee    Mixed hyperlipidemia    PND (post-nasal drip)    Skin lesions    Osteoporosis without current pathological fracture    Concussion with loss of consciousness    Sensorineural hearing loss (SNHL) of both ears    Bilateral leg edema    Frequent falls    Poor balance    Stage 3b chronic kidney disease    Heart failure with reduced ejection fraction    UTI (urinary tract infection)    Anemia    Acute combined systolic and diastolic heart failure    Longstanding persistent atrial fibrillation       Review of patient's allergies indicates:   Allergen Reactions    Levaquin [levofloxacin]      Joint pain    Hydrochlorothiazide Other (See Comments)     Pain in joints and depression per pt       No current facility-administered medications on file prior to  encounter.     Current Outpatient Medications on File Prior to Encounter   Medication Sig Dispense Refill    acetaminophen (TYLENOL) 500 MG tablet Take 500 mg by mouth every 6 (six) hours as needed.      ascorbic acid, vitamin C, (VITAMIN C) 250 MG tablet Take 250 mg by mouth once daily.      atorvastatin (LIPITOR) 10 MG tablet Take 1 tablet by mouth once daily 90 tablet 3    b complex vitamins capsule Take 1 capsule by mouth once daily.      biotin 5,000 mcg TbDL Take 1 tablet (5,000 mcg total) by mouth Daily. 90 tablet 3    bisacodyL (DULCOLAX) 5 mg EC tablet Take 5 mg by mouth once daily.      CALCIUM CARBONATE/VITAMIN D3 (CALCIUM 600 + D,3, ORAL) Take 1 tablet by mouth once daily.       cholecalciferol, vitamin D3, (VITAMIN D3) 50 mcg (2,000 unit) Cap Take 1 capsule by mouth once daily.      coenzyme Q10 200 mg capsule Take 200 mg by mouth once daily.      DEXTRAN 70/HYPROMELLOSE (ARTIFICIAL TEARS, PF, OPHT) Place 1 drop into both eyes as needed.      diltiaZEM (CARDIZEM) 90 MG tablet Take 90 mg by mouth every 8 (eight) hours.      ELIQUIS 5 mg Tab Take 1 tablet by mouth twice daily 180 tablet 3    estradioL (ESTRACE) 0.01 % (0.1 mg/gram) vaginal cream Place 0.5 g intravaginally q.h.s. x2 weeks; then, placed 0.5 g intravaginally twice weekly at bedtime (maintenance therapy). 42.5 g 1    fluticasone (VERAMYST) 27.5 mcg/actuation nasal spray 2 sprays by Nasal route.      fluticasone propionate (FLONASE) 50 mcg/actuation nasal spray CLEAN SINUS/NARES WITH OCEAN NASAL SPRAY THEN USE FLONASE 1 SPRAY TWICE DAILY. 48 g 3    gabapentin (NEURONTIN) 100 MG capsule TAKE 1 CAPSULE BY MOUTH THREE TIMES DAILY 90 capsule 3    hydroquinone 4 % Crea APPLY  CREAM TOPICALLY TWICE DAILY 58 g 0    levothyroxine (SYNTHROID) 112 MCG tablet TAKE 1 TABLET BY MOUTH BEFORE BREAKFAST 90 tablet 3    LIDOcaine (LIDODERM) 5 % Place 1 patch onto the skin once daily. Remove & Discard patch within 12 hours or as directed by MD 30 patch 0     losartan (COZAAR) 100 MG tablet Take 1 tablet by mouth once daily 90 tablet 3    magnesium oxide 400 mg magnesium Tab Take 1 tablet by mouth once daily.      metoprolol succinate (TOPROL-XL) 50 MG 24 hr tablet Take 1 tablet by mouth twice daily 180 tablet 3    MULTIVITAMIN W-MINERALS/LUTEIN (CENTRUM SILVER ORAL) Take 1 tablet by mouth once daily.      mupirocin (BACTROBAN) 2 % ointment Apply topically 3 (three) times daily. Apply to wound on head until healed 30 g 1    sodium chloride (SALINE NASAL) 0.65 % nasal spray 1 spray by Nasal route as needed for Congestion. 104 mL 3    vit A/vit C/vit E/zinc/copper (ICAPS AREDS ORAL) Take 1 tablet by mouth 2 (two) times a day.      vitamin D (VITAMIN D3) 1000 units Tab Take 2 tablets by mouth once daily.         Past Surgical History:   Procedure Laterality Date    CATARACT EXTRACTION Right 2012    Dr. Lexis Nicolas     CHOLECYSTECTOMY  2022    COLONOSCOPY      2014 polyps    COLONOSCOPY N/A 11/07/2016    Procedure: COLONOSCOPY;  Surgeon: Bebeto Gonzalez MD;  Location: [address]);  Service: Endoscopy;  Laterality: N/A;    COLONOSCOPY N/A 03/09/2020    Procedure: COLONOSCOPY;  Surgeon: Bebeto Gonzalez MD;  Location: [address]);  Service: Endoscopy;  Laterality: N/A;    COLONOSCOPY N/A 09/29/2021    Procedure: COLONOSCOPY;  Surgeon: Bebeto Gonzalez MD;  Location: [address]);  Service: Endoscopy;  Laterality: N/A;  please schedule patient as soon as possible with me EGD colonoscopy with constipation bowel prep for iron deficiency anemia family history of colon cancer and personal history of colon polyps  9/17/21 ok for standard split prep per Dr. Gonzalez-ml    COLONOSCOPY N/A 09/29/2021    Procedure: COLONOSCOPY;  Surgeon: Bebeto Gonzalez MD;  Location: [address]);  Service: Endoscopy;  Laterality: N/A;  Please schedule patient as soon as possible with me EGD colonoscopy with constipation bowel prep for iron deficiency anemia  family history of colon cancer and personal history of colon polyps  9/17/21 Ok for standard split prep per Dr. Gonzalez-ml    COLONOSCOPY W/ POLYPECTOMY  06/2013    polyp of colon    ENDOSCOPIC ULTRASOUND OF UPPER GASTROINTESTINAL TRACT N/A 04/29/2021    Procedure: ULTRASOUND, UPPER GI TRACT, ENDOSCOPIC;  Surgeon: Dalton Johnson MD;  Location: [facility] (2ND FLR);  Service: Endoscopy;  Laterality: N/A;  Postprandial nausea vomiting epigastric 0.9 cm stone and sludge seen within the gallbladder lumen.  No wall thickening or pericholecystic fluid.  0.6 cm stone seen within the distal common bile duct at the level of the pancreatic head.  There is dil    ERCP N/A 04/29/2021    Procedure: ERCP (ENDOSCOPIC RETROGRADE CHOLANGIOPANCREATOGRAPHY);  Surgeon: Dalton Johnson MD;  Location: [facility] (2ND FLR);  Service: Endoscopy;  Laterality: N/A;  Postprandial nausea and vomit 0.9 cm stone and sludge seen within the gallbladder lumen.  No wall thickening or pericholecystic fluid.  0.6 cm stone seen within the distal common bile duct at the level of the pancreatic head.  There i    ESOPHAGOGASTRODUODENOSCOPY N/A 09/29/2021    Procedure: EGD (ESOPHAGOGASTRODUODENOSCOPY);  Surgeon: Bebeto Gonzalez MD;  Location: [facility] (4TH FLR);  Service: Endoscopy;  Laterality: N/A;  rapid covid test arrival 12pm - sm  9/27 arrival time for covid test/procedure confirmed with pt-rb    ESOPHAGOGASTRODUODENOSCOPY N/A 09/29/2021    Procedure: EGD (ESOPHAGOGASTRODUODENOSCOPY);  Surgeon: Bebeto Gonzalez MD;  Location: [facility] (4TH FLR);  Service: Endoscopy;  Laterality: N/A;  covid test 9/19/21 [facility], prep instr emailed -ml    EYE SURGERY  11/10/2014    right retina/Dr. Dalton Sierra ([facility])    HYSTEROSCOPIC POLYPECTOMY OF UTERUS N/A 07/02/2018    Procedure: POLYPECTOMY, UTERUS, HYSTEROSCOPIC;  Surgeon: Elliot Vanessa IV, MD;  Location: [facility] OR;  Service: OB/GYN;  Laterality: N/A;    INJECTION OF JOINT Right 11/11/2022    Procedure:  INJECTION, RIGHT GTB CONTRAST DIRECT REFERRAL;  Surgeon: Camden Hernandez MD;  Location: [facility] PAIN MGT;  Service: Pain Management;  Laterality: Right;    JOINT REPLACEMENT  03/23/2011    left total hip replacement    LAPAROSCOPIC CHOLECYSTECTOMY N/A 05/17/2021    Procedure: CHOLECYSTECTOMY, LAPAROSCOPIC;  Surgeon: Rayshawn Peralta Jr., MD;  Location: [facility] OR [address] [place] [address];  Service: General;  Laterality: N/A;    REMOVAL OF EPIRETINAL MEMBRANE Right     Dr. Padron    TONSILLECTOMY      pt was 11 years old       Social History     Socioeconomic History    Marital status:    Tobacco Use    Smoking status: Never    Smokeless tobacco: Never    Tobacco comments:     The patient is not getting any regular exercise at this time.  She does enjoy traveling to [place].   Substance and Sexual Activity    Alcohol use: Yes     Comment: occasionally    Drug use: No    Sexual activity: Yes     Partners: Male     Birth control/protection: Post-menopausal     Comment:  64 years      Social Drivers of Health     Financial Resource Strain: Low Risk  (1/3/2025)    Overall Financial Resource Strain (CARDIA)     Difficulty of Paying Living Expenses: Not hard at all   Food Insecurity: No Food Insecurity (1/3/2025)    Hunger Vital Sign     Worried About Running Out of Food in the Last Year: Never true     Ran Out of Food in the Last Year: Never true   Transportation Needs: No Transportation Needs (1/3/2025)    TRANSPORTATION NEEDS     Transportation : No   Physical Activity: Insufficiently Active (1/3/2025)    Exercise Vital Sign     Days of Exercise per Week: 2 days     Minutes of Exercise per Session: 30 min   Stress: No Stress Concern Present (1/3/2025)    [employer] - Occupational Stress Questionnaire     Feeling of Stress : Not at all   Housing Stability: Low Risk  (1/3/2025)    Housing Stability Vital Sign     Unable to Pay for Housing in the Last Year: No     Homeless in the Last Year: No  "        CBC:   Recent Labs     25  0351 25  0544   WBC 11.50 11.13   RBC 4.52 4.61   HGB 13.5 13.9   HCT 41.3 41.9    225   MCV 91 91   MCH 29.9 30.2   MCHC 32.7 33.2       CMP:   Recent Labs     25  0351 25  0544    133*   K 4.2 3.8   CL 99 96   CO2 25 22*   BUN 17 14   CREATININE 1.3 1.2   GLU 73 79   MG 1.7 1.7   PHOS 4.5 3.7   CALCIUM 9.1 8.5*   ALBUMIN 2.9* 2.7*       INR  No results for input(s): "PT", "INR", "PROTIME", "APTT" in the last 72 hours.        Diagnostic Studies:      EKD Echo:  No results found. However, due to the size of the patient record, not all encounters were searched. Please check Results Review for a complete set of results.      Pre-op Assessment    I have reviewed the Patient Summary Reports.    I have reviewed the NPO Status.   I have reviewed the Medications.     Review of Systems  Anesthesia Hx:  No problems with previous Anesthesia               Denies Personal Hx of Anesthesia complications.                    Cardiovascular:  Exercise tolerance: good   Hypertension    Dysrhythmias atrial fibrillation                                     Renal/:  Chronic Renal Disease, CKD                Neurological:  TIA,  Denies CVA.    Denies Seizures.                                    Physical Exam  General: Cooperative, Oriented and Alert    Airway:  Mallampati: III / II  Mouth Opening: Normal  TM Distance: Normal  Tongue: Normal  Neck ROM: Normal ROM    Dental:  Intact        Anesthesia Plan  Type of Anesthesia, risks & benefits discussed:    Anesthesia Type: Gen Natural Airway  Intra-op Monitoring Plan: Standard ASA Monitors  Induction:  IV  Informed Consent: Informed consent signed with the Patient and all parties understand the risks and agree with anesthesia plan.  All questions answered.   ASA Score: 4  Day of Surgery Review of History & Physical: H&P Update referred to the surgeon/provider.    Ready For Surgery From Anesthesia " Perspective.     .

## 2025-01-06 NOTE — PROGRESS NOTES
[facility] - Cardiology [facility] Medicine  Progress Note    Patient Name: Tiffany Masterson  MRN: [id number]  Patient Class: IP- Inpatient   Admission Date: 1/2/2025  Length of Stay: 2 days  Attending Physician: Karissa García MD  Primary Care Provider: Rebeca Dumont MD        Subjective     Principal Problem: Paroxysmal atrial fibrillation    HPI:  Tiffany Masterson is a pleasant 89 y.o. female w/ PMHx of atrial fibrillation (on apixaban), HFpEF, CKD3, HTN, hypothyroidism, DEE, HLD, migraines & recurrent falls recently (resulting in a SAH & skull fracture in Sept '24), who presented to [facility] ED on 1/2/2025 from her PCP's office w/ progressive bilateral LE edema. Pt reports she noticed increased swelling of her legs starting 2-3 months ago that has continued to worsen since then w/ interval development of SOB, along w/ FLORES, orthopnea, weakness & decreased PO intake. Swelling has gotten particularly bad over past couple days.  reports that swelling got so bad that legs were weeping clear fluid. No clear trigger/inciting event for exacerbation. Pt has had multiple falls over the last few months, most recently a few days prior to presentation where she sustained a small contusion to her forehead, but denies any residual pain or injury related to the fall. No recent illnesses or medication changes. Endorses compliance with home medications. Given severity of edema & pt's fragility (w/ recent falls), pt sent to ED for further evaluation & mgmt.     On arrival to ED, pt was noted to be in atrial fibrillation w/ HR sustaining 170s-190s. Luckily BP & SpO2 stable on RA. Labs notable for BNP 1246, troponin nml x2, Cr 1.5, bicarb 20, glucose 115, Hgb 11.8, TSH 4.68, & FT4 1.2. UA w/ 3+ LE, 18 WBCs & occasional bacteria. CXR showed interstitial attenuation c/w pulmonary edema, as well as b/l pleural effusions. COVID/Flu/RSV negative. Received pushes of IV diltiazem, IV lasix, and CTX. Admitted to Hospital Medicine  Cardiac Stepdown Unit for further workup & mgmt.     Overview/Hospital Course:  Admitted for afib w/ RVR in setting of volume overload. Given 10mg IV diltiazem x2, 40mg IV lasix x2, and then continued on home PO diltiazem. HR improved from sustained 120s-130s (w/ jumps up to 170s) --> 90s-100s (w/ jumps to 120s-130s). TTE showed drop in EF to 25-30% (down from > 55% in Sept '24). Diltiazem d/c'ed. Loaded w/ digoxin & metoprolol dose increased. Jardiance started. Cardiology consulted given drop in EF. Metoprolol transitioned to tartrate (to avoid potential bradycardia in event that pt converted to NSR). Continued on IV lasix w/ excellent response. Renal function slowly improving and BP stable allowing for uptitration of diuretics to IV lasix 60mg TID w/ robust response (up to 6+ liters UOP/day). While BP & renal function remained stable, pt started to experience some orthostatic dizziness for which diuresis downtitrated. Completed 3-day course of CTX, but developed dysuria. Phenazopyridine added. Urine cx grew MDRO/ESBL E.coli (resistant to CTX). Started on 3-day course of Bactrim. Euvolemia achieved 1/5 (note dry weight is 68.6 kg). IV diuresis d/c'ed. Pt started on spironolactone, and continued on metoprolol & losartan.     Interval History:   Received single dose of 40mg IV lasix yesterday w/ 1.6L UOP (net -670cc) for past 24hrs. Further diuresis held per Cardiology recs. NAEON. Remains in a-fib w/ HR ranging anywhere from 90s-140s. Soft BP, but renal function stable/improving. Pt overall feeling well. Reports some continued SOB intermittently (including at rest), but no notable cough, dizziness/lightheadedness, chest pain or other sx. States that compared to when she came in, she's feeling much better. Tired of being in bed and reports hips are starting to get achy; hopeful to get up to chair and even walk around with therapies later. Reports resolution of dysuria. Remains on Bactrim for MDRO UTI. Continuing on  metoprolol & digoxin (and apixaban). Planning for SONIA/DCCV today. Some residual LE edema and bibasilar crackles on today's exam. Will likely start PO diuretic tmrw. Plan reviewed with patient at bedside on AM rounds.     Review of Systems   Constitutional:  Negative for chills, diaphoresis and fever.   HENT:  Negative for congestion, rhinorrhea, sore throat and trouble swallowing.    Eyes:  Negative for photophobia and visual disturbance.   Respiratory:  Positive for shortness of breath. Negative for cough and wheezing.    Cardiovascular:  Positive for leg swelling (improved). Negative for chest pain and palpitations.   Gastrointestinal:  Negative for abdominal pain, diarrhea, nausea and vomiting.   Genitourinary:  Negative for difficulty urinating, dysuria and hematuria.   Musculoskeletal:  Negative for arthralgias, joint swelling and neck pain.   Skin:  Positive for rash. Negative for wound.   Neurological:  Negative for dizziness, syncope, light-headedness and headaches.   Psychiatric/Behavioral:  Negative for agitation and confusion. The patient is not nervous/anxious.        Objective:     Vital Signs (Most Recent):  Temp: 97.3 °F (36.3 °C) (01/04/25 1220)  Pulse: 106 (01/04/25 1220)  Resp: 18 (01/04/25 1220)  BP: 124/67 (01/04/25 1220)  SpO2: 99 % (01/04/25 1220) Vital Signs (24h Range):  Temp:  [97 °F (36.1 °C)-98.2 °F (36.8 °C)] 97.3 °F (36.3 °C)  Pulse:  [] 106  Resp:  [16-20] 18  SpO2:  [92 %-99 %] 99 %  BP: (108-147)/(62-97) 124/67     Weight:  (Patient can not stand)  Body mass index is 26.69 kg/m².    Intake/Output Summary (Last 24 hours) at 1/4/2025 1414  Last data filed at 1/4/2025 1117  Gross per 24 hour   Intake 698 ml   Output 2125 ml   Net -1427 ml         Physical Exam  Constitutional:       General: She is not in acute distress.     Appearance: She is not toxic-appearing or diaphoretic.   HENT:      Head: Normocephalic and atraumatic.      Mouth/Throat:      Mouth: Mucous membranes are  moist.   Eyes:      Conjunctiva/sclera: Conjunctivae normal.   Cardiovascular:      Rate and Rhythm: Tachycardia present. Rhythm irregular.      Heart sounds: Normal heart sounds.   Pulmonary:      Effort: Pulmonary effort is normal. No respiratory distress.      Breath sounds: Rales (bibasilar) present. No wheezing or rhonchi.   Abdominal:      General: Bowel sounds are normal. There is no distension.      Palpations: Abdomen is soft.      Tenderness: There is no abdominal tenderness. There is no guarding.   Musculoskeletal:      Right lower leg: Edema (trace to 1+) present.      Left lower leg: Edema (trace to 1+) present.   Skin:     General: Skin is warm and dry.      Findings: Lesion and rash present.      Comments: See image below   Neurological:      General: No focal deficit present.      Mental Status: She is alert and oriented to person, place, and time. Mental status is at baseline.   Psychiatric:         Mood and Affect: Mood normal.         Behavior: Behavior normal.               Significant Labs: All pertinent labs within the past 24 hours have been reviewed.    Significant Imaging: I have reviewed all pertinent imaging results/findings within the past 24 hours.        Assessment and Plan     Acute on chronic atrial fibrillation with RVR  Pt w/ long hx of chronic atrial fibrillation, managed w/ metoprolol & diltiazem (along w/ apixaban). [id number] score 5-6. On admission, EKG notable for atrial fibrillation w/ RVR (). Pt is asymptomatic. CXR & exam c/w volume overload. TTE showed drop in EF from > 55% in Sept '24 to 25-30% now. Favor volume overload 2/2 declining systolic function to be driving RVR exacerbation. Aside from CHF, etiology of uncontrolled afib unclear- TSH elevated but FT4 nml, troponin nml, no notable electrolyte derangements, & no hypoxia. UA w/ e/o UTI (although pt largely asymptomatic & afebrile w/ nml WBC), & pt denies illicit substance use (UDS not collected on admit).   -  Cardiology consulted; appreciate recs   - Plan for SONIA/DCCV on 1/6/25  - Continue home apixaban  - D/c diltiazem given low EF  - S/p digoxin 0.5 mcg x1 + 0.25mcg Q6H x2; continue at 0.125mcg daily   - Switched metoprolol to tartrate 25mg Q6H  - Metoprolol PRN for goal HR < 120   - Monitor electrolytes, replete PRN for goal K > 4 & Mg > 2  - Telemetry  - Volume mgmt as below   - Follow-up w/ EP on discharge for evaluation of Watchman (medardo given recurrent falls)    Heart failure with reduced ejection fraction   Hx of HFpEF- last TTE (Sept '24) w/ LVEF > 55%, indeterminate diastolic function, mild AV sclerosis w/o stenosis, mild AI, mild TR & MR, mild LA dilation, & mild aortic & mitral annular calcification. Pt presented w/ progressive BLE edema. On admission, BNP 1246 w/ e/o volume overload on exam. CXR w/ b/l pleural effusions & e/o extensive pulmonary edema. Weight 75kg on admission. Repeat TTE showed LVEF 25-30%, mildly reduced RV systolic function, severe LA dilation, mild AS (ARRON by VTI 2.1, peak velocity 1.4, mean gradient 5, DI 0.5), mild AI, moderately thickened mitral leaflets, moderate TR, PASP 29; IVC not well visualized. Unclear etiology of pts declining EF. While uncontrolled afib considered, inverse favored. TSH elevated but FT4 nml, and troponin negative x2.   - Cardiology consulted; appreciate recs  - Holding further diuresis for today. Likely start PO lasix tmrw for goal net even to -0.5L/day  - Metoprolol as above  - Digoxin as above  - Continue home losartan   - Start Jardiance 10mg & spironolactone 25mg daily  - Strict I/Os & daily standing weights  - Fluid (1.5L/day) & sodium (2g) restriction  - Monitor BMP w/ electrolyte replacement PRN  - Telemetry   - Mgmt of afib as above     UTI (urinary tract infection)  MDR E.coli infxn  UA on admit w/ 3+ LE, 18 WBCs & occasional bacteria. Denies dysuria, hematuria, frequency or urgency. Generally would not treat as pt seemingly asymptomatic, but given RVR  "& recent falls, abx started. Despite starting CTX, interval development of mild dysuria upon initiation of micturition on 1/4 that improved w/ phenazopyridine. Urine cx resulted w/ ESBL/MDRO E.coli. Placed on 3-day course of Bactrim.   - Bactrim 800-160mg BID x3 days   - Holding further phenazopyridine    Stage 3b chronic kidney disease  On admission, Cr 1.5 (baseline ~1.1-1.3) w/ BUN 20. UA w/ 3+ LE, 18 WBCs & occasional bacteria.   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 The fall in September seemingly mechanical (was carrying boxes up stairs in the dark and tripped) and resulted in pt falling backward w/ resulting ICH & occipital bone fracture. Pt's subsequent falls have all reportedly been when pt is getting out of bed or getting up to use the bathroom (usually at night), more suggestive of vasovagal/orthostatic etiology. Pt denies any assoc sx w/ falls- no dizziness, lightheadedness, palpitations, etc. Denies tripping on subsequent episodes- "just falls." Most recent fall a few days PTA resulted in small contusion on forehead but no major trauma/injuries.   - PT/OT  - Daily orthostatics   - B vitamin lvls pending    Normocytic anemia   On admission, Hgb 11.8 (baseline mid 10s) w/ MCV 91. No e/o active bleeding. Last iron panel in 2021. Recent B12 from Sept '24 w/ low-nml B12. Anemia 2/2 CKD suspected, although contributing nutritional deficiency considered. Iron panel w/ low %sat (11), elevated ferritin (605), and nml iron (32), TIBC (299), & transferrin (202). Nml B12 (727) & folate (15.5).   - Vitamin B1, B3 & B6 pending   - Monitor CBC & for e/o active bleeding  - Transfuse PRN for goal Hgb > 7    Rash/lesions of bilateral lower extremities  New rash w/ lesions/scabs of BLEs noted on 1/4. Images " uploaded to Media. Denies pain or itching and only noticed them when her compression stockings were removed. Possibly 2/2 skin breaks from severe edema w/ weeping of legs PTA. Surrounding erythema, but no tenderness, drainage or other sx of infxn.   - Dermatology consulted; appreciate recs     Mixed hyperlipidemia  Hx of mixed dyslipidemia. Last lipid panel (Sept '24) nml.   - Continue home statin     Primary hypertension  Hx of chronic HTN, managed w/ home regimen of losartan, metoprolol XL, & diltiazem. Normotensive on arrival (110s-130s/70s-100s), and remained relatively normotensive throughout admission.   - Cardiac package as above    Hypothyroidism  Last TSH (Sept '24) nml at 2.158. Repeat TSH on admission elevated at 4.68, but FT4 nml at 1.2.   - Continue home levothyroxine 112 mcg daily  - Repeat labs outpatient per PCP         VTE Risk Mitigation (From admission, onward)           Ordered     apixaban tablet 5 mg  2 times daily         01/02/25 2255     Reason for No Pharmacological VTE Prophylaxis  Once        Question:  Reasons:  Answer:  Already adequately anticoagulated on oral Anticoagulants    01/02/25 2255     Place sequential compression device  Until discontinued         01/02/25 2255     IP VTE HIGH RISK PATIENT  Once         01/02/25 2255                    Discharge Planning   AREN: 1/7/2025     Code Status: Full Code   Medical Readiness for Discharge Date:   Discharge Plan A: Home with family        Please place Justification for DME        Karissa García MD  Department of Hospital Medicine   [facility] - Cardiology Stepdown

## 2025-01-06 NOTE — PROGRESS NOTES
[facility] - Cardiology Stepdown  Dermatology  Progress note     Reason for consult:  rash on legs    HPI: The patient continues to do well. Rash stable. No new lesions.       Physical Exam:  Vitals:    01/06/25 1630   BP: 125/60   Pulse: 77   Resp: 20   Temp:      General: NAD   Skin:  - RLE: feet/ankle/shins with trace edema, R medial shin with multiple erythematous papules with central heme crusted ulcer arranged in linear fashion. Biopsy site with pressure dressing in place   - LLE:feet/ankle/shins with trace edema, L lateral shin/ankle  with multiple erythematous papules with hemecrusted center    Assessment and Plan:  89yoF w/ PMHx of heart failure, CKD3, HTN, hypothyroidism, HLD, and falls who was initally admitted for paroxysmal afib, volume overload, and a UTI and dermatology was consulted for the rash on legs.     Dermatitis  The patient with multiple erythematous papules centrally arranged in linear fashion. Timeline of appearance for the lesions unclear. Will biopsy today to confirm diagnosis DDX includes arthropod given linear disruption vs edema bullae (hx of volume overload, isolated to shin) vs vasculitis vs ecthyma (although clinically less suspicious for erythema). Rash stable today without any new lesions appearing.   -Punch biopsy still pending   -Pt was previously on bactrim for UTI. Unclear if improved after abx. If results as infectious can consider keflex/dicloxacillin.    -Further treatment recommendations once the biopsy has resulted.     Case discussed with dermatology attending, Dr. Maradiaga. Dermatology will continue to follow. Please contact us if you have any additional questions.     Glenis Mcdermott MD   Dermatology

## 2025-01-06 NOTE — NURSING TRANSFER
Nursing Transfer Note      1/6/2025   4:51 PM    Transfer To: Room 304    Transfer via bed    Transfer with cardiac monitoring    Transported by RN    Telemetry: Rate 77  Order for Tele Monitor? Yes    Additional Lines: Oxygen    4eyes on Skin: yes    Medicines sent: None    Any special needs or follow-up needed: None    Patient belongings transferred with patient: No    Chart send with patient: Yes    Notified: son    Patient reassessed at: 1/6/2025  16:40 (date, time)

## 2025-01-06 NOTE — TRANSFER OF CARE
"Anesthesia Transfer of Care Note    Patient: Tiffany Masterson    Procedure(s) Performed: Procedure(s) (LRB):  Cardioversion or Defibrillation (N/A)  Transesophageal echo (SONIA) intra-procedure log documentation (N/A)    Patient location: PACU    Anesthesia Type: general    Transport from OR: Transported from OR on 6-10 L/min O2 by face mask with adequate spontaneous ventilation    Post pain: adequate analgesia    Post assessment: no apparent anesthetic complications and tolerated procedure well    Post vital signs: stable    Level of consciousness: sedated    Nausea/Vomiting: no nausea/vomiting    Complications: none    Transfer of care protocol was followed    Last vitals: Visit Vitals  BP (!) 121/56   Pulse 79   Temp 36.6 °C (97.9 °F) (Temporal)   Resp 19   Ht 5' 6" (1.676 m)   Wt 68.6 kg (151 lb 3.8 oz)   SpO2 98%   BMI 24.41 kg/m²     "

## 2025-01-06 NOTE — PROGRESS NOTES
[facility] - Cardiology Stepdown  Cardiology  Progress Note    Patient Name: Tiffany Masterson  MRN: [id number]  Admission Date: 1/2/2025  Hospital Length of Stay: 2 days  Code Status: Full Code   Attending Physician: Karissa García MD   Primary Care Physician: Rebeca Dumont MD  Expected Discharge Date: 1/7/2025  Principal Problem:Paroxysmal atrial fibrillation    Subjective:     Hospital Course:   No notes on file    Interval History: NAEON. Patient reports feeling well overall and reports that she is blood in her urine. Most likely hematuria in setting of UTI, Hgb still stable at 13. Tele still showing Afib, remains HDS stable. Plan for possible SONIA and cardioversion today.     Review of Systems   Constitutional: Positive for weight gain. Negative for chills and fever.   Cardiovascular:  Negative for chest pain, irregular heartbeat, leg swelling, near-syncope, palpitations and syncope.   Respiratory:  Negative for cough and shortness of breath.    Musculoskeletal:  Positive for falls.   Gastrointestinal: Negative.    Genitourinary:  Positive for frequency and hematuria. Negative for dysuria.   Neurological:  Positive for loss of balance.     Objective:     Vital Signs (Most Recent):  Temp: 97.5 °F (36.4 °C) (01/06/25 1146)  Pulse: 90 (01/06/25 1200)  Resp: 18 (01/06/25 1146)  BP: (!) 121/56 (01/06/25 1146)  SpO2: (!) 92 % (01/06/25 1146) Vital Signs (24h Range):  Temp:  [97.5 °F (36.4 °C)-98.2 °F (36.8 °C)] 97.5 °F (36.4 °C)  Pulse:  [] 90  Resp:  [16-19] 18  SpO2:  [88 %-94 %] 92 %  BP: ()/(53-84) 121/56     Weight: 68.6 kg (151 lb 3.8 oz)  Body mass index is 24.41 kg/m².     SpO2: (!) 92 %         Intake/Output Summary (Last 24 hours) at 1/6/2025 1317  Last data filed at 1/6/2025 1230  Gross per 24 hour   Intake 600 ml   Output 700 ml   Net -100 ml       Lines/Drains/Airways       Drain  Duration             Female External Urinary Catheter w/ Suction 01/02/25 1850 3 days              Peripheral  Intravenous Line  Duration                  Peripheral IV - Single Lumen 01/03/25 1044 22 G Anterior;Proximal;Right Forearm 3 days                       Physical Exam  Constitutional:       General: She is not in acute distress.     Appearance: She is normal weight. She is not toxic-appearing.   Eyes:      General: No scleral icterus.  Neck:      Comments: Negative JVD   Cardiovascular:      Rate and Rhythm: Tachycardia present. Rhythm irregular.      Heart sounds:      No gallop.   Pulmonary:      Effort: Pulmonary effort is normal. No respiratory distress.   Abdominal:      General: There is no distension.      Palpations: Abdomen is soft.   Musculoskeletal:      Right lower leg: No edema.      Left lower leg: No edema.   Skin:     General: Skin is warm and dry.   Neurological:      Mental Status: She is alert.   Psychiatric:         Mood and Affect: Mood normal.         Thought Content: Thought content normal.            Significant Labs: CMP   Recent Labs   Lab 01/05/25  0351 01/06/25  0544    133*   K 4.2 3.8   CL 99 96   CO2 25 22*   GLU 73 79   BUN 17 14   CREATININE 1.3 1.2   CALCIUM 9.1 8.5*   ALBUMIN 2.9* 2.7*   ANIONGAP 12 15   , CBC   Recent Labs   Lab 01/05/25  0351 01/06/25  0544   WBC 11.50 11.13   HGB 13.5 13.9   HCT 41.3 41.9    225   , and All pertinent lab results from the last 24 hours have been reviewed.    Significant Imaging:   All pertinent imaging results from the last 24 hours have been reviewed.  Assessment and Plan:       * Paroxysmal atrial fibrillation  Patient has paroxysmal (<7 days) atrial fibrillation. Patient is currently in atrial fibrillation. [id number] Score: 3. The patients heart rate in the last 24 hours is as follows:  Pulse  Min: 67  Max: 141     Antiarrhythmics  metoprolol tartrate (LOPRESSOR) tablet 25 mg, 4 times daily, Oral    Anticoagulants  apixaban tablet 5 mg, 2 times daily, Oral    Plan  - Replete lytes with a goal of K>4, Mg >2  - Patient is  anticoagulated, see medications listed above.  - Patient's afib is currently uncontrolled. Will adjust treatment as follows:    - Continue metoprolol  25mg q6hrs (if converts to sinus rhythm may become bradycardic on high BB dose), can consider switching to Toprol on discharge.   - on digoxin 125mcg,can consider stopping on discharge after DCCV.   - continue telemetry  - Remains in atrial fibrillation will plan for SONIA/DCCV today, can consider amiodarone after for rhythm control.   - EP referral on discharge for possible Watchman evaluation given recent recurrent falls.   -Continue eliquis              Heart failure with reduced ejection fraction  Hold diuretic  Bedside US done, collapsible IVC  Continue Jardiance   Consider decreasing spirolactone to 12.5 mg.   Repeat TTE in 3 months        VTE Risk Mitigation (From admission, onward)           Ordered     apixaban tablet 5 mg  2 times daily         01/02/25 2255     Reason for No Pharmacological VTE Prophylaxis  Once        Question:  Reasons:  Answer:  Already adequately anticoagulated on oral Anticoagulants    01/02/25 2255     Place sequential compression device  Until discontinued         01/02/25 2255     IP VTE HIGH RISK PATIENT  Once         01/02/25 2255                    Tasha Vergara MD  Cardiology  [facility] - Cardiology Stepdown

## 2025-01-06 NOTE — PLAN OF CARE
Pt maintained free from falls/trauma/injuries and skin breakdown. Pt denied pain or discomfort. NPO since midnight. Pt's urine is orange due to phenazopyridine. Plan of care reviewed. Pt verbalized understanding. All questions and concerns addressed.       Problem: Adult Inpatient Plan of Care  Goal: Plan of Care Review  Outcome: Progressing  Goal: Patient-Specific Goal (Individualized)  Outcome: Progressing  Goal: Optimal Comfort and Wellbeing  Outcome: Progressing     Problem: Fall Injury Risk  Goal: Absence of Fall and Fall-Related Injury  Outcome: Progressing     Problem: Skin Injury Risk Increased  Goal: Skin Health and Integrity  Outcome: Progressing     Problem: Heart Failure  Goal: Stable Heart Rate and Rhythm  Outcome: Progressing     Problem: Dysrhythmia  Goal: Normalized Cardiac Rhythm  Outcome: Progressing     Problem: Infection  Goal: Absence of Infection Signs and Symptoms  Outcome: Progressing

## 2025-01-06 NOTE — ASSESSMENT & PLAN NOTE
Patient has paroxysmal (<7 days) atrial fibrillation. Patient is currently in atrial fibrillation. [id number] Score: 3. The patients heart rate in the last 24 hours is as follows:  Pulse  Min: 67  Max: 141     Antiarrhythmics  metoprolol tartrate (LOPRESSOR) tablet 25 mg, 4 times daily, Oral    Anticoagulants  apixaban tablet 5 mg, 2 times daily, Oral    Plan  - Replete lytes with a goal of K>4, Mg >2  - Patient is anticoagulated, see medications listed above.  - Patient's afib is currently uncontrolled. Will adjust treatment as follows:    - Continue metoprolol  25mg q6hrs (if converts to sinus rhythm may become bradycardic on high BB dose), can consider switching to Toprol on discharge.   - on digoxin 125mcg,can consider stopping on discharge after DCCV.   - continue telemetry  - Remains in atrial fibrillation will plan for SONIA/DCCV today, can consider amiodarone after for rhythm control.   - EP referral on discharge for possible Watchman evaluation given recent recurrent falls.   -Continue eliquis

## 2025-01-06 NOTE — SUBJECTIVE & OBJECTIVE
Interval History: NAEON. Patient reports feeling well overall and reports that she is blood in her urine. Most likely hematuria in setting of UTI, Hgb still stable at 13. Tele still showing Afib, remains HDS stable. Plan for possible SONIA and cardioversion today.     Review of Systems   Constitutional: Positive for weight gain. Negative for chills and fever.   Cardiovascular:  Negative for chest pain, irregular heartbeat, leg swelling, near-syncope, palpitations and syncope.   Respiratory:  Negative for cough and shortness of breath.    Musculoskeletal:  Positive for falls.   Gastrointestinal: Negative.    Genitourinary:  Positive for frequency and hematuria. Negative for dysuria.   Neurological:  Positive for loss of balance.     Objective:     Vital Signs (Most Recent):  Temp: 97.5 °F (36.4 °C) (01/06/25 1146)  Pulse: 90 (01/06/25 1200)  Resp: 18 (01/06/25 1146)  BP: (!) 121/56 (01/06/25 1146)  SpO2: (!) 92 % (01/06/25 1146) Vital Signs (24h Range):  Temp:  [97.5 °F (36.4 °C)-98.2 °F (36.8 °C)] 97.5 °F (36.4 °C)  Pulse:  [] 90  Resp:  [16-19] 18  SpO2:  [88 %-94 %] 92 %  BP: ()/(53-84) 121/56     Weight: 68.6 kg (151 lb 3.8 oz)  Body mass index is 24.41 kg/m².     SpO2: (!) 92 %         Intake/Output Summary (Last 24 hours) at 1/6/2025 1317  Last data filed at 1/6/2025 1230  Gross per 24 hour   Intake 600 ml   Output 700 ml   Net -100 ml       Lines/Drains/Airways       Drain  Duration             Female External Urinary Catheter w/ Suction 01/02/25 1850 3 days              Peripheral Intravenous Line  Duration                  Peripheral IV - Single Lumen 01/03/25 1044 22 G Anterior;Proximal;Right Forearm 3 days                       Physical Exam  Constitutional:       General: She is not in acute distress.     Appearance: She is normal weight. She is not toxic-appearing.   Eyes:      General: No scleral icterus.  Neck:      Comments: Negative JVD   Cardiovascular:      Rate and Rhythm: Tachycardia  present. Rhythm irregular.      Heart sounds:      No gallop.   Pulmonary:      Effort: Pulmonary effort is normal. No respiratory distress.   Abdominal:      General: There is no distension.      Palpations: Abdomen is soft.   Musculoskeletal:      Right lower leg: No edema.      Left lower leg: No edema.   Skin:     General: Skin is warm and dry.   Neurological:      Mental Status: She is alert.   Psychiatric:         Mood and Affect: Mood normal.         Thought Content: Thought content normal.            Significant Labs: CMP   Recent Labs   Lab 01/05/25  0351 01/06/25  0544    133*   K 4.2 3.8   CL 99 96   CO2 25 22*   GLU 73 79   BUN 17 14   CREATININE 1.3 1.2   CALCIUM 9.1 8.5*   ALBUMIN 2.9* 2.7*   ANIONGAP 12 15   , CBC   Recent Labs   Lab 01/05/25  0351 01/06/25  0544   WBC 11.50 11.13   HGB 13.5 13.9   HCT 41.3 41.9    225   , and All pertinent lab results from the last 24 hours have been reviewed.    Significant Imaging:   All pertinent imaging results from the last 24 hours have been reviewed.

## 2025-01-06 NOTE — PT/OT/SLP PROGRESS
Occupational Therapy      Patient Name:  Tiffany Masterson   MRN:  [id number]    Patient not seen today secondary to off the floor for procedure/surgery.    Pt's plan of care and OT goals reviewed on this date and remain appropriate. Will follow-up for progressive mobility pending continued medical stability and patient participation.       1/6/2025

## 2025-01-07 VITALS
WEIGHT: 149.5 LBS | HEIGHT: 66 IN | DIASTOLIC BLOOD PRESSURE: 56 MMHG | SYSTOLIC BLOOD PRESSURE: 116 MMHG | TEMPERATURE: 98 F | OXYGEN SATURATION: 96 % | HEART RATE: 88 BPM | RESPIRATION RATE: 18 BRPM | BODY MASS INDEX: 24.03 KG/M2

## 2025-01-07 PROBLEM — E87.1 HYPONATREMIA: Status: ACTIVE | Noted: 2025-01-07

## 2025-01-07 LAB
ALBUMIN SERPL BCP-MCNC: 2.7 G/DL (ref 3.5–5.2)
ANION GAP SERPL CALC-SCNC: 9 MMOL/L (ref 8–16)
BUN SERPL-MCNC: 13 MG/DL (ref 8–23)
CALCIUM SERPL-MCNC: 8.5 MG/DL (ref 8.7–10.5)
CHLORIDE SERPL-SCNC: 95 MMOL/L (ref 95–110)
CO2 SERPL-SCNC: 29 MMOL/L (ref 23–29)
CREAT SERPL-MCNC: 1.1 MG/DL (ref 0.5–1.4)
ERYTHROCYTE [DISTWIDTH] IN BLOOD BY AUTOMATED COUNT: 13.7 % (ref 11.5–14.5)
EST. GFR  (NO RACE VARIABLE): 48.6 ML/MIN/1.73 M^2
GLUCOSE SERPL-MCNC: 87 MG/DL (ref 70–110)
HCT VFR BLD AUTO: 37.2 % (ref 37–48.5)
HGB BLD-MCNC: 12.4 G/DL (ref 12–16)
MAGNESIUM SERPL-MCNC: 1.9 MG/DL (ref 1.6–2.6)
MCH RBC QN AUTO: 29.7 PG (ref 27–31)
MCHC RBC AUTO-ENTMCNC: 33.3 G/DL (ref 32–36)
MCV RBC AUTO: 89 FL (ref 82–98)
OHS QRS DURATION: 78 MS
OHS QRS DURATION: 84 MS
OHS QTC CALCULATION: 398 MS
OHS QTC CALCULATION: 539 MS
PHOSPHATE SERPL-MCNC: 3 MG/DL (ref 2.7–4.5)
PLATELET # BLD AUTO: 222 K/UL (ref 150–450)
PMV BLD AUTO: 10.5 FL (ref 9.2–12.9)
POTASSIUM SERPL-SCNC: 3.7 MMOL/L (ref 3.5–5.1)
RBC # BLD AUTO: 4.17 M/UL (ref 4–5.4)
SODIUM SERPL-SCNC: 133 MMOL/L (ref 136–145)
WBC # BLD AUTO: 8.98 K/UL (ref 3.9–12.7)

## 2025-01-07 PROCEDURE — 97530 THERAPEUTIC ACTIVITIES: CPT | Mod: HCNC

## 2025-01-07 PROCEDURE — 80069 RENAL FUNCTION PANEL: CPT | Mod: HCNC | Performed by: STUDENT IN AN ORGANIZED HEALTH CARE EDUCATION/TRAINING PROGRAM

## 2025-01-07 PROCEDURE — 85027 COMPLETE CBC AUTOMATED: CPT | Mod: HCNC | Performed by: STUDENT IN AN ORGANIZED HEALTH CARE EDUCATION/TRAINING PROGRAM

## 2025-01-07 PROCEDURE — 36415 COLL VENOUS BLD VENIPUNCTURE: CPT | Mod: HCNC | Performed by: STUDENT IN AN ORGANIZED HEALTH CARE EDUCATION/TRAINING PROGRAM

## 2025-01-07 PROCEDURE — 25000003 PHARM REV CODE 250: Mod: HCNC | Performed by: STUDENT IN AN ORGANIZED HEALTH CARE EDUCATION/TRAINING PROGRAM

## 2025-01-07 PROCEDURE — 25000003 PHARM REV CODE 250: Mod: HCNC | Performed by: INTERNAL MEDICINE

## 2025-01-07 PROCEDURE — 83735 ASSAY OF MAGNESIUM: CPT | Mod: HCNC | Performed by: STUDENT IN AN ORGANIZED HEALTH CARE EDUCATION/TRAINING PROGRAM

## 2025-01-07 PROCEDURE — 63600175 PHARM REV CODE 636 W HCPCS: Mod: HCNC | Performed by: STUDENT IN AN ORGANIZED HEALTH CARE EDUCATION/TRAINING PROGRAM

## 2025-01-07 PROCEDURE — 97116 GAIT TRAINING THERAPY: CPT | Mod: HCNC

## 2025-01-07 PROCEDURE — 97535 SELF CARE MNGMENT TRAINING: CPT | Mod: HCNC

## 2025-01-07 RX ORDER — FUROSEMIDE 20 MG/1
20 TABLET ORAL DAILY
Status: DISCONTINUED | OUTPATIENT
Start: 2025-01-08 | End: 2025-01-07

## 2025-01-07 RX ORDER — SULFAMETHOXAZOLE AND TRIMETHOPRIM 800; 160 MG/1; MG/1
1 TABLET ORAL 2 TIMES DAILY
Qty: 3 TABLET | Refills: 0 | Status: SHIPPED | OUTPATIENT
Start: 2025-01-07 | End: 2025-01-09

## 2025-01-07 RX ORDER — SPIRONOLACTONE 25 MG/1
12.5 TABLET ORAL DAILY
Qty: 45 TABLET | Refills: 3 | Status: SHIPPED | OUTPATIENT
Start: 2025-01-08 | End: 2025-01-15 | Stop reason: SDUPTHER

## 2025-01-07 RX ORDER — AMIODARONE HYDROCHLORIDE 200 MG/1
200 TABLET ORAL 2 TIMES DAILY
Status: DISCONTINUED | OUTPATIENT
Start: 2025-01-07 | End: 2025-01-07 | Stop reason: HOSPADM

## 2025-01-07 RX ORDER — METOPROLOL SUCCINATE 100 MG/1
100 TABLET, EXTENDED RELEASE ORAL DAILY
Qty: 90 TABLET | Refills: 3 | Status: SHIPPED | OUTPATIENT
Start: 2025-01-07 | End: 2025-01-15 | Stop reason: SDUPTHER

## 2025-01-07 RX ORDER — AMIODARONE HYDROCHLORIDE 200 MG/1
TABLET ORAL
Qty: 208 TABLET | Refills: 0 | Status: SHIPPED | OUTPATIENT
Start: 2025-01-07 | End: 2025-01-27 | Stop reason: SDUPTHER

## 2025-01-07 RX ORDER — METOPROLOL SUCCINATE 100 MG/1
100 TABLET, EXTENDED RELEASE ORAL DAILY
Status: DISCONTINUED | OUTPATIENT
Start: 2025-01-08 | End: 2025-01-07 | Stop reason: HOSPADM

## 2025-01-07 RX ORDER — FUROSEMIDE 10 MG/ML
40 INJECTION INTRAMUSCULAR; INTRAVENOUS ONCE
Status: COMPLETED | OUTPATIENT
Start: 2025-01-07 | End: 2025-01-07

## 2025-01-07 RX ADMIN — LEVOTHYROXINE SODIUM 112 MCG: 112 TABLET ORAL at 05:01

## 2025-01-07 RX ADMIN — LOSARTAN POTASSIUM 100 MG: 50 TABLET, FILM COATED ORAL at 08:01

## 2025-01-07 RX ADMIN — METOPROLOL TARTRATE 25 MG: 25 TABLET, FILM COATED ORAL at 08:01

## 2025-01-07 RX ADMIN — APIXABAN 5 MG: 5 TABLET, FILM COATED ORAL at 08:01

## 2025-01-07 RX ADMIN — METOPROLOL SUCCINATE 75 MG: 50 TABLET, EXTENDED RELEASE ORAL at 02:01

## 2025-01-07 RX ADMIN — AMIODARONE HYDROCHLORIDE 200 MG: 200 TABLET ORAL at 02:01

## 2025-01-07 RX ADMIN — GABAPENTIN 100 MG: 100 CAPSULE ORAL at 02:01

## 2025-01-07 RX ADMIN — ATORVASTATIN CALCIUM 10 MG: 10 TABLET, FILM COATED ORAL at 08:01

## 2025-01-07 RX ADMIN — FUROSEMIDE 40 MG: 10 INJECTION, SOLUTION INTRAVENOUS at 08:01

## 2025-01-07 RX ADMIN — DIGOXIN 0.12 MG: 125 TABLET ORAL at 08:01

## 2025-01-07 RX ADMIN — SPIRONOLACTONE 25 MG: 25 TABLET, FILM COATED ORAL at 08:01

## 2025-01-07 RX ADMIN — SULFAMETHOXAZOLE AND TRIMETHOPRIM 1 TABLET: 800; 160 TABLET ORAL at 08:01

## 2025-01-07 RX ADMIN — GABAPENTIN 100 MG: 100 CAPSULE ORAL at 08:01

## 2025-01-07 RX ADMIN — POTASSIUM BICARBONATE 40 MEQ: 391 TABLET, EFFERVESCENT ORAL at 10:01

## 2025-01-07 RX ADMIN — POLYETHYLENE GLYCOL 3350 17 G: 17 POWDER, FOR SOLUTION ORAL at 06:01

## 2025-01-07 NOTE — PT/OT/SLP PROGRESS
"Occupational Therapy   Treatment    Name: Tiffany Masterson  MRN: [id number]  Admitting Diagnosis:  Paroxysmal atrial fibrillation  1 Day Post-Op    Recommendations:     Discharge Recommendations: Low Intensity Therapy  Discharge Equipment Recommendations:  none  Barriers to discharge:  None    Assessment:     Tiffany Masterson is a 89 y.o. female with a medical diagnosis of Paroxysmal atrial fibrillation.  Pt tolerated session well and without incident, but she continues to require (A) with ADLs and mobility.  She presents with the following.  Performance deficits affecting function are weakness, impaired endurance, impaired self care skills, impaired functional mobility, gait instability, impaired balance, impaired cardiopulmonary response to activity.     Rehab Prognosis:  Good; patient would benefit from acute skilled OT services to address these deficits and reach maximum level of function.       Plan:     Patient to be seen 3 x/week to address the above listed problems via self-care/home management, therapeutic exercises, therapeutic activities  Plan of Care Expires: 01/24/25  Plan of Care Reviewed with: patient    Subjective     Chief Complaint: "I feel a little unsteady."  Patient/Family Comments/goals: Pt was pleasant and agreeable to therapy  Pain/Comfort:  Pain Rating 1: 0/10  Pain Rating Post-Intervention 1: 0/10    Objective:     Communicated with: nurse prior to session.  Patient found HOB elevated with telemetry, PureWick, peripheral IV (IV not connected and running) upon OT entry to room.    General Precautions: Standard, fall, contact    Orthopedic Precautions:N/A  Braces: N/A  Respiratory Status: Room air     Occupational Performance:     Bed Mobility:    Patient completed Rolling/Turning to Right with stand by assistance  Patient completed Scooting/Bridging with stand by assistance  Patient completed Supine to Sit with stand by assistance     Functional Mobility/Transfers:  Patient completed Sit <> " Stand Transfer from EOB x 1 trial with stand by assistance with rolling walker   Patient completed Bed <> Chair Transfer using Step Transfer technique with stand by assistance with rolling walker  Functional Mobility: Pt ambulated ~12 ft x 2 trials to and from the bathroom with SBA with RW.  She had no LOB and no reports of dizziness.      Activities of Daily Living:  Feeding: setup assistance of tray table to eat her lunch while seated in bedside chair   Grooming: stand by assistance to perform oral care and wash her face while standing at sink  Upper Body Dressing: minimum assistance to don back gown as a robe/manage lines while seated EOB  Toileting: (A) to disconnect and to re-connect PureWick.  Pt had minor urinary incontinence episode while standing at the sink, but she denied toileting needs.  Her floor was cleaned.        [facility] 6 Click ADL: 19    Treatment & Education:  Per provider instruction, pt's BP was monitored during session.  It read 117/58 with MAP of 84 while HOB elevated prior to mobility and read 125/60 with MAP of 87 while seated EOB.  Writing therapist twice attempted to take reading while pt was standing EOB, but the machine read error.  Pt's symptoms were monitored, and she remained asymptomatic throughout session.    Pt edu on role of OT, POC, safety when performing self care tasks, benefit of performing OOB activity, and safety when performing functional transfers and mobility.    - Self care tasks completed-- as noted above      Patient left up in chair with all lines intact, call button in reach, PCT notified, and PCT present    GOALS:   Multidisciplinary Problems       Occupational Therapy Goals          Problem: Occupational Therapy    Goal Priority Disciplines Outcome Interventions   Occupational Therapy Goal     OT, PT/OT Progressing    Description: Goals to be met by: 01/24/25     Patient will increase functional independence with ADLs by performing:    UE Dressing with Modified  [place].  LE Dressing with Modified [place].  Grooming while standing at sink with Modified [place].  Toileting from toilet with Modified [place] for hygiene and clothing management.   Toilet transfer to toilet with Modified [place].    DME: No anticipated needs                         Time Tracking:     OT Date of Treatment: 01/07/25  OT Start Time: 1118  OT Stop Time: 1142  OT Total Time (min): 24 min    Billable Minutes:Self Care/Home Management 12 min  Therapeutic Activity 12 min    OT/ROBIN: OT          1/7/2025

## 2025-01-07 NOTE — PLAN OF CARE
AAOX4,VSS,O2 sats>95% on RA.Plan of care discussed with patient and family. Patient has no complaints of pain/SOB. Discussed medications and care. K of 3.8 and Mg of 1.7 replaced today. Strict I&O's maintained. Patient has no questions at this time.Pt visualised and stable.Call light within reach.Pt resting,bed at lowest position.Pt's family by the bedside.  SONIA/ cardioversion completed today.     Problem: Adult Inpatient Plan of Care  Goal: Plan of Care Review  Outcome: Progressing  Goal: Patient-Specific Goal (Individualized)  Outcome: Progressing  Goal: Absence of Hospital-Acquired Illness or Injury  Outcome: Progressing  Goal: Optimal Comfort and Wellbeing  Outcome: Progressing  Goal: Readiness for Transition of Care  Outcome: Progressing     Problem: Fall Injury Risk  Goal: Absence of Fall and Fall-Related Injury  Outcome: Progressing     Problem: Skin Injury Risk Increased  Goal: Skin Health and Integrity  Outcome: Progressing     Problem: Heart Failure  Goal: Optimal Coping  Outcome: Progressing  Goal: Optimal Cardiac Output  Outcome: Progressing  Goal: Stable Heart Rate and Rhythm  Outcome: Progressing  Goal: Optimal Functional Ability  Outcome: Progressing  Goal: Fluid and Electrolyte Balance  Outcome: Progressing  Goal: Improved Oral Intake  Outcome: Progressing  Goal: Effective Oxygenation and Ventilation  Outcome: Progressing  Goal: Effective Breathing Pattern During Sleep  Outcome: Progressing     Problem: Dysrhythmia  Goal: Normalized Cardiac Rhythm  Outcome: Progressing     Problem: Infection  Goal: Absence of Infection Signs and Symptoms  Outcome: Progressing

## 2025-01-07 NOTE — PLAN OF CARE
Problem: Adult Inpatient Plan of Care  Goal: Plan of Care Review  Outcome: Progressing  Goal: Patient-Specific Goal (Individualized)  Outcome: Progressing  Goal: Absence of Hospital-Acquired Illness or Injury  Outcome: Progressing  Goal: Optimal Comfort and Wellbeing  Outcome: Progressing  Goal: Readiness for Transition of Care  Outcome: Progressing     Problem: Fall Injury Risk  Goal: Absence of Fall and Fall-Related Injury  Outcome: Progressing     Problem: Skin Injury Risk Increased  Goal: Skin Health and Integrity  Outcome: Progressing     Problem: Heart Failure  Goal: Optimal Coping  Outcome: Progressing  Goal: Optimal Cardiac Output  Outcome: Progressing  Goal: Stable Heart Rate and Rhythm  Outcome: Progressing  Goal: Optimal Functional Ability  Outcome: Progressing  Goal: Fluid and Electrolyte Balance  Outcome: Progressing  Goal: Improved Oral Intake  Outcome: Progressing  Goal: Effective Oxygenation and Ventilation  Outcome: Progressing  Goal: Effective Breathing Pattern During Sleep  Outcome: Progressing     Problem: Dysrhythmia  Goal: Normalized Cardiac Rhythm  Outcome: Progressing     Problem: Infection  Goal: Absence of Infection Signs and Symptoms  Outcome: Progressing

## 2025-01-07 NOTE — PLAN OF CARE
[facility] - Cardiology Stepdown  Discharge Final Note    Primary Care Provider: Rebeca Dumont MD    Expected Discharge Date: 1/7/2025    Final Discharge Note (most recent)       Final Note - 01/07/25 1541          Final Note    Assessment Type Final Discharge Note     Anticipated Discharge Disposition Home-Health Care [facility] Resources/Appts/Education Provided Provided patient/caregiver with written discharge plan information        Post-Acute Status    Post-Acute Authorization Home Health     Home Health Status Referrals Sent     Patient choice form signed by patient/caregiver List with quality metrics by geographic area provided                 SW reviewed pt chart and discussed pt with care team during a.m. huddle. SW met with pt and her family to discuss therapy recs and DCP. Pt and family want to go home with  and did not have a preference for  provider. BREE sent referral to [facility]. Pt has no other SW/CM needs and will transport home with family.       Dispo:Home with     JANET Chris  Case management  [id number]

## 2025-01-07 NOTE — PLAN OF CARE
Patient is ready for discharge. Patient stable alert and oriented. IVs removed. No complaints of pain. Discussed discharge plan. Reviewed medications and side effects, appointments, and answered questions with patient and family. RX to be picked up by patient...      Problem: Adult Inpatient Plan of Care  Goal: Plan of Care Review  Outcome: Met  Goal: Patient-Specific Goal (Individualized)  Outcome: Met  Goal: Absence of Hospital-Acquired Illness or Injury  Outcome: Met  Goal: Optimal Comfort and Wellbeing  Outcome: Met  Goal: Readiness for Transition of Care  Outcome: Met     Problem: Fall Injury Risk  Goal: Absence of Fall and Fall-Related Injury  Outcome: Met     Problem: Skin Injury Risk Increased  Goal: Skin Health and Integrity  Outcome: Met     Problem: Heart Failure  Goal: Optimal Coping  Outcome: Met  Goal: Optimal Cardiac Output  Outcome: Met  Goal: Stable Heart Rate and Rhythm  Outcome: Met  Goal: Optimal Functional Ability  Outcome: Met  Goal: Fluid and Electrolyte Balance  Outcome: Met  Goal: Improved Oral Intake  Outcome: Met  Goal: Effective Oxygenation and Ventilation  Outcome: Met  Goal: Effective Breathing Pattern During Sleep  Outcome: Met     Problem: Dysrhythmia  Goal: Normalized Cardiac Rhythm  Outcome: Met     Problem: Infection  Goal: Absence of Infection Signs and Symptoms  Outcome: Met

## 2025-01-07 NOTE — PT/OT/SLP PROGRESS
"Physical Therapy Treatment    Patient Name: Tiffany Masterson   MRN: [id number]    Recommendations:     Discharge Recommendations: Low Intensity Therapy  Discharge Equipment Recommendations: none  Barriers to Discharge: None  Safest Mobility Level with Nursing: Ambulation with SBA    Assessment:     Tiffany Masterson is a 89 y.o. female admitted with a medical diagnosis of Paroxysmal atrial fibrillation. She presents with the following impairments/functional limitations: weakness, impaired endurance, impaired self care skills, impaired functional mobility, gait instability, impaired cardiopulmonary response to activity. Pt presenting with HOB elevated and agreeable to completing therapy session. Focus of treatment on continued improvements with activity tolerance, gait quality, and dynamic postural stability. Pt with excellent progression toward therapy goals and remains appropriate for low intensity therapy following discharge. Pt would continue to benefit from skilled acute PT in order to address current deficits and progress functional mobility.     Rehab Prognosis: Good; patient continues to benefit from acute skilled PT services to address these deficits and reach maximum level of function.  Recent Surgery: Procedure(s) (LRB):  Cardioversion or Defibrillation (N/A)  Transesophageal echo (SONIA) intra-procedure log documentation (N/A) 1 Day Post-Op    Plan:     During this hospitalization, patient to be seen 4 x/week to address the identified rehab impairments via gait training, therapeutic activities, therapeutic exercises, neuromuscular re-education and progress toward the following goals:    Plan of Care Expires:  02/02/25    Subjective     Chief Complaint: None verbalized  Patient/Family Comments/Goals: "I am super strong willed. I want to go the whole way."  Pain/Comfort:  Pain Rating 1: 0/10    Objective:     Communicated with RN prior to session. Patient found HOB elevated with telemetryJesús upon PT entry " to room.     General Precautions: Standard, fall, contact  Orthopedic Precautions: N/A  Braces: N/A    Functional Mobility:  Bed Mobility:    Supine to Sit: minimum assistance for trunk management  Transfers:    Sit to Stand: contact guard assistance with rolling walker with cues for hand placement  Toilet transfer: minimum assistance with rolling walker with cues for hand placement and foot placement using Step Transfer  Gait: Patient ambulated 300' with rolling walker and stand by assistance.   Patient demonstrates steady gait, decreased step length, narrow base of support, decreased weight shift, decreased foot clearance, ambulates outside CHRISTELLE of RW, and decreased javier.  Patient required cues for upright posture, gluteal activation, sequencing, rolling walker management, safe rolling walker usage, to ambulate within CHRISTELLE of RW, increased step size, and increased foot clearance.  All lines remained intact throughout ambulation trial, gait belt utilized.    AM-PAC 6 CLICK MOBILITY  Turning over in bed (including adjusting bedclothes, sheets and blankets)?: 3  Sitting down on and standing up from a chair with arms (e.g., wheelchair, bedside commode, etc.): 3  Moving from lying on back to sitting on the side of the bed?: 3  Moving to and from a bed to a chair (including a wheelchair)?: 3  Need to walk in hospital room?: 3  Climbing 3-5 steps with a railing?: 2  Basic Mobility Total Score: 17     Therapeutic Activities and Exercises:  Patient educated on role of acute care PT and PT POC, safety while in hospital including calling nurse for mobility, and call light usage  Pt educated on the effects of bed rest and the importance of OOB activity. Pt encouraged to sit UIC majority of day as tolerated and continue daily ambulation with nursing assist. Pt verbalized understanding.  Pt educated on importance of maximal participation in therapy session in order to reduce negative effects of prolonged sedentary positioning.    Answered all questions within PT scope of practice and addressed functional mobility concerns.  Pt completed continent toileting during session. Pericare completed independently.    Patient left up in chair with all lines intact, call button in reach, RN notified, and family present.    GOALS:   Multidisciplinary Problems       Physical Therapy Goals          Problem: Physical Therapy    Goal Priority Disciplines Outcome Interventions   Physical Therapy Goal     PT, PT/OT Progressing    Description: Goals to be met by: 2025    Patient will increase functional independence with mobility by performin. Supine to sit with Supervision  2. Sit to stand transfer with Supervision using RW  3. Gait  x 150 feet with Supervision using Rolling Walker.   4. Ascend/descend 5 stair with bilateral Handrails Supervision.                          Time Tracking:     PT Received On: 25  PT Start Time: 1432     PT Stop Time: 1455  PT Total Time (min): 23 min     Billable Minutes: Gait Training 12 and Therapeutic Activity 11      Treatment Type: Treatment  PT/PTA: PT     Number of PTA visits since last PT visit: 0     2025

## 2025-01-07 NOTE — PLAN OF CARE
ADHF  GDMT: continue jardiance, continue losartan, change lopressor to toprol 100 qd,  aldactone to 12.5 mg given hypoNa    AF   Successful CV today  Load with amio 200mg BID x 2 weeks then 200 mg daily  Stop digoxin   Continue BB as above  Continue OAC    Follow up with EP and general cardiology

## 2025-01-07 NOTE — PLAN OF CARE
CHW met with patient/family at bedside. Patient experience rounding completed and reviewed the following.     Do you know your discharge plan? Yes or No,      If yes, what is the plan? (Home, Home Health, Rehab, SNF, LTAC, or Other)  Yes Home    Have you discussed your needs and preferences with your SW/CM? Yes or No  Yes Home    If you are discharging home, do you have help at home? Yes or No Yes (spouse)    Do you think you will need help additional at home at discharge? Yes or No   No    Do you currently have difficulty keeping up with bills, affording medicine or buying food? Yes or No No    Assigned SW/CM notified of any patient/family needs or concerns. Appropriate resources provided to address patient's needs.  Tori Lopez, ASTER, RSW, BSW  Case Management  [phone]

## 2025-01-07 NOTE — NURSING
Patient arrived to the unit s/p SONIA/ cardioversion. PT AAO X 4, VSS, sats >95% on 2 L NC. Telemetry box applied and vital signs documented in flow sheet. Oriented to room, call bell with in reach, bed is in low lock position. Plan of care continued.   EKG completed on arrival to floor, reading NSR.

## 2025-01-07 NOTE — PLAN OF CARE
[facility] - Cardiology Stepdown      HOME HEALTH ORDERS  FACE TO FACE ENCOUNTER    Patient Name: Tiffany Masterson  YOB: 1935    PCP: Rebeca Dumont MD   PCP Address: [address] / [place] [address]  PCP Phone Number: [phone]  PCP Fax: [phone]    Encounter Date: 1/2/25    Admit to Home Health    Diagnoses:  Active Hospital Problems    Diagnosis  POA    *Paroxysmal atrial fibrillation [I48.0]  Yes    Anemia [D64.9]  Yes    Acute combined systolic and diastolic heart failure [I50.41]  Unknown    Longstanding persistent atrial fibrillation [I48.11]  Unknown    Stage 3b chronic kidney disease [N18.32]  Yes    Frequent falls [R29.6]  Not Applicable    Heart failure with reduced ejection fraction [I50.20]  Yes    UTI (urinary tract infection) [N39.0]  Yes    Mixed hyperlipidemia [E78.2]  Yes    Primary hypertension [I10]  Yes    Hypothyroidism [E03.9]  Yes     Chronic      Resolved Hospital Problems   No resolved problems to display.       Follow Up Appointments:  Future Appointments   Date Time Provider Department Center   1/15/2025 10:00 AM Maria Isabel Young OD [facility] OPTICLB [facility]   1/27/2025 10:00 AM NOMDAVIDSON, DEXA1 [facility] BMD [facility]   1/29/2025  8:30 AM Fran Pickett MD [facility] DERM [facility]   2/27/2025  2:00 PM Mirlande Moore MD [facility] NEUCON [facility]       Allergies:  Review of patient's allergies indicates:   Allergen Reactions    Levaquin [levofloxacin]      Joint pain    Hydrochlorothiazide Other (See Comments)     Pain in joints and depression per pt       Medications: Review discharge medications with patient and family and provide education.    Current Facility-Administered Medications   Medication Dose Route Frequency Provider Last Rate Last Admin    acetaminophen tablet 650 mg  650 mg Oral Q4H PRN Karissa García MD        apixaban tablet 5 mg  5 mg Oral BID Manuel Lloyd MD   5 mg at 01/07/25 0814    atorvastatin tablet 10 mg  10 mg Oral Daily Manuel Lloyd MD   10  mg at 01/07/25 0814    bisacodyL suppository 10 mg  10 mg Rectal Daily PRN Manuel Lloyd MD        [START ON 1/9/2025] empagliflozin (Jardiance) tablet 10 mg  10 mg Oral Daily Karissa García MD        gabapentin capsule 100 mg  100 mg Oral TID Manuel Lloyd MD   100 mg at 01/07/25 0814    levothyroxine tablet 112 mcg  112 mcg Oral Before breakfast Manuel Lloyd MD   112 mcg at 01/07/25 0513    losartan tablet 100 mg  100 mg Oral Daily Karissa García MD   100 mg at 01/07/25 0814    melatonin tablet 6 mg  6 mg Oral Nightly PRN Karissa García MD        metoprolol tartrate (LOPRESSOR) tablet 25 mg  25 mg Oral QID Satish Mallory MD   25 mg at 01/07/25 0814    ondansetron disintegrating tablet 4 mg  4 mg Oral Q6H PRN Karissa García MD        polyethylene glycol packet 17 g  17 g Oral Daily PRN Manuel Lloyd MD   17 g at 01/07/25 0617    sodium chloride 0.9% flush 10 mL  10 mL Intravenous Q12H PRN Manuel Lloyd MD        spironolactone tablet 25 mg  25 mg Oral Daily Karissa García MD   25 mg at 01/07/25 0814    sulfamethoxazole-trimethoprim 800-160mg per tablet 1 tablet  1 tablet Oral BID Karissa García MD   1 tablet at 01/07/25 0814        Medication List        START taking these medications      amiodarone 200 MG Tab  Commonly known as: PACERONE  Take 1 tablet (200 mg total) by mouth 2 (two) times daily for 14 days, THEN 1 tablet (200 mg total) once daily.  Start taking on: January 7, 2025     empagliflozin 10 mg tablet  Commonly known as: Jardiance  Take 1 tablet (10 mg total) by mouth once daily.  Start taking on: January 9, 2025     spironolactone 25 MG tablet  Commonly known as: ALDACTONE  Take 0.5 tablets (12.5 mg total) by mouth once daily.  Start taking on: January 8, 2025     sulfamethoxazole-trimethoprim 800-160mg 800-160 mg Tab  Commonly known as: BACTRIM DS  Take 1 tablet by mouth 2 (two) times daily. for 3 doses            CHANGE how you take these medications       metoprolol succinate 100 MG 24 hr tablet  Commonly known as: TOPROL-XL  Take 1 tablet (100 mg total) by mouth once daily.  What changed:   medication strength  how much to take  when to take this            CONTINUE taking these medications      acetaminophen 500 MG tablet  Commonly known as: TYLENOL  Take 500 mg by mouth every 6 (six) hours as needed.     ARTIFICIAL TEARS (PF) OPHT  Place 1 drop into both eyes as needed.     ascorbic acid (vitamin C) 250 MG tablet  Commonly known as: VITAMIN C  Take 250 mg by mouth once daily.     atorvastatin 10 MG tablet  Commonly known as: LIPITOR  Take 1 tablet by mouth once daily     b complex vitamins capsule  Take 1 capsule by mouth once daily.     biotin 5,000 mcg Tbdl  Take 1 tablet (5,000 mcg total) by mouth Daily.     bisacodyL 5 mg EC tablet  Commonly known as: DULCOLAX  Take 5 mg by mouth once daily.     CALCIUM 600 + D(3) ORAL  Take 1 tablet by mouth once daily.     CENTRUM SILVER ORAL  Take 1 tablet by mouth once daily.     * vitamin D 1000 units Tab  Commonly known as: VITAMIN D3  Take 2 tablets by mouth once daily.     * cholecalciferol (vitamin D3) 50 mcg (2,000 unit) Cap capsule  Commonly known as: VITAMIN D3  Take 1 capsule by mouth once daily.     coenzyme Q10 200 mg capsule  Take 200 mg by mouth once daily.     ELIQUIS 5 mg Tab  Generic drug: apixaban  Take 1 tablet by mouth twice daily     estradioL 0.01 % (0.1 mg/gram) vaginal cream  Commonly known as: ESTRACE  Place 0.5 g intravaginally q.h.s. x2 weeks; then, placed 0.5 g intravaginally twice weekly at bedtime (maintenance therapy).     fluticasone 27.5 mcg/actuation nasal spray  Commonly known as: VERAMYST  2 sprays by Nasal route.     fluticasone propionate 50 mcg/actuation nasal spray  Commonly known as: FLONASE  CLEAN SINUS/NARES WITH OCEAN NASAL SPRAY THEN USE FLONASE 1 SPRAY TWICE DAILY.     gabapentin 100 MG capsule  Commonly known as: NEURONTIN  TAKE 1 CAPSULE BY MOUTH THREE TIMES DAILY      hydroquinone 4 % Crea  APPLY  CREAM TOPICALLY TWICE DAILY     ICAPS AREDS ORAL  Take 1 tablet by mouth 2 (two) times a day.     levothyroxine 112 MCG tablet  Commonly known as: SYNTHROID  TAKE 1 TABLET BY MOUTH BEFORE BREAKFAST     LIDOcaine 5 %  Commonly known as: LIDODERM  Place 1 patch onto the skin once daily. Remove & Discard patch within 12 hours or as directed by MD     losartan 100 MG tablet  Commonly known as: COZAAR  Take 1 tablet by mouth once daily     magnesium oxide 400 mg magnesium Tab  Take 1 tablet by mouth once daily.     mupirocin 2 % ointment  Commonly known as: BACTROBAN  Apply topically 3 (three) times daily. Apply to wound on head until healed     sodium chloride 0.65 % nasal spray  Commonly known as: Saline NasaL  1 spray by Nasal route as needed for Congestion.           * This list has 2 medication(s) that are the same as other medications prescribed for you. Read the directions carefully, and ask your doctor or other care provider to review them with you.                STOP taking these medications      diltiaZEM 90 MG tablet  Commonly known as: CARDIZEM     sodium,potassium,mag sulfates 17.5-3.13-1.6 gram Solr  Commonly known as: SUPREP BOWEL PREP KIT                I have seen and examined this patient within the last 30 days. My clinical findings that support the need for the home health skilled services and home bound status are the following:no   Weakness/numbness causing balance and gait disturbance due to Coronary Heart Disease and Weakness/Debility making it taxing to leave home.     Diet:   cardiac diet    Labs:  BMP weekly x4 weeks. Report Lab results to PCP.    Referrals/ Consults  Physical Therapy to evaluate and treat. Evaluate for home safety and equipment needs; Establish/upgrade home exercise program. Perform / instruct on therapeutic exercises, gait training, transfer training, and Range of Motion.  Occupational Therapy to evaluate and treat. Evaluate home environment  for safety and equipment needs. Perform/Instruct on transfers, ADL training, ROM, and therapeutic exercises.    Activities:   activity as tolerated    Nursing:   Agency to admit patient within 24 hours of hospital discharge unless specified on physician order or at patient request    SN to complete comprehensive assessment including routine vital signs. Instruct on disease process and s/s of complications to report to MD. Review/verify medication list sent home with the patient at time of discharge  and instruct patient/caregiver as needed. Frequency may be adjusted depending on start of care date.     Skilled nurse to perform up to 3 visits PRN for symptoms related to diagnosis    Notify MD if SBP > 160 or < 90; DBP > 90 or < 50; HR > 120 or < 50; Temp > 101; O2 < 88%    Ok to schedule additional visits based on staff availability and patient request on consecutive days within the home health episode.    When multiple disciplines ordered:    Start of Care occurs on Sunday - Wednesday schedule remaining discipline evaluations as ordered on separate consecutive days following the start of care.    Thursday SOC -schedule subsequent evaluations Friday and Monday the following week.     Friday - Saturday SOC - schedule subsequent discipline evaluations on consecutive days starting Monday of the following week.    For all post-discharge communication and subsequent orders please contact patient's primary care physician. If unable to reach primary care physician or do not receive response within 30 minutes, please contact Dr. Karissa García for clinical staff order clarification    Miscellaneous   Heart Failure:      SN to instruct on the following:    Instruct on the definition of CHF.   Instruct on the signs/sympoms of CHF to be reported.   Instruct on and monitor daily weights.   Instruct on factors that cause exacerbation.   Instruct on action, dose, schedule, and side effects of medications.   Instruct on diet as  prescribed.   Instruct on activity allowed.   Instruct on life-style modifications for life long management of CHF   SN to assess compliance with daily weights, diet, medications, fluid retention,    safety precautions, activities permitted and life-style modifications.   Additional 1-2 SN visits per week as needed for signs and symptoms     of CHF exacerbation.    Home Health Aide:  Nursing Three times weekly, Physical Therapy Three times weekly, and Occupational Therapy Three times weekly    Wound Care Orders  no    I certify that this patient is confined to her home and needs intermittent skilled nursing care, physical therapy, and occupational therapy.

## 2025-01-07 NOTE — PROGRESS NOTES
Orthostatic vital signs completed as charted below. Pt reported feeling dizziness while changing position from sitting to standing. Provider MD Ricky notified.        01/07/25 0953 01/07/25 0956 01/07/25 0958   Vital Signs   Pulse 79 80 84   Heart Rate Source Monitor Monitor Monitor   SpO2 95 %  --   --    Device (Oxygen Therapy) room air  --   --    /60 (!) 122/58 (!) 109/55   MAP (mmHg) 87 83 79   BP Location Left arm Left arm Left arm   BP Method Automatic Automatic Automatic   Patient Position Lying Sitting Standing

## 2025-01-07 NOTE — PROGRESS NOTES
Orthostatic completed as below. No s/s reported with position changes.        01/07/25 1510 01/07/25 1512 01/07/25 1517   Vital Signs   Pulse 83 92 88   Heart Rate Source Monitor Monitor Monitor   SpO2  --  96 %  --    BP (!) 104/52 (!) 99/46 (!) 116/56   MAP (mmHg) 74 66 81   BP Location Left arm Left arm Left arm   BP Method Automatic Automatic Automatic   Patient Position Sitting Standing Lying

## 2025-01-07 NOTE — CARE UPDATE
I have reviewed the chart of Tiffany Masterson who is hospitalized for the following:    Active Hospital Problems    Diagnosis    *Paroxysmal atrial fibrillation    Hyponatremia     , will monitor       Anemia    Acute combined systolic and diastolic heart failure    Longstanding persistent atrial fibrillation    Stage 3b chronic kidney disease    Frequent falls    Heart failure with reduced ejection fraction    UTI (urinary tract infection)    Mixed hyperlipidemia    Primary hypertension    Hypothyroidism        Hannah Lopez PA-C  [facility]

## 2025-01-08 NOTE — ANESTHESIA POSTPROCEDURE EVALUATION
Anesthesia Post Evaluation    Patient: Tiffany Masterson    Procedure(s) Performed: Procedure(s) (LRB):  Cardioversion or Defibrillation (N/A)  Transesophageal echo (SONIA) intra-procedure log documentation (N/A)    Final Anesthesia Type: general      Patient location during evaluation: PACU  Patient participation: Yes- Able to Participate  Level of consciousness: awake and alert and sedated  Post-procedure vital signs: reviewed and stable  Pain management: adequate  Airway patency: patent    PONV status at discharge: No PONV  Anesthetic complications: no      Cardiovascular status: blood pressure returned to baseline  Respiratory status: unassisted  Hydration status: euvolemic  Follow-up not needed.              Vitals Value Taken Time   /56 01/07/25 1517   Temp 36.7 °C (98.1 °F) 01/07/25 1146   Pulse 88 01/07/25 1517   Resp 18 01/07/25 1146   SpO2 96 % 01/07/25 1512         No case tracking events are documented in the log.      Pain/Apple Score: Apple Score: 9 (1/7/2025  8:00 AM)

## 2025-01-09 ENCOUNTER — PATIENT MESSAGE (OUTPATIENT)
Dept: PRIMARY CARE CLINIC | Facility: CLINIC | Age: 89
End: 2025-01-09
Payer: MEDICARE

## 2025-01-09 LAB — VIT B1 BLD-MCNC: 70 UG/L (ref 38–122)

## 2025-01-10 ENCOUNTER — DOCUMENTATION ONLY (OUTPATIENT)
Dept: CARDIOLOGY | Facility: CLINIC | Age: 89
End: 2025-01-10
Payer: MEDICARE

## 2025-01-10 ENCOUNTER — PATIENT OUTREACH (OUTPATIENT)
Dept: ADMINISTRATIVE | Facility: CLINIC | Age: 89
End: 2025-01-10
Payer: MEDICARE

## 2025-01-10 DIAGNOSIS — R06.02 SOB (SHORTNESS OF BREATH): Primary | ICD-10-CM

## 2025-01-10 LAB
NIACIN SERPL-MCNC: <5 NG/ML
NICOTINAMIDE SERPL-MCNC: 42.4 NG/ML (ref 5–48)
NICOTINURATE SERPL-MCNC: <5 NG/ML
PYRIDOXAL SERPL-MCNC: 9 UG/L (ref 5–50)

## 2025-01-10 NOTE — PROGRESS NOTES
[facility] Phone Enrollment Complete.    Phone enrollment completed due to : pt convenience    Called and spoke to the pt's  Mr. Tiffany Masterson via phone. Introduced self to pt as [facility] nurse navigator.      Patient education was given briefly over the phone and will continue at the pt's first appt 1/13/2025.     Reviewed the following key points of [facility] program with pt and family:    Call [facility] if anyone tries to change your Fluid pills or Heart medications.   Do daily weights to see if you are gaining fluid. Upon waking empty your bladder weigh yourself without much clothing and before you eat or drink anything. Record your weights and compare them for weight gain of 3 - 4lbs overnight or 5lbs in 3 days. Call [facility] if you have this.  Follow a low Salt/Sodium diet (2,000-3,000mg sodium) and limit your fluid to 1.5 - 2 liters a day.  A liter is a quart. Measure all that you drink.   Stop smoking and start exercising.   Keep all your appointments and call the [facility] if you have any SOB or signs of fluid gain.          Reviewed plan for follow up once discharged to include phone calls, in person and virtual visits to assist pt optimizing their heart failure medication regimen and encouraging healthy lifestyle modifications.  Reminded pt that program will assist them over the next 4-6 weeks and then patient will be transferred to long term care provider .  Reminded pt how to contact [facility] navigator via phone and or via [email or web address].      Pt given appointment today via phone. 1/13/2025 for 11:30a with labs at 11:00a. Message sent to MA for scheduling.     Pt also reminded Nurse will call 48-72 hours after discharge to check on them.      PT and family verbalize read back of information given.  Encouraged pt and family to read over information often and contact team with any questions or concerns.

## 2025-01-10 NOTE — PROGRESS NOTES
C3 nurse spoke with Tiffany Masterson (spouse, Lauro) for a [facility]CC post hospital discharge follow up call. The patient has a scheduled HOSFU appointment with Rebeca Dumont MD on 1/23/2025 @ 0900.

## 2025-01-10 NOTE — TELEPHONE ENCOUNTER
, no available slots to scheduled until 01/22/2025. Pt would like to be seen sooner.     Please advise a good date, and time for pt to come in.

## 2025-01-13 ENCOUNTER — HOSPITAL ENCOUNTER (OUTPATIENT)
Dept: CARDIOLOGY | Facility: CLINIC | Age: 89
Discharge: HOME | End: 2025-01-13
Payer: MEDICARE

## 2025-01-13 ENCOUNTER — LAB VISIT (OUTPATIENT)
Dept: LAB | Facility: HOSPITAL | Age: 89
End: 2025-01-13
Payer: MEDICARE

## 2025-01-13 ENCOUNTER — OFFICE VISIT (OUTPATIENT)
Dept: CARDIOLOGY | Facility: CLINIC | Age: 89
End: 2025-01-13
Payer: MEDICARE

## 2025-01-13 VITALS
SYSTOLIC BLOOD PRESSURE: 130 MMHG | HEIGHT: 66 IN | BODY MASS INDEX: 22.16 KG/M2 | DIASTOLIC BLOOD PRESSURE: 85 MMHG | WEIGHT: 137.88 LBS | HEART RATE: 106 BPM

## 2025-01-13 DIAGNOSIS — I48.0 PAROXYSMAL ATRIAL FIBRILLATION: ICD-10-CM

## 2025-01-13 DIAGNOSIS — N18.32 STAGE 3B CHRONIC KIDNEY DISEASE: ICD-10-CM

## 2025-01-13 DIAGNOSIS — R06.02 SOB (SHORTNESS OF BREATH): ICD-10-CM

## 2025-01-13 DIAGNOSIS — I50.41 ACUTE COMBINED SYSTOLIC AND DIASTOLIC HEART FAILURE: Primary | ICD-10-CM

## 2025-01-13 DIAGNOSIS — I10 PRIMARY HYPERTENSION: ICD-10-CM

## 2025-01-13 LAB
ALBUMIN SERPL BCP-MCNC: 3.6 G/DL (ref 3.5–5.2)
ALP SERPL-CCNC: 81 U/L (ref 40–150)
ALT SERPL W/O P-5'-P-CCNC: 28 U/L (ref 10–44)
ANION GAP SERPL CALC-SCNC: 8 MMOL/L (ref 8–16)
AST SERPL-CCNC: 32 U/L (ref 10–40)
BASOPHILS # BLD AUTO: 0.08 K/UL (ref 0–0.2)
BASOPHILS NFR BLD: 1 % (ref 0–1.9)
BILIRUB SERPL-MCNC: 0.6 MG/DL (ref 0.1–1)
BNP SERPL-MCNC: 1144 PG/ML (ref 0–99)
BUN SERPL-MCNC: 10 MG/DL (ref 8–23)
CALCIUM SERPL-MCNC: 9.5 MG/DL (ref 8.7–10.5)
CHLORIDE SERPL-SCNC: 104 MMOL/L (ref 95–110)
CO2 SERPL-SCNC: 25 MMOL/L (ref 23–29)
CREAT SERPL-MCNC: 1.2 MG/DL (ref 0.5–1.4)
DIFFERENTIAL METHOD BLD: NORMAL
EOSINOPHIL # BLD AUTO: 0.2 K/UL (ref 0–0.5)
EOSINOPHIL NFR BLD: 3 % (ref 0–8)
ERYTHROCYTE [DISTWIDTH] IN BLOOD BY AUTOMATED COUNT: 13.6 % (ref 11.5–14.5)
EST. GFR  (NO RACE VARIABLE): 43.8 ML/MIN/1.73 M^2
GLUCOSE SERPL-MCNC: 105 MG/DL (ref 70–110)
HCT VFR BLD AUTO: 40.6 % (ref 37–48.5)
HGB BLD-MCNC: 13.2 G/DL (ref 12–16)
IMM GRANULOCYTES # BLD AUTO: 0.04 K/UL (ref 0–0.04)
IMM GRANULOCYTES NFR BLD AUTO: 0.5 % (ref 0–0.5)
LYMPHOCYTES # BLD AUTO: 2.1 K/UL (ref 1–4.8)
LYMPHOCYTES NFR BLD: 27.6 % (ref 18–48)
MAGNESIUM SERPL-MCNC: 1.8 MG/DL (ref 1.6–2.6)
MCH RBC QN AUTO: 29.8 PG (ref 27–31)
MCHC RBC AUTO-ENTMCNC: 32.5 G/DL (ref 32–36)
MCV RBC AUTO: 92 FL (ref 82–98)
MONOCYTES # BLD AUTO: 0.7 K/UL (ref 0.3–1)
MONOCYTES NFR BLD: 9.7 % (ref 4–15)
NEUTROPHILS # BLD AUTO: 4.4 K/UL (ref 1.8–7.7)
NEUTROPHILS NFR BLD: 58.2 % (ref 38–73)
NRBC BLD-RTO: 0 /100 WBC
OHS QRS DURATION: 78 MS
OHS QTC CALCULATION: 431 MS
PHOSPHATE SERPL-MCNC: 2.8 MG/DL (ref 2.7–4.5)
PLATELET # BLD AUTO: 339 K/UL (ref 150–450)
PMV BLD AUTO: 9.7 FL (ref 9.2–12.9)
POTASSIUM SERPL-SCNC: 4 MMOL/L (ref 3.5–5.1)
PROT SERPL-MCNC: 7.2 G/DL (ref 6–8.4)
RBC # BLD AUTO: 4.43 M/UL (ref 4–5.4)
SODIUM SERPL-SCNC: 137 MMOL/L (ref 136–145)
WBC # BLD AUTO: 7.64 K/UL (ref 3.9–12.7)

## 2025-01-13 PROCEDURE — 3288F FALL RISK ASSESSMENT DOCD: CPT | Mod: HCNC,CPTII,S$GLB,

## 2025-01-13 PROCEDURE — 83880 ASSAY OF NATRIURETIC PEPTIDE: CPT | Mod: HCNC

## 2025-01-13 PROCEDURE — 1160F RVW MEDS BY RX/DR IN RCRD: CPT | Mod: HCNC,CPTII,S$GLB,

## 2025-01-13 PROCEDURE — 1126F AMNT PAIN NOTED NONE PRSNT: CPT | Mod: HCNC,CPTII,S$GLB,

## 2025-01-13 PROCEDURE — 83735 ASSAY OF MAGNESIUM: CPT | Mod: HCNC

## 2025-01-13 PROCEDURE — 99204 OFFICE O/P NEW MOD 45 MIN: CPT | Mod: HCNC,S$GLB,,

## 2025-01-13 PROCEDURE — 80053 COMPREHEN METABOLIC PANEL: CPT | Mod: HCNC

## 2025-01-13 PROCEDURE — 85025 COMPLETE CBC W/AUTO DIFF WBC: CPT | Mod: HCNC

## 2025-01-13 PROCEDURE — 36415 COLL VENOUS BLD VENIPUNCTURE: CPT | Mod: HCNC

## 2025-01-13 PROCEDURE — 93005 ELECTROCARDIOGRAM TRACING: CPT | Mod: HCNC,S$GLB,,

## 2025-01-13 PROCEDURE — 1159F MED LIST DOCD IN RCRD: CPT | Mod: HCNC,CPTII,S$GLB,

## 2025-01-13 PROCEDURE — 84100 ASSAY OF PHOSPHORUS: CPT | Mod: HCNC

## 2025-01-13 PROCEDURE — 1111F DSCHRG MED/CURRENT MED MERGE: CPT | Mod: HCNC,CPTII,S$GLB,

## 2025-01-13 PROCEDURE — 99999 PR PBB SHADOW E&M-EST. PATIENT-LVL IV: CPT | Mod: PBBFAC,HCNC,,

## 2025-01-13 PROCEDURE — 1157F ADVNC CARE PLAN IN RCRD: CPT | Mod: HCNC,CPTII,S$GLB,

## 2025-01-13 PROCEDURE — 1101F PT FALLS ASSESS-DOCD LE1/YR: CPT | Mod: HCNC,CPTII,S$GLB,

## 2025-01-13 PROCEDURE — 93010 ELECTROCARDIOGRAM REPORT: CPT | Mod: HCNC,S$GLB,, | Performed by: INTERNAL MEDICINE

## 2025-01-13 NOTE — PROGRESS NOTES
[facility] Provider Note (Initial Clinic) Consult Note    Age: 89 y.o.  Gender: female  Type of Congestive Heart Failure: Combined, new HFrEF in setting of AF RVR  Etiology: Tachycardia induced    Enrolled in Infusion suite: no    Diagnostic Labs:   EKG - 01/07/2025  CXR - 01/02/2025  ECHO - 01/06/2025  Stress test -   Stress echo -   Pharmacologic stress -   Cardiac catheterization -    Cardiac MRI -     Lab Results   Component Value Date     (L) 01/07/2025     (L) 01/06/2025    K 3.7 01/07/2025    K 3.8 01/06/2025    CL 95 01/07/2025    CL 96 01/06/2025    CO2 29 01/07/2025    CO2 22 (L) 01/06/2025    GLU 87 01/07/2025    GLU 79 01/06/2025    BUN 13 01/07/2025    BUN 14 01/06/2025    CREATININE 1.1 01/07/2025    CREATININE 1.2 01/06/2025    CALCIUM 8.5 (L) 01/07/2025    CALCIUM 8.5 (L) 01/06/2025    PROT 6.6 01/02/2025    PROT 7.1 09/19/2024    ALBUMIN 2.7 (L) 01/07/2025    ALBUMIN 2.7 (L) 01/06/2025    BILITOT 0.8 01/02/2025    BILITOT 0.6 09/19/2024    ALKPHOS 95 01/02/2025    ALKPHOS 98 09/19/2024    AST 35 01/02/2025    AST 23 09/19/2024    ALT 40 01/02/2025    ALT 19 09/19/2024    ANIONGAP 9 01/07/2025    ANIONGAP 15 01/06/2025    ESTGFRAFRICA 59.8 (A) 12/22/2021    ESTGFRAFRICA 48.0 (A) 12/21/2021    EGFRNONAA 51.9 (A) 12/22/2021    EGFRNONAA 41.6 (A) 12/21/2021       Lab Results   Component Value Date    WBC 8.98 01/07/2025    WBC 11.13 01/06/2025    RBC 4.17 01/07/2025    RBC 4.61 01/06/2025    HGB 12.4 01/07/2025    HGB 13.9 01/06/2025    HCT 37.2 01/07/2025    HCT 41.9 01/06/2025    MCV 89 01/07/2025    MCV 91 01/06/2025    MCH 29.7 01/07/2025    MCH 30.2 01/06/2025    MCHC 33.3 01/07/2025    MCHC 33.2 01/06/2025    RDW 13.7 01/07/2025    RDW 13.8 01/06/2025     01/07/2025     01/06/2025    MPV 10.5 01/07/2025    MPV 10.6 01/06/2025    IMMGR 0.5 01/02/2025    IMMGR 0.4 09/13/2023    IGABS 0.05 (H) 01/02/2025    IGABS 0.04 09/13/2023    LYMPH 1.5 01/02/2025    LYMPH 15.6 (L)  01/02/2025    MONO 0.9 01/02/2025    MONO 9.7 01/02/2025    EOS 0.1 01/02/2025    EOS 0.2 09/13/2023    BASO 0.03 01/02/2025    BASO 0.06 09/13/2023    NRBC 0 01/02/2025    NRBC 0 09/13/2023    GRAN 7.1 01/02/2025    GRAN 73.4 (H) 01/02/2025    EOSINOPHIL 0.5 01/02/2025    EOSINOPHIL 2.2 09/13/2023    BASOPHIL 0.3 01/02/2025    BASOPHIL 0.6 09/13/2023       Lab Results   Component Value Date    BNP 1,246 (H) 01/02/2025     (H) 12/21/2021    MG 1.9 01/07/2025    MG 1.7 01/06/2025    PHOS 3.0 01/07/2025    PHOS 3.7 01/06/2025    TROPONINI 0.014 12/21/2021    HGBA1C 5.8 (H) 08/31/2020    TSH 4.680 (H) 01/02/2025    TSH 3.41 09/26/2024    FREET4 1.20 01/02/2025    FREET4 1.02 09/19/2024       Lab Results   Component Value Date    IRON 32 01/04/2025    TIBC 299 01/04/2025    FERRITIN 605 (H) 01/04/2025    CHOL 141 09/19/2024    TRIG 104 09/19/2024    HDL 44 09/19/2024    LDLCALC 76.2 09/19/2024    CHOLHDL 31.2 09/19/2024    TOTALCHOLEST 3.2 09/19/2024    NONHDLCHOL 97 09/19/2024    COLORU Yellow 01/02/2025    APPEARANCEUA Clear 01/02/2025    PHUR 6.0 01/02/2025    SPECGRAV 1.010 01/02/2025    PROTEINUA Negative 01/02/2025    GLUCUA Negative 01/02/2025    KETONESU Negative 01/02/2025    BILIRUBINUA Negative 01/02/2025    OCCULTUA Negative 01/02/2025    NITRITE Negative 01/02/2025    LEUKOCYTESUR 3+ (A) 01/02/2025       No implanted cardiac devices    Current Outpatient Medications on File Prior to Visit   Medication Sig Dispense Refill    acetaminophen (TYLENOL) 500 MG tablet Take 500 mg by mouth every 6 (six) hours as needed.      amiodarone (PACERONE) 200 MG Tab Take 1 tablet (200 mg total) by mouth 2 (two) times daily for 14 days, THEN 1 tablet (200 mg total) once daily. 208 tablet 0    ascorbic acid, vitamin C, (VITAMIN C) 250 MG tablet Take 250 mg by mouth once daily. (Patient not taking: Reported on 1/10/2025)      atorvastatin (LIPITOR) 10 MG tablet Take 1 tablet by mouth once daily 90 tablet 3    b complex  vitamins capsule Take 1 capsule by mouth once daily. (Patient not taking: Reported on 1/10/2025)      biotin 5,000 mcg TbDL Take 1 tablet (5,000 mcg total) by mouth Daily. (Patient not taking: Reported on 1/10/2025) 90 tablet 3    bisacodyL (DULCOLAX) 5 mg EC tablet Take 5 mg by mouth once daily. (Patient not taking: Reported on 1/10/2025)      CALCIUM CARBONATE/VITAMIN D3 (CALCIUM 600 + D,3, ORAL) Take 1 tablet by mouth once daily.  (Patient not taking: Reported on 1/10/2025)      cholecalciferol, vitamin D3, (VITAMIN D3) 50 mcg (2,000 unit) Cap Take 1 capsule by mouth once daily. (Patient not taking: Reported on 1/10/2025)      coenzyme Q10 200 mg capsule Take 200 mg by mouth once daily. (Patient not taking: Reported on 1/10/2025)      DEXTRAN 70/HYPROMELLOSE (ARTIFICIAL TEARS, PF, OPHT) Place 1 drop into both eyes as needed.      ELIQUIS 5 mg Tab Take 1 tablet by mouth twice daily 180 tablet 3    estradioL (ESTRACE) 0.01 % (0.1 mg/gram) vaginal cream Place 0.5 g intravaginally q.h.s. x2 weeks; then, placed 0.5 g intravaginally twice weekly at bedtime (maintenance therapy). 42.5 g 1    fluticasone (VERAMYST) 27.5 mcg/actuation nasal spray 2 sprays by Nasal route. (Patient taking differently: 2 sprays by Nasal route as needed.)      fluticasone propionate (FLONASE) 50 mcg/actuation nasal spray CLEAN SINUS/NARES WITH OCEAN NASAL SPRAY THEN USE FLONASE 1 SPRAY TWICE DAILY. (Patient taking differently: as needed. CLEAN SINUS/NARES WITH OCEAN NASAL SPRAY THEN USE FLONASE 1 SPRAY TWICE DAILY.) 48 g 3    gabapentin (NEURONTIN) 100 MG capsule TAKE 1 CAPSULE BY MOUTH THREE TIMES DAILY (Patient taking differently: Take 100 mg by mouth as needed.) 90 capsule 3    hydroquinone 4 % Crea APPLY  CREAM TOPICALLY TWICE DAILY (Patient not taking: Reported on 1/10/2025) 58 g 0    levothyroxine (SYNTHROID) 112 MCG tablet TAKE 1 TABLET BY MOUTH BEFORE BREAKFAST 90 tablet 3    LIDOcaine (LIDODERM) 5 % Place 1 patch onto the skin once  daily. Remove & Discard patch within 12 hours or as directed by MD (Patient not taking: Reported on 1/10/2025) 30 patch 0    losartan (COZAAR) 100 MG tablet Take 1 tablet by mouth once daily (Patient not taking: Reported on 1/10/2025) 90 tablet 3    magnesium oxide 400 mg magnesium Tab Take 1 tablet by mouth once daily. (Patient not taking: Reported on 1/10/2025)      metoprolol succinate (TOPROL-XL) 100 MG 24 hr tablet Take 1 tablet (100 mg total) by mouth once daily. 90 tablet 3    MULTIVITAMIN W-MINERALS/LUTEIN (CENTRUM SILVER ORAL) Take 1 tablet by mouth once daily. (Patient not taking: Reported on 1/10/2025)      mupirocin (BACTROBAN) 2 % ointment Apply topically 3 (three) times daily. Apply to wound on head until healed (Patient not taking: Reported on 1/10/2025) 30 g 1    sodium chloride (SALINE NASAL) 0.65 % nasal spray 1 spray by Nasal route as needed for Congestion. 104 mL 3    spironolactone (ALDACTONE) 25 MG tablet Take 0.5 tablets (12.5 mg total) by mouth once daily. 45 tablet 3    vit A/vit C/vit E/zinc/copper (ICAPS AREDS ORAL) Take 1 tablet by mouth 2 (two) times a day. (Patient not taking: Reported on 1/10/2025)      vitamin D (VITAMIN D3) 1000 units Tab Take 2 tablets by mouth once daily. (Patient not taking: Reported on 1/10/2025)       No current facility-administered medications on file prior to visit.         HPI:  Patient denies appreciable SOB with ADL since hospital discharge    Patient sleeps on 1-2 number of pillows   Patient wakes up SOB, has to get out of bed, associated cough- denies PND symptoms   Palpitations - denies subjective   Dizzy, light-headed, pre-syncope or syncope- intermittent dizziness/lightheadedness noted with lying down, denies pre-syncope/syncope or persistent dizziness/lightheadedness   Since discharge frequency of performing weights, home weight and weight change- performing daily weights, denies weight gain on home scale. Reports BLE edema greatly improved   Other  information felt pertinent to HPI: Ms. Tiffany Masterson is a 89 yo female with pAF, HFpEF, hypothyroidism, HTN, CKD3, migraines, hx tSAH w/ punctate cerebellar IPH who presents to first [facility] visit following recent admission for AF RVR with EF drop (65-> 30%) and UTI. She was rate controlled and diuresed. Cards consulted and she underwent SONIA/DCCV on 1/6 with restoration of NSR. Initiated on amiodarone prior to dc.     PHYSICAL:   Vitals:    01/13/25 1127   BP: 130/85   Pulse: 106      @[id number](3)@    JVD: no   Heart rhythm: irregular  Cardiac murmur: No    S3: no  S4: no  Lungs: clear  Hepatojugular reflux: no  Edema: yes, trace BLE edema, wearing compression stockings      Echo 1/3/25:    Left Ventricle: The left ventricle is normal in size. Ventricular mass is normal. Normal wall thickness. There is severely reduced systolic function with a visually estimated ejection fraction of 25 - 30%. Quantitated ejection fraction is 30%.    Right Ventricle: Systolic function is mildly reduced.    Left Atrium: Left atrium is severely dilated.    Aortic Valve: There is mild annular calcification present. There is mild stenosis. Aortic valve area by VTI is 2.1 cm2. Aortic valve peak velocity is 1.4 m/s. Mean gradient is 5.0 mmHg. The dimensionless index is 0.50. There is mild aortic regurgitation.    Mitral Valve: Moderately thickened leaflets.    Tricuspid Valve: There is moderate regurgitation.    Pulmonary Artery: The estimated pulmonary artery systolic pressure is 29 mmHg.    Pericardium: Left pleural effusion.    Irregularly irregular rhythm was present during the study    ASSESSMENT/PLAN:    1. Acute combined systolic and diastolic heart failure  -NYHA Class I-II symptoms. Suspected tachycardia induced CM. Not on loop diuretic but appears well compensated on exam today. Pending fluid trend, consider sending in prn lasix.  -Current GDMT: losartan 100mg daily, toprol 100mg daily, aldactone 12.5mg daily. Not on SGLT2i in  setting of recent UTI.  -Recommend 2-3 gram sodium restriction and 1500cc- 2000cc fluid restriction.  -Requested patient to weigh themselves daily, and to notify us if their weight increases by more than 3 lbs in 1 day or 5 lbs in 3 days.  -RTC in 2 weeks or sooner prn    2. Paroxysmal atrial fibrillation   -s/p SONIA/DCCV 1/6/25. EKG today in [facility].  -Currently on amiodarone loading dose 200mg BID. Continue amiodarone, metoprolol, and eliquis  -Messaged EP for close follow up.    3. Primary hypertension   -Continue antihypertensives    4. Stage 3b chronic kidney disease   -Cr 1.2 today, at baseline    Gale Bess PA-C

## 2025-01-13 NOTE — PATIENT INSTRUCTIONS
Resume losartan 100mg daily.    Notify us ([phone]) with any worsening shortness of breath, swelling or 3lb weight gain.    EKG today. Will message Electrophysiology to coordinate follow up for Afib.

## 2025-01-14 ENCOUNTER — DOCUMENTATION ONLY (OUTPATIENT)
Dept: CARDIOLOGY | Facility: CLINIC | Age: 89
End: 2025-01-14
Payer: MEDICARE

## 2025-01-14 NOTE — PROGRESS NOTES
"[facility]) First Week Visit   Out of Sequence Note  Pt presents to clinic 1- and accompanied by  of 70 desire [facility].     Most Recent Hospital Discharge Date: 1-7-2025  Last admission Diagnosis/chief complaint:SOB        Visit Vitals:     Wt Readings from Last 3 Encounters:   01/13/25 62.5 kg (137 lb 14.4 oz)   01/07/25 67.8 kg (149 lb 7.6 oz)   01/02/25 76.2 kg (167 lb 15.9 oz)     Temp Readings from Last 3 Encounters:   01/07/25 98.1 °F (36.7 °C) (Oral)   05/24/23 97.7 °F (36.5 °C)   11/11/22 97.9 °F (36.6 °C) (Oral)     BP Readings from Last 3 Encounters:   01/13/25 130/85   01/07/25 (!) 116/56   01/02/25 116/78     Pulse Readings from Last 3 Encounters:   01/13/25 106   01/07/25 88   01/02/25 87            Pt reports the following:  []  Shortness of Breath with activity  []  Shortness of Breath at rest/ sleeping on 2-3 pillows "some days"  []  Fatigue  []  Edema   [] Chest pain or tightness  [] Weight Increase since discharge  [x] None of the above    Pt reports being in the GREEN(color) Zone. If in yellow/red, reminded that they should be calling TCC today or now.      Medications:     Pt reports having all medications available and understands how to take them appropriately. Reminded pt to call prior to making any changes to medications. YES     Education:    [x] Gave pt new  / Confirmed pt has  "Heart Failure Transitional Care Clinic Home Care Guide" .   Reviewed key points as listed below.      Recommend 2 gram sodium restriction and 1500cc fluid restriction.  Encourage physical activity with graded exercise program.  Requested patient to weigh themselves daily, and to notify us if their weight increases by more than 3 lbs in 1 day or 5 lbs in 3 days.      [x] Gave / Reviewed "Daily Weight and Symptom Tracker".  Reviewed with patient when and how to call  [facility] according to "Yellow Zone" and "Red Zone".                  [x] Pt given list of low/high sodium " "food list and Your Heart Healthy Nutrition Guide.                   Watch for these Signs and Symptoms: If any of these occur, contact [facility] immediately:   Increase in shortness of breath with movement   Increase in swelling in your legs and ankles   Weight gain of more than 3 pounds in a night or 5 pounds in 3 days.   Difficulty breathing when you are lying down   Worsening fatigue or tiredness   Stomach bloating, a full feeling or a loss of appetite   Increased coughing--especially when you are lying down     MyChart and Care Companion:              Patient active on myChart? Yes, patient uses regularly.    Contacting our Team:              Reviewed with pt how to contact [facility] RN via phone or [employer] messaging.      [facility] Program Plan:  Pt educated on follow-up plan while in H[facility] program.   [x]  PT given /reviewed upcoming appointment/check in dates. These will include weekly contact with RN or visits with providers over the next 4-6 weeks.                   [x]  Pt educated that they will transitioned to long term care provider team at week 4-6.  This team will be either Cardiology, PCP or Advanced Heart Failure depending on needs.          Pt was able to verbalize back to RN in their own words correct diet/fluid restrictions, necessity for exercise, warning signs and symptoms, when and how to contact their TCC team .       Plan:   Please see PA-C notes. Weekly [facility] checks and PRN appts.        [x]  Pt given AVS with follow up appointment within 2 weeks(1-27-25) and medication detail list for ease of use.     [x]  Explained to pt they will have a phone "check in" by RN in/on 1-.          Will follow up with pt at next clinic visit and RN navigator available for pt questions, issues or concerns.     Please refer to provider visit for additional details and assessment.    Stressed the use of the [facility] and after hrs ph #s, Daily Dry Wts and Sodium/Fluid Restrictions.    "

## 2025-01-15 ENCOUNTER — OFFICE VISIT (OUTPATIENT)
Dept: PRIMARY CARE CLINIC | Facility: CLINIC | Age: 89
End: 2025-01-15
Payer: MEDICARE

## 2025-01-15 ENCOUNTER — OFFICE VISIT (OUTPATIENT)
Dept: OPTOMETRY | Facility: CLINIC | Age: 89
End: 2025-01-15
Payer: MEDICARE

## 2025-01-15 ENCOUNTER — TELEPHONE (OUTPATIENT)
Dept: PRIMARY CARE CLINIC | Facility: CLINIC | Age: 89
End: 2025-01-15
Payer: MEDICARE

## 2025-01-15 VITALS
RESPIRATION RATE: 14 BRPM | HEIGHT: 66 IN | DIASTOLIC BLOOD PRESSURE: 62 MMHG | BODY MASS INDEX: 22.5 KG/M2 | SYSTOLIC BLOOD PRESSURE: 126 MMHG | WEIGHT: 140 LBS | HEART RATE: 72 BPM | OXYGEN SATURATION: 98 %

## 2025-01-15 DIAGNOSIS — H52.4 MYOPIA OF BOTH EYES WITH ASTIGMATISM AND PRESBYOPIA: ICD-10-CM

## 2025-01-15 DIAGNOSIS — H53.8 BLURRY VISION: Primary | ICD-10-CM

## 2025-01-15 DIAGNOSIS — H04.123 DRY EYES, BILATERAL: ICD-10-CM

## 2025-01-15 DIAGNOSIS — S06.0X9S CONCUSSION WITH LOSS OF CONSCIOUSNESS, SEQUELA: ICD-10-CM

## 2025-01-15 DIAGNOSIS — E78.2 MIXED HYPERLIPIDEMIA: ICD-10-CM

## 2025-01-15 DIAGNOSIS — I50.41 ACUTE COMBINED SYSTOLIC AND DIASTOLIC HEART FAILURE: ICD-10-CM

## 2025-01-15 DIAGNOSIS — Z00.00 PREVENTATIVE HEALTH CARE: ICD-10-CM

## 2025-01-15 DIAGNOSIS — H02.883 MEIBOMIAN GLAND DYSFUNCTION (MGD) OF BOTH EYES: ICD-10-CM

## 2025-01-15 DIAGNOSIS — I48.0 PAROXYSMAL ATRIAL FIBRILLATION: ICD-10-CM

## 2025-01-15 DIAGNOSIS — H52.203 MYOPIA OF BOTH EYES WITH ASTIGMATISM AND PRESBYOPIA: ICD-10-CM

## 2025-01-15 DIAGNOSIS — E55.9 VITAMIN D DEFICIENCY DISEASE: ICD-10-CM

## 2025-01-15 DIAGNOSIS — E03.9 HYPOTHYROIDISM, UNSPECIFIED TYPE: Chronic | ICD-10-CM

## 2025-01-15 DIAGNOSIS — H52.13 MYOPIA OF BOTH EYES WITH ASTIGMATISM AND PRESBYOPIA: ICD-10-CM

## 2025-01-15 DIAGNOSIS — M81.0 OSTEOPOROSIS WITHOUT CURRENT PATHOLOGICAL FRACTURE, UNSPECIFIED OSTEOPOROSIS TYPE: ICD-10-CM

## 2025-01-15 DIAGNOSIS — N18.32 STAGE 3B CHRONIC KIDNEY DISEASE: ICD-10-CM

## 2025-01-15 DIAGNOSIS — D12.6 COLON ADENOMAS: ICD-10-CM

## 2025-01-15 DIAGNOSIS — I48.11 LONGSTANDING PERSISTENT ATRIAL FIBRILLATION: Primary | ICD-10-CM

## 2025-01-15 DIAGNOSIS — Z96.1 PSEUDOPHAKIA OF RIGHT EYE: ICD-10-CM

## 2025-01-15 DIAGNOSIS — I50.20 HEART FAILURE WITH REDUCED EJECTION FRACTION: ICD-10-CM

## 2025-01-15 DIAGNOSIS — H25.12 NUCLEAR SCLEROTIC CATARACT, LEFT: ICD-10-CM

## 2025-01-15 DIAGNOSIS — I10 PRIMARY HYPERTENSION: ICD-10-CM

## 2025-01-15 DIAGNOSIS — M85.80 OSTEOPENIA, UNSPECIFIED LOCATION: ICD-10-CM

## 2025-01-15 DIAGNOSIS — H02.886 MEIBOMIAN GLAND DYSFUNCTION (MGD) OF BOTH EYES: ICD-10-CM

## 2025-01-15 PROBLEM — R60.0 BILATERAL LEG EDEMA: Status: RESOLVED | Noted: 2025-01-02 | Resolved: 2025-01-15

## 2025-01-15 PROBLEM — E87.1 HYPONATREMIA: Status: RESOLVED | Noted: 2025-01-07 | Resolved: 2025-01-15

## 2025-01-15 PROCEDURE — 1157F ADVNC CARE PLAN IN RCRD: CPT | Mod: HCNC,CPTII,S$GLB, | Performed by: OPTOMETRIST

## 2025-01-15 PROCEDURE — 99214 OFFICE O/P EST MOD 30 MIN: CPT | Mod: HCNC,S$GLB,, | Performed by: OPTOMETRIST

## 2025-01-15 PROCEDURE — 1111F DSCHRG MED/CURRENT MED MERGE: CPT | Mod: HCNC,CPTII,S$GLB, | Performed by: INTERNAL MEDICINE

## 2025-01-15 PROCEDURE — 99999 PR PBB SHADOW E&M-EST. PATIENT-LVL III: CPT | Mod: PBBFAC,HCNC,, | Performed by: INTERNAL MEDICINE

## 2025-01-15 PROCEDURE — 1101F PT FALLS ASSESS-DOCD LE1/YR: CPT | Mod: HCNC,CPTII,S$GLB, | Performed by: INTERNAL MEDICINE

## 2025-01-15 PROCEDURE — G2211 COMPLEX E/M VISIT ADD ON: HCPCS | Mod: HCNC,S$GLB,, | Performed by: INTERNAL MEDICINE

## 2025-01-15 PROCEDURE — 1157F ADVNC CARE PLAN IN RCRD: CPT | Mod: HCNC,CPTII,S$GLB, | Performed by: INTERNAL MEDICINE

## 2025-01-15 PROCEDURE — 1159F MED LIST DOCD IN RCRD: CPT | Mod: HCNC,CPTII,S$GLB, | Performed by: OPTOMETRIST

## 2025-01-15 PROCEDURE — 1126F AMNT PAIN NOTED NONE PRSNT: CPT | Mod: HCNC,CPTII,S$GLB, | Performed by: INTERNAL MEDICINE

## 2025-01-15 PROCEDURE — 3288F FALL RISK ASSESSMENT DOCD: CPT | Mod: HCNC,CPTII,S$GLB, | Performed by: INTERNAL MEDICINE

## 2025-01-15 PROCEDURE — 99999 PR PBB SHADOW E&M-EST. PATIENT-LVL IV: CPT | Mod: PBBFAC,HCNC,, | Performed by: OPTOMETRIST

## 2025-01-15 PROCEDURE — 99215 OFFICE O/P EST HI 40 MIN: CPT | Mod: HCNC,S$GLB,, | Performed by: INTERNAL MEDICINE

## 2025-01-15 PROCEDURE — 1111F DSCHRG MED/CURRENT MED MERGE: CPT | Mod: HCNC,CPTII,S$GLB, | Performed by: OPTOMETRIST

## 2025-01-15 PROCEDURE — 92015 DETERMINE REFRACTIVE STATE: CPT | Mod: HCNC,S$GLB,, | Performed by: OPTOMETRIST

## 2025-01-15 RX ORDER — ACETAMINOPHEN 500 MG
2000 TABLET ORAL DAILY
COMMUNITY
Start: 2025-01-15

## 2025-01-15 RX ORDER — METOPROLOL SUCCINATE 100 MG/1
100 TABLET, EXTENDED RELEASE ORAL DAILY
Qty: 90 TABLET | Refills: 3 | Status: SHIPPED | OUTPATIENT
Start: 2025-01-15

## 2025-01-15 RX ORDER — SPIRONOLACTONE 25 MG/1
25 TABLET ORAL DAILY
Qty: 90 TABLET | Refills: 3 | Status: SHIPPED | OUTPATIENT
Start: 2025-01-15 | End: 2026-01-15

## 2025-01-15 NOTE — ASSESSMENT & PLAN NOTE
As per Neurology rec 12/03/2024  Keep EEG appt  to r/o seizure-like activity. Patient had an EEG at [facility] [place] that was undeterminable   Referral for vestibular therapy for imbalance  Referral for speech for cognitive impairment  Start beginner's yoga and advance as tolerated  You do not feel your feet well due to neuropathy. Please wear footwear with good tread at all times. This recommendation includes wearing socks with tread on them. Make sure all of your walkways, hallways, and maddi are well lit at all times day and night. Use night lights to light up commonly used pathways in your home, meaning from the bedroom to the bathroom or kitchen. Make sure all of your walkways, hallways, and maddi are clear of obstacles at all times. Obstacles include decorations, rugs/mats, pet beds, shoes, and clothes. Make sure you have good hand holds to grab onto as you walk around your home.     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     12/23/2024 for CT head - stable without interval change     Keep eye appt on 1/15/2024    Drink Boost when appetite is poor.     Drink 4-6 cups of water daily

## 2025-01-15 NOTE — ASSESSMENT & PLAN NOTE
Cont GDMT: losartan 100mg daily, toprol 100mg daily, aldactone 255mg daily.    -Recommend 1.5 gram sodium restriction and 1500cc- 2000cc fluid restriction.  -Requested patient to weigh themselves daily, and to notify us if their weight increases by more than 3 lbs in 1 day or 5 lbs in 3 days  -currently in CHF clinic   -Start Jardiance 10mg daily  - repeat labs in 8 wks     Dry weight: 136-139 lbs    Eat high potassium foods: Green leafy veggies (cooked spinach/broccoli), fresh fruits especially bananas/oranges/cantaloupe/honeydew/grapefruit; dried fruits ie prunes, raisins, dates;  sweet potatoes, zucchini, mushrooms, potatoes and sweet potatoes, coconut water

## 2025-01-15 NOTE — ASSESSMENT & PLAN NOTE
Get COVID vaccine - refuses   Keep eye appt today   Keep colon 2/03 and DEXA appt   Keep appt with cardiology/neurology    Labs in 8 wks to check BMP, Mag, Thyroid, BNP

## 2025-01-15 NOTE — ASSESSMENT & PLAN NOTE
s/p SONIA/DCCV 1/6/25. EKG today in [facility].  -Currently on amiodarone loading dose 200mg BID. Continue amiodarone, metoprolol, and eliquis  -Messaged EP for close follow up.  -keep eye appt today   Monitor Tsh - repeat lab in 8 wks

## 2025-01-15 NOTE — ASSESSMENT & PLAN NOTE
Cont to monitor and encourage at least 4-6 glasses of water daily (restricted to 1-2 liters per day due to CHF)  -check BMP, Mag in 8 wks on Jardiance and Aldactone

## 2025-01-15 NOTE — PROGRESS NOTES
HPI    PT reports : having a hard time seeing the television     -blurred  OU  --diplopia  --eye pain   --flashes/floaters  --headaches  --curtain/shadow/veils    Eye Meds: NONE      Last edited by Nell Seo MA on 1/15/2025 10:32 AM.            Assessment /Plan     For exam results, see Encounter Report.    Blurry vision    Preventative health care  -     Ambulatory referral/consult to Optometry    Nuclear sclerotic cataract, left    Pseudophakia of right eye    Meibomian gland dysfunction (MGD) of both eyes    Myopia of both eyes with astigmatism and presbyopia    Dry eyes, bilateral      MONITOR. ED PT ON ALL EXAM FINDINGS  RX FINAL SPECS   OCULAR HEALTH STABLE OD, OS   MODERATE CAT OS; NOT INERESTED IN CONSULT AT THIS TIME; PCIOL OD; MONITOR. UV PROTECTION   RTC 1 YR//PRN FOR REE/DFE

## 2025-01-15 NOTE — PROGRESS NOTES
[facility]ediatric[facility] Progress Note      Chief Complaint     Hospital Follow Up   for hospitalization for Atrial fib and CHFon 1/02/2025    Subjective:      History of Present Illness:  Tiffany Masterson is a 89 y.o. female here for hospital f/u for CHF due to Afib RVR    E. Coli cystitis 1/02/2025: completed Bactrim DS bid x 7 days; pt was never symptomatic, febrile or elevated WBC    New-onset CHF with reduced EF due to tachycardia-induced acute HR with RVR with NYHA Class 1-2 admitted on 1/02/2025 dropped 162 lbs down to 136 lbs but admits to drinking 4 regular cokes per day  -saw nutritionist at CHF clinic yesterday  symptoms - saw Cardiology 1/13 ROB Bess  -currently on GDMT: losartan 100mg daily, toprol 100mg daily, aldactone 255mg daily. Not on SGLT2i in setting of recent UTI.  -Recommend 2-3 gram sodium restriction and 1500cc- 2000cc fluid restriction.  -Requested patient to weigh themselves daily, and to notify us if their weight increases by more than 3 lbs in 1 day or 5 lbs in 3 days  -1/02 CXR Bilateral pleural effusions with interstitial findings suggesting edema. D   BNP 1/02/2025 1,246 (184 12/2021), TSH 4.680 with free T4 1.2, ferritin 605  ECHO: EF 65% in 1/2/2021 with Grade 1 diastolic dysfunction   ECHO 1/3/25:    Left Ventricle: The left ventricle is normal in size. Ventricular mass is normal. Normal wall thickness. There is severely reduced systolic function with a visually estimated ejection fraction of 25 - 30%. Quantitated ejection fraction is 30%.    Right Ventricle: Systolic function is mildly reduced.    Left Atrium: Left atrium is severely dilated.    Aortic Valve: There is mild annular calcification present. There is mild stenosis. Aortic valve area by VTI is 2.1 cm2. Aortic valve peak velocity is 1.4 m/s. Mean gradient is 5.0 mmHg. The dimensionless index is 0.50. There is mild aortic regurgitation.    Mitral Valve: Moderately thickened leaflets.    Tricuspid Valve:  There is moderate regurgitation.    Pulmonary Artery: The estimated pulmonary artery systolic pressure is 29 mmHg.    Pericardium: Left pleural effusion.    Irregularly irregular rhythm was present during the study      Paroxysmal atrial fibrillation              -s/p SONIA/DCCV 1/6/25. EKG today in [facility].  -Currently on amiodarone loading dose 200mg BID. Continue amiodarone, metoprolol, and eliquis  -Messaged EP for close follow up.    Hypokalemia: K 3.7 1/07/2025: not on loop due to euvolemia thus far  K 4.0 on 1/13 not on diuretics     CKD 3b with GFR 44 ml/min and creat 1.2 on 1/13   CTH 12/23/24:    Nontraumatic subdural hemorrhage; SAH due to fall on 9/22/2024 - had f/u Neurosurgery ROB Marte on 12/30/2024  - CT head 12/23:  No acute hemorrhage, prior tSAH/IPH appear to have resolved; there are some small foci of encephalomalacia in the L frontal/temporal lobes that coincide with sequelae of prior traumatic hemorrhages  - Ventriculomegaly out of proportion to sulcal enlargement  - Non-displaced R occipital bone fracture, appears to be healing    Hypertension: stable on  losartan 100mg daily, toprol 100mg daily, aldactone 12.5mg daily.       Past Medical History:  Past Medical History:   Diagnosis Date    Abnormal MRI 10/16/2020    Acute cystitis without hematuria 12/22/2021    Arthritis     Arthritis of right hip 11/08/2021    Atrial fibrillation     Bilateral leg edema 01/02/2025    Cataract     Elevated liver enzymes     Endometrial mass 06/29/2018    Epiretinal membrane (ERM) of right eye     Family history of malignant neoplasm of gastrointestinal tract mother    Frequent UTI 09/30/2018    Generalized arthritis 09/19/2024    Shoulders, knees, hips      Graves disease     now hypothryoid - no surgery or medicine. resolved on its own    History of cholecystectomy 06/10/2021    History of colonoscopy     unremarkable 2007 by Dr. Javier.  Barium enema revealed diverticular disease.     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     Total hip:                                BMD is 0.678 g/cm2, T-score is -2.2, and Z-score is 0.2.     Femoral neck:                          BMD is 0.664 g/cm2, T-score is -1.7, and Z-score is 0.9.     Distal 1/3 radius:                      Not applicable     FRAX:     34% risk of a major oste    Personal history of colonic polyps     Physical deconditioning 09/13/2023    Preop testing 05/17/2021    S/P colonoscopic polypectomy 06/18/2013    Sleep disorder 11/08/2021    Status post hysteroscopic polypectomy 07/02/2018    TBI (traumatic brain injury) 10/25/2024    9/22/2024: Ct head: Left frontotemporal subarachnoid hemorrhage again noted including a small focus of intraparenchymal hemorrhage in the left cerebellum. No new hemorrhage.       TIA (transient ischemic attack)     with a normal angiogram in the past, Ocular migraines reported by patient, not TIA     Transaminitis 03/19/2021    Urethral caruncle 09/15/2020       Past Surgical History:  Past Surgical History:   Procedure Laterality Date    CATARACT EXTRACTION Right 2012    Dr. Lexis Nicolas     CHOLECYSTECTOMY  2022    COLONOSCOPY      2014 polyps    COLONOSCOPY N/A 11/07/2016    Procedure: COLONOSCOPY;  Surgeon: Bebeto Gonzalez MD;  Location: [facility] (4TH FLR);  Service: Endoscopy;  Laterality: N/A;    COLONOSCOPY N/A 03/09/2020    Procedure: COLONOSCOPY;  Surgeon: Bebeto Gonzalez MD;  Location: [facility] (4TH FLR);   Service: Endoscopy;  Laterality: N/A;    COLONOSCOPY N/A 09/29/2021    Procedure: COLONOSCOPY;  Surgeon: Bebeto Gonzalez MD;  Location: [facility] (4TH FLR);  Service: Endoscopy;  Laterality: N/A;  please schedule patient as soon as possible with me EGD colonoscopy with constipation bowel prep for iron deficiency anemia family history of colon cancer and personal history of colon polyps  9/17/21 ok for standard split prep per Dr. Gonzalez-imani    COLONOSCOPY N/A 09/29/2021    Procedure: COLONOSCOPY;  Surgeon: Bebeto Gonzalez MD;  Location: [facility] (4TH FLR);  Service: Endoscopy;  Laterality: N/A;  Please schedule patient as soon as possible with me EGD colonoscopy with constipation bowel prep for iron deficiency anemia family history of colon cancer and personal history of colon polyps  9/17/21 Ok for standard split prep per Dr. Collins    COLONOSCOPY W/ POLYPECTOMY  06/2013    polyp of colon    ENDOSCOPIC ULTRASOUND OF UPPER GASTROINTESTINAL TRACT N/A 04/29/2021    Procedure: ULTRASOUND, UPPER GI TRACT, ENDOSCOPIC;  Surgeon: Dalton Johnson MD;  Location: [facility] (2ND FLR);  Service: Endoscopy;  Laterality: N/A;  Postprandial nausea vomiting epigastric 0.9 cm stone and sludge seen within the gallbladder lumen.  No wall thickening or pericholecystic fluid.  0.6 cm stone seen within the distal common bile duct at the level of the pancreatic head.  There is dil    ERCP N/A 04/29/2021    Procedure: ERCP (ENDOSCOPIC RETROGRADE CHOLANGIOPANCREATOGRAPHY);  Surgeon: Dalton Johnson MD;  Location: [facility] (2ND FLR);  Service: Endoscopy;  Laterality: N/A;  Postprandial nausea and vomit 0.9 cm stone and sludge seen within the gallbladder lumen.  No wall thickening or pericholecystic fluid.  0.6 cm stone seen within the distal common bile duct at the level of the pancreatic head.  There i    ESOPHAGOGASTRODUODENOSCOPY N/A 09/29/2021    Procedure: EGD (ESOPHAGOGASTRODUODENOSCOPY);  Surgeon: Bebeto Gonzalez MD;   Location: [facility] ENDO (4TH FLR);  Service: Endoscopy;  Laterality: N/A;  rapid covid test arrival 12pm - sm  9/27 arrival time for covid test/procedure confirmed with pt-rb    ESOPHAGOGASTRODUODENOSCOPY N/A 09/29/2021    Procedure: EGD (ESOPHAGOGASTRODUODENOSCOPY);  Surgeon: Bebeto Gonzalez MD;  Location: [facility] ENDO (4TH FLR);  Service: Endoscopy;  Laterality: N/A;  covid test 9/19/21 [facility], prep instr emailed -ml    EYE SURGERY  11/10/2014    right retina/Dr. Dalton Sierra ([facility])    HYSTEROSCOPIC POLYPECTOMY OF UTERUS N/A 07/02/2018    Procedure: POLYPECTOMY, UTERUS, HYSTEROSCOPIC;  Surgeon: Elliot Vanessa IV, MD;  Location: [facility] OR;  Service: OB/GYN;  Laterality: N/A;    INJECTION OF JOINT Right 11/11/2022    Procedure: INJECTION, RIGHT GTB CONTRAST DIRECT REFERRAL;  Surgeon: Camden Hernandez MD;  Location: [facility] PAIN MGT;  Service: Pain Management;  Laterality: Right;    JOINT REPLACEMENT  03/23/2011    left total hip replacement    LAPAROSCOPIC CHOLECYSTECTOMY N/A 05/17/2021    Procedure: CHOLECYSTECTOMY, LAPAROSCOPIC;  Surgeon: Rayshawn Peralta Jr., MD;  Location: [facility] 2ND FLR;  Service: General;  Laterality: N/A;    REMOVAL OF EPIRETINAL MEMBRANE Right     Dr. Padron    TONSILLECTOMY      pt was 11 years old    TRANSESOPHAGEAL ECHOCARDIOGRAM WITH POSSIBLE CARDIOVERSION (SONIA W/ POSS CARDIOVERSION) N/A 1/6/2025    Procedure: Transesophageal echo (SONIA) intra-procedure log documentation;  Surgeon: Tio Phelps MD;  Location: [facility] EP LAB;  Service: Cardiology;  Laterality: N/A;    TREATMENT OF CARDIAC ARRHYTHMIA N/A 1/6/2025    Procedure: Cardioversion or Defibrillation;  Surgeon: Ayush Lechuga MD;  Location: [facility] EP LAB;  Service: Cardiology;  Laterality: N/A;  AF, SONIA/DCCV, ANES, GP,        Allergies:  Review of patient's allergies indicates:   Allergen Reactions    Levaquin [levofloxacin]      Joint pain    Hydrochlorothiazide Other (See Comments)     Pain in joints and depression per pt        Home Medications:  Current Outpatient Medications   Medication Sig Dispense Refill    amiodarone (PACERONE) 200 MG Tab Take 1 tablet (200 mg total) by mouth 2 (two) times daily for 14 days, THEN 1 tablet (200 mg total) once daily. 208 tablet 0    atorvastatin (LIPITOR) 10 MG tablet Take 1 tablet by mouth once daily 90 tablet 3    ELIQUIS 5 mg Tab Take 1 tablet by mouth twice daily 180 tablet 3    levothyroxine (SYNTHROID) 112 MCG tablet TAKE 1 TABLET BY MOUTH BEFORE BREAKFAST 90 tablet 3    losartan (COZAAR) 100 MG tablet Take 1 tablet by mouth once daily 90 tablet 3    acetaminophen (TYLENOL) 500 MG tablet Take 500 mg by mouth every 6 (six) hours as needed.      ascorbic acid, vitamin C, (VITAMIN C) 250 MG tablet Take 250 mg by mouth once daily.      b complex vitamins capsule Take 1 capsule by mouth once daily.      biotin 5,000 mcg TbDL Take 1 tablet (5,000 mcg total) by mouth Daily. 90 tablet 3    bisacodyL (DULCOLAX) 5 mg EC tablet Take 5 mg by mouth once daily.      CALCIUM CARBONATE/VITAMIN D3 (CALCIUM 600 + D,3, ORAL) Take 1 tablet by mouth once daily.      cholecalciferol, vitamin D3, (VITAMIN D3) 50 mcg (2,000 unit) Cap capsule Take 1 capsule (2,000 Units total) by mouth once daily.      coenzyme Q10 200 mg capsule Take 200 mg by mouth once daily.      DEXTRAN 70/HYPROMELLOSE (ARTIFICIAL TEARS, PF, OPHT) Place 1 drop into both eyes as needed.      empagliflozin (JARDIANCE) 10 mg tablet Take 1 tablet (10 mg total) by mouth once daily. 90 tablet 1    estradioL (ESTRACE) 0.01 % (0.1 mg/gram) vaginal cream Place 0.5 g intravaginally q.h.s. x2 weeks; then, placed 0.5 g intravaginally twice weekly at bedtime (maintenance therapy). 42.5 g 1    fluticasone (VERAMYST) 27.5 mcg/actuation nasal spray 2 sprays by Nasal route. (Patient not taking: Reported on 1/13/2025)      fluticasone propionate (FLONASE) 50 mcg/actuation nasal spray CLEAN SINUS/NARES WITH OCEAN NASAL SPRAY THEN USE FLONASE 1 SPRAY TWICE DAILY.  48 g 3    gabapentin (NEURONTIN) 100 MG capsule TAKE 1 CAPSULE BY MOUTH THREE TIMES DAILY 90 capsule 3    hydroquinone 4 % Crea APPLY  CREAM TOPICALLY TWICE DAILY 58 g 0    LIDOcaine (LIDODERM) 5 % Place 1 patch onto the skin once daily. Remove & Discard patch within 12 hours or as directed by MD 30 patch 0    magnesium oxide 400 mg magnesium Tab Take 1 tablet by mouth once daily.      metoprolol succinate (TOPROL-XL) 100 MG 24 hr tablet Take 1 tablet (100 mg total) by mouth once daily. 90 tablet 3    MULTIVITAMIN W-MINERALS/LUTEIN (CENTRUM SILVER ORAL) Take 1 tablet by mouth once daily.      mupirocin (BACTROBAN) 2 % ointment Apply topically 3 (three) times daily. Apply to wound on head until healed 30 g 1    sodium chloride (SALINE NASAL) 0.65 % nasal spray 1 spray by Nasal route as needed for Congestion. 104 mL 3    spironolactone (ALDACTONE) 25 MG tablet Take 1 tablet (25 mg total) by mouth once daily. 90 tablet 3    vit A/vit C/vit E/zinc/copper (ICAPS AREDS ORAL) Take 1 tablet by mouth 2 (two) times a day.      vitamin D (VITAMIN D3) 1000 units Tab Take 2 tablets by mouth once daily.       No current facility-administered medications for this visit.        Family History:  Family History   Problem Relation Name Age of Onset    Colon cancer Mother Tiffany Sutton 75    Lung cancer Father Col. Josue Umanzornhill 75    Diabetes Other great aunt     Diabetes Paternal Aunt      Colon cancer Paternal Aunt  80    Prostate cancer Brother      Breast cancer Neg Hx      Ovarian cancer Neg Hx      Celiac disease Neg Hx      Cataracts Neg Hx      Glaucoma Neg Hx      Macular degeneration Neg Hx         Social History:  Social History     Tobacco Use    Smoking status: Never    Smokeless tobacco: Never    Tobacco comments:     The patient is not getting any regular exercise at this time.  She does enjoy traveling to [place].   Substance Use Topics    Alcohol use: Yes     Comment: occasionally    Drug  "use: No       Review of Systems:  Pertinent positives and negatives listed in HPI. All other systems are reviewed and are negative.    Health Maintaince :   Health Maintenance Topics with due status: Not Due       Topic Last Completion Date    TETANUS VACCINE 09/20/2018    Lipid Panel 09/19/2024           Eye:   Dental:     Immunizations:   Cancer Screening:  PAP: UTD   Mammogram: UTD   Colonoscopy: scheduled 2/03/2025 with Dr. Gonzalez   DEXA:  12/2025Lumbar spine (L1-L4):              BMD is 0.934 g/cm2, T-score is -1.0, and Z-score is 1.8.     Total hip:                                BMD is 0.678 g/cm2, T-score is -2.2, and Z-score is 0.2.     Femoral neck:                          BMD is 0.664 g/cm2, T-score is -1.7, and Z-score is 0.9.     Distal 1/3 radius:                      Not applicable     FRAX:     34% risk of a major osteoporotic fracture in the next 10 years.     20% risk of hip fracture in the next 10 years.     Impression:     *Osteoporosis based on T-score between -1.0 and -2.5 and elevated risk based on FRAX    The ASCVD Risk score (Emeka DK, et al., 2019) failed to calculate for the following reasons:    The 2019 ASCVD risk score is only valid for ages 40 to 79      Objective:   /62 (BP Location: Left arm, Patient Position: Sitting)   Pulse 72   Resp 14   Ht 5' 6" (1.676 m)   Wt 63.5 kg (139 lb 15.9 oz)   SpO2 98%   BMI 22.60 kg/m²      Body mass index is 22.6 kg/m².       Physical Examination:  General: Alert and awake in no apparent distress  Mouth:  Oropharynx clear and without exudate; moist mucous membranes  Neck:   Cervical nodes not enlarged (No submental, submandibular, preauricular, posterior auricular or occipital adenopathy); supple; no bruits  Cardio:   Irregularly irregular rate and rhythm with normal S1 and S2; no murmurs or rubs  Resp:  CTAB; respirations unlabored; no wheezes, crackles or rhonchi  Abdom: Soft, NTND with normoactive bowel sounds; negative " HSM  Extrem: Warm and well-perfused with no clubbing, cyanosis or edema  Skin:  No rashes, lesions, or color changes  Pulses:  2+ and symmetric distally  Neuro:  AAOx3; cooperative and pleasant with no focal deficits    Laboratory:      Most Recent Data:  Lab Results   Component Value Date    WBC 7.64 01/13/2025    HGB 13.2 01/13/2025    HCT 40.6 01/13/2025     01/13/2025    CHOL 141 09/19/2024    TRIG 104 09/19/2024    HDL 44 09/19/2024    ALT 28 01/13/2025    AST 32 01/13/2025     01/13/2025    K 4.0 01/13/2025     01/13/2025    BUN 10 01/13/2025    CO2 25 01/13/2025    TSH 4.680 (H) 01/02/2025    INR 1.1 09/22/2024    HGBA1C 5.8 (H) 08/31/2020              CBC:   WBC   Date Value Ref Range Status   01/13/2025 7.64 3.90 - 12.70 K/uL Final     Hemoglobin   Date Value Ref Range Status   01/13/2025 13.2 12.0 - 16.0 g/dL Final     Hematocrit   Date Value Ref Range Status   01/13/2025 40.6 37.0 - 48.5 % Final     Platelets   Date Value Ref Range Status   01/13/2025 339 150 - 450 K/uL Final     MCV   Date Value Ref Range Status   01/13/2025 92 82 - 98 fL Final     RDW   Date Value Ref Range Status   01/13/2025 13.6 11.5 - 14.5 % Final     BMP:   Sodium   Date Value Ref Range Status   01/13/2025 137 136 - 145 mmol/L Final     Potassium   Date Value Ref Range Status   01/13/2025 4.0 3.5 - 5.1 mmol/L Final     Chloride   Date Value Ref Range Status   01/13/2025 104 95 - 110 mmol/L Final     CO2   Date Value Ref Range Status   01/13/2025 25 23 - 29 mmol/L Final     BUN   Date Value Ref Range Status   01/13/2025 10 8 - 23 mg/dL Final     Creatinine   Date Value Ref Range Status   01/13/2025 1.2 0.5 - 1.4 mg/dL Final     Glucose   Date Value Ref Range Status   01/13/2025 105 70 - 110 mg/dL Final     Calcium   Date Value Ref Range Status   01/13/2025 9.5 8.7 - 10.5 mg/dL Final     Magnesium   Date Value Ref Range Status   01/13/2025 1.8 1.6 - 2.6 mg/dL Final     Phosphorus   Date Value Ref Range Status  "  01/13/2025 2.8 2.7 - 4.5 mg/dL Final     LFTs:   Total Protein   Date Value Ref Range Status   01/13/2025 7.2 6.0 - 8.4 g/dL Final     Albumin   Date Value Ref Range Status   01/13/2025 3.6 3.5 - 5.2 g/dL Final     Total Bilirubin   Date Value Ref Range Status   01/13/2025 0.6 0.1 - 1.0 mg/dL Final     Comment:     For infants and newborns, interpretation of results should be based  on gestational age, weight and in agreement with clinical  observations.    Premature Infant recommended reference ranges:  Up to 24 hours.............<8.0 mg/dL  Up to 48 hours............<12.0 mg/dL  3-5 days..................<15.0 mg/dL  6-29 days.................<15.0 mg/dL       AST   Date Value Ref Range Status   01/13/2025 32 10 - 40 U/L Final     Alkaline Phosphatase   Date Value Ref Range Status   01/13/2025 81 40 - 150 U/L Final     ALT   Date Value Ref Range Status   01/13/2025 28 10 - 44 U/L Final     Coags:   INR   Date Value Ref Range Status   09/22/2024 1.1 0.9 - 1.2 Final   05/09/2013 1.0 0.8 - 1.2 Final     Comment:     ACCP Guideline for Coumadin usage recommends a "target range" of  2.0 - 3.0 for INR for all indicators except mechanical heart valves  and antiphospholipid syndromes which should use 2.5 - 3.5.  .     FLP:      Lab Results   Component Value Date    CHOL 141 09/19/2024    CHOL 136 11/06/2023    CHOL 217 (H) 09/13/2023     Lab Results   Component Value Date    HDL 44 09/19/2024    HDL 49 11/06/2023    HDL 39 (L) 09/13/2023     Lab Results   Component Value Date    LDLCALC 76.2 09/19/2024    LDLCALC 65.6 11/06/2023    LDLCALC 131.0 09/13/2023     Lab Results   Component Value Date    TRIG 104 09/19/2024    TRIG 107 11/06/2023    TRIG 235 (H) 09/13/2023     Lab Results   Component Value Date    CHOLHDL 31.2 09/19/2024    CHOLHDL 36.0 11/06/2023    CHOLHDL 18.0 (L) 09/13/2023      DM:      Hemoglobin A1C   Date Value Ref Range Status   08/31/2020 5.8 (H) 4.0 - 5.6 % Final     Comment:     ADA Screening " Guidelines:  5.7-6.4%  Consistent with prediabetes  >or=6.5%  Consistent with diabetes  High levels of fetal hemoglobin interfere with the HbA1C  assay. Heterozygous hemoglobin variants (HbS, HgC, etc)do  not significantly interfere with this assay.   However, presence of multiple variants may affect accuracy.       LDL Cholesterol   Date Value Ref Range Status   09/19/2024 76.2 63.0 - 159.0 mg/dL Final     Comment:     The National Cholesterol Education Program (NCEP) has set the  following guidelines (reference values) for LDL Cholesterol:  Optimal.......................<130 mg/dL  Borderline High...............130-159 mg/dL  High..........................160-189 mg/dL  Very High.....................>190 mg/dL       Creatinine   Date Value Ref Range Status   01/13/2025 1.2 0.5 - 1.4 mg/dL Final     Thyroid:   TSH   Date Value Ref Range Status   01/02/2025 4.680 (H) 0.400 - 4.000 uIU/mL Final     Free T4   Date Value Ref Range Status   01/02/2025 1.20 0.71 - 1.51 ng/dL Final     Anemia:   Iron   Date Value Ref Range Status   01/04/2025 32 30 - 160 ug/dL Final     TIBC   Date Value Ref Range Status   01/04/2025 299 250 - 450 ug/dL Final     Ferritin   Date Value Ref Range Status   01/04/2025 605 (H) 20.0 - 300.0 ng/mL Final     Vitamin B-12   Date Value Ref Range Status   01/05/2025 727 210 - 950 pg/mL Final     Folate   Date Value Ref Range Status   01/05/2025 15.5 4.0 - 24.0 ng/mL Final     Cardiac:   Troponin I   Date Value Ref Range Status   12/21/2021 0.014 0.000 - 0.026 ng/mL Final     Comment:     The reference interval for Troponin I represents the 99th percentile   cutoff   for our facility and is consistent with 3rd generation assay   performance.       BNP   Date Value Ref Range Status   01/13/2025 1,144 (H) 0 - 99 pg/mL Final     Comment:     Values of less than 100 pg/ml are consistent with non-CHF populations.     Urinalysis:   Urine Culture, Routine   Date Value Ref Range Status   01/02/2025  ESCHERICHIA COLI ESBL  >100,000 cfu/ml   (A)  Final     Color, UA   Date Value Ref Range Status   01/02/2025 Yellow Yellow, Straw, [place] Final     Clarity, UA   Date Value Ref Range Status   10/29/2020   Final     Comment:     normal      Specific Gravity, UA   Date Value Ref Range Status   01/02/2025 1.010 1.005 - 1.030 Final     Nitrite, UA   Date Value Ref Range Status   01/02/2025 Negative Negative Final     Ketones, UA   Date Value Ref Range Status   01/02/2025 Negative Negative Final     Urobilinogen, UA   Date Value Ref Range Status   10/29/2020   Final     Comment:     normal   09/14/2018 Negative <2.0 EU/dL Final     WBC, UA   Date Value Ref Range Status   01/02/2025 18 (H) 0 - 5 /hpf Final       Other Results:  EKG (my interpretation):     Radiology:  Intra-Procedure Documentation  SONIA performed in the Invasive Lab    - See Procedure Log link below for nursing documentation    - See SONIA order on Card Proc Tab for physician findings   Electrophysiology Procedure    Cardioversion was successfully performed with restoration of normal   sinus rhythm.    I certify that I was present for the critical steps of the procedure   including the diagnostic, surgical and/or interventional portions.     Procedure Log documented by Documenter: Kenneth Smith and verified by   Ayush Lechuga MD.    Date: 1/7/2025  Time: 10:41 AM          Assessment/Plan     Tiffany Masterson is a 89 y.o. female with:  1. Longstanding persistent atrial fibrillation  Overview:  s/p SONIA/DCCV 1/6/25. EKG today in [facility].    Assessment & Plan:  s/p SONIA/DCCV 1/6/25. EKG today in [facility].  -Currently on amiodarone loading dose 200mg BID. Continue amiodarone, metoprolol, and eliquis  -Messaged EP for close follow up.  -keep eye appt today   Monitor Tsh - repeat lab in 8 wks         2. Stage 3b chronic kidney disease  Assessment & Plan:  Cont to monitor and encourage at least 4-6 glasses of water daily (restricted to 1-2 liters per day due to  CHF)  -check BMP, Mag in 8 wks on Jardiance and Aldactone       3. Hypothyroidism, unspecified type  Assessment & Plan:  -Continue Levothyroxine 112 mcg daily - repeat TSH in 8 wks on Amiodarone       Orders:  -     TSH; Future; Expected date: 01/15/2025  -     T4, FREE; Future; Expected date: 01/15/2025    4. Primary hypertension  Assessment & Plan:  stable on  losartan 100mg daily, toprol 100mg daily, aldactone 12.5mg daily.       5. Acute combined systolic and diastolic heart failure  Assessment & Plan:  Cont GDMT: losartan 100mg daily, toprol 100mg daily, aldactone 255mg daily.    -Recommend 1.5 gram sodium restriction and 1500cc- 2000cc fluid restriction.  -Requested patient to weigh themselves daily, and to notify us if their weight increases by more than 3 lbs in 1 day or 5 lbs in 3 days  -currently in CHF clinic   -Start Jardiance 10mg daily  - repeat labs in 8 wks     Dry weight: 136-139 lbs    Eat high potassium foods: Green leafy veggies (cooked spinach/broccoli), fresh fruits especially bananas/oranges/cantaloupe/honeydew/grapefruit; dried fruits ie prunes, raisins, dates;  sweet potatoes, zucchini, mushrooms, potatoes and sweet potatoes, coconut water       Orders:  -     empagliflozin (JARDIANCE) 10 mg tablet; Take 1 tablet (10 mg total) by mouth once daily.  Dispense: 90 tablet; Refill: 1  -     spironolactone (ALDACTONE) 25 MG tablet; Take 1 tablet (25 mg total) by mouth once daily.  Dispense: 90 tablet; Refill: 3    6. Concussion with loss of consciousness, sequela  Overview:  9/22/2024: Ct head: Left frontotemporal subarachnoid hemorrhage again noted including a small focus of intraparenchymal hemorrhage in the left cerebellum. No new hemorrhage.     Assessment & Plan:  As per Neurology rec 12/03/2024  Keep EEG appt  to r/o seizure-like activity. Patient had an EEG at [facility] that was undeterminable   Referral for vestibular therapy for imbalance  Referral for speech for cognitive impairment  Start  beginner's yoga and advance as tolerated  You do not feel your feet well due to neuropathy. Please wear footwear with good tread at all times. This recommendation includes wearing socks with tread on them. Make sure all of your walkways, hallways, and maddi are well lit at all times day and night. Use night lights to light up commonly used pathways in your home, meaning from the bedroom to the bathroom or kitchen. Make sure all of your walkways, hallways, and maddi are clear of obstacles at all times. Obstacles include decorations, rugs/mats, pet beds, shoes, and clothes. Make sure you have good hand holds to grab onto as you walk around your home.     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     12/23/2024 for CT head - stable without interval change     Keep eye appt on 1/15/2024    Drink Boost when appetite is poor.     Drink 4-6 cups of water daily       7. Mixed hyperlipidemia  Assessment & Plan:  Continue Atorvastatin. 10mg daily       8. Vitamin D deficiency disease  Assessment & Plan:  Restart Vit D 2000 IU daily  and check level in 8 wks       9. Osteopenia, unspecified location  Overview:  12/2022 Lumbar spine (L1-L4):              BMD is 0.934 g/cm2, T-score is -1.0, and Z-score is 1.8.     Total hip:                                BMD is 0.678 g/cm2, T-score is -2.2, and Z-score is 0.2.     Femoral neck:                          BMD is 0.664 g/cm2, T-score is -1.7, and Z-score is 0.9.     Distal 1/3 radius:                      Not applicable     FRAX:     34% risk of a major osteoporotic fracture in the next 10 years.     20% risk of hip fracture in the next 10 years.  LDCT:       Assessment & Plan:  Cont Vit D/Ca supplements, weight-bearing exercise  Repeat DEXA in 12/2024      10. Preventative health care  Assessment & Plan:  Get COVID vaccine - refuses   Keep eye appt today   Keep colon  2/03 and DEXA appt   Keep appt with cardiology/neurology    Labs in 8 wks to check BMP, Mag, Thyroid, BNP        11. Paroxysmal atrial fibrillation  -     metoprolol succinate (TOPROL-XL) 100 MG 24 hr tablet; Take 1 tablet (100 mg total) by mouth once daily.  Dispense: 90 tablet; Refill: 3    12. Heart failure with reduced ejection fraction  Assessment & Plan:  Hold diuretic  Bedside US done, collapsible IVC   Restart  Jardiance 10mg daily   Cont Aldactone 25mg daily, metoprolol ER 100mg daily   Repeat TTE in 3 months    Orders:  -     BASIC METABOLIC PANEL; Future; Expected date: 01/15/2025  -     Urinalysis    13. Osteoporosis without current pathological fracture, unspecified osteoporosis type  Overview:  Lumbar spine (L1-L4):              BMD is 0.934 g/cm2, T-score is -1.0, and Z-score is 1.8.     Total hip:                                BMD is 0.678 g/cm2, T-score is -2.2, and Z-score is 0.2.     Femoral neck:                          BMD is 0.664 g/cm2, T-score is -1.7, and Z-score is 0.9.     Distal 1/3 radius:                      Not applicable     FRAX:     34% risk of a major osteoporotic fracture in the next 10 years.     20% risk of hip fracture in the next 10 years.     Impression:     *Osteoporosis based on T-score between -1.0 and -2.5 and elevated risk based on FRAX    Assessment & Plan:  Cont Vit D 2000 IU daily and calcium, weight-bearing  Pt refuses intervention with bisphosphonates, prolia, etc  Repeat DEXA now      14. Colon adenomas  Overview:  5 polyps <5mm in 9/2021  all tubular adenomas neg dysplasia/cancer- repeat in 3 years 9/2024    Assessment & Plan:  Keep appt for colon on 2/03/2025 with Dr. Gonzalez      Other orders  -     cholecalciferol, vitamin D3, (VITAMIN D3) 50 mcg (2,000 unit) Cap capsule; Take 1 capsule (2,000 Units total) by mouth once daily.             This includes face to face time and non-face to face time preparing to see the patient (eg, review of tests), obtaining  and/or reviewing separately obtained history, documenting clinical information in the electronic or other health record, independently interpreting results and communicating results to the patient/family/caregiver, or care coordinator.   Code Status:     Rebeca Dumont MD

## 2025-01-16 ENCOUNTER — TELEPHONE (OUTPATIENT)
Dept: ELECTROPHYSIOLOGY | Facility: CLINIC | Age: 89
End: 2025-01-16
Payer: MEDICARE

## 2025-01-16 DIAGNOSIS — I48.11 LONGSTANDING PERSISTENT ATRIAL FIBRILLATION: Primary | ICD-10-CM

## 2025-01-16 NOTE — ASSESSMENT & PLAN NOTE
Cont Vit D 2000 IU daily and calcium, weight-bearing  Pt refuses intervention with bisphosphonates, prolia, etc  Repeat DEXA now

## 2025-01-16 NOTE — ASSESSMENT & PLAN NOTE
Hold diuretic  Bedside US done, collapsible IVC   Restart  Jardiance 10mg daily   Cont Aldactone 25mg daily, metoprolol ER 100mg daily   Repeat TTE in 3 months

## 2025-01-18 NOTE — DISCHARGE SUMMARY
Kye Ca - Cardiology [facility] Medicine  Discharge Summary      Patient Name: Tiffany Masterson  MRN: [id number]  DARRON: [id number]  Patient Class: IP- Inpatient  Admission Date: 1/2/2025  Hospital Length of Stay: 3 days  Discharge Date and Time: 1/7/2025  4:08 PM  Attending Physician: Karissa García MD   Discharging Provider: Karissa García MD  Primary Care Provider: Rebeca Dumont MD  [place] Medicine [place] [facility]   Primary Care Team: [facility]       HPI:   Tiffany Masterson is a pleasant 89 y.o. female w/ PMHx of atrial fibrillation (on apixaban), HFpEF, CKD3, HTN, hypothyroidism, DEE, HLD, migraines & recurrent falls recently (resulting in a SAH & skull fracture in Sept '24), who presented to [facility] ED on 1/2/2025 from her PCP's office w/ progressive bilateral LE edema. Pt reports she noticed increased swelling of her legs starting 2-3 months ago that has continued to worsen since then w/ interval development of SOB, along w/ FLORES, orthopnea, weakness & decreased PO intake. Swelling has gotten particularly bad over past couple days.  reports that swelling got so bad that legs were weeping clear fluid. No clear trigger/inciting event for exacerbation. Pt has had multiple falls over the last few months, most recently a few days prior to presentation where she sustained a small contusion to her forehead, but denies any residual pain or injury related to the fall. No recent illnesses or medication changes. Endorses compliance with home medications. Given severity of edema & pt's fragility (w/ recent falls), pt sent to ED for further evaluation & mgmt.     On arrival to ED, pt was noted to be in atrial fibrillation w/ HR sustaining 170s-190s. Luckily BP & SpO2 stable on RA. Labs notable for BNP 1246, troponin nml x2, Cr 1.5, bicarb 20, glucose 115, Hgb 11.8, TSH 4.68, & FT4 1.2. UA w/ 3+ LE, 18 WBCs & occasional bacteria. CXR showed interstitial attenuation c/w pulmonary edema, as well as  b/l pleural effusions. COVID/Flu/RSV negative. Received pushes of IV diltiazem, IV lasix, and CTX. Admitted to Hospital Medicine Cardiac Stepdown Unit for further workup & mgmt.     Procedure(s) (LRB):  Cardioversion or Defibrillation (N/A)  Transesophageal echo (SONIA) intra-procedure log documentation (N/A)      Hospital Course:   Admitted for afib w/ RVR in setting of volume overload. Given 10mg IV diltiazem x2, 40mg IV lasix x2, and then continued on home PO diltiazem. HR improved from sustained 120s-130s (w/ jumps up to 170s) --> 90s-100s (w/ jumps to 120s-130s). TTE showed drop in EF to 25-30% (down from > 55% in Sept '24). Diltiazem d/c'ed. Loaded w/ digoxin & metoprolol dose increased. Jardiance started. Cardiology consulted given drop in EF. Metoprolol transitioned to tartrate (to avoid potential bradycardia in event that pt converted to NSR). Continued on IV lasix w/ excellent response. Renal function slowly improving and BP stable allowing for uptitration of diuretics to IV lasix 60mg TID w/ robust response (up to 6+ liters UOP/day). While BP & renal function remained stable, pt started to experience some orthostatic dizziness for which diuresis downtitrated. Completed 3-day course of CTX, but developed dysuria. Phenazopyridine added. Urine cx grew MDRO/ESBL E.coli (resistant to CTX). Started on 3-day course of Bactrim. Euvolemia achieved 1/5 (note dry weight is 67-68 kg). IV diuresis d/c'ed. Pt started on spironolactone, and continued on metoprolol & losartan. Underwent successful SONIA/DCCV on 1/6/25. Remained in NSR (HR 70s-90s) following procedure. Digoxin d/c'ed & pt started on amiodarone following procedure. Worked well w/ PT/OT. Pt deemed appropriate for discharge; seen and examined prior to departure. Plan discussed with pt, medications & follow-up were reviewed, and ER precautions were given. Pt discharged on home apixaban & losartan, increased dose of metoprolol, w/ new Rx for spironolactone,  amiodarone & Bactrim. Jardiance not continued on discharge as cost-prohibitive. Pt to follow-up with Cardiology & EP. Pt instructions (per AVS) below. Home health ordered. Details of individual problems addressed during hospitalization discussed below.       Goals of Care Treatment Preferences:  Code Status: Full Code    Living Will: Yes              SDOH Screening:  The patient was screened for utility difficulties, food insecurity, transport difficulties, housing insecurity, and interpersonal safety and there were no concerns identified this admission.     Consults:   Consults (From admission, onward)          Status Ordering Provider     Inpatient consult to Dermatology  Once        Provider:  (Not yet assigned)    Completed GABRIELA TORO     Inpatient consult to Cardiology  Once        Provider:  (Not yet assigned)    Completed GABRIELA TORO          PROBLEMS ADDRESSED DURING ADMISSION:     Acute on chronic atrial fibrillation with RVR  Pt w/ long hx of chronic atrial fibrillation, managed w/ metoprolol & diltiazem (along w/ apixaban). [id number] score 5-6. On admission, EKG notable for atrial fibrillation w/ RVR (). Pt is asymptomatic. CXR & exam c/w volume overload. TTE showed drop in EF from > 55% in Sept '24 to 25-30% now. Favor volume overload 2/2 declining systolic function to be driving RVR exacerbation. Aside from CHF, etiology of uncontrolled afib unclear- TSH elevated but FT4 nml, troponin nml, no notable electrolyte derangements, & no hypoxia. UA w/ e/o UTI (although pt largely asymptomatic & afebrile w/ nml WBC), & pt denies illicit substance use (UDS not collected on admit). Home diltiazem d/c'ed given reduced EF. Pt started on digoxin w/ uptitration of metoprolol. Improved rate control, but still elevated & in persistent afib. Underwent successful SONIA/DCCV on 1/6/25. Remained in NSR (HR 70s-90s) following procedure. Digoxin d/c'ed & pt started on amiodarone following procedure.   -  Diltiazem d/c'ed given low EF  - Metoprolol succinate increased to 100mg daily  - Start amiodarone 200mg BID x2 weeks --> 200mg daily   - Continue home apixaban  - Volume mgmt as below   - Follow-up w/ EP on discharge for evaluation of Watchman (medardo given recurrent falls)     Heart failure with reduced ejection fraction   Hx of HFpEF- last TTE (Sept '24) w/ LVEF > 55%, indeterminate diastolic function, mild AV sclerosis w/o stenosis, mild AI, mild TR & MR, mild LA dilation, & mild aortic & mitral annular calcification. Pt presented w/ progressive BLE edema. On admission, BNP 1246 w/ e/o volume overload on exam. CXR w/ b/l pleural effusions & e/o extensive pulmonary edema. Weight 75kg on admission. Repeat TTE showed LVEF 25-30%, mildly reduced RV systolic function, severe LA dilation, mild AS (ARRON by VTI 2.1, peak velocity 1.4, mean gradient 5, DI 0.5), mild AI, moderately thickened mitral leaflets, moderate TR, PASP 29; IVC not well visualized. Unclear etiology of pts declining EF. While uncontrolled afib considered, inverse favored. TSH elevated but FT4 nml, and troponin negative x2. Cardiology consulted. Received IV diuresis & digoxin, w/ uptitration of BB. While some residual/minimal LE edema noted on discharge, pt on RA w/ IVC 3cm & collapsible per bedside POCUS per Cardiology. Note dry weight of approx 67-68kg.   - Continue home losartan   - Increase home metoprolol to 100mg daily as above  - Start spironolactone 12.5mg daily  - SGLT2i deferred as cost-prohibitive   - Mgmt of afib as above   - Follow-up with outpatient Cardiology      UTI (urinary tract infection)  MDR E.coli infxn  UA on admit w/ 3+ LE, 18 WBCs & occasional bacteria. Denies dysuria, hematuria, frequency or urgency. Generally would not treat as pt seemingly asymptomatic, but given RVR & recent falls, abx started. Despite starting CTX, interval development of mild dysuria upon initiation of micturition on 1/4 that improved w/ phenazopyridine.  "Urine cx resulted w/ ESBL/MDRO E.coli. Placed on 3-day course of Bactrim.   - Bactrim 800-160mg BID x3 days   - Holding further phenazopyridine     Stage 3b chronic kidney disease  On admission, Cr 1.5 (baseline ~1.1-1.3) w/ BUN 20. UA w/o protein or blood, but s/o UTI. Great UOP during admission w/ stable renal function. On discharge Cr 1.1 & BUN 13.      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 The fall in September seemingly mechanical (was carrying boxes up stairs in the dark and tripped) and resulted in pt falling backward w/ resulting ICH & occipital bone fracture. Pt's subsequent falls have all reportedly been when pt is getting out of bed or getting up to use the bathroom (usually at night), more suggestive of vasovagal/orthostatic etiology. Pt denies any assoc sx w/ falls- no dizziness, lightheadedness, palpitations, etc. Denies tripping on subsequent episodes- "just falls." Most recent fall a few days PTA resulted in small contusion on forehead but no major trauma/injuries. Intermittently positive/symptomatic orthostatic VS during admission (i/s/o receiving IV lasix). Worked well with PT/OT. Orthostatics negative on discharge.   - B vitamin lvls pending as below   - Home PT/OT ordered  - Pt to follow-up w/ outpatient EP for consideration of Watchman device as above      Normocytic anemia   On admission, Hgb 11.8 (baseline mid 10s) w/ MCV 91. No e/o active bleeding. Last iron panel in 2021. Recent B12 from Sept '24 w/ low-nml B12. Anemia 2/2 CKD suspected, although contributing nutritional deficiency considered. Iron panel w/ low %sat (11), elevated ferritin (605), and nml iron (32), TIBC (299), & transferrin (202). Nml B12 (727) & folate (15.5). Vitamin B1, B3 & B6 levels also drawn. Gradual uptrend in H/H w/ volume removal, s/o dilutional anemia iso volume overload on admission. Hgb 12.4 on " discharge. No transfusion needs during admission.   - Vitamin B1, B3 & B6 pending      Rash/lesions of bilateral lower extremities  New rash w/ lesions/scabs of BLEs noted on 1/4. Images uploaded to Media. Denies pain or itching and only noticed them when her compression stockings were removed. Possibly 2/2 skin breaks from severe edema w/ weeping of legs PTA. Surrounding erythema, but no tenderness, drainage or other sx of infxn. Dermatology consulted. While lesions favored to be sequelae of skin breaks 2/2 volume overload, biopsy done 1/5 to r/o other etiologies.   - S/p biopsy 1/5; pathology pending  - Dermatology to follow-up results      Mixed hyperlipidemia  Hx of mixed dyslipidemia. Last lipid panel (Sept '24) nml.   - Continue home statin      Primary hypertension  Hx of chronic HTN, managed w/ home regimen of losartan, metoprolol XL, & diltiazem. Normotensive on arrival (110s-130s/70s-100s), and remained relatively normotensive throughout admission.   - Cardiac package as above     Hypothyroidism  Last TSH (Sept '24) nml at 2.158. Repeat TSH on admission elevated at 4.68, but FT4 nml at 1.2.   - Continue home levothyroxine 112 mcg daily  - Repeat labs outpatient per PCP       Final Active Diagnoses:    Diagnosis Date Noted POA    PRINCIPAL PROBLEM:  Paroxysmal atrial fibrillation [I48.0] 12/21/2021 Yes    Anemia [D64.9] 01/04/2025 Yes    Acute combined systolic and diastolic heart failure [I50.41] 01/04/2025 Unknown    Longstanding persistent atrial fibrillation [I48.11] 01/04/2025 Unknown    Stage 3b chronic kidney disease [N18.32] 01/02/2025 Yes    Frequent falls [R29.6] 01/02/2025 Not Applicable    Heart failure with reduced ejection fraction [I50.20] 01/02/2025 Yes    UTI (urinary tract infection) [N39.0] 01/02/2025 Yes    Mixed hyperlipidemia [E78.2] 08/30/2024 Yes    Primary hypertension [I10] 05/08/2015 Yes    Hypothyroidism [E03.9] 06/18/2013 Yes     Chronic      Problems Resolved During this  Admission:    Diagnosis Date Noted Date Resolved POA    Hyponatremia [E87.1] 01/07/2025 01/15/2025 Yes       Discharged Condition: fair    Disposition: Home-Health Care [facility] [place]    Follow Up: PCP, EP, PCP     Patient Instructions:  (Per AVS)   You were hospitalized because you had a heart failure exacerbation which was likely triggered by uncontrolled a-fib. You were also found to have a UTI, for which you were started on antibiotics. You received medications to help control your a-fib and had successful cardioversion. You received medications to pull the fluid off of your lungs & legs. You are stable to discharge home from the hospital.     For your ATRIAL FIBRILLATION:   - START taking amiodarone 200mg (1 tablet) 2x/day for 14 days (thru 1/20/24), then DECREASE to just 1x/day.   - INCREASE your dose of metoprolol to 100mg (1 tablet) daily.   - STOP taking diltiazem  - Continue taking your apixaban (aka Eliquis) 2x/day.   - Follow-up with the Cardiology & Electrophysiology specialists in clinic.     For your HEART FAILURE:   - START taking spironolactone 12.5mg (1/2 tablet) daily.   - INCREASE your dose of metoprolol to 100mg daily as above  - Continue taking your losartan daily.     For your UTI:   - START taking sulfamethoxazole-trimethoprim (aka Bactrim) 800-160mg (1 tablet) 2x/day for next 2 days (thru 1/8/25).     Otherwise continue taking the rest of your prior home medications as you were before.       - In addition to General Cardiology and Electrophysiology, follow-up with your PCP.   - Home Health has also been ordered so PT/OT can come work with you at home and hopefully help with your balance and perhaps prevent future falls. A nurse can also come out and monitor your vital signs & volume status, and check labs.       If you start to notice shortness of breath, chest pain, dizziness, fainting, increased leg swelling, weight gain, palpitations, painful urination, fevers, nausea/vomiting, etc - call your  "doctor or go to the ER.            ACCEPT - Ambulatory referral/consult to Heart Failure Transitional Care Clinic   Standing Status: Future   Referral Priority: Routine Referral Type: Consultation   Referral Reason: Specialty Services Required   Requested Specialty: Cardiology   Number of Visits Requested: 1     Ambulatory referral/consult to Cardiology   Standing Status: Future   Referral Priority: Routine Referral Type: Consultation   Referral Reason: Specialty Services Required   Requested Specialty: Cardiology   Number of Visits Requested: 1     Ambulatory referral/consult to Cardiac Electrophysiology   Standing Status: Future   Referral Priority: Routine Referral Type: Consultation   Referral Reason: Specialty Services Required   Requested Specialty: Cardiology   Number of Visits Requested: 1       Significant Diagnostic Studies: N/A ; as discussed in "Hospital Course" above    Pending Diagnostic Studies:       None           Medications:  Reconciled Home Medications:    (Note list below autopopulates at time that this Discharge Summary is written, so it may reflect changes made to home medication list since discharge. For changes made to pt's medications at time of discharge, see above.)      Medication List        START taking these medications      amiodarone 200 MG Tab  Commonly known as: PACERONE  Take 1 tablet (200 mg total) by mouth 2 (two) times daily for 14 days, THEN 1 tablet (200 mg total) once daily.  Start taking on: January 7, 2025            CONTINUE taking these medications      acetaminophen 500 MG tablet  Commonly known as: TYLENOL  Take 500 mg by mouth every 6 (six) hours as needed.     ARTIFICIAL TEARS (PF) OPHT  Place 1 drop into both eyes as needed.     ascorbic acid (vitamin C) 250 MG tablet  Commonly known as: VITAMIN C  Take 250 mg by mouth once daily.     atorvastatin 10 MG tablet  Commonly known as: LIPITOR  Take 1 tablet by mouth once daily     b complex vitamins capsule  Take 1 " capsule by mouth once daily.     biotin 5,000 mcg Tbdl  Take 1 tablet (5,000 mcg total) by mouth Daily.     bisacodyL 5 mg EC tablet  Commonly known as: DULCOLAX  Take 5 mg by mouth once daily.     CALCIUM 600 + D(3) ORAL  Take 1 tablet by mouth once daily.     CENTRUM SILVER ORAL  Take 1 tablet by mouth once daily.     coenzyme Q10 200 mg capsule  Take 200 mg by mouth once daily.     ELIQUIS 5 mg Tab  Generic drug: apixaban  Take 1 tablet by mouth twice daily     estradioL 0.01 % (0.1 mg/gram) vaginal cream  Commonly known as: ESTRACE  Place 0.5 g intravaginally q.h.s. x2 weeks; then, placed 0.5 g intravaginally twice weekly at bedtime (maintenance therapy).     fluticasone 27.5 mcg/actuation nasal spray  Commonly known as: VERAMYST  2 sprays by Nasal route.     fluticasone propionate 50 mcg/actuation nasal spray  Commonly known as: FLONASE  CLEAN SINUS/NARES WITH OCEAN NASAL SPRAY THEN USE FLONASE 1 SPRAY TWICE DAILY.     gabapentin 100 MG capsule  Commonly known as: NEURONTIN  TAKE 1 CAPSULE BY MOUTH THREE TIMES DAILY     hydroquinone 4 % Crea  APPLY  CREAM TOPICALLY TWICE DAILY     ICAPS AREDS ORAL  Take 1 tablet by mouth 2 (two) times a day.     levothyroxine 112 MCG tablet  Commonly known as: SYNTHROID  TAKE 1 TABLET BY MOUTH BEFORE BREAKFAST     LIDOcaine 5 %  Commonly known as: LIDODERM  Place 1 patch onto the skin once daily. Remove & Discard patch within 12 hours or as directed by MD     losartan 100 MG tablet  Commonly known as: COZAAR  Take 1 tablet by mouth once daily     magnesium oxide 400 mg magnesium Tab  Take 1 tablet by mouth once daily.     mupirocin 2 % ointment  Commonly known as: BACTROBAN  Apply topically 3 (three) times daily. Apply to wound on head until healed     sodium chloride 0.65 % nasal spray  Commonly known as: Saline NasaL  1 spray by Nasal route as needed for Congestion.     vitamin D 1000 units Tab  Commonly known as: VITAMIN D3  Take 2 tablets by mouth once daily.             STOP taking these medications      diltiaZEM 90 MG tablet  Commonly known as: CARDIZEM     metoprolol succinate 50 MG 24 hr tablet  Commonly known as: TOPROL-XL     sodium,potassium,mag sulfates 17.5-3.13-1.6 gram Solr  Commonly known as: SUPREP BOWEL PREP KIT            ASK your doctor about these medications      sulfamethoxazole-trimethoprim 800-160mg 800-160 mg Tab  Commonly known as: BACTRIM DS  Take 1 tablet by mouth 2 (two) times daily. for 3 doses  Ask about: Should I take this medication?              Indwelling Lines/Drains at time of discharge:   Lines/Drains/Airways       None            Time spent on the discharge of patient: 45 minutes         Karissa García MD  Department of Hospital Medicine  [facility] - Cardiology Stepdown

## 2025-01-24 NOTE — PROGRESS NOTES
[facility] TCC Provider Note (Follow-up) Consult Note      HPI:     Patient denies appreciable SOB with ADL since hospital discharge               Patient sleeps on 1-2 number of pillows              Patient wakes up SOB, has to get out of bed, associated cough- denies PND symptoms              Palpitations - denies subjective              Dizzy, light-headed, pre-syncope or syncope- intermittent brief dizziness/lightheadedness if standing quickly, denies pre-syncope/syncope or persistent dizziness/lightheadedness              Since discharge frequency of performing weights, home weight and weight change- performing daily weights, reports weight stable 135lbs on home scale. Up 8lbs on clinic scale. Reports BLE edema greatly improved from admission, though still with mild BLE edema              Other information felt pertinent to HPI: Ms. Tiffany Masterson is a 88 yo female with pAF, HFpEF, hypothyroidism, HTN, CKD3, migraines, hx tSAH w/ punctate cerebellar IPH who presents to second HFTCC visit following recent admission for AF RVR with EF drop (65-> 30%) and UTI. She was rate controlled and diuresed. Cards consulted and she underwent SONIA/DCCV on 1/6 with restoration of NSR. Initiated on amiodarone prior to dc.     PHYSICAL:   Vitals:    01/27/25 1102   BP: 139/61   Pulse: 62      @[id number](3)@    JVD: no   Heart rhythm: regular  Cardiac murmur: No    S3: no  S4: no  Lungs: clear  Hepatojugular reflux: to clavicle sitting  Edema: yes, 1+ BLE edema to midshin, wearing compression stockings      Lab Results   Component Value Date     01/27/2025    K 4.0 01/27/2025    MG 2.0 01/27/2025     01/27/2025    CO2 23 01/27/2025    BUN 13 01/27/2025    CREATININE 1.2 01/27/2025    GLU 93 01/27/2025    CALCIUM 9.6 01/27/2025    AST 17 01/27/2025    ALT 18 01/27/2025    ALBUMIN 4.0 01/27/2025    PROT 7.5 01/27/2025    BILITOT 0.9 01/27/2025     Lab Results   Component Value Date    BNP 1,341 (H) 01/27/2025    BNP 1,144 (H)  01/13/2025    BNP 1,246 (H) 01/02/2025       ASSESSMENT/PLAN:     1. Acute combined systolic and diastolic heart failure  -NYHA Class I-II symptoms. Suspected tachycardia induced CM. Mild fluid volume on exam today. Jardiance 10mg daily recently resumed and aldactone increased to 25mg daily. Lasix 20mg daily prn ordered with instructions to take prn dose today  -Current GDMT: losartan 100mg daily, toprol 100mg daily, aldactone 25mg daily, jardiance 10mg daily.  -Recommend 2-3 gram sodium restriction and 1500cc- 2000cc fluid restriction.  -Requested patient to weigh themselves daily, and to notify us if their weight increases by more than 3 lbs in 1 day or 5 lbs in 3 days.  -weekly phone check ins with RTC prn     2. Paroxysmal atrial fibrillation              -s/p SONIA/DCCV 1/6/25.  -Continue amiodarone, metoprolol, and eliquis  -Messaged EP for close follow up.     3. Primary hypertension              -Continue antihypertensives     4. Stage 3b chronic kidney disease              -Cr 1.2 today, at baseline    5. Impaired functional mobility and endurance   -Orders placed for outpatient PT/OT      Gale Bess PA-C

## 2025-01-27 ENCOUNTER — HOSPITAL ENCOUNTER (OUTPATIENT)
Dept: RADIOLOGY | Facility: CLINIC | Age: 89
Discharge: HOME | End: 2025-01-27
Attending: INTERNAL MEDICINE
Payer: MEDICARE

## 2025-01-27 ENCOUNTER — TELEPHONE (OUTPATIENT)
Dept: CARDIOLOGY | Facility: CLINIC | Age: 89
End: 2025-01-27

## 2025-01-27 ENCOUNTER — TELEPHONE (OUTPATIENT)
Dept: ENDOSCOPY | Facility: HOSPITAL | Age: 89
End: 2025-01-27
Payer: MEDICARE

## 2025-01-27 ENCOUNTER — PATIENT MESSAGE (OUTPATIENT)
Dept: ENDOSCOPY | Facility: HOSPITAL | Age: 89
End: 2025-01-27
Payer: MEDICARE

## 2025-01-27 ENCOUNTER — PATIENT MESSAGE (OUTPATIENT)
Dept: GASTROENTEROLOGY | Facility: CLINIC | Age: 89
End: 2025-01-27
Payer: MEDICARE

## 2025-01-27 ENCOUNTER — OFFICE VISIT (OUTPATIENT)
Dept: CARDIOLOGY | Facility: CLINIC | Age: 89
End: 2025-01-27
Payer: MEDICARE

## 2025-01-27 VITALS
WEIGHT: 145.75 LBS | BODY MASS INDEX: 23.42 KG/M2 | SYSTOLIC BLOOD PRESSURE: 139 MMHG | HEIGHT: 66 IN | OXYGEN SATURATION: 98 % | HEART RATE: 62 BPM | DIASTOLIC BLOOD PRESSURE: 61 MMHG

## 2025-01-27 DIAGNOSIS — I48.0 PAROXYSMAL ATRIAL FIBRILLATION: ICD-10-CM

## 2025-01-27 DIAGNOSIS — M81.0 OSTEOPOROSIS WITHOUT CURRENT PATHOLOGICAL FRACTURE, UNSPECIFIED OSTEOPOROSIS TYPE: ICD-10-CM

## 2025-01-27 DIAGNOSIS — I10 PRIMARY HYPERTENSION: ICD-10-CM

## 2025-01-27 DIAGNOSIS — N18.32 STAGE 3B CHRONIC KIDNEY DISEASE: ICD-10-CM

## 2025-01-27 DIAGNOSIS — I50.41 ACUTE COMBINED SYSTOLIC AND DIASTOLIC HEART FAILURE: Primary | ICD-10-CM

## 2025-01-27 DIAGNOSIS — Z74.09 IMPAIRED FUNCTIONAL MOBILITY AND ENDURANCE: ICD-10-CM

## 2025-01-27 PROCEDURE — 77080 DXA BONE DENSITY AXIAL: CPT | Mod: 26,HCNC,, | Performed by: INTERNAL MEDICINE

## 2025-01-27 PROCEDURE — 99999 PR PBB SHADOW E&M-EST. PATIENT-LVL III: CPT | Mod: PBBFAC,HCNC,,

## 2025-01-27 PROCEDURE — 3288F FALL RISK ASSESSMENT DOCD: CPT | Mod: HCNC,CPTII,S$GLB,

## 2025-01-27 PROCEDURE — 1157F ADVNC CARE PLAN IN RCRD: CPT | Mod: HCNC,CPTII,S$GLB,

## 2025-01-27 PROCEDURE — 99214 OFFICE O/P EST MOD 30 MIN: CPT | Mod: HCNC,S$GLB,,

## 2025-01-27 PROCEDURE — 1111F DSCHRG MED/CURRENT MED MERGE: CPT | Mod: HCNC,CPTII,S$GLB,

## 2025-01-27 PROCEDURE — 1126F AMNT PAIN NOTED NONE PRSNT: CPT | Mod: HCNC,CPTII,S$GLB,

## 2025-01-27 PROCEDURE — 1159F MED LIST DOCD IN RCRD: CPT | Mod: HCNC,CPTII,S$GLB,

## 2025-01-27 PROCEDURE — 1101F PT FALLS ASSESS-DOCD LE1/YR: CPT | Mod: HCNC,CPTII,S$GLB,

## 2025-01-27 PROCEDURE — 77080 DXA BONE DENSITY AXIAL: CPT | Mod: TC,HCNC

## 2025-01-27 RX ORDER — FUROSEMIDE 20 MG/1
20 TABLET ORAL DAILY PRN
Qty: 90 TABLET | Refills: 3 | Status: SHIPPED | OUTPATIENT
Start: 2025-01-27 | End: 2026-01-27

## 2025-01-27 RX ORDER — AMIODARONE HYDROCHLORIDE 200 MG/1
200 TABLET ORAL DAILY
Qty: 90 TABLET | Refills: 3 | Status: SHIPPED | OUTPATIENT
Start: 2025-01-27 | End: 2026-01-27

## 2025-01-27 NOTE — TELEPHONE ENCOUNTER
[facility]    Called Mr. Lauro Masterson to inform him of his wife's refill on Aldactone.     Can we call  and let him know that insurance doesn't cover for the aldactone to be refilled until 2/14 (since the script they filled at [facility] on discharge was for 45 days) which is why [facility] hasn't filled the script yet.    Since the prescription was filled for a 45 day supply her insurance will not cover it until 2/14/2025. This is why [facility] will not filled it yet. Left message and contact information.

## 2025-01-27 NOTE — PATIENT INSTRUCTIONS
Lasix 20mg daily as needed called in to pharmacy. Take daily as needed for worsening shortness of breath, swelling or 3lb weight gain on home scale.    Recommend taking as needed dose today to help with swelling.

## 2025-01-27 NOTE — TELEPHONE ENCOUNTER
Dr. Gonzalez,     Patient is scheduled with you for a colonoscopy on 2/3/25 on the 4th floor. The colonoscopy was ordered as a 3yr f/u for polyps. Patient is 89 years old.  Does patient need to be seen with you in clinic prior to having procedure? Please advise.     Thanks,   Tiffany

## 2025-01-28 ENCOUNTER — TELEPHONE (OUTPATIENT)
Dept: ENDOSCOPY | Facility: HOSPITAL | Age: 89
End: 2025-01-28
Payer: MEDICARE

## 2025-01-28 DIAGNOSIS — Z12.11 SPECIAL SCREENING FOR MALIGNANT NEOPLASMS, COLON: Primary | ICD-10-CM

## 2025-01-28 RX ORDER — SODIUM, POTASSIUM,MAG SULFATES 17.5-3.13G
1 SOLUTION, RECONSTITUTED, ORAL ORAL DAILY
Qty: 1 KIT | Refills: 0 | Status: SHIPPED | OUTPATIENT
Start: 2025-01-28 | End: 2025-01-30

## 2025-01-28 NOTE — TELEPHONE ENCOUNTER
Dr. Gonzalez,     Per our conversation, patient has been rescheduled for a colonoscopy on 2nd floor with you on 3/7/25.     Thanks,   Tiffany Mc

## 2025-01-28 NOTE — TELEPHONE ENCOUNTER
Referral for procedure from telephone call r/s john      Spoke to patient to reschedule procedure(s) Colonoscopy       Physician to perform procedure(s) Dr. SCOT Gonzalez  Date of Procedure (s) 3/7/25  Arrival Time 8:45 AM  Time of Procedure(s) 9:45 AM   Location of Procedure(s) [facility] 2nd Floor  Type of Rx Prep sent to patient: Suprep  Instructions provided to patient via [facility]    Patient was informed on the following information and verbalized understanding. Screening questionnaire reviewed with patient and complete. If procedure requires anesthesia, a responsible adult needs to be present to accompany the patient home, patient cannot drive after receiving anesthesia. Appointment details are tentative, especially check-in time. Patient will receive a prep-op call 7 days prior to confirm check-in time for procedure. If applicable the patient should contact their pharmacy to verify Rx for procedure prep is ready for pick-up. Patient was advised to call the scheduling department at [phone] if pharmacy states no Rx is available. Patient was advised to call the endoscopy scheduling department if any questions or concerns arise.      [facility] Scheduling Department  The patient is currently under an internal cardiologist, Gale Bess PA-C,  care. Is patient okay to hold blood thinner Eliquis (apixaban) and medically optimized by Cardiology  for their upcoming scheduled Colonoscopy on 3/7/25.

## 2025-01-28 NOTE — TELEPHONE ENCOUNTER
Contacted patient to reschedule Colonoscopy per Dr. Gonzalez's recommendation to schedule patient on 2nd floor.

## 2025-01-29 ENCOUNTER — OFFICE VISIT (OUTPATIENT)
Dept: DERMATOLOGY | Facility: CLINIC | Age: 89
End: 2025-01-29
Payer: MEDICARE

## 2025-01-29 ENCOUNTER — TELEPHONE (OUTPATIENT)
Dept: CARDIOLOGY | Facility: CLINIC | Age: 89
End: 2025-01-29
Payer: MEDICARE

## 2025-01-29 DIAGNOSIS — L85.3 XEROSIS CUTIS: ICD-10-CM

## 2025-01-29 DIAGNOSIS — D48.5 NEOPLASM OF UNCERTAIN BEHAVIOR OF SKIN: Primary | ICD-10-CM

## 2025-01-29 DIAGNOSIS — T14.8XXA ABRASION: ICD-10-CM

## 2025-01-29 LAB
COMMENT: NORMAL
FINAL PATHOLOGIC DIAGNOSIS: NORMAL
GROSS: NORMAL
Lab: NORMAL
MICROSCOPIC EXAM: NORMAL
SUPPLEMENTAL DIAGNOSIS: NORMAL

## 2025-01-29 PROCEDURE — 87070 CULTURE OTHR SPECIMN AEROBIC: CPT | Mod: HCNC | Performed by: STUDENT IN AN ORGANIZED HEALTH CARE EDUCATION/TRAINING PROGRAM

## 2025-01-29 PROCEDURE — 3288F FALL RISK ASSESSMENT DOCD: CPT | Mod: HCNC,CPTII,S$GLB, | Performed by: STUDENT IN AN ORGANIZED HEALTH CARE EDUCATION/TRAINING PROGRAM

## 2025-01-29 PROCEDURE — 87102 FUNGUS ISOLATION CULTURE: CPT | Mod: HCNC | Performed by: STUDENT IN AN ORGANIZED HEALTH CARE EDUCATION/TRAINING PROGRAM

## 2025-01-29 PROCEDURE — 1160F RVW MEDS BY RX/DR IN RCRD: CPT | Mod: HCNC,CPTII,S$GLB, | Performed by: STUDENT IN AN ORGANIZED HEALTH CARE EDUCATION/TRAINING PROGRAM

## 2025-01-29 PROCEDURE — 11104 PUNCH BX SKIN SINGLE LESION: CPT | Mod: HCNC,S$GLB,, | Performed by: STUDENT IN AN ORGANIZED HEALTH CARE EDUCATION/TRAINING PROGRAM

## 2025-01-29 PROCEDURE — 99999 PR PBB SHADOW E&M-EST. PATIENT-LVL V: CPT | Mod: PBBFAC,HCNC,, | Performed by: STUDENT IN AN ORGANIZED HEALTH CARE EDUCATION/TRAINING PROGRAM

## 2025-01-29 PROCEDURE — 99213 OFFICE O/P EST LOW 20 MIN: CPT | Mod: 25,HCNC,S$GLB, | Performed by: STUDENT IN AN ORGANIZED HEALTH CARE EDUCATION/TRAINING PROGRAM

## 2025-01-29 PROCEDURE — 1159F MED LIST DOCD IN RCRD: CPT | Mod: HCNC,CPTII,S$GLB, | Performed by: STUDENT IN AN ORGANIZED HEALTH CARE EDUCATION/TRAINING PROGRAM

## 2025-01-29 PROCEDURE — 87206 SMEAR FLUORESCENT/ACID STAI: CPT | Mod: HCNC | Performed by: STUDENT IN AN ORGANIZED HEALTH CARE EDUCATION/TRAINING PROGRAM

## 2025-01-29 PROCEDURE — 87205 SMEAR GRAM STAIN: CPT | Mod: HCNC | Performed by: STUDENT IN AN ORGANIZED HEALTH CARE EDUCATION/TRAINING PROGRAM

## 2025-01-29 PROCEDURE — 1111F DSCHRG MED/CURRENT MED MERGE: CPT | Mod: HCNC,CPTII,S$GLB, | Performed by: STUDENT IN AN ORGANIZED HEALTH CARE EDUCATION/TRAINING PROGRAM

## 2025-01-29 PROCEDURE — 1126F AMNT PAIN NOTED NONE PRSNT: CPT | Mod: HCNC,CPTII,S$GLB, | Performed by: STUDENT IN AN ORGANIZED HEALTH CARE EDUCATION/TRAINING PROGRAM

## 2025-01-29 PROCEDURE — 1100F PTFALLS ASSESS-DOCD GE2>/YR: CPT | Mod: HCNC,CPTII,S$GLB, | Performed by: STUDENT IN AN ORGANIZED HEALTH CARE EDUCATION/TRAINING PROGRAM

## 2025-01-29 PROCEDURE — 1157F ADVNC CARE PLAN IN RCRD: CPT | Mod: HCNC,CPTII,S$GLB, | Performed by: STUDENT IN AN ORGANIZED HEALTH CARE EDUCATION/TRAINING PROGRAM

## 2025-01-29 PROCEDURE — 87116 MYCOBACTERIA CULTURE: CPT | Mod: HCNC | Performed by: STUDENT IN AN ORGANIZED HEALTH CARE EDUCATION/TRAINING PROGRAM

## 2025-01-29 RX ORDER — AMMONIUM LACTATE 12 G/100G
CREAM TOPICAL
Qty: 385 G | Refills: 6 | Status: SHIPPED | OUTPATIENT
Start: 2025-01-29

## 2025-01-29 NOTE — TELEPHONE ENCOUNTER
[facility]    Called Mrs. Tiffany Masterson to ask how she did with her PRN dose of Lasix 1/27/2025. Left message and contact information.

## 2025-01-29 NOTE — PATIENT INSTRUCTIONS
For legs: Amlactin twice daily    Punch Biopsy Wound Care    Your doctor has performed a punch biopsy today.  A band aid and antibiotic ointment has been placed over the site.  This should remain in place for 24 hours.  It is recommended that you keep the area dry for the first 24 hours.  After 24 hours, you may remove the band aid and wash the area with warm soap and water and apply Vaseline jelly.  Many patients prefer to use Neosporin or Bacitracin ointment.  This is acceptable; however know that you can develop an allergy to this medication even if you have used it safely for years.  It is important to keep the area moist.  Letting it dry out and get air slows healing time, will worsen the scar, and make it more difficult to remove the stitches if they were placed.  Band aid is optional after first 24 hours.      If you notice increasing redness, tenderness, pain, or yellow drainage at the biopsy or surgical site, please notify your doctor.  These are signs of an infection.    If your biopsy/surgical site is bleeding, apply firm pressure for 15 minutes straight.  Repeat for another 15 minutes, if it is still bleeding.   If the surgical site continues to bleed, then please contact your doctor.      For [facility] users:   You will receive your biopsy results in [facility] as soon as they are available. Please be assured that your physician/provider will review your results and will then determine what further treatment, evaluation, or planning is required. You should be contacted by your physician's/provider's office within 5 business days of receiving your results; If not, please reach out to directly. This is one more way [facility] is putting you first.       [facility] [place] [address]/ [phone] FAX/ [email or web address]

## 2025-01-29 NOTE — PROGRESS NOTES
Subjective:      Patient ID:  Tiffany Masterson is a 89 y.o. female who presents for   Chief Complaint   Patient presents with    Abrasion     Pt is here for follow up of abrasion on scalp   Pt wants Dr to determine if it has healed   Pt state she used antibiotic ointment to treat       Had an abrasion on scalp from fall in 9/2024. Seen 11/2024 and treated by removing crust, electrodessicaiton of exuberant granulation tissue, mupirocin and wound care  Here for f/u    Review of Systems   Skin:  Negative for daily sunscreen use, activity-related sunscreen use and wears hat.   Hematologic/Lymphatic: Bruises/bleeds easily (eliquis).       Objective:   Physical Exam   Constitutional: She appears well-developed and well-nourished. No distress.   Neurological: She is alert and oriented to person, place, and time. She is not disoriented.   Psychiatric: She has a normal mood and affect.   Skin:   Areas Examined (abnormalities noted in diagram):   Scalp / Hair Palpated and Inspected  Head / Face Inspection Performed                 Diagram Legend     Erythematous scaling macule/papule c/w actinic keratosis       Vascular papule c/w angioma      Pigmented verrucoid papule/plaque c/w seborrheic keratosis      Yellow umbilicated papule c/w sebaceous hyperplasia      Irregularly shaped tan macule c/w lentigo     1-2 mm smooth white papules consistent with Milia      Movable subcutaneous cyst with punctum c/w epidermal inclusion cyst      Subcutaneous movable cyst c/w pilar cyst      Firm pink to brown papule c/w dermatofibroma      Pedunculated fleshy papule(s) c/w skin tag(s)      Evenly pigmented macule c/w junctional nevus     Mildly variegated pigmented, slightly irregular-bordered macule c/w mildly atypical nevus      Flesh colored to evenly pigmented papule c/w intradermal nevus       Pink pearly papule/plaque c/w basal cell carcinoma      Erythematous hyperkeratotic cursted plaque c/w SCC      Surgical scar with no sign  of skin cancer recurrence      Open and closed comedones      Inflammatory papules and pustules      Verrucoid papule consistent consistent with wart     Erythematous eczematous patches and plaques     Dystrophic onycholytic nail with subungual debris c/w onychomycosis     Umbilicated papule    Erythematous-base heme-crusted tan verrucoid plaque consistent with inflamed seborrheic keratosis     Erythematous Silvery Scaling Plaque c/w Psoriasis     See annotation            Assessment / Plan:      Pathology Orders:       Normal Orders This Visit    Specimen to Pathology, Dermatology     Questions:    Procedure Type: Dermatology and skin neoplasms    Number of Specimens: 1    ------------------------: -------------------------    Spec 1 Procedure: Biopsy    Spec 1 Clinical Impression: dermal nodule on leg after fall x 6 months, calcinosis, hematoma, infectious, DF, other    Spec 1 Source: right medial leg    Release to patient: Immediate    Release to patient:           Neoplasm of uncertain behavior of skin  -     Specimen to Pathology, Dermatology  -     CULTURE, TISSUE  -     AFB Culture & Smear  -     Fungus culture    Nodule on right leg x 5 months following follow. Eroded and with serous discharge. Biopsy to further eval and tissue cx to r/o infection    Punch biopsy procedure note:  Punch biopsy performed after verbal consent obtained. Area marked and prepped with alcohol. Approximately 1cc of 1% lidocaine with epinephrine injected. 6 mm disposable punch used to remove lesion. Hemostasis obtained and biopsy site closed with 1 - 2 Prolene sutures. Wound care instructions reviewed with patient and handout given.      Abrasion-scalp  - healed. No treatment needed    Xerosis cutis--legs  -     ammonium lactate 12 % Crea; Apply topically bid to legs  Dispense: 385 g; Refill: 6           Follow up in about 2 weeks (around 2/12/2025) for Suture removal.

## 2025-01-30 LAB
ACID FAST MOD KINY STN SPEC: NORMAL
MYCOBACTERIUM SPEC QL CULT: NORMAL

## 2025-01-31 ENCOUNTER — PATIENT MESSAGE (OUTPATIENT)
Dept: PRIMARY CARE CLINIC | Facility: CLINIC | Age: 89
End: 2025-01-31
Payer: MEDICARE

## 2025-02-03 ENCOUNTER — TELEPHONE (OUTPATIENT)
Dept: ENDOSCOPY | Facility: HOSPITAL | Age: 89
End: 2025-02-03
Payer: MEDICARE

## 2025-02-03 ENCOUNTER — TELEPHONE (OUTPATIENT)
Dept: CARDIOLOGY | Facility: CLINIC | Age: 89
End: 2025-02-03
Payer: MEDICARE

## 2025-02-03 LAB
BACTERIA TISS AEROBE CULT: NO GROWTH
FUNGUS SPEC CULT: NORMAL
GRAM STN SPEC: NORMAL
GRAM STN SPEC: NORMAL

## 2025-02-03 NOTE — TELEPHONE ENCOUNTER
"[facility]) weekly phone follow up / triage call completed.     TCC RN Navigator spoke with PT    Current Patient reported weight: 137.4 lbs   Patient Goal Weight: (Unsure)  Recent Patient reported blood pressure and heart rate:  BP-125/57 P-60    Pt reports the following:  [x]  Shortness of Breath with Activity Going up the stairs  []  Shortness of Breath at rest   []  Fatigue  []  Edema   [] Chest pain or tightness  [] Weight Increase since discharge  [] None of the above    Pt reports using "Daily weight and symptom tracker".    Pt reports being in the GREEN(color) Zone. If in yellow/red, reminded that they should be calling [facility] today or now.     Medications:   Medication compliance reviewed with pt.  Pt reports having medication list available and has all medications at home for use per list.  Taking    Education:   Confirmed pt still has "[facility] Home Care Guide"  .     Reminded of key points as listed below.     Recommend 2 -3 gram sodium restriction and 1500 cc-2000 cc fluid restriction.  Encourage physical activity with graded exercise program.  Requested patient to weigh themselves daily, and to notify us if their weight increases by more than 3 lbs in 1 day or 5 lbs in 3 days.   Reminded to use "Daily weight and symptom tracker".  Even if pt does not have a scale, to use symptom tracker.       Watch for these Signs and Symptoms: If any of these occur, contact [facility] immediately:   Increase in shortness of breath with movement   Increase in swelling in your legs and ankles   Weight gain of more than 3 pounds in a day or 5 pounds in 3 days.   Difficulty breathing when you are lying down   Worsening fatigue or tiredness   Stomach bloating, a full feeling or a loss of appetite   Increased coughing--especially when you are lying down      Pt was able to verbalize back to RN in their own words correct diet/fluid restrictions, necessity for " exercise, warning signs and symptoms, when and how to contact their [facility] team.      Pt reminded of upcoming appointment for last weekly call  2-10-25.  PT reports they will attend.       Pt reminded of how and when to contact [facility]:  [phone](Mon-Fri, 8a-5p) & for urgent issues on the weekend to page the Heart Transplant MD on call.  Pt also encouraged utilize [facility] messaging as well.      Pt  verbalized understanding and in agreement of plan.       Will follow up with pt at next clinic visit and RN navigator available for pt questions, issues or concerns.

## 2025-02-03 NOTE — TELEPHONE ENCOUNTER
----- Message from TATE Hearn sent at 1/28/2025  8:57 AM CST -----  Regarding: 3/7 BT CL  The patient is currently under an internal cardiologist, Gale Bess PA-C,  care. Is patient okay to hold blood thinner Eliquis (apixaban) and medically optimized by Cardiology  for their upcoming scheduled Colonoscopy on 3/7/25.

## 2025-02-10 ENCOUNTER — DOCUMENTATION ONLY (OUTPATIENT)
Dept: CARDIOLOGY | Facility: CLINIC | Age: 89
End: 2025-02-10
Payer: MEDICARE

## 2025-02-10 NOTE — PROGRESS NOTES
"[facility]) DISCHARGE VISIT - PHONE     Called and spoke to patient    Most Recent Hospital Discharge Date: 1/7/25  Last admission Diagnosis/chief complaint:sob    Pt discharge completed by phone related to patient preference.      Pt reports the following:  []  Shortness of Breath with activity  []  Shortness of Breath at rest   []  Fatigue  []  Edema   [] Chest pain or tightness  [] Weight Increase since discharge  [x] None of the above    Medications:   Medication reconciliation completed today per RN.  Pt reports having all medications available and understands how to take them appropriately. Reminded pt to call prior to making any changes to medications. Reviewed.    Education:   [x] Confirmed pt still has  "[facility] Home Care Guide" .   Reviewed key points as listed below.     Recommend 2 gram sodium restriction and 1500cc fluid restriction.  Encourage physical activity with graded exercise program.  Requested patient to weigh themselves daily, and to notify us if their weight increases by more than 3 lbs in 1 day or 5 lbs in 3 days.     [x] Reviewed completed "Daily Weight and Symptom Tracker".  Reviewed with patient when and how to call [facility] according to "Yellow Zone" and "Red Zone".       Watch for these Signs and Symptoms: If any of these occur, contact [facility] immediately:   Increase in shortness of breath with movement   Increase in swelling in your legs and ankles   Weight gain of more than 3 pounds in a night or 5 pounds in 3 days.   Difficulty breathing when you are lying down   Worsening fatigue or tiredness   Stomach bloating, a full feeling or a loss of appetite   Increased coughing--especially when you are lying down    MyChart and Care Companion:   Patient active on myChart? Yes, patient uses regularly.    HF TCC Program Plan:  Pt has successfully completed [facility] program.  Pt care to be transferred to Dr. Bao Yap for long term " care.     Pt educated on how to call their offices and how to call [facility] On call in the event of an after hour issue.    PT reminded to continue to follow recommendations made during the [facility] program to include monitoring daily weights, taking medications according to list, following up to appointments per provider recommendations, stop smoking/ start exercising and following a heart friendly low salt, low fluid diet.      Pt was able to verbalize back to RN in their own words correct diet/fluid restrictions, necessity for exercise, warning signs and symptoms, when and how to contact their  Long term care team .      Plan:     See PA-C note.    [x]  Discussed upcoming appointments and/or plan for follow-up care with his/her PCP/Cardiology Done.    Please refer to provider note for additional details and assessment.

## 2025-02-11 DIAGNOSIS — M81.0 AGE-RELATED OSTEOPOROSIS WITHOUT CURRENT PATHOLOGICAL FRACTURE: Primary | ICD-10-CM

## 2025-02-12 ENCOUNTER — CLINICAL SUPPORT (OUTPATIENT)
Dept: DERMATOLOGY | Facility: CLINIC | Age: 89
End: 2025-02-12
Payer: MEDICARE

## 2025-02-12 DIAGNOSIS — Z48.02 VISIT FOR SUTURE REMOVAL: Primary | ICD-10-CM

## 2025-02-12 PROCEDURE — 99024 POSTOP FOLLOW-UP VISIT: CPT | Mod: HCNC,S$GLB,, | Performed by: STUDENT IN AN ORGANIZED HEALTH CARE EDUCATION/TRAINING PROGRAM

## 2025-02-12 NOTE — PROGRESS NOTES
Suture Removal note:  CC: 89 y.o. female patient is here for suture removal.         HPI: Patient is s/p punch biopsy of FOREIGN MATERIAL IN THE DERMIS WITH ASSOCIATED GRANULOMATOUS INFLAMMATION AND FIBROSIS from the right medial leg on 1/29/25.  Patient reports no problems.    WOUND PE:  Sutures intact.  Wound healing well.  Good approximation of skin edges.  No signs or symptoms of infection.    IMPRESSION:  Skin, right medial leg, punch biopsy:   -FOREIGN MATERIAL IN THE DERMIS WITH ASSOCIATED GRANULOMATOUS INFLAMMATION AND FIBROSIS  - margins clear.    PLAN:  Sutures removed today.  Continue wound care.    RTC: In 6 months.

## 2025-02-13 ENCOUNTER — PATIENT MESSAGE (OUTPATIENT)
Dept: DERMATOLOGY | Facility: CLINIC | Age: 89
End: 2025-02-13
Payer: MEDICARE

## 2025-02-18 NOTE — PROGRESS NOTES
Subjective     HPI    I had the pleasure of seeing Tiffany Masterson in follow-up for her history of AF. She is an 89F with AF (on apixaban), HFpEF, CKD3, HTN, hypothyroidism, DEE, HLD, migraines & recurrent falls (resulting in a SAH & skull fracture in 9/2024), who presented to [facility] in 1/2025 with a several month history of worsening LE edema and HF sx, and was found to be in AF with RVR. Echo showed EF 25-30%, severe LAE. SONIA/DCCV, and discharged on amiodarone and eliquis. Pt currently on amiodarone 200 mg daily.    Today in the office Ms. Masterson feels great, no complaints. She has had 2 falls since 9/2024, most recently in 1/2024 several days after discharge (sounds like postural hypotension played a role).    My interpretation of today's ECG is NSR 58 bpm.    Review of Systems   Constitutional: Negative for decreased appetite, malaise/fatigue, weight gain and weight loss.   HENT:  Negative for sore throat.    Eyes:  Negative for blurred vision.   Cardiovascular:  Negative for chest pain, dyspnea on exertion, irregular heartbeat, leg swelling, near-syncope, orthopnea, palpitations, paroxysmal nocturnal dyspnea and syncope.   Respiratory:  Negative for shortness of breath.    Skin:  Negative for rash.   Musculoskeletal:  Negative for arthritis.   Gastrointestinal:  Negative for abdominal pain.   Neurological:  Negative for focal weakness.   Psychiatric/Behavioral:  Negative for altered mental status.           Objective     Physical Exam  Vitals and nursing note reviewed.   Constitutional:       General: She is not in acute distress.     Appearance: She is well-developed.   HENT:      Head: Normocephalic and atraumatic.   Eyes:      General: No scleral icterus.     Conjunctiva/sclera: Conjunctivae normal.      Pupils: Pupils are equal, round, and reactive to light.   Neck:      Thyroid: No thyromegaly.      Vascular: No JVD.   Cardiovascular:      Rate and Rhythm: Normal rate and regular rhythm.      Pulses:  Intact distal pulses.      Heart sounds: Normal heart sounds. No murmur heard.     No friction rub. No gallop.   Pulmonary:      Effort: Pulmonary effort is normal. No respiratory distress.      Breath sounds: Normal breath sounds.   Abdominal:      General: Bowel sounds are normal. There is no distension.      Palpations: Abdomen is soft.   Musculoskeletal:      Cervical back: Normal range of motion and neck supple.   Skin:     General: Skin is warm and dry.   Neurological:      Mental Status: She is alert and oriented to person, place, and time.   Psychiatric:         Behavior: Behavior normal.            Assessment and Plan     1. Paroxysmal atrial fibrillation    2. Primary hypertension    3. Mixed hyperlipidemia        Plan:     In summary, Tiffany Masterson is an 89M with symptomatic persistent AF and a likely tachycardia induced cardiomyopathy. Maintaining sinus rhythm on amiodarone. Tolerating eliquis.    Plan is echo 3 months, follow-up 6 months.    Briefly discussed Watchman procedure given recent falls--for now she wants to hold the course.    Thank you for allowing me to participate in the care of this patient. Please do not hesitate to call me with any questions or concerns.

## 2025-02-21 DIAGNOSIS — Z00.00 ENCOUNTER FOR MEDICARE ANNUAL WELLNESS EXAM: ICD-10-CM

## 2025-02-24 ENCOUNTER — OFFICE VISIT (OUTPATIENT)
Dept: ELECTROPHYSIOLOGY | Facility: CLINIC | Age: 89
End: 2025-02-24
Payer: MEDICARE

## 2025-02-24 ENCOUNTER — HOSPITAL ENCOUNTER (OUTPATIENT)
Dept: CARDIOLOGY | Facility: CLINIC | Age: 89
Discharge: HOME | End: 2025-02-24
Payer: MEDICARE

## 2025-02-24 VITALS
BODY MASS INDEX: 22.6 KG/M2 | WEIGHT: 140.63 LBS | HEART RATE: 58 BPM | SYSTOLIC BLOOD PRESSURE: 119 MMHG | HEIGHT: 66 IN | DIASTOLIC BLOOD PRESSURE: 58 MMHG

## 2025-02-24 DIAGNOSIS — E78.2 MIXED HYPERLIPIDEMIA: ICD-10-CM

## 2025-02-24 DIAGNOSIS — I48.0 PAROXYSMAL ATRIAL FIBRILLATION: Primary | ICD-10-CM

## 2025-02-24 DIAGNOSIS — I48.11 LONGSTANDING PERSISTENT ATRIAL FIBRILLATION: ICD-10-CM

## 2025-02-24 DIAGNOSIS — I10 PRIMARY HYPERTENSION: ICD-10-CM

## 2025-02-24 DIAGNOSIS — I50.41 ACUTE COMBINED SYSTOLIC AND DIASTOLIC HEART FAILURE: ICD-10-CM

## 2025-02-24 LAB
OHS QRS DURATION: 92 MS
OHS QTC CALCULATION: 449 MS

## 2025-02-24 PROCEDURE — 99999 PR PBB SHADOW E&M-EST. PATIENT-LVL IV: CPT | Mod: PBBFAC,HCNC,, | Performed by: INTERNAL MEDICINE

## 2025-02-24 PROCEDURE — 1126F AMNT PAIN NOTED NONE PRSNT: CPT | Mod: HCNC,CPTII,S$GLB, | Performed by: INTERNAL MEDICINE

## 2025-02-24 PROCEDURE — 93010 ELECTROCARDIOGRAM REPORT: CPT | Mod: HCNC,S$GLB,, | Performed by: STUDENT IN AN ORGANIZED HEALTH CARE EDUCATION/TRAINING PROGRAM

## 2025-02-24 PROCEDURE — 1159F MED LIST DOCD IN RCRD: CPT | Mod: HCNC,CPTII,S$GLB, | Performed by: INTERNAL MEDICINE

## 2025-02-24 PROCEDURE — 1157F ADVNC CARE PLAN IN RCRD: CPT | Mod: HCNC,CPTII,S$GLB, | Performed by: INTERNAL MEDICINE

## 2025-02-24 PROCEDURE — 99214 OFFICE O/P EST MOD 30 MIN: CPT | Mod: HCNC,S$GLB,, | Performed by: INTERNAL MEDICINE

## 2025-02-24 PROCEDURE — 1100F PTFALLS ASSESS-DOCD GE2>/YR: CPT | Mod: HCNC,CPTII,S$GLB, | Performed by: INTERNAL MEDICINE

## 2025-02-24 PROCEDURE — 93005 ELECTROCARDIOGRAM TRACING: CPT | Mod: HCNC,S$GLB,, | Performed by: INTERNAL MEDICINE

## 2025-02-24 PROCEDURE — 3288F FALL RISK ASSESSMENT DOCD: CPT | Mod: HCNC,CPTII,S$GLB, | Performed by: INTERNAL MEDICINE
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
## 2025-02-27 ENCOUNTER — OFFICE VISIT (OUTPATIENT)
Facility: CLINIC | Age: 89
End: 2025-02-27
Payer: MEDICARE

## 2025-02-27 ENCOUNTER — TELEPHONE (OUTPATIENT)
Dept: ENDOSCOPY | Facility: HOSPITAL | Age: 89
End: 2025-02-27
Payer: MEDICARE

## 2025-02-27 VITALS — DIASTOLIC BLOOD PRESSURE: 70 MMHG | SYSTOLIC BLOOD PRESSURE: 132 MMHG | HEART RATE: 67 BPM

## 2025-02-27 DIAGNOSIS — R26.89 IMBALANCE: ICD-10-CM

## 2025-02-27 DIAGNOSIS — S06.9X1D TRAUMATIC BRAIN INJURY, WITH LOSS OF CONSCIOUSNESS OF 30 MINUTES OR LESS, SUBSEQUENT ENCOUNTER: ICD-10-CM

## 2025-02-27 DIAGNOSIS — S06.0X9S CONCUSSION WITH LOSS OF CONSCIOUSNESS, SEQUELA: ICD-10-CM

## 2025-02-27 DIAGNOSIS — S13.4XXS WHIPLASH INJURIES, SEQUELA: ICD-10-CM

## 2025-02-27 DIAGNOSIS — H55.81 SACCADIC DEFICIENCY: ICD-10-CM

## 2025-02-27 DIAGNOSIS — R41.89 COGNITIVE CHANGES: ICD-10-CM

## 2025-02-27 DIAGNOSIS — S13.4XXA WHIPLASH INJURIES, INITIAL ENCOUNTER: Primary | ICD-10-CM

## 2025-02-27 PROCEDURE — 99999 PR PBB SHADOW E&M-EST. PATIENT-LVL IV: CPT | Mod: PBBFAC,HCNC,, | Performed by: STUDENT IN AN ORGANIZED HEALTH CARE EDUCATION/TRAINING PROGRAM

## 2025-02-27 NOTE — TELEPHONE ENCOUNTER
Dear Dr Lcehuga,    Patient has a scheduled procedure Colonoscopy on 3/7/25 and is currently taking a blood thinner. In order to ensure patient safety, we would like to confirm that the patient can place their blood thinner medication on hold for the procedure. Can he/she discontinue Eliquis (apixaban) for a minimum of 2 days prior to the procedure?     Thank you for your prompt reply.    [facility] Endoscopy Scheduling

## 2025-02-27 NOTE — PATIENT INSTRUCTIONS
We have ordered an EEG to r/o seizure-like activity. Patient had an EEG at [facility] that was undeterminable   Referral for vestibular therapy for imbalance and saccadic impairment. Placed again since the pt has not been able to get an apt in the [facility]  Referral for speech for cognitive impairment. Placed again since the pt has not been able to get an apt in the [facility]  Start beginner's yoga and advance as tolerated  You do not feel your feet well due to neuropathy. Please wear footwear with good tread at all times. This recommendation includes wearing socks with tread on them. Make sure all of your walkways, hallways, and maddi are well lit at all times day and night. Use night lights to light up commonly used pathways in your home, meaning from the bedroom to the bathroom or kitchen. Make sure all of your walkways, hallways, and maddi are clear of obstacles at all times. Obstacles include decorations, rugs/mats, pet beds, shoes, and clothes. Make sure you have good hand holds to grab onto as you walk around your home.

## 2025-02-28 ENCOUNTER — TELEPHONE (OUTPATIENT)
Dept: ENDOSCOPY | Facility: HOSPITAL | Age: 89
End: 2025-02-28
Payer: MEDICARE

## 2025-03-07 ENCOUNTER — HOSPITAL ENCOUNTER (OUTPATIENT)
Facility: HOSPITAL | Age: 89
Discharge: HOME | End: 2025-03-07
Attending: INTERNAL MEDICINE | Admitting: INTERNAL MEDICINE
Payer: MEDICARE

## 2025-03-07 ENCOUNTER — RESULTS FOLLOW-UP (OUTPATIENT)
Dept: PRIMARY CARE CLINIC | Facility: CLINIC | Age: 89
End: 2025-03-07

## 2025-03-07 ENCOUNTER — ANESTHESIA EVENT (OUTPATIENT)
Dept: ENDOSCOPY | Facility: HOSPITAL | Age: 89
End: 2025-03-07
Payer: MEDICARE

## 2025-03-07 ENCOUNTER — NURSE TRIAGE (OUTPATIENT)
Dept: ADMINISTRATIVE | Facility: CLINIC | Age: 89
End: 2025-03-07
Payer: MEDICARE

## 2025-03-07 ENCOUNTER — ANESTHESIA (OUTPATIENT)
Dept: ENDOSCOPY | Facility: HOSPITAL | Age: 89
End: 2025-03-07
Payer: MEDICARE

## 2025-03-07 VITALS
BODY MASS INDEX: 22.5 KG/M2 | HEIGHT: 66 IN | OXYGEN SATURATION: 100 % | SYSTOLIC BLOOD PRESSURE: 161 MMHG | TEMPERATURE: 98 F | WEIGHT: 140 LBS | DIASTOLIC BLOOD PRESSURE: 77 MMHG | RESPIRATION RATE: 59 BRPM | HEART RATE: 73 BPM

## 2025-03-07 DIAGNOSIS — D12.6 COLON ADENOMAS: ICD-10-CM

## 2025-03-07 PROCEDURE — 63600175 PHARM REV CODE 636 W HCPCS: Performed by: STUDENT IN AN ORGANIZED HEALTH CARE EDUCATION/TRAINING PROGRAM

## 2025-03-07 PROCEDURE — 45385 COLONOSCOPY W/LESION REMOVAL: CPT | Mod: PT,22,, | Performed by: INTERNAL MEDICINE

## 2025-03-07 PROCEDURE — 37000009 HC ANESTHESIA EA ADD 15 MINS: Performed by: INTERNAL MEDICINE

## 2025-03-07 PROCEDURE — 27200997: Performed by: INTERNAL MEDICINE

## 2025-03-07 PROCEDURE — 88305 TISSUE EXAM BY PATHOLOGIST: CPT | Mod: 26,,, | Performed by: STUDENT IN AN ORGANIZED HEALTH CARE EDUCATION/TRAINING PROGRAM

## 2025-03-07 PROCEDURE — 45385 COLONOSCOPY W/LESION REMOVAL: CPT | Mod: PT | Performed by: INTERNAL MEDICINE

## 2025-03-07 PROCEDURE — 27201089 HC SNARE, DISP (ANY): Performed by: INTERNAL MEDICINE

## 2025-03-07 PROCEDURE — 88305 TISSUE EXAM BY PATHOLOGIST: CPT | Performed by: STUDENT IN AN ORGANIZED HEALTH CARE EDUCATION/TRAINING PROGRAM

## 2025-03-07 PROCEDURE — 37000008 HC ANESTHESIA 1ST 15 MINUTES: Performed by: INTERNAL MEDICINE

## 2025-03-07 PROCEDURE — 25000003 PHARM REV CODE 250: Performed by: STUDENT IN AN ORGANIZED HEALTH CARE EDUCATION/TRAINING PROGRAM

## 2025-03-07 RX ORDER — PROPOFOL 10 MG/ML
VIAL (ML) INTRAVENOUS
Status: DISCONTINUED | OUTPATIENT
Start: 2025-03-07 | End: 2025-03-07

## 2025-03-07 RX ORDER — HYDROMORPHONE HYDROCHLORIDE 1 MG/ML
0.2 INJECTION, SOLUTION INTRAMUSCULAR; INTRAVENOUS; SUBCUTANEOUS EVERY 5 MIN PRN
Status: DISCONTINUED | OUTPATIENT
Start: 2025-03-07 | End: 2025-03-07 | Stop reason: HOSPADM

## 2025-03-07 RX ORDER — PROPOFOL 10 MG/ML
VIAL (ML) INTRAVENOUS CONTINUOUS PRN
Status: DISCONTINUED | OUTPATIENT
Start: 2025-03-07 | End: 2025-03-07

## 2025-03-07 RX ORDER — LIDOCAINE HYDROCHLORIDE 20 MG/ML
INJECTION INTRAVENOUS
Status: DISCONTINUED | OUTPATIENT
Start: 2025-03-07 | End: 2025-03-07

## 2025-03-07 RX ORDER — ONDANSETRON HYDROCHLORIDE 2 MG/ML
INJECTION, SOLUTION INTRAVENOUS
Status: DISCONTINUED | OUTPATIENT
Start: 2025-03-07 | End: 2025-03-07

## 2025-03-07 RX ORDER — ONDANSETRON HYDROCHLORIDE 2 MG/ML
4 INJECTION, SOLUTION INTRAVENOUS DAILY PRN
Status: DISCONTINUED | OUTPATIENT
Start: 2025-03-07 | End: 2025-03-07 | Stop reason: HOSPADM

## 2025-03-07 RX ORDER — ONDANSETRON HYDROCHLORIDE 2 MG/ML
4 INJECTION, SOLUTION INTRAVENOUS ONCE AS NEEDED
Status: DISCONTINUED | OUTPATIENT
Start: 2025-03-07 | End: 2025-03-07 | Stop reason: HOSPADM

## 2025-03-07 RX ORDER — FENTANYL CITRATE 50 UG/ML
INJECTION, SOLUTION INTRAMUSCULAR; INTRAVENOUS
Status: DISCONTINUED | OUTPATIENT
Start: 2025-03-07 | End: 2025-03-07

## 2025-03-07 RX ADMIN — SODIUM CHLORIDE: 0.9 INJECTION, SOLUTION INTRAVENOUS at 10:03

## 2025-03-07 RX ADMIN — PROPOFOL 50 MG: 10 INJECTION, EMULSION INTRAVENOUS at 10:03

## 2025-03-07 RX ADMIN — LIDOCAINE HYDROCHLORIDE 50 MG: 20 INJECTION INTRAVENOUS at 10:03

## 2025-03-07 RX ADMIN — ONDANSETRON 4 MG: 2 INJECTION INTRAMUSCULAR; INTRAVENOUS at 10:03

## 2025-03-07 RX ADMIN — PROPOFOL 20 MG: 10 INJECTION, EMULSION INTRAVENOUS at 10:03

## 2025-03-07 RX ADMIN — PROPOFOL 150 MCG/KG/MIN: 10 INJECTION, EMULSION INTRAVENOUS at 10:03

## 2025-03-07 RX ADMIN — FENTANYL CITRATE 25 MCG: 50 INJECTION, SOLUTION INTRAMUSCULAR; INTRAVENOUS at 10:03

## 2025-03-07 NOTE — PROVATION PATIENT INSTRUCTIONS
Discharge Summary/Instructions after an Endoscopic Procedure  Patient Name: Tiffany Acevedo  Patient MRN: [id number]  Patient YOB: 1935  Friday, March 7, 2025  Bebeto Gonzalez MD  Dear patient,  As a result of recent federal legislation (The Federal Cures Act), you may   receive lab or pathology results from your procedure in your [facility]   account before your physician is able to contact you. Your physician or   their representative will relay the results to you with their   recommendations at their soonest availability.  Thank you,  RESTRICTIONS:  During your procedure today, you received medications for sedation.  These   medications may affect your judgment, balance and coordination.  Therefore,   for 24 hours, you have the following restrictions:   - DO NOT drive a car, operate machinery, make legal/financial decisions,   sign important papers or drink alcohol.    ACTIVITY:  Today: no heavy lifting, straining or running due to procedural   sedation/anesthesia.  The following day: return to full activity including work.  DIET:  Eat and drink normally unless instructed otherwise.     TREATMENT FOR COMMON SIDE EFFECTS:  - Mild abdominal pain, nausea, belching, bloating or excessive gas:  rest,   eat lightly and use a heating pad.  - Sore Throat: treat with throat lozenges and/or gargle with warm salt   water.  - Because air was used during the procedure, expelling large amounts of air   from your rectum or belching is normal.  - If a bowel prep was taken, you may not have a bowel movement for 1-3 days.    This is normal.  SYMPTOMS TO WATCH FOR AND REPORT TO YOUR PHYSICIAN:  1. Abdominal pain or bloating, other than gas cramps.  2. Chest pain.  3. Back pain.  4. Signs of infection such as: chills or fever occurring within 24 hours   after the procedure.  5. Rectal bleeding, which would show as bright red, maroon, or black stools.   (A tablespoon of blood from the rectum is not serious, especially if    hemorrhoids are present.)  6. Vomiting.  7. Weakness or dizziness.  GO DIRECTLY TO THE NEAREST EMERGENCY ROOM IF YOU HAVE ANY OF THE FOLLOWING:      Difficulty breathing              Chills and/or fever over 101 F   Persistent vomiting and/or vomiting blood   Severe abdominal pain   Severe chest pain   Black, tarry stools   Bleeding- more than one tablespoon   Any other symptom or condition that you feel may need urgent attention  Your doctor recommends these additional instructions:  If any biopsies were taken, your doctors clinic will contact you in 1 to 2   weeks with any results.  - Discharge patient to home.   - Resume Eliquis (apixaban) at prior dose tomorrow.   - Discharge patient to home.   - Await pathology results.   - Telephone endoscopist for pathology results in 3 weeks.   - Repeat colonoscopy in 3 years for surveillance of multiple polyps.   - Return to primary care physician.   - Return to GI clinic.   - The findings and recommendations were discussed with the patient.  For questions, problems or results please call your physician - Bebeto Gonzalez MD at Work:  [phone].  [facility], EMERGENCY ROOM PHONE NUMBER: [phone]  IF A COMPLICATION OR EMERGENCY SITUATION ARISES AND YOU ARE UNABLE TO REACH   YOUR PHYSICIAN - GO DIRECTLY TO THE EMERGENCY ROOM.  Bebeto Gonzalez MD  3/7/2025 11:25:30 AM  This report has been verified and signed electronically.  Dear patient,  As a result of recent federal legislation (The Federal Cures Act), you may   receive lab or pathology results from your procedure in your [facility]   account before your physician is able to contact you. Your physician or   their representative will relay the results to you with their   recommendations at their soonest availability.  Thank you,  PROVATION   - - -

## 2025-03-07 NOTE — TELEPHONE ENCOUNTER
"Pt calling about medications before colonoscopy, states  puts meds out for her, asking if she can wait to take meds when she gets to hospital.  on call now, clarified the question and medication pt is asking about. Pt is asking if can take "purging solution" now or at hospital. Advised to take prep at this time per MD instructions on chart. Attempted to discuss further, pt hangs up.   Reason for Disposition   Bowel prep for colonoscopy, questions about    Protocols used: Colonoscopy Symptoms and [id number]-PR    "

## 2025-03-07 NOTE — H&P
[facility] - Endoscopy  History & Physical    Subjective:      Chief Complaint/Reason for Admission:     Surveillance colonoscopy High risk colon cancer surveillance: Personal                          history of adenoma (10 mm or greater in size),                          High risk colon cancer surveillance: Personal                          history of multiple (3 or more) adenomas     Tiffany Masterson is a 89 y.o. female.    Past Medical History:   Diagnosis Date    Abnormal MRI 10/16/2020    Acute cystitis without hematuria 12/22/2021    Arthritis     Arthritis of right hip 11/08/2021    Atrial fibrillation     Bilateral leg edema 01/02/2025    Cataract     Elevated liver enzymes     Endometrial mass 06/29/2018    Epiretinal membrane (ERM) of right eye     Family history of malignant neoplasm of gastrointestinal tract mother    Frequent UTI 09/30/2018    Generalized arthritis 09/19/2024    Shoulders, knees, hips      Graves disease     now hypothryoid - no surgery or medicine. resolved on its own    History of cholecystectomy 06/10/2021    History of colonoscopy     unremarkable 2007 by Dr. Javier.  Barium enema revealed diverticular disease.    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     Total hip:                                BMD is 0.678 g/cm2, T-score is -2.2, and Z-score is 0.2.     Femoral neck:                          BMD is 0.664 g/cm2, T-score is -1.7, and Z-score is 0.9.     Distal 1/3 radius:                       Not applicable     FRAX:     34% risk of a major oste    Personal history of colonic polyps     Physical deconditioning 09/13/2023    Preop testing 05/17/2021    S/P colonoscopic polypectomy 06/18/2013    Sleep disorder 11/08/2021    Status post hysteroscopic polypectomy 07/02/2018    TBI (traumatic brain injury) 10/25/2024    9/22/2024: Ct head: Left frontotemporal subarachnoid hemorrhage again noted including a small focus of intraparenchymal hemorrhage in the left cerebellum. No new hemorrhage.       TIA (transient ischemic attack)     with a normal angiogram in the past, Ocular migraines reported by patient, not TIA     Transaminitis 03/19/2021    Urethral caruncle 09/15/2020     Past Surgical History:   Procedure Laterality Date    CATARACT EXTRACTION Right 2012    Dr. Lexis Nicolas     CHOLECYSTECTOMY  2022    COLONOSCOPY      2014 polyps    COLONOSCOPY N/A 11/07/2016    Procedure: COLONOSCOPY;  Surgeon: Bebeto Gonzalez MD;  Location: [facility] ([address] [place] [address]);  Service: Endoscopy;  Laterality: N/A;    COLONOSCOPY N/A 03/09/2020    Procedure: COLONOSCOPY;  Surgeon: Bebeto Gonzalez MD;  Location: [facility] ([facility]  Service: Endoscopy;  Laterality: N/A;    COLONOSCOPY N/A 09/29/2021    Procedure: COLONOSCOPY;  Surgeon: Bebeto Gonzalez MD;  [facility]  Service: Endoscopy;  Laterality: N/A;  please schedule patient as soon as possible with me EGD colonoscopy with constipation bowel prep for iron deficiency anemia family history of colon cancer and personal history of colon polyps  9/17/21 ok for standard split prep per Dr. Gonzalez-ml    COLONOSCOPY N/A 09/29/2021    Procedure: COLONOSCOPY;  Surgeon: Bebeto Gonzalez MD;  Location: [facility] ([facility]);  Service: Endoscopy;  Laterality: N/A;  Please schedule patient as soon as possible with me EGD colonoscopy with constipation bowel prep for iron deficiency anemia family history of colon cancer and personal history of colon polyps  9/17/21  Ok for standard split prep per Dr. Gonzalez-ml    COLONOSCOPY W/ POLYPECTOMY  06/2013    polyp of colon    ENDOSCOPIC ULTRASOUND OF UPPER GASTROINTESTINAL TRACT N/A 04/29/2021    Procedure: ULTRASOUND, UPPER GI TRACT, ENDOSCOPIC;  Surgeon: Dalton Johnson MD;  Location: [facility] (2ND FLR);  Service: Endoscopy;  Laterality: N/A;  Postprandial nausea vomiting epigastric 0.9 cm stone and sludge seen within the gallbladder lumen.  No wall thickening or pericholecystic fluid.  0.6 cm stone seen within the distal common bile duct at the level of the pancreatic head.  There is dil    ERCP N/A 04/29/2021    Procedure: ERCP (ENDOSCOPIC RETROGRADE CHOLANGIOPANCREATOGRAPHY);  Surgeon: Dalton Johnson MD;  Location: [facility] (2ND FLR);  Service: Endoscopy;  Laterality: N/A;  Postprandial nausea and vomit 0.9 cm stone and sludge seen within the gallbladder lumen.  No wall thickening or pericholecystic fluid.  0.6 cm stone seen within the distal common bile duct at the level of the pancreatic head.  There i    ESOPHAGOGASTRODUODENOSCOPY N/A 09/29/2021    Procedure: EGD (ESOPHAGOGASTRODUODENOSCOPY);  Surgeon: Bebeto Gonzalez MD;  Location: [facility] (4TH FLR);  Service: Endoscopy;  Laterality: N/A;  rapid covid test arrival 12pm - sm  9/27 arrival time for covid test/procedure confirmed with pt-rb    ESOPHAGOGASTRODUODENOSCOPY N/A 09/29/2021    Procedure: EGD (ESOPHAGOGASTRODUODENOSCOPY);  Surgeon: Bebeto Gonzalez MD;  Location: [facility] (4TH FLR);  Service: Endoscopy;  Laterality: N/A;  [place]19[place] [facility] [place]ml    EYE SURGERY  11/10/2014    right retina/Dr. Dalton Sierra ([facility])    HYSTEROSCOPIC POLYPECTOMY OF UTERUS N/A 07/02/2018    Procedure: POLYPECTOMY, UTERUS, HYSTEROSCOPIC;  Surgeon: Elliot Vanessa IV, MD;  Location: [facility];  Service: OB/GYN;  Laterality: N/A;    INJECTION OF JOINT Right 11/11/2022    Procedure: INJECTION, RIGHT GTB CONTRAST DIRECT REFERRAL;  Surgeon: Camden Hernandez MD;   Location: [facility];  Service: Pain Management;  Laterality: Right;    JOINT REPLACEMENT  03/23/2011    left total hip replacement    LAPAROSCOPIC CHOLECYSTECTOMY N/A 05/17/2021    Procedure: CHOLECYSTECTOMY, LAPAROSCOPIC;  Surgeon: Rayshawn Peralta Jr., MD;  Location: [facility] OR [address] [place] [address];  Service: General;  Laterality: N/A;    REMOVAL OF EPIRETINAL MEMBRANE Right     Dr. Padron    TONSILLECTOMY      pt was 11 years old    TRANSESOPHAGEAL ECHOCARDIOGRAM WITH POSSIBLE CARDIOVERSION (SONIA W/ POSS CARDIOVERSION) N/A 1/6/2025    Procedure: Transesophageal echo (SONIA) intra-procedure log documentation;  Surgeon: Tio Phelps MD;  Location: [facility] EP LAB;  Service: Cardiology;  Laterality: N/A;    TREATMENT OF CARDIAC ARRHYTHMIA N/A 1/6/2025    Procedure: Cardioversion or Defibrillation;  Surgeon: Ayush Lechuga MD;  Location: [facility] EP LAB;  Service: Cardiology;  Laterality: N/A;  AF, SONIA/DCCV, ANES, GP,      Social History[1]    PTA Medications   Medication Sig    acetaminophen (TYLENOL) 500 MG tablet Take 500 mg by mouth every 6 (six) hours as needed.    amiodarone (PACERONE) 200 MG Tab Take 1 tablet (200 mg total) by mouth once daily.    ammonium lactate 12 % Crea Apply topically bid to legs    atorvastatin (LIPITOR) 10 MG tablet Take 1 tablet by mouth once daily    bisacodyL (DULCOLAX) 5 mg EC tablet Take 5 mg by mouth once daily. Pt states taking twice per day    CALCIUM CARBONATE/VITAMIN D3 (CALCIUM 600 + D,3, ORAL) Take 1 tablet by mouth once daily.    DEXTRAN 70/HYPROMELLOSE (ARTIFICIAL TEARS, PF, OPHT) Place 1 drop into both eyes as needed.    ELIQUIS 5 mg Tab Take 1 tablet by mouth twice daily    empagliflozin (JARDIANCE) 10 mg tablet Take 1 tablet (10 mg total) by mouth once daily.    estradioL (ESTRACE) 0.01 % (0.1 mg/gram) vaginal cream Place 0.5 g intravaginally q.h.s. x2 weeks; then, placed 0.5 g intravaginally twice weekly at bedtime (maintenance therapy).    fluticasone propionate (FLONASE) 50  mcg/actuation nasal spray CLEAN SINUS/NARES WITH OCEAN NASAL SPRAY THEN USE FLONASE 1 SPRAY TWICE DAILY.    furosemide (LASIX) 20 MG tablet Take 1 tablet (20 mg total) by mouth daily as needed (for worsening shortness of breath, swelling or 3lb weight gain on home scale).    levothyroxine (SYNTHROID) 112 MCG tablet TAKE 1 TABLET BY MOUTH BEFORE BREAKFAST    losartan (COZAAR) 100 MG tablet Take 1 tablet by mouth once daily    metoprolol succinate (TOPROL-XL) 100 MG 24 hr tablet Take 1 tablet (100 mg total) by mouth once daily.    sodium chloride (SALINE NASAL) 0.65 % nasal spray 1 spray by Nasal route as needed for Congestion.    spironolactone (ALDACTONE) 25 MG tablet Take 1 tablet (25 mg total) by mouth once daily.    vit A/vit C/vit E/zinc/copper (ICAPS AREDS ORAL) Take 1 tablet by mouth 2 (two) times a day.    vitamin D (VITAMIN D3) 1000 units Tab Take 2 tablets by mouth once daily.     Review of patient's allergies indicates:   Allergen Reactions    Levaquin [levofloxacin]      Joint pain    Hydrochlorothiazide Other (See Comments)     Pain in joints and depression per pt        Review of Systems   Constitutional:  Negative for fever.       Objective:      Vital Signs (Most Recent)  Temp: 97.9 °F (36.6 °C) (03/07/25 0908)  Pulse: 90 (03/07/25 0908)  Resp: 16 (03/07/25 0908)  BP: (!) 149/70 (03/07/25 0908)  SpO2: 99 % (03/07/25 0908)    Vital Signs Range (Last 24H):  Temp:  [97.9 °F (36.6 °C)]   Pulse:  [90]   Resp:  [16]   BP: (149)/(70)   SpO2:  [99 %]     Physical Exam  Cardiovascular:      Rate and Rhythm: Normal rate.   Pulmonary:      Effort: Pulmonary effort is normal.   Neurological:      Mental Status: She is alert and oriented to person, place, and time.             Assessment:      Surveillance colonoscopy High risk colon cancer surveillance: Personal                          history of adenoma (10 mm or greater in size),                          High risk colon cancer surveillance: Personal                           history of multiple (3 or more) adenomas       Plan:    Surveillance colonoscopy High risk colon cancer surveillance: Personal                          history of adenoma (10 mm or greater in size),                          High risk colon cancer surveillance: Personal                          history of multiple (3 or more) adenomas            [1]   Social History  Tobacco Use    Smoking status: Never    Smokeless tobacco: Never    Tobacco comments:     The patient is not getting any regular exercise at this time.  She does enjoy traveling to [place].   Substance Use Topics    Alcohol use: Yes     Comment: occasionally    Drug use: No

## 2025-03-07 NOTE — TRANSFER OF CARE
"Anesthesia Transfer of Care Note    Patient: Tiffayn Masterson    Procedure(s) Performed: Procedure(s) (LRB):  COLONOSCOPY, SCREENING, HIGH RISK PATIENT (N/A)    Patient location: [facility]    Anesthesia Type: general    Transport from OR: Transported from OR on room air with adequate spontaneous ventilation    Post pain: adequate analgesia    Post assessment: no apparent anesthetic complications and tolerated procedure well    Post vital signs: stable    Level of consciousness: awake and alert    Nausea/Vomiting: no nausea/vomiting    Complications: none    Transfer of care protocol was followed      Last vitals: Visit Vitals  BP (!) 149/70 (BP Location: Left arm, Patient Position: Sitting)   Pulse 90   Temp 36.6 °C (97.9 °F) (Temporal)   Resp 16   Ht 5' 6" (1.676 m)   Wt 63.5 kg (140 lb)   SpO2 99%   BMI 22.60 kg/m²     "

## 2025-03-07 NOTE — ANESTHESIA PREPROCEDURE EVALUATION
03/07/2025  Tiffany Masterson is a 89 y.o., female.      Pre-op Assessment          Review of Systems  Anesthesia Hx:  No problems with previous Anesthesia                Cardiovascular:  Exercise tolerance: poor   Hypertension    Denies CAD.    Dysrhythmias atrial fibrillation  CHF     FLORES                              Pulmonary:     Denies Asthma.     Denies Sleep Apnea.                Renal/:   Denies Chronic Renal Disease.                Hepatic/GI:   Denies PUD.   Denies GERD. Denies Liver Disease.               Neurological:    Denies CVA.    Denies Seizures.                                Endocrine:  Denies Diabetes. Hypothyroidism              Physical Exam  General: Alert    Airway:  Mallampati: III / III  Mouth Opening: Normal  TM Distance: Normal  Tongue: Normal  Neck ROM: Normal ROM    Dental:  Intact        Anesthesia Plan  Type of Anesthesia, risks & benefits discussed:    Anesthesia Type: Gen Natural Airway, MAC  Intra-op Monitoring Plan: Standard ASA Monitors  Post Op Pain Control Plan: multimodal analgesia and IV/PO Opioids PRN  Induction:  IV  Airway Plan: Direct  Informed Consent: Informed consent signed with the Patient and all parties understand the risks and agree with anesthesia plan.  All questions answered.   ASA Score: 3    Ready For Surgery From Anesthesia Perspective.     .

## 2025-03-10 ENCOUNTER — CLINICAL SUPPORT (OUTPATIENT)
Dept: REHABILITATION | Facility: HOSPITAL | Age: 89
End: 2025-03-10
Attending: STUDENT IN AN ORGANIZED HEALTH CARE EDUCATION/TRAINING PROGRAM
Payer: MEDICARE

## 2025-03-10 DIAGNOSIS — Z74.09 IMPAIRED FUNCTIONAL MOBILITY AND ENDURANCE: ICD-10-CM

## 2025-03-10 DIAGNOSIS — R26.89 IMPAIRMENT OF BALANCE: Primary | ICD-10-CM

## 2025-03-10 LAB
FINAL PATHOLOGIC DIAGNOSIS: NORMAL
GROSS: NORMAL
Lab: NORMAL

## 2025-03-10 NOTE — PROGRESS NOTES
Outpatient Rehab    Physical Therapy Evaluation    Patient Name: Tiffany Masterson  MRN: [id number]  YOB: 1935  Encounter Date: 3/10/2025    Therapy Diagnosis:   Encounter Diagnoses   Name Primary?    Impairment of balance Yes    Impaired functional mobility and endurance      Physician: Mirlande Moore MD    Physician Orders: Eval and Treat  Medical Diagnosis: Z74.09 (ICD-10-CM) - Impaired functional mobility and endurance    Visit # / Visits Authorized:  01 / 01   Date of Evaluation:  3/10/2025   Insurance Authorization Period: 01/27/2025 - 01/27/2026  Plan of Care Certification:  3/10/2025 to 06/02/2025      Time In: 1032   Time Out: 1113  Total Time: 41   Total Billable Time: 41 minutes    Intake Outcome Measure for FOTO Survey    Therapist reviewed FOTO scores for Tiffany Masterson on 3/10/2025.   FOTO report - see Media section or FOTO account episode details.     Intake Score: 66%    Precautions     Standard, Fall      Subjective   History of Present Illness  Tiffany is a 89 y.o. female who reports to physical therapy with a chief concern of S/p fall with head injury.                 History of Present Condition/Illness: Patient had a trip going up stairs resulting in head injury. She denies headaches, pain, balance changes and visual changes. She did have double vision initially, but this has resolved.  She feels that she is functioning back to her baseline.     Activities of Daily Living  Social history was obtained from Patient.                   Pain     Patient reports a current pain level of 0/10.               Living Arrangements  Living Situation  Housing: Home independently  Living Arrangements: Spouse/significant other    Home Setup  Type of Structure: House  Number of Levels in Home: Two level  Primary Bedroom: 2nd floor        Employment  Patient does not report that: Does the patient's condition impact their ability to work?      Does a lot of volunteer work.      Past Medical  History/Physical Systems Review:   Tiffany Masterson  has a past medical history of Abnormal MRI, Acute cystitis without hematuria, Arthritis, Arthritis of right hip, Atrial fibrillation, Bilateral leg edema, Cataract, Elevated liver enzymes, Endometrial mass, Epiretinal membrane (ERM) of right eye, Family history of malignant neoplasm of gastrointestinal tract, Frequent UTI, Generalized arthritis, Graves disease, History of cholecystectomy, History of colonoscopy, Hyperlipidemia, Hypertension, Hypertriglyceridemia, Hyponatremia, Hypothyroidism, Impaired functional mobility, balance, gait, and endurance, Iron deficiency anemia, Migraine, ophthalmoplegic, Mixed stress and urge urinary incontinence, Ocular migraine, Osteopenia, Personal history of colonic polyps, Physical deconditioning, Preop testing, S/P colonoscopic polypectomy, Sleep disorder, Status post hysteroscopic polypectomy, TBI (traumatic brain injury), TIA (transient ischemic attack), Transaminitis, and Urethral caruncle.    Tiffany Masterson  has a past surgical history that includes Tonsillectomy; Joint replacement (03/23/2011); Colonoscopy w/ polypectomy (06/2013); Eye surgery (11/10/2014); Colonoscopy; Colonoscopy (N/A, 11/07/2016); Hysteroscopic polypectomy of uterus (N/A, 07/02/2018); Colonoscopy (N/A, 03/09/2020); Endoscopic ultrasound of upper gastrointestinal tract (N/A, 04/29/2021); ERCP (N/A, 04/29/2021); Laparoscopic cholecystectomy (N/A, 05/17/2021); Esophagogastroduodenoscopy (N/A, 09/29/2021); Colonoscopy (N/A, 09/29/2021); Esophagogastroduodenoscopy (N/A, 09/29/2021); Colonoscopy (N/A, 09/29/2021); Removal of epiretinal membrane (Right); Cataract extraction (Right, 2012); Injection of joint (Right, 11/11/2022); Cholecystectomy (2022); Treatment of cardiac arrhythmia (N/A, 1/6/2025); transesophageal echocardiogram with possible cardioversion (prasanth w/ poss cardioversion) (N/A, 1/6/2025); and colonoscopy, screening, high risk patient (N/A,  3/7/2025).    Tiffany has a current medication list which includes the following prescription(s): acetaminophen, amiodarone, ammonium lactate, atorvastatin, bisacodyl, calcium carbonate/vitamin d3, dextran 70/hypromellose, eliquis, empagliflozin, estradiol, fluticasone propionate, furosemide, levothyroxine, losartan, metoprolol succinate, sodium chloride, spironolactone, vit a/vit c/vit e/zinc/copper, and vitamin d.    Review of patient's allergies indicates:   Allergen Reactions    Levaquin [levofloxacin]      Joint pain    Hydrochlorothiazide Other (See Comments)     Pain in joints and depression per pt        Objective   Ocular Movement  Convergence: Intact  Convergence Break Point (Distance from Nose in cm): 9 cm  Right Eye Ocular Range of Motion: Intact  Left Eye Ocular Range of Motion: Intact  Pursuits: Smooth and accurate  Horizontal Saccades: Target undershooting  Vertical Saccades: Target undershooting  Patient Symptoms/Response to Ocular Movement Testing: Latent onset of eye movements.             Cervical Range of Motion   Active (deg)   Flexion  (WFL)   Extension  (WFL)   Right Lateral Flexion  (WFL)   Right Rotation  (WFL)   Left Lateral Flexion  (WFL)   Left Rotation  (WFL)               Vestibular Positional and Balance Testing       Modified Clinical Test of Sensory Interaction and Balance (CTSIB-M): 1. 30s 2. 30s 3. 30s 4. 16s                Fall Risk  Functional mobility test results suggest the patient is: At Risk for Falls  Timed Up & Go (TUG)  Time: 18.8 seconds     An older adult who takes >=12 seconds to complete the TUG is at risk for falling.          Ambulation Details    10MWT: 0.63 m/s without AD       Functional Gait Assessment:   1. Gait on level surface =  1, 9.6s   (3) Normal: less than 5.5 sec, no A.D., no imbalance, normal gait pattern, deviates< 6in   (2) Mild impairment: 7-5.6 sec, uses A.D., mild gait deviations, or deviates 6-10 in   (1) Moderate impairment: > 7 sec, slow speed,  imbalance, deviates 10-15 in.   (0) Severe impairment: needs assist, deviates >15 in, reach/touch wall  2. Change in Gait Speed = 2   (3) Normal: smooth change w/o loss of balance or gait deviation, deviates < 6 in, significant difference between speeds   (2) Mild impairment: changes speed, but demonstrates mild gait deviations, deviates 6-10 in, OR no deviations but unable to significantly speed, OR uses A.D.   (1) Moderate impairment: minor changes to speed, OR changes speed w/ significant deviations, deviates 10-15 in, OR  Changes speed , but loses balance & recovers   (0) Severe impairment: cannot change speed, deviates >15 in, or loses balance & needs assist  3. Gait with horizontal head turns  = 2   (3) Normal: no change in gait, deviates <6 in   (2) Mild impairment: slight change in speed, deviates 6-10 in, OR uses A.D.   (1) Moderate impairment: moderate change in speed, deviates 10-15 in   (0) Severe impairment: severe disruption of gait, deviates >15in  4. Gait with vertical head turns = 2   (3) Normal: no change in gait, deviates <6 in   (2) Mild impairment: slight change in speed, deviates 6-10 in OR uses A.D.   (1) Moderate impairment: moderate change in speed, deviates 10-15 in   (0) Severe impairment: severe disruption of gait, deviates >15 in  5. Gait with pivot turns = 2   (3) Normal: performs safely in 3 sec, no LOB   (2) Mild impairment: performs in >3 sec & no LOB, OR turns safely & requires several steps to regain LOB   (1) Moderate impairment: turns slow, OR requires several small steps for balance following turn & stop   (0) Severe impairment: cannot turn safely, needs assist  6. Step over obstacle = 2   (3) Normal: steps over 2 stacked boxes w/o change in speed or LOB   (2) Mild impairment: able to step over 1 box w/o change in speed or LOB   (1) Moderate impairment: steps over 1 box but must slow down, may require VC   (0) Severe impairment: cannot perform w/o assist  7. Gait with Narrow CHRISTELLE  = 0   (3) Normal: 10 steps no staggering   (2) Mild impairment: 7-9 steps   (1) Moderate impairment: 4-7 steps   (0) Severe impairment: < 4 steps or cannot perform w/o assist  8. Gait with eyes closed = 1   (3) Normal: < 7 sec, no A.D., no LOB, normal gait pattern, deviates <6 in   (2) Mild impairment: 7.1-9 sec, mild gait deviations, deviates 6-10 in   (1) Moderate impairment: > 9 sec, abnormal pattern, LOB, deviates 10-15 in   (0) Severe impairment: cannot perform w/o assist, LOB, deviates >15in  9. Ambulating Backwards = 2   (3) Normal: no A.D., no LOB, normal gait pattern, deviates <6in   (2) Mild impairment: uses A.D., slower speed, mild gait deviations, deviates 6-10 in   (1) Moderate impairment: slow speed, abnormal gait pattern, LOB, deviates 10-15 in   (0) Severe impairment: severe gait deviations or LOB, deviates >15in  10. Steps = 2   (3) Normal: alternating feet, no rail   (2) Mild Impairment: alternating feet, uses rail   (1) Moderate impairment: step-to, uses rail   (0) Severe impairment: cannot perform safely    Score 16/30     Score:   <22/30 fall risk   <20/30 fall risk in older adults   <18/30 fall risk in Parkinsons       Assessment & Plan   Assessment  Tiffany presents with a condition of Low complexity.   Presentation of Symptoms: Stable  Will Comorbidities Impact Care: No       Functional Limitations: Increased risk of fall, Maintaining balance  Impairments: Impaired balance    Patient Goal for Therapy (PT): Improve mobility  Prognosis: Good  Assessment Details: Patient presents to neurologic PT s/p fall with head injury with referring diagnoses of impaired balance and impaired oculomotor function. Examination revealed impaired saccades, but without symptom provocation. Timed Up and Go duration of 18.8 seconds places her within the elevated fall risk range. Functional Gait Assessment score also places her within the elevated fall risk range. Tiffany has recovered well from recent head injury;  today's findings likely attributable to sedentary behaviors with aging. She would benefit from skilled PT to improve functional mobility and reduce fall risk. Patient expressed desire to participate in aquatic therapy, which evaluating PT finds appropriate for her.     Plan  From a physical therapy perspective, the patient would benefit from: Skilled Rehab Services    Planned therapy interventions include: Therapeutic exercise, Therapeutic activities, Neuromuscular re-education, and Aquatic therapy.    Planned modalities to include: Cryotherapy (cold pack) and Thermotherapy (hot pack).                   This plan was discussed with Patient.   Discussion participants: Agreed Upon Plan of Care  Plan details: PT is in contact with supervisor to transfer to aquatic PT.           Patient's spiritual, cultural, and educational needs considered and patient agreeable to plan of care and goals.     Education  Education was done with Patient.           PT POC, benefits/purpose of PT, scheduling       Goals:   Active       Mobility/Balance - Short Term Goals (STG) = 4 weeks; Long Term Goals (LTG) = 8 weeks        LTG - Patient will be independent with HEP emphasizing generalized strengthening and aerobic program        Start:  03/10/25    Expected End:  06/02/25            Patient will exhibit improved Timed Up and Go time to </= 15 seconds (STG) and 12 seconds (LTG) indicating improved safety with functional mobility tasks.           Start:  03/10/25    Expected End:  06/02/25       3/10/2025 - 18.8s, no AD         Pt will improve Self Selected Walking Speed to at least 0.73 m/s (STG) and 0.83 m/s (LTG) indicating improved safety with community mobility.        Start:  03/10/25    Expected End:  06/02/25       3/10/2025 - 0.63 m/s without AD         Patient will exhibit improved Functional Gait Assessment score to 19/30 (STG) and 23/30 (LTG) indicating improved dynamic balance.         Start:  03/10/25    Expected End:   06/02/25       3/10/2025 - 16/30             Lucinda Parekh PT

## 2025-03-10 NOTE — ANESTHESIA POSTPROCEDURE EVALUATION
Anesthesia Post Evaluation    Patient: Tiffany Masterson    Procedure(s) Performed: Procedure(s) (LRB):  COLONOSCOPY, SCREENING, HIGH RISK PATIENT (N/A)    Final Anesthesia Type: general      Patient location during evaluation: GI PACU  Patient participation: Yes- Able to Participate  Level of consciousness: awake  Post-procedure vital signs: reviewed and stable  Pain management: adequate  Airway patency: patent    PONV status at discharge: No PONV  Anesthetic complications: no      Cardiovascular status: blood pressure returned to baseline  Respiratory status: unassisted  Hydration status: euvolemic  Follow-up not needed.              Vitals Value Taken Time   /77 03/07/25 12:15   Temp  03/09/25 20:31   Pulse 73 03/07/25 12:15   Resp 59 03/07/25 12:15   SpO2 100 % 03/07/25 12:15         No case tracking events are documented in the log.      Pain/Apple Score: No data recorded

## 2025-03-11 ENCOUNTER — CLINICAL SUPPORT (OUTPATIENT)
Dept: REHABILITATION | Facility: HOSPITAL | Age: 89
End: 2025-03-11
Attending: STUDENT IN AN ORGANIZED HEALTH CARE EDUCATION/TRAINING PROGRAM
Payer: MEDICARE

## 2025-03-11 DIAGNOSIS — R41.841 COGNITIVE COMMUNICATION DEFICIT: Primary | ICD-10-CM

## 2025-03-11 PROCEDURE — 96125 COGNITIVE TEST BY HC PRO: CPT | Mod: PO

## 2025-03-11 NOTE — PROGRESS NOTES
Outpatient Rehab    Speech-Language Pathology Evaluation (only)    Patient Name: Tiffany Masterson  MRN: [id number]  YOB: 1935  Encounter Date: 3/11/2025    Therapy Diagnosis:   Encounter Diagnosis   Name Primary?    Cognitive communication deficit Yes     Physician: Mirlande Moore MD    Physician Orders: Eval and Treat  Medical Diagnosis: concussion    Visit # / Visits Authorized:  1 / 1  Date of Evaluation:  3/11/2025  Insurance Authorization Period: 2/27/25 to 2/27/26  Plan of Care Certification:  3/11/2025 to 4/25/26      Time In: 0145   Time Out: 0230  Total Time: 45   Total Billable Time: 45    Intake Outcome Measure for FOTO Survey  1/3 given    Precautions          Standard, fall.    Subjective   History of Present Illness  Tiffany is a 89 y.o. female who reports to Speech-Language Pathology with a chief concern of s/p fall with head injury.     The patient reports a medical diagnosis of concussion.            Patient presents in clinic breathing with Room air.             Prior Level of Function: no significant cognitive communication deficit  Current Level of Function: mild cognitive communication deficit    Patient had a trip going up stairs resulting in head injury. She denies headaches, pain, cognitive changes.  She feels that she is functioning back to her baseline.     Current Speech Symptoms    Cognitive communication changes    Pain     Patient reports a current pain level of 0/10.               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   for 68 years. Does a lot of volunteer work.       Past Medical History/Physical Systems Review:   Tiffany Masterson  has a past medical history of Abnormal MRI, Acute cystitis without hematuria, Arthritis,  Arthritis of right hip, Atrial fibrillation, Bilateral leg edema, Cataract, Elevated liver enzymes, Endometrial mass, Epiretinal membrane (ERM) of right eye, Family history of malignant neoplasm of gastrointestinal tract, Frequent UTI, Generalized arthritis, Graves disease, History of cholecystectomy, History of colonoscopy, Hyperlipidemia, Hypertension, Hypertriglyceridemia, Hyponatremia, Hypothyroidism, Impaired functional mobility, balance, gait, and endurance, Iron deficiency anemia, Migraine, ophthalmoplegic, Mixed stress and urge urinary incontinence, Ocular migraine, Osteopenia, Personal history of colonic polyps, Physical deconditioning, Preop testing, S/P colonoscopic polypectomy, Sleep disorder, Status post hysteroscopic polypectomy, TBI (traumatic brain injury), TIA (transient ischemic attack), Transaminitis, and Urethral caruncle.    Tiffany Masterson  has a past surgical history that includes Tonsillectomy; Joint replacement (03/23/2011); Colonoscopy w/ polypectomy (06/2013); Eye surgery (11/10/2014); Colonoscopy; Colonoscopy (N/A, 11/07/2016); Hysteroscopic polypectomy of uterus (N/A, 07/02/2018); Colonoscopy (N/A, 03/09/2020); Endoscopic ultrasound of upper gastrointestinal tract (N/A, 04/29/2021); ERCP (N/A, 04/29/2021); Laparoscopic cholecystectomy (N/A, 05/17/2021); Esophagogastroduodenoscopy (N/A, 09/29/2021); Colonoscopy (N/A, 09/29/2021); Esophagogastroduodenoscopy (N/A, 09/29/2021); Colonoscopy (N/A, 09/29/2021); Removal of epiretinal membrane (Right); Cataract extraction (Right, 2012); Injection of joint (Right, 11/11/2022); Cholecystectomy (2022); Treatment of cardiac arrhythmia (N/A, 1/6/2025); transesophageal echocardiogram with possible cardioversion (prasanth w/ poss cardioversion) (N/A, 1/6/2025); and colonoscopy, screening, high risk patient (N/A, 3/7/2025).    Tiffany has a current medication list which includes the following prescription(s): acetaminophen, amiodarone, ammonium lactate,  atorvastatin, bisacodyl, calcium carbonate/vitamin d3, dextran 70/hypromellose, eliquis, empagliflozin, estradiol, fluticasone propionate, furosemide, levothyroxine, losartan, metoprolol succinate, sodium chloride, spironolactone, vit a/vit c/vit e/zinc/copper, and vitamin d.    Review of patient's allergies indicates:   Allergen Reactions    Levaquin [levofloxacin]      Joint pain    Hydrochlorothiazide Other (See Comments)     Pain in joints and depression per pt        Objective            The Repeatable Battery for the Assessment of Neuropsychological Status (RBANS) Version A was administered to measure the patient's attention, language, visuospatial/constructional abilities, and immediate and delayed memory. The results are outlined below:    Domain Subtest Total Score Index Score   Immediate Memory List Learning 13   69    Story Memory 11    Visuospatial/  Constructional Figure Copy 8   64    Line Orientation 14    Language Picture Naming 10   86    Semantic Fluency 10    Attention Digit Span 8   79    Coding 21      Delayed Memory List Recall 2     84    List Recognition 18     Story Recall 5     Figure Recall 8        Total Scale   70     Percentile   2%     Descriptor   Borderline Average   Interpretation:  Scaled score: mean of 10, standard deviation of 3. Therefore, 16+ = Very Superior; 14-15 = Superior; 12-13 = High Average; 8-11 = Average; 6-7 = Low Average; 4-5 = Borderline; 3 and below = Extremely Low    Index score: mean of 100, standard deviation of 15. Therefore, 130+ = Very Superior; 120-129 = Superior; 110-119 = High average;  = Average; 80-89 = Low Average; 70-79 = Borderline; 69 and below = Extremely Low. Apparently the most reliable score; factor in education level.    Immediate Memory Score: Recalling information following immediate presentation is assessed through the List Learning and Story Memory subtests. In the List Learning subtest, the patient is given 10 words to remember. This  list is presented four times overall. In this subtest, the patient did demonstrate learning over the 4 trials. The patient recalled 3 items on trial one, 3 items on trial two, 3 items on trial three, and 5 items in trial 4. In the Story Memory subtest, the patient recalled 6 details on the first presentation and 5 details on the second presentation. Results of this domain indicate Extremely Low performance.  Visuospatial score: Perceiving spatial relations and constructing a spatially accurate copy of a drawing is assessed through the Figure Copy and Line Orientation subtests. The Figure Copy subtest asks the patient to copy a complex line drawing. The patient had difficulty with copying the given drawing. The patient did not draw [place], had difficulty with drawing straight lines, and closing shapes within her drawing. The Line Orientation subtest the presents the patient with 12 line displays and asks the patient to match two given lines at the bottom to the display at the top.  The patient correctly identified 14/20. Results of this domain indicate Extremely low performance.  Language score: Naming common items and retrieving learned material is assessed through the Picture Naming and Semantic Fluency subtests. The Picture Naming subtest asks the patient to name 10 line drawings. The patient was able to accurately name 10/10 items. The Semantic Fluency subtest asks the patient to name as many animals as she can in 60 seconds. The patient was able to name 10 animals. These results indicate Low average performance.   Attention score: Attending to, holding and manipulating information presented visually and orally in working memory is assessed with use of the Digit Span and Coding subtests. The Digit Span subtest asks the patient to repeat progressively lengthening strings of numbers. The Coding subtest asks the patient to alternate attention between a key the given work and then to decode symbols to numbers. The  patients results on these subtest indicate Borderline  performance.  Delayed Memory score: Anterograde memory capacity is assessed through the List Recall, List Recognition, Story Recall, and Figure Recall subtests. The List Recall subtest asks the patient to recall items from the list presented at the beginning of the test. The patient was able to recall 2/10 items. The List Recognition subtest has the patient recall whether a word was or was not in the original list. The patient accurately identified whether a word was or was not on the list in 18 of 20 items. On the Story Recall subtest, the patient was able to recall 5 details. Finally, on the Figure Recall, the patient recalled 8 details. Results of this domain indicate Low average performance.    Overall, according to the RBANS research, total Scale index is a good indicator of general cognitive functioning. The patient presents with a moderate cognitive communication disorder charaterized by deficits in immediate memory, delayed memory, attention, language, and visuospatial skills. Patient would benefit from skilled speech therapy services targeting cognitive-linguistic deficits. Patient was educated on plan of care.    Assessment & Plan   Assessment   Tiffany presents with symptoms that are Stable.          Diagnosis and Impressions: The patient presents with a moderate cognitive communication disorder charaterized by deficits in immediate memory, delayed memory, attention, language, and visuospatial skills. Patient would benefit from skilled speech therapy services targeting cognitive-linguistic deficits.                          Goals  Active       She will use appropriate memory strategies to schedule and recall weekly activities, express needs and recall names to maintain safety and participate socially in functional living environment.          Start:  03/13/25    Expected End:  04/25/25            1. Patient will participate in functional immediate memory  tasks (appointments, voicemails) at 80% accuracy and min A to improve immediate recall of information within her daily activities and communication.       Start:  03/13/25    Expected End:  04/25/25            2. Patient will recall key details from a reading or picture task at 80% accuracy and min A to improve recall of important details during everyday activities.       Start:  03/13/25    Expected End:  04/25/25            3. Patient will recall information from memory tasks given a 5 minute delay at 80% accuracy and min A to improve retention of given information within a period of time, required within daily communication and activities.        Start:  03/13/25    Expected End:  04/25/25            4. Patient will complete sequencing/organizational tasks at 80% accuracy and min A to increase independence with organizational skills needed to manage daily activities.        Start:  03/13/25    Expected End:  04/25/25            5. Patient will name 15 items given a concrete category at 80% accuracy and min A to improve word retrieval skills, required during daily communication.        Start:  03/13/25    Expected End:  04/25/25             Patient's spiritual, cultural, and educational needs considered and patient agreeable to plan of care and goals.       Short Term Goals:  Patient will participate in functional immediate memory tasks (appointments, voicemails) at 80% accuracy and min A to improve immediate recall of information within her daily activities and communication. Established this date   2. Patient will recall key details from a reading or picture task at 80% accuracy and min A to improve recall of important details during everyday activities. Established this date   3. Patient will recall information from memory tasks given a 5 minute delay at 80% accuracy and min A to improve retention of given information within a period of time, required within daily communication and activities.  Established this  "date   4. Patient will complete sequencing/organizational tasks at 80% accuracy and min A to increase independence with organizational skills needed to manage daily activities.  Established this date   5. Patient will name 15 items given a concrete category at 80% accuracy and min A to improve word retrieval skills, required during daily communication.  Established this date     Clara DAN   Clinician     "I , Antoinette Monsivasi, certify that I was present in the room directing the student and service delivery and guiding them using my skilled judgement. As the co-signing therapist, I have reviewed the student's documentation, and am responsible for the treatment, assessment and plan."     RONNY Ruiz-SLP, CCC-SLP    "

## 2025-03-17 ENCOUNTER — CLINICAL SUPPORT (OUTPATIENT)
Dept: REHABILITATION | Facility: HOSPITAL | Age: 89
End: 2025-03-17
Payer: MEDICARE

## 2025-03-17 DIAGNOSIS — R41.841 COGNITIVE COMMUNICATION DEFICIT: Primary | ICD-10-CM

## 2025-03-17 PROCEDURE — 97129 THER IVNTJ 1ST 15 MIN: CPT | Mod: PO | Performed by: SPEECH-LANGUAGE PATHOLOGIST

## 2025-03-17 PROCEDURE — 97130 THER IVNTJ EA ADDL 15 MIN: CPT | Mod: PO | Performed by: SPEECH-LANGUAGE PATHOLOGIST

## 2025-03-17 NOTE — PATIENT INSTRUCTIONS
Memory Strategies:   Write: make a list or utilize a calendar   Repeat:  Repeat information out loud or internally   Associate:  Connect desired information with something you already recall. For example, when in the grocery, group items by meal or taste (sweet vs salty) or by category (vegetables, cold items, etc).  Picture: try to mentally picture what you are trying to remember  Mental Rehearsal: think through how you're going to complete a task before completing it.     ISAAC White., THEO Yang, & TABITHA Mayes (2012). Cognitive rehabilitation manual: Translating evidence-based recommendations into practice. [place], [place]: [employer].

## 2025-03-17 NOTE — PROGRESS NOTES
Outpatient Rehab    Speech-Language Pathology Visit    Patient Name: Tiffany Masterson  MRN: [id number]  YOB: 1935  Encounter Date: 3/17/2025    Therapy Diagnosis:   Encounter Diagnosis   Name Primary?    Cognitive communication deficit Yes     Physician: Mirlande Moore MD  Physician Orders: Eval and Treat  Medical Diagnosis: Concussion with loss of consciousness, sequela  Cognitive changes     Visit # / Visits Authorized:  1/10 (plus evaluation)  Date of Evaluation:  3/11/2025  Insurance Authorization Period: 3/11/25 to 5/2/25  Plan of Care Certification:  3/11/2025 to 4/25/26      Time In: 1517   Time Out: 1558  Total Time: 41   Total Billable Time: 41 minutes     Precautions          Standard, fall.    Subjective   Patient arrived to scheduled speech therapy session on time in pleasant spirits with no complaints or concerns stated. Patient motivated to participate in skilled speech therapy services..  Pain reported as 0/10.        Objective   Treatment  Short Term Goals:  Patient will participate in functional immediate memory tasks (appointments, voicemails) at 80% accuracy and min A to improve immediate recall of information within her daily activities and communication. Patient uses printouts of calendar appointments to recall medical/therapy appointments    Patient listened to recorded voicemails aloud and answered questions regarding voicemails with 65% accuracy independently; 80% accuracy given minimum cues; 100% accuracy given moderate cues  Strategy: taking notes, repetition     Patient listened to simple stories read aloud (~2-3 sentences) and answered questions regarding voicemails with 75% accuracy independently; 80% accuracy given minimum cues; 100% accuracy given moderate cues  Strategy: taking notes, repetition     METx1   2. Patient will recall key details from a reading or picture task at 80% accuracy and min A to improve recall of important details during everyday activities.  "Picture retention: 82% accuracy independently; 100% accuracy given minimum cues   Strategy used: "picture it"     METx1   3. Patient will recall information from memory tasks given a 5 minute delay at 80% accuracy and min A to improve retention of given information within a period of time, required within daily communication and activities.  Not formally addressed     4. Patient will complete sequencing/organizational tasks at 80% accuracy and min A to increase independence with organizational skills needed to manage daily activities.  Time orientation: Patient determined time to leave when given various scenarios along with time constrictions (I.e., You have a dr's appointment at 2:00 PM. It takes 25 minutes to drive there. You need 10 minutes to park and check in. What time should you leave to be on time?) with 80% accuracy independently; 100% accuracy given minimum to moderate cues    METx1   5. Patient will name 15 items given a concrete category at 80% accuracy and min A to improve word retrieval skills, required during daily communication.  Patient able to name 3 items within "occupations" category; 13 items given moderate category cues within 5 minues        Time Entry(in minutes):  Cognitive Skill Development Time Entry: 26  Cognitive Skill Development (Medicare) Time Entry: 15    Assessment & Plan   Assessment  Tiffany participated well today in today's session which focused on auditory and visual memory, verbal expression, and functional problem solving. Patient able to complete both auditory and visual memory tasks given minimum cues and use of strategies.  Min cues required for functional problem solving task regarding time orientation. Moderate cues required for generative naming task. Cognitive, Physical, and Emotional fatigue was not believed to have been a barrier to the session.  Evaluation/Treatment Tolerance: Patient tolerated treatment well    Patient will continue to benefit from skilled outpatient " speech therapy to address the deficits listed in the problem list box on initial evaluation, provide pt/family education and to maximize pt's level of independence in the home and community environment.     Patient's spiritual, cultural, and educational needs considered and patient agreeable to plan of care and goals.     Education  Education was done with Patient. The patient's learning style includes Listening. The patient Verbalizes understanding.         Memory strategies discussed at length in addition to goals/plan of care.         Plan  Continue plan of care with focus on improving cognitive-linguistic skills          Goals:   Active       Long Term Goals       She will use appropriate memory strategies to schedule and recall weekly activities, express needs and recall names to maintain safety and participate socially in functional living environment.    (Progressing)       Start:  03/13/25    Expected End:  04/25/25               Short Term Goals       1. Patient will participate in functional immediate memory tasks (appointments, voicemails) at 80% accuracy and min A to improve immediate recall of information within her daily activities and communication. (Progressing)       Start:  03/13/25    Expected End:  04/25/25            2. Patient will recall key details from a reading or picture task at 80% accuracy and min A to improve recall of important details during everyday activities. (Progressing)       Start:  03/13/25    Expected End:  04/25/25            3. Patient will recall information from memory tasks given a 5 minute delay at 80% accuracy and min A to improve retention of given information within a period of time, required within daily communication and activities.  (Progressing)       Start:  03/13/25    Expected End:  04/25/25            4. Patient will complete sequencing/organizational tasks at 80% accuracy and min A to increase independence with organizational skills needed to manage daily  activities.  (Progressing)       Start:  03/13/25    Expected End:  04/25/25            5. Patient will name 15 items given a concrete category at 80% accuracy and min A to improve word retrieval skills, required during daily communication.  (Progressing)       Start:  03/13/25    Expected End:  04/25/25              JENN Weber, L-SLP, CCC-SLP  Speech Language Pathologist   3/17/2025

## 2025-03-22 ENCOUNTER — PATIENT MESSAGE (OUTPATIENT)
Dept: GASTROENTEROLOGY | Facility: CLINIC | Age: 89
End: 2025-03-22
Payer: MEDICARE

## 2025-03-31 ENCOUNTER — CLINICAL SUPPORT (OUTPATIENT)
Dept: REHABILITATION | Facility: HOSPITAL | Age: 89
End: 2025-03-31
Payer: MEDICARE

## 2025-03-31 DIAGNOSIS — R41.841 COGNITIVE COMMUNICATION DEFICIT: Primary | ICD-10-CM

## 2025-03-31 PROCEDURE — 97130 THER IVNTJ EA ADDL 15 MIN: CPT | Mod: HCNC,PO

## 2025-03-31 PROCEDURE — 97129 THER IVNTJ 1ST 15 MIN: CPT | Mod: HCNC,PO

## 2025-03-31 NOTE — PROGRESS NOTES
"Outpatient Rehab  Speech-Language Pathology Visit    Patient Name: Tiffany Masterson  MRN: [id number]  YOB: 1935  Encounter Date: 3/31/2025    Therapy Diagnosis:   Encounter Diagnosis   Name Primary?    Cognitive communication deficit Yes     Physician: Mirlande Moore MD  Physician Orders: Eval and Treat  Medical Diagnosis: Concussion with loss of consciousness, sequela  Cognitive changes     Visit # / Visits Authorized:  1/10 (plus evaluation)  Date of Evaluation:  3/11/2025  Insurance Authorization Period: 3/11/25 to 5/2/25  Plan of Care Certification:  3/11/2025 to 4/25/26      Time In: 1305   Time Out: 1345  Total Time: 40   Total Billable Time: 40 minutes      Subjective    Pleasant. No complaints. Arrived 5 minutes late to today's session.         Objective   Treatment  Short Term Goals:  Patient will participate in functional immediate memory tasks (appointments, voicemails) at 80% accuracy and min A to improve immediate recall of information within her daily activities and communication. Immediate recall of moderate paragraph with 8/9 acc Independently     METx1   2. Patient will recall key details from a reading or picture task at 80% accuracy and min A to improve recall of important details during everyday activities. First the patient described the picture out loud. She was able to name 14/15 objects within the picture.    Immediate picture retention: 70% accuracy independently; 100% accuracy given minimum cues   Strategy used: "picture it" and repetition    METx1   3. Patient will recall information from memory tasks given a 5 minute delay at 80% accuracy and min A to improve retention of given information within a period of time, required within daily communication and activities.  5 minute delayed recall of picture scene with 100% acc Independently.    4. Patient will complete sequencing/organizational tasks at 80% accuracy and min A to increase independence with organizational skills " needed to manage daily activities.  -Not addressed this session.    Previously addressed time orientation.     METx1   5. Patient will name 15 items given a concrete category at 80% accuracy and min A to improve word retrieval skills, required during daily communication.  Categories:   Items in the house x 8 in 60 seconds  Items in kitchen x 10 in 60 seconds           Time Entry(in minutes):  Cognitive Skill Development Time Entry: 15  Cognitive Skill Development (Medicare) Time Entry: 25    Assessment & Plan   Assessment   Today's therapy session focused on memory exercises, strategies, and word finding/categorization. Patient did not recall any memory strategies Independently. Reviewed write, repeat, associate, picture. Printed schedule for patient. Patient rescheduled next appointment. Repetition was a helpful strategy for memory today.  Patient participated readily in all exercises.        Patient will continue to benefit from skilled outpatient speech therapy to address the deficits listed in the problem list box on initial evaluation, provide pt/family education and to maximize pt's level of independence in the home and community environment.     Patient's spiritual, cultural, and educational needs considered and patient agreeable to plan of care and goals.      Plan   Continue plan of care with focus on cognitive communication and memory skills.           Goals:   Active       Long Term Goals       She will use appropriate memory strategies to schedule and recall weekly activities, express needs and recall names to maintain safety and participate socially in functional living environment.    (Progressing)       Start:  03/13/25    Expected End:  04/25/25               Short Term Goals       1. Patient will participate in functional immediate memory tasks (appointments, voicemails) at 80% accuracy and min A to improve immediate recall of information within her daily activities and communication. (Progressing)   "     Start:  03/13/25    Expected End:  04/25/25            2. Patient will recall key details from a reading or picture task at 80% accuracy and min A to improve recall of important details during everyday activities. (Progressing)       Start:  03/13/25    Expected End:  04/25/25            3. Patient will recall information from memory tasks given a 5 minute delay at 80% accuracy and min A to improve retention of given information within a period of time, required within daily communication and activities.  (Progressing)       Start:  03/13/25    Expected End:  04/25/25            4. Patient will complete sequencing/organizational tasks at 80% accuracy and min A to increase independence with organizational skills needed to manage daily activities.  (Progressing)       Start:  03/13/25    Expected End:  04/25/25            5. Patient will name 15 items given a concrete category at 80% accuracy and min A to improve word retrieval skills, required during daily communication.  (Progressing)       Start:  03/13/25    Expected End:  04/25/25              Clara DAN   Clinician     "I, Antoinette Monsivais, certify that I was present in the room directing the student and service delivery and guiding them using my skilled judgement. As the co-signing therapist, I have reviewed the student's documentation, and am responsible for the treatment, assessment and plan."     Antoinette Monsivais M.S., L-SLP,CCC-SLP   Speech Language Pathologist    3/31/2025            "

## 2025-04-02 ENCOUNTER — CLINICAL SUPPORT (OUTPATIENT)
Dept: REHABILITATION | Facility: HOSPITAL | Age: 89
End: 2025-04-02
Payer: MEDICARE

## 2025-04-02 DIAGNOSIS — Z74.09 IMPAIRED FUNCTIONAL MOBILITY AND ENDURANCE: ICD-10-CM

## 2025-04-02 DIAGNOSIS — G91.2 NORMAL PRESSURE HYDROCEPHALUS: ICD-10-CM

## 2025-04-02 DIAGNOSIS — M62.81 GENERALIZED MUSCLE WEAKNESS: Primary | ICD-10-CM

## 2025-04-02 NOTE — PROGRESS NOTES
After initial subjective information & intake it is my recommendation that this patient is inappropriate for aquatic therapy at this time and will benefit from land-based therapy for referred dx.

## 2025-04-03 ENCOUNTER — TELEPHONE (OUTPATIENT)
Dept: PRIMARY CARE CLINIC | Facility: CLINIC | Age: 89
End: 2025-04-03
Payer: MEDICARE

## 2025-04-03 DIAGNOSIS — M62.81 GENERALIZED MUSCLE WEAKNESS: Primary | ICD-10-CM

## 2025-04-04 ENCOUNTER — CLINICAL SUPPORT (OUTPATIENT)
Dept: REHABILITATION | Facility: HOSPITAL | Age: 89
End: 2025-04-04
Payer: MEDICARE

## 2025-04-04 DIAGNOSIS — R41.841 COGNITIVE COMMUNICATION DEFICIT: Primary | ICD-10-CM

## 2025-04-04 PROCEDURE — 97130 THER IVNTJ EA ADDL 15 MIN: CPT | Mod: HCNC,PO

## 2025-04-04 PROCEDURE — 97129 THER IVNTJ 1ST 15 MIN: CPT | Mod: HCNC,PO

## 2025-04-04 NOTE — PROGRESS NOTES
"  Outpatient Rehab    Speech-Language Pathology Visit    Patient Name: Tiffany Masterson  MRN: [id number]  YOB: 1935  Encounter Date: 4/4/2025    Therapy Diagnosis:   Encounter Diagnosis   Name Primary?    Cognitive communication deficit Yes     Physician: Mirlande Moore MD    Physician Orders: Eval and Treat  Medical Diagnosis: Concussion with loss of consciousness, sequela  Cognitive changes    Visit # / Visits Authorized: 3 / 10   Insurance Authorization Period: 3/11/2025 to 5/2/2025  Date of Evaluation: 3/11/2025   Plan of Care Certification: 3/11/2025 to 4/25/26      Time In: 1000   Time Out: 1030  Total Time: 30   Total Billable Time: 30       Subjective   was somewhat upset to hear that she was not recommended for water therapy recently.  Pain reported as 0/10.        Objective            Treatment  Speech  Activity 1: Categorization Tasks: Patient independently named 13 items in the "Food" category, increasing to 15 per request by use of semantic cueing. For the category "Animals," SLP initiated a word map task to provide education, knowledge, and use of semantic features and semantic associations to generate additional category members. Additional education and discussion of semantic strategies for word finding impairments was provided to the patient and she verbalized understanding. In the subcategories of Insects and Pets, the patient named approximately 6 per category independently. Frequently, she reported being able to see the word, but could not find the word to say it aloud. Frequent cueing and prompts to use descriptors though patient did not perform.  Cognitive Skills  Activity 1: Patient independent recalled the following cognitive compensatory strategies: writing information down. SLP cued patient for use of repetition and rehearsal. With prompt, she recalled using clarification. She has been keeping lists of her medical appointment information, including provider's names and phone " numbers.  Activity 2: Sequencing/Organization: Patient verbally sequenced household tasks with 100% accuracy independently in significant detail.    Time Entry(in minutes):  Cognitive Skill Development Time Entry: 15  Cognitive Skill Development (Medicare) Time Entry: 15    Assessment & Plan   Assessment  Mrs. Allen participated well in her speech therapy session focusing on knowledge and use of cognitive compensatory strategies and word finding strategies for use in everyday contexts. She completed a word mapping task with assistance for knowledge and use and then generation of category members. SHe also sequenced everyday activities verbally with 100% accuracy independently as they relate to her home tasks.  Evaluation/Treatment Tolerance: Patient tolerated treatment well    Patient will continue to benefit from skilled outpatient speech therapy to address the deficits listed in the problem list box on initial evaluation, provide pt/family education and to maximize pt's level of independence in the home and community environment.     Patient's spiritual, cultural, and educational needs considered and patient agreeable to plan of care and goals.     Education  Education was done with Patient. The patient's learning style includes Demonstration. The patient Demonstrates understanding and Verbalizes understanding.         compensatory strategies, plan of care, goals        Plan  continue speech therapy plan of care          Goals:   Active       Long Term Goals       She will use appropriate memory strategies to schedule and recall weekly activities, express needs and recall names to maintain safety and participate socially in functional living environment.    (Progressing)       Start:  03/13/25    Expected End:  04/25/25               Short Term Goals       1. Patient will participate in functional immediate memory tasks (appointments, voicemails) at 80% accuracy and min A to improve immediate recall of information  within her daily activities and communication. (Progressing)       Start:  03/13/25    Expected End:  04/25/25            2. Patient will recall key details from a reading or picture task at 80% accuracy and min A to improve recall of important details during everyday activities. (Progressing)       Start:  03/13/25    Expected End:  04/25/25            3. Patient will recall information from memory tasks given a 5 minute delay at 80% accuracy and min A to improve retention of given information within a period of time, required within daily communication and activities.  (Progressing)       Start:  03/13/25    Expected End:  04/25/25            4. Patient will complete sequencing/organizational tasks at 80% accuracy and min A to increase independence with organizational skills needed to manage daily activities.  (Progressing)       Start:  03/13/25    Expected End:  04/25/25            5. Patient will name 15 items given a concrete category at 80% accuracy and min A to improve word retrieval skills, required during daily communication.  (Progressing)       Start:  03/13/25    Expected End:  04/25/25                RONNY Domínguez-SLP, CCC-SLP

## 2025-04-07 ENCOUNTER — CLINICAL SUPPORT (OUTPATIENT)
Dept: REHABILITATION | Facility: HOSPITAL | Age: 89
End: 2025-04-07
Payer: MEDICARE

## 2025-04-07 DIAGNOSIS — R41.841 COGNITIVE COMMUNICATION DEFICIT: Primary | ICD-10-CM

## 2025-04-07 PROCEDURE — 97130 THER IVNTJ EA ADDL 15 MIN: CPT | Mod: HCNC,PO

## 2025-04-07 PROCEDURE — 97129 THER IVNTJ 1ST 15 MIN: CPT | Mod: HCNC,PO

## 2025-04-07 NOTE — PROGRESS NOTES
Outpatient Rehab  Speech-Language Pathology Visit    Patient Name: Tiffany Masterson  MRN: [id number]  YOB: 1935  Encounter Date: 4/7/2025    Therapy Diagnosis:   Encounter Diagnosis   Name Primary?    Cognitive communication deficit Yes     Physician: Mirlande Moore MD    Physician Orders: Eval and Treat  Medical Diagnosis: Concussion with loss of consciousness, sequela  Cognitive changes    Visit # / Visits Authorized: 4 / 10   Insurance Authorization Period: 3/11/2025 to 5/2/2025  Date of Evaluation: 3/11/2025   Plan of Care Certification: 3/11/2025 to 4/25/26      Time In: 1435   Time Out: 1515  Total Time: 40   Total Billable Time: 40     Subjective         Pleasant; no complaints. Her son, Duglas, brought her to therapy today and waited in the lobby.     Objective          Recall from a picture with 100% acc Independently  Delayed recall from a picture with 100% acc Independently  Recall from a short story with 33% acc min A.   Junk food: x 6 in 60 seconds.   Mental manipulation: 3 word reverse order with mod A.   Word finding while storytelling with 2 errors noted in 5 minutes. Used strategy description to find the word twice.  Described her favorite restaurant and a vacation with her grandchild.     Time Entry(in minutes):  Cognitive Skill Development Time Entry: 15  Cognitive Skill Development (Medicare) Time Entry: 30    Assessment & Plan   Assessment   Patient participated readily in all exercises today. Repetition and visualization were helpful strategies. Immediate and delayed recall of a picture with 100% acc Independently. Difficulty with recall of a paragraph.  Difficulty with mental manipulation: 3 word reverse order with mod A. Patient did well word finding in conversation while describing her favorite restaurant and a vacation that she took with her grandchild.      Patient will continue to benefit from skilled outpatient speech therapy to address the deficits listed in the problem  list box on initial evaluation, provide pt/family education and to maximize pt's level of independence in the home and community environment.     Patient's spiritual, cultural, and educational needs considered and patient agreeable to plan of care and goals.      Plan   Continue plan of care with focus on cognitive communication and memory skills.         Goals:   Active       Long Term Goals       She will use appropriate memory strategies to schedule and recall weekly activities, express needs and recall names to maintain safety and participate socially in functional living environment.    (Progressing)       Start:  03/13/25    Expected End:  04/25/25               Short Term Goals       1. Patient will participate in functional immediate memory tasks (appointments, voicemails) at 80% accuracy and min A to improve immediate recall of information within her daily activities and communication. (Progressing)       Start:  03/13/25    Expected End:  04/25/25            2. Patient will recall key details from a reading or picture task at 80% accuracy and min A to improve recall of important details during everyday activities. (Progressing)       Start:  03/13/25    Expected End:  04/25/25            3. Patient will recall information from memory tasks given a 5 minute delay at 80% accuracy and min A to improve retention of given information within a period of time, required within daily communication and activities.  (Progressing)       Start:  03/13/25    Expected End:  04/25/25            4. Patient will complete sequencing/organizational tasks at 80% accuracy and min A to increase independence with organizational skills needed to manage daily activities.  (Progressing)       Start:  03/13/25    Expected End:  04/25/25            5. Patient will name 15 items given a concrete category at 80% accuracy and min A to improve word retrieval skills, required during daily communication.  (Progressing)       Start:   "03/13/25    Expected End:  04/25/25              Clara DAN   Clinician     "I, Antoinette Monsivais, certify that I was present in the room directing the student and service delivery and guiding them using my skilled judgement. As the co-signing therapist, I have reviewed the student's documentation, and am responsible for the treatment, assessment and plan."     RONNY Ruiz-SLP, CCC-SLP      "

## 2025-04-11 ENCOUNTER — CLINICAL SUPPORT (OUTPATIENT)
Dept: REHABILITATION | Facility: HOSPITAL | Age: 89
End: 2025-04-11
Payer: MEDICARE

## 2025-04-11 DIAGNOSIS — R41.841 COGNITIVE COMMUNICATION DEFICIT: Primary | ICD-10-CM

## 2025-04-11 PROCEDURE — 97130 THER IVNTJ EA ADDL 15 MIN: CPT | Mod: HCNC,PO | Performed by: SPEECH-LANGUAGE PATHOLOGIST

## 2025-04-11 PROCEDURE — 97129 THER IVNTJ 1ST 15 MIN: CPT | Mod: HCNC,PO | Performed by: SPEECH-LANGUAGE PATHOLOGIST

## 2025-04-11 NOTE — PROGRESS NOTES
Outpatient Rehab  Speech-Language Pathology Visit    Patient Name: Tiffany Masterson  MRN: [id number]  YOB: 1935  Encounter Date: 4/11/2025    Therapy Diagnosis:   Encounter Diagnosis   Name Primary?    Cognitive communication deficit Yes       Physician: Mirlande Moore MD    Physician Orders: Eval and Treat  Medical Diagnosis: Concussion with loss of consciousness, sequela  Cognitive changes    Visit # / Visits Authorized: 5 / 10   Insurance Authorization Period: 3/11/2025 to 5/2/2025  Date of Evaluation: 3/11/2025   Plan of Care Certification: 3/11/2025 to 4/25/26      Time In: 0110   Time Out: 0145  Total Time: 35   Total Billable Time:  34 minutes     Subjective   Patient arrived to the location and checked in on time. However, the patient had an accident in the bathroom at the location and arrived to the session late due to time taken to resolve this. Patient reported that she had two falls yesterday. One of them occured while she was taking out the garbage, and she fell down one step. The other occured while she was walking to the bathroom from her bed, she lost her balance and fell down. She reported she was not injured from etiher fall..  Pain reported as 0/10.      Objective             Immediate memory:  - Constant Therapy- Understanding voice mails- The patient listened to a voicemail and answered questions about the voicemail with 80% accuracy independently.   - Constant Therapy- Understanding stories you hear level 3- The patient listened to stories and answered comprehension questions with 80% accuracy given minimal cuing to incorporate strategies (repeating the story)    Delayed memory: (5 minute delay)  - not formal addressed today    Naming items in a concrete category: (15 minimum)  - Things you find in the kitchen- the patient named 16 different items in 42 seconds   - Things you find in/on a car- The patient displayed more difficulty with this category. The patient named 9 different  items in 72 seconds (not including repeated items, she repeated 4 items) with minimal cuing to continue naming items    Mental Manipulation:  -Unscrambling sentences- The patient unscrambled 4 word sentences with 77% accuracy independently and 100% accuracy given moderate visual and verbal cuing. Repetitions were helpful.    Word findin moment of word finding difficulty attempting to explain what her grandson does during conversation. Patient was able to self correct errors    Time Entry(in minutes):      Cognitive Skill Development Time Entry: 15  Cognitive Skill Development (Medicare) Time Entry: 19    Assessment & Plan   Assessment   Patient participated readily in all exercises today. Repetition and visualization continue to be helpful strategies. Minimal cuing was required during immediate memory tasks to incorporate strategies (I.e. repeating information). Improvement was observed with incorporation of strategies during immediate memory tasks. Difficulty with mental manipulation: 4 word sentence unscrambling with moderate visual and verbal cuing. The patient did well naming items found in the kitchen, however, she displayed more difficulty naming items found  in/on a car. Patient did well word finding in conversation describing what her grandson does for work and what he is interested in, as well as other information about her grandchildren. She was observed to recognize and self correct errors in conversation.     Patient will continue to benefit from skilled outpatient speech therapy to address the deficits listed in the problem list box on initial evaluation, provide pt/family education and to maximize pt's level of independence in the home and community environment.     Patient's spiritual, cultural, and educational needs considered and patient agreeable to plan of care and goals.      Plan   Continue plan of care with focus on cognitive communication and memory skills.         Goals:   Active        Long Term Goals       She will use appropriate memory strategies to schedule and recall weekly activities, express needs and recall names to maintain safety and participate socially in functional living environment.    (Progressing)       Start:  03/13/25    Expected End:  04/25/25               Short Term Goals       1. Patient will participate in functional immediate memory tasks (appointments, voicemails) at 80% accuracy and min A to improve immediate recall of information within her daily activities and communication. (Progressing)       Start:  03/13/25    Expected End:  04/25/25            2. Patient will recall key details from a reading or picture task at 80% accuracy and min A to improve recall of important details during everyday activities. (Progressing)       Start:  03/13/25    Expected End:  04/25/25            3. Patient will recall information from memory tasks given a 5 minute delay at 80% accuracy and min A to improve retention of given information within a period of time, required within daily communication and activities.  (Progressing)       Start:  03/13/25    Expected End:  04/25/25            4. Patient will complete sequencing/organizational tasks at 80% accuracy and min A to increase independence with organizational skills needed to manage daily activities.  (Progressing)       Start:  03/13/25    Expected End:  04/25/25            5. Patient will name 15 items given a concrete category at 80% accuracy and min A to improve word retrieval skills, required during daily communication.  (Progressing)       Start:  03/13/25    Expected End:  04/25/25              Jewels JEONG, [facility]  Clinician    I, Louisa Kerr, certify that I was present in the room directing the student and service delivery and guiding them using my skilled judgement. As the co-signing therapist, I have reviewed the student's documentation, and am responsible for the treatment, assessment and  plan.   JENN Weber, CCC-SLP  Speech Language Pathologist   4/11/2025

## 2025-04-14 ENCOUNTER — CLINICAL SUPPORT (OUTPATIENT)
Dept: REHABILITATION | Facility: HOSPITAL | Age: 89
End: 2025-04-14
Payer: MEDICARE

## 2025-04-14 ENCOUNTER — PATIENT MESSAGE (OUTPATIENT)
Facility: CLINIC | Age: 89
End: 2025-04-14
Payer: MEDICARE

## 2025-04-14 DIAGNOSIS — R41.841 COGNITIVE COMMUNICATION DEFICIT: Primary | ICD-10-CM

## 2025-04-14 PROCEDURE — 97129 THER IVNTJ 1ST 15 MIN: CPT | Mod: HCNC,PO | Performed by: SPEECH-LANGUAGE PATHOLOGIST

## 2025-04-14 PROCEDURE — 97130 THER IVNTJ EA ADDL 15 MIN: CPT | Mod: HCNC,PO | Performed by: SPEECH-LANGUAGE PATHOLOGIST

## 2025-04-14 NOTE — PROGRESS NOTES
Outpatient Rehab  Speech-Language Pathology Visit    Patient Name: Tiffany Masterson  MRN: [id number]  YOB: 1935  Encounter Date: 4/14/2025    Therapy Diagnosis:   Encounter Diagnosis   Name Primary?    Cognitive communication deficit Yes       Physician: Mirlande Moore MD    Physician Orders: Eval and Treat  Medical Diagnosis: Concussion with loss of consciousness, sequela  Cognitive changes    Visit # / Visits Authorized: 6 / 10   Insurance Authorization Period: 3/11/2025 to 5/2/2025  Date of Evaluation: 3/11/2025   Plan of Care Certification: 3/11/2025 to 4/25/26      Time In: 28894:45 PM  Time Out: 95085:30 PM  Total Time: 45 45 minutes  Total Billable Time:  45 minutes     Subjective    Patient arrived on time for scheduled speech therapy session. Patient reported no pain during session today.          Objective          Immediate memory:  - Given a passage, patient with immediate recall of key details with 80% accuracy given independently; 100% accuracy min cuing.   Given a picture, patient with immediate recall of key detailed with 100% accuracy given min cuing.    Delayed memory: (5 minute delay)  - Given 5 minute delay, patient with 5 minute delay recall of key detail from give passage with 80% accuracy given min cuing.     Naming items in a concrete category: (15 minimum)  - Things you find in the pet store, the patient named 11 different items in 65 seconds   - Things you find in the wedding, the patient named 10 different items in 70 seconds   - Things you find on a plane, the patient named 8 different items in 70 seconds    Mental Manipulation:  -Unscrambling sentences- patient unscrambled 4-5 word sentences with 67% accuracy with min-mod A.     Word finding:   3 moments of word finding- patient utilized word finding strategies given min cuing.   - animal shelter, groomsmen, padilla retriever     Patient was able to self correct errors independently in 2/3 word finding moments    Time  Entry(in minutes):      Cognitive Skill Development Time Entry: 15  Cognitive Skill Development (Medicare) Time Entry: 30    Assessment & Plan   Assessment   Patient participated readily in all exercises today. Repetition and visualization continue to be helpful strategies for patient during recall tasks. Minimal cuing was required throughout given tasks. Patient with some difficulty with 4 word unscrambled sentence task, requiring min-mod cuing and repetition of given word lists. The patient did well naming items found in pet store, wedding, and plane, required cuing to continue responses. Patient able to recognize and self correct errors in conversation. Patient tolerated treatment well and is progressing well towards goals.      Patient will continue to benefit from skilled outpatient speech therapy to address the deficits listed in the problem list box on initial evaluation, provide pt/family education and to maximize pt's level of independence in the home and community environment.     Patient's spiritual, cultural, and educational needs considered and patient agreeable to plan of care and goals.      Plan   Continue plan of care with focus on cognitive communication and memory skills.         Goals:   Active       Long Term Goals       She will use appropriate memory strategies to schedule and recall weekly activities, express needs and recall names to maintain safety and participate socially in functional living environment.    (Progressing)       Start:  03/13/25    Expected End:  04/25/25               Short Term Goals       1. Patient will participate in functional immediate memory tasks (appointments, voicemails) at 80% accuracy and min A to improve immediate recall of information within her daily activities and communication. (Progressing)       Start:  03/13/25    Expected End:  04/25/25            2. Patient will recall key details from a reading or picture task at 80% accuracy and min A to improve  recall of important details during everyday activities. (Progressing)       Start:  03/13/25    Expected End:  04/25/25            3. Patient will recall information from memory tasks given a 5 minute delay at 80% accuracy and min A to improve retention of given information within a period of time, required within daily communication and activities.  (Progressing)       Start:  03/13/25    Expected End:  04/25/25            4. Patient will complete sequencing/organizational tasks at 80% accuracy and min A to increase independence with organizational skills needed to manage daily activities.  (Progressing)       Start:  03/13/25    Expected End:  04/25/25            5. Patient will name 15 items given a concrete category at 80% accuracy and min A to improve word retrieval skills, required during daily communication.  (Progressing)       Start:  03/13/25    Expected End:  04/25/25                Clara BUNDY    Clinician      I, Louisa Kerr, certify that I was present in the room directing the student and service delivery and guiding them using my skilled judgement. As the co-signing therapist, I have reviewed the student's documentation, and am responsible for the treatment, assessment and plan.   JENN Weber, CCC-SLP  Speech Language Pathologist   4/14/2025

## 2025-04-15 ENCOUNTER — OFFICE VISIT (OUTPATIENT)
Dept: PRIMARY CARE CLINIC | Facility: CLINIC | Age: 89
End: 2025-04-15
Payer: MEDICARE

## 2025-04-15 ENCOUNTER — LAB VISIT (OUTPATIENT)
Dept: LAB | Facility: HOSPITAL | Age: 89
End: 2025-04-15
Attending: INTERNAL MEDICINE
Payer: MEDICARE

## 2025-04-15 VITALS
HEIGHT: 66 IN | SYSTOLIC BLOOD PRESSURE: 120 MMHG | WEIGHT: 144.81 LBS | HEART RATE: 57 BPM | DIASTOLIC BLOOD PRESSURE: 52 MMHG | OXYGEN SATURATION: 98 % | BODY MASS INDEX: 23.27 KG/M2

## 2025-04-15 DIAGNOSIS — I10 PRIMARY HYPERTENSION: ICD-10-CM

## 2025-04-15 DIAGNOSIS — Z79.01 CHRONIC ANTICOAGULATION: ICD-10-CM

## 2025-04-15 DIAGNOSIS — M62.81 GENERALIZED MUSCLE WEAKNESS: ICD-10-CM

## 2025-04-15 DIAGNOSIS — E03.9 HYPOTHYROIDISM, UNSPECIFIED TYPE: ICD-10-CM

## 2025-04-15 DIAGNOSIS — E55.9 VITAMIN D DEFICIENCY: ICD-10-CM

## 2025-04-15 DIAGNOSIS — I42.8 TACHYCARDIA INDUCED CARDIOMYOPATHY: ICD-10-CM

## 2025-04-15 DIAGNOSIS — S06.0X9S CONCUSSION WITH LOSS OF CONSCIOUSNESS, SEQUELA: ICD-10-CM

## 2025-04-15 DIAGNOSIS — Z00.00 PREVENTATIVE HEALTH CARE: ICD-10-CM

## 2025-04-15 DIAGNOSIS — I50.20 HEART FAILURE WITH REDUCED EJECTION FRACTION: ICD-10-CM

## 2025-04-15 DIAGNOSIS — M81.0 AGE-RELATED OSTEOPOROSIS WITHOUT CURRENT PATHOLOGICAL FRACTURE: ICD-10-CM

## 2025-04-15 DIAGNOSIS — N18.32 STAGE 3B CHRONIC KIDNEY DISEASE: ICD-10-CM

## 2025-04-15 DIAGNOSIS — E78.2 MIXED HYPERLIPIDEMIA: ICD-10-CM

## 2025-04-15 DIAGNOSIS — I48.0 PAROXYSMAL ATRIAL FIBRILLATION: ICD-10-CM

## 2025-04-15 DIAGNOSIS — N95.2 VAGINAL ATROPHY: ICD-10-CM

## 2025-04-15 DIAGNOSIS — R41.841 COGNITIVE COMMUNICATION DEFICIT: ICD-10-CM

## 2025-04-15 DIAGNOSIS — R29.6 FREQUENT FALLS: Primary | ICD-10-CM

## 2025-04-15 DIAGNOSIS — D12.6 TUBULAR ADENOMA OF COLON: ICD-10-CM

## 2025-04-15 DIAGNOSIS — D64.9 ANEMIA, UNSPECIFIED TYPE: Primary | ICD-10-CM

## 2025-04-15 PROBLEM — I48.11 LONGSTANDING PERSISTENT ATRIAL FIBRILLATION: Status: RESOLVED | Noted: 2025-01-04 | Resolved: 2025-04-15

## 2025-04-15 PROBLEM — L60.3 BRITTLE NAILS: Status: RESOLVED | Noted: 2024-02-08 | Resolved: 2025-04-15

## 2025-04-15 PROBLEM — I50.41 ACUTE COMBINED SYSTOLIC AND DIASTOLIC HEART FAILURE: Status: RESOLVED | Noted: 2025-01-04 | Resolved: 2025-04-15

## 2025-04-15 LAB
25(OH)D3+25(OH)D2 SERPL-MCNC: 36 NG/ML (ref 30–96)
ABSOLUTE EOSINOPHIL (OHS): 0.3 K/UL
ABSOLUTE MONOCYTE (OHS): 0.93 K/UL (ref 0.3–1)
ABSOLUTE NEUTROPHIL COUNT (OHS): 5.49 K/UL (ref 1.8–7.7)
ALBUMIN/CREAT UR: 8.7 UG/MG
ANION GAP (OHS): 10 MMOL/L (ref 8–16)
BASOPHILS # BLD AUTO: 0.05 K/UL
BASOPHILS NFR BLD AUTO: 0.6 %
BUN SERPL-MCNC: 14 MG/DL (ref 8–23)
CALCIUM SERPL-MCNC: 9.5 MG/DL (ref 8.7–10.5)
CHLORIDE SERPL-SCNC: 104 MMOL/L (ref 95–110)
CHOLEST SERPL-MCNC: 148 MG/DL (ref 120–199)
CHOLEST/HDLC SERPL: 2.9 {RATIO} (ref 2–5)
CO2 SERPL-SCNC: 22 MMOL/L (ref 23–29)
CREAT SERPL-MCNC: 1.1 MG/DL (ref 0.5–1.4)
CREAT UR-MCNC: 104 MG/DL (ref 15–325)
ERYTHROCYTE [DISTWIDTH] IN BLOOD BY AUTOMATED COUNT: 13.6 % (ref 11.5–14.5)
GFR SERPLBLD CREATININE-BSD FMLA CKD-EPI: 49 ML/MIN/1.73/M2
GLUCOSE SERPL-MCNC: 85 MG/DL (ref 70–110)
HCT VFR BLD AUTO: 37.5 % (ref 37–48.5)
HDLC SERPL-MCNC: 51 MG/DL (ref 40–75)
HDLC SERPL: 34.5 % (ref 20–50)
HGB BLD-MCNC: 11.7 GM/DL (ref 12–16)
IMM GRANULOCYTES # BLD AUTO: 0.05 K/UL (ref 0–0.04)
IMM GRANULOCYTES NFR BLD AUTO: 0.6 % (ref 0–0.5)
LDLC SERPL CALC-MCNC: 80.2 MG/DL (ref 63–159)
LYMPHOCYTES # BLD AUTO: 1.32 K/UL (ref 1–4.8)
MCH RBC QN AUTO: 30.2 PG (ref 27–31)
MCHC RBC AUTO-ENTMCNC: 31.2 G/DL (ref 32–36)
MCV RBC AUTO: 97 FL (ref 82–98)
MICROALBUMIN UR-MCNC: 9 UG/ML (ref ?–5000)
NONHDLC SERPL-MCNC: 97 MG/DL
NUCLEATED RBC (/100WBC) (OHS): 0 /100 WBC
PLATELET # BLD AUTO: 216 K/UL (ref 150–450)
PMV BLD AUTO: 10.9 FL (ref 9.2–12.9)
POTASSIUM SERPL-SCNC: 4.1 MMOL/L (ref 3.5–5.1)
RBC # BLD AUTO: 3.88 M/UL (ref 4–5.4)
RELATIVE EOSINOPHIL (OHS): 3.7 %
RELATIVE LYMPHOCYTE (OHS): 16.2 % (ref 18–48)
RELATIVE MONOCYTE (OHS): 11.4 % (ref 4–15)
RELATIVE NEUTROPHIL (OHS): 67.5 % (ref 38–73)
SODIUM SERPL-SCNC: 136 MMOL/L (ref 136–145)
T4 FREE SERPL-MCNC: 1.39 NG/DL (ref 0.71–1.51)
TRIGL SERPL-MCNC: 84 MG/DL (ref 30–150)
TSH SERPL-ACNC: 0.73 UIU/ML (ref 0.4–4)
WBC # BLD AUTO: 8.14 K/UL (ref 3.9–12.7)

## 2025-04-15 PROCEDURE — 36415 COLL VENOUS BLD VENIPUNCTURE: CPT | Mod: HCNC,PN

## 2025-04-15 PROCEDURE — G2211 COMPLEX E/M VISIT ADD ON: HCPCS | Mod: HCNC,S$GLB,, | Performed by: INTERNAL MEDICINE

## 2025-04-15 PROCEDURE — 1126F AMNT PAIN NOTED NONE PRSNT: CPT | Mod: HCNC,CPTII,S$GLB, | Performed by: INTERNAL MEDICINE

## 2025-04-15 PROCEDURE — 1100F PTFALLS ASSESS-DOCD GE2>/YR: CPT | Mod: HCNC,CPTII,S$GLB, | Performed by: INTERNAL MEDICINE

## 2025-04-15 PROCEDURE — 82570 ASSAY OF URINE CREATININE: CPT | Mod: HCNC

## 2025-04-15 PROCEDURE — 82306 VITAMIN D 25 HYDROXY: CPT | Mod: HCNC

## 2025-04-15 PROCEDURE — 99999 PR PBB SHADOW E&M-EST. PATIENT-LVL III: CPT | Mod: PBBFAC,HCNC,, | Performed by: INTERNAL MEDICINE

## 2025-04-15 PROCEDURE — 80048 BASIC METABOLIC PNL TOTAL CA: CPT | Mod: HCNC

## 2025-04-15 PROCEDURE — 80061 LIPID PANEL: CPT | Mod: HCNC

## 2025-04-15 PROCEDURE — 85025 COMPLETE CBC W/AUTO DIFF WBC: CPT | Mod: HCNC

## 2025-04-15 PROCEDURE — 82728 ASSAY OF FERRITIN: CPT | Mod: HCNC

## 2025-04-15 PROCEDURE — 1157F ADVNC CARE PLAN IN RCRD: CPT | Mod: HCNC,CPTII,S$GLB, | Performed by: INTERNAL MEDICINE

## 2025-04-15 PROCEDURE — 84443 ASSAY THYROID STIM HORMONE: CPT | Mod: HCNC

## 2025-04-15 PROCEDURE — 84439 ASSAY OF FREE THYROXINE: CPT | Mod: HCNC

## 2025-04-15 PROCEDURE — 3288F FALL RISK ASSESSMENT DOCD: CPT | Mod: HCNC,CPTII,S$GLB, | Performed by: INTERNAL MEDICINE

## 2025-04-15 PROCEDURE — 99215 OFFICE O/P EST HI 40 MIN: CPT | Mod: HCNC,S$GLB,, | Performed by: INTERNAL MEDICINE

## 2025-04-15 NOTE — ASSESSMENT & PLAN NOTE
Check Vit D level on Vit D 2000 IU daily and calcium, increase weight-bearing exercises as tolerated  Pt considering bisphosphonates pending GFR and recommendations by oral surgeon today  -has endocrine appt in July 3, 2025 with Dr. Vicente

## 2025-04-15 NOTE — PROGRESS NOTES
[facility] Progress Note      Chief Complaint     Follow-up and Health Maintenance      Subjective:      History of Present Illness:  Tiffany Masterson is a 89 y.o. female here for f/u     Had 3 falls this past week: has not yet started PT since discharged from the hospital for Atrial fib in Jan 2025  -falls are associated with loss of balance and generalized weakness of UE and LE  -she was unable to get off of the ground without assiistance    Hx TBI with cognitive communication deficit: sess therapist biweekly with Speech Pathology since March 2025 and has been making progress albeit slowly as per her     Does not like to eat veggies; gained 5 lbs since last visit 1/2025; would like canned fruit on plain lettuce with dressing   -drinks 3-4 cokes per day   -skips breakfast, eats lunch and small dinner but likes to snack on junk food    Vaginal atrophy with hx E. Coli cystitis in 1/2025 : Not using estrace cream biweekly     Hypothyroidism: compliant with Levo 112mcg daily but on amiodarone 200mg daily    New-onset CHF with reduced EF due to tachycardia-induced acute HR with RVR with NYHA Class 1-2 admitted on 1/02/2025 dropped 162 lbs down to 136 lbs but admits to drinking 4 regular cokes per day  -saw nutritionist at CHF clinic yesterday  symptoms - saw Cardiology 1/13 ROB Bess  -currently on GDMT: losartan 100mg daily, toprol 100mg daily, aldactone 255mg daily. Not on SGLT2i in setting of recent UTI.  -Recommend 2-3 gram sodium restriction and 1500cc- 2000cc fluid restriction.  -Requested patient to weigh themselves daily, and to notify us if their weight increases by more than 3 lbs in 1 day or 5 lbs in 3 days  -1/02 CXR Bilateral pleural effusions with interstitial findings suggesting edema. D   BNP 1/02/2025 1,246 (184 12/2021), TSH 4.680 with free T4 1.2, ferritin 605  ECHO: EF 65% in 1/2/2021 with Grade 1 diastolic dysfunction   ECHO 1/3/25:    Left Ventricle: The left  ventricle is normal in size. Ventricular mass is normal. Normal wall thickness. There is severely reduced systolic function with a visually estimated ejection fraction of 25 - 30%. Quantitated ejection fraction is 30%.    Right Ventricle: Systolic function is mildly reduced.    Left Atrium: Left atrium is severely dilated.    Aortic Valve: There is mild annular calcification present. There is mild stenosis. Aortic valve area by VTI is 2.1 cm2. Aortic valve peak velocity is 1.4 m/s. Mean gradient is 5.0 mmHg. The dimensionless index is 0.50. There is mild aortic regurgitation.    Mitral Valve: Moderately thickened leaflets.    Tricuspid Valve: There is moderate regurgitation.    Pulmonary Artery: The estimated pulmonary artery systolic pressure is 29 mmHg.    Pericardium: Left pleural effusion.    Irregularly irregular rhythm was present during the study      Paroxysmal atrial fibrillation              -s/p SONIA/DCCV 1/6/25. EKG today in [facility].  -Currently on amiodarone loading dose 200mg BID. Continue amiodarone, metoprolol, and eliquis  -Messaged EP for close follow up.    Hypokalemia: K 3.7 1/07/2025:  takes lasix prn      CKD 3b with GFR 44 ml/min and creat 1.2 on 1/13: on Jardiance, aldactone, Losartan  CTH 12/23/24:    Nontraumatic subdural hemorrhage; SAH due to fall on 9/22/2024 - had f/u Neurosurgery ROB Marte on 12/30/2024  - CT head 12/23:  No acute hemorrhage, prior tSAH/IPH appear to have resolved; there are some small foci of encephalomalacia in the L frontal/temporal lobes that coincide with sequelae of prior traumatic hemorrhages  - Ventriculomegaly out of proportion to sulcal enlargement  - Non-displaced R occipital bone fracture, appears to be healing    Hypertension: stable on  losartan 100mg daily, Metoprolol ER 100mg daily, aldactone 12.5mg daily.     Osteoporosis: interested in starting bisphosphonates; has endo appt in 7/2025      Past Medical History:  Past Medical History:    Diagnosis Date    Abnormal MRI 10/16/2020    Acute combined systolic and diastolic heart failure 01/04/2025    Acute cystitis without hematuria 12/22/2021    Anemia 01/04/2025    Arthritis     Arthritis of right hip 11/08/2021    Atrial fibrillation     Bilateral leg edema 01/02/2025    Brittle nails 02/08/2024    Cataract     Elevated liver enzymes     Endometrial mass 06/29/2018    Epiretinal membrane (ERM) of right eye     Family history of malignant neoplasm of gastrointestinal tract mother    Frequent UTI 09/30/2018    Generalized arthritis 09/19/2024    Shoulders, knees, hips      Graves disease     now hypothryoid - no surgery or medicine. resolved on its own    History of cholecystectomy 06/10/2021    History of colonoscopy     unremarkable 2007 by Dr. Javier.  Barium enema revealed diverticular disease.    Hyperlipidemia     Hypertension     Hypertriglyceridemia 04/19/2013    Continue statin for secondary stroke prevention    Hyponatremia 01/07/2025    , will monitor       Hypothyroidism     Impaired functional mobility, balance, gait, and endurance 06/04/2021    Iron deficiency anemia 09/29/2021    Longstanding persistent atrial fibrillation 01/04/2025    s/p SONIA/DCCV 1/6/25. EKG today in [facility].      Migraine, ophthalmoplegic     Mixed stress and urge urinary incontinence 09/30/2018    Ocular migraine     Osteopenia 12/03/2016 12/2022 Lumbar spine (L1-L4):              BMD is 0.934 g/cm2, T-score is -1.0, and Z-score is 1.8.     Total hip:                                BMD is 0.678 g/cm2, T-score is -2.2, and Z-score is 0.2.     Femoral neck:                          BMD is 0.664 g/cm2, T-score is -1.7, and Z-score is 0.9.     Distal 1/3 radius:                      Not applicable     FRAX:     34% risk of a major oste    Personal history of colonic polyps     Physical deconditioning 09/13/2023    Preop testing 05/17/2021    S/P colonoscopic polypectomy 06/18/2013    Sleep disorder 11/08/2021     Status post hysteroscopic polypectomy 07/02/2018    TBI (traumatic brain injury) 10/25/2024    9/22/2024: Ct head: Left frontotemporal subarachnoid hemorrhage again noted including a small focus of intraparenchymal hemorrhage in the left cerebellum. No new hemorrhage.       TIA (transient ischemic attack)     with a normal angiogram in the past, Ocular migraines reported by patient, not TIA     Transaminitis 03/19/2021    Urethral caruncle 09/15/2020       Past Surgical History:  Past Surgical History:   Procedure Laterality Date    CATARACT EXTRACTION Right 2012    Dr. Lexis Nicolas     CHOLECYSTECTOMY  2022    COLONOSCOPY      2014 polyps    COLONOSCOPY N/A 11/07/2016    Procedure: COLONOSCOPY;  Surgeon: Bebeto Gonzalez MD;  Location: [facility] ENDO (4TH FLR);  Service: Endoscopy;  Laterality: N/A;    COLONOSCOPY N/A 03/09/2020    Procedure: COLONOSCOPY;  Surgeon: Bebeto Gonzalez MD;  Location: [facility] (4TH FLR);  Service: Endoscopy;  Laterality: N/A;    COLONOSCOPY N/A 09/29/2021    Procedure: COLONOSCOPY;  Surgeon: Bebeto Gonzalez MD;  Location: [facility] (4TH FLR);  Service: Endoscopy;  Laterality: N/A;  please schedule patient as soon as possible with me EGD colonoscopy with constipation bowel prep for iron deficiency anemia family history of colon cancer and personal history of colon polyps  9/17/21 ok for standard split prep per Dr. Gonzalez-imani    COLONOSCOPY N/A 09/29/2021    Procedure: COLONOSCOPY;  Surgeon: Bebeto Gonzalez MD;  Location: [facility] ENDO (4TH FLR);  Service: Endoscopy;  Laterality: N/A;  Please schedule patient as soon as possible with me EGD colonoscopy with constipation bowel prep for iron deficiency anemia family history of colon cancer and personal history of colon polyps  9/17/21 Ok for standard split prep per Dr. Gonzalez-imani    COLONOSCOPY W/ POLYPECTOMY  06/2013    polyp of colon    COLONOSCOPY, SCREENING, HIGH RISK PATIENT N/A 3/7/2025    Procedure: COLONOSCOPY, SCREENING, HIGH  RISK PATIENT;  Surgeon: Bebeto Gonzalez MD;  Location: [facility] (2ND FLR);  Service: Endoscopy;  Laterality: N/A;  12/30 ref by luis Foley eliquis, surprep- gg/rb  1/27 LVM precall, pending Eliquis, Jardiance? -needing 2nd floor-  1/28 R/S to 2nd floor-ok to schedule per Dr. Gonzalez, pending Eliquis, Jardiance, suprep, portal -ml  2/27 precall att    ENDOSCOPIC ULTRASOUND OF UPPER GASTROINTESTINAL TRACT N/A 04/29/2021    Procedure: ULTRASOUND, UPPER GI TRACT, ENDOSCOPIC;  Surgeon: Dalton Johnson MD;  Location: [facility] (2ND FLR);  Service: Endoscopy;  Laterality: N/A;  Postprandial nausea vomiting epigastric 0.9 cm stone and sludge seen within the gallbladder lumen.  No wall thickening or pericholecystic fluid.  0.6 cm stone seen within the distal common bile duct at the level of the pancreatic head.  There is dil    ERCP N/A 04/29/2021    Procedure: ERCP (ENDOSCOPIC RETROGRADE CHOLANGIOPANCREATOGRAPHY);  Surgeon: Dalton Johnson MD;  Location: [facility] (2ND FLR);  Service: Endoscopy;  Laterality: N/A;  Postprandial nausea and vomit 0.9 cm stone and sludge seen within the gallbladder lumen.  No wall thickening or pericholecystic fluid.  0.6 cm stone seen within the distal common bile duct at the level of the pancreatic head.  There i    ESOPHAGOGASTRODUODENOSCOPY N/A 09/29/2021    Procedure: EGD (ESOPHAGOGASTRODUODENOSCOPY);  Surgeon: Bebeto Gonzalez MD;  Location: [facility] (4TH FLR);  Service: Endoscopy;  Laterality: N/A;  rapid covid test arrival 12pm - sm  9/27 arrival time for covid test/procedure confirmed with pt-rb    ESOPHAGOGASTRODUODENOSCOPY N/A 09/29/2021    Procedure: EGD (ESOPHAGOGASTRODUODENOSCOPY);  Surgeon: Bebeto Gonzalez MD;  Location: [facility] ENDO (4TH FLR);  Service: Endoscopy;  Laterality: N/A;  covid test 9/19/21 [facility], prep instr emailed -    EYE SURGERY  11/10/2014    right retina/Dr. Dalton Sierra ([facility])    HYSTEROSCOPIC POLYPECTOMY OF UTERUS N/A 07/02/2018    Procedure:  POLYPECTOMY, UTERUS, HYSTEROSCOPIC;  Surgeon: Elliot Vanessa IV, MD;  Location: [facility] OR;  Service: OB/GYN;  Laterality: N/A;    INJECTION OF JOINT Right 11/11/2022    Procedure: INJECTION, RIGHT GTB CONTRAST DIRECT REFERRAL;  Surgeon: Camden Hernandez MD;  Location: [facility] PAIN MGT;  Service: Pain Management;  Laterality: Right;    JOINT REPLACEMENT  03/23/2011    left total hip replacement    LAPAROSCOPIC CHOLECYSTECTOMY N/A 05/17/2021    Procedure: CHOLECYSTECTOMY, LAPAROSCOPIC;  Surgeon: Rayshawn Peralta Jr., MD;  Location: [facility] 2ND FLR;  Service: General;  Laterality: N/A;    REMOVAL OF EPIRETINAL MEMBRANE Right     Dr. Padron    TONSILLECTOMY      pt was 11 years old    TRANSESOPHAGEAL ECHOCARDIOGRAM WITH POSSIBLE CARDIOVERSION (SONIA W/ POSS CARDIOVERSION) N/A 1/6/2025    Procedure: Transesophageal echo (SONIA) intra-procedure log documentation;  Surgeon: Tio Phelps MD;  Location: [facility] EP LAB;  Service: Cardiology;  Laterality: N/A;    TREATMENT OF CARDIAC ARRHYTHMIA N/A 1/6/2025    Procedure: Cardioversion or Defibrillation;  Surgeon: Ayush Lechuga MD;  Location: [facility] EP LAB;  Service: Cardiology;  Laterality: N/A;  AF, SONIA/DCCV, ANES, GP,        Allergies:  Review of patient's allergies indicates:   Allergen Reactions    Levaquin [levofloxacin]      Joint pain    Hydrochlorothiazide Other (See Comments)     Pain in joints and depression per pt       Home Medications:  Current Outpatient Medications   Medication Sig Dispense Refill    acetaminophen (TYLENOL) 500 MG tablet Take 500 mg by mouth every 6 (six) hours as needed.      amiodarone (PACERONE) 200 MG Tab Take 1 tablet (200 mg total) by mouth once daily. 90 tablet 3    ammonium lactate 12 % Crea Apply topically bid to legs 385 g 6    atorvastatin (LIPITOR) 10 MG tablet Take 1 tablet by mouth once daily 90 tablet 3    bisacodyL (DULCOLAX) 5 mg EC tablet Take 5 mg by mouth once daily. Pt states taking twice per day      CALCIUM  CARBONATE/VITAMIN D3 (CALCIUM 600 + D,3, ORAL) Take 1 tablet by mouth once daily.      DEXTRAN 70/HYPROMELLOSE (ARTIFICIAL TEARS, PF, OPHT) Place 1 drop into both eyes as needed.      ELIQUIS 5 mg Tab Take 1 tablet by mouth twice daily 180 tablet 3    empagliflozin (JARDIANCE) 10 mg tablet Take 1 tablet (10 mg total) by mouth once daily. 90 tablet 1    estradioL (ESTRACE) 0.01 % (0.1 mg/gram) vaginal cream Place 0.5 g intravaginally q.h.s. x2 weeks; then, placed 0.5 g intravaginally twice weekly at bedtime (maintenance therapy). 42.5 g 1    fluticasone propionate (FLONASE) 50 mcg/actuation nasal spray CLEAN SINUS/NARES WITH OCEAN NASAL SPRAY THEN USE FLONASE 1 SPRAY TWICE DAILY. 48 g 3    furosemide (LASIX) 20 MG tablet Take 1 tablet (20 mg total) by mouth daily as needed (for worsening shortness of breath, swelling or 3lb weight gain on home scale). 90 tablet 3    levothyroxine (SYNTHROID) 112 MCG tablet TAKE 1 TABLET BY MOUTH BEFORE BREAKFAST 90 tablet 3    losartan (COZAAR) 100 MG tablet Take 1 tablet by mouth once daily 90 tablet 3    metoprolol succinate (TOPROL-XL) 100 MG 24 hr tablet Take 1 tablet (100 mg total) by mouth once daily. 90 tablet 3    sodium chloride (SALINE NASAL) 0.65 % nasal spray 1 spray by Nasal route as needed for Congestion. 104 mL 3    spironolactone (ALDACTONE) 25 MG tablet Take 1 tablet (25 mg total) by mouth once daily. 90 tablet 3    vit A/vit C/vit E/zinc/copper (ICAPS AREDS ORAL) Take 1 tablet by mouth 2 (two) times a day.       No current facility-administered medications for this visit.        Family History:  Family History   Problem Relation Name Age of Onset    Colon cancer Mother Tiffany Frank Herman 75    Lung cancer Father ColLakesha Umanzornhill 75    Diabetes Other great aunt     Diabetes Paternal Aunt      Colon cancer Paternal Aunt  80    Prostate cancer Brother      Breast cancer Neg Hx      Ovarian cancer Neg Hx      Celiac disease Neg Hx      Cataracts Neg  "Hx      Glaucoma Neg Hx      Macular degeneration Neg Hx         Social History:  Social History     Tobacco Use    Smoking status: Never    Smokeless tobacco: Never    Tobacco comments:     The patient is not getting any regular exercise at this time.  She does enjoy traveling to [place].   Substance Use Topics    Alcohol use: Yes     Comment: occasionally    Drug use: No       Review of Systems:  Pertinent positives and negatives listed in HPI. All other systems are reviewed and are negative.    Health Maintaince :   Health Maintenance Topics with due status: Not Due       Topic Last Completion Date    TETANUS VACCINE 09/20/2018    DEXA Scan 01/27/2025    Colonoscopy 03/07/2025    Lipid Panel 04/15/2025           Eye: UTD  Dental: today     Immunizations:   Cancer Screening:  PAP: UTD   Mammogram: UTD   Colonoscopy: scheduled [id number] with Dr. Gonzalez - tubular adenoma x 8 - repeat in 3 years   DEXA:  12/2025Lumbar spine (L1-L4):              BMD is 0.934 g/cm2, T-score is -1.0, and Z-score is 1.8.     Total hip:                                BMD is 0.678 g/cm2, T-score is -2.2, and Z-score is 0.2.     Femoral neck:                          BMD is 0.664 g/cm2, T-score is -1.7, and Z-score is 0.9.     Distal 1/3 radius:                      Not applicable     FRAX:     34% risk of a major osteoporotic fracture in the next 10 years.     20% risk of hip fracture in the next 10 years.     Impression:     *Osteoporosis based on T-score between -1.0 and -2.5 and elevated risk based on FRAX    The ASCVD Risk score (Emeka DK, et al., 2019) failed to calculate for the following reasons:    The 2019 ASCVD risk score is only valid for ages 40 to 79      Objective:   BP (!) 120/52 (BP Location: Left arm, Patient Position: Sitting)   Pulse (!) 57   Ht 5' 6" (1.676 m)   Wt 65.7 kg (144 lb 13.5 oz)   SpO2 98%   BMI 23.38 kg/m²      Body mass index is 23.38 kg/m².       Physical Examination:  General: Alert and awake in " no apparent distress  Mouth:  Oropharynx clear and without exudate; moist mucous membranes  Neck:   Cervical nodes not enlarged (No submental, submandibular, preauricular, posterior auricular or occipital adenopathy); supple; no bruits  Cardio:   Irregularly irregular rate and rhythm with normal S1 and S2; no murmurs or rubs  Resp:  CTAB; respirations unlabored; no wheezes, crackles or rhonchi  Abdom: Soft, NTND with normoactive bowel sounds; negative HSM  Extrem: Warm and well-perfused with no clubbing, cyanosis or edema  Skin:  No rashes, lesions, or color changes  Pulses:  2+ and symmetric distally  Neuro:  AAOx3; cooperative and pleasant with no focal deficits    Laboratory:      Most Recent Data:  Lab Results   Component Value Date    WBC 8.14 04/15/2025    HGB 11.7 (L) 04/15/2025    HCT 37.5 04/15/2025     04/15/2025    CHOL 148 04/15/2025    TRIG 84 04/15/2025    HDL 51 04/15/2025    ALT 18 01/27/2025    AST 17 01/27/2025     04/15/2025    K 4.1 04/15/2025     04/15/2025    BUN 14 04/15/2025    CO2 22 (L) 04/15/2025    TSH 0.734 04/15/2025    INR 1.1 09/22/2024    HGBA1C 5.8 (H) 08/31/2020              CBC:   WBC   Date Value Ref Range Status   04/15/2025 8.14 3.90 - 12.70 K/uL Final     Hemoglobin   Date Value Ref Range Status   01/13/2025 13.2 12.0 - 16.0 g/dL Final     HGB   Date Value Ref Range Status   04/15/2025 11.7 (L) 12.0 - 16.0 gm/dL Final     Hematocrit   Date Value Ref Range Status   01/13/2025 40.6 37.0 - 48.5 % Final     HCT   Date Value Ref Range Status   04/15/2025 37.5 37.0 - 48.5 % Final     Platelet Count   Date Value Ref Range Status   04/15/2025 216 150 - 450 K/uL Final     Platelets   Date Value Ref Range Status   01/13/2025 339 150 - 450 K/uL Final     MCV   Date Value Ref Range Status   04/15/2025 97 82 - 98 fL Final   01/13/2025 92 82 - 98 fL Final     RDW   Date Value Ref Range Status   04/15/2025 13.6 11.5 - 14.5 % Final   01/13/2025 13.6 11.5 - 14.5 % Final      BMP:   Sodium   Date Value Ref Range Status   04/15/2025 136 136 - 145 mmol/L Final   01/27/2025 138 136 - 145 mmol/L Final     Potassium   Date Value Ref Range Status   04/15/2025 4.1 3.5 - 5.1 mmol/L Final   01/27/2025 4.0 3.5 - 5.1 mmol/L Final     Chloride   Date Value Ref Range Status   04/15/2025 104 95 - 110 mmol/L Final   01/27/2025 105 95 - 110 mmol/L Final     CO2   Date Value Ref Range Status   04/15/2025 22 (L) 23 - 29 mmol/L Final   01/27/2025 23 23 - 29 mmol/L Final     BUN   Date Value Ref Range Status   04/15/2025 14 8 - 23 mg/dL Final     Creatinine   Date Value Ref Range Status   04/15/2025 1.1 0.5 - 1.4 mg/dL Final     Glucose   Date Value Ref Range Status   01/27/2025 93 70 - 110 mg/dL Final     Calcium   Date Value Ref Range Status   04/15/2025 9.5 8.7 - 10.5 mg/dL Final   01/27/2025 9.6 8.7 - 10.5 mg/dL Final     Magnesium   Date Value Ref Range Status   01/27/2025 2.0 1.6 - 2.6 mg/dL Final     Phosphorus   Date Value Ref Range Status   01/27/2025 4.1 2.7 - 4.5 mg/dL Final     LFTs:   Total Protein   Date Value Ref Range Status   01/27/2025 7.5 6.0 - 8.4 g/dL Final     Albumin   Date Value Ref Range Status   01/27/2025 4.0 3.5 - 5.2 g/dL Final     Total Bilirubin   Date Value Ref Range Status   01/27/2025 0.9 0.1 - 1.0 mg/dL Final     Comment:     For infants and newborns, interpretation of results should be based  on gestational age, weight and in agreement with clinical  observations.    Premature Infant recommended reference ranges:  Up to 24 hours.............<8.0 mg/dL  Up to 48 hours............<12.0 mg/dL  3-5 days..................<15.0 mg/dL  6-29 days.................<15.0 mg/dL       AST   Date Value Ref Range Status   01/27/2025 17 10 - 40 U/L Final     Alkaline Phosphatase   Date Value Ref Range Status   01/27/2025 81 40 - 150 U/L Final     ALT   Date Value Ref Range Status   01/27/2025 18 10 - 44 U/L Final     Coags:   INR   Date Value Ref Range Status   09/22/2024 1.1 0.9 -  "1.2 Final   05/09/2013 1.0 0.8 - 1.2 Final     Comment:     ACCP Guideline for Coumadin usage recommends a "target range" of  2.0 - 3.0 for INR for all indicators except mechanical heart valves  and antiphospholipid syndromes which should use 2.5 - 3.5.  .     FLP:      Lab Results   Component Value Date    CHOL 148 04/15/2025    CHOL 141 09/19/2024    CHOL 136 11/06/2023     Lab Results   Component Value Date    HDL 51 04/15/2025    HDL 44 09/19/2024    HDL 49 11/06/2023     Lab Results   Component Value Date    LDLCALC 76.2 09/19/2024    LDLCALC 65.6 11/06/2023    LDLCALC 131.0 09/13/2023     Lab Results   Component Value Date    TRIG 84 04/15/2025    TRIG 104 09/19/2024    TRIG 107 11/06/2023     Lab Results   Component Value Date    CHOLHDL 34.5 04/15/2025    CHOLHDL 31.2 09/19/2024    CHOLHDL 36.0 11/06/2023      DM:      Hemoglobin A1C   Date Value Ref Range Status   08/31/2020 5.8 (H) 4.0 - 5.6 % Final     Comment:     ADA Screening Guidelines:  5.7-6.4%  Consistent with prediabetes  >or=6.5%  Consistent with diabetes  High levels of fetal hemoglobin interfere with the HbA1C  assay. Heterozygous hemoglobin variants (HbS, HgC, etc)do  not significantly interfere with this assay.   However, presence of multiple variants may affect accuracy.       LDL Cholesterol   Date Value Ref Range Status   09/19/2024 76.2 63.0 - 159.0 mg/dL Final     Comment:     The National Cholesterol Education Program (NCEP) has set the  following guidelines (reference values) for LDL Cholesterol:  Optimal.......................<130 mg/dL  Borderline High...............130-159 mg/dL  High..........................160-189 mg/dL  Very High.....................>190 mg/dL       Creatinine   Date Value Ref Range Status   04/15/2025 1.1 0.5 - 1.4 mg/dL Final     Thyroid:   TSH   Date Value Ref Range Status   04/15/2025 0.734 0.400 - 4.000 uIU/mL Final   01/02/2025 4.680 (H) 0.400 - 4.000 uIU/mL Final     Free T4   Date Value Ref Range Status "   04/15/2025 1.39 0.71 - 1.51 ng/dL Final     Anemia:   Iron   Date Value Ref Range Status   01/04/2025 32 30 - 160 ug/dL Final     TIBC   Date Value Ref Range Status   01/04/2025 299 250 - 450 ug/dL Final     Ferritin   Date Value Ref Range Status   01/04/2025 605 (H) 20.0 - 300.0 ng/mL Final     Vitamin B-12   Date Value Ref Range Status   01/05/2025 727 210 - 950 pg/mL Final     Folate   Date Value Ref Range Status   01/05/2025 15.5 4.0 - 24.0 ng/mL Final     Cardiac:   Troponin I   Date Value Ref Range Status   12/21/2021 0.014 0.000 - 0.026 ng/mL Final     Comment:     The reference interval for Troponin I represents the 99th percentile   cutoff   for our facility and is consistent with 3rd generation assay   performance.       BNP   Date Value Ref Range Status   01/27/2025 1,341 (H) 0 - 99 pg/mL Final     Comment:     Values of less than 100 pg/ml are consistent with non-CHF populations.     Urinalysis:   Urine Culture, Routine   Date Value Ref Range Status   01/02/2025 ESCHERICHIA COLI ESBL  >100,000 cfu/ml   (A)  Final     Color, UA   Date Value Ref Range Status   01/02/2025 Yellow Yellow, Straw, [place] Final     Clarity, UA   Date Value Ref Range Status   10/29/2020   Final     Comment:     normal      Specific Gravity, UA   Date Value Ref Range Status   01/02/2025 1.010 1.005 - 1.030 Final     Nitrite, UA   Date Value Ref Range Status   01/02/2025 Negative Negative Final     Ketones, UA   Date Value Ref Range Status   01/02/2025 Negative Negative Final     Urobilinogen, UA   Date Value Ref Range Status   10/29/2020   Final     Comment:     normal   09/14/2018 Negative <2.0 EU/dL Final     WBC, UA   Date Value Ref Range Status   01/02/2025 18 (H) 0 - 5 /hpf Final       Other Results:  EKG (my interpretation):     Radiology:  DXA Bone Density Axial Skeleton 1 or more sites  Narrative: EXAMINATION:  DXA BONE DENSITY AXIAL SKELETON 1 OR MORE SITES    CLINICAL HISTORY:  Age-related osteoporosis without current  pathological fracture.  Patient reported history of left elbow fracture at age 68 and pelvis fracture at age 76.  No reported treatments for osteoporosis.    TECHNIQUE:  DXA specification: Main Payette Popdust A ([id number])    Bone Mineral Density scanning was performed over the hip and lumbar spine.    Trabecular bone score (TBS) analysis was performed over the lumbar spine.    Review of the images confirms satisfactory positioning and technique.    COMPARISON:  Comparison study dated 12/02/2022    FINDINGS:  Lumbar spine (L1-L4):               T-score is -1.2, and Z-score is +1.6.    Compared with previous DXA, BMD at the lumbar spine has remained stable.    Femoral neck:                          T-score is -2.1, and Z-score is +0.4.    Total hip:                                T-score is -2.8, and Z-score is -0.5.    Compared with previous DXA, BMD at the total hip has declined by 12.2%.    Trabecular Bone Score    The trabecular bone score (TBS) indicates normal bone quality.    Fracture Risk (FRAX adjusted for Trabecular Bone Score)    30% risk of a major osteoporotic fracture in the next 10 years.    21% risk of hip fracture in the next 10 years.  Impression: *Osteoporosis based on T-score below -2.5  *Fracture risk is very high due to calculated 10 year risk of major osteoporotic fracture >30% (FRAX).    RECOMMENDATIONS:  *Daily calcium intake 3197-1905 mg, dietary sources preferred; Vitamin D 8794-1965 IU daily.  *Weight bearing exercise and fall precautions.  *Given very high fracture risk, would consider anabolic agents (including teriparatide, abaloparatide, or romosozumab), denosumab or zoledronic acid as the preferred treatment options. Oral bisphosphonates can be considered as second-line therapy.  *Repeat BMD in 2 years.    Electronically signed by: Stephon Mcintosh  Date:    01/30/2025  Time:    15:20          Assessment/Plan     Tiffany Masterson is a 89 y.o. female with:  1. Frequent  falls  Assessment & Plan:  Restart PT     Orders:  -     Ambulatory Referral/Consult to Physical Therapy; Future; Expected date: 04/15/2025    2. Tubular adenoma of colon  Overview:  5 polyps <5mm in 9/2021  all tubular adenomas neg dysplasia/cancer- repeat in 3 years 9/2024      Colonoscopy: scheduled [id number] with Dr. Gonzalez - tubular adenoma x 8 - repeat in 3 years     Assessment & Plan:  Repeat colon in 3/2028 with Dr. Gonzalez - keep appt with him on 4/16/2025      3. Primary hypertension  Assessment & Plan:  stable on  losartan 100mg daily, toprol 100mg daily, ltugqnhsf23.5 mg daily.   -Check Bps at home weekly and prn symptoms for goal BP <130/80.  Avoid Dobbins's table salt; use Mrs. Thompson or Mata Rizvi no salt   -Start healthy diet high in fiber (25-35 gm daily), low fat dairy, lean protein, low in saturated/trans fat (minimize cheese, creamy salad dressings); high in calcium/magnesium/potassium; 1.5 gm sodium diet; low in processed sugars and foods, ie  WHITE sugars, bread, pasta, rice, ice cream, cookies, cake, doughnuts  -Drink 6-8 glasses of water daily  -Moderate exercise 30 min 5 times per week or 75 min intense exercise biweekly   -Start 8-10 hour intermittent fasting diet   -Avoid eating 3 hours prior to bedtime  -Reduce alcohol to 1-2 servings (1 serving = 1 oz wine, 1 oz hard liquor, 12 oz beer) per day  -Avoid smoking, vaping, stimulant drugs/mediations ie illicit drugs, pseudo-ephedrine  -Use ADD/ADHD meds sparingly  -Minimize NSAIDs       Orders:  -     Basic Metabolic Panel; Future; Expected date: 04/15/2025  -     Microalbumin/Creatinine Ratio, Urine; Future; Expected date: 04/15/2025    4. Hypothyroidism, unspecified type  Assessment & Plan:  -Continue Levothyroxine 112 mcg daily - repeat TSH, free T4 today medardo while taking Amiodarone       Orders:  -     TSH; Future; Expected date: 04/15/2025  -     T4, Free; Future; Expected date: 04/15/2025    5. Paroxysmal atrial  fibrillation  Overview:  s/p SONIA/DCCV 1/6/25.     Assessment & Plan:  Cont amiodarone 200mg po daily metoprolol ER 100mg daily , and eliquis 5mg po bid   Monitor for bleeding, CP, SOB, FLORES  Keep BP >110/60 and 130/80, HR 60-80s  F/u cardiology as scheduled    Orders:  -     BNP; Future; Expected date: 04/15/2025    6. Mixed hyperlipidemia  -     Lipid Panel; Future; Expected date: 04/15/2025    7. Vitamin D deficiency  -     Vitamin D; Future; Expected date: 04/15/2025    8. Age-related osteoporosis without current pathological fracture  Overview:  Lumbar spine (L1-L4):              BMD is 0.934 g/cm2, T-score is -1.0, and Z-score is 1.8.     Total hip:                                BMD is 0.678 g/cm2, T-score is -2.2, and Z-score is 0.2.     Femoral neck:                          BMD is 0.664 g/cm2, T-score is -1.7, and Z-score is 0.9.     Distal 1/3 radius:                      Not applicable     FRAX:     34% risk of a major osteoporotic fracture in the next 10 years.     20% risk of hip fracture in the next 10 years.     Impression:     *Osteoporosis based on T-score between -1.0 and -2.5 and elevated risk based on FRAX    Assessment & Plan:  Check Vit D level on Vit D 2000 IU daily and calcium, increase weight-bearing exercises as tolerated  Pt considering bisphosphonates pending GFR and recommendations by oral surgeon today  -has endocrine appt in July 3, 2025 with Dr. Vicente          9. Cognitive communication deficit  Assessment & Plan:  Cont Speech Pathology biweekly       10. Chronic anticoagulation  Assessment & Plan:  Check CBC on Eliquis 5mg po bid     Orders:  -     CBC Auto Differential; Future; Expected date: 04/15/2025    11. Preventative health care  Assessment & Plan:  Get COVID and RSV vaccine - refuses   Keep eye appt today   Keep appt with cardiology/neurology            12. Generalized muscle weakness  Assessment & Plan:  Refer for PT to strengthen UE and LE    Orders:  -     Ambulatory  Referral/Consult to Physical Therapy; Future; Expected date: 04/15/2025    13. Vaginal atrophy  Assessment & Plan:  Restart Estrace cream biweekly medardo with hx UTIs      14. Concussion with loss of consciousness, sequela  Overview:  9/22/2024: Ct head: Left frontotemporal subarachnoid hemorrhage again noted including a small focus of intraparenchymal hemorrhage in the left cerebellum. No new hemorrhage.     Assessment & Plan:  F/u Neurology on 5/08/2025       15. Tachycardia induced cardiomyopathy  Assessment & Plan:  Repeat TTE on 5/19/2025   Cont to keep rate controlled with Metoprolol ER 100mg daily       16. Stage 3b chronic kidney disease  Assessment & Plan:  Cont to monitor and encourage at least 4-6 glasses of water daily (restricted to 1-2 liters per day due to CHF)  -check BMP today on Jardiance and Aldactone       17. Heart failure with reduced ejection fraction  Assessment & Plan:  Cont Jardiance 10mg daily, Aldactone 12.5mg daily, metoprolol ER 100mg daily   Repeat TTE  on 5/19/2025  Take Lasix 20mg prn LE edema, PND, orthopnea  Check BNP today   F/u cardiology as scheduled                 I spent 64 min with this pt that includes face to face time and non-face to face time preparing to see the patient (eg, review of tests), obtaining and/or reviewing separately obtained history, documenting clinical information in the electronic or other health record, independently interpreting results and communicating results to the patient/family/caregiver, or care coordinator.   Code Status:     Rebeca Dumont MD

## 2025-04-15 NOTE — PATIENT INSTRUCTIONS
Tubular adenoma of colon  Assessment & Plan:  Repeat colon in 3/2028       Primary hypertension  -     Basic Metabolic Panel; Future; Expected date: 04/15/2025  -     Microalbumin/Creatinine Ratio, Urine; Future; Expected date: 04/15/2025    Hypothyroidism, unspecified type  -     TSH; Future; Expected date: 04/15/2025  -     T4, Free; Future; Expected date: 04/15/2025    Paroxysmal atrial fibrillation  -     BNP; Future; Expected date: 04/15/2025    Mixed hyperlipidemia  -     Lipid Panel; Future; Expected date: 04/15/2025    Vitamin D deficiency  -     Vitamin D; Future; Expected date: 04/15/2025    Frequent falls  Assessment & Plan:  Restart PT     Orders:  -     Ambulatory Referral/Consult to Physical Therapy; Future; Expected date: 04/15/2025    Longstanding persistent atrial fibrillation  Assessment & Plan:  Currently on amiodarone loading dose 200mg BID. Continue amiodarone, metoprolol, and eliquis      Age-related osteoporosis without current pathological fracture  Assessment & Plan:  Cont Vit D 2000 IU daily and calcium, weight-bearing  Pt considering bisphosphonates pending GFR           Muscular deconditioning  -     Ambulatory Referral/Consult to Physical Therapy; Future; Expected date: 04/15/2025    Cognitive communication deficit  Assessment & Plan:  Cont Speech Pathology biweekly       Chronic anticoagulation  -     CBC Auto Differential; Future; Expected date: 04/15/2025    Preventative health care  Assessment & Plan:  Get COVID and RSV vaccine - refuses   Keep eye appt today   Keep appt with cardiology/neurology

## 2025-04-15 NOTE — ASSESSMENT & PLAN NOTE
Get COVID and RSV vaccine - refuses   Keep eye appt today   Keep appt with cardiology/neurology

## 2025-04-16 ENCOUNTER — TELEPHONE (OUTPATIENT)
Dept: PRIMARY CARE CLINIC | Facility: CLINIC | Age: 89
End: 2025-04-16

## 2025-04-16 ENCOUNTER — OFFICE VISIT (OUTPATIENT)
Dept: GASTROENTEROLOGY | Facility: CLINIC | Age: 89
End: 2025-04-16
Payer: MEDICARE

## 2025-04-16 ENCOUNTER — RESULTS FOLLOW-UP (OUTPATIENT)
Dept: PRIMARY CARE CLINIC | Facility: CLINIC | Age: 89
End: 2025-04-16

## 2025-04-16 VITALS — HEIGHT: 66 IN | BODY MASS INDEX: 23.17 KG/M2 | WEIGHT: 144.19 LBS

## 2025-04-16 DIAGNOSIS — R29.6 FALLS FREQUENTLY: ICD-10-CM

## 2025-04-16 DIAGNOSIS — D12.6 COLON ADENOMA: ICD-10-CM

## 2025-04-16 DIAGNOSIS — D64.9 ANEMIA, UNSPECIFIED TYPE: Primary | ICD-10-CM

## 2025-04-16 DIAGNOSIS — R26.9 GAIT ABNORMALITY: Primary | ICD-10-CM

## 2025-04-16 DIAGNOSIS — G93.89 CEREBRAL VENTRICULOMEGALY: ICD-10-CM

## 2025-04-16 DIAGNOSIS — Z80.0 FAMILY HISTORY OF COLON CANCER: Primary | ICD-10-CM

## 2025-04-16 DIAGNOSIS — Z80.0 FAMILY HISTORY OF COLON CANCER: ICD-10-CM

## 2025-04-16 DIAGNOSIS — R32 URINARY INCONTINENCE, UNSPECIFIED TYPE: ICD-10-CM

## 2025-04-16 DIAGNOSIS — D12.6 COLON ADENOMAS: ICD-10-CM

## 2025-04-16 DIAGNOSIS — G91.2 IDIOPATHIC NORMAL PRESSURE HYDROCEPHALUS: Primary | ICD-10-CM

## 2025-04-16 LAB — FERRITIN SERPL-MCNC: 339 NG/ML (ref 20–300)

## 2025-04-16 PROCEDURE — 1126F AMNT PAIN NOTED NONE PRSNT: CPT | Mod: HCNC,CPTII,S$GLB, | Performed by: INTERNAL MEDICINE

## 2025-04-16 PROCEDURE — 99215 OFFICE O/P EST HI 40 MIN: CPT | Mod: HCNC,S$GLB,, | Performed by: INTERNAL MEDICINE

## 2025-04-16 PROCEDURE — 99999 PR PBB SHADOW E&M-EST. PATIENT-LVL IV: CPT | Mod: PBBFAC,HCNC,, | Performed by: INTERNAL MEDICINE

## 2025-04-16 PROCEDURE — 1159F MED LIST DOCD IN RCRD: CPT | Mod: HCNC,CPTII,S$GLB, | Performed by: INTERNAL MEDICINE

## 2025-04-16 PROCEDURE — 3288F FALL RISK ASSESSMENT DOCD: CPT | Mod: HCNC,CPTII,S$GLB, | Performed by: INTERNAL MEDICINE

## 2025-04-16 PROCEDURE — 1100F PTFALLS ASSESS-DOCD GE2>/YR: CPT | Mod: HCNC,CPTII,S$GLB, | Performed by: INTERNAL MEDICINE

## 2025-04-16 PROCEDURE — 1160F RVW MEDS BY RX/DR IN RCRD: CPT | Mod: HCNC,CPTII,S$GLB, | Performed by: INTERNAL MEDICINE

## 2025-04-16 PROCEDURE — 1157F ADVNC CARE PLAN IN RCRD: CPT | Mod: HCNC,CPTII,S$GLB, | Performed by: INTERNAL MEDICINE

## 2025-04-16 NOTE — ASSESSMENT & PLAN NOTE
stable on  losartan 100mg daily, toprol 100mg daily, nuymyosym10.5 mg daily.   -Check Bps at home weekly and prn symptoms for goal BP <130/80.  Avoid Dobbins's table salt; use Mrs. Thompson or Mata Rizvi no salt   -Start healthy diet high in fiber (25-35 gm daily), low fat dairy, lean protein, low in saturated/trans fat (minimize cheese, creamy salad dressings); high in calcium/magnesium/potassium; 1.5 gm sodium diet; low in processed sugars and foods, ie  WHITE sugars, bread, pasta, rice, ice cream, cookies, cake, doughnuts  -Drink 6-8 glasses of water daily  -Moderate exercise 30 min 5 times per week or 75 min intense exercise biweekly   -Start 8-10 hour intermittent fasting diet   -Avoid eating 3 hours prior to bedtime  -Reduce alcohol to 1-2 servings (1 serving = 1 oz wine, 1 oz hard liquor, 12 oz beer) per day  -Avoid smoking, vaping, stimulant drugs/mediations ie illicit drugs, pseudo-ephedrine  -Use ADD/ADHD meds sparingly  -Minimize NSAIDs

## 2025-04-16 NOTE — ASSESSMENT & PLAN NOTE
Cont to monitor and encourage at least 4-6 glasses of water daily (restricted to 1-2 liters per day due to CHF)  -check BMP today on Jardiance and Aldactone

## 2025-04-16 NOTE — PROGRESS NOTES
"GENERAL GI PATIENT INTAKE:    COVID symptoms in the last 7 days (runny nose, sore throat, congestion, cough, fever): No  PCP: Rebeca Dumont  If not PCP-  number given to establish [phone]: N/A    ALLERGIES REVIEWED:  Yes    CHIEF COMPLAINT:    Chief Complaint   Patient presents with    Follow-up       VITAL SIGNS:  Ht 5' 6" (1.676 m)   Wt 65.4 kg (144 lb 2.9 oz)   BMI 23.27 kg/m²      Change in medical, surgical, family or social history: No      REVIEWED MEDICATION LIST RECONCILED INCLUDING ABOVE MEDS:  Yes      "

## 2025-04-16 NOTE — ASSESSMENT & PLAN NOTE
Cont amiodarone 200mg po daily metoprolol ER 100mg daily , and eliquis 5mg po bid   Monitor for bleeding, CP, SOB, FLORES  Keep BP >110/60 and 130/80, HR 60-80s  F/u cardiology as scheduled

## 2025-04-16 NOTE — Clinical Note
Deepali please refer patient to cancer genetics for hereditary cancer screening referral placed  Please schedule patient return GI clinic 6 months for follow-up

## 2025-04-16 NOTE — Clinical Note
Very important  Deepali please refer Tiffany somewhat urgently to nurse surgery to evaluate for suspected normal pressure hydrocephalus CT scan shows ventriculomegaly out of proportion to the degree of sulci enlargement she has been having several falls over 3 which he had been very risky for her and she is now starting to have some partial urinary incontinence.  Please evaluate for normal pressure hydrocephalus.  Referral placed

## 2025-04-16 NOTE — ASSESSMENT & PLAN NOTE
-Continue Levothyroxine 112 mcg daily - repeat TSH, free T4 today medardo while taking Amiodarone

## 2025-04-16 NOTE — ASSESSMENT & PLAN NOTE
Cont Jardiance 10mg daily, Aldactone 12.5mg daily, metoprolol ER 100mg daily   Repeat TTE  on 5/19/2025  Take Lasix 20mg prn LE edema, PND, orthopnea  Check BNP today   F/u cardiology as scheduled

## 2025-04-16 NOTE — PROGRESS NOTES
[facility] Consultation Note    Reason for Consult:  The primary encounter diagnosis was Gait abnormality. Diagnoses of Falls frequently, Cerebral ventriculomegaly, Colon adenoma, and Family history of colon cancer were also pertinent to this visit.    PCP:   Rebeca Dumont   No address on file    Referring MD:  Self, Aaareferral  No address on file    Initial History of Present Illness (HPI):  This is a 89 y.o. female here for evaluation of family history of colon cancer patient's mom with colon cancer at age 75 dad sister with colon cancer in her 70s her brother with colon polyps patient has personally had 20 colon adenomas polyps remove some of which of these polyps were advanced adenomas based on histology and size.  Patient has 4 children will refer patient to cancer genetics to rule out hereditary polyposis or cancer syndrome she still has her ovaries and uterus.  Patient did fine post colonoscopy which was done on March 7, 2025 we recommend your next surveillance colonoscopy in 2-3 years pending her health and her genetic results and risks benefits at advanced age in on blood thinner.  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  I will message her primary care as well up-to-date information given to her  to review.  No GI bleeding no change in bowel habits no heartburn no chest pain or shortness of breath no headaches.  No dysuria urgency or frequency    Abdominal pain - no  Reflux - no  Dysphagia - no   Bowel habits - normal  GI bleeding - none  NSAID usage - none    Interval HPI 04/16/2025:  The patient's last visit with me was on 9/21/2021.      ROS:  Constitutional: No  fevers, chills, No weight loss  ENT:  No heartburn no dysphagia no odynophagia no hoarseness  CV: No chest pain, no palpitation  Pulm: No cough, No shortness of breath, no wheezing  Ophtho: No vision changes  GI: see HPI  Derm: No rash, no itching  Heme: No lymphadenopathy, No easy bruising  MSK: No significant arthritis  : No dysuria, No hematuria, new onset partial urinary incontinence  Endo: No hot or cold intolerance  Neuro:  Gait disturbances and recent falls  Psych: No uncontrolled anxiety, No uncontrolled depression    Medical History:  has a past medical history of Abnormal MRI (10/16/2020), Acute combined systolic and diastolic heart failure (01/04/2025), Acute cystitis without hematuria (12/22/2021), Anemia (01/04/2025), Arthritis, Arthritis of right hip (11/08/2021), Atrial fibrillation, Bilateral leg edema (01/02/2025), Brittle nails (02/08/2024), Cataract, Elevated liver enzymes, Endometrial mass (06/29/2018), Epiretinal membrane (ERM) of right eye, Family history of malignant neoplasm of gastrointestinal tract (mother), Frequent UTI (09/30/2018), Generalized arthritis (09/19/2024), Graves disease, History of cholecystectomy (06/10/2021), History of colonoscopy, Hyperlipidemia, Hypertension, Hypertriglyceridemia (04/19/2013), Hyponatremia (01/07/2025), Hypothyroidism, Impaired functional mobility, balance, gait, and endurance (06/04/2021), Iron deficiency anemia (09/29/2021), Longstanding persistent atrial fibrillation (01/04/2025), Migraine, ophthalmoplegic, Mixed stress and urge urinary incontinence (09/30/2018), Ocular migraine, Osteopenia (12/03/2016), Personal history of colonic polyps, Physical deconditioning (09/13/2023), Preop testing (05/17/2021), S/P colonoscopic polypectomy (06/18/2013), Sleep disorder (11/08/2021), Status post hysteroscopic polypectomy (07/02/2018), TBI (traumatic brain injury) (10/25/2024), TIA (transient ischemic attack), Transaminitis (03/19/2021), and Urethral caruncle  (09/15/2020).    Surgical History:  has a past surgical history that includes Tonsillectomy; Joint replacement (03/23/2011); Colonoscopy w/ polypectomy (06/2013); Eye surgery (11/10/2014); Colonoscopy; Colonoscopy (N/A, 11/07/2016); Hysteroscopic polypectomy of uterus (N/A, 07/02/2018); Colonoscopy (N/A, 03/09/2020); Endoscopic ultrasound of upper gastrointestinal tract (N/A, 04/29/2021); ERCP (N/A, 04/29/2021); Laparoscopic cholecystectomy (N/A, 05/17/2021); Esophagogastroduodenoscopy (N/A, 09/29/2021); Colonoscopy (N/A, 09/29/2021); Esophagogastroduodenoscopy (N/A, 09/29/2021); Colonoscopy (N/A, 09/29/2021); Removal of epiretinal membrane (Right); Cataract extraction (Right, 2012); Injection of joint (Right, 11/11/2022); Cholecystectomy (2022); Treatment of cardiac arrhythmia (N/A, 1/6/2025); transesophageal echocardiogram with possible cardioversion (prasanth w/ poss cardioversion) (N/A, 1/6/2025); and colonoscopy, screening, high risk patient (N/A, 3/7/2025).    Family History: family history includes Colon cancer (age of onset: 75) in her mother; Colon cancer (age of onset: 80) in her paternal aunt; Diabetes in her paternal aunt and another family member; Lung cancer (age of onset: 75) in her father; Prostate cancer in her brother..     Social History:  reports that she has never smoked. She has never used smokeless tobacco. She reports current alcohol use. She reports that she does not use drugs.    Review of patient's allergies indicates:   Allergen Reactions    Levaquin [levofloxacin]      Joint pain    Hydrochlorothiazide Other (See Comments)     Pain in joints and depression per pt       Medication List with Changes/Refills   Current Medications    ACETAMINOPHEN (TYLENOL) 500 MG TABLET    Take 500 mg by mouth every 6 (six) hours as needed.    AMIODARONE (PACERONE) 200 MG TAB    Take 1 tablet (200 mg total) by mouth once daily.    AMMONIUM LACTATE 12 % CREA    Apply topically bid to legs    ATORVASTATIN  "(LIPITOR) 10 MG TABLET    Take 1 tablet by mouth once daily    BISACODYL (DULCOLAX) 5 MG EC TABLET    Take 5 mg by mouth once daily. Pt states taking twice per day    CALCIUM CARBONATE/VITAMIN D3 (CALCIUM 600 + D,3, ORAL)    Take 1 tablet by mouth once daily.    DEXTRAN 70/HYPROMELLOSE (ARTIFICIAL TEARS, PF, OPHT)    Place 1 drop into both eyes as needed.    ELIQUIS 5 MG TAB    Take 1 tablet by mouth twice daily    EMPAGLIFLOZIN (JARDIANCE) 10 MG TABLET    Take 1 tablet (10 mg total) by mouth once daily.    ESTRADIOL (ESTRACE) 0.01 % (0.1 MG/GRAM) VAGINAL CREAM    Place 0.5 g intravaginally q.h.s. x2 weeks; then, placed 0.5 g intravaginally twice weekly at bedtime (maintenance therapy).    FLUTICASONE PROPIONATE (FLONASE) 50 MCG/ACTUATION NASAL SPRAY    CLEAN SINUS/NARES WITH OCEAN NASAL SPRAY THEN USE FLONASE 1 SPRAY TWICE DAILY.    FUROSEMIDE (LASIX) 20 MG TABLET    Take 1 tablet (20 mg total) by mouth daily as needed (for worsening shortness of breath, swelling or 3lb weight gain on home scale).    LEVOTHYROXINE (SYNTHROID) 112 MCG TABLET    TAKE 1 TABLET BY MOUTH BEFORE BREAKFAST    LOSARTAN (COZAAR) 100 MG TABLET    Take 1 tablet by mouth once daily    METOPROLOL SUCCINATE (TOPROL-XL) 100 MG 24 HR TABLET    Take 1 tablet (100 mg total) by mouth once daily.    SODIUM CHLORIDE (SALINE NASAL) 0.65 % NASAL SPRAY    1 spray by Nasal route as needed for Congestion.    SPIRONOLACTONE (ALDACTONE) 25 MG TABLET    Take 1 tablet (25 mg total) by mouth once daily.    VIT A/VIT C/VIT E/ZINC/COPPER (ICAPS AREDS ORAL)    Take 1 tablet by mouth 2 (two) times a day.         Objective Findings:    Vital Signs:  Ht 5' 6" (1.676 m)   Wt 65.4 kg (144 lb 2.9 oz)   BMI 23.27 kg/m²   Body mass index is 23.27 kg/m².    Physical Exam:  General Appearance: Well appearing in no acute distress  Eyes:    No scleral icterus  ENT:  No lesions or masses   Lungs:  No wheezes, no rhonchi, no rales  Abdomen:  Non distended, soft, no guarding, " no rebound, no tenderness, no appreciated ascites, no bruits, no hepatosplenomegaly,  No CVA tenderness, no appreciated hernias, no Sanchez sign no McBurney point tenderness  Musculoskeletal:  No major joint deformities  Skin: No petechiae or rash on exposed skin areas  Neurologic:  Alert and oriented x4  Psychiatric:  Normal speech mentation and affect    Labs:  Lab Results   Component Value Date    WBC 8.14 04/15/2025    HGB 11.7 (L) 04/15/2025    HCT 37.5 04/15/2025     04/15/2025    CHOL 148 04/15/2025    TRIG 84 04/15/2025    HDL 51 04/15/2025    ALT 18 01/27/2025    AST 17 01/27/2025     04/15/2025    K 4.1 04/15/2025     04/15/2025    CREATININE 1.1 04/15/2025    BUN 14 04/15/2025    CO2 22 (L) 04/15/2025    TSH 0.734 04/15/2025    INR 1.1 09/22/2024    HGBA1C 5.8 (H) 08/31/2020       Medical Decision Making:  Total time with patient reviewing medical records taking history physical exam discussing  With patient and  communicating with patient's primary care placing referrals Has been 40  minutes with greater than 50% of the time face-to-face in room today  Lab work reviewed  Prior colonoscopy use all the reports and path reviewed over 20 adenomas removed so far some advanced by histology and size  CT scan images and report personally reviewed by myself  Referral placed to neurology  Normal pressure hydrocephalus talk given handout from up-to-date given to patient patient's  to read  The pbsi scope EC-760S-V/L ([id number]) was                          introduced through the anus and advanced to the                          cecum, identified by appendiceal orifice and                          ileocecal valve. The colonoscopy was technically                          difficult and complex due to restricted mobility                          of the colon, a redundant colon, significant                          looping and a tortuous colon. Successful                           completion of the procedure was aided by changing                          the patient to a supine position, using manual                          pressure, withdrawing and reinserting the scope,                          straightening and shortening the scope to obtain                          bowel loop reduction and using scope torsion. The                          patient tolerated the procedure well. The quality                          of the bowel preparation was good. The ileocecal                          valve, appendiceal orifice, and rectum were                          photographed. Scope insertion time was 16 minutes.                          Scope withdrawal time was 24 minutes. The total                          duration of the procedure was 41 minutes.                          Colonoscopy polyp detection was aided by [facility]                          [facility] intelligent endoscopy module. Greater than                          usual services more than twice time of a routine                          screening colonoscopy due to restricted mobility                          of the colon and tortuous colon and multiple colon                          polyps and patient on Eliquis. Good look.   Findings:       Diverticula were found in the recto-sigmoid colon, sigmoid colon,        descending colon, transverse colon and ascending colon.        The perianal and digital rectal examinations were normal.        The retroflexed view of the distal rectum and anal verge was normal        and showed no anal or rectal abnormalities.        Non-bleeding internal hemorrhoids were found. The hemorrhoids were        mild.        A 2 mm polyp was found in the ascending colon. The polyp was        sessile. The polyp was removed with a cold snare. Resection and        retrieval were complete. To close a defect after polypectomy, one        hemostatic clip was successfully placed. Clip : [employer].  There was no bleeding at the end of the procedure.        A 2 mm polyp was found in the ascending colon. The polyp was        sessile. The polyp was removed with a cold snare. Resection and        retrieval were complete. To close a defect after polypectomy, one        hemostatic clip was successfully placed. Clip : [employer]        [employer]. There was no bleeding at the end of the procedure.        A 2 mm polyp was found in the ascending colon. The polyp was        sessile. The polyp was removed with a cold snare. Resection and        retrieval were complete. To close a defect after polypectomy, one        hemostatic clip was successfully placed. Clip : [employer]        [employer]. There was no bleeding at the end of the procedure.        A less than 1 mm polyp was found in the ascending colon. The polyp        was sessile. The polyp was removed with a cold snare. Resection and        retrieval were complete. Estimated blood loss: none.        A 3 mm polyp was found in the transverse colon. The polyp was        sessile. The polyp was removed with a cold snare. Resection and        retrieval were complete. Estimated blood loss was minimal. To close        a defect after polypectomy, one hemostatic clip was successfully        placed. Clip : [employer]. There was no bleeding        at the end of the procedure.        A 3 mm polyp was found in the transverse colon. The polyp was        sessile. The polyp was removed with a cold snare. Resection and        retrieval were complete. To close a defect after polypectomy, one        hemostatic clip was successfully placed. Clip : [employer]        [employer]. There was no bleeding at the end of the procedure.        A 3 mm polyp was found in the descending colon. The polyp was        sessile. The polyp was removed with a cold snare. Resection and        retrieval were complete. To close a defect after polypectomy, one         hemostatic clip was successfully placed. Clip : [employer]. There was no bleeding at the end of the procedure.        A 3 mm polyp was found in the rectum. The polyp was sessile. The        polyp was removed with a cold snare. Resection and retrieval were        complete. To close a defect after polypectomy, one hemostatic clip        was successfully placed. Clip : [employer]. There        was no bleeding at the end of the procedure.   Impression:            - Diverticulosis in the recto-sigmoid colon, in                          the sigmoid colon, in the descending colon, in the                          transverse colon and in the ascending colon.                          - Non-bleeding internal hemorrhoids.                          - One 2 mm polyp in the ascending colon, removed                          with a cold snare. Resected and retrieved. Clip                          was placed. Clip : [employer].                          - One 2 mm polyp in the ascending colon, removed                          with a cold snare. Resected and retrieved. Clip                          was placed. Clip : [employer].                          - One 2 mm polyp in the ascending colon, removed                          with a cold snare. Resected and retrieved. Clip                          was placed. Clip : [employer].                          - One less than 1 mm polyp in the ascending colon,                          removed with a cold snare. Resected and retrieved.                          - One 3 mm polyp in the transverse colon, removed                          with a cold snare. Resected and retrieved. Clip                          was placed. Clip : [employer].                          - One 3 mm polyp in the transverse colon, removed                          with a cold snare. Resected and retrieved.  Clip                          was placed. Clip : [email or web address].                          - One 3 mm polyp in the descending colon, removed                          with a cold snare. Resected and retrieved. Clip                          was placed. Clip : [email or web address].                          - One 3 mm polyp in the rectum, removed with a                          cold snare. Resected and retrieved. Clip was                          placed. Clip : [email or web address].   Recommendation:        - Discharge patient to home.                          - Resume Eliquis (apixaban) at prior dose tomorrow.                          - Discharge patient to home.                          - Await pathology results.                          - Telephone endoscopist for pathology results in 3                          weeks.                          - Repeat colonoscopy in 3 years for surveillance                          of multiple polyps.                          - Return to primary care physician.                          - Return to GI clinic.                          - The findings and recommendations were discussed                          with the patient.   Attending Participation:        I personally performed the entire procedure.   Bebeto Gonzalez MD   3/7/2025   Final Pathologic Diagnosis 1. Colon, asscending colon, polyp x4, polypectomy:  - Tubular adenoma(s), multiple fragments    2. Colon, transverse colon, polyp x2, polypectomy:  - Tubular adenoma(s), multiple fragments    3. Colon, descending colon, polyp, polypectomy:  - Tubular adenoma    4. Rectum, polyp, polypectomy:  - Tubular adenoma   Comment: Interp By Chuy Martínez D.O., Signed on 03/10/2025       Assessment:  1. Gait abnormality    2. Falls frequently    3. Cerebral ventriculomegaly    4. Colon adenoma    5. Family history of colon cancer         Recommendations:  1. Urgent referral to neurosurgery for  evaluation of possible normal pressure hydrocephalus in light of her recent falls gait issues little bit of memory issues and new urinary partial incontinence  2. Referral to Urology for urinary incontinence rule out UTI  3. Referral to cancer genetics since patient has 4 children patient has had over 20 adenomas colon polyp some advanced by histology and size and family history of brother with colon polyps patient's mom with colon cancer at 75 and dad sister with colon cancer in her 70s.  This evaluation would benefit patient's first-degree relatives mainly her children  4. Next surveillance colonoscopy 3 years from her last which would be March of 2027 potentially modified by genetic testing results possibly.  However patient has low cardiac EF on blood thinners and challenging Colon due to restricted mobility of the colon, a redundant colon, significant looping and a tortuous colon.   5. Return to GI clinic 6 months for follow-up okay for telemedicine video visit            Follow up in about 6 months (around 10/16/2025).      Order summary:  Orders Placed This Encounter    Ambulatory referral/consult to Neurosurgery    Ambulatory referral/consult to [facility]         Thank you so much for allowing me to participate in the care of Tiffany MOSS Mastersonshruti Gonzalez MD    DISCLAIMER: This note was prepared with [employer] voice recognition transcription software. Garbled syntax, mangled or inadvertent pronouns, and other bizarre constructions may be attributed to that software system. While efforts were made to correct any mistakes made by this voice recognition program, some errors and/or omissions may remain in the note that were missed when the note was originally created.

## 2025-04-17 ENCOUNTER — CLINICAL SUPPORT (OUTPATIENT)
Dept: REHABILITATION | Facility: HOSPITAL | Age: 89
End: 2025-04-17
Payer: MEDICARE

## 2025-04-17 ENCOUNTER — PATIENT MESSAGE (OUTPATIENT)
Dept: NEUROSURGERY | Facility: CLINIC | Age: 89
End: 2025-04-17
Payer: MEDICARE

## 2025-04-17 ENCOUNTER — TELEPHONE (OUTPATIENT)
Dept: NEUROSURGERY | Facility: CLINIC | Age: 89
End: 2025-04-17
Payer: MEDICARE

## 2025-04-17 ENCOUNTER — PATIENT MESSAGE (OUTPATIENT)
Dept: ADMINISTRATIVE | Facility: OTHER | Age: 89
End: 2025-04-17
Payer: MEDICARE

## 2025-04-17 DIAGNOSIS — R41.841 COGNITIVE COMMUNICATION DEFICIT: Primary | ICD-10-CM

## 2025-04-17 PROCEDURE — 97129 THER IVNTJ 1ST 15 MIN: CPT | Mod: HCNC,PO

## 2025-04-17 PROCEDURE — 97130 THER IVNTJ EA ADDL 15 MIN: CPT | Mod: HCNC,PO

## 2025-04-17 NOTE — TELEPHONE ENCOUNTER
Appt scheduled     ----- Message from Med Assistant Severino sent at 4/17/2025 10:55 AM CDT -----  HiPlease see referral in EPIC to Dr. Rodriguez. Patient was seen in the pass by Lali, but has a new referral to Dr. Rodriguez. Please advise if you want me to make the appointment with Lali I can.Thanks

## 2025-04-17 NOTE — PROGRESS NOTES
"Outpatient Rehab  Speech-Language Pathology Visit    Patient Name: Tiffany Masterson  MRN: [id number]  YOB: 1935  Encounter Date: 4/17/2025    Therapy Diagnosis:   Encounter Diagnosis   Name Primary?    Cognitive communication deficit Yes       Physician: Mirlande Moore MD    Physician Orders: Eval and Treat  Medical Diagnosis: Concussion with loss of consciousness, sequela  Cognitive changes    Visit # / Visits Authorized: 7 / 10   Insurance Authorization Period: 3/11/2025 to 5/2/2025  Date of Evaluation: 3/11/2025   Plan of Care Certification: 3/11/2025 to 4/25/25      Time In: 0330  Time Out: 0415  Total Time: 45   Total Billable Time:  45 minutes     Subjective   Patient arrived to scheduled speech therapy session on time. Patient reported she is going to "get her brain checked"..  Pain reported as 0/10.      Objective          Immediate memory:  - Constant Therapy: Read a Paragraph Level 2: Patient read a paragraph aloud and answered comprehension questions with 70% accuracy given moderate cuing. Patient required moderate cuing to operate software/amy as well.     Delayed memory: (5 minute delay)  - Not formally addressed today. However, she recalled information from the previous session.    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 (She reported that her  organizes her medication at home, but she is able to take them independently. However, her  still monitors her while she takes her " medication)    Mental Manipulation:  -Unscrambling sentences- patient unscrambled 3 word sentences with 100% accuracy with incorporation of strategies (I.e. writing the words down).   -Attempted to start unscrambling 4 words, but ran out of time. The patient unscrambled 1 (with 4 words) with 100% with incorporation of strategies (I.e. writing down the words). She stated she does not like this task.    Word finding:   No moments of word finding were observed during this session      Time Entry(in minutes):  Cognitive Skill Development Time Entry: 15  Cognitive Skill Development (Medicare) Time Entry: 30    Assessment & Plan   Assessment   Patient participated readily in all exercises today. Repetition and visualization continue to be helpful strategies for patient during therapy tasks. Patient with significant difficulty during pill organization task, requiring maximal cuing initially. After color distinction of the pills and multiple explanations/clarifications, the task was clearly understood and the patient was able to continue with minimal cuing. Patient did well with unscrambling 3 word sentences with incorporation of strategies (I.e. writing down the words). The patient required moderate cuing during immediate memory task today. She also required moderate cuing to operate the software/amy. The patient did well naming items found in a bathroom, but became distracted naming items found in a school. She used to be a  and began talking about her personal experiences. Patient able to recognize and self correct errors in conversation. Patient tolerated treatment well and is progressing well towards goals.      Patient will continue to benefit from skilled outpatient speech therapy to address the deficits listed in the problem list box on initial evaluation, provide pt/family education and to maximize pt's level of independence in the home and community environment.     Patient's spiritual, cultural, and  educational needs considered and patient agreeable to plan of care and goals.      Plan   Continue plan of care with focus on cognitive communication and memory skills.         Goals:   Active       Long Term Goals       She will use appropriate memory strategies to schedule and recall weekly activities, express needs and recall names to maintain safety and participate socially in functional living environment.    (Progressing)       Start:  03/13/25    Expected End:  04/25/25               Short Term Goals       1. Patient will participate in functional immediate memory tasks (appointments, voicemails) at 80% accuracy and min A to improve immediate recall of information within her daily activities and communication. (Progressing)       Start:  03/13/25    Expected End:  04/25/25            2. Patient will recall key details from a reading or picture task at 80% accuracy and min A to improve recall of important details during everyday activities. (Progressing)       Start:  03/13/25    Expected End:  04/25/25            3. Patient will recall information from memory tasks given a 5 minute delay at 80% accuracy and min A to improve retention of given information within a period of time, required within daily communication and activities.  (Progressing)       Start:  03/13/25    Expected End:  04/25/25            4. Patient will complete sequencing/organizational tasks at 80% accuracy and min A to increase independence with organizational skills needed to manage daily activities.  (Progressing)       Start:  03/13/25    Expected End:  04/25/25            5. Patient will name 15 items given a concrete category at 80% accuracy and min A to improve word retrieval skills, required during daily communication.  (Progressing)       Start:  03/13/25    Expected End:  04/25/25                Jewels JEONG, [facility]  Clinician    4/17/2025

## 2025-04-21 ENCOUNTER — CLINICAL SUPPORT (OUTPATIENT)
Dept: REHABILITATION | Facility: HOSPITAL | Age: 89
End: 2025-04-21
Payer: MEDICARE

## 2025-04-21 ENCOUNTER — TELEPHONE (OUTPATIENT)
Dept: NEUROSURGERY | Facility: CLINIC | Age: 89
End: 2025-04-21
Payer: MEDICARE

## 2025-04-21 DIAGNOSIS — R41.841 COGNITIVE COMMUNICATION DEFICIT: Primary | ICD-10-CM

## 2025-04-21 PROCEDURE — 97129 THER IVNTJ 1ST 15 MIN: CPT | Mod: HCNC,PO

## 2025-04-21 PROCEDURE — 97130 THER IVNTJ EA ADDL 15 MIN: CPT | Mod: HCNC,PO

## 2025-04-21 NOTE — PROGRESS NOTES
Outpatient Rehab  Speech-Language Pathology Visit    Patient Name: Tiffany Masterson  MRN: [id number]  YOB: 1935  Encounter Date: 4/21/2025    Therapy Diagnosis:   Encounter Diagnosis   Name Primary?    Cognitive communication deficit Yes     Physician: Mirlande Moore MD    Physician Orders: Eval and Treat  Medical Diagnosis: Concussion with loss of consciousness, sequela  Cognitive changes    Visit # / Visits Authorized: 8 / 10   Insurance Authorization Period: 3/11/2025 to 5/2/2025  Date of Evaluation: 3/11/2025   Plan of Care Certification: 3/11/2025 to 4/25/25      Time In:  13:45  Time Out:  14:30  Total Time:   45 minutes  Total Billable Time:  45 minutes     Subjective    Patient arrived to speech therapy appointment on time. Patient reported no pain at the beginning of the session.          Objective          Immediate memory:  - Given an image, the patient able to recall key details from given images with 90% accuracy; 100% accuracy with min cueing.     Delayed memory: (5 minute delay)  - Patient recalled key details of a given picture with a 5 minute delay at 90% accuracy given min cueing.   - Patient recalled the amount of awards she previously received, associated organization of awards, and specific details relating to the awards received (19 rewards)     Naming items in a concrete category: (15 minimum)  - Patient named items that you would need for hurricane prep, the patient named 6 items in 54 seconds given mod A.  - Patient named items that you would find in a grocery store, the patient named 10 items in 1 minute 15 seconds with min-mod A. (Patient began talking about her 's cooking at home during naming task)    Organization Task:  - Not addressed formally during session  - Patient sequenced weekly schedule for attendance of each organization that she participates in.     Mental Manipulation:  - Not addressed during session.  - Noted difficulty with recall of given 3-4 units  lists. Patient benefited from use of more functional items (names, food,etc)     Word findin instances of word finding were noted during conversation of therapy, but patient asked for extra time to get through word finding difficulty. Patient independently utilize her word finding strategies throughout session conversations.    Assessment & Plan   Assessment   Patient participated readily in all exercises today. Today's session focused on memory, attention, organization, naming, and word finding. Patient required min-mod A during given tasks. During naming task, patient required redirection to naming category. Patient expressed difficulty in previous session with 4 unit words. Patient was educated on the use of the tasks as it relates to memory, but that finding something more functional (list of names or food items) may be more beneficial in future sessions. Patient tolerated treatment and is progressing well towards her goals.      Patient will continue to benefit from skilled outpatient speech therapy to address the deficits listed in the problem list box on initial evaluation, provide pt/family education and to maximize pt's level of independence in the home and community environment.     Patient's spiritual, cultural, and educational needs considered and patient agreeable to plan of care and goals.      Plan   Continue plan of care with focus on cognitive communication and memory skills. Plan of care ends 25. Consider discharge versus extension.        Goals:   Active       Long Term Goals       She will use appropriate memory strategies to schedule and recall weekly activities, express needs and recall names to maintain safety and participate socially in functional living environment.    (Progressing)       Start:  25    Expected End:  25               Short Term Goals       1. Patient will participate in functional immediate memory tasks (appointments, voicemails) at 80% accuracy and  "min A to improve immediate recall of information within her daily activities and communication. (Progressing)       Start:  03/13/25    Expected End:  04/25/25            2. Patient will recall key details from a reading or picture task at 80% accuracy and min A to improve recall of important details during everyday activities. (Progressing)       Start:  03/13/25    Expected End:  04/25/25            3. Patient will recall information from memory tasks given a 5 minute delay at 80% accuracy and min A to improve retention of given information within a period of time, required within daily communication and activities.  (Progressing)       Start:  03/13/25    Expected End:  04/25/25            4. Patient will complete sequencing/organizational tasks at 80% accuracy and min A to increase independence with organizational skills needed to manage daily activities.  (Progressing)       Start:  03/13/25    Expected End:  04/25/25            5. Patient will name 15 items given a concrete category at 80% accuracy and min A to improve word retrieval skills, required during daily communication.  (Progressing)       Start:  03/13/25    Expected End:  04/25/25              Clara BUNDY    Clinician    "I, Antoinette Monsivais, certify that I was present in the room directing the student and service delivery and guiding them using my skilled judgement. As the co-signing therapist, I have reviewed the student's documentation, and am responsible for the treatment, assessment and plan."     Antoinette Monsivais M.S., L-SLP,CCC-SLP   Speech Language Pathologist         "

## 2025-04-21 NOTE — TELEPHONE ENCOUNTER
----- Message from Latesha sent at 4/16/2025 12:23 PM CDT -----  Regarding: Appt Access  Contact: [phone]  Rosemary/Amisha Gonzalez calling to schedule appt for patient Gait [id number] (ICD-10-CM) - Falls [id number] (ICD-10-CM) - Hydrocephalus, Please schedule appt ASAP. Per Dr. Guidoase schedule on with an MD, Please call patient to schedule

## 2025-04-23 ENCOUNTER — TELEPHONE (OUTPATIENT)
Dept: HEMATOLOGY/ONCOLOGY | Facility: CLINIC | Age: 89
End: 2025-04-23
Payer: MEDICARE

## 2025-04-23 NOTE — TELEPHONE ENCOUNTER
Called and spoke to patient to set up an in office genetic appointment and to complete the questionnaire.

## 2025-04-24 ENCOUNTER — OFFICE VISIT (OUTPATIENT)
Dept: NEUROSURGERY | Facility: CLINIC | Age: 89
End: 2025-04-24
Payer: MEDICARE

## 2025-04-24 ENCOUNTER — TELEPHONE (OUTPATIENT)
Dept: NEUROSURGERY | Facility: CLINIC | Age: 89
End: 2025-04-24
Payer: MEDICARE

## 2025-04-24 ENCOUNTER — LAB VISIT (OUTPATIENT)
Dept: LAB | Facility: HOSPITAL | Age: 89
End: 2025-04-24
Attending: NEUROLOGICAL SURGERY
Payer: MEDICARE

## 2025-04-24 ENCOUNTER — CLINICAL SUPPORT (OUTPATIENT)
Dept: REHABILITATION | Facility: HOSPITAL | Age: 89
End: 2025-04-24
Payer: MEDICARE

## 2025-04-24 VITALS — SYSTOLIC BLOOD PRESSURE: 139 MMHG | HEART RATE: 65 BPM | DIASTOLIC BLOOD PRESSURE: 68 MMHG

## 2025-04-24 DIAGNOSIS — Z51.81 MONITORING FOR ANTICOAGULANT USE: ICD-10-CM

## 2025-04-24 DIAGNOSIS — G93.89 CEREBRAL VENTRICULOMEGALY: ICD-10-CM

## 2025-04-24 DIAGNOSIS — G91.2 NPH (NORMAL PRESSURE HYDROCEPHALUS): Primary | ICD-10-CM

## 2025-04-24 DIAGNOSIS — R29.6 FALLS FREQUENTLY: ICD-10-CM

## 2025-04-24 DIAGNOSIS — G91.2 NPH (NORMAL PRESSURE HYDROCEPHALUS): ICD-10-CM

## 2025-04-24 DIAGNOSIS — Z79.01 MONITORING FOR ANTICOAGULANT USE: ICD-10-CM

## 2025-04-24 DIAGNOSIS — Z01.818 PRE-OP EXAM: ICD-10-CM

## 2025-04-24 DIAGNOSIS — R41.841 COGNITIVE COMMUNICATION DEFICIT: Primary | ICD-10-CM

## 2025-04-24 DIAGNOSIS — R26.9 GAIT ABNORMALITY: ICD-10-CM

## 2025-04-24 LAB
ABSOLUTE EOSINOPHIL (OHS): 0.3 K/UL
ABSOLUTE MONOCYTE (OHS): 0.97 K/UL (ref 0.3–1)
ABSOLUTE NEUTROPHIL COUNT (OHS): 5.91 K/UL (ref 1.8–7.7)
ALBUMIN SERPL BCP-MCNC: 3.8 G/DL (ref 3.5–5.2)
ALP SERPL-CCNC: 92 UNIT/L (ref 40–150)
ALT SERPL W/O P-5'-P-CCNC: 13 UNIT/L (ref 10–44)
ANION GAP (OHS): 6 MMOL/L (ref 8–16)
APTT PPP: 29.8 SECONDS (ref 21–32)
AST SERPL-CCNC: 15 UNIT/L (ref 11–45)
BASOPHILS # BLD AUTO: 0.06 K/UL
BASOPHILS NFR BLD AUTO: 0.7 %
BILIRUB SERPL-MCNC: 0.5 MG/DL (ref 0.1–1)
BUN SERPL-MCNC: 18 MG/DL (ref 8–23)
CALCIUM SERPL-MCNC: 9.7 MG/DL (ref 8.7–10.5)
CHLORIDE SERPL-SCNC: 102 MMOL/L (ref 95–110)
CO2 SERPL-SCNC: 28 MMOL/L (ref 23–29)
CREAT SERPL-MCNC: 1.3 MG/DL (ref 0.5–1.4)
ERYTHROCYTE [DISTWIDTH] IN BLOOD BY AUTOMATED COUNT: 13 % (ref 11.5–14.5)
GFR SERPLBLD CREATININE-BSD FMLA CKD-EPI: 40 ML/MIN/1.73/M2
GLUCOSE SERPL-MCNC: 92 MG/DL (ref 70–110)
HCT VFR BLD AUTO: 35.8 % (ref 37–48.5)
HGB BLD-MCNC: 11.6 GM/DL (ref 12–16)
IMM GRANULOCYTES # BLD AUTO: 0.06 K/UL (ref 0–0.04)
IMM GRANULOCYTES NFR BLD AUTO: 0.7 % (ref 0–0.5)
INR PPP: 1.1 (ref 0.8–1.2)
LYMPHOCYTES # BLD AUTO: 1.02 K/UL (ref 1–4.8)
MCH RBC QN AUTO: 30.8 PG (ref 27–31)
MCHC RBC AUTO-ENTMCNC: 32.4 G/DL (ref 32–36)
MCV RBC AUTO: 95 FL (ref 82–98)
NUCLEATED RBC (/100WBC) (OHS): 0 /100 WBC
PLATELET # BLD AUTO: 221 K/UL (ref 150–450)
PMV BLD AUTO: 10.1 FL (ref 9.2–12.9)
POTASSIUM SERPL-SCNC: 4.4 MMOL/L (ref 3.5–5.1)
PROT SERPL-MCNC: 7.5 GM/DL (ref 6–8.4)
PROTHROMBIN TIME: 12.2 SECONDS (ref 9–12.5)
RBC # BLD AUTO: 3.77 M/UL (ref 4–5.4)
RELATIVE EOSINOPHIL (OHS): 3.6 %
RELATIVE LYMPHOCYTE (OHS): 12.3 % (ref 18–48)
RELATIVE MONOCYTE (OHS): 11.7 % (ref 4–15)
RELATIVE NEUTROPHIL (OHS): 71 % (ref 38–73)
SODIUM SERPL-SCNC: 136 MMOL/L (ref 136–145)
WBC # BLD AUTO: 8.32 K/UL (ref 3.9–12.7)

## 2025-04-24 PROCEDURE — 85730 THROMBOPLASTIN TIME PARTIAL: CPT | Mod: HCNC

## 2025-04-24 PROCEDURE — 1157F ADVNC CARE PLAN IN RCRD: CPT | Mod: CPTII,S$GLB,, | Performed by: NEUROLOGICAL SURGERY

## 2025-04-24 PROCEDURE — 97129 THER IVNTJ 1ST 15 MIN: CPT | Mod: PO

## 2025-04-24 PROCEDURE — 1159F MED LIST DOCD IN RCRD: CPT | Mod: CPTII,S$GLB,, | Performed by: NEUROLOGICAL SURGERY

## 2025-04-24 PROCEDURE — 99999 PR PBB SHADOW E&M-EST. PATIENT-LVL III: CPT | Mod: PBBFAC,,, | Performed by: NEUROLOGICAL SURGERY

## 2025-04-24 PROCEDURE — 1126F AMNT PAIN NOTED NONE PRSNT: CPT | Mod: CPTII,S$GLB,, | Performed by: NEUROLOGICAL SURGERY

## 2025-04-24 PROCEDURE — 36415 COLL VENOUS BLD VENIPUNCTURE: CPT | Mod: HCNC

## 2025-04-24 PROCEDURE — 84450 TRANSFERASE (AST) (SGOT): CPT

## 2025-04-24 PROCEDURE — 85610 PROTHROMBIN TIME: CPT | Mod: HCNC

## 2025-04-24 PROCEDURE — 99215 OFFICE O/P EST HI 40 MIN: CPT | Mod: S$GLB,,, | Performed by: NEUROLOGICAL SURGERY

## 2025-04-24 PROCEDURE — 85025 COMPLETE CBC W/AUTO DIFF WBC: CPT | Mod: HCNC

## 2025-04-24 PROCEDURE — 97130 THER IVNTJ EA ADDL 15 MIN: CPT | Mod: PO

## 2025-04-24 NOTE — PROGRESS NOTES
"Neurosurgery  History & Physical    SUBJECTIVE:     Chief Complaint: query NPH     History of Present Illness:  "Tiffany Masterson is a 89 y.o. female with PMH significant for Afib on Eliquis, who presents for evaluation after fall with head trauma in September. Pt had an unwitnessed fall down some stairs at home on 9/22/24, was found down by . Presented to [facility] ED, CTH was notable for L tSAH and L cerebellar punctate IPH, as well as R non-displaced occipital bone fx extending just medial to the mastoid air cells. She also had a large R gluteal hematoma. GCS 9 on presentation. Eliquis was reversed. She was admitted to Trauma ICU. Required intubation 2/2 respiratory failure. She did not require any neurosurgical interventions. Her mental status and neurological exam improved significantly during hospitalization. Course c/b Afib with RVR. Home Eliquis was resumed on 10/6. Pt eventually discharged home with HH on 10/9.     "Today, presents to clinic for neurosurgical follow-up of hemorrhages with updated CTH. Accompanied by . Has been well overall since discharge. She has had some ongoing balance difficulty and mild memory deficit. Has had 2 falls since being back home, one was at least a few weeks ago (unsure exactly when, lost balance while walking). 2nd fall was this morning (fell out of bed, hit head on floor, has some small facial lacerations on R side). No LOC, has been at baseline since fall. Has remained on Eliquis. She had a follow-up CTH on 12/23, no imaging today."    As of 4/24/25, the patient is referred by Dr. Bebeto Gonzalez for consideration of possible normal pressure hydrocephalus. She is accompanied today by her , Lauro, who helps provide much of the history. He states that ever since the fall in September, his wife has had a significant cognitive decline. She denies any change in her gait (it has improved significantly since her accident). She has urinary urgency around 3am, which " is new in the last few months, together with some episodes of incontinence.     She is heavily involved in civic organizations, including the [employer] and the  [employer].     She remains on Eliquis for atrial fibrillation. She endorses some anesthetic difficulty when her son was born (66 years ago) but never subsequently.     Review of patient's allergies indicates:   Allergen Reactions    Levaquin [levofloxacin]      Joint pain    Hydrochlorothiazide Other (See Comments)     Pain in joints and depression per pt       Current Medications[1]    Past Medical History:   Diagnosis Date    Abnormal MRI 10/16/2020    Acute combined systolic and diastolic heart failure 01/04/2025    Acute cystitis without hematuria 12/22/2021    Anemia 01/04/2025    Arthritis     Arthritis of right hip 11/08/2021    Atrial fibrillation     Bilateral leg edema 01/02/2025    Brittle nails 02/08/2024    Cataract     Elevated liver enzymes     Endometrial mass 06/29/2018    Epiretinal membrane (ERM) of right eye     Family history of malignant neoplasm of gastrointestinal tract mother    Frequent UTI 09/30/2018    Generalized arthritis 09/19/2024    Shoulders, knees, hips      Graves disease     now hypothryoid - no surgery or medicine. resolved on its own    History of cholecystectomy 06/10/2021    History of colonoscopy     unremarkable 2007 by Dr. Javier.  Barium enema revealed diverticular disease.    Hyperlipidemia     Hypertension     Hypertriglyceridemia 04/19/2013    Continue statin for secondary stroke prevention    Hyponatremia 01/07/2025    , will monitor       Hypothyroidism     Impaired functional mobility, balance, gait, and endurance 06/04/2021    Iron deficiency anemia 09/29/2021    Longstanding persistent atrial fibrillation 01/04/2025    s/p SONIA/DCCV 1/6/25. EKG today in [facility].      Migraine, ophthalmoplegic     Mixed stress and urge urinary incontinence 09/30/2018    Ocular migraine     Osteopenia  12/03/2016 12/2022 Lumbar spine (L1-L4):              BMD is 0.934 g/cm2, T-score is -1.0, and Z-score is 1.8.     Total hip:                                BMD is 0.678 g/cm2, T-score is -2.2, and Z-score is 0.2.     Femoral neck:                          BMD is 0.664 g/cm2, T-score is -1.7, and Z-score is 0.9.     Distal 1/3 radius:                      Not applicable     FRAX:     34% risk of a major oste    Personal history of colonic polyps     Physical deconditioning 09/13/2023    Preop testing 05/17/2021    S/P colonoscopic polypectomy 06/18/2013    Sleep disorder 11/08/2021    Status post hysteroscopic polypectomy 07/02/2018    TBI (traumatic brain injury) 10/25/2024    9/22/2024: Ct head: Left frontotemporal subarachnoid hemorrhage again noted including a small focus of intraparenchymal hemorrhage in the left cerebellum. No new hemorrhage.       TIA (transient ischemic attack)     with a normal angiogram in the past, Ocular migraines reported by patient, not TIA     Transaminitis 03/19/2021    Urethral caruncle 09/15/2020     Past Surgical History:   Procedure Laterality Date    CATARACT EXTRACTION Right 2012    Dr. Lexis Nicolas     CHOLECYSTECTOMY  2022    COLONOSCOPY      2014 polyps    COLONOSCOPY N/A 11/07/2016    Procedure: COLONOSCOPY;  Surgeon: Bebeto Gonzalez MD;  Location: [address]);  Service: Endoscopy;  Laterality: N/A;    COLONOSCOPY N/A 03/09/2020    Procedure: COLONOSCOPY;  Surgeon: Bebeto Gonzalez MD;  Location: [address]);  Service: Endoscopy;  Laterality: N/A;    COLONOSCOPY N/A 09/29/2021    Procedure: COLONOSCOPY;  Surgeon: Bebeto Gonzalez MD;  Location: [address]);  Service: Endoscopy;  Laterality: N/A;  please schedule patient as soon as possible with me EGD colonoscopy with constipation bowel prep for iron deficiency anemia family history of colon cancer and personal history of colon polyps  9/17/21 ok for standard split prep per Dr. Gonzalez-ml     COLONOSCOPY N/A 09/29/2021    Procedure: COLONOSCOPY;  Surgeon: Bebeto Gonzalez MD;  Location: [facility] (4TH FLR);  Service: Endoscopy;  Laterality: N/A;  Please schedule patient as soon as possible with me EGD colonoscopy with constipation bowel prep for iron deficiency anemia family history of colon cancer and personal history of colon polyps  9/17/21 Ok for standard split prep per Dr. Gonzalez-ml    COLONOSCOPY W/ POLYPECTOMY  06/2013    polyp of colon    COLONOSCOPY, SCREENING, HIGH RISK PATIENT N/A 3/7/2025    Procedure: COLONOSCOPY, SCREENING, HIGH RISK PATIENT;  Surgeon: Bebeto Gonzalez MD;  Location: [facility] (2ND FLR);  Service: Endoscopy;  Laterality: N/A;  12/30 ref by luis Foley eliquis, surprep- gg/rb  1/27 LVM precall, pending Eliquis, Jardiance? -needing 2nd floor-ml  1/28 R/S to 2nd floor-ok to schedule per Dr. Gonzalez, pending Eliquis, Jardiance, suprep, portal -ml  2/27 precall att    ENDOSCOPIC ULTRASOUND OF UPPER GASTROINTESTINAL TRACT N/A 04/29/2021    Procedure: ULTRASOUND, UPPER GI TRACT, ENDOSCOPIC;  Surgeon: Dalton Johnson MD;  Location: [facility] (2ND FLR);  Service: Endoscopy;  Laterality: N/A;  Postprandial nausea vomiting epigastric 0.9 cm stone and sludge seen within the gallbladder lumen.  No wall thickening or pericholecystic fluid.  0.6 cm stone seen within the distal common bile duct at the level of the pancreatic head.  There is dil    ERCP N/A 04/29/2021    Procedure: ERCP (ENDOSCOPIC RETROGRADE CHOLANGIOPANCREATOGRAPHY);  Surgeon: Dalton Johnson MD;  Location: [facility] (2ND FLR);  Service: Endoscopy;  Laterality: N/A;  Postprandial nausea and vomit 0.9 cm stone and sludge seen within the gallbladder lumen.  No wall thickening or pericholecystic fluid.  0.6 cm stone seen within the distal common bile duct at the level of the pancreatic head.  There i    ESOPHAGOGASTRODUODENOSCOPY N/A 09/29/2021    Procedure: EGD (ESOPHAGOGASTRODUODENOSCOPY);  Surgeon: Bebeto GRAY  MD Lisa;  Location: [facility] ENDO (4TH FLR);  Service: Endoscopy;  Laterality: N/A;  rapid covid test arrival 12pm - sm  9/27 arrival time for covid test/procedure confirmed with pt-rb    ESOPHAGOGASTRODUODENOSCOPY N/A 09/29/2021    Procedure: EGD (ESOPHAGOGASTRODUODENOSCOPY);  Surgeon: Bebeto Gonzalez MD;  Location: [facility] ENDO (4TH FLR);  Service: Endoscopy;  Laterality: N/A;  covid test 9/19/21 [facility], prep instr emailed -ml    EYE SURGERY  11/10/2014    right retina/Dr. Dalton Sierra ([facility])    HYSTEROSCOPIC POLYPECTOMY OF UTERUS N/A 07/02/2018    Procedure: POLYPECTOMY, UTERUS, HYSTEROSCOPIC;  Surgeon: Elliot Vanessa IV, MD;  Location: [facility] OR;  Service: OB/GYN;  Laterality: N/A;    INJECTION OF JOINT Right 11/11/2022    Procedure: INJECTION, RIGHT GTB CONTRAST DIRECT REFERRAL;  Surgeon: Camden Hernandez MD;  Location: [facility] PAIN MGT;  Service: Pain Management;  Laterality: Right;    JOINT REPLACEMENT  03/23/2011    left total hip replacement    LAPAROSCOPIC CHOLECYSTECTOMY N/A 05/17/2021    Procedure: CHOLECYSTECTOMY, LAPAROSCOPIC;  Surgeon: Rayshawn Peralta Jr., MD;  Location: [facility] 2ND FLR;  Service: General;  Laterality: N/A;    REMOVAL OF EPIRETINAL MEMBRANE Right     Dr. Padron    TONSILLECTOMY      pt was 11 years old    TRANSESOPHAGEAL ECHOCARDIOGRAM WITH POSSIBLE CARDIOVERSION (SONIA W/ POSS CARDIOVERSION) N/A 1/6/2025    Procedure: Transesophageal echo (SONIA) intra-procedure log documentation;  Surgeon: Tio Phelps MD;  Location: [facility] EP LAB;  Service: Cardiology;  Laterality: N/A;    TREATMENT OF CARDIAC ARRHYTHMIA N/A 1/6/2025    Procedure: Cardioversion or Defibrillation;  Surgeon: Ayush Lechuga MD;  Location: [facility] EP LAB;  Service: Cardiology;  Laterality: N/A;  AF, SONIA/DCCV, ANES, GP,      Family History       Problem Relation (Age of Onset)    Colon cancer Mother (75), Paternal Aunt (80)    Diabetes Other, Paternal Aunt    Lung cancer Father (75)    Prostate cancer Brother           Social History     Socioeconomic History    Marital status:    Tobacco Use    Smoking status: Never    Smokeless tobacco: Never    Tobacco comments:     The patient is not getting any regular exercise at this time.  She does enjoy traveling to [place].   Substance and Sexual Activity    Alcohol use: Yes     Comment: occasionally    Drug use: No    Sexual activity: Yes     Partners: Male     Birth control/protection: Post-menopausal     Comment:  64 years      Social Drivers of Health     Financial Resource Strain: Low Risk  (2/25/2025)    Overall Financial Resource Strain (CARDIA)     Difficulty of Paying Living Expenses: Not hard at all   Food Insecurity: No Food Insecurity (2/25/2025)    Hunger Vital Sign     Worried About Running Out of Food in the Last Year: Never true     Ran Out of Food in the Last Year: Never true   Transportation Needs: No Transportation Needs (2/25/2025)    PRAPARE - Transportation     Lack of Transportation (Medical): No     Lack of Transportation (Non-Medical): No   Physical Activity: Inactive (2/25/2025)    Exercise Vital Sign     Days of Exercise per Week: 0 days     Minutes of Exercise per Session: 0 min   Stress: No Stress Concern Present (2/25/2025)    [place] citizen [place] [employer] - Occupational Stress Questionnaire     Feeling of Stress : Only a little   Housing Stability: Low Risk  (2/25/2025)    Housing Stability Vital Sign     Unable to Pay for Housing in the Last Year: No     Number of Times Moved in the Last Year: 0     Homeless in the Last Year: No       Review of Systems    OBJECTIVE:     Vital Signs     There is no height or weight on file to calculate BMI.      Physical Exam:    Constitutional: She appears well-developed and well-nourished. No distress.     Eyes: EOM are normal.     Abdominal: Soft.     Skin: Skin displays no rash on extremities. Skin displays no lesions on extremities.     Psych/Behavior: She is alert.     Musculoskeletal:       Comments: Gait somewhat magnetic     Neurological:        Coordination: She has normal finger to nose coordination.     Pulmonary: nonlabored respirations     Hematologic: no bruising noted       Diagnostic Results:  CT head personally reviewed   Ventriculomegaly noted   MRI scheduled 5/5 at [facility]     ASSESSMENT/PLAN:     Tiffany Masterson is a 89 y.o. female who presents as a referral from Dr. Gonzalez for consideration of normal pressure hydrocephalus. She has marked cerebral ventriculomegaly on CT head (MRI is pending) and cognitive change, urinary urgency and incontinence, and shuffling gait.     I explained that in my practice, NPH is a functional, not radiographic diagnosis.      Thus, I have offered a large-volume LP with evaluation before and after by PT to see if there is meaningful gait improvement. If so, then the next step would be to proceed with a  shunt. If there is no significant improvement, then we would consider neuropsych evaluation.      We had a pleasant conversation about the risks, benefits, and alternatives to large-volume LP (we will plan to take off about 30 cc). Risks include, but are not limited to, bleeding, pain, infection, scarring, need for further/repeat procedure, death, paralysis, damage to bowel/bladder/sexual function, stroke/damage to major blood vessels, and leak of cerebrospinal fluid. Informed consent was obtained of the patient with her  present.      She will need to hold Eliquis for at least 72 hours prior to LP (96 hours preferred).      I have encouraged them to contact the clinic in interim with any questions, concerns, or adverse clinical change.            [1]   Current Outpatient Medications   Medication Sig Dispense Refill    acetaminophen (TYLENOL) 500 MG tablet Take 500 mg by mouth every 6 (six) hours as needed.      amiodarone (PACERONE) 200 MG Tab Take 1 tablet (200 mg total) by mouth once daily. 90 tablet 3    ammonium lactate 12 % Crea Apply  topically bid to legs 385 g 6    atorvastatin (LIPITOR) 10 MG tablet Take 1 tablet by mouth once daily 90 tablet 3    bisacodyL (DULCOLAX) 5 mg EC tablet Take 5 mg by mouth once daily. Pt states taking twice per day      CALCIUM CARBONATE/VITAMIN D3 (CALCIUM 600 + D,3, ORAL) Take 1 tablet by mouth once daily.      DEXTRAN 70/HYPROMELLOSE (ARTIFICIAL TEARS, PF, OPHT) Place 1 drop into both eyes as needed.      ELIQUIS 5 mg Tab Take 1 tablet by mouth twice daily 180 tablet 3    empagliflozin (JARDIANCE) 10 mg tablet Take 1 tablet (10 mg total) by mouth once daily. 90 tablet 1    estradioL (ESTRACE) 0.01 % (0.1 mg/gram) vaginal cream Place 0.5 g intravaginally q.h.s. x2 weeks; then, placed 0.5 g intravaginally twice weekly at bedtime (maintenance therapy). 42.5 g 1    fluticasone propionate (FLONASE) 50 mcg/actuation nasal spray CLEAN SINUS/NARES WITH OCEAN NASAL SPRAY THEN USE FLONASE 1 SPRAY TWICE DAILY. 48 g 3    furosemide (LASIX) 20 MG tablet Take 1 tablet (20 mg total) by mouth daily as needed (for worsening shortness of breath, swelling or 3lb weight gain on home scale). 90 tablet 3    levothyroxine (SYNTHROID) 112 MCG tablet TAKE 1 TABLET BY MOUTH BEFORE BREAKFAST 90 tablet 3    losartan (COZAAR) 100 MG tablet Take 1 tablet by mouth once daily 90 tablet 3    metoprolol succinate (TOPROL-XL) 100 MG 24 hr tablet Take 1 tablet (100 mg total) by mouth once daily. 90 tablet 3    sodium chloride (SALINE NASAL) 0.65 % nasal spray 1 spray by Nasal route as needed for Congestion. 104 mL 3    spironolactone (ALDACTONE) 25 MG tablet Take 1 tablet (25 mg total) by mouth once daily. 90 tablet 3    vit A/vit C/vit E/zinc/copper (ICAPS AREDS ORAL) Take 1 tablet by mouth 2 (two) times a day.       No current facility-administered medications for this visit.

## 2025-04-24 NOTE — PROGRESS NOTES
Outpatient Rehab  Speech-Language Pathology Visit    Patient Name: Tiffany Masterson  MRN: [id number]  YOB: 1935  Encounter Date: 4/24/2025    Therapy Diagnosis:   Encounter Diagnosis   Name Primary?    Cognitive communication deficit Yes     Physician: Mirlande Moore MD    Physician Orders: Eval and Treat  Medical Diagnosis: Concussion with loss of consciousness, sequela  Cognitive changes    Visit # / Visits Authorized: 9 / 10   Insurance Authorization Period: 3/11/2025 to 5/2/2025  Date of Evaluation: 3/11/2025   Plan of Care Certification: 3/11/2025 to 4/25/25      Time In: 1535  Time Out: 1615  Total Time: 40  minutes  Total Billable Time: 40 minutes     Subjective   no changes.Patient arrived to speech therapy appointment on time. Patient reported no pain at the beginning of the session.   Pain reported as 0/10.      Objective          Immediate memory:  - Constant Therapy - Remember voice mail - 100% accuracy with some repetitions    Delayed memory: (5 minute delay)  - recalled memory strategies to help her remember things    Naming items in a concrete category: (15 minimum)  - Patient named 15 items- fruits and vegetables within 1 minute  - Patient named 8 cities within one minute; 6 more without timing. She said she needed time to think about the words     Organization Task:  -  Constant Therapy - L3 put 5 steps in order - 86% accuracy independently   Constant Therapy - read a calendar - 100% accuracy independently      Mental Manipulation:  - Patient named 15 items- fruits and vegetables within 1 minute  - Patient named 8 cities within one minute; 6 more without timing. She said she needed time to think about the words     Word finding:   Patient named 15 items- fruits and vegetables within 1 minute  - Patient named 8 cities within one minute; 6 more without timing. She said she needed time to think about the words   Patient independently utilize her word finding strategies throughout session  conversations.    Assessment & Plan   Assessment   Patient participated readily in all exercises today. Today's session focused on memory, attention, organization, naming, and word finding. Patient was educated on the use of strategies to help with word finding and memory. Patient tolerated treatment and is progressing well towards her goals.  Discharge today.      Patient's spiritual, cultural, and educational needs considered and patient agreeable to plan of care and goals.      Plan   Discharge today        Goals:   Resolved       Long Term Goals       She will use appropriate memory strategies to schedule and recall weekly activities, express needs and recall names to maintain safety and participate socially in functional living environment.    (Met)       Start:  03/13/25    Expected End:  04/25/25    Resolved:  04/24/25            Short Term Goals       1. Patient will participate in functional immediate memory tasks (appointments, voicemails) at 80% accuracy and min A to improve immediate recall of information within her daily activities and communication. (Met)       Start:  03/13/25    Expected End:  04/25/25    Resolved:  04/24/25         2. Patient will recall key details from a reading or picture task at 80% accuracy and min A to improve recall of important details during everyday activities. (Met)       Start:  03/13/25    Expected End:  04/25/25    Resolved:  04/24/25         3. Patient will recall information from memory tasks given a 5 minute delay at 80% accuracy and min A to improve retention of given information within a period of time, required within daily communication and activities.  (Met)       Start:  03/13/25    Expected End:  04/25/25    Resolved:  04/24/25         4. Patient will complete sequencing/organizational tasks at 80% accuracy and min A to increase independence with organizational skills needed to manage daily activities.  (Met)       Start:  03/13/25    Expected End:  04/25/25     Resolved:  04/24/25         5. Patient will name 15 items given a concrete category at 80% accuracy and min A to improve word retrieval skills, required during daily communication.  (Met)       Start:  03/13/25    Expected End:  04/25/25    Resolved:  04/24/25           HENRY Oviedo., L-SLP,CCC-SLP, CBIS  Speech-Language Pathologist  Certified Brain Injury Specialist

## 2025-04-28 ENCOUNTER — PATIENT MESSAGE (OUTPATIENT)
Dept: PRIMARY CARE CLINIC | Facility: CLINIC | Age: 89
End: 2025-04-28
Payer: MEDICARE

## 2025-04-28 NOTE — TELEPHONE ENCOUNTER
"Caregiver seeking PCP advise regarding lumbar puncture procedure    LOV with Lo, Rebeca Thornton MD , 4/15/2025 referred to PT for falls    4/16 pt was referred to neurosurgery by GI provider; note states "she has had some falls that she is being evaluated for however I reviewed her CT scan in it looks like she has ventriculomegaly I out of proportion to sulci atrophy I am going to refer her to neurosurgery for evaluation because of these falls in her new onset partial urinary incontinence and gait issues. I will message her primary care as well up-to-date information given to her  to review. "    " Doxycycline Pregnancy And Lactation Text: This medication is Pregnancy Category D and not consider safe during pregnancy. It is also excreted in breast milk but is considered safe for shorter treatment courses.

## 2025-05-05 ENCOUNTER — RESULTS FOLLOW-UP (OUTPATIENT)
Dept: PRIMARY CARE CLINIC | Facility: CLINIC | Age: 89
End: 2025-05-05

## 2025-05-05 ENCOUNTER — HOSPITAL ENCOUNTER (OUTPATIENT)
Dept: RADIOLOGY | Facility: HOSPITAL | Age: 89
Discharge: HOME | End: 2025-05-05
Attending: INTERNAL MEDICINE
Payer: MEDICARE

## 2025-05-05 DIAGNOSIS — G91.2 IDIOPATHIC NORMAL PRESSURE HYDROCEPHALUS: ICD-10-CM

## 2025-05-05 PROCEDURE — A9585 GADOBUTROL INJECTION: HCPCS | Performed by: INTERNAL MEDICINE

## 2025-05-05 PROCEDURE — 70553 MRI BRAIN STEM W/O & W/DYE: CPT | Mod: TC

## 2025-05-05 PROCEDURE — 70553 MRI BRAIN STEM W/O & W/DYE: CPT | Mod: 26,,, | Performed by: STUDENT IN AN ORGANIZED HEALTH CARE EDUCATION/TRAINING PROGRAM

## 2025-05-05 PROCEDURE — 25500020 PHARM REV CODE 255: Performed by: INTERNAL MEDICINE

## 2025-05-05 RX ORDER — GADOBUTROL 604.72 MG/ML
10 INJECTION INTRAVENOUS
Status: COMPLETED | OUTPATIENT
Start: 2025-05-05 | End: 2025-05-05

## 2025-05-05 RX ADMIN — GADOBUTROL 10 ML: 604.72 INJECTION INTRAVENOUS at 02:05

## 2025-05-07 ENCOUNTER — TELEPHONE (OUTPATIENT)
Dept: PRIMARY CARE CLINIC | Facility: CLINIC | Age: 89
End: 2025-05-07
Payer: MEDICARE

## 2025-05-07 NOTE — TELEPHONE ENCOUNTER
----- Message from Nurse Che sent at 5/7/2025 11:34 AM CDT -----  Regarding: PREOP CLEARANCE AND OPTIMIZATION FOR SURGERY ON 5/14  Patient is scheduled for LUMBAR PUNCTURE on 5/14 with Dr. Rodriguez and will be under LOCAL/MAC anesthesia for approximately 44 minutes. Can you please provide pre operative clearance with eliquis instructions for this patient. Thank you.

## 2025-05-08 ENCOUNTER — OFFICE VISIT (OUTPATIENT)
Facility: CLINIC | Age: 89
End: 2025-05-08
Payer: MEDICARE

## 2025-05-08 VITALS — DIASTOLIC BLOOD PRESSURE: 66 MMHG | HEART RATE: 71 BPM | SYSTOLIC BLOOD PRESSURE: 121 MMHG

## 2025-05-08 DIAGNOSIS — R26.89 IMBALANCE: ICD-10-CM

## 2025-05-08 DIAGNOSIS — R32 URINARY INCONTINENCE, UNSPECIFIED TYPE: ICD-10-CM

## 2025-05-08 DIAGNOSIS — S06.0X9S CONCUSSION WITH LOSS OF CONSCIOUSNESS, SEQUELA: Primary | ICD-10-CM

## 2025-05-08 DIAGNOSIS — S13.4XXS WHIPLASH INJURIES, SEQUELA: ICD-10-CM

## 2025-05-08 DIAGNOSIS — S06.9X1D TRAUMATIC BRAIN INJURY, WITH LOSS OF CONSCIOUSNESS OF 30 MINUTES OR LESS, SUBSEQUENT ENCOUNTER: ICD-10-CM

## 2025-05-08 DIAGNOSIS — R26.81 GAIT INSTABILITY: ICD-10-CM

## 2025-05-08 DIAGNOSIS — Z86.79 HISTORY OF SUBDURAL HEMORRHAGE: ICD-10-CM

## 2025-05-08 PROCEDURE — 99999 PR PBB SHADOW E&M-EST. PATIENT-LVL III: CPT | Mod: PBBFAC,,, | Performed by: STUDENT IN AN ORGANIZED HEALTH CARE EDUCATION/TRAINING PROGRAM

## 2025-05-08 NOTE — PROGRESS NOTES
"Chief Complaint: Follow-up    Subjective:     History of Present Illness  Referring Provider:  Date of Injury: 9/22/2024, 11/30/2024, early Dec 2024, 12/30/2024  Accompanied by:          12/03/2024: Tiffany Masterson is a 89 y.o. female pmhx, HTN, HLD, Afib on eliquis, Subdural hemorrhage  presents to the clinic for concussion evaluation. On 9/22/2024, the patient was walking up the stairs at 2am with papers in her both hands when her  heard a loud noise. He waited a few minutes, called out her name and found her at the base of the stairs +LOC, with blood pooled around her head. Per , when he found her on the floor after she regained consciousness the patient repeat the same phrase "please, help me". The patient does not remember falling and woke in the hospital a few days later. The patient states that all of symptoms resolved after the first event. However per her , the patient has generalized weakness (she was bedridden for 2wks in the hospital), cognitive impairment (she struggles to follow/comprehend instructions) since the Sep event. On 11/30/2024, she was trying to stand from sitting on the side of the bed and she could not find her balance. She tumbled over onto the carpet with no impact to the head, no LOC. She needed assistance to get up from the floor. Immediately, endorses possible lightheaded upon rising from the floor; denies photophobia, phonophobia, n/v, blurred vision, diplopia, neck pain or headache. Currently, denies headaches, photophobia, phonophobia, n/v, blurred vision, diplopia, neck pain. Sleep has been poor prior to the  Sep 2024 injury. She does nap throughout the day, avg 7 to 8 nonconsecutive hours in a 24hr period.  Denies emotional liability, depression, anxiety, SI or HI. Denies difficulty concentrating. Per , he believes it has declined by 40% in concentration since September. Per , she is more confused than forgetful. She has been had only " "two falls and denies any near-missed falls. Endorses urinary incontinence since prior to the first event in September and after has started to have bowel incontinence. Denies REM behavior.     02/27/2025: Tiffany Masterson is a 89 y.o. 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 On last visit, the patient was instructed retrieve the [place] [facility] [place] them to medical records,  to ice, do ergonomics, exercise with restrictions, ordered an EEG to r/o seizure-like activity and referral for speech, vestibular PT, Neurosurgery to f/u on SAH and start beginner's yoga. The EEG was not completed.  Since our last appointment the patient has had ? Falls.  In early Dec 2024, the patient lost her balance while walking with no hitting of the head no LOC. On 12/30/2024, the patient stood up from the bed and fell, hitting head on floor with no LOC. Recalls sound the impact. Denies lapse of time. She had a CTH completed on that day that showed no acute intracranial pathology. She has not been able to go to therapy due to the location available. The patient would like to go to the [place] therapy location but have been unsuccessful. Currently, denies headaches, photophobia, phonophobia, n/v, blurred vision, diplopia, neck pain. Cognition is still impaired and she is forgetting task, the day of the week.  Per her , the patient had no cognitive impairment prior to her fall in Sep 2024.     Per Neurosurgery note on 12/30/2024, "Today, presents to clinic for neurosurgical follow-up of hemorrhages with updated CTH. Accompanied by . Has been well overall since discharge. She has had some ongoing balance difficulty and mild memory deficit. Has had 2 falls since being back home, one was at least a few weeks ago (unsure exactly when, lost balance while " "walking). 2nd fall was this morning (fell out of bed, hit head on floor, has some small facial lacerations on R side). No LOC, has been at baseline since fall. Has remained on Eliquis. She had a follow-up CTH on 12/23, no imaging today. "     Today, I personally reviewed the  Neurosurgery, cardiology notes that were completed after any previous visit to the [facility] neurology clinic as documented in the patient's electronic medical record. This review was done to analyze the patient's progress and findings as it relates to the conditions that the sports neurology clinic is treating.     05/09/2025: Tiffany Masterson is a 89 y.o. 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 On last visit, the patient was instructed to ice, do ergonomics, exercise with restrictions, ordered EEG, and referral for speech, vestibular PT, and neurosurgery. The patient was thought to have idiopathic NPH on CTH, and was referred to NPH clinic for a large volume lumbar puncture with is schedule for the 5/18/2025 to evaluate if there will be an improvement in symptoms (urinary incontinence, memory and gait). However upon comparing the MRI brain from 2020 to 2025, there is no significant change. Currently, endorses spinning sensation, denies photophobia, phonophobia, headache, neck pain, n/v, blurred vision, Endorses that she continues to have trouble with walking. Cognition remains impaired but the memory is improving.    Today, I personally reviewed the PCP, Neurosurgery, Urology, CTH notes that were completed after any previous visit to the sports neurology clinic as documented in the patient's electronic medical record. This review was done to analyze the patient's progress and findings as it relates to the conditions that the sports neurology clinic is treating.       Medications " Ordered Prior to Encounter[1]    Review of patient's allergies indicates:   Allergen Reactions    Levaquin [levofloxacin]      Joint pain    Hydrochlorothiazide Other (See Comments)     Pain in joints and depression per pt       Family History   Problem Relation Name Age of Onset    Colon cancer Mother Tiffany Sutton 75    Lung cancer Father ColLakesha Sutton 75    Diabetes Other great aunt     Diabetes Paternal Aunt      Colon cancer Paternal Aunt  80    Prostate cancer Brother      Breast cancer Neg Hx      Ovarian cancer Neg Hx      Celiac disease Neg Hx      Cataracts Neg Hx      Glaucoma Neg Hx      Macular degeneration Neg Hx         Social History[2]    Review of Systems  Constitutional: No fevers, no chills, no change in weight  Eye/Vision: See HPI  Ear/Nose/Mouth/Throat: See HPI; no cough, no runny nose, no sore throat  Respiratory: No shortness of breath, no problems breathing  Cardiovascular: No chest pain  Gastrointestinal: See HPI, no diarrhea, no constipation  Genitourinary: No dysuria  Musculoskeletal: See HPI  Integumentary: No skin changes  Neurologic: See HPI  Psychiatric: Denies depression, denies anxiety, denies SI and HI.    Objective:     Vitals:    05/08/25 1407   BP: 121/66   Pulse: 71       General: Alert and awake, Well nourished, Well groomed, No acute distress, no photophobia with 60 Hz hypersensitivity.  Eyes: Extraocular movements are intact; Normal conjunctiva; no nystagmus; Visual fields are intact bilaterally to finger counting in all cardinal directions  Neck: Supple  No Stiffness  Patient has no occipital point tenderness over the bilateral greater and lesser occipital nerve without induction of headaches with no jump sign and no twitch response and no referred pain: none   No high, medial cervical pain with lateral movement of C1 over C2 and with isometric neck flexion and extension  Fluid patient turnaround with concurrent neck movement in direction of  "torso movement.  No bilateral paraspinal cervical muscle spasm present  Spine/torso exam: Spine/ torso exam is within normal limits     Neurologic Exam  no saccadic intrusions of volitional ocular smooth pursuits  no pain with sustained upgaze and convergence  no visual motion sensitivity/dizziness produced with rapid eye movements or neck movements  no convergence insufficiency with no diplopia developed > 5 " accommodation    Sensory: Negative Romberg, deferred tandem stance    Gait: Gait slow slightly magnetic, Heel to toe walking impaired    Labs:    Lab Visit on 04/24/2025   Component Date Value Ref Range Status    PTT 04/24/2025 29.8  21.0 - 32.0 seconds Final    Refer to local heparin nomogram for intensity/dose specific therapeutic range.    Sodium 04/24/2025 136  136 - 145 mmol/L Final    Potassium 04/24/2025 4.4  3.5 - 5.1 mmol/L Final    Chloride 04/24/2025 102  95 - 110 mmol/L Final    CO2 04/24/2025 28  23 - 29 mmol/L Final    Glucose 04/24/2025 92  70 - 110 mg/dL Final    BUN 04/24/2025 18  8 - 23 mg/dL Final    Creatinine 04/24/2025 1.3  0.5 - 1.4 mg/dL Final    Calcium 04/24/2025 9.7  8.7 - 10.5 mg/dL Final    Protein Total 04/24/2025 7.5  6.0 - 8.4 gm/dL Final    Albumin 04/24/2025 3.8  3.5 - 5.2 g/dL Final    Bilirubin Total 04/24/2025 0.5  0.1 - 1.0 mg/dL Final    For infants and newborns, interpretation of results should be based   on gestational age, weight and in agreement with clinical   observations.    Premature Infant recommended reference ranges:   0-24 hours:  <8.0 mg/dL   24-48 hours: <12.0 mg/dL   3-5 days:    <15.0 mg/dL   6-29 days:   <15.0 mg/dL    ALP 04/24/2025 92  40 - 150 unit/L Final    AST 04/24/2025 15  11 - 45 unit/L Final    ALT 04/24/2025 13  10 - 44 unit/L Final    Anion Gap 04/24/2025 6 (L)  8 - 16 mmol/L Final    eGFR 04/24/2025 40 (L)  >60 mL/min/1.73/m2 Final    Estimated GFR calculated using the CKD-EPI creatinine (2021) equation.    PT 04/24/2025 12.2  9.0 - 12.5 " seconds Final    INR 04/24/2025 1.1  0.8 - 1.2 Final    Coumadin Therapy:    2.0 - 3.0 for INR for all indicators except mechanical heart valves    and antiphospholipid syndromes which should use 2.5 - 3.5.    WBC 04/24/2025 8.32  3.90 - 12.70 K/uL Final    RBC 04/24/2025 3.77 (L)  4.00 - 5.40 M/uL Final    HGB 04/24/2025 11.6 (L)  12.0 - 16.0 gm/dL Final    HCT 04/24/2025 35.8 (L)  37.0 - 48.5 % Final    MCV 04/24/2025 95  82 - 98 fL Final    MCH 04/24/2025 30.8  27.0 - 31.0 pg Final    MCHC 04/24/2025 32.4  32.0 - 36.0 g/dL Final    RDW 04/24/2025 13.0  11.5 - 14.5 % Final    Platelet Count 04/24/2025 221  150 - 450 K/uL Final    MPV 04/24/2025 10.1  9.2 - 12.9 fL Final    Nucleated RBC 04/24/2025 0  <=0 /100 WBC Final    Neut % 04/24/2025 71.0  38 - 73 % Final    Lymph % 04/24/2025 12.3 (L)  18 - 48 % Final    Mono % 04/24/2025 11.7  4 - 15 % Final    Eos % 04/24/2025 3.6  <=8 % Final    Basophil % 04/24/2025 0.7  <=1.9 % Final    Imm Grans % 04/24/2025 0.7 (H)  0.0 - 0.5 % Final    Neut # 04/24/2025 5.91  1.8 - 7.7 K/uL Final    Lymph # 04/24/2025 1.02  1 - 4.8 K/uL Final    Mono # 04/24/2025 0.97  0.3 - 1 K/uL Final    Eos # 04/24/2025 0.30  <=0.5 K/uL Final    Baso # 04/24/2025 0.06  <=0.2 K/uL Final    Imm Grans # 04/24/2025 0.06 (H)  0.00 - 0.04 K/uL Final    Mild elevation in immature granulocytes is non specific and can be seen in a variety of conditions including stress response, acute inflammation, trauma and pregnancy. Correlation with other laboratory and clinical findings is essential.   Lab Visit on 04/15/2025   Component Date Value Ref Range Status    Sodium 04/15/2025 136  136 - 145 mmol/L Final    Potassium 04/15/2025 4.1  3.5 - 5.1 mmol/L Final    Chloride 04/15/2025 104  95 - 110 mmol/L Final    CO2 04/15/2025 22 (L)  23 - 29 mmol/L Final    Glucose 04/15/2025 85  70 - 110 mg/dL Final    BUN 04/15/2025 14  8 - 23 mg/dL Final    Creatinine 04/15/2025 1.1  0.5 - 1.4 mg/dL Final    Calcium  04/15/2025 9.5  8.7 - 10.5 mg/dL Final    Anion Gap 04/15/2025 10  8 - 16 mmol/L Final    eGFR 04/15/2025 49 (L)  >60 mL/min/1.73/m2 Final    Estimated GFR calculated using the CKD-EPI creatinine (2021) equation.    TSH 04/15/2025 0.734  0.400 - 4.000 uIU/mL Final    Free T4 04/15/2025 1.39  0.71 - 1.51 ng/dL Final    Vitamin D 04/15/2025 36  30 - 96 ng/mL Final    Vitamin D deficiency.........<10 ng/mL                              Vitamin D insufficiency......10-29 ng/mL       Vitamin D sufficiency........> or equal to 30 ng/mL  Vitamin D toxicity............>100 ng/mL      Cholesterol Total 04/15/2025 148  120 - 199 mg/dL Final    The National Cholesterol Education Program (NCEP) has set the  following guidelines (reference ranges) for Cholesterol:  Optimal.....................<200 mg/dL  Borderline High.............200-239 mg/dL  High........................> or = 240 mg/dL    Triglyceride 04/15/2025 84  30 - 150 mg/dL Final    The National Cholesterol Education Program (NCEP) has set the  following guidelines (reference values) for triglycerides:  Normal......................<150 mg/dL  Borderline High.............150-199 mg/dL  High........................200-499 mg/dL    HDL Cholesterol 04/15/2025 51  40 - 75 mg/dL Final    The National Cholesterol Education Program (NCEP) has set the   following guidelines (reference values) for HDL Cholesterol:   Low...............<40 mg/dL   Optimal...........>60 mg/dL    LDL Cholesterol 04/15/2025 80.2  63.0 - 159.0 mg/dL Final    The National Cholesterol Education Program (NCEP) has set the  following guidelines (reference values) for LDL Cholesterol:  Optimal.......................<130 mg/dL  Borderline High...............130-159 mg/dL  High..........................160-189 mg/dL  Very High.....................>190 mg/dL  LDL calculated using the Friedewald equation.    HDL/Cholesterol Ratio 04/15/2025 34.5  20.0 - 50.0 % Final    Cholesterol/HDL Ratio 04/15/2025 2.9   2.0 - 5.0 Final    Non HDL Cholesterol 04/15/2025 97  mg/dL Final    Risk category and Non-HDL cholesterol goals:  Coronary heart disease (CHD)or equivalent (10-year risk of CHD >20%):  Non-HDL cholesterol goal     <130 mg/dL  Two or more CHD risk factors and 10-year risk of CHD <= 20%:  Non-HDL cholesterol goal     <160 mg/dL  0 to 1 CHD risk factor:  Non-HDL cholesterol goal     <190 mg/dL    WBC 04/15/2025 8.14  3.90 - 12.70 K/uL Final    RBC 04/15/2025 3.88 (L)  4.00 - 5.40 M/uL Final    HGB 04/15/2025 11.7 (L)  12.0 - 16.0 gm/dL Final    HCT 04/15/2025 37.5  37.0 - 48.5 % Final    MCV 04/15/2025 97  82 - 98 fL Final    MCH 04/15/2025 30.2  27.0 - 31.0 pg Final    MCHC 04/15/2025 31.2 (L)  32.0 - 36.0 g/dL Final    RDW 04/15/2025 13.6  11.5 - 14.5 % Final    Platelet Count 04/15/2025 216  150 - 450 K/uL Final    MPV 04/15/2025 10.9  9.2 - 12.9 fL Final    Nucleated RBC 04/15/2025 0  <=0 /100 WBC Final    Neut % 04/15/2025 67.5  38 - 73 % Final    Lymph % 04/15/2025 16.2 (L)  18 - 48 % Final    Mono % 04/15/2025 11.4  4 - 15 % Final    Eos % 04/15/2025 3.7  <=8 % Final    Basophil % 04/15/2025 0.6  <=1.9 % Final    Imm Grans % 04/15/2025 0.6 (H)  0.0 - 0.5 % Final    Neut # 04/15/2025 5.49  1.8 - 7.7 K/uL Final    Lymph # 04/15/2025 1.32  1 - 4.8 K/uL Final    Mono # 04/15/2025 0.93  0.3 - 1 K/uL Final    Eos # 04/15/2025 0.30  <=0.5 K/uL Final    Baso # 04/15/2025 0.05  <=0.2 K/uL Final    Imm Grans # 04/15/2025 0.05 (H)  0.00 - 0.04 K/uL Final    Mild elevation in immature granulocytes is non specific and can be seen in a variety of conditions including stress response, acute inflammation, trauma and pregnancy. Correlation with other laboratory and clinical findings is essential.    Urine Microalbumin 04/15/2025 9.0    ug/mL Final    Urine Creatinine 04/15/2025 104.0  15.0 - 325.0 mg/dL Final    Microalbumin/Creatinine Ratio Urine 04/15/2025 8.7  <=30.0 ug/mg Final    Ferritin 04/15/2025 339.0 (H)  20.0 - 300.0  ng/mL Final       I personally reviewed all current labs noted in today's chart.      Imaging:    MRI BRAIN W WO CONTRAST on 5/5/2025     FINDINGS:  Postcontrast imaging degraded by motion artifact.     Continued enlargement of the ventricles out of proportion to the volume loss about the visualized cerebral parenchyma, with ventricle stable in size and configuration from comparison CT 12/30/2024.  For reference, the 3rd ventricle measures approximately 1.3 cm.     Focal areas of encephalomalacia and surrounding gliosis about the left inferior frontal lobe and left temporal lobe, possibly representing sequela of remote trauma.  Additional scattered subcortical and periventricular T2/FLAIR hyperintense foci, overall nonspecific, but can be seen in the setting of microvascular ischemic change.  Remote lacunar type infarcts about the left centrum semiovale.  No parenchymal mass, worsening edema, acute hemorrhage, or acute major vascular territory infarct.     Scattered serpiginous susceptibility artifact about the left frontal lobe as well as more focal punctate foci susceptibility artifact about the left greater than right frontal and temporal lobes, as well as focal susceptibility artifact left cerebellar hemisphere, in keeping with areas of remote microhemorrhage/hemosiderin staining.     No abnormal parenchymal or leptomeningeal enhancement.     No extra-axial blood or fluid collections.     The T2 skull base flow voids are preserved.  Dural venous sinuses are patent.     Bone marrow signal intensity is unremarkable.     Paranasal sinuses and mastoid air cells are essentially clear.     Impression:     No evidence for acute intracranial pathology.     Continued enlargement of the ventricles slightly out of proportion to volume loss about the visualized cerebral parenchyma, noting that ventricles are stable in size and configuration from multiple comparison imaging.  Findings remain nonspecific and may relate to  sequela of central parenchymal volume loss, noting that normal pressure hydrocephalus is also considered.  Correlation is advised     Focal regions of encephalomalacia, surrounding gliosis, and hemosiderin staining most notably about the left inferior frontal and temporal lobes, likely representing sequela of remote trauma.  Correlation is advised.    My read: Ventriculomegaly; no acute intracranial pathology    I personally reviewed all diagnostic images and reports and agree with findings in the radiographical report as noted below unless otherwise specified.      Assessment:       ICD-10-CM ICD-9-CM    1. Concussion with loss of consciousness, sequela  S06.0X9S 907.0       2. Whiplash injuries, sequela  S13.4XXS 905.7       3. Traumatic brain injury, with loss of consciousness of 30 minutes or less, subsequent encounter  S06.9X1D V58.89       4. History of subdural hemorrhage  Z86.79 V12.59       5. Imbalance  R26.89 781.2       6. Urinary incontinence, unspecified type  R32 788.30       7. Gait instability  R26.81 781.2          Tiffany Masterson is a 89 y.o. 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 On examination, she exhibits imbalance, diminished vibration sensation in the left foot, reduced temperature sensation in all extremities, and peripheral neuropathy in the left foot, consistent with prior findings. The patient reports improved cognition since the last visit and notes she has been discharged from speech therapy; however, she continues to experience an impaired, slow, magnetic gait of uncertain etiology, which began after a fall. It is unclear whether the gait changes are due to the fall itself, age-related decline, or fear of falling. She also reports the onset of urinary incontinence after the fall, although this conflicts with accounts from her  daughter-in-law and ; the patient remains confident that this symptom is new. The clinical presentation raises concern for normal pressure hydrocephalus (NPH), given the triad of cognitive changes, gait disturbance, and urinary incontinence. MRI findings from 2020 and 2025 show no significant changes in ventriculomegaly, and the Johnson index and callosal angle are not supportive of NPH. Nevertheless, she has been referred to the NPH clinic for further evaluation, and a large-volume lumbar puncture is scheduled for May 18, 2025. Improvement in gait following the procedure would support a diagnosis of NPH. Memory changes may also be attributed to her age and prior subdural hemorrhage, and age and fear of falling may contribute to both her urinary incontinence and gait impairment.    Plan:     Referral was placed previously for vestibular therapy for imbalance   You do not feel your feet well due to neuropathy. Please wear footwear with good tread at all times. This recommendation includes wearing socks with tread on them. Make sure all of your walkways, hallways, and maddi are well lit at all times day and night. Use night lights to light up commonly used pathways in your home, meaning from the bedroom to the bathroom or kitchen. Make sure all of your walkways, hallways, and maddi are clear of obstacles at all times. Obstacles include decorations, rugs/mats, pet beds, shoes, and clothes. Make sure you have good hand holds to grab onto as you walk around your home.     Visit today included increased complexity associated with the care of the episodic problem  addressed and managing the longitudinal care of the patient due to the serious and/or complex managed problem(s) SDH, concussion, concern for NPH, speaking with her daughter-in-law on the phone.    53 minutes were spent on the date of this patient encounter, which includes: preparing to see the patient, reviewing previous history, obtaining new patient  history, performing the physical exam, counseling and educating the patient and/or family/caregiver, ordering necessary medications or tests or referrals, documenting in the electronic medical record, coordinating care.             [1]   Current Outpatient Medications on File Prior to Visit   Medication Sig Dispense Refill    acetaminophen (TYLENOL) 500 MG tablet Take 500 mg by mouth every 6 (six) hours as needed.      amiodarone (PACERONE) 200 MG Tab Take 1 tablet (200 mg total) by mouth once daily. 90 tablet 3    ammonium lactate 12 % Crea Apply topically bid to legs 385 g 6    atorvastatin (LIPITOR) 10 MG tablet Take 1 tablet by mouth once daily 90 tablet 3    bisacodyL (DULCOLAX) 5 mg EC tablet Take 5 mg by mouth once daily. Pt states taking twice per day      CALCIUM CARBONATE/VITAMIN D3 (CALCIUM 600 + D,3, ORAL) Take 1 tablet by mouth once daily.      DEXTRAN 70/HYPROMELLOSE (ARTIFICIAL TEARS, PF, OPHT) Place 1 drop into both eyes as needed.      ELIQUIS 5 mg Tab Take 1 tablet by mouth twice daily 180 tablet 3    empagliflozin (JARDIANCE) 10 mg tablet Take 1 tablet (10 mg total) by mouth once daily. 90 tablet 1    estradioL (ESTRACE) 0.01 % (0.1 mg/gram) vaginal cream Place 0.5 g intravaginally q.h.s. x2 weeks; then, placed 0.5 g intravaginally twice weekly at bedtime (maintenance therapy). 42.5 g 1    fluticasone propionate (FLONASE) 50 mcg/actuation nasal spray CLEAN SINUS/NARES WITH OCEAN NASAL SPRAY THEN USE FLONASE 1 SPRAY TWICE DAILY. 48 g 3    furosemide (LASIX) 20 MG tablet Take 1 tablet (20 mg total) by mouth daily as needed (for worsening shortness of breath, swelling or 3lb weight gain on home scale). 90 tablet 3    levothyroxine (SYNTHROID) 112 MCG tablet TAKE 1 TABLET BY MOUTH BEFORE BREAKFAST 90 tablet 3    losartan (COZAAR) 100 MG tablet Take 1 tablet by mouth once daily 90 tablet 3    metoprolol succinate (TOPROL-XL) 100 MG 24 hr tablet Take 1 tablet (100 mg total) by mouth once daily. 90  tablet 3    sodium chloride (SALINE NASAL) 0.65 % nasal spray 1 spray by Nasal route as needed for Congestion. 104 mL 3    spironolactone (ALDACTONE) 25 MG tablet Take 1 tablet (25 mg total) by mouth once daily. 90 tablet 3    vit A/vit C/vit E/zinc/copper (ICAPS AREDS ORAL) Take 1 tablet by mouth 2 (two) times a day.       No current facility-administered medications on file prior to visit.   [2]   Social History  Tobacco Use    Smoking status: Never    Smokeless tobacco: Never    Tobacco comments:     The patient is not getting any regular exercise at this time.  She does enjoy traveling to [place].   Substance Use Topics    Alcohol use: Yes     Comment: occasionally    Drug use: No

## 2025-05-09 NOTE — PATIENT INSTRUCTIONS
Referral was placed previously for vestibular therapy for imbalance   You do not feel your feet well due to neuropathy. Please wear footwear with good tread at all times. This recommendation includes wearing socks with tread on them. Make sure all of your walkways, hallways, and maddi are well lit at all times day and night. Use night lights to light up commonly used pathways in your home, meaning from the bedroom to the bathroom or kitchen. Make sure all of your walkways, hallways, and maddi are clear of obstacles at all times. Obstacles include decorations, rugs/mats, pet beds, shoes, and clothes. Make sure you have good hand holds to grab onto as you walk around your home.

## 2025-05-12 ENCOUNTER — TELEPHONE (OUTPATIENT)
Dept: NEUROSURGERY | Facility: CLINIC | Age: 89
End: 2025-05-12
Payer: MEDICARE

## 2025-05-12 NOTE — TELEPHONE ENCOUNTER
Date of Procedure: 5/14/2025    Arrival Time: 8:00 AM     Planned Start Time: 10:00 AM    Location: [place]    Pre-op Instructions Reviewed:    * No food after 9pm night before procedure.   * May have water or electrolyte replacement drinks (e.g. Gatorade/Powerade/Pedialyte) until you leave for the hospital the morning of your procedure.     Medications Reviewed:   Anticoagulants: Eliquis  Last dose: 5/8/2025    GLP - 1: None   Last Dose:     Parkinson's/Essential Tremor/Dystonia Medications  Not Applicable      Pt verbalized understanding of all instructions. Denies further questions/concerns at this time. Pt encouraged to call the clinic at [phone] or send a message through the portal if they have any other questions/concerns that have not been addressed.     Future Appointments   Date Time Provider Department Center   5/19/2025  8:00 AM [facility] ECHOSTR [address]   5/22/2025  3:00 PM Raeann Rodriguez MD [facility] NEUROS8 [address]   6/16/2025  1:00 PM Ruddy Samayoa [facility] HERTIFFANIE [address]   6/17/2025 10:20 AM Burak De León MD [facility] URO [place]   7/3/2025  8:00 AM Stephie Vicente MD [facility] ENDODIA [address]        Attending Attestation (For Attendings USE Only)...

## 2025-05-13 ENCOUNTER — PATIENT MESSAGE (OUTPATIENT)
Dept: NEUROSURGERY | Facility: CLINIC | Age: 89
End: 2025-05-13
Payer: MEDICARE

## 2025-05-15 ENCOUNTER — PATIENT MESSAGE (OUTPATIENT)
Dept: NEUROSURGERY | Facility: CLINIC | Age: 89
End: 2025-05-15
Payer: MEDICARE

## 2025-05-19 ENCOUNTER — RESULTS FOLLOW-UP (OUTPATIENT)
Dept: ELECTROPHYSIOLOGY | Facility: CLINIC | Age: 89
End: 2025-05-19
Payer: MEDICARE

## 2025-05-19 ENCOUNTER — HOSPITAL ENCOUNTER (OUTPATIENT)
Dept: CARDIOLOGY | Facility: HOSPITAL | Age: 89
Discharge: HOME | End: 2025-05-19
Attending: INTERNAL MEDICINE
Payer: MEDICARE

## 2025-05-19 VITALS
HEART RATE: 69 BPM | DIASTOLIC BLOOD PRESSURE: 57 MMHG | SYSTOLIC BLOOD PRESSURE: 140 MMHG | WEIGHT: 144 LBS | HEIGHT: 66 IN | BODY MASS INDEX: 23.14 KG/M2

## 2025-05-19 DIAGNOSIS — I48.0 PAROXYSMAL ATRIAL FIBRILLATION: ICD-10-CM

## 2025-05-19 DIAGNOSIS — E78.2 MIXED HYPERLIPIDEMIA: ICD-10-CM

## 2025-05-19 DIAGNOSIS — I10 PRIMARY HYPERTENSION: ICD-10-CM

## 2025-05-19 LAB
AORTIC SIZE INDEX (SOV): 2 CM/M2
AORTIC SIZE INDEX: 2 CM/M2
ASCENDING AORTA: 3.5 CM
AV AREA BY CONTINUOUS VTI: 1.5 CM2
AV INDEX (PROSTH): 0.59
AV LVOT MEAN GRADIENT: 3 MMHG
AV LVOT PEAK GRADIENT: 5 MMHG
AV MEAN GRADIENT: 8 MMHG
AV PEAK GRADIENT: 14 MMHG
AV REGURGITATION PRESSURE HALF TIME: 551 MS
AV VALVE AREA BY VELOCITY RATIO: 1.6 CM²
AV VALVE AREA: 1.5 CM2
AV VELOCITY RATIO: 0.63
BSA FOR ECHO PROCEDURE: 1.74 M2
CV ECHO LV RWT: 0.36 CM
DOP CALC AO PEAK VEL: 1.9 M/S
DOP CALC AO VTI: 51.5 CM
DOP CALC LVOT AREA: 2.5 CM2
DOP CALC LVOT DIAMETER: 1.8 CM
DOP CALC LVOT PEAK VEL: 1.2 M/S
DOP CALC LVOT STROKE VOLUME: 77.3 CM3
DOP CALCLVOT PEAK VEL VTI: 30.4 CM
E WAVE DECELERATION TIME: 185 MS
E/A RATIO: 1.12
E/E' RATIO: 15 M/S
ECHO EF ESTIMATED: 77 %
ECHO LV POSTERIOR WALL: 0.8 CM (ref 0.6–1.1)
FRACTIONAL SHORTENING: 44.4 % (ref 28–44)
INTERVENTRICULAR SEPTUM: 0.9 CM (ref 0.6–1.1)
IVC DIAMETER: 1.14 CM
LA MAJOR: 5.8 CM
LA MINOR: 5 CM
LA WIDTH: 4.3 CM
LEFT ATRIUM SIZE: 4.2 CM
LEFT ATRIUM VOLUME INDEX MOD: 57 ML/M2
LEFT ATRIUM VOLUME INDEX: 47 ML/M2
LEFT ATRIUM VOLUME MOD: 100 ML
LEFT ATRIUM VOLUME: 82 CM3
LEFT INTERNAL DIMENSION IN SYSTOLE: 2.5 CM (ref 2.1–4)
LEFT VENTRICLE DIASTOLIC VOLUME INDEX: 54.02 ML/M2
LEFT VENTRICLE DIASTOLIC VOLUME: 94 ML
LEFT VENTRICLE MASS INDEX: 70.7 G/M2
LEFT VENTRICLE SYSTOLIC VOLUME INDEX: 12.6 ML/M2
LEFT VENTRICLE SYSTOLIC VOLUME: 22 ML
LEFT VENTRICULAR INTERNAL DIMENSION IN DIASTOLE: 4.5 CM (ref 3.5–6)
LEFT VENTRICULAR MASS: 123.1 G
LV LATERAL E/E' RATIO: 13.1
LV SEPTAL E/E' RATIO: 17.5
MV PEAK A VEL: 0.94 M/S
MV PEAK E VEL: 1.05 M/S
OHS CV RV/LV RATIO: 0.71 CM
RA MAJOR: 4.04 CM
RA PRESSURE ESTIMATED: 3 MMHG
RA WIDTH: 2.6 CM
RIGHT ATRIAL AREA: 15 CM2
RIGHT VENTRICLE DIASTOLIC BASEL DIMENSION: 3.2 CM
RV TISSUE DOPPLER FREE WALL SYSTOLIC VELOCITY 1 (APICAL 4 CHAMBER VIEW): 15.28 CM/S
SINUS: 3.5 CM
STJ: 2.9 CM
TDI LATERAL: 0.08 M/S
TDI SEPTAL: 0.06 M/S
TDI: 0.07 M/S
TRICUSPID ANNULAR PLANE SYSTOLIC EXCURSION: 2.6 CM
Z-SCORE OF LEFT VENTRICULAR DIMENSION IN END DIASTOLE: -0.68
Z-SCORE OF LEFT VENTRICULAR DIMENSION IN END SYSTOLE: -1.39

## 2025-05-19 PROCEDURE — 93306 TTE W/DOPPLER COMPLETE: CPT | Mod: 26,HCNC,, | Performed by: INTERNAL MEDICINE

## 2025-05-19 PROCEDURE — 93306 TTE W/DOPPLER COMPLETE: CPT | Mod: HCNC

## 2025-05-20 ENCOUNTER — TELEPHONE (OUTPATIENT)
Dept: ELECTROPHYSIOLOGY | Facility: CLINIC | Age: 89
End: 2025-05-20
Payer: MEDICARE

## 2025-05-20 NOTE — TELEPHONE ENCOUNTER
Discussed results with patient, she verb understanding. Pt has no further questions at this time.    ----- Message from Ayush Lechuga MD sent at 5/19/2025  3:13 PM CDT -----  Please contact the patient and let them know that their results were fine and do not require any change in treatment.    Her EF has normalized in sinus rhythm. Good news!    ----- Message -----  From: Migue Mcintosh III, MD  Sent: 5/19/2025   9:55 AM CDT  To: Ayush Lechuga MD

## 2025-06-03 DIAGNOSIS — R32 URINARY INCONTINENCE, UNSPECIFIED TYPE: ICD-10-CM

## 2025-06-03 DIAGNOSIS — R93.0 ABNORMAL HEAD CT: Primary | ICD-10-CM

## 2025-06-03 DIAGNOSIS — R29.6 FREQUENT FALLS: ICD-10-CM

## 2025-06-10 ENCOUNTER — TELEPHONE (OUTPATIENT)
Dept: NEUROSURGERY | Facility: CLINIC | Age: 89
End: 2025-06-10
Payer: MEDICARE

## 2025-06-10 ENCOUNTER — DOCUMENTATION ONLY (OUTPATIENT)
Dept: REHABILITATION | Facility: HOSPITAL | Age: 89
End: 2025-06-10
Payer: MEDICARE

## 2025-06-10 DIAGNOSIS — R41.841 COGNITIVE COMMUNICATION DEFICIT: Primary | ICD-10-CM

## 2025-06-10 NOTE — TELEPHONE ENCOUNTER
Spoke with pt to discuss rescheduling LP at [facility]. Pt states she thought she was having brain surgery and didn't want that. Explained that the planned procedure was an LP to test for NPH but that the treatment for NPH was a shunt. Pt would like her daughter in law that is an MD at [facility] (Dr. Maia Sanders) to discuss plan with team before proceeding. Dr. Sanders is out of the country currently and pt was unsure of her return date. Advised pt to call office when Dr. Sanders returns so we can schedule a time for her to meet with the team. Pt verbalized understanding of plan.

## 2025-06-16 ENCOUNTER — LAB VISIT (OUTPATIENT)
Dept: LAB | Facility: HOSPITAL | Age: 89
End: 2025-06-16
Payer: MEDICARE

## 2025-06-16 ENCOUNTER — OFFICE VISIT (OUTPATIENT)
Dept: HEMATOLOGY/ONCOLOGY | Facility: CLINIC | Age: 89
End: 2025-06-16
Payer: MEDICARE

## 2025-06-16 DIAGNOSIS — D12.6 COLON ADENOMAS: ICD-10-CM

## 2025-06-16 DIAGNOSIS — Z80.42 FAMILY HISTORY OF PROSTATE CANCER: ICD-10-CM

## 2025-06-16 DIAGNOSIS — Z13.71 ENCOUNTER FOR NONPROCREATIVE GENETIC COUNSELING AND TESTING: ICD-10-CM

## 2025-06-16 DIAGNOSIS — Z80.0 FAMILY HISTORY OF COLON CANCER: ICD-10-CM

## 2025-06-16 DIAGNOSIS — Z71.83 ENCOUNTER FOR NONPROCREATIVE GENETIC COUNSELING AND TESTING: ICD-10-CM

## 2025-06-16 DIAGNOSIS — Z80.42 FAMILY HISTORY OF PROSTATE CANCER: Primary | ICD-10-CM

## 2025-06-16 PROCEDURE — 99999 PR PBB SHADOW E&M-EST. PATIENT-LVL II: CPT | Mod: PBBFAC,HCNC,,

## 2025-06-16 PROCEDURE — 36415 COLL VENOUS BLD VENIPUNCTURE: CPT | Mod: HCNC

## 2025-06-16 NOTE — PROGRESS NOTES
"Cancer Genetics  Hereditary and High-Risk Clinic  Department of Hematology and Oncology  [facility]    [facility]    Date of Service:  25  Visit Provider:  Barry Samayoa, [facility] [place]  Collaborating Physician:  Edmundo Farrell MD    Patient ID  Name: Tiffany Masterson    : 1935    MRN: [id number]      Referring Provider:   Bebeto Gonzalez MD  [address] [place] [address]    Visit Information  The patient location is:  [facility]    The chief complaint leading to consultation is:  As below.    Visit type: in-person  Face-to-face time with patient: 53 minutes.    71 minutes in total were spent on this encounter, which includes face-to-face time and non-face-to-face time preparing to see the patient (e.g., review of tests), obtaining and/or reviewing separately obtained history, documenting clinical information in the electronic or other health record, independently interpreting results (not separately reported) and communicating results to the patient/family/caregiver, or coordinating care (not separately reported).  .    IMRESSION     Tiffany Masterson is a 89 y.o. yo female who was referred to genetic counseling due to her family history of colon cancer and personal history of colon polyps, for which she meets NCCN guidelines for testing (>20 cumulative adenomas). A sample will be submitted to [facility] on 25 for the xG panel, along with insurance information. Results will be available within 2-3 weeks of sample submission. Tiffany was accompanied to the appointment by her spouse, Lauro.    SUBJECTIVE      Chief Complaint: Genetic evaluation (family history of colon cancer and personal history of colon polyps)    History of Present Illness (HPI):  Tiffany Masterson ("Tiffany"), 89 y.o., assigned female sex at birth, is new to the [facility] and to me.  She was referred by Dr. Bebeto Gonzalez (GI) for hereditary " "cancer risk assessment given her family history of colon cancer and personal history of colon polyps    Age:  89 y.o.   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  Patient attributes this to general life stress.  Patient denies experiencing suicidal or homicidal ideations (SI/HI).     FAMILY HISTORY       ONCOLOGY PEDIGREE  Ashkenazi Mosque:  No  Consanguinity:  No  Hereditary cancer genetic testing in blood relatives:  No    Family Cancer Pedigree:  Pedigree Image    Tiffany reported her  family history of cancer as follows:   Mother ( at 75y) diagnosed with colon cancer at 70y  Father ( at 82y) diagnosed with lung cancer at 75y  Brother (92y) diagnosed with prostate cancer in his 70s-80s  Paternal aunt () diagnosed with cancer, unknown type   Maternal maternal first cousin once removed () diagnosed with colon cancer     A family history of birth defects, intellectual disability, SIDS, sudden early death, multiple miscarriages and consanguinity were denied. Please refer to above pedigree for further details. A larger copy is available for review in the Media tab.     COUNSELING      Pre-test cancer genetic counseling was conducted.        Causes of Cancer:  Cancer occurs when " cells grow out of control. Some genes help protect against cancer by controlling the growth of cells. However, mutations (problems) in these genes can prevent them from working properly, increasing the risk of developing cancer.  Sporadic Cancer: Most people who get cancer have sporadic cancer. Sporadic cancer is caused by mutations that occur during the lifetime. These mutations may be caused by aging, environmental exposures (ex. UV radiation, carcinogens), lifestyle (ex. smoking, drinking alcohol, sunbathing, poor diet), other medical conditions (ex. hepatitis, HPV, ulcerative colitis), or other factors.   Hereditary Cancer: A small percentage (5-10%) of people who develop cancer were born with a mutation already in one of the cancer protection genes.  Familial Cancer: Cancer can also cluster in families that do not have an identifiable mutation. This may be due to shared environmental or lifestyle factors or genetic risk factors that have not been identified or cannot be detected using current technology or panels.     The likelihood of having a hereditary cancer risk depends on many factors including who in the family had cancer, what type of cancer they had, their age at diagnosis, cancer specifics (such as MMR status of an endometrial or colon cancer or type of breast cancer), and previous genetic testing in the family. Typically, the chance is higher for families that have multiple people with the same or related cancers, an individual with multiple cancers, younger ages of cancer, and certain types of cancer (such as pancreatic or triple negative breast cancer).     Adenomatous polyposis may be indicative of three hereditary cancer syndromes: familial adenomatous polyposis (FAP), attenuated familial adenomatous polyposis (AFAP), and MUTYH-associated polyposis (MAP). Mutations in the APC gene cause FAP. Individuals with FAP typically have 100s to 1000s of adenomatous polyps that carpet the colon. If  untreated with total colectomy, the lifetime risk of colon cancer is %.  Individuals with FAP may also develop polyps in other locations along the GI tract, such as in the stomach or small intestine.  Individuals with FAP have an increased lifetime risk of the following cancers:  duodenal (5-12%), thyroid (papillary, 2%), pancreatic (2%), hepatoblastoma (childhood, 2%), gastric (~1%), brain (~1%). Other non-cancer findings include congenital hypertrophy of retinal pigment epithelium, osteomas, supernumerary teeth, odontomas, and desmoids. Mutations in APC may also cause AFAP. AFAP is characterized by 10s-100s of polyps, often seen at a later age than classic FAP. Individuals with AFAP have up to a 70% lifetime risk of developing colon cancer. AFAP is believed to have the same extracolonic risks as classic FAP.    Biallelic mutations in the MUTYH gene cause MAP. Individuals with MAP typically have 10s-100s of polyps and may have up to a 100% lifetime risk of cancer if proper screening is not pursued. There is a 25% risk of duodenal polyps and a 4% risk of duodenal cancer. There may be other cancer risks, but they are not well described at this time, and colorectal cancer risks for heterozygotes are still under investigation. Additionally, mutations in other genes can cause polyposis, including BMPR1A, GREM1, NTHL1, POLD1, POLE, STK11 and SMAD4.    We reviewed that mutations in the highly penetrant genes put an individual at a significantly increased risk of colorectal and other cancers.  There are established screening and surgery guidelines for these syndromes. Mutations in the moderately penetrant genes increase the risk of colorectal and other cancers, but less is understood regarding their role in cancer risk. There may not be standard screening or management guidelines for individuals who have mutations in these genes.      Possible Results:    Positive (pathogenic or likely pathogenic variant): A genetic  variant was found that is suspected or known to impact the function of the gene. The impact of a positive result on an individual's risk of cancer varies based on the gene, specific variant, individual's sex assigned at birth, personal cancer history, other health history (such as surgical history), and family history. A positive result can impact screening and risk management recommendations. However, there are not always available guidelines for management based on a specific gene variant. Family history and personal risk factors should always be considered. Sometimes, a positive result can also have treatment or reproductive implications.   Negative: No clinically significant variants were reported in the tested genes. A negative result does not indicate that an individual cannot develop cancer or even that the individual is at average risk. An individual may still be at an increased risk for cancer based on personal risk factors or family history. Additionally, there could be a hereditary cancer predisposition that was not included in a chosen panel or is not detected with current technology.   Variant of Uncertain Significance (VUS): A variant was found. However, the lab does not have enough information to determine if the variant is benign (harmless) or pathogenic (impacts the function of the gene). The laboratory may update (reclassify) the variant over time as more information becomes available. When reclassified, most variants of uncertain significance are reclassified to benign/likely benign. Typically, it is not recommended to  based on the presence of a VUS. The chance of finding a VUS varies based on the test performed. Generally, the chance of finding a VUS increases with the number of genes tested and decreases with the amount of testing of that gene (genes that are tested more frequently or for a longer period of time have a lower VUS rate).     Genetic Mutation Inheritance:  When an  individual has a gene mutation, their first-degree relatives (parents, children, and siblings) each have a 50% chance of carrying the same mutation. Other, more distant blood relatives can also be at risk of carrying the same mutation. At-risk relatives of an individual with a mutation should consider genetic testing to help determine their risk for cancer.     Genetic Discrimination: The Genetic Information Nondiscrimination Act of 2008 (NICHELLE) is a [place] federal law that provides some protections against the use of an individual's genetic information by their health insurer and by their employer. Title I of NICHELLE prohibits most health insurers (except for insurance obtained through a job with the  or the Federal Employees Health Benefits Plan) from utilizing an individual's genetic information to make decisions regarding insurance eligibility or premium charges. Title II of NICHELLE prohibits covered entities from requesting or requiring the genetic information of employees and applicants and from using said information to make employment decisions. This does not apply to employers with fewer than 15 employees or to the .  NICHELLE also does not protect individuals from genetic discrimination by any other type of policy or entity, including but not limited to life insurance, disability insurance, long-term care insurance,  benefits, and [facility] benefits.    Genetic Testing Options:   Various genetic testing panel options were discussed along with associated benefits, limitations, and risks.   There are several issues to consider regarding multi-gene testing:  Insurance coverage can vary depending on the genetic test panel(s) ordered.  There are limited data and a lack of clear guidelines regarding degree of cancer risk associated with some of the genes assessed and how to communicate and manage risk for carriers of these genes; this issue is compounded by the low incidence rates of  hereditary disease; multi-gene tests include moderate-penetrance genes, and they often also include low-penetrance genes for which there are little available data regarding degree of cancer risk and guidelines for risk management.  Increased likelihood of detecting a genetic variant of unknown significance (VUS).  Increased chance of finding genotypically distinct cell lines (i.e., genetic mosaicism) with next-generation sequencing; clones of non-cancerous cell containing certain genetic mutations have been found in healthy adults undergoing multi-gene testing; this phenomenon can often be attributed to clonal hematopoiesis, a condition in which a hematopoietic stem cell begins making blood cells with the same acquired mutation.    The option for DNA only vs DNA+RNA was discussed. Adding RNA has a small chance of helping to clarify a VUS or detecting genetic variants that DNA only cannot (deep intronic variants). However, it must be conducted on a blood sample (not saliva).     Genetic Testing Guidelines: Tiffany's reported personal history meets the genetic testing criteria established by the National Comprehensive Cancer Network Genetic/Familial High-Risk Assessment: Colorectal version 4.2024.  Therefore, Tiffany was offered germline hereditary cancer genetic testing. Tiffany decided to proceed with testing after a discussion regarding various genetic testing panels that could be performed as well as associated risks. Tiffany has provided informed consent.     ASSESSMENT / PLAN      Genetic Testing Logistics:  An outside laboratory performs this genetic testing. Genetic testing typically takes 2-3 weeks to after the sample has been received.  There is a potential for the patient to have out-of-pocket costs related to genetic testing.  Post-test genetic counseling can be conducted once the genetic testing results are available.    Genetic Test Information:  Testing lab: [facility]   Test panel: xG   ICD-10 code(s): D12.6, Z80.0,  Z80.41   Verbal informed consent: Obtained   Specimen type: Blood  (Patient denies blood disorders that would necessitate a skin fibroblast specimen)   Specimen collection by: [facility]   Specimen collection date: 6/16/25   Results expected by: Approximately 2-3 weeks after the genetic testing lab receives the specimen   Results disclosure plan: Post-test visit if positive or complex result; otherwise, results will be communicated by phone call      Follow-up: Post-test genetic counseling will be conducted once the genetic testing results are available.    Tiffany appeared to have a good understanding of the information. Questions were encouraged and answered to the patient's satisfaction, and she verbalized understanding of the information and agreement with the plan.   Tiffany is welcome to contact me if she has any questions, concerns, or updates to her family history.     This assessment is based on the history and reports provided, as well as the current scientific knowledge regarding cancer genetics.     Barry Samayoa, MGC, [facility]  Certified Genetic Counselor  Department of Hematology and Oncology  [facility]    CC:  Dr. Bebeto Gonzalez

## 2025-06-25 ENCOUNTER — OFFICE VISIT (OUTPATIENT)
Dept: UROLOGY | Facility: CLINIC | Age: 89
End: 2025-06-25
Payer: MEDICARE

## 2025-06-25 VITALS
DIASTOLIC BLOOD PRESSURE: 59 MMHG | SYSTOLIC BLOOD PRESSURE: 102 MMHG | BODY MASS INDEX: 24.45 KG/M2 | HEART RATE: 71 BPM | WEIGHT: 151.44 LBS

## 2025-06-25 DIAGNOSIS — R35.0 URINARY FREQUENCY: Primary | ICD-10-CM

## 2025-06-25 DIAGNOSIS — R35.1 NOCTURIA: ICD-10-CM

## 2025-06-25 DIAGNOSIS — R32 URINARY INCONTINENCE, UNSPECIFIED TYPE: ICD-10-CM

## 2025-06-25 PROCEDURE — 99204 OFFICE O/P NEW MOD 45 MIN: CPT | Mod: S$GLB,,, | Performed by: UROLOGY

## 2025-06-25 PROCEDURE — 1101F PT FALLS ASSESS-DOCD LE1/YR: CPT | Mod: CPTII,S$GLB,, | Performed by: UROLOGY

## 2025-06-25 PROCEDURE — 3288F FALL RISK ASSESSMENT DOCD: CPT | Mod: CPTII,S$GLB,, | Performed by: UROLOGY

## 2025-06-25 PROCEDURE — 99999 PR PBB SHADOW E&M-EST. PATIENT-LVL III: CPT | Mod: PBBFAC,,, | Performed by: UROLOGY

## 2025-06-25 PROCEDURE — G2211 COMPLEX E/M VISIT ADD ON: HCPCS | Mod: S$GLB,,, | Performed by: UROLOGY

## 2025-06-25 PROCEDURE — 1157F ADVNC CARE PLAN IN RCRD: CPT | Mod: CPTII,S$GLB,, | Performed by: UROLOGY

## 2025-06-25 PROCEDURE — 1159F MED LIST DOCD IN RCRD: CPT | Mod: CPTII,S$GLB,, | Performed by: UROLOGY

## 2025-06-25 NOTE — PROGRESS NOTES
[facility]      New Overactive Bladder (OAB) Note    6/26/2025    Referred by:  Bebeto Gonzalez MD    The patient has not been previously seen by a specialist board-certified in Female Urology/Urogynecology (Health Care Provider Taxonomy code [id number]) in our system previously and is meeting with me today for specialty care.     History of Present Illness    CHIEF COMPLAINT:  Patient, an 89-year-old female, presents with urinary incontinence that started after a fall in September of the previous year.    HPI:  Patient reports urinary incontinence that began approximately 9 months ago following a fall in September of last year. The fall resulted in a concussion and she was in the ICU for about 15 days, consistent with the traumatic brain injury noted in her history. She has urinary urgency, describing an immediate need to urinate with very little warning time. She gets up to urinate 2-3 times per night.    Patient mentions severe pain across her side in the last 10 days, for which she has been prescribed gabapentin. She also reports trouble with her shoulders after the fall. She notes changes in her bowel movements, describing it as more severe than constipation, with episodes of not having a bowel movement for up to 3 days.    Regarding caffeine intake, she reports up to 4 Coca-Cola drinks per day and some iced tea. She denies coffee consumption. Her son, who is present during the visit, confirms her high caffeine intake.    She denies difficulty urinating, pushing or straining to urinate, having to push up on a vaginal bulge to urinate, and blood in her urine. She denies having had a hysterectomy, incontinence surgery, or prolapse surgery.    MEDICATIONS:  Patient is on Dolcolax and Docusate for constipation. She is using topical vaginal estrogen for recurrent urinary tract infections. She is on Lasix, which may increase urinary frequency. Patient started Gabapentin in the last 10 days for  pain in her side and back. She is on Eliquis as a anticoagulant and is also taking Jardiance.    MEDICAL HISTORY:  Patient has a history of transient ischemic attacks (TIAs), hypertension, atrial fibrillation, urethral caruncle, and hypernatremia. She experienced a traumatic brain injury in September last year, which resulted in an ICU stay for about 15 days. Patient has not had a hysterectomy.    FAMILY HISTORY:  Family history is significant for the patient's 63-year-old son expecting a baby in July.    SURGICAL HISTORY:  Patient underwent a hip replacement in 2011, with the indication not specified.    TEST RESULTS:  A urine culture from January 2025 was positive for bacteriuria. Upper urinary tract imaging: No recent imaging    SOCIAL HISTORY:  Smoking: Never smoker          A review of 10+ systems was conducted with pertinent positive and negative findings documented in HPI with all other systems reviewed and negative.    Past medical, family, surgical and social history reviewed as documented in chart with pertinent positive medical, family, surgical and social history detailed in HPI.    Previous OAB therapies:  Behavioral Therapies  None  Medications  None  Other Therapies  No Other Therapies    Previous AMY therapies:  no previous therapy      Exam Findings:    Physical Exam    General: No acute distress. Nontoxic appearing.  HENT: Normocephalic. Atraumatic.  Respiratory: Normal respiratory effort. No conversational dyspnea. No audible wheezing.  Abdomen: No obvious distension.  Skin: No visible abnormalities.  Extremities: No edema upper extremities. No edema lower extremities.  Neurological: Alert and oriented x3. Normal speech.  Psychiatric: Normal mood. Normal affect. No evidence of SI.          Assessment/Plan:    Assessment & Plan    Assessed urinary incontinence, noting urgency and nocturia.  Considered impact of recent traumatic brain injury and mobility issues on incontinence.  Evaluated caffeine  intake as potential contributing factor to urinary symptoms.  Started overactive bladder medication (either Mirabegron/Myrbetriq or Gemtesa, depending on insurance coverage and affordability) due to constipation concerns and need to avoid cognitive side effects.  Plan to combine medication therapy with caffeine reduction to address symptoms.    URINARY INCONTINENCE, UNSPECIFIED TYPE:  1. Explained relationship between mobility issues and incontinence, noting that slower movement to bathroom can lead to accidents even if bladder function remains unchanged.  2. Discussed potential stimulating effect of artificial sweeteners on bladder function.  3. Explained impact of caffeine on bladder function and sleep.  4. Patient to limit caffeine intake to 100-150 mg per day: Reduce Coca-Cola consumption to 1 per day.  5. Limit iced tea to 1 glass per day.  6. Avoid diet sodas with artificial sweeteners.  7. Follow up in 6 weeks to assess progress with new medication and lifestyle changes.    OVERACTIVE BLADDER:  1. Started overactive bladder medication (either Mirabegron/Myrbetriq or Gemtesa, depending on insurance coverage and affordability) due to constipation concerns and need to avoid cognitive side effects.  2. Explained impact of caffeine on bladder function and sleep.  3. Patient to limit caffeine intake to 100-150 mg per day: Reduce Coca-Cola consumption to 1 per day.  4. Limit iced tea to 1 glass per day.  5. Avoid diet sodas with artificial sweeteners.  6. Follow up in 6 weeks to assess progress with new medication and lifestyle changes.    CONSTIPATION, UNSPECIFIED:  1. Started overactive bladder medication (either Mirabegron/Myrbetriq or Gemtesa, depending on insurance coverage and affordability) due to constipation concerns and need to avoid cognitive side effects.    HISTORY OF FALLIN. Explained relationship between mobility issues and incontinence, noting that slower movement to bathroom can lead to  accidents even if bladder function remains unchanged.    PLAN SUMMARY:  1. Started overactive bladder medication (Mirabegron/Myrbetriq or Gemtesa, based on insurance coverage and affordability)  2. Limit caffeine intake to 100-150 mg per day  3. Reduce Coca-Cola consumption to 1 per day  4. Limit iced tea to 1 glass per day  5. Avoid diet sodas with artificial sweeteners  6. Follow-up in 6 weeks to assess progress with new medication and lifestyle changes              Visit complexity today is associated with medical care services that are part of the ongoing care related to the single serious and/or complex condition of Overactive bladder (OAB). A longitudinal relationship exists or is being developed between the patient and this practitioner for the care of this condition.

## 2025-06-26 RX ORDER — MIRABEGRON 50 MG/1
50 TABLET, FILM COATED, EXTENDED RELEASE ORAL DAILY
Qty: 90 TABLET | Refills: 3 | Status: SHIPPED | OUTPATIENT
Start: 2025-06-26 | End: 2026-06-26

## 2025-06-27 ENCOUNTER — TELEPHONE (OUTPATIENT)
Dept: UROLOGY | Facility: CLINIC | Age: 89
End: 2025-06-27
Payer: MEDICARE

## 2025-06-27 NOTE — TELEPHONE ENCOUNTER
Called pt and relayed Dr. De León's message    ----- Message from Burak De León MD sent at 6/26/2025  3:53 PM CDT -----  Can you please let them know that I have sent her prescription to her [facility] pharmacy this afternoon.   Thank you,  CG

## 2025-07-22 DIAGNOSIS — I50.41 ACUTE COMBINED SYSTOLIC AND DIASTOLIC HEART FAILURE: ICD-10-CM

## 2025-07-22 RX ORDER — EMPAGLIFLOZIN 10 MG/1
10 TABLET, FILM COATED ORAL
Qty: 90 TABLET | Refills: 0 | Status: SHIPPED | OUTPATIENT
Start: 2025-07-22

## 2025-07-22 NOTE — TELEPHONE ENCOUNTER
No care due was identified.  [facility] Embedded Care Due Messages. Reference number: [id number].   7/22/2025 12:23:46 PM CDT

## 2025-08-12 ENCOUNTER — OFFICE VISIT (OUTPATIENT)
Dept: UROLOGY | Facility: CLINIC | Age: 89
End: 2025-08-12
Payer: MEDICARE

## 2025-08-12 VITALS
BODY MASS INDEX: 24.73 KG/M2 | SYSTOLIC BLOOD PRESSURE: 107 MMHG | DIASTOLIC BLOOD PRESSURE: 67 MMHG | HEART RATE: 64 BPM | WEIGHT: 153.25 LBS

## 2025-08-12 DIAGNOSIS — R35.1 NOCTURIA: ICD-10-CM

## 2025-08-12 DIAGNOSIS — R32 URINARY INCONTINENCE, UNSPECIFIED TYPE: Primary | ICD-10-CM

## 2025-08-12 PROCEDURE — 1100F PTFALLS ASSESS-DOCD GE2>/YR: CPT | Mod: CPTII,S$GLB,, | Performed by: UROLOGY

## 2025-08-12 PROCEDURE — 99999 PR PBB SHADOW E&M-EST. PATIENT-LVL III: CPT | Mod: PBBFAC,,, | Performed by: UROLOGY

## 2025-08-12 PROCEDURE — 3288F FALL RISK ASSESSMENT DOCD: CPT | Mod: CPTII,S$GLB,, | Performed by: UROLOGY

## 2025-08-12 PROCEDURE — 99214 OFFICE O/P EST MOD 30 MIN: CPT | Mod: S$GLB,,, | Performed by: UROLOGY

## 2025-08-12 PROCEDURE — 1159F MED LIST DOCD IN RCRD: CPT | Mod: CPTII,S$GLB,, | Performed by: UROLOGY

## 2025-08-12 PROCEDURE — 1126F AMNT PAIN NOTED NONE PRSNT: CPT | Mod: CPTII,S$GLB,, | Performed by: UROLOGY

## 2025-08-12 PROCEDURE — 1157F ADVNC CARE PLAN IN RCRD: CPT | Mod: CPTII,S$GLB,, | Performed by: UROLOGY

## 2025-08-12 RX ORDER — VIBEGRON 75 MG/1
75 TABLET, FILM COATED ORAL DAILY
Qty: 30 TABLET | Refills: 11 | Status: SHIPPED | OUTPATIENT
Start: 2025-08-12

## 2025-08-13 ENCOUNTER — HOSPITAL ENCOUNTER (OUTPATIENT)
Dept: RADIOLOGY | Facility: HOSPITAL | Age: 89
Discharge: HOME | End: 2025-08-13
Attending: INTERNAL MEDICINE
Payer: MEDICARE

## 2025-08-13 DIAGNOSIS — R42 DIZZINESS AND GIDDINESS: Primary | ICD-10-CM

## 2025-08-13 DIAGNOSIS — R42 DIZZINESS AND GIDDINESS: ICD-10-CM

## 2025-08-13 PROCEDURE — 70450 CT HEAD/BRAIN W/O DYE: CPT | Mod: 26,HCNC,, | Performed by: RADIOLOGY

## 2025-08-13 PROCEDURE — 70450 CT HEAD/BRAIN W/O DYE: CPT | Mod: TC,HCNC

## 2025-08-26 ENCOUNTER — EXTERNAL HOME HEALTH (OUTPATIENT)
Dept: HOME HEALTH SERVICES | Facility: HOSPITAL | Age: 89
End: 2025-08-26
Payer: MEDICARE

## 2025-08-29 ENCOUNTER — PATIENT MESSAGE (OUTPATIENT)
Dept: UROLOGY | Facility: CLINIC | Age: 89
End: 2025-08-29
Payer: MEDICARE

## 2025-09-02 ENCOUNTER — TELEPHONE (OUTPATIENT)
Dept: UROLOGY | Facility: CLINIC | Age: 89
End: 2025-09-02
Payer: MEDICARE

## 2025-09-02 ENCOUNTER — OFFICE VISIT (OUTPATIENT)
Dept: PRIMARY CARE CLINIC | Facility: CLINIC | Age: 89
End: 2025-09-02
Payer: MEDICARE

## 2025-09-02 VITALS
HEIGHT: 66 IN | DIASTOLIC BLOOD PRESSURE: 64 MMHG | OXYGEN SATURATION: 99 % | BODY MASS INDEX: 24.55 KG/M2 | SYSTOLIC BLOOD PRESSURE: 132 MMHG | HEART RATE: 58 BPM | WEIGHT: 152.75 LBS

## 2025-09-02 DIAGNOSIS — R29.898 WEAKNESS OF BOTH LOWER EXTREMITIES: ICD-10-CM

## 2025-09-02 DIAGNOSIS — R29.6 FREQUENT FALLS: ICD-10-CM

## 2025-09-02 DIAGNOSIS — R26.89 POOR BALANCE: ICD-10-CM

## 2025-09-02 DIAGNOSIS — M75.81 ROTATOR CUFF TENDONITIS, RIGHT: Primary | ICD-10-CM

## 2025-09-02 DIAGNOSIS — Z00.00 PREVENTATIVE HEALTH CARE: ICD-10-CM

## 2025-09-02 PROBLEM — L98.9 SKIN LESIONS: Status: RESOLVED | Noted: 2024-09-19 | Resolved: 2025-09-02

## 2025-09-02 PROCEDURE — 99999 PR PBB SHADOW E&M-EST. PATIENT-LVL V: CPT | Mod: PBBFAC,HCNC,, | Performed by: INTERNAL MEDICINE

## 2025-09-03 RX ORDER — VIBEGRON 75 MG/1
75 TABLET, FILM COATED ORAL DAILY
Qty: 90 TABLET | Refills: 3 | Status: SHIPPED | OUTPATIENT
Start: 2025-09-03

## (undated) DEVICE — ADHESIVE MASTISOL VIAL 48/BX

## (undated) DEVICE — DRESSING LEUKOPLAST FLEX 1X3IN

## (undated) DEVICE — GOWN SURGICAL X-LARGE

## (undated) DEVICE — SEE MEDLINE ITEM 157117

## (undated) DEVICE — SUT MCRYL PLUS 4-0 PS2 27IN

## (undated) DEVICE — SOL IRR NACL .9% 3000ML

## (undated) DEVICE — DEVICE TRUECLEAR INCISOR 2.9

## (undated) DEVICE — TROCAR ENDOPATH XCEL 12MM 10CM

## (undated) DEVICE — ELECTRODE REM PLYHSV RETURN 9

## (undated) DEVICE — GLOVE BIOGEL SKINSENSE PI 8.0

## (undated) DEVICE — PAD DEFIB CADENCE ADULT R2

## (undated) DEVICE — CANNULA ENDOPATH XCEL 5X100MM

## (undated) DEVICE — TROCAR ENDOPATH XCEL 5X100MM

## (undated) DEVICE — NDL HYPO REG 25G X 1 1/2

## (undated) DEVICE — TRAY MINOR GEN SURG

## (undated) DEVICE — SET INFLOW TUBE HYSTER

## (undated) DEVICE — SYS SMOKE EVACUATION LAP

## (undated) DEVICE — SCISSOR 5MMX35CM DIRECT DRIVE

## (undated) DEVICE — Device

## (undated) DEVICE — TUBING HF INSUFFLATION W/ FLTR

## (undated) DEVICE — IRRIGATOR ENDOSCOPY DISP.

## (undated) DEVICE — SOL 9P NACL IRR PIC IL

## (undated) DEVICE — GLOVE BIOGEL SKINSENSE PI 7.5

## (undated) DEVICE — CLIP HEMO-LOK ML

## (undated) DEVICE — DRAPE ABDOMINAL TIBURON 14X11

## (undated) DEVICE — SEE MEDLINE ITEM 146313

## (undated) DEVICE — STRIP STERI REIN CLSR 1/2X2IN

## (undated) DEVICE — SUT 0 VICRYL / UR6 (J603)

## (undated) DEVICE — SET BASIN 48X48IN 6000ML RING